# Patient Record
Sex: MALE | Race: WHITE | NOT HISPANIC OR LATINO | Employment: OTHER | URBAN - METROPOLITAN AREA
[De-identification: names, ages, dates, MRNs, and addresses within clinical notes are randomized per-mention and may not be internally consistent; named-entity substitution may affect disease eponyms.]

---

## 2020-01-14 ENCOUNTER — TRANSCRIBE ORDERS (OUTPATIENT)
Dept: ADMINISTRATIVE | Facility: HOSPITAL | Age: 65
End: 2020-01-14

## 2020-01-14 DIAGNOSIS — R07.9 CHEST PAIN, UNSPECIFIED TYPE: Primary | ICD-10-CM

## 2020-01-27 ENCOUNTER — HOSPITAL ENCOUNTER (OUTPATIENT)
Dept: NON INVASIVE DIAGNOSTICS | Facility: CLINIC | Age: 65
Discharge: HOME | End: 2020-01-27
Payer: COMMERCIAL

## 2020-01-27 DIAGNOSIS — R07.9 CHEST PAIN, UNSPECIFIED TYPE: ICD-10-CM

## 2020-01-27 PROCEDURE — 78452 HT MUSCLE IMAGE SPECT MULT: CPT

## 2020-01-27 PROCEDURE — 93016 CV STRESS TEST SUPVJ ONLY: CPT | Performed by: INTERNAL MEDICINE

## 2020-01-27 PROCEDURE — 78452 HT MUSCLE IMAGE SPECT MULT: CPT | Performed by: INTERNAL MEDICINE

## 2020-01-27 PROCEDURE — 93017 CV STRESS TEST TRACING ONLY: CPT

## 2020-01-27 PROCEDURE — A9502 TC99M TETROFOSMIN: HCPCS

## 2020-01-27 PROCEDURE — 93018 CV STRESS TEST I&R ONLY: CPT | Performed by: INTERNAL MEDICINE

## 2020-01-27 RX ADMIN — REGADENOSON 0.4 MG: 0.08 INJECTION, SOLUTION INTRAVENOUS at 11:15

## 2020-01-28 LAB
CHEST PAIN STATEMENT: NORMAL
MAX DIASTOLIC BP: 80 MMHG
MAX HEART RATE: 110 BPM
MAX PREDICTED HEART RATE: 157 BPM
MAX. SYSTOLIC BP: 154 MMHG
PROTOCOL NAME: NORMAL
REASON FOR TERMINATION: NORMAL
TARGET HR FORMULA: NORMAL
TIME IN EXERCISE PHASE: NORMAL

## 2021-03-29 ENCOUNTER — APPOINTMENT (EMERGENCY)
Dept: CT IMAGING | Facility: HOSPITAL | Age: 66
End: 2021-03-29
Payer: COMMERCIAL

## 2021-03-29 ENCOUNTER — APPOINTMENT (OUTPATIENT)
Dept: NON INVASIVE DIAGNOSTICS | Facility: HOSPITAL | Age: 66
End: 2021-03-29
Payer: COMMERCIAL

## 2021-03-29 ENCOUNTER — APPOINTMENT (EMERGENCY)
Dept: RADIOLOGY | Facility: HOSPITAL | Age: 66
End: 2021-03-29
Payer: COMMERCIAL

## 2021-03-29 ENCOUNTER — APPOINTMENT (OUTPATIENT)
Dept: MRI IMAGING | Facility: HOSPITAL | Age: 66
End: 2021-03-29
Payer: COMMERCIAL

## 2021-03-29 ENCOUNTER — APPOINTMENT (OUTPATIENT)
Dept: VASCULAR ULTRASOUND | Facility: HOSPITAL | Age: 66
End: 2021-03-29
Payer: COMMERCIAL

## 2021-03-29 ENCOUNTER — HOSPITAL ENCOUNTER (OUTPATIENT)
Facility: HOSPITAL | Age: 66
Setting detail: OBSERVATION
Discharge: HOME | End: 2021-03-30
Attending: EMERGENCY MEDICINE | Admitting: HOSPITALIST
Payer: COMMERCIAL

## 2021-03-29 DIAGNOSIS — N17.9 AKI (ACUTE KIDNEY INJURY) (HCC): ICD-10-CM

## 2021-03-29 DIAGNOSIS — I10 HTN (HYPERTENSION): ICD-10-CM

## 2021-03-29 DIAGNOSIS — F10.10 ALCOHOL ABUSE: ICD-10-CM

## 2021-03-29 DIAGNOSIS — E78.5 HYPERLIPIDEMIA, UNSPECIFIED HYPERLIPIDEMIA TYPE: ICD-10-CM

## 2021-03-29 DIAGNOSIS — E87.1 HYPONATREMIA: ICD-10-CM

## 2021-03-29 DIAGNOSIS — G45.9 TIA (TRANSIENT ISCHEMIC ATTACK): Primary | ICD-10-CM

## 2021-03-29 DIAGNOSIS — I63.411 CEREBROVASCULAR ACCIDENT (CVA) DUE TO EMBOLISM OF RIGHT MIDDLE CEREBRAL ARTERY (HCC): ICD-10-CM

## 2021-03-29 DIAGNOSIS — Z72.0 TOBACCO USE: ICD-10-CM

## 2021-03-29 DIAGNOSIS — I65.21 SYMPTOMATIC CAROTID ARTERY STENOSIS, RIGHT: ICD-10-CM

## 2021-03-29 PROBLEM — R79.89 ELEVATED SERUM CREATININE: Status: ACTIVE | Noted: 2021-03-29

## 2021-03-29 PROBLEM — R29.898 LEFT ARM WEAKNESS: Status: ACTIVE | Noted: 2021-03-29

## 2021-03-29 PROBLEM — J44.9 COPD (CHRONIC OBSTRUCTIVE PULMONARY DISEASE) (HCC): Status: ACTIVE | Noted: 2021-03-29

## 2021-03-29 PROBLEM — I63.9 STROKE (CEREBRUM) (HCC): Status: ACTIVE | Noted: 2021-03-29

## 2021-03-29 PROBLEM — D64.9 ANEMIA: Status: ACTIVE | Noted: 2021-03-29

## 2021-03-29 LAB
ALBUMIN SERPL BCP-MCNC: 3.6 G/DL (ref 3.5–5)
ALP SERPL-CCNC: 41 U/L (ref 46–116)
ALT SERPL W P-5'-P-CCNC: 18 U/L (ref 12–78)
ANION GAP SERPL CALCULATED.3IONS-SCNC: 11 MMOL/L (ref 4–13)
ANION GAP SERPL CALCULATED.3IONS-SCNC: 11 MMOL/L (ref 4–13)
ANION GAP SERPL CALCULATED.3IONS-SCNC: 9 MMOL/L (ref 4–13)
APTT PPP: 37 SECONDS (ref 23–37)
AST SERPL W P-5'-P-CCNC: 24 U/L (ref 5–45)
ATRIAL RATE: 71 BPM
BASOPHILS # BLD AUTO: 0.04 THOUSANDS/ΜL (ref 0–0.1)
BASOPHILS NFR BLD AUTO: 1 % (ref 0–1)
BILIRUB SERPL-MCNC: 0.32 MG/DL (ref 0.2–1)
BUN SERPL-MCNC: 14 MG/DL (ref 5–25)
BUN SERPL-MCNC: 16 MG/DL (ref 5–25)
BUN SERPL-MCNC: 16 MG/DL (ref 5–25)
CALCIUM SERPL-MCNC: 8.6 MG/DL (ref 8.3–10.1)
CALCIUM SERPL-MCNC: 8.7 MG/DL (ref 8.3–10.1)
CALCIUM SERPL-MCNC: 8.8 MG/DL (ref 8.3–10.1)
CHLORIDE SERPL-SCNC: 102 MMOL/L (ref 100–108)
CHLORIDE SERPL-SCNC: 107 MMOL/L (ref 100–108)
CHLORIDE SERPL-SCNC: 97 MMOL/L (ref 100–108)
CHOLEST SERPL-MCNC: 190 MG/DL (ref 50–200)
CO2 SERPL-SCNC: 24 MMOL/L (ref 21–32)
CO2 SERPL-SCNC: 25 MMOL/L (ref 21–32)
CO2 SERPL-SCNC: 25 MMOL/L (ref 21–32)
CREAT SERPL-MCNC: 1.23 MG/DL (ref 0.6–1.3)
CREAT SERPL-MCNC: 1.3 MG/DL (ref 0.6–1.3)
CREAT SERPL-MCNC: 1.35 MG/DL (ref 0.6–1.3)
EOSINOPHIL # BLD AUTO: 0.21 THOUSAND/ΜL (ref 0–0.61)
EOSINOPHIL NFR BLD AUTO: 3 % (ref 0–6)
ERYTHROCYTE [DISTWIDTH] IN BLOOD BY AUTOMATED COUNT: 14.4 % (ref 11.6–15.1)
ERYTHROCYTE [DISTWIDTH] IN BLOOD BY AUTOMATED COUNT: 14.5 % (ref 11.6–15.1)
EST. AVERAGE GLUCOSE BLD GHB EST-MCNC: 120 MG/DL
ETHANOL SERPL-MCNC: 31 MG/DL (ref 0–3)
FERRITIN SERPL-MCNC: 12 NG/ML (ref 8–388)
FOLATE SERPL-MCNC: >20 NG/ML (ref 3.1–17.5)
GFR SERPL CREATININE-BSD FRML MDRD: 55 ML/MIN/1.73SQ M
GFR SERPL CREATININE-BSD FRML MDRD: 58 ML/MIN/1.73SQ M
GFR SERPL CREATININE-BSD FRML MDRD: 62 ML/MIN/1.73SQ M
GLUCOSE P FAST SERPL-MCNC: 111 MG/DL (ref 65–99)
GLUCOSE SERPL-MCNC: 111 MG/DL (ref 65–140)
GLUCOSE SERPL-MCNC: 115 MG/DL (ref 65–140)
GLUCOSE SERPL-MCNC: 145 MG/DL (ref 65–140)
GLUCOSE SERPL-MCNC: 99 MG/DL (ref 65–140)
HBA1C MFR BLD: 5.8 %
HCT VFR BLD AUTO: 32.3 % (ref 36.5–49.3)
HCT VFR BLD AUTO: 36.8 % (ref 36.5–49.3)
HDLC SERPL-MCNC: 59 MG/DL
HEMOCCULT STL QL: NEGATIVE
HGB BLD-MCNC: 11.2 G/DL (ref 12–17)
HGB BLD-MCNC: 9.8 G/DL (ref 12–17)
IMM GRANULOCYTES # BLD AUTO: 0.03 THOUSAND/UL (ref 0–0.2)
IMM GRANULOCYTES NFR BLD AUTO: 0 % (ref 0–2)
INR PPP: 1.01 (ref 0.84–1.19)
IRON SATN MFR SERPL: 6 %
IRON SERPL-MCNC: 26 UG/DL (ref 65–175)
LDLC SERPL CALC-MCNC: 91 MG/DL (ref 0–100)
LYMPHOCYTES # BLD AUTO: 1.98 THOUSANDS/ΜL (ref 0.6–4.47)
LYMPHOCYTES NFR BLD AUTO: 23 % (ref 14–44)
MAGNESIUM SERPL-MCNC: 1.2 MG/DL (ref 1.6–2.6)
MCH RBC QN AUTO: 23.4 PG (ref 26.8–34.3)
MCH RBC QN AUTO: 23.7 PG (ref 26.8–34.3)
MCHC RBC AUTO-ENTMCNC: 30.3 G/DL (ref 31.4–37.4)
MCHC RBC AUTO-ENTMCNC: 30.4 G/DL (ref 31.4–37.4)
MCV RBC AUTO: 77 FL (ref 82–98)
MCV RBC AUTO: 78 FL (ref 82–98)
MONOCYTES # BLD AUTO: 0.72 THOUSAND/ΜL (ref 0.17–1.22)
MONOCYTES NFR BLD AUTO: 9 % (ref 4–12)
NEUTROPHILS # BLD AUTO: 5.5 THOUSANDS/ΜL (ref 1.85–7.62)
NEUTS SEG NFR BLD AUTO: 64 % (ref 43–75)
NRBC BLD AUTO-RTO: 0 /100 WBCS
P AXIS: -13 DEGREES
PLATELET # BLD AUTO: 184 THOUSANDS/UL (ref 149–390)
PLATELET # BLD AUTO: 276 THOUSANDS/UL (ref 149–390)
PMV BLD AUTO: 10.4 FL (ref 8.9–12.7)
PMV BLD AUTO: 10.5 FL (ref 8.9–12.7)
POTASSIUM SERPL-SCNC: 3.2 MMOL/L (ref 3.5–5.3)
POTASSIUM SERPL-SCNC: 4 MMOL/L (ref 3.5–5.3)
POTASSIUM SERPL-SCNC: 4.1 MMOL/L (ref 3.5–5.3)
PR INTERVAL: 136 MS
PROT SERPL-MCNC: 7.7 G/DL (ref 6.4–8.2)
PROTHROMBIN TIME: 13.4 SECONDS (ref 11.6–14.5)
QRS AXIS: 63 DEGREES
QRSD INTERVAL: 74 MS
QT INTERVAL: 376 MS
QTC INTERVAL: 414 MS
RBC # BLD AUTO: 4.18 MILLION/UL (ref 3.88–5.62)
RBC # BLD AUTO: 4.72 MILLION/UL (ref 3.88–5.62)
SODIUM SERPL-SCNC: 132 MMOL/L (ref 136–145)
SODIUM SERPL-SCNC: 138 MMOL/L (ref 136–145)
SODIUM SERPL-SCNC: 141 MMOL/L (ref 136–145)
T WAVE AXIS: 72 DEGREES
TIBC SERPL-MCNC: 467 UG/DL (ref 250–450)
TRIGL SERPL-MCNC: 199 MG/DL
TROPONIN I SERPL-MCNC: <0.02 NG/ML
TSH SERPL DL<=0.05 MIU/L-ACNC: 1.69 UIU/ML (ref 0.36–3.74)
VENTRICULAR RATE: 73 BPM
VIT B12 SERPL-MCNC: 409 PG/ML (ref 100–900)
WBC # BLD AUTO: 5.26 THOUSAND/UL (ref 4.31–10.16)
WBC # BLD AUTO: 8.48 THOUSAND/UL (ref 4.31–10.16)

## 2021-03-29 PROCEDURE — 97162 PT EVAL MOD COMPLEX 30 MIN: CPT

## 2021-03-29 PROCEDURE — 82948 REAGENT STRIP/BLOOD GLUCOSE: CPT

## 2021-03-29 PROCEDURE — 70450 CT HEAD/BRAIN W/O DYE: CPT

## 2021-03-29 PROCEDURE — 97166 OT EVAL MOD COMPLEX 45 MIN: CPT

## 2021-03-29 PROCEDURE — 83036 HEMOGLOBIN GLYCOSYLATED A1C: CPT | Performed by: INTERNAL MEDICINE

## 2021-03-29 PROCEDURE — 85027 COMPLETE CBC AUTOMATED: CPT | Performed by: INTERNAL MEDICINE

## 2021-03-29 PROCEDURE — 93306 TTE W/DOPPLER COMPLETE: CPT

## 2021-03-29 PROCEDURE — 82272 OCCULT BLD FECES 1-3 TESTS: CPT | Performed by: INTERNAL MEDICINE

## 2021-03-29 PROCEDURE — 93010 ELECTROCARDIOGRAM REPORT: CPT | Performed by: INTERNAL MEDICINE

## 2021-03-29 PROCEDURE — 99285 EMERGENCY DEPT VISIT HI MDM: CPT

## 2021-03-29 PROCEDURE — 80048 BASIC METABOLIC PNL TOTAL CA: CPT | Performed by: INTERNAL MEDICINE

## 2021-03-29 PROCEDURE — 93880 EXTRACRANIAL BILAT STUDY: CPT | Performed by: SURGERY

## 2021-03-29 PROCEDURE — 80053 COMPREHEN METABOLIC PANEL: CPT | Performed by: EMERGENCY MEDICINE

## 2021-03-29 PROCEDURE — 84443 ASSAY THYROID STIM HORMONE: CPT | Performed by: EMERGENCY MEDICINE

## 2021-03-29 PROCEDURE — 99205 OFFICE O/P NEW HI 60 MIN: CPT | Performed by: PSYCHIATRY & NEUROLOGY

## 2021-03-29 PROCEDURE — 71045 X-RAY EXAM CHEST 1 VIEW: CPT

## 2021-03-29 PROCEDURE — 82728 ASSAY OF FERRITIN: CPT | Performed by: INTERNAL MEDICINE

## 2021-03-29 PROCEDURE — 82607 VITAMIN B-12: CPT | Performed by: INTERNAL MEDICINE

## 2021-03-29 PROCEDURE — 85730 THROMBOPLASTIN TIME PARTIAL: CPT | Performed by: EMERGENCY MEDICINE

## 2021-03-29 PROCEDURE — 82746 ASSAY OF FOLIC ACID SERUM: CPT | Performed by: INTERNAL MEDICINE

## 2021-03-29 PROCEDURE — 84484 ASSAY OF TROPONIN QUANT: CPT | Performed by: EMERGENCY MEDICINE

## 2021-03-29 PROCEDURE — 85610 PROTHROMBIN TIME: CPT | Performed by: EMERGENCY MEDICINE

## 2021-03-29 PROCEDURE — 93005 ELECTROCARDIOGRAM TRACING: CPT

## 2021-03-29 PROCEDURE — 99285 EMERGENCY DEPT VISIT HI MDM: CPT | Performed by: EMERGENCY MEDICINE

## 2021-03-29 PROCEDURE — 82077 ASSAY SPEC XCP UR&BREATH IA: CPT | Performed by: INTERNAL MEDICINE

## 2021-03-29 PROCEDURE — 36415 COLL VENOUS BLD VENIPUNCTURE: CPT | Performed by: EMERGENCY MEDICINE

## 2021-03-29 PROCEDURE — 83735 ASSAY OF MAGNESIUM: CPT | Performed by: INTERNAL MEDICINE

## 2021-03-29 PROCEDURE — 80061 LIPID PANEL: CPT | Performed by: INTERNAL MEDICINE

## 2021-03-29 PROCEDURE — 93880 EXTRACRANIAL BILAT STUDY: CPT

## 2021-03-29 PROCEDURE — 83550 IRON BINDING TEST: CPT | Performed by: INTERNAL MEDICINE

## 2021-03-29 PROCEDURE — 99220 PR INITIAL OBSERVATION CARE/DAY 70 MINUTES: CPT | Performed by: INTERNAL MEDICINE

## 2021-03-29 PROCEDURE — 93306 TTE W/DOPPLER COMPLETE: CPT | Performed by: INTERNAL MEDICINE

## 2021-03-29 PROCEDURE — G1004 CDSM NDSC: HCPCS

## 2021-03-29 PROCEDURE — 70551 MRI BRAIN STEM W/O DYE: CPT

## 2021-03-29 PROCEDURE — 83540 ASSAY OF IRON: CPT | Performed by: INTERNAL MEDICINE

## 2021-03-29 RX ORDER — ACETAMINOPHEN 325 MG/1
650 TABLET ORAL EVERY 4 HOURS PRN
Status: DISCONTINUED | OUTPATIENT
Start: 2021-03-29 | End: 2021-03-30 | Stop reason: HOSPADM

## 2021-03-29 RX ORDER — LANOLIN ALCOHOL/MO/W.PET/CERES
100 CREAM (GRAM) TOPICAL DAILY
Status: DISCONTINUED | OUTPATIENT
Start: 2021-03-29 | End: 2021-03-30 | Stop reason: HOSPADM

## 2021-03-29 RX ORDER — SERTRALINE HYDROCHLORIDE 100 MG/1
100 TABLET, FILM COATED ORAL DAILY
COMMUNITY
Start: 2011-01-19

## 2021-03-29 RX ORDER — PANTOPRAZOLE SODIUM 20 MG/1
20 TABLET, DELAYED RELEASE ORAL
Status: DISCONTINUED | OUTPATIENT
Start: 2021-03-29 | End: 2021-03-30 | Stop reason: HOSPADM

## 2021-03-29 RX ORDER — ASPIRIN 81 MG/1
81 TABLET ORAL DAILY
Status: DISCONTINUED | OUTPATIENT
Start: 2021-03-30 | End: 2021-03-30 | Stop reason: HOSPADM

## 2021-03-29 RX ORDER — HEPARIN SODIUM 5000 [USP'U]/ML
5000 INJECTION, SOLUTION INTRAVENOUS; SUBCUTANEOUS EVERY 8 HOURS SCHEDULED
Status: DISCONTINUED | OUTPATIENT
Start: 2021-03-29 | End: 2021-03-30 | Stop reason: HOSPADM

## 2021-03-29 RX ORDER — AMLODIPINE BESYLATE 10 MG/1
10 TABLET ORAL DAILY
Status: DISCONTINUED | OUTPATIENT
Start: 2021-03-29 | End: 2021-03-30

## 2021-03-29 RX ORDER — OMEPRAZOLE 20 MG/1
20 TABLET, DELAYED RELEASE ORAL DAILY
COMMUNITY
End: 2021-03-30 | Stop reason: HOSPADM

## 2021-03-29 RX ORDER — LANSOPRAZOLE 30 MG/1
CAPSULE, DELAYED RELEASE ORAL
COMMUNITY
Start: 2010-11-08 | End: 2021-04-02

## 2021-03-29 RX ORDER — SERTRALINE HYDROCHLORIDE 100 MG/1
100 TABLET, FILM COATED ORAL DAILY
Status: DISCONTINUED | OUTPATIENT
Start: 2021-03-29 | End: 2021-03-30 | Stop reason: HOSPADM

## 2021-03-29 RX ORDER — LABETALOL 20 MG/4 ML (5 MG/ML) INTRAVENOUS SYRINGE
10 EVERY 6 HOURS PRN
Status: DISCONTINUED | OUTPATIENT
Start: 2021-03-29 | End: 2021-03-30 | Stop reason: HOSPADM

## 2021-03-29 RX ORDER — CLOPIDOGREL BISULFATE 75 MG/1
75 TABLET ORAL DAILY
Status: DISCONTINUED | OUTPATIENT
Start: 2021-03-29 | End: 2021-03-30 | Stop reason: HOSPADM

## 2021-03-29 RX ORDER — ACLIDINIUM BROMIDE 400 UG/1
1 POWDER, METERED RESPIRATORY (INHALATION) 2 TIMES DAILY
COMMUNITY
End: 2022-06-23 | Stop reason: ALTCHOICE

## 2021-03-29 RX ORDER — SIMVASTATIN 40 MG
TABLET ORAL
COMMUNITY
Start: 2011-01-19 | End: 2021-03-30 | Stop reason: HOSPADM

## 2021-03-29 RX ORDER — SODIUM CHLORIDE 9 MG/ML
125 INJECTION, SOLUTION INTRAVENOUS CONTINUOUS
Status: DISCONTINUED | OUTPATIENT
Start: 2021-03-29 | End: 2021-03-30 | Stop reason: HOSPADM

## 2021-03-29 RX ORDER — ATORVASTATIN CALCIUM 40 MG/1
40 TABLET, FILM COATED ORAL EVERY EVENING
Status: DISCONTINUED | OUTPATIENT
Start: 2021-03-29 | End: 2021-03-30 | Stop reason: HOSPADM

## 2021-03-29 RX ORDER — ALBUTEROL SULFATE 90 UG/1
2 AEROSOL, METERED RESPIRATORY (INHALATION) EVERY 4 HOURS PRN
Status: DISCONTINUED | OUTPATIENT
Start: 2021-03-29 | End: 2021-03-30 | Stop reason: HOSPADM

## 2021-03-29 RX ORDER — ASPIRIN 81 MG/1
243 TABLET, CHEWABLE ORAL ONCE
Status: COMPLETED | OUTPATIENT
Start: 2021-03-29 | End: 2021-03-29

## 2021-03-29 RX ORDER — FOLIC ACID 1 MG/1
1 TABLET ORAL DAILY
Status: DISCONTINUED | OUTPATIENT
Start: 2021-03-29 | End: 2021-03-30 | Stop reason: HOSPADM

## 2021-03-29 RX ORDER — AMLODIPINE BESYLATE AND BENAZEPRIL HYDROCHLORIDE 5; 10 MG/1; MG/1
CAPSULE ORAL
Status: ON HOLD | COMMUNITY
Start: 2011-01-19 | End: 2021-04-14 | Stop reason: SDUPTHER

## 2021-03-29 RX ADMIN — AMLODIPINE BESYLATE 10 MG: 10 TABLET ORAL at 09:29

## 2021-03-29 RX ADMIN — ATORVASTATIN CALCIUM 40 MG: 40 TABLET, FILM COATED ORAL at 18:46

## 2021-03-29 RX ADMIN — SERTRALINE HYDROCHLORIDE 100 MG: 100 TABLET ORAL at 09:37

## 2021-03-29 RX ADMIN — FOLIC ACID 1 MG: 1 TABLET ORAL at 09:29

## 2021-03-29 RX ADMIN — HEPARIN SODIUM 5000 UNITS: 5000 INJECTION INTRAVENOUS; SUBCUTANEOUS at 14:26

## 2021-03-29 RX ADMIN — HEPARIN SODIUM 5000 UNITS: 5000 INJECTION INTRAVENOUS; SUBCUTANEOUS at 22:45

## 2021-03-29 RX ADMIN — CLOPIDOGREL BISULFATE 75 MG: 75 TABLET ORAL at 09:29

## 2021-03-29 RX ADMIN — ASPIRIN 243 MG: 81 TABLET, CHEWABLE ORAL at 02:39

## 2021-03-29 RX ADMIN — SODIUM CHLORIDE 125 ML/HR: 0.9 INJECTION, SOLUTION INTRAVENOUS at 02:34

## 2021-03-29 RX ADMIN — SODIUM CHLORIDE 500 ML: 0.9 INJECTION, SOLUTION INTRAVENOUS at 01:31

## 2021-03-29 RX ADMIN — PANTOPRAZOLE SODIUM 20 MG: 20 TABLET, DELAYED RELEASE ORAL at 06:04

## 2021-03-29 RX ADMIN — SODIUM CHLORIDE 125 ML/HR: 0.9 INJECTION, SOLUTION INTRAVENOUS at 19:33

## 2021-03-29 RX ADMIN — THIAMINE HCL TAB 100 MG 100 MG: 100 TAB at 09:29

## 2021-03-29 RX ADMIN — Medication 1 TABLET: at 10:31

## 2021-03-29 RX ADMIN — IOHEXOL 85 ML: 350 INJECTION, SOLUTION INTRAVENOUS at 20:11

## 2021-03-29 RX ADMIN — HEPARIN SODIUM 5000 UNITS: 5000 INJECTION INTRAVENOUS; SUBCUTANEOUS at 06:04

## 2021-03-29 NOTE — OCCUPATIONAL THERAPY NOTE
Occupational Therapy Evaluation     Patient Name: Lewis SHAH Date: 3/29/2021  Problem List  Principal Problem:    Stroke (cerebrum) [facility])  Active Problems:    Left arm weakness    Tobacco use    Alcohol abuse    HTN (hypertension)    HLD (hyperlipidemia)    Hyponatremia    Elevated serum creatinine    COPD (chronic obstructive pulmonary disease) ([facility] [address] )    Anemia    Past Medical History  Past Medical History:   Diagnosis Date    Anxiety     [facility]) [facility]     Lumbar back pain      Past Surgical History  History reviewed  No pertinent surgical history  03/29/21 1519   OT Last Visit   OT Visit Date 03/29/21   Note Type   Note type Evaluation   Restrictions/Precautions   Weight Bearing Precautions Per Order No   Other Precautions Multiple lines   Pain Assessment   Pain Assessment Tool 0-10   Pain Score No Pain   Home Living   Type of Home Mobile home   Home Layout One level; Able to live on main level with bedroom/bathroom;Stairs to enter with rails  ([address] [facility] [facility])   Bathroom Shower/Tub Tub/shower unit   Bathroom Toilet Standard   Bathroom Equipment Grab bars in shower   [address]   (none)   Additional Comments reports sleeping on couch mostly   Prior Function   Lives With [facility], [address] in the last 6 months 0   Vocational On disability   Comments (+)drives   Lifestyle   Autonomy PTA pt living alone in mobile home, pt (I) with ADLs and IADLs, no use of AD at baseline, (-)falls, (+)drives   Reciprocal Relationships supportive sister and neice   Service to Others on disability   Intrinsic Gratification likes being with cat "Demetrius", and cleaning   ADL   Eating Assistance 7  Independent   Grooming Assistance 7  Independent   UB Bathing Assistance 7  Independent   LB Bathing Assistance [address] [facility] [id number] [facility] Toileting Assistance  7  Independent   Bed Mobility   Supine to Sit 6  Modified independent   Additional items Increased time required   Additional Comments OOB and in chair at end of session   Transfers   Sit to Stand 7  Independent   Stand to Sit 7  Independent   Stand pivot 7  Independent   Toilet transfer 7  Independent   Functional Mobility   Functional Mobility 7  Independent   Balance   Static Sitting Normal   Dynamic Sitting Normal   Static Standing Normal   Dynamic Standing Normal   Ambulatory Normal   Activity Tolerance   Activity Tolerance Patient tolerated treatment well   Medical Staff Made Aware PT ÁNGEL Harrison Assessment   RUE Assessment [facility]   RUE Strength   RUE Overall Strength Within Functional Limits - able to perform ADL tasks with strength   LUE Assessment   LUE Assessment WFL   LUE Strength   LUE Overall Strength Within Functional Limits - able to perform ADL tasks with strength   Hand Function   Gross Motor Coordination Functional   Fine Motor Coordination Functional  (reports numbness on back of hand below wrist)   Sensation   Light Touch   (light deficits on back of hand)   Sharp/Dull No apparent deficits   Proprioception   Proprioception No apparent deficits   Vision-Basic Assessment   Current Vision Wears glasses only for reading   Vision - Complex Assessment   Ocular Range of Motion [facility]   Tracking Able to track stimulus in all quads without difficulty   Acuity Able to read clock/calendar on wall without difficulty   Cognition   Overall Cognitive Status [facility]   Arousal/Participation Alert; Cooperative   Attention Within functional limits   Orientation Level Oriented X4   Memory Within functional limits   Following Commands Follows all commands and directions without difficulty   Comments Pleasant and cooperative   Assessment   Prognosis Good   Assessment Patient is a 60 y o  male admitted to [address] [place] [address] on 3/29/2021 due to Left arm weakness, pt with stroke (cerebrum)   Pt admitted under stroke pathway  Comorbidities affecting pt's physical performance at time of assessment include L arm weakness, tobacco use, alcohol abuse, HTN, HLD, COPD  Patient has active OT orders and activity orders for Up and OOB as tolerated   PTA pt living alone in mobile home, pt (I) with ADLs and IADLs, no use of AD at baseline, (-)falls, (+)drives  Personal factors affecting pt at time of IE include:steps to enter environment  At the time of evaluation patient currently requires (I) for UB ADLs, is (I) for LB ADLs, (I) for functional transfers, and (I) for functional mobility  No further acute OT needs identified at this time  Recommend continued mobilization with hospital staff and restorative services while in the hospital to increase pts endurance and strength upon D/C  From OT standpoint, recommend D/C to home independently when medically cleared  D/C pt from OT caseload at this time  The patient's raw score on the AM-PAC Daily Activity inpatient short form is 24, standardized score is 57 54, greater than 39 4  Patients at this level are likely to benefit from discharge to home     Goals   Patient Goals to go home   Plan   OT Frequency Eval only   Recommendation   OT Discharge Recommendation Return to previous environment with no needs   AM-PAC Daily Activity Inpatient   Lower Body Dressing 4   Bathing 4   Toileting 4   Upper Body Dressing 4   Grooming 4   Eating 4   Daily Activity Raw Score 24   Daily Activity Standardized Score (Calc for Raw Score >=11) 57 54   AM-PAC Applied Cognition Inpatient   Following a Speech/Presentation 4   Understanding Ordinary Conversation 4   Taking Medications 4   Remembering Where Things Are Placed or Put Away 4   Remembering List of 4-5 Errands 4   Taking Care of Complicated Tasks 4   Applied Cognition Raw Score 24   Applied Cognition Standardized Score 62 21   Barthel Index   Feeding 10   Bathing 5   Grooming Score 5   Dressing Score 10   Bladder Score 10   Bowels Score 10   Toilet Use Score 10   Transfers (Bed/Chair) Score 15   Mobility (Level Surface) Score 15   Stairs Score 10   Barthel Index Score 100     Pt with no identified skilled OT needs at this time, will d/c OT orders        At the end of the session, all needs met and pt seated in bedside chair    Zoila Mcneil OTR/L

## 2021-03-29 NOTE — ED PROVIDER NOTES
History  Chief Complaint   Patient presents with    Extremity Weakness     PT stated that an hour ago he experienced left arm numbness/tingling/inablility to move arm, pt took an aspirin  This episode subsided after half an hour  Pt is back to baseline now, no noted deficits     Patient is a 60year old male with onset of left arm and hand numbness and weakness while watching TV tonight about 1 hour ago which lasted for about 45 minutes and has since resolved  No prior episodes  No headache  No chest pain  No fever  Has COPD and has chronic sob  Took low dose ASA prior to arrival  (+) smokes 5 cigarettes a week and is on Chantix  No recent old records from this ED seen on computer system  [facility] website checked on this patient and last Rx filled was on 3/10/21 for xanax for 30 day supply  History provided by:  Patient (neighbor)   used: No    Extremity Weakness  Associated symptoms: shortness of breath (chronic)    Associated symptoms: no chest pain, no fever, no headaches, no nausea and no vomiting        Prior to Admission Medications   Prescriptions Last Dose Informant Patient Reported? Taking? ALBUTEROL IN   Yes Yes   Sig: Inhale every 4 (four) hours as needed Unknown dose   amLODIPine-benazepril (LOTREL 5-10) 5-10 MG per capsule   Yes Yes   Sig: Take by mouth   lansoprazole (PREVACID) 30 mg capsule   Yes Yes   Sig: Take by mouth   omeprazole (PriLOSEC OTC) 20 MG tablet   Yes Yes   Sig: Take 20 mg by mouth daily   sertraline (ZOLOFT) 100 mg tablet   Yes Yes   Sig: Take by mouth   simvastatin (ZOCOR) 40 mg tablet   Yes Yes   Sig: Take by mouth      Facility-Administered Medications: None       Past Medical History:   Diagnosis Date    Anxiety     COPD (chronic obstructive pulmonary disease) (HCC)     Lumbar back pain        History reviewed  No pertinent surgical history  History reviewed  No pertinent family history    I have reviewed and agree with the history as documented  E-Cigarette/Vaping     E-Cigarette/Vaping Substances     Social History     Tobacco Use    Smoking status: Light Tobacco Smoker    Smokeless tobacco: Never Used   Substance Use Topics    Alcohol use: Yes     Frequency: 4 or more times a week     Drinks per session: 7 to 9     Binge frequency: Weekly    Drug use: Not on file       Review of Systems   Constitutional: Negative for fever  Respiratory: Positive for shortness of breath (chronic)  Cardiovascular: Negative for chest pain  Gastrointestinal: Negative for nausea and vomiting  Musculoskeletal: Positive for extremity weakness  Neurological: Positive for weakness and numbness  Negative for headaches  All other systems reviewed and are negative  Physical Exam  Physical Exam  Vitals signs and nursing note reviewed  Constitutional:       General: He is in acute distress (mild)  HENT:      Head: Normocephalic and atraumatic  Mouth/Throat:      Mouth: Mucous membranes are moist       Pharynx: No posterior oropharyngeal erythema  Eyes:      General: No scleral icterus  Extraocular Movements: Extraocular movements intact  Pupils: Pupils are equal, round, and reactive to light  Neck:      Musculoskeletal: Normal range of motion and neck supple  Cardiovascular:      Rate and Rhythm: Normal rate and regular rhythm  Heart sounds: Normal heart sounds  No murmur  Pulmonary:      Effort: Pulmonary effort is normal  No respiratory distress  Breath sounds: Normal breath sounds  No stridor  No wheezing or rhonchi  Abdominal:      General: Bowel sounds are normal       Palpations: Abdomen is soft  Tenderness: There is no abdominal tenderness  Musculoskeletal:         General: No deformity  Right lower leg: No edema  Left lower leg: No edema  Skin:     General: Skin is warm and dry  Findings: No rash  Neurological:      General: No focal deficit present        Mental Status: He is alert and oriented to person, place, and time  Sensory: No sensory deficit  Motor: No weakness  Psychiatric:         Mood and Affect: Mood normal          Vital Signs  ED Triage Vitals   Temperature Pulse Respirations Blood Pressure SpO2   03/29/21 0019 03/29/21 0019 03/29/21 0036 03/29/21 0019 03/29/21 0019   98 °F (36 7 °C) 69 20 139/67 98 %      Temp Source Heart Rate Source Patient Position - Orthostatic VS BP Location FiO2 (%)   03/29/21 0019 03/29/21 0019 03/29/21 0019 03/29/21 0019 --   Oral Monitor Lying Right arm       Pain Score       --                  Vitals:    03/29/21 0019 03/29/21 0239   BP: 139/67 152/72   Pulse: 69 64   Patient Position - Orthostatic VS: Lying Lying         Visual Acuity      ED Medications  Medications   sodium chloride 0 9 % infusion (125 mL/hr Intravenous New Bag 3/29/21 0234)   sodium chloride 0 9 % bolus 500 mL (0 mL Intravenous Stopped 3/29/21 0234)   aspirin chewable tablet 243 mg (243 mg Oral Given 3/29/21 0239)       Diagnostic Studies  Results Reviewed     Procedure Component Value Units Date/Time    TSH, 3rd generation with Free T4 reflex [[id number]]  (Normal) Collected: 03/29/21 0035    Lab Status: Final result Specimen: Blood from Arm, Left Updated: 03/29/21 0127     TSH 3RD GENERATON 1 686 uIU/mL     Narrative:      Patients undergoing fluorescein dye angiography may retain small amounts of fluorescein in the body for 48-72 hours post procedure  Samples containing fluorescein can produce falsely depressed TSH values  If the patient had this procedure,a specimen should be resubmitted post fluorescein clearance        Troponin I [[id number]]  (Normal) Collected: 03/29/21 0035    Lab Status: Final result Specimen: Blood from Arm, Left Updated: 03/29/21 0118     Troponin I <0 02 ng/mL     Comprehensive metabolic panel [[id number]]  (Abnormal) Collected: 03/29/21 0035    Lab Status: Final result Specimen: Blood from Arm, Left Updated: 03/29/21 0117     Sodium 132 mmol/L      Potassium 4 1 mmol/L      Chloride 97 mmol/L      CO2 24 mmol/L      ANION GAP 11 mmol/L      BUN 16 mg/dL      Creatinine 1 35 mg/dL      Glucose 99 mg/dL      Calcium 8 7 mg/dL      AST 24 U/L      ALT 18 U/L      Alkaline Phosphatase 41 U/L      Total Protein 7 7 g/dL      Albumin 3 6 g/dL      Total Bilirubin 0 32 mg/dL      eGFR 55 ml/min/1 73sq m     Narrative:      National Kidney Disease Foundation guidelines for Chronic Kidney Disease (CKD):     Stage 1 with normal or high GFR (GFR > 90 mL/min/1 73 square meters)    Stage 2 Mild CKD (GFR = 60-89 mL/min/1 73 square meters)    Stage 3A Moderate CKD (GFR = 45-59 mL/min/1 73 square meters)    Stage 3B Moderate CKD (GFR = 30-44 mL/min/1 73 square meters)    Stage 4 Severe CKD (GFR = 15-29 mL/min/1 73 square meters)    Stage 5 End Stage CKD (GFR <15 mL/min/1 73 square meters)  Note: GFR calculation is accurate only with a steady state creatinine    Protime-INR [[id number]]  (Normal) Collected: 03/29/21 0035    Lab Status: Final result Specimen: Blood from Arm, Left Updated: 03/29/21 0109     Protime 13 4 seconds      INR 1 01    APTT [[id number]]  (Normal) Collected: 03/29/21 0035    Lab Status: Final result Specimen: Blood from Arm, Left Updated: 03/29/21 0109     PTT 37 seconds     Fingerstick Glucose (POCT) [[id number]]  (Normal) Collected: 03/29/21 0057    Lab Status: Final result Updated: 03/29/21 0059     POC Glucose 115 mg/dl     CBC and differential [[id number]]  (Abnormal) Collected: 03/29/21 0035    Lab Status: Final result Specimen: Blood from Arm, Left Updated: 03/29/21 0052     WBC 8 48 Thousand/uL      RBC 4 72 Million/uL      Hemoglobin 11 2 g/dL      Hematocrit 36 8 %      MCV 78 fL      MCH 23 7 pg      MCHC 30 4 g/dL      RDW 14 5 %      MPV 10 5 fL      Platelets 716 Thousands/uL      nRBC 0 /100 WBCs      Neutrophils Relative 64 %      Immat GRANS % 0 %      Lymphocytes Relative 23 %      Monocytes Relative 9 %      Eosinophils Relative 3 %      Basophils Relative 1 %      Neutrophils Absolute 5 50 Thousands/µL      Immature Grans Absolute 0 03 Thousand/uL      Lymphocytes Absolute 1 98 Thousands/µL      Monocytes Absolute 0 72 Thousand/µL      Eosinophils Absolute 0 21 Thousand/µL      Basophils Absolute 0 04 Thousands/µL                  XR chest 1 view portable   ED Interpretation by Gregg Weaver MD (03/29 0100)   No acute disease read by me  CT head without contrast   ED Interpretation by Gregg Weaver MD (03/29 0220)   FINDINGS:     PARENCHYMA:  No intracranial mass, mass effect or midline shift  No CT signs of acute infarction  No acute parenchymal hemorrhage  Moderate microangiopathic changes are present  There is a small old lacune in the left thalamus        VENTRICLES AND EXTRA-AXIAL SPACES:  Normal for the patient's age      VISUALIZED ORBITS AND PARANASAL SINUSES:  There is mild to moderate mucosal thickening in the left sphenoid sinus  There is mucosal thickening and partial opacification of the ethmoids  No evidence of acute abnormality involving the orbits      CALVARIUM AND EXTRACRANIAL SOFT TISSUES:  Normal      IMPRESSION:     No acute intracranial abnormality  Moderate microangiopathic changes are present  There is a small old lacune in the left thalamus      Paranasal sinus disease, as described above                  Workstation performed: [id number]      Final Result by Bob Mora MD (03/29 1853)      No acute intracranial abnormality  Moderate microangiopathic changes are present  There is a small old lacune in the left thalamus  Paranasal sinus disease, as described above                    Workstation performed: [id number]                    Procedures  ECG 12 Lead Documentation Only    Date/Time: 3/29/2021 12:38 AM  Performed by: Gregg Weaver MD  Authorized by: Gregg Weaver MD     Indications / Diagnosis:  TIA  ECG reviewed by me, the ED Provider: yes    Patient location:  ED  Previous ECG:     Previous ECG:  Compared to current    Comparison ECG info:  7/22/12    Similarity:  Changes noted (not s  ashleigh now)  Quality:     Tracing quality:  Limited by artifact  Rate:     ECG rate:  73    ECG rate assessment: normal    Rhythm:     Rhythm: sinus rhythm    Ectopy:     Ectopy: none    QRS:     QRS axis:  Normal    QRS intervals:  Normal  Conduction:     Conduction: normal    ST segments:     ST segments:  Normal  T waves:     T waves: normal    Comments:      I do not agree with computer reading of nonspecific T wave abnormality             ED Course  ED Course as of Mar 29 0251   Mon Mar 29, 2021   0041 EKG d/w patient  0123 IVFs ordered  Creatinine(!): 1 35   0141 Labs and CXR d/w patient  SBIRT 20yo+      Most Recent Value   SBIRT (22 yo +)   In order to provide better care to our patients, we are screening all of our patients for alcohol and drug use  Would it be okay to ask you these screening questions? Yes Filed at: 03/29/2021 0110   Initial Alcohol Screen: US AUDIT-C    1  How often do you have a drink containing alcohol? 5 Filed at: 03/29/2021 0114   2  How many drinks containing alcohol do you have on a typical day you are drinking? 5 Filed at: 03/29/2021 0110   3a  Male UNDER 65: How often do you have five or more drinks on one occasion? 5 Filed at: 03/29/2021 0114   3b  FEMALE Any Age, or MALE 65+: How often do you have 4 or more drinks on one occassion? 5 Filed at: 03/29/2021 0114   Audit-C Score  (!) 15 Filed at: 03/29/2021 0114   Full Alcohol Screen: US AUDIT   4  How often during the last year have you found that you were not able to stop drinking once you had started? 0 Filed at: 03/29/2021 0114   5  How often during past year have you failed to do what was normally expected of you because of drinking? 0 Filed at: 03/29/2021 0114   6   How often in past year have you needed a first drink in the morning to get yourself going after a heavy drinking session? 0 Filed at: 03/29/2021 0114   7  How often in past year have you had feeling of guilt or remorse after drinking? 0 Filed at: 03/29/2021 0114   8  How often in past year have you been unable to remember what happened night before because you had been drinking? 0 Filed at: 03/29/2021 0114   9  Have you or someone else been injured as a result of your drinking? 0 Filed at: 03/29/2021 0114   10  Has a relative, friend, doctor or other health worker been concerned about your drinking and suggested you cut down?   0 Filed at: 03/29/2021 0114   AUDIT Total Score  15 Filed at: 03/29/2021 0114   JAQUELIN: How many times in the past year have you    Used an illegal drug or used a prescription medication for non-medical reasons? Never Filed at: 03/29/2021 0113                    MDM  Number of Diagnoses or Management Options  Diagnosis management comments: DDX including but not limited to: TIA, CVA, metabolic abnormality, cardiac etiology, atypical migraine, seizure, tumor, thyroid disease          Amount and/or Complexity of Data Reviewed  Clinical lab tests: ordered and reviewed  Tests in the radiology section of CPT®: reviewed and ordered  Decide to obtain previous medical records or to obtain history from someone other than the patient: yes  Discuss the patient with other providers: yes  Independent visualization of images, tracings, or specimens: yes        Disposition  Final diagnoses:   TIA (transient ischemic attack)   Hyponatremia   PRITI (acute kidney injury) ([facility] [address] )     Time reflects when diagnosis was documented in both MDM as applicable and the Disposition within this note     Time User Action Codes Description Comment    3/29/2021  1:24 AM Ansley Santiago Add [G45 9] TIA (transient ischemic attack)     3/29/2021  1:24 AM Ansley Santiago Add [E87 1] Hyponatremia     3/29/2021  1:24 AM Ansley Santiago Add [N17 9] PRITI (acute kidney injury) [facility]       ED Disposition     ED Disposition Condition Date/Time Comment    Admit Stable Mon Mar 29, 2021  2:50 AM Case was discussed with [facility] resident and the patient's admission status was agreed to be Admission Status: observation status to the service of Dr Claritza Fairbanks   Follow-up Information    None         Patient's Medications   Discharge Prescriptions    No medications on file     No discharge procedures on file      PDMP Review       Value Time User    PDMP Reviewed  Yes 3/29/2021 12:11 AM Erlinda Morin MD          ED Provider  Electronically Signed by           Erlinda Morin MD  03/29/21 7991

## 2021-03-29 NOTE — ASSESSMENT & PLAN NOTE
- As per Internal Medicine this patient appears to be a heavy alcohol drinker     - Agree with instituting the CIWA protocol

## 2021-03-29 NOTE — CONSULTS
Consultation - Vascular Surgery  Chu Marin 60 y o  male MRN: [id number]  Unit/Bed#: S -01 Encounter: [id number]        Assessment/Plan     Assessment:  65M w/ R MCA CVA, suspicious for embolic in setting of R ICA stenosis of 50-69% seen on Carotid Duplex    Took ASA at home and came to ED, all symptoms resolved prior to presentation    Plan:  Care per primary  Agree with Plavix and statin, can add ASA 81  Follow-up CTA head neck  Recommend cardiology consult for surgical clearance    Patient can follow-up with vascular surgery in the outpatient setting for carotid endarterectomy if discharged  Plan to get patient scheduled for early next week surgery  History of Present Illness     HPI:  Chu Marin is a 60 y o  male who presents with left arm weakness, numbness and tingling  Patient states that this happened around 11:30 p m  On 03/28 where he fell decreased sensation in his left arm with pins and needles  The patient did note weakness in his left hand at the time  He thought he was having a stroke in took low-dose aspirin and had his neighbor drive him to the emergency department  By the time he arrived all of his symptoms had resolved stating that the episode lasted approximately 1 hour  States that he has never had this happen before however he did have an episode of momentary memory loss and confusion I year prior which did resolve  He denies any slurred speech, lightheadedness, falls, palpitations or chest pain  Patient does currently smoke 5 cigarettes a week  Reports he drinks about 8 cans of beer every other day  Review of Systems   Constitutional: Negative  HENT: Negative  Eyes: Negative  Respiratory: Negative  Cardiovascular: Negative  Gastrointestinal: Negative  Endocrine: Negative  Genitourinary: Negative  Musculoskeletal: Negative  Skin: Negative  Allergic/Immunologic: Negative  Neurological: Positive for weakness and numbness  Hematological: Negative  Psychiatric/Behavioral: Negative  Historical Information   Past Medical History:   Diagnosis Date    Anxiety     COPD (chronic obstructive pulmonary disease) ([facility] [address] )     Lumbar back pain      History reviewed  No pertinent surgical history  Social History   Social History     Substance and Sexual Activity   Alcohol Use Yes    Frequency: 4 or more times a week    Drinks per session: 7 to 9    Binge frequency: Weekly     Social History     Substance and Sexual Activity   Drug Use Not on file     Social History     Tobacco Use   Smoking Status Light Tobacco Smoker   Smokeless Tobacco Never Used     Family History: History reviewed  No pertinent family history  Meds/Allergies   PTA meds:   Prior to Admission Medications   Prescriptions Last Dose Informant Patient Reported? Taking?    ALBUTEROL IN   Yes Yes   Sig: Inhale every 4 (four) hours as needed Unknown dose   aclidinium (Tudorza Pressair) 400 MCG/ACT inhaler 3/28/2021 at Unknown time  Yes Yes   Sig: Inhale 1 puff 2 (two) times a day   amLODIPine-benazepril (LOTREL 5-10) 5-10 MG per capsule   Yes Yes   Sig: Take by mouth   lansoprazole (PREVACID) 30 mg capsule   Yes Yes   Sig: Take by mouth   omeprazole (PriLOSEC OTC) 20 MG tablet   Yes Yes   Sig: Take 20 mg by mouth daily   sertraline (ZOLOFT) 100 mg tablet   Yes Yes   Sig: Take by mouth   simvastatin (ZOCOR) 40 mg tablet   Yes Yes   Sig: Take by mouth      Facility-Administered Medications: None     Allergies   Allergen Reactions    Penicillins Anaphylaxis       Objective   First Vitals:   Blood Pressure: 139/67 (03/29/21 0019)  Pulse: 69 (03/29/21 0019)  Temperature: 98 °F (36 7 °C) (03/29/21 0019)  Temp Source: Oral (03/29/21 0019)  Respirations: 20 (03/29/21 0036)  Height: 5' 9" (175 3 cm) (03/29/21 0019)  Weight - Scale: 76 4 kg (168 lb 6 9 oz) (03/29/21 0019)  SpO2: 98 % (03/29/21 0019)    Current Vitals:   Blood Pressure: 119/93 (03/29/21 1510)  Pulse: 74 (03/29/21 1510)  Temperature: 98 5 °F (36 9 °C) (03/29/21 1510)  Temp Source: Oral (03/29/21 1510)  Respirations: 16 (03/29/21 1510)  Height: 5' 9" (175 3 cm) (03/29/21 0019)  Weight - Scale: 76 4 kg (168 lb 6 9 oz) (03/29/21 0019)  SpO2: 93 % (03/29/21 1510)      Intake/Output Summary (Last 24 hours) at 3/29/2021 1843  Last data filed at 3/29/2021 1300  Gross per 24 hour   Intake 350 ml   Output 2200 ml   Net -1850 ml       Invasive Devices     Peripheral Intravenous Line            Peripheral IV 03/29/21 Left Antecubital less than 1 day                Physical Exam  Constitutional:       General: He is not in acute distress  Appearance: He is not ill-appearing or toxic-appearing  HENT:      Head: Normocephalic and atraumatic  Nose: Nose normal       Mouth/Throat:      Mouth: Mucous membranes are moist    Eyes:      General: No scleral icterus  Neck:      Musculoskeletal: Neck supple  Cardiovascular:      Rate and Rhythm: Normal rate and regular rhythm  Pulmonary:      Effort: Pulmonary effort is normal    Abdominal:      General: Abdomen is flat  There is no distension  Palpations: Abdomen is soft  Genitourinary:     Comments: Deferred  Musculoskeletal:         General: No swelling or tenderness  Skin:     General: Skin is warm  Neurological:      General: No focal deficit present  Mental Status: He is alert and oriented to person, place, and time  Cranial Nerves: No cranial nerve deficit  Sensory: Sensory deficit (Subjective numbness to dorsal surface of hand) present  Motor: No weakness  Comments: Equal strength bilateral upper and lower extremities  Cranial nerves 2-12 intact  Patient is left hand subjectively per him still feels numb on the dorsal surface   Psychiatric:         Mood and Affect: Mood normal          Behavior: Behavior normal          Lab Results:   I have personally reviewed pertinent lab results    , CBC:   Lab Results   Component Value Date WBC 5 26 03/29/2021    HGB 9 8 (L) 03/29/2021    HCT 32 3 (L) 03/29/2021    MCV 77 (L) 03/29/2021     03/29/2021    MCH 23 4 (L) 03/29/2021    MCHC 30 3 (L) 03/29/2021    RDW 14 4 03/29/2021    MPV 10 4 03/29/2021    NRBC 0 03/29/2021   , CMP:   Lab Results   Component Value Date    SODIUM 141 03/29/2021    K 4 0 03/29/2021     03/29/2021    CO2 25 03/29/2021    BUN 16 03/29/2021    CREATININE 1 23 03/29/2021    CALCIUM 8 8 03/29/2021    AST 24 03/29/2021    ALT 18 03/29/2021    ALKPHOS 41 (L) 03/29/2021    EGFR 62 03/29/2021     Imaging: I have personally reviewed pertinent reports  and I have personally reviewed pertinent films in PACS  EKG, Pathology, and Other Studies: I have personally reviewed pertinent reports     and I have personally reviewed pertinent films in PACS    Code Status: Level 1 - Full Code  Advance Directive and Living Will:      Power of :    POLST:

## 2021-03-29 NOTE — SPEECH THERAPY NOTE
Speech Language/Pathology  Speech/Language Pathology  Assessment    Patient Name: Dre Steiner  WUEEI'X Date: 3/29/2021     Problem List  Principal Problem:    Left arm weakness  Active Problems:    Tobacco use    Alcohol abuse    HTN (hypertension)    HLD (hyperlipidemia)    Hyponatremia    Elevated serum creatinine    COPD (chronic obstructive pulmonary disease) (HCC)    Anemia    Past Medical History  Past Medical History:   Diagnosis Date    Anxiety     COPD (chronic obstructive pulmonary disease) ([address] )     Lumbar back pain      Past Surgical History  History reviewed  No pertinent surgical history  Dre Steiner is a 60 y o  male with PMH of HTN, HLD, GERD, COPD, and current smoker who presents with reports of left arm numbness and tingling and left hand weakness  He reports that at around 11:30 pm/12 am on 3/28 as he was watching TV and drinking beer, he felt decreased sensation in his left arm with pins and needles  In addition, he felt weakness in his left hand  He took a low dose aspirin and had his neighbor bring him to the ED  By the time he arrived, symptoms had resolved, with episode lasting about 1 hour  He reports that this has never happened before but last year, he had an episode of momentary memory loss and confusion which resolved  He denies any visual disturbance, slurred speech, lightheadedness, falls, nausea, vomiting, tremors, palpitations or chest pain  He has chronic cough and SOB secondary to COPD  He also reports that he drinks about 8 cans of beer every other day and last drink was at 11:30 pm, prior to coming into ED  Consult received for speech/swallow eval on stroke pathway  Pt passed nsg swallow screen; tolerating regular diet w/o s/s dysphagia or aspiration  No speech/language deficits reported  NIH score is 0  MRI: pending    No need for formal speech/swallow eval at this time  Reconsult if needed      April Rose, [address] [place] CCC-SLP  Speech Pathologist  Available via  tiger text

## 2021-03-29 NOTE — PLAN OF CARE
Problem: Potential for Falls  Goal: Patient will remain free of falls  Description: INTERVENTIONS:  - Assess patient frequently for physical needs  -  Identify cognitive and physical deficits and behaviors that affect risk of falls  -  [place] fall precautions as indicated by assessment   - Educate patient/family on patient safety including physical limitations  - Instruct patient to call for assistance with activity based on assessment  - Modify environment to reduce risk of injury  - Consider OT/PT consult to assist with strengthening/mobility  Outcome: Progressing     Problem: Neurological Deficit  Goal: Neurological status is stable or improving  Description: Interventions:  - Monitor and assess patient's level of consciousness, motor function, sensory function, and level of assistance needed for ADLs  - Monitor and report changes from baseline  Collaborate with interdisciplinary team to initiate plan and implement interventions as ordered  - Provide and maintain a safe environment  - Consider seizure precautions  - Consider fall precautions  - Consider aspiration precautions  - Consider bleeding precautions  Outcome: Progressing     Problem: Activity Intolerance/Impaired Mobility  Goal: Mobility/activity is maintained at optimum level for patient  Description: Interventions:  - Assess and monitor patient  barriers to mobility and need for assistive/adaptive devices  - Assess patient's emotional response to limitations  - Collaborate with interdisciplinary team and initiate plans and interventions as ordered  - Encourage independent activity per ability   - Maintain proper body alignment  - Perform active/passive rom as tolerated/ordered    - Plan activities to conserve energy   - Turn patient as appropriate  Outcome: Progressing     Problem: Communication Impairment  Goal: Ability to express needs and understand communication  Description: Assess patient's communication skills and ability to understand information  Patient will demonstrate use of effective communication techniques, alternative methods of communication and understanding even if not able to speak  - Encourage communication and provide alternate methods of communication as needed  - Collaborate with case management/ for discharge needs  - Include patient/family/caregiver in decisions related to communication  Outcome: Progressing     Problem: Potential for Aspiration  Goal: Non-ventilated patient's risk of aspiration is minimized  Description: Assess and monitor vital signs, respiratory status, and labs (WBC)  Monitor for signs of aspiration (tachypnea, cough, rales, wheezing, cyanosis, fever)  - Assess and monitor patient's ability to swallow  - Place patient up in chair to eat if possible  - HOB up at 90 degrees to eat if unable to get patient up into chair   - Supervise patient during oral intake  - Instruct patient/ family to take small bites  - Instruct patient/ family to take small single sips when taking liquids  - Follow patient-specific strategies generated by speech pathologist   Outcome: Progressing  Goal: Ventilated patient's risk of aspiration is minimized  Description: Assess and monitor vital signs, respiratory status, airway cuff pressure, and labs (WBC)  Monitor for signs of aspiration (tachypnea, cough, rales, wheezing, cyanosis, fever)  - Elevate head of bed 30 degrees if patient has tube feeding   - Monitor tube feeding  Outcome: Progressing     Problem: Nutrition  Goal: Nutrition/Hydration status is improving  Description: Monitor and assess patient's nutrition/hydration status for malnutrition (ex- brittle hair, bruises, dry skin, pale skin and conjunctiva, muscle wasting, smooth red tongue, and disorientation)  Collaborate with interdisciplinary team and initiate plan and interventions as ordered  Monitor patient's weight and dietary intake as ordered or per policy   Utilize nutrition screening tool and intervene per policy  Determine patient's food preferences and provide high-protein, high-caloric foods as appropriate  - Assist patient with eating   - Allow adequate time for meals   - Encourage patient to take dietary supplement as ordered  - Collaborate with clinical nutritionist   - Include patient/family/caregiver in decisions related to nutrition  Outcome: Progressing     Problem: SAFETY ADULT  Goal: Patient will remain free of falls  Description: INTERVENTIONS:  - Assess patient frequently for physical needs  -  Identify cognitive and physical deficits and behaviors that affect risk of falls    -  [place] fall precautions as indicated by assessment   - Educate patient/family on patient safety including physical limitations  - Instruct patient to call for assistance with activity based on assessment  - Modify environment to reduce risk of injury  - Consider OT/PT consult to assist with strengthening/mobility  Outcome: Progressing  Goal: Maintain or return to baseline ADL function  Description: INTERVENTIONS:  -  Assess patient's ability to carry out ADLs; assess patient's baseline for ADL function and identify physical deficits which impact ability to perform ADLs (bathing, care of mouth/teeth, toileting, grooming, dressing, etc )  - Assess/evaluate cause of self-care deficits   - Assess range of motion  - Assess patient's mobility; develop plan if impaired  - Assess patient's need for assistive devices and provide as appropriate  - Encourage maximum independence but intervene and supervise when necessary  - Involve family in performance of ADLs  - Assess for home care needs following discharge   - Consider OT consult to assist with ADL evaluation and planning for discharge  - Provide patient education as appropriate  Outcome: Progressing  Goal: Maintain or return mobility status to optimal level  Description: INTERVENTIONS:  - Assess patient's baseline mobility status (ambulation, transfers, stairs, etc )    - Identify cognitive and physical deficits and behaviors that affect mobility  - Identify mobility aids required to assist with transfers and/or ambulation (gait belt, sit-to-stand, lift, walker, cane, etc )  - [place] fall precautions as indicated by assessment  - Record patient progress and toleration of activity level on Mobility SBAR; progress patient to next Phase/Stage  - Instruct patient to call for assistance with activity based on assessment  - Consider rehabilitation consult to assist with strengthening/weightbearing, etc   Outcome: Progressing     Problem: DISCHARGE PLANNING  Goal: Discharge to home or other facility with appropriate resources  Description: INTERVENTIONS:  - Identify barriers to discharge w/patient and caregiver  - Arrange for needed discharge resources and transportation as appropriate  - Identify discharge learning needs (meds, wound care, etc )  - Arrange for interpretive services to assist at discharge as needed  - Refer to Case Management Department for coordinating discharge planning if the patient needs post-hospital services based on physician/advanced practitioner order or complex needs related to functional status, cognitive ability, or social support system  Outcome: Progressing     Problem: Knowledge Deficit  Goal: Patient/family/caregiver demonstrates understanding of disease process, treatment plan, medications, and discharge instructions  Description: Complete learning assessment and assess knowledge base    Interventions:  - Provide teaching at level of understanding  - Provide teaching via preferred learning methods  Outcome: Progressing

## 2021-03-29 NOTE — ED NOTES
Called and spoke with Willow Stack in lab, per Willow Stack, the ferritin and iron sat labs are already there and packed up to be transferred to [facility] lab       Sepideh Cronin RN  03/29/21 [id number]

## 2021-03-29 NOTE — ED NOTES
Patient reports using an inhaler called Tudorza  Called pharmacy to confirm they do NOT carry this one  PT would like to have sister bring it in so that he can continue to use it as directed  Tiger Text was sent to ELVIN MCQUEEN asking if his was ok with her       April Mary Sanches RN  03/29/21 7812

## 2021-03-29 NOTE — PROGRESS NOTES
Senior Resident Supervision Note - Admission  [facility] Internal Medicine Residency    Patient Information:     Name: Robert Strange    MRN: [id number]  Age / Sex: 60 y o  male  Unit/Bed #: @DBLINK ([facility],10977)  @ Encounter: [id number]    Senior Resident Statement:    Patient seen and examined personally  History, assessment, and plan detailed below as well as all orders were reviewed with PGY1 resident, Dr Mik Cramer  I agree with the assessment and plan with the following additions / corrections     56-year-old male with a history of hypertension, hyperlipidemia, chronic tobacco abuse and alcohol abuse presents with left distal arm numbness with associated left hand numbness and weakness of 1 hour duration  Currently with no symptoms or complaints  Denies any cardiac history  Drinks about 8 cans of beers every other day  Is down to smoking 6 cigarettes a day  Follows up with a primary care provider  Last visit was 2 days ago  Reports history of polyps on colonoscopy  Reports history of fatty liver on liver ultrasound  Sinus rhythm with slightly elevated blood pressure  Hyponatremia with possible PRITI on BMP  No prior records available for review  CT head with no acute intracranial abnormality, small old lacunar in the left thalamus  Neuro exam unremarkable  NIH SS 0, ABCD2 score 5      Assessment/Plan: Principal Problem:    Left arm weakness  Active Problems:    Tobacco use    Alcohol abuse    HTN (hypertension)    HLD (hyperlipidemia)    Hyponatremia    Elevated serum creatinine    COPD (chronic obstructive pulmonary disease) (HCC)    Anemia     Plan  · Admit stroke pathway, MRI brain, Echo,   · Consult neurology  · TSH, Vitamin B12, Hemoglobin A1c, Fasting lipid panel  · Telemetry  · Aspirin and Plavix, Statin  · Frequent neuro checks  · CIWA protocol  · Iron panel, FOBT    See Resident H&P for details      Disposition:  OBSERVATION    Expected LOS: <2 Evangelina Herrera MD

## 2021-03-29 NOTE — ASSESSMENT & PLAN NOTE
- This patient reports that he previously had been a heavy smoker   - he reports that he has been able to slow his tobacco use and he currently reports that he only smokes, to this examiner anyway, 5 cigarettes a week

## 2021-03-29 NOTE — ASSESSMENT & PLAN NOTE
The transient nature of this patient's arm weakness in light of his other medical problems could certainly be assumed to a TIA  - However MRI scanning reveals a clear but small right embolic appearing infarct     - Because all of the ischemic regions are in the MCA territory on the right it is suspicious that this may be related to his carotids  - His stenosis is noted to be a 50-69%  He needs a CTA and a vascular consult  Plan:  -  Stroke protocol as he is currently on  -  CTA of head and neck - pending  -  Echocardiogram -a 60% EF  -  Maintain pressure   -  Telemetry, continue no AFib seen so far  -  Lipid panel modestly abnormal   Statin started, to be maintained post discharge  -  Secondary stroke / TIA  risk factor modification  -  Continue      Antipltl/AC  -  Keep euvolemic, as dehydration can worsen exam and function  -  Treat high blood sugars if arises  -  Therapies to include PT/OT/ST  -  DVT prevention   -  Frequent neurological check and notify neurology with any change in neuro status  -  Continue to monitor for infectious and metabolic derangements and treat as arises

## 2021-03-29 NOTE — ASSESSMENT & PLAN NOTE
· Hemoglobin of 11 2 with no prior baseline  · MCV: 78, likely iron deficiency anemia  · No signs of acute bleeding  · Reports polyps on previous colonoscopy  · Check iron panel and FOBT  · Monitor with labs

## 2021-03-29 NOTE — UTILIZATION REVIEW
Initial Clinical Review    Admission: Date/Time/Statement:   Admission Orders (From admission, onward)     Ordered        03/29/21 0251  Place in Observation  Once                   Orders Placed This Encounter   Procedures    Place in Observation     Telemetry     Standing Status:   Standing     Number of Occurrences:   1     Order Specific Question:   Level of Care     Answer:   Med Surg [16]     Order Specific Question:   Bed request comments     Answer:   telemetry     ED Arrival Information     Expected Arrival 70 [address] Olea of Arrival Escorted By Service Admission Type    - 3/29/2021 00:09 Urgent Walk-In Self Hospitalist Urgent    Arrival Complaint    L arm weakness        Chief Complaint   Patient presents with    Extremity Weakness     PT stated that an hour ago he experienced left arm numbness/tingling/inablility to move arm, pt took an aspirin  This episode subsided after half an hour  Pt is back to baseline now, no noted deficits     Assessment/Plan: 59 yo male with hx of HTN, HLD, GERD, COPD, and current smoker who presents to ED from home with reports of left arm numbness and tingling and left hand weakness  At 11:30 3/28 pt felt decreased sensation in his left arm with pins and needles with L arm weakness as he was watching TV and drinking beer  Pt took ASA 81mg at home with by ED arrival,symptoms had resolved, with episode lasting about 1 hour  Pt had last year episode of momentary memory loss and confusion which resolved  Pt drinks about 8 cans of beer every other day and last drink was at 11:30 pm, prior to coming into ED NIH score =0  On exam, no focal neuro defecit, pt has slight shakiness with finger-to-noses   CT head shows no acute changes  Pt given  mg in ED    Labs show low sodium-132, elevated creat of 1 35 with no baseline available  Pt admitted as OBS to telemetry with  l arm weakness with DDX of-TIA, , CVA, cardiac etiology, metabolic abnormality, alcohol abuse, atypical migraine, seizure and with hyponatremia and elevated creat  , alcohol abuse  PLan includes telemetry, stroke/TIA workup, neuro consult, neuro checks,lipid panel, A1c, MRI brain, ECHO, DAPT , IVF, labs in am, CIWA monitoring,start on thiamine, folic acid and MVI, hold Amlodipine-Benazepril , and start amlodipine  3/29-  MRI does show acute versus subacute right MCA territory infarct, correlating with his symptoms  Carotid Dopplers show 50-69% stenosis in the right internal carotid artery  Patient's symptoms have been improving, his only complaint this morning is decreased sensation on the dorsal aspect of his left hand    ED Triage Vitals   Temperature Pulse Respirations Blood Pressure SpO2   03/29/21 0019 03/29/21 0019 03/29/21 0036 03/29/21 0019 03/29/21 0019   98 °F (36 7 °C) 69 20 139/67 98 %      Temp Source Heart Rate Source Patient Position - Orthostatic VS BP Location FiO2 (%)   03/29/21 0019 03/29/21 0019 03/29/21 0019 03/29/21 0019 --   Oral Monitor Lying Right arm       Pain Score       03/29/21 0345       No Pain          Wt Readings from Last 1 Encounters:   03/29/21 76 4 kg (168 lb 6 9 oz)     Additional Vital Signs:   Date/Time  Temp  Pulse  Resp  BP  MAP (mmHg)  SpO2    03/29/21 0929  --  --  --  172/80Abnormal   --  --    03/29/21 0900  --  62  --  172/80Abnormal   115  --    03/29/21 0800  --  58  16  161/70  101  97 %    03/29/21 0700  --  62  16  159/75  108  95 %    03/29/21 0600  --  64  16  159/77  111  95 %    03/29/21 0501  --  64  --  153/72  --  --    03/29/21 0500  --  64  18  153/73  105  95 %    03/29/21 0400  --  66  18  146/66  95  95 %    03/29/21 0345  --  64  18  156/74  106  97 %    03/29/21 0239  --  64  18  152/72  --  95 %    03/29/21 0059  --  --  --  --  --  --      Date and Time Eye Opening Best Verbal Response Best Motor Response [place] Coma Scale Score User   03/29/21 0900 4 5 6 15 AG   03/29/21 0700 4 5 6 15 AG   03/29/21 0600 4 5 6 15 AG   03/29/21 0500 4 5 6 15 AG   03/29/21 0342 4 5 6 15 AG   03/29/21 0000 4 5 6 15 RC     CIWA=0 3/29/21    Pertinent Labs/Diagnostic Test Results:   3/29  ECG--Normal sinus rhythm  Normal ECG  ECHO-LEFT VENTRICLE: Size was normal  Systolic function was normal  Ejection fraction was estimated to be 60 %  There were no regional wall motion abnormalities  Wall thickness was normal  DOPPLER: There was an increased relative contribution  of atrial contraction to ventricular filling  Left ventricular diastolic function parameters were normal for the patient's age   RIGHT VENTRICLE: The size was normal  Systolic function was normal  Wall thickness was normal    LEFT ATRIUM: Size was normal    RIGHT ATRIUM: Size was normal    CT head-No acute intracranial abnormality  Moderate microangiopathic changes are present  There is a small old lacune in the left thalamus    Paranasal sinus disease,  CXR-No acute cardiopulmonary disease  MRI brain-3/29-1   2 foci of mild diffusion restriction in the right frontal lobe, indicative of recent/acute to subacute infarction  Findings may be related to right MCA territory or perhaps the watershed territory  Recommend screening assessment of the carotid   arteries with carotid ultrasound to assess for underlying carotid stenosis  Correlation for other cerebrovascular risk factors advised as well  2   Mild, chronic microangiopathy elsewhere  3   No acute intracranial hemorrhage            Results from last 7 days   Lab Units 03/29/21  0448 03/29/21  0035   WBC Thousand/uL 5 26 8 48   HEMOGLOBIN g/dL 9 8* 11 2*   HEMATOCRIT % 32 3* 36 8   PLATELETS Thousands/uL 184 276   NEUTROS ABS Thousands/µL  --  5 50         Results from last 7 days   Lab Units 03/29/21  0448 03/29/21  0035   SODIUM mmol/L 138 132*   POTASSIUM mmol/L 3 2* 4 1   CHLORIDE mmol/L 102 97*   CO2 mmol/L 25 24   ANION GAP mmol/L 11 11   BUN mg/dL 14 16   CREATININE mg/dL 1 30 1 35*   EGFR ml/min/1 73sq m 58 55   CALCIUM mg/dL 8 6 8 7   MAGNESIUM mg/dL  -- 1 2*     Results from last 7 days   Lab Units 03/29/21  0035   AST U/L 24   ALT U/L 18   ALK PHOS U/L 41*   TOTAL PROTEIN g/dL 7 7   ALBUMIN g/dL 3 6   TOTAL BILIRUBIN mg/dL 0 32     Results from last 7 days   Lab Units 03/29/21  0057   POC GLUCOSE mg/dl 115     Results from last 7 days   Lab Units 03/29/21  0448 03/29/21  0035   GLUCOSE RANDOM mg/dL 111 99           Results from last 7 days   Lab Units 03/29/21  0035   TROPONIN I ng/mL <0 02         Results from last 7 days   Lab Units 03/29/21  0035   PROTIME seconds 13 4   INR  1 01   PTT seconds 37     Results from last 7 days   Lab Units 03/29/21  0035   TSH 3RD GENERATON uIU/mL 1 686           Results from last 7 days   Lab Units 03/29/21  0448   FERRITIN ng/mL 12           Results from last 7 days   Lab Units 03/29/21  0448   ETHANOL LVL mg/dL 31*       ED Treatment:   Medication Administration from 03/29/2021 0009 to 03/29/2021 1016       Date/Time Order Dose Route Action     03/29/2021 0131 sodium chloride 0 9 % bolus 500 mL 500 mL Intravenous New Bag     03/29/2021 0234 sodium chloride 0 9 % infusion 125 mL/hr Intravenous New Bag     03/29/2021 0239 aspirin chewable tablet 243 mg 243 mg Oral Given     03/29/2021 0604 pantoprazole (PROTONIX) EC tablet 20 mg 20 mg Oral Given     03/29/2021 0937 sertraline (ZOLOFT) tablet 100 mg 100 mg Oral Given     03/29/2021 0929 clopidogrel (PLAVIX) tablet 75 mg 75 mg Oral Given     03/29/2021 0604 heparin (porcine) subcutaneous injection 5,000 Units 5,000 Units Subcutaneous Given     03/29/2021 0929 thiamine tablet 100 mg 100 mg Oral Given     93/97/4458 0860 folic acid (FOLVITE) tablet 1 mg 1 mg Oral Given     03/29/2021 0929 amLODIPine (NORVASC) tablet 10 mg 10 mg Oral Given        Past Medical History:   Diagnosis Date    Anxiety     COPD (chronic obstructive pulmonary disease) ([facility] [address] )     Lumbar back pain      Present on Admission:  **None**      Admitting Diagnosis: Arm pain [M79 603]  Age/Sex: 60 y o  male  Admission Orders:  Scheduled Medications:  amLODIPine, 10 mg, Oral, Daily  atorvastatin, 40 mg, Oral, QPM  clopidogrel, 75 mg, Oral, Daily  folic acid, 1 mg, Oral, Daily  heparin (porcine), 5,000 Units, Subcutaneous, Q8H [address]  multivitamin-minerals, 1 tablet, Oral, Daily  nicotine, 1 patch, Transdermal, Daily  pantoprazole, 20 mg, Oral, Early Morning  sertraline, 100 mg, Oral, Daily  thiamine, 100 mg, Oral, Daily      Continuous IV Infusions:  sodium chloride, 125 mL/hr, Intravenous, Continuous      PRN Meds:  acetaminophen, 650 mg, Oral, Q4H PRN  albuterol, 2 puff, Inhalation, Q4H PRN  Labetalol HCl, 10 mg, Intravenous, Q6H PRN    neuro checks-Every 1 hour x 4 hours, then every 2 hours x 8 hours, then every 4 hours x 72 hours  CIWA monitoring  Telemetry  Occult blood  4 gm na diet    IP CONSULT TO NEUROLOGY  IP CONSULT TO CASE MANAGEMENT  IP CONSULT TO NUTRITION SERVICES    Network Utilization Review Department  ATTENTION: Please call with any questions or concerns to [phone] and carefully listen to the prompts so that you are directed to the right person  All voicemails are confidential   [facility] all requests for admission clinical reviews, approved or denied determinations and any other requests to dedicated fax number below belonging to the campus where the patient is receiving treatment   List of dedicated fax numbers for the Facilities:  [address] DENIALS (Administrative/Medical Necessity) [phone]   [address] (Maternity/NICU/Pediatrics) [phone]   [address] [address] [facility] Fabiana Ferrer [address] (  Yarely Duarte "Dorinda" 103) [address] [facility] [phone] 244 [facility] 301 [facility] [phone]   Cheryl Menendez 37 [address] [place] [address] [phone]

## 2021-03-29 NOTE — ASSESSMENT & PLAN NOTE
· Takes Amlodipine-Benazepril at home  · Will hold due to elevated creatinine and just start on Amlodipine 10 mg  · Labetalol prn  · Monitor BP

## 2021-03-29 NOTE — QUICK NOTE
Patient's brain MRI does show acute versus subacute right MCA territory infarct, correlating with his symptoms  Carotid Dopplers show 50-69% stenosis in the right internal carotid artery  Patient's symptoms have been improving, his only complaint this morning is decreased sensation on the dorsal aspect of his left hand  Cranial nerves 2-12 intact, motor function grossly intact  Patient does note decreased sensation on the dorsal aspect of his left hand when compared to the right hand  I attempted to update the patient's sister Jn Saleem by phone, and left a message for her as she did not

## 2021-03-29 NOTE — ASSESSMENT & PLAN NOTE
- Carotid ultrasound 50-69% stenosis appreciated on the right  - CTA head and neck pending  - vascular consult pending  - vascular risk factors include dyslipidemia hypertension and heavy tobacco use premorbidly

## 2021-03-29 NOTE — ASSESSMENT & PLAN NOTE
· Reports efforts to quit smoking, currently on Chantix but still smokes 5 cigarettes a week  · NRT  · Tobacco cessation education materials provided

## 2021-03-29 NOTE — ASSESSMENT & PLAN NOTE
· Reports history of drinking one case of wine every day  Says he has cut down and now drinks 8 cans of beer every other day  Last drink was at 12 pm prior to coming into ED    · Check ethanol level, folate and Vit B12  · Will start on thiamine, folic acid and MVI  · CIWA protocol

## 2021-03-29 NOTE — CONSULTS
[facility]  Neurology Consult- Leslie Jarquin 1955, 60 y o  male   MRN: [id number]  Unit/Bed#: S -01 Encounter: [id number]    Inpatient consult to Neurology  Consult performed by: SVEN Nettles  Consult ordered by: Donna Wynne MD      Reason for Consult / Principal Problem:  Left arm weakness  Hx and PE limited by:  None  Review of previous medical records was completed  Family, was not present at the bedside for history and examination  The patient was deemed to be reliable historian  * 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  Assessment & Plan  The transient nature of this patient's arm weakness in light of his other medical problems could certainly be assumed to a TIA  - However MRI scanning reveals a clear but small right embolic appearing infarct     - Because all of the ischemic regions are in the MCA territory on the right it is suspicious that this may be related to his carotids  - His stenosis is noted to be a 50-69%  He needs a CTA and a vascular consult  Plan:  -  Stroke protocol as he is currently on  -  CTA of head and neck - pending  -  Echocardiogram -a 60% EF  -  Maintain pressure   -  Telemetry, continue no AFib seen so far  -  Lipid panel modestly abnormal   Statin started, to be maintained post discharge  -  Secondary stroke / TIA  risk factor modification  -  Continue      Antipltl/AC  -  Keep euvolemic, as dehydration can worsen exam and function  -  Treat high blood sugars if arises  -  Therapies to include PT/OT/ST  -  DVT prevention   -  Frequent neurological check and notify neurology with any change in neuro status  -  Continue to monitor for infectious and metabolic derangements and treat as arises    Symptomatic carotid artery stenosis, right  Assessment & Plan  - Carotid ultrasound 50-69% stenosis appreciated on the right  - CTA head and neck pending  - vascular consult pending  - vascular risk factors include dyslipidemia hypertension and heavy tobacco use premorbidly  Alcohol abuse  Assessment & Plan  - As per Internal Medicine this patient appears to be a heavy alcohol drinker     - Agree with instituting the CIWA protocol  Tobacco use  Assessment & Plan  - This patient reports that he previously had been a heavy smoker   - he reports that he has been able to slow his tobacco use and he currently reports that he only smokes, to this examiner anyway, 5 cigarettes a week  This patient will need follow-up post hospital discharge for an acute right stroke with our stroke team in the office in approximately 1 month  He can be seen by a of our stroke team members  He should stay on his aspirin and statin as well as Plavix for 21 days  HPI: Perla Swenson is a right handed  60 y o  male who  reports that he largely has diminished and cut down his smoking to only 5 cigarettes a week  He reports that he continues to enjoy alcohol particularly beer and or wine  It is in the background of this that the patient reports that he keeps up with his blood pressure and other simple medications  He reports that last night he began to have an episode where his arm felt weak     In addition to it feeling weak he felt that he had some numbness or tingling in it  He reports he did not think it last long he took an aspirin at that time, he ordinarily does not use baby aspirin  He presented to the emergency room he thought he was back to baseline  Overnight he was able to have his MRI and MRI notes a clear what appears to be a somewhat cortical few infarcts noted in the right hemisphere in the region of the MCA territory  Neurology is asked to comment  ROS: 12 system cued query:  The patient reports that he feels like he is back to normal when I interviewed him early on Monday morning approximately 9:00 a m  Jigar Hanna Feels as if he has had an uneventful night    He denies that he feels as if he has had a stroke or feels any Um weakness at this time  He denies any headache no visual disturbances no diplopia  No chest pain no shortness of breath no palpitations  He reports that he feels as if he could all walk well at this point  He denies any dizziness or vertigo  The remainder of his query he denied all he appears to be reliable historian  Historical Information     Past Medical History:   Diagnosis Date    Anxiety     COPD (chronic obstructive pulmonary disease) ([facility] [address] )     Lumbar back pain      History reviewed  No pertinent surgical history  Social History  he is a smoker and alcoholic user of both beer and wine rather heavy amounts by his description  No reports of recreational drug use  Family History: History reviewed  No pertinent family history  Allergies   Allergen Reactions    Penicillins Anaphylaxis     Meds:all current active meds have been reviewed    Scheduled Meds:  Medication Dose Route Frequency    acetaminophen  650 mg Oral Q4H PRN    albuterol  2 puff Inhalation Q4H PRN    amLODIPine  10 mg Oral Daily    atorvastatin  40 mg Oral QPM    clopidogrel  75 mg Oral Daily    folic acid  1 mg Oral Daily    heparin (porcine)  5,000 Units Subcutaneous Q8H [address]    Labetalol HCl  10 mg Intravenous Q6H PRN    multivitamin-minerals  1 tablet Oral Daily    nicotine  1 patch Transdermal Daily    pantoprazole  20 mg Oral Early Morning    sertraline  100 mg Oral Daily    sodium chloride  125 mL/hr Intravenous Continuous    thiamine  100 mg Oral Daily     PRN Meds:   acetaminophen    albuterol    Labetalol HCl      Physical Exam:   Objective   Vitals:Blood pressure 119/93, pulse 74, temperature 98 5 °F (36 9 °C), temperature source Oral, resp  rate 16, height 5' 9" (1 753 m), weight 76 4 kg (168 lb 6 9 oz), SpO2 93 %  ,Body mass index is 24 87 kg/m²  Patient was examined in bed in the emergency department as a hold over    General: alert, appears stated age and cooperative, he is thin  Head: Normocephalic, without obvious abnormality, atraumatic  Oral exam: lips, mucosa, and tongue moist;   Neck: no carotid bruit,   Lungs: clear to auscultation ant  bilaterally  Heart: regular rate and rhythm, S1, S2 normal, no murmur appreciated,   Abdomen: soft, +BS    Extremities: atraumatic, no cyanosis or edema    Neurologic:   Mental status: Alert, oriented, thought content appropriate  CN Exam: ROSA, EOM's I, VF full, Gaze conjugate No sensory or motor lateralizations (No PP on face), Hearing I B, CNIX-XII I B  Motor: full power, nearly symmetrical age appropriate x 4 limbs, I appreciated a slight distinction in his left hand digits particularly compared to the right, he is right-hand dominant  Large muscles, proximal arm and leg, appeared to be symmetrical   Sensory:  Grossly intact  X 4 limbs, 4 mod inc lt, temp, with PP testing  Cerebellar:  No gross cerebellar dysfunction  DTR's: Age appropriate, WNL; Plantars: downgoing on the right mute on the left  Gait:  He reports he was able to walk out to the bathroom while he was in the emergency room without any loss of balance for a complaint  The staff nurse reports that his gait was symmetrical unsteady  Lab Results:   I have personally reviewed pertinent reports    , CBC:   Results from last 7 days   Lab Units 03/29/21  0448 03/29/21  0035   WBC Thousand/uL 5 26 8 48   RBC Million/uL 4 18 4 72   HEMOGLOBIN g/dL 9 8* 11 2*   HEMATOCRIT % 32 3* 36 8   MCV fL 77* 78*   PLATELETS Thousands/uL 184 276   , BMP/CMP:   Results from last 7 days   Lab Units 03/29/21  1331 03/29/21  0448 03/29/21  0035   SODIUM mmol/L 141 138 132*   POTASSIUM mmol/L 4 0 3 2* 4 1   CHLORIDE mmol/L 107 102 97*   CO2 mmol/L 25 25 24   BUN mg/dL 16 14 16   CREATININE mg/dL 1 23 1 30 1 35*   CALCIUM mg/dL 8 8 8 6 8 7   AST U/L  --   --  24   ALT U/L  --   --  18   ALK PHOS U/L  --   --  41*   EGFR ml/min/1 73sq m 62 58 55   , Vitamin B12:   Results from last 7 days Lab Units 03/29/21 0035   VITAMIN B 12 pg/mL 409   , HgBA1C:   Results from last 7 days   Lab Units 03/29/21  0448   HEMOGLOBIN A1C % 5 8*   , TSH:   Results from last 7 days   Lab Units 03/29/21 0035   TSH 3RD GENERATON uIU/mL 1 686   , Coagulation:   Results from last 7 days   Lab Units 03/29/21  0035   INR  1 01   , Lipid Profile:   Results from last 7 days   Lab Units 03/29/21 0035   HDL mg/dL 59   LDL CALC mg/dL 91   TRIGLYCERIDES mg/dL 199*        Imaging Studies: I have personally reviewed pertinent films in PACS and And her reviewed them with the patient the bedside  He has a small embolic appearing infarct  He has fairly significant small vessel disease noted on his flares  EEG, Echo, Pathology, and Other Studies: He has an ejection fraction is 60%  Atria were within normal limits bilaterally  There was no hypertrophy appreciated  Counseling / Coordination of Care  Total time spent today Greater than 30 minutes  Greater than 50% of total time was spent with the patient and / or family counseling and / or coordination of care  A description of the counseling / coordination of care: All of the above was discussed with the patient at the bedside today in the emergency department  Films were reviewed his labs were reviewed I discussed with the patient his tobacco end blood pressure and lipids particularly  he had no questions at this time  Dictation voice to text software has been used in the creation of this document  Please consider this in light of any contextual or grammatical errors

## 2021-03-29 NOTE — PHYSICAL THERAPY NOTE
PHYSICAL THERAPY NOTE  Patient Name: Anna Merchant  [id number] Date: 3/29/2021     03/29/21 0820   PT Last Visit   PT Visit Date 03/29/21   Note Type   Note type Evaluation   Cancel Reasons Patient off floor/test  (off floor (possibly MRI), will follow)   Shahzad Figueroa, PT

## 2021-03-29 NOTE — H&P
Kaykay Palafox [address] 1955, 60 y o  male MRN: [id number]  Unit/Bed#: ED 13 Encounter: [id number]  Primary Care Provider: Sepideh Garcia DO   Date and time admitted to hospital: 3/29/2021 12:17 AM    * Left arm weakness  Assessment & Plan  Presents with left arm numbness and tingling and left hand weakness that occurred around 11:30 pm/12 am on 3/28/21, lasted about an hour  Took aspirin 81 mg at home  Episode resolved on its own by the time patient came to ED  No neurologic deficits currently      ED:  · Head CT with no acute abnormality, shows moderate microangiopathic changes, small old lacune infarct in Lt thalamus  · EKG was NSR  · Received Aspirin 243 mg    Assessment: TIA, other differentials include CVA, cardiac etiology, metabolic abnormality, alcohol abuse, atypical migraine, seizure  Plan:  · NIH score of 0  · Monitor on telemetry  · Stroke/TIA pathway:  · Neuro consult  · Neuro checks  · Lipid panel, HbA1c  · Brain MRI  · Echo  · Lipitor 40 mg daily  · ABCD2 score of 6- high 2 day stroke risk (8%), will start on dual antiplatelet therapy with Aspirin and Plavix  · PT/OT/speech consults  · Neuro checks    Hyponatremia  Assessment & Plan  · Sodium of 132 on admission  · Likely secondary to history of alcohol abuse  · IVF  · Monitor with labs in AM    Elevated serum creatinine  Assessment & Plan  · Creatinine of 1 35 on admission  · No prior labs for baseline  · IVF  · Monitor with labs      Anemia  Assessment & Plan  · Hemoglobin of 11 2 with no prior baseline  · MCV: 78, likely iron deficiency anemia  · No signs of acute bleeding  · Reports polyps on previous colonoscopy  · Check iron panel and FOBT  · Monitor with labs    COPD (chronic obstructive pulmonary disease) (HCC)  Assessment & Plan  · Current smoker  · Reports mild wheezing and cough at baseline  · O2 sat 98% on room air  · Respiratory protocol  · Albuterol inhaler prn    Tobacco use  Assessment & Plan  · Reports efforts to quit smoking, currently on Chantix but still smokes 5 cigarettes a week  · NRT  · Tobacco cessation education materials provided    Alcohol abuse  Assessment & Plan  · Reports history of drinking one case of wine every day  Says he has cut down and now drinks 8 cans of beer every other day  Last drink was at 12 pm prior to coming into ED  · Check ethanol level, folate and Vit B12  · Will start on thiamine, folic acid and MVI  · CIWA protocol    HTN (hypertension)  Assessment & Plan  · Takes Amlodipine-Benazepril at home  · Will hold due to elevated creatinine and just start on Amlodipine 10 mg  · Labetalol prn  · Monitor BP    HLD (hyperlipidemia)  Assessment & Plan  · Takes Zocor 40 mg at home  · Switched to high intensity statin, Lipitor 40 mg daily    VTE Prophylaxis: Heparin  / sequential compression device   Code Status: Level 1  POLST: POLST form is not discussed and not completed at this time  Anticipated Length of Stay:  Patient will be admitted on an Observation basis with an anticipated length of stay of  < 2 midnights  Justification for Hospital Stay: TIA    Chief Complaint:  Left arm numbness and tingling and hand weakness    History of Present Illness:    Vangie Bustamante is a 60 y o  male with PMH of HTN, HLD, GERD, COPD, and current smoker who presents with reports of left arm numbness and tingling and left hand weakness  He reports that at around 11:30 pm/12 am on 3/28 as he was watching TV and drinking beer, he felt decreased sensation in his left arm with pins and needles  In addition, he felt weakness in his left hand  He took a low dose aspirin and had his neighbor bring him to the ED  By the time he arrived, symptoms had resolved, with episode lasting about 1 hour  He reports that this has never happened before but last year, he had an episode of momentary memory loss and confusion which resolved    He denies any visual disturbance, slurred speech, lightheadedness, falls, nausea, vomiting, tremors, palpitations or chest pain  He has chronic cough and SOB secondary to COPD  He also reports that he drinks about 8 cans of beer every other day and last drink was at 11:30 pm, prior to coming into ED  In the ED, vitals were stable  Labs were significant for sodium of 132, creatinine of 1 35, and hemoglobin of 11 2 with no prior baselines  Head CT revealed no acute changes  When seen, patient had NIH score of 0 and no focal neurologic deficits  Review of Systems:    Review of Systems   Constitutional: Negative for chills, diaphoresis, fatigue and fever  Eyes: Negative for visual disturbance  Respiratory: Positive for cough and shortness of breath  Chronic 2/2 COPD   Cardiovascular: Negative for chest pain, palpitations and leg swelling  Gastrointestinal: Negative for abdominal pain, diarrhea, nausea and vomiting  Neurological: Positive for weakness and numbness  Negative for dizziness, tremors, seizures, syncope, speech difficulty, light-headedness and headaches  Past Medical and Surgical History:     Past Medical History:   Diagnosis Date    Anxiety     COPD (chronic obstructive pulmonary disease) ([facility] )     Lumbar back pain        History reviewed  No pertinent surgical history  Meds/Allergies:    Prior to Admission medications    Medication Sig Start Date End Date Taking?  Authorizing Provider   ALBUTEROL IN Inhale every 4 (four) hours as needed Unknown dose   Yes Historical Provider, MD   amLODIPine-benazepril (LOTREL 5-10) 5-10 MG per capsule Take by mouth 1/19/11  Yes Historical Provider, MD   lansoprazole (PREVACID) 30 mg capsule Take by mouth 11/8/10  Yes Historical Provider, MD   omeprazole (PriLOSEC OTC) 20 MG tablet Take 20 mg by mouth daily   Yes Historical Provider, MD   sertraline (ZOLOFT) 100 mg tablet Take by mouth 1/19/11  Yes Historical Provider, MD   simvastatin (ZOCOR) 40 mg tablet Take by mouth 1/19/11  Yes Historical Provider, MD I have reviewed home medications with patient personally  Allergies: Allergies   Allergen Reactions    Penicillins Anaphylaxis       Social History:     Marital Status: /Civil Union   Occupation:   Patient Pre-hospital Living Situation: lives at home on his own  Patient Pre-hospital Level of Mobility: ambulatory  Patient Pre-hospital Diet Restrictions: none  Substance Use History:   Social History     Substance and Sexual Activity   Alcohol Use Yes    Frequency: 4 or more times a week    Drinks per session: 7 to 9    Binge frequency: Weekly     Social History     Tobacco Use   Smoking Status Light Tobacco Smoker   Smokeless Tobacco Never Used     Social History     Substance and Sexual Activity   Drug Use Not on file       Family History:    non-contributory    Physical Exam:     Vitals:   Blood Pressure: 153/72 (03/29/21 0501)  Pulse: 64 (03/29/21 0501)  Temperature: 98 °F (36 7 °C) (03/29/21 0019)  Temp Source: Oral (03/29/21 0019)  Respirations: 18 (03/29/21 0500)  Height: 5' 9" (175 3 cm) (03/29/21 0019)  Weight - Scale: 76 4 kg (168 lb 6 9 oz) (03/29/21 0019)  SpO2: 95 % (03/29/21 0500)    Physical Exam  Vitals signs reviewed  Constitutional:       General: He is not in acute distress  HENT:      Head: Normocephalic and atraumatic  Mouth/Throat:      Mouth: Mucous membranes are moist       Pharynx: Oropharynx is clear  Comments: Slight facial asymmetry noted but patient reports no change, at his baseline  Eyes:      Extraocular Movements: Extraocular movements intact  Pupils: Pupils are equal, round, and reactive to light  Neck:      Musculoskeletal: Normal range of motion and neck supple  Cardiovascular:      Rate and Rhythm: Normal rate and regular rhythm  Pulses: Normal pulses  Heart sounds: Normal heart sounds     Pulmonary:      Effort: Pulmonary effort is normal       Comments: Mild wheezing, chronic  Abdominal:      General: Bowel sounds are normal  There is no distension  Palpations: Abdomen is soft  Tenderness: There is no abdominal tenderness  Musculoskeletal: Normal range of motion  General: No swelling or tenderness  Skin:     General: Skin is warm and dry  Neurological:      General: No focal deficit present  Mental Status: He is alert and oriented to person, place, and time  Motor: No weakness  Comments: Slight shakiness with finger-to-nose, no tremors, no dysdiadochokinesia         Additional Data:     Lab Results: I have personally reviewed pertinent reports  Results from last 7 days   Lab Units 03/29/21  0035   WBC Thousand/uL 8 48   HEMOGLOBIN g/dL 11 2*   HEMATOCRIT % 36 8   PLATELETS Thousands/uL 276   NEUTROS PCT % 64   LYMPHS PCT % 23   MONOS PCT % 9   EOS PCT % 3     Results from last 7 days   Lab Units 03/29/21  0035   POTASSIUM mmol/L 4 1   CHLORIDE mmol/L 97*   CO2 mmol/L 24   BUN mg/dL 16   CREATININE mg/dL 1 35*   CALCIUM mg/dL 8 7   ALK PHOS U/L 41*   ALT U/L 18   AST U/L 24     Results from last 7 days   Lab Units 03/29/21  0035   INR  1 01       Imaging: I have personally reviewed pertinent reports  Ct Head Without Contrast    Result Date: 3/29/2021  Narrative: CT BRAIN - WITHOUT CONTRAST INDICATION:   TIA, with left arm and hand weakness and numbness  Patient experienced an episode of left arm and left hand numbness and weakness which lasted approximately 45 minutes  Symptoms have subsequently completely resolved  Patient is back to baseline  No focal neurological deficits  COMPARISON:  CT dated June 19, 2012 and report of MRI dated July 23, 2012  TECHNIQUE:  CT examination of the brain was performed  In addition to axial images, sagittal and coronal 2D reformatted images were created and submitted for interpretation  Radiation dose length product (DLP) for this visit:  926 mGy-cm     This examination, like all CT scans performed in the [facility], was performed utilizing techniques to minimize radiation dose exposure, including the use of iterative reconstruction and automated exposure control  IMAGE QUALITY:  Diagnostic  FINDINGS: PARENCHYMA:  No intracranial mass, mass effect or midline shift  No CT signs of acute infarction  No acute parenchymal hemorrhage  Moderate microangiopathic changes are present  There is a small old lacune in the left thalamus  VENTRICLES AND EXTRA-AXIAL SPACES:  Normal for the patient's age  VISUALIZED ORBITS AND PARANASAL SINUSES:  There is mild to moderate mucosal thickening in the left sphenoid sinus  There is mucosal thickening and partial opacification of the ethmoids  No evidence of acute abnormality involving the orbits  CALVARIUM AND EXTRACRANIAL SOFT TISSUES:  Normal      Impression: No acute intracranial abnormality  Moderate microangiopathic changes are present  There is a small old lacune in the left thalamus  Paranasal sinus disease, as described above  Workstation performed: [id number]       EKG, Pathology, and Other Studies Reviewed on Admission:   · EKG: NSR at 73 BPM    Epic / Care Everywhere Records Reviewed: Yes     ** Please Note: This note has been constructed using a voice recognition system   **

## 2021-03-29 NOTE — PHYSICAL THERAPY NOTE
PHYSICAL THERAPY EVALUATION  NAME:  Lewis Salinas  DATE: 03/29/21    AGE:   60 y o  Mrn:   [id number]  ADMIT DX:  TIA (transient ischemic attack) [G45 9]  Arm pain [M79 603]  Hyponatremia [E87 1]  PRITI [facility]acute [facility]) ([facility] ) [N17 9]  Problem List:   Patient Active Problem List   Diagnosis    Left arm weakness    Tobacco use    Alcohol abuse    HTN (hypertension)    HLD (hyperlipidemia)    Hyponatremia    Elevated serum creatinine    COPD [facility]chronic obstructive [facility] disease) ([facility] )    Anemia    Stroke (cerebrum) ([facility] )         Length Of Stay: 0  Performed at least 2 patient identifiers during session: Name and Birthday  PHYSICAL THERAPY EVALUATION :    03/29/21 1516   PT Last Visit   PT Visit Date 03/29/21   Note Type   Note type Evaluation   Pain Assessment   Pain Assessment Tool Pain Assessment not indicated - pt denies pain   Home Living   Type of Home Mobile home   Home Layout Stairs to enter with rails; One level  (w/railing)   Home Equipment   (none used prior to admit)   Additional Comments sleeps on sofa   Prior Function   Level of [place] Independent with ADLs and functional mobility   Lives With Alone   ADL Assistance Independent   Falls in the last 6 months 0   Vocational On disability   Restrictions/Precautions   Weight Bearing Precautions Per Order No   Other Precautions Fall Risk;Multiple lines   General   Family/Caregiver Present No   Cognition   Overall Cognitive Status WFL   Arousal/Participation Alert   Memory Within functional limits   Following Commands Follows one step commands with increased time or repetition   RUE Strength   RUE Overall Strength Within Functional Limits - able to perform ADL tasks with strength   LUE Strength   LUE Overall Strength Within Functional Limits - able to perform ADL tasks with strength   Strength RLE   R Hip Flexion 4+/5   R Knee Extension 4+/5   R Ankle Dorsiflexion 4+/5   Strength LLE   L Hip Flexion 4+/5   L Knee Extension 4+/5   L Ankle Dorsiflexion 4+/5   Coordination   Movements are Fluid and Coordinated 0   Coordination and Movement Description + tremors noted throughout   Sensation   (vision and hearing WFL)   Light Touch   RLE Light Touch Grossly intact   LLE Light Touch Grossly intact   Bed Mobility   Supine to Sit 6  Modified independent   Additional items Assist x 1; Increased time required   Transfers   Sit to Stand 7  Independent   Stand to Sit 7  Independent   Ambulation/Elevation   Gait pattern WNL   Gait Assistance 7  Independent  (w/o pole management)   Assistive Device None   Distance tested to 100'x2   Stair Management Assistance 6  Modified independent   Additional items Assist x 1   Stair Management Technique Alternating pattern; Foreward  (ascending forward and descending backward)   Number of Stairs 4   Balance   Static Sitting Normal   Static Standing Normal   Ambulatory Normal   Higher level balance   (TUG 7 99 second, 5xSTS 8 64; 4point DGI=)   Endurance Deficit   Endurance Deficit No   Activity Tolerance   Activity Tolerance Other (Comment)  (+tremors)   Medical Staff Made Aware care coordination w/Zoila from [address] spoke to [facility] RN   Assessment   Prognosis Good   Problem List   (tremors)   Barriers to Discharge Inaccessible home environment;Decreased caregiver support   Goals   Patient Goals to go home tomorrow   Plan   PT Frequency   (DC from IPPT)   Recommendation   PT Discharge Recommendation Return to previous environment with no needs   Equipment Recommended   (none)   AM-PAC Basic Mobility Inpatient   Turning in Bed Without Bedrails 4   Lying on Back to Sitting on Edge of Flat Bed 4   Moving Bed to Chair 4   Standing Up From Chair 4   Walk in Room 4   Climb 3-5 Stairs 4   Basic Mobility Inpatient Raw Score 24   Basic Mobility Standardized Score 57 68   (Please find full objective findings from PT assessment regarding body systems outlined above)       Assessment: Pt is a 60 y o  male seen for PT evaluation s/p admit to [facility] on 3/29/2021 w/    Order placed for PT  Upon evaluation: Pt requires no physical assistance for bed mobility, transfers, and ambulation with out device and modified I on stairs  Pt's clinical presentation is currently evolving given the functional mobility deficits above, especially (but not limited to) +tremors, coupled with fall risks including +ETOH  Pt scored better than cut offs for TUG, 4 point DGI and 5xSTS indicating less risk for falls  No further IPPT indicated at this time  Recommend restorative amb while here, but monitor peripherally if pt has increased tremors due to hx of Etoh     The patient's AM-PAC Basic Mobility Inpatient Short Form Raw Score is 24, Standardized Score is 57 68  A standardized score greater than 42 9 suggests the patient may benefit from discharge to home, however please refer to therapist recommendation for discharge planning given other factors that may influence destination  Comorbidities affecting pt's physical performance at time of assessment include: Anxiety, COPD (chronic obstructive pulmonary disease), and Lumbar back pain, ETOH  Personal factors affecting pt at time of IE include: steps to enter environment       Murphy Girard, PT, DPT

## 2021-03-29 NOTE — ED NOTES
Patient has returned from MRI and was set up for breakfast since tray arrived       April Claudia Keller RN  03/29/21 6960

## 2021-03-29 NOTE — ASSESSMENT & PLAN NOTE
· Sodium of 132 on admission  · Likely secondary to history of alcohol abuse  · IVF  · Monitor with labs in AM

## 2021-03-29 NOTE — ASSESSMENT & PLAN NOTE
Presents with left arm numbness and tingling and left hand weakness that occurred around 11:30 pm/12 am on 3/28/21, lasted about an hour  Took aspirin 81 mg at home  Episode resolved on its own by the time patient came to ED  No neurologic deficits currently      ED:  · Head CT with no acute abnormality, shows moderate microangiopathic changes, small old lacune infarct in Lt thalamus  · EKG was NSR  · Received Aspirin 243 mg    Assessment: TIA, other differentials include CVA, cardiac etiology, metabolic abnormality, alcohol abuse, atypical migraine, seizure  Plan:  · NIH score of 0  · Monitor on telemetry  · Stroke/TIA pathway:  · Neuro consult  · Neuro checks  · Lipid panel, HbA1c  · Brain MRI  · Echo  · Lipitor 40 mg daily  · ABCD2 score of 6- high 2 day stroke risk (8%), will start on dual antiplatelet therapy with Aspirin and Plavix  · PT/OT/speech consults  · Neuro checks

## 2021-03-30 ENCOUNTER — TELEPHONE (OUTPATIENT)
Dept: VASCULAR SURGERY | Facility: CLINIC | Age: 66
End: 2021-03-30

## 2021-03-30 VITALS
RESPIRATION RATE: 18 BRPM | OXYGEN SATURATION: 92 % | HEART RATE: 71 BPM | SYSTOLIC BLOOD PRESSURE: 168 MMHG | DIASTOLIC BLOOD PRESSURE: 74 MMHG | TEMPERATURE: 98.5 F | BODY MASS INDEX: 24.95 KG/M2 | HEIGHT: 69 IN | WEIGHT: 168.43 LBS

## 2021-03-30 LAB
ANION GAP SERPL CALCULATED.3IONS-SCNC: 9 MMOL/L (ref 4–13)
BUN SERPL-MCNC: 17 MG/DL (ref 5–25)
CALCIUM SERPL-MCNC: 9.2 MG/DL (ref 8.3–10.1)
CHLORIDE SERPL-SCNC: 106 MMOL/L (ref 100–108)
CO2 SERPL-SCNC: 27 MMOL/L (ref 21–32)
CREAT SERPL-MCNC: 1.26 MG/DL (ref 0.6–1.3)
ERYTHROCYTE [DISTWIDTH] IN BLOOD BY AUTOMATED COUNT: 14.6 % (ref 11.6–15.1)
GFR SERPL CREATININE-BSD FRML MDRD: 60 ML/MIN/1.73SQ M
GLUCOSE P FAST SERPL-MCNC: 98 MG/DL (ref 65–99)
GLUCOSE SERPL-MCNC: 98 MG/DL (ref 65–140)
HCT VFR BLD AUTO: 37.2 % (ref 36.5–49.3)
HGB BLD-MCNC: 11.1 G/DL (ref 12–17)
MCH RBC QN AUTO: 23.2 PG (ref 26.8–34.3)
MCHC RBC AUTO-ENTMCNC: 29.8 G/DL (ref 31.4–37.4)
MCV RBC AUTO: 78 FL (ref 82–98)
PLATELET # BLD AUTO: 204 THOUSANDS/UL (ref 149–390)
PMV BLD AUTO: 10.6 FL (ref 8.9–12.7)
POTASSIUM SERPL-SCNC: 4.2 MMOL/L (ref 3.5–5.3)
RBC # BLD AUTO: 4.79 MILLION/UL (ref 3.88–5.62)
SODIUM SERPL-SCNC: 142 MMOL/L (ref 136–145)
WBC # BLD AUTO: 5.68 THOUSAND/UL (ref 4.31–10.16)

## 2021-03-30 PROCEDURE — 99214 OFFICE O/P EST MOD 30 MIN: CPT | Performed by: PSYCHIATRY & NEUROLOGY

## 2021-03-30 PROCEDURE — 99205 OFFICE O/P NEW HI 60 MIN: CPT | Performed by: SURGERY

## 2021-03-30 PROCEDURE — NC001 PR NO CHARGE: Performed by: SURGERY

## 2021-03-30 PROCEDURE — 99205 OFFICE O/P NEW HI 60 MIN: CPT | Performed by: INTERNAL MEDICINE

## 2021-03-30 PROCEDURE — 80048 BASIC METABOLIC PNL TOTAL CA: CPT | Performed by: INTERNAL MEDICINE

## 2021-03-30 PROCEDURE — 85027 COMPLETE CBC AUTOMATED: CPT | Performed by: INTERNAL MEDICINE

## 2021-03-30 PROCEDURE — 99217 PR OBSERVATION CARE DISCHARGE MANAGEMENT: CPT | Performed by: INTERNAL MEDICINE

## 2021-03-30 RX ORDER — LANOLIN ALCOHOL/MO/W.PET/CERES
100 CREAM (GRAM) TOPICAL DAILY
Qty: 30 TABLET | Refills: 0 | Status: SHIPPED | OUTPATIENT
Start: 2021-03-31 | End: 2022-03-01 | Stop reason: HOSPADM

## 2021-03-30 RX ORDER — CLOPIDOGREL BISULFATE 75 MG/1
75 TABLET ORAL DAILY
Qty: 19 TABLET | Refills: 0 | Status: SHIPPED | OUTPATIENT
Start: 2021-03-31 | End: 2021-05-05 | Stop reason: SDUPTHER

## 2021-03-30 RX ORDER — AMLODIPINE BESYLATE 10 MG/1
10 TABLET ORAL DAILY
Status: DISCONTINUED | OUTPATIENT
Start: 2021-03-31 | End: 2021-03-30 | Stop reason: HOSPADM

## 2021-03-30 RX ORDER — ATORVASTATIN CALCIUM 40 MG/1
40 TABLET, FILM COATED ORAL EVERY EVENING
Qty: 30 TABLET | Refills: 0 | Status: SHIPPED | OUTPATIENT
Start: 2021-03-30 | End: 2021-09-06 | Stop reason: HOSPADM

## 2021-03-30 RX ORDER — ASPIRIN 81 MG/1
81 TABLET ORAL DAILY
Qty: 30 TABLET | Refills: 0 | Status: SHIPPED | OUTPATIENT
Start: 2021-03-31 | End: 2021-09-18 | Stop reason: HOSPADM

## 2021-03-30 RX ADMIN — SERTRALINE HYDROCHLORIDE 100 MG: 100 TABLET ORAL at 08:45

## 2021-03-30 RX ADMIN — NICOTINE 1 PATCH: 7 PATCH, EXTENDED RELEASE TRANSDERMAL at 08:45

## 2021-03-30 RX ADMIN — THIAMINE HCL TAB 100 MG 100 MG: 100 TAB at 08:45

## 2021-03-30 RX ADMIN — ASPIRIN 81 MG: 81 TABLET, COATED ORAL at 08:45

## 2021-03-30 RX ADMIN — PANTOPRAZOLE SODIUM 20 MG: 20 TABLET, DELAYED RELEASE ORAL at 06:00

## 2021-03-30 RX ADMIN — HEPARIN SODIUM 5000 UNITS: 5000 INJECTION INTRAVENOUS; SUBCUTANEOUS at 13:39

## 2021-03-30 RX ADMIN — Medication 1 TABLET: at 08:44

## 2021-03-30 RX ADMIN — METOPROLOL TARTRATE 25 MG: 25 TABLET, FILM COATED ORAL at 10:45

## 2021-03-30 RX ADMIN — AMLODIPINE BESYLATE 10 MG: 10 TABLET ORAL at 08:45

## 2021-03-30 RX ADMIN — FOLIC ACID 1 MG: 1 TABLET ORAL at 08:45

## 2021-03-30 RX ADMIN — HEPARIN SODIUM 5000 UNITS: 5000 INJECTION INTRAVENOUS; SUBCUTANEOUS at 06:00

## 2021-03-30 RX ADMIN — CLOPIDOGREL BISULFATE 75 MG: 75 TABLET ORAL at 08:45

## 2021-03-30 NOTE — ASSESSMENT & PLAN NOTE
· Hemoglobin of 11 2 with no prior baseline  · MCV: 78, with decreased ferritin, iron, and increased TIBC  · No signs of acute bleeding  · Reports polyps on previous colonoscopy  · B12 and folate within normal limits    Plan:  · Will discharge with iron supplementation  · Outpatient follow-up

## 2021-03-30 NOTE — TELEPHONE ENCOUNTER
From: Deandra Fulton   Sent: Tuesday, March 30, 2021 5:57 PM  To: Jessie Session <[email or web address]; Vascular Surgery Schedulers [email or web address]  Subject: RE: Stormy Kang [birth year]    Dr Alesha Combs,     This is the availability for next week for this carotid angio:      1) Monday possible at [facility]/IR either before or after bypass  I have sent an email to [facility] for time  2) Tuesday in [facility]  The whole day is open in our block  You would have to confirm giving up a part of your admin time  We are unable to schedule in [facility] next week due to the IR Symposium  I will let you know as soon as [facility] gets back to me if there is IR time at [place]  Thanks  Chepe Bejarano  Senior Surgery Coordinator  [address] [place] [address]  Phone: [phone]  Fax: [phone]        From: Jessie Etienne   Sent: Tuesday, March 30, 2021 4:59 PM  To: Vascular Surgery Schedulers [email or web address]  Subject: Stormy Kang [birth year]    Needs to be scheduled for carotid angio with me next week  Level 1  Being discharged from [facility]  Will also need cea either later next week after angio or the following week    ThanksBERNARDO

## 2021-03-30 NOTE — CASE MANAGEMENT
LOS: 1 DAYS  PATIENT IS NOT A BUNDLE  PATIENT IS NOT A READMISSION  UNPLANNED RISK OF READMISSION: N/A as patient is OBS    Consult: TIA     CM met with patient at bedside to introduce self, explain role, complete CM assessment, and discuss DC planning  Patient alert and oriented and sitting up in bed eating his lunch  [facility]  Lives with: alone  Supports: neighbor/friend support, his sister and daughter don't live close but are also supports  Home Type & Entry: mobile home  A few EVELYNE with railing, no issues with navigating his home  DME: denies  ADLs: independent  VNA history:  none  STR history: none  Pharmacy Preference & Coverage[facility] [address] [facility], has Rx plan and no issues with affording or obtaining medications  Mental Health/Drug/ETOH history: patient denies MH history  Reports he "used to be an alcoholic" and would drink 6 bottles of wine per night but this all stopped when he got  5 years ago  Patient now drinks 8 to 10 cans of beer every other night and reports he has no concerns with his consumption  No treatment history  POA/AD/LW: patient denies but requested and received information  He identified his daughter, Kristian Lyles, who lives in [place], as his HCR  Income/Employment: SSD  Transportation: drives in the community  PCP: Dr Jeromy Clayton  Patient reports he just saw him last week and normally sees annually but can follow up more often if needed  Transport Home: friend to transport home     DCP: Patient has been seen by and dismissed from PT/OT caseload as he is back to his baseline and no skilled therapy needs have been identified  No CM needs identified at this time  CM Dept will continue to follow      CM reviewed discharge planning process including the following: identifying caregivers at home, preference for d/c planning needs, availability of treatment team to discuss questions or concerns patient and/or family may have regarding diagnosis, plan of care, old or new medications and discharge planning   CM will continue to follow for care coordination and update assessment as appropriate

## 2021-03-30 NOTE — ASSESSMENT & PLAN NOTE
· Creatinine of 1 35 on admission  · No prior labs for baseline  · Mildly improved with IV fluids, 1 23    Plan:  · Outpatient follow-up

## 2021-03-30 NOTE — NURSING NOTE
Follow up call placed to patient's RN after extravasation of contrast into the left arm yesterday March 29th in CT scan  Per patient's RN Deejay Duncan the site is pink and swelling is present, but patient denies pain  Continued management per unit RN

## 2021-03-30 NOTE — DISCHARGE INSTR - AVS FIRST PAGE
Dear Yair Grant,     It was our pleasure to care for you here at [facility]  It is our hope that we were always able to exceed the expected standards for your care during your stay  You were hospitalized due to stroke  You were cared for on the 3rd floor by Lennice Favre, MD under the service of Nika Connolly MD with the [facility] who covers for your primary care physician (PCP), Erik Merino DO, while you were hospitalized  If you have any questions or concerns related to this hospitalization, you may contact us at [phone]  For follow up as well as any medication refills, we recommend that you follow up with your primary care physician  A registered nurse will reach out to you by phone within a few days after your discharge to answer any additional questions that you may have after going home  However, at this time we provide for you here, the most important instructions / recommendations at discharge:     · Notable Medication Adjustments -   · Stop taking simvastatin  · Start taking atorvastatin 40 mg daily  · Start taking aspirin 81 mg daily  · Take Plavix 75 mg daily for a total of 3 weeks  · Metoprolol 25 mg b i d  · Testing Required after Discharge -   · None  · Important follow up information -   · PCP  · Neurology  · Vascular surgery  · Other Instructions -   · Please monitor for signs and symptoms of stroke at home, return to the emergency department if you worsening symptoms  · Please review this entire after visit summary as additional general instructions including medication list, appointments, activity, diet, any pertinent wound care, and other additional recommendations from your care team that may be provided for you        Sincerely,     Lennice Favre, MD

## 2021-03-30 NOTE — QUICK NOTE
Patient's IV infiltrated during CT scan and test was not completed  Patient refused to let them try again  Upon returning to the unit this nurse explained to patient that it was necessary to place a new IV  I educated him on why  Making him aware he had orders for fluids to be run through his IV and that he is a level 1 Full code and that requires an IV access in case of emergency  Patient refused and said he was "traumatized by what went on downstairs and he will be leaving first thing in the morning" He said "I am telling the doctor I am leaving as soon as he comes to see me"  I again reinforced the need for IV access over night in case of an emergency  He said "I don't care  I can't do it " I made the resident on call aware of all events and conversation  Resident will come to floor and attempt to convince patient to have IV inserted  Awaiting for him to speak to patient  No other orders given at this time  Will continue to monitor patient

## 2021-03-30 NOTE — PROGRESS NOTES
Progress Note - Vascular Surgery   Djeah Perez 60 y o  male MRN: [id number]  Unit/Bed#: S -01 Encounter: [id number]    Assessment:  65M w/ R MCA CVA and R Carotid stenosis, symptoms resolved    US - 50-69% R ICA stenosis    Plan:  Care per primary  Aspirin/Plavix/statin  Follow-up official CT results  Plan for vascular intervention early next week  The vascular attending will see the patient today and discuss intervention options    Subjective/Objective   Chief Complaint:     Subjective:  Patient's IV infiltrated during the CT a last night, however the CT a did appear to be adequate to illustrate anatomy  Patient had no further events and still has no symptoms of the right CVA  Objective:     Blood pressure 143/80, pulse 69, temperature 98 2 °F (36 8 °C), temperature source Oral, resp  rate 19, height 5' 9" (1 753 m), weight 76 4 kg (168 lb 6 9 oz), SpO2 92 %  ,Body mass index is 24 87 kg/m²  Intake/Output Summary (Last 24 hours) at 3/30/2021 0720  Last data filed at 3/30/2021 0500  Gross per 24 hour   Intake 1330 ml   Output 900 ml   Net 430 ml       Invasive Devices     Peripheral Intravenous Line            Peripheral IV 03/30/21 Right;Ventral (anterior) Forearm less than 1 day                Physical Exam: General: AAOx3  Head: normocephalic, atraumatic  Neck: supple, trachea midline  Neuro: No CN deficits  Equal strength b/l UE and LE  Respiratory: BS b/l  Abdomen: Soft, NT, no rebound/guarding  Heart: RRR, S1s2  Ext: Warm no cyanosis         Lab, Imaging and other studies:  I have personally reviewed pertinent lab results    , CBC:   Lab Results   Component Value Date    WBC 5 68 03/30/2021    HGB 11 1 (L) 03/30/2021    HCT 37 2 03/30/2021    MCV 78 (L) 03/30/2021     03/30/2021    MCH 23 2 (L) 03/30/2021    MCHC 29 8 (L) 03/30/2021    RDW 14 6 03/30/2021    MPV 10 6 03/30/2021   , CMP:   Lab Results   Component Value Date    SODIUM 142 03/30/2021    K 4 2 03/30/2021     03/30/2021 CO2 27 03/30/2021    BUN 17 03/30/2021    CREATININE 1 26 03/30/2021    CALCIUM 9 2 03/30/2021    EGFR 60 03/30/2021     VTE Pharmacologic Prophylaxis: Heparin  VTE Mechanical Prophylaxis: sequential compression device

## 2021-03-30 NOTE — ASSESSMENT & PLAN NOTE
· Reports efforts to quit smoking, currently on Chantix but still smokes 5 cigarettes a week    Plan:  · Tobacco cessation education materials provided  · Outpatient follow-up

## 2021-03-30 NOTE — ASSESSMENT & PLAN NOTE
· Reports history of drinking one case of wine every day  Says he has cut down and now drinks 8 cans of beer every other day  Last drink was at 12 pm prior to coming into ED    · Folate and vitamin 12 within normal limits  · Patient was on CIWA protocol, did not require any Ativan    Plan:  · Outpatient follow-up

## 2021-03-30 NOTE — ASSESSMENT & PLAN NOTE
· Patient presented with left arm numbness, tingling, and weakness, which resolved upon arrival to the emergency department  · CT head showed prior small lacunar infarct in left thalamus  · MRI brain showed right MCA territory subacute/acute infarct  · Carotid Doppler showed 50-69% internal carotid artery stenosis  · Patient's only symptom is decreased sensation at the dorsal aspect of his left hand    Plan:  · Aspirin, Plavix, and atorvastatin  · Outpatient vascular surgery follow-up

## 2021-03-30 NOTE — CONSULTS
Consultation - Cardiology   Parish García 60 y o  male MRN: [id number]  Unit/Bed#: S -01 Encounter: [id number]    Inpatient consult to Cardiology  Consult performed by: Amy Gutierrez MD  Consult ordered by: Jess Gu MD          History of Present Illness   Physician Requesting Consult: Jessica Valencia MD  Reason for Consult / Principal Problem: Pre-op risk stratification    HPI: Parish García is a 60y o  year old male with left arm numbness/tingling lasting an hour on 3/28/21  Took ASA at home, episode resolved on own  Has prior history of HTN, COPD, dyslipidemia, CKD, tobacco usage  MRI brain demonstrated small R MCA embolic infarct  No afib on telemetry  Carotid duplex demonstrated 50-69% R ICA stenosis  CTA head/neck pending  Echo demonstrated structurally normal heart, stress test 1/20 demonstrated no ischemia  Asymptomatic from cardiac standpoint  Able to climb stairs without difficulty  Does get dyspneic from COPD  Review of Systems   Constitution: Negative for chills, diaphoresis, fever and malaise/fatigue  HENT: Negative  Eyes: Negative  Cardiovascular: Negative for chest pain, dyspnea on exertion, leg swelling and palpitations  Respiratory: Positive for shortness of breath  Negative for cough, snoring and wheezing  Endocrine: Negative  Hematologic/Lymphatic: Negative  Skin: Negative for rash  Musculoskeletal: Negative  Gastrointestinal: Negative for abdominal pain, constipation and diarrhea  Genitourinary: Negative for bladder incontinence, dysuria and frequency  Neurological: Negative for dizziness and light-headedness  Psychiatric/Behavioral: Negative  All other systems reviewed and are negative  Historical Information   Past Medical History:   Diagnosis Date    Anxiety     COPD (chronic obstructive pulmonary disease) ([facility] [address] )     Lumbar back pain      History reviewed  No pertinent surgical history    Social History     Substance and Sexual Activity   Alcohol Use Yes    Frequency: 4 or more times a week    Drinks per session: 7 to 9    Binge frequency: Weekly     Social History     Substance and Sexual Activity   Drug Use Not on file     Social History     Tobacco Use   Smoking Status Light Tobacco Smoker   Smokeless Tobacco Never Used     Family History: non-contributory    Meds/Allergies   Current Facility-Administered Medications   Medication Dose Route Frequency Provider Last Rate    acetaminophen  650 mg Oral Q4H PRN Donna Wynne MD      albuterol  2 puff Inhalation Q4H PRN Donna Wynne MD      amLODIPine  10 mg Oral Daily Donna Wynne MD      aspirin  81 mg Oral Daily Melchor Dozier MD      atorvastatin  40 mg Oral QPM Donna Wynne MD      clopidogrel  75 mg Oral Daily SVEN Baires      folic acid  1 mg Oral Daily Donna Wynne MD      heparin (porcine)  5,000 Units Subcutaneous [facility] Mariana Araujo MD      Labetalol HCl  10 mg Intravenous Q6H PRN Donna Wynne MD      multivitamin-minerals  1 tablet Oral Daily Donna Wynne MD      nicotine  1 patch Transdermal Daily Donna Wynne MD      pantoprazole  20 mg Oral Early Morning Donna Wynne MD      sertraline  100 mg Oral Daily Donna Wynne MD      sodium chloride  125 mL/hr Intravenous Continuous Donna Wynne  mL/hr (03/29/21 1933)    thiamine  100 mg Oral Daily Donna Wynne MD       sodium chloride, 125 mL/hr, Last Rate: 125 mL/hr (03/29/21 1933)      Medications Prior to Admission   Medication    aclidinium (Tudorza Pressair) 400 MCG/ACT inhaler    ALBUTEROL IN    amLODIPine-benazepril (LOTREL 5-10) 5-10 MG per capsule    lansoprazole (PREVACID) 30 mg capsule    omeprazole (PriLOSEC OTC) 20 MG tablet    sertraline (ZOLOFT) 100 mg tablet    simvastatin (ZOCOR) 40 mg tablet       Allergies   Allergen Reactions    Penicillins Anaphylaxis       Objective   Vitals: Blood pressure 143/80, pulse 69, temperature 98 2 °F (36 8 °C), temperature source Oral, resp  rate 19, height 5' 9" (1 753 m), weight 76 4 kg (168 lb 6 9 oz), SpO2 92 %  , Body mass index is 24 87 kg/m² ,   Orthostatic Blood Pressures      Most Recent Value   Blood Pressure  143/80 filed at 2021 9684   Patient Position - Orthostatic VS  Lying filed at 2021 7726        Systolic (60FNT), ES , Min:119 , OOI:780     Diastolic (48FQY), CEI:65, Min:72, Max:93      Intake/Output Summary (Last 24 hours) at 3/30/2021 0916  Last data filed at 3/30/2021 0900  Gross per 24 hour   Intake 1430 ml   Output 1200 ml   Net 230 ml       Weight (last 2 days)     Date/Time   Weight    21 0019   76 4 (168 43)              Invasive Devices     Peripheral Intravenous Line            Peripheral IV 21 Right;Ventral (anterior) Forearm less than 1 day                  Physical Exam  Constitutional:       Appearance: He is well-developed  HENT:      Head: Normocephalic and atraumatic  Neck:      Vascular: No JVD  Cardiovascular:      Rate and Rhythm: Normal rate and regular rhythm  Heart sounds: Normal heart sounds  No murmur  No friction rub  No gallop  Pulmonary:      Effort: Pulmonary effort is normal       Breath sounds: Normal breath sounds  No wheezing or rales  Abdominal:      General: Bowel sounds are normal  There is no distension  Palpations: Abdomen is soft  Tenderness: There is no abdominal tenderness  Skin:     General: Skin is warm and dry  Findings: No erythema or rash  Neurological:      Mental Status: He is alert and oriented to person, place, and time  Deep Tendon Reflexes: Reflexes are normal and symmetric               Laboratory Results:  Results from last 7 days   Lab Units 21  0035   TROPONIN I ng/mL <0 02       CBC with diff:   Results from last 7 days   Lab Units 21  0604 21  7718 21  0035   WBC Thousand/uL 5 68 5 26 8 48   HEMOGLOBIN g/dL 11 1* 9 8* 11 2*   HEMATOCRIT % 37 2 32 3* 36 8   MCV fL 78* 77* 78*   PLATELETS Thousands/uL 204 184 276   MCH pg 23 2* 23 4* 23 7*   MCHC g/dL 29 8* 30 3* 30 4*   RDW % 14 6 14 4 14 5   MPV fL 10 6 10 4 10 5   NRBC AUTO /100 WBCs  --   --  0         CMP:  Results from last 7 days   Lab Units 21  0604 21  1331 21  0448 21  0035   POTASSIUM mmol/L 4 2 4 0 3 2* 4 1   CHLORIDE mmol/L 106 107 102 97*   CO2 mmol/L 27 25 25 24   BUN mg/dL 17 16 14 16   CREATININE mg/dL 1 26 1 23 1 30 1 35*   CALCIUM mg/dL 9 2 8 8 8 6 8 7   AST U/L  --   --   --  24   ALT U/L  --   --   --  18   ALK PHOS U/L  --   --   --  41*   EGFR ml/min/1 73sq m 60 62 58 55         BMP:  Results from last 7 days   Lab Units 21  0604 21  1331 218 21  0035   POTASSIUM mmol/L 4 2 4 0 3 2* 4 1   CHLORIDE mmol/L 106 107 102 97*   CO2 mmol/L 27 25 25 24   BUN mg/dL 17 16 14 16   CREATININE mg/dL 1 26 1 23 1 30 1 35*   CALCIUM mg/dL 9 2 8 8 8 6 8 7       BNP:     Magnesium:   Results from last 7 days   Lab Units 21  0035   MAGNESIUM mg/dL 1 2*       Coags:   Results from last 7 days   Lab Units 21  0035   PTT seconds 37   INR  1 01       TSH:       Lipid Profile:   Results from last 7 days   Lab Units 21  0035   TRIGLYCERIDES mg/dL 199*   HDL mg/dL 59       Cardiac testing:   Results for orders placed during the hospital encounter of 21   Echo complete with contrast if indicated    Narrative [facility] [address] [facility]  [phone]    Transthoracic Echocardiogram  2D, M-mode, Doppler, and Color Doppler    Study date:  29-Mar-2021    Patient: Leyda Lowry  MR number: [id number]  Account number: [de-identified]  : 1955  Age: 60 years  Gender: Male  Status: Outpatient  Location: ED  Height: 69 in  Weight: 168 lb  BP: 159/ 77 mmHg    Indications: TIA    Diagnoses: G45 9 - Transient cerebral ischemic attack, unspecified    Sonographer:  TONY Trevino  Primary Physician:  Kadie Alvarado, DO  Referring Physician:  Yuli Ledesma MD  Group:  [facility]  Interpreting Physician:  Mikala Lisa MD    SUMMARY    LEFT VENTRICLE:  Systolic function was normal  Ejection fraction was estimated to be 60 %  There were no regional wall motion abnormalities  RIGHT VENTRICLE:  The size was normal   Systolic function was normal     HISTORY: PRIOR HISTORY: Hyperlipidemia, Hypertension, Alcohol and Tobacco use, COPD  PROCEDURE: The study was performed in the ED  This was a routine study  The transthoracic approach was used  The study included complete 2D imaging, M-mode, complete spectral Doppler, and color Doppler  The heart rate was 61 bpm, at the  start of the study  Images were obtained from the parasternal, apical, subcostal, and suprasternal notch acoustic windows  Image quality was adequate  LEFT VENTRICLE: Size was normal  Systolic function was normal  Ejection fraction was estimated to be 60 %  There were no regional wall motion abnormalities  Wall thickness was normal  DOPPLER: There was an increased relative contribution  of atrial contraction to ventricular filling  Left ventricular diastolic function parameters were normal for the patient's age  RIGHT VENTRICLE: The size was normal  Systolic function was normal  Wall thickness was normal     LEFT ATRIUM: Size was normal     RIGHT ATRIUM: Size was normal     MITRAL VALVE: Valve structure was normal  There was normal leaflet separation  DOPPLER: The transmitral velocity was within the normal range  There was no evidence for stenosis  There was trace regurgitation  AORTIC VALVE: The valve was trileaflet  Leaflets exhibited normal thickness, mild calcification, normal cuspal separation, and sclerosis  DOPPLER: Transaortic velocity was within the normal range  There was no evidence for stenosis  There  was no significant regurgitation  TRICUSPID VALVE: The valve structure was normal  There was normal leaflet separation  DOPPLER: The transtricuspid velocity was within the normal range  There was no evidence for stenosis  There was trace regurgitation  Pulmonary artery  systolic pressure was within the normal range  Estimated peak PA pressure was 32 mmHg  PULMONIC VALVE: Leaflets exhibited normal thickness, no calcification, and normal cuspal separation  DOPPLER: The transpulmonic velocity was within the normal range  There was trace regurgitation  PERICARDIUM: There was no pericardial effusion  The pericardium was normal in appearance  AORTA: The root exhibited normal size  SYSTEMIC VEINS: IVC: The inferior vena cava was normal in size  Respirophasic changes were normal     SYSTEM MEASUREMENT TABLES    2D  %FS: 36 31 %  Ao Diam: 3 62 cm  EDV(Teich): 116 63 ml  EF(Teich): 65 78 %  ESV(Teich): 39 91 ml  IVSd: 0 93 cm  LA Area: 16 21 cm2  LA Diam: 3 6 cm  LVEDV MOD A4C: 73 25 ml  LVEF MOD A4C: 56 6 %  LVESV MOD A4C: 31 8 ml  LVIDd: 4 97 cm  LVIDs: 3 17 cm  LVLd A4C: 7 28 cm  LVLs A4C: 6 1 cm  LVPWd: 0 9 cm  RA Area: 12 77 cm2  RVIDd: 3 42 cm  SV MOD A4C: 41 46 ml  SV(Teich): 76 72 ml    CW  AV MaxPG: 3 62 mmHg  AV Vmax: 0 95 m/s  TR MaxP 84 mmHg  TR Vmax: 2 69 m/s    MM  TAPSE: 2 05 cm    PW  E' Sept: 0 08 m/s  E/E' Sept: 7 68  LVOT Vmax: 0 83 m/s  LVOT maxP 79 mmHg  MV A Cj: 0 75 m/s  MV Dec [place]: 4 68 m/s2  MV DecT: 130 82 ms  MV E Cj: 0 61 m/s  MV E/A Ratio: 0 81  MV PHT: 37 94 ms  MVA By PHT: 5 8 cm2    [employer] Accredited Echocardiography Laboratory    Prepared and electronically signed by    Jannet Chapa MD  Signed 29-Mar-2021 08:40:55       No results found for this or any previous visit  No results found for this or any previous visit    Results for orders placed during the hospital encounter of 20   NM myocardial perfusion spect (stress and/or rest) Narrative [address]  [address] [place] [address]  [phone]    Rest/Stress Gated SPECT Myocardial Perfusion Imaging After Regadenoson    Patient: Leyda Lowry  MR number: [id number]  Account number: [de-identified]  : 1955  Age: 62 years  Gender: Male  Status: Outpatient  Location: [address] [place] [address] and Vascular Center  Height: 69 in  Weight: 155 lb  BP: 142/ 60 mmHg    Allergies: ALLERGIES NOT ON FILE    Diagnosis: R07 9 - Chest pain, unspecified    Referring Physician:  Bhavya Espinosa DO  Primary Physician:  Bhavya Espinosa DO  Technician:  KANE Castillo  RN:  Mitch Voss RN  Group:  [facility]  Cardiology Fellow:  Dolph Eisenmenger, MD  Report Prepared by[de-identified]  Mitch Voss RN  Interpreting Physician:  Doc Hill MD    INDICATIONS: Detection of Coronary artery disease  HISTORY: Childhood rheumatic fever  The patient is a 62year old  male  Chest pain status: chest pain  Other symptoms: dyspnea and decreased effort tolerance  Coronary artery disease risk factors: smoking  Cardiovascular history:  none significant  Medications: a beta blocker and a lipid lowering agent  PHYSICAL EXAM: Baseline physical exam screening: no wheezes audible  REST ECG: Normal sinus rhythm  PROCEDURE: The study was performed in the the [address] [address] and Vascular Center  A regadenoson infusion pharmacologic stress test was performed  Gated SPECT myocardial perfusion imaging was performed after stress  Systolic blood pressure  was 142 mmHg, at the start of the study  Diastolic blood pressure was 60 mmHg, at the start of the study  The heart rate was 64 bpm, at the start of the study  IV double checked  Regadenoson protocol:  HR bpm SBP mmHg DBP mmHg Symptoms  Baseline 64 142 60 none  Immediate 109 122 64 mild dyspnea  1 min 97 140 76 none  No medications or fluids given      STRESS SUMMARY: Duration of pharmacologic stress was 3 min and 0 sec  Functional capacity could not be adequately assessed  Maximal heart rate during stress was 109 bpm  The heart rate response to stress was blunted  There was resting  hypertension with an appropriate blood pressure response to stress  There was no chest pain during stress  The stress test was terminated due to protocol completion  Pre oxygen saturation: 98 %  Peak oxygen saturation: 98 %  The stress ECG  was negative for ischemia and normal  There were no stress arrhythmias or conduction abnormalities  ISOTOPE ADMINISTRATION:  Resting isotope administration Stress isotope administration  Agent Tetrofosmin Tetrofosmin  Dose 10 66 mCi 31 6 mCi  Date 01/27/2020 01/27/2020  Injection time 07:55 09:55  Imaging time 08:43 10:39  Injection-image interval 48 min 44 min    The radiopharmaceutical was injected one minute before the end of exercise  MYOCARDIAL PERFUSION IMAGING:  The image quality was fair  Rotating projection images reveal mild diaphragmatic attenuation  Left ventricular size was normal  The TID ratio was 0 74  PERFUSION DEFECTS:  -  There were no significnat perfusion defects  PERFUSION QUANTITATION:  The summed perfusion score measured 0 during stress  GATED SPECT:  The calculated left ventricular ejection fraction was 71 %  Left ventricular ejection fraction was within normal limits by visual estimate  There was no left ventricular regional abnormality  SUMMARY:  -  Stress results: There was resting hypertension with an appropriate blood pressure response to stress  There was no chest pain during stress  -  ECG conclusions: The stress ECG was negative for ischemia and normal   -  Perfusion imaging: There were no significnat perfusion defects   -  Gated SPECT: The calculated left ventricular ejection fraction was 71 %  Left ventricular ejection fraction was within normal limits by visual estimate  There was no left ventricular regional abnormality      IMPRESSIONS: Normal study after vasodilation and low level exercise  Myocardial perfusion imaging was normal at rest and with stress  Left ventricular systolic function was normal     Prepared and signed by    Stephen Lino MD  Signed 01/27/2020 14:00:03           EKG reviewed personally: NSR 73 Nml  Telemetry reviewed personally: NSR    Assessment:  Principal Problem:    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  Active Problems:    Left arm weakness    Tobacco use    Alcohol abuse    HTN (hypertension)    HLD (hyperlipidemia)    Hyponatremia    Elevated serum creatinine    COPD (chronic obstructive pulmonary disease) (HCC)    Anemia    Symptomatic carotid artery stenosis, right      Impression:  1  R carotid stenosis - patient at acceptable cardiovascular risk to proceed with surgery  No further preoperative testing necessary at this time  Would start metoprolol tartrate 25mg BID and continue indefinitely  2  HTN - controlled  3  Dyslipidemia - on statin  Plan:  1  Proceed with surgery  2  Start metoprolol tartrate 25mg BID  3  No cardiology follow-up necessary at this tiime  4  Stable for discharge from cardiac standpoint

## 2021-03-30 NOTE — ASSESSMENT & PLAN NOTE
· 50-69% carotid artery stenosis on the right internal carotid artery  · Vascular surgery evaluated the patient, planning for procedure after discharge    Plan:  · Outpatient follow-up with vascular surgery

## 2021-03-30 NOTE — ASSESSMENT & PLAN NOTE
· Presents with left arm numbness and tingling and left hand weakness that occurred around 11:30 pm/12 am on 3/28/21, lasted about an hour  Took aspirin 81 mg at home   Episode resolved on its own by the time patient came to ED  · Secondary to TIA  · Now resolved    Plan:  · Aspirin, Plavix, and atorvastatin  · Outpatient vascular surgery follow-up

## 2021-03-30 NOTE — ASSESSMENT & PLAN NOTE
· Takes Amlodipine-Benazepril at home  · Blood pressure is controlled this admission    Plan:  · Continue home medications

## 2021-03-30 NOTE — ASSESSMENT & PLAN NOTE
Dejah Perez is a 30-year-old gentleman presented 3/29 with stroke-like symptoms of left upper extremity numbness and weakness that started the night prior  His symptoms had resolved upon arrival to the emergency room, initial CT was unremarkable, however MRI of the brain showed evidence of cortical micro infarcts in the right hemisphere with evidence of right ICA stenosis  Patient started on dual anti-platelet therapy  MRI scanning reveals a clear but small right embolic appearing infarct  Due to the facts that the ischemic regions are in the MCA territory on the right it is suspicious that this may be related to his carotids  Carotid doppler demonstrated 50-69% of right carotid  CTA demonstrated fairly extensive calcification of the left and right vessel  Moderate to severe stenosis on right, and mild stenosis of the left ICA origin  Vascular consulted, appreciate recommendations  They plan for vascular surgical intervention early next week  Plan:  -  Continue stroke pathway  -  Echocardiogram -a 60% EF  -  Normotension; avoid hypotension  -  Telemetry, continue no AFib seen so far  -  Lipid panel modestly abnormal   Statin started, to be maintained post discharge  -  Continue aspirin and Plavix   -  Keep euvolemic, as dehydration can worsen exam and function  -  Goal euglycemia  -  Therapies to include PT/OT/ST  -  DVT prevention   -  Frequent neurological check and notify neurology with any change in neuro status  -  Continue to monitor for infectious and metabolic derangements and treat as arises  - Patient will require follow up with outpatient neurology    - No further neurology recommendations at this time; Okay for discharge from neuro standpoint

## 2021-03-30 NOTE — PLAN OF CARE
Problem: Potential for Falls  Goal: Patient will remain free of falls  Description: INTERVENTIONS:  - Assess patient frequently for physical needs  -  Identify cognitive and physical deficits and behaviors that affect risk of falls  -  [place] fall precautions as indicated by assessment   - Educate patient/family on patient safety including physical limitations  - Instruct patient to call for assistance with activity based on assessment  - Modify environment to reduce risk of injury  - Consider OT/PT consult to assist with strengthening/mobility  Outcome: Progressing     Problem: Neurological Deficit  Goal: Neurological status is stable or improving  Description: Interventions:  - Monitor and assess patient's level of consciousness, motor function, sensory function, and level of assistance needed for ADLs  - Monitor and report changes from baseline  Collaborate with interdisciplinary team to initiate plan and implement interventions as ordered  - Provide and maintain a safe environment  - Consider seizure precautions  - Consider fall precautions  - Consider aspiration precautions  - Consider bleeding precautions  Outcome: Progressing     Problem: Activity Intolerance/Impaired Mobility  Goal: Mobility/activity is maintained at optimum level for patient  Description: Interventions:  - Assess and monitor patient  barriers to mobility and need for assistive/adaptive devices  - Assess patient's emotional response to limitations  - Collaborate with interdisciplinary team and initiate plans and interventions as ordered  - Encourage independent activity per ability   - Maintain proper body alignment  - Perform active/passive rom as tolerated/ordered    - Plan activities to conserve energy   - Turn patient as appropriate  Outcome: Progressing     Problem: Communication Impairment  Goal: Ability to express needs and understand communication  Description: Assess patient's communication skills and ability to understand information  Patient will demonstrate use of effective communication techniques, alternative methods of communication and understanding even if not able to speak  - Encourage communication and provide alternate methods of communication as needed  - Collaborate with case management/ for discharge needs  - Include patient/family/caregiver in decisions related to communication  Outcome: Progressing     Problem: Potential for Aspiration  Goal: Non-ventilated patient's risk of aspiration is minimized  Description: Assess and monitor vital signs, respiratory status, and labs (WBC)  Monitor for signs of aspiration (tachypnea, cough, rales, wheezing, cyanosis, fever)  - Assess and monitor patient's ability to swallow  - Place patient up in chair to eat if possible  - HOB up at 90 degrees to eat if unable to get patient up into chair   - Supervise patient during oral intake  - Instruct patient/ family to take small bites  - Instruct patient/ family to take small single sips when taking liquids  - Follow patient-specific strategies generated by speech pathologist   Outcome: Progressing  Goal: Ventilated patient's risk of aspiration is minimized  Description: Assess and monitor vital signs, respiratory status, airway cuff pressure, and labs (WBC)  Monitor for signs of aspiration (tachypnea, cough, rales, wheezing, cyanosis, fever)  - Elevate head of bed 30 degrees if patient has tube feeding   - Monitor tube feeding  Outcome: Progressing     Problem: Nutrition  Goal: Nutrition/Hydration status is improving  Description: Monitor and assess patient's nutrition/hydration status for malnutrition (ex- brittle hair, bruises, dry skin, pale skin and conjunctiva, muscle wasting, smooth red tongue, and disorientation)  Collaborate with interdisciplinary team and initiate plan and interventions as ordered  Monitor patient's weight and dietary intake as ordered or per policy   Utilize nutrition screening tool and intervene per policy  Determine patient's food preferences and provide high-protein, high-caloric foods as appropriate  - Assist patient with eating   - Allow adequate time for meals   - Encourage patient to take dietary supplement as ordered  - Collaborate with clinical nutritionist   - Include patient/family/caregiver in decisions related to nutrition  Outcome: Progressing     Problem: SAFETY ADULT  Goal: Patient will remain free of falls  Description: INTERVENTIONS:  - Assess patient frequently for physical needs  -  Identify cognitive and physical deficits and behaviors that affect risk of falls    -  [place] fall precautions as indicated by assessment   - Educate patient/family on patient safety including physical limitations  - Instruct patient to call for assistance with activity based on assessment  - Modify environment to reduce risk of injury  - Consider OT/PT consult to assist with strengthening/mobility  Outcome: Progressing  Goal: Maintain or return to baseline ADL function  Description: INTERVENTIONS:  -  Assess patient's ability to carry out ADLs; assess patient's baseline for ADL function and identify physical deficits which impact ability to perform ADLs (bathing, care of mouth/teeth, toileting, grooming, dressing, etc )  - Assess/evaluate cause of self-care deficits   - Assess range of motion  - Assess patient's mobility; develop plan if impaired  - Assess patient's need for assistive devices and provide as appropriate  - Encourage maximum independence but intervene and supervise when necessary  - Involve family in performance of ADLs  - Assess for home care needs following discharge   - Consider OT consult to assist with ADL evaluation and planning for discharge  - Provide patient education as appropriate  Outcome: Progressing  Goal: Maintain or return mobility status to optimal level  Description: INTERVENTIONS:  - Assess patient's baseline mobility status (ambulation, transfers, stairs, etc )    - Identify cognitive and physical deficits and behaviors that affect mobility  - Identify mobility aids required to assist with transfers and/or ambulation (gait belt, sit-to-stand, lift, walker, cane, etc )  - [place] fall precautions as indicated by assessment  - Record patient progress and toleration of activity level on Mobility SBAR; progress patient to next Phase/Stage  - Instruct patient to call for assistance with activity based on assessment  - Consider rehabilitation consult to assist with strengthening/weightbearing, etc   Outcome: Progressing     Problem: DISCHARGE PLANNING  Goal: Discharge to home or other facility with appropriate resources  Description: INTERVENTIONS:  - Identify barriers to discharge w/patient and caregiver  - Arrange for needed discharge resources and transportation as appropriate  - Identify discharge learning needs (meds, wound care, etc )  - Arrange for interpretive services to assist at discharge as needed  - Refer to Case Management Department for coordinating discharge planning if the patient needs post-hospital services based on physician/advanced practitioner order or complex needs related to functional status, cognitive ability, or social support system  Outcome: Progressing     Problem: Knowledge Deficit  Goal: Patient/family/caregiver demonstrates understanding of disease process, treatment plan, medications, and discharge instructions  Description: Complete learning assessment and assess knowledge base    Interventions:  - Provide teaching at level of understanding  - Provide teaching via preferred learning methods  Outcome: Progressing

## 2021-03-30 NOTE — ASSESSMENT & PLAN NOTE
· Current smoker  · Reports mild wheezing and cough at baseline  · O2 sat 97% on room air    Plan:  · Outpatient follow-up

## 2021-03-30 NOTE — UTILIZATION REVIEW
Continued Stay Review  3/29/2021 0251 observation    Date: 3/30/2021                        Current Patient Class: observation  Current Level of Care: med surg     HPI:65 y o  male initially admitted on 3/29/2021 from home thru ED to observation due to left arm weakness with DDX of TIA vs CVA, cardiac etiology, metabolic abnormality , alcohol abuse, atypical migraine, seizure/hypnatrema  Patient drinks case wine daily or 8 cans beer every other day  PMH of HTN, HLD, GERD, COPD  Presented with left arm numbness and tingling, left hand numbness lasting about 1 hour, patient took asa at home  Labs were significant for sodium of 132, creatinine of 1 35, and hemoglobin of 11 2 with no prior baselines  Head CT revealed no acute changes  Plan includes telemetry, stroke/TIA workup, neuro consult, neuro checks,lipid panel, A1c, MRI brain, ECHO, DAPT , IVF, labs in am, CIWA monitoring,start on thiamine, folic acid and MVI, hold Amlodipine-Benazepril , and start amlodipine    3/29/2021 per neurology - Patient with MRI showing cortical micro infarcts in the right hemisphere with evidence of right ICA stenosis  Presenting symptoms  Resolved upon presentation to ED and recommend dual anti platelet therapy  3/29/2021 per vascular - Patient has R ICA stenosis of 50-69% on Carotid Dulpex with suspicion for embolic R MCA CVA  Recommend Plavix and statin, add asa  Will need surgery and recommend follow up  For carotid endartectomy  Assessment/Plan:  3/30/2021 numbness and tingling in hand improving  To continue asa and Plavix  Plan on vascular surgical intervention early next week  Per cardiology 3/20/2021:  Patient with right carotid stenosis and is acceptable cardiovascular risk for surgery  Patient dyspneic with climbing of stairs  No afib no telemety  Recommend to start metoprolol          Pertinent Labs/Diagnostic Results:   3/29/2021 Carotids RIGHT: There is 50-69% stenosis noted in the internal carotid artery  Plaque is   heterogenous and irregular  There is retrograde flow noted in the ECA signifying possible high grade   stenosis at the bifurcation  Vertebral artery flow is antegrade  There is no significant subclavian artery   disease  LEFT: There is <50% stenosis noted in the internal carotid artery  Plaque is   heterogenous and irregular  Vertebral artery flow is antegrade  There is no significant subclavian artery   Disease  3/29/2021 MRI brain  2 foci of mild diffusion restriction in the right frontal lobe, indicative of recent/acute to subacute infarction   Findings may be related to right MCA territory or perhaps the watershed territory   Recommend screening assessment of the carotid   arteries with carotid ultrasound to assess for underlying carotid stenosis   Correlation for other cerebrovascular risk factors advised as well  2   Mild, chronic microangiopathy elsewhere  3   No acute intracranial hemorrhage    3/29/201 CxR- No acute cardiopulmonary disease  3/29/2021 cta head neck - Unfortunately this examination is degraded as the IV significantly infiltrated and there is a poor contrast bolus within the vasculature  Noncontrast imaging of the brain demonstrates mild chronic microangiopathic change  The limited postcontrast portion of the examination demonstrates extensive calcification of the cervical vasculature including right brachiocephalic origin, left common carotid artery origin, both common carotid arteries and bifurcations  There is suggestion of high-grade stenosis of the distal right common carotid artery and proximal cervical internal carotid artery as well as the distal left vertebral artery  Recommend repeat CT angiogram     3/29/2021 Ct head No acute intracranial abnormality   Moderate microangiopathic changes are present  Jessi Andres is a small old lacune in the left thalamus     Paranasal sinus disease    3/29/2021 ECG Normal sinus rhythm   Normal ECG   When compared with ECG of 22-JUL-2012 20:43,   ST elevation now present in Inferior leads   Nonspecific T wave abnormality now evident in Lateral leads    3/29/2021 Echo - LEFT VENTRICLE:Systolic function was normal  Ejection fraction was estimated to be 60 %    There were no regional wall motion abnormalities    RIGHT VENTRICLE:The size was normal   Systolic function was normal      Results from last 7 days   Lab Units 03/30/21  0604 03/29/21  0448 03/29/21  0035   WBC Thousand/uL 5 68 5 26 8 48   HEMOGLOBIN g/dL 11 1* 9 8* 11 2*   HEMATOCRIT % 37 2 32 3* 36 8   PLATELETS Thousands/uL 204 184 276   NEUTROS ABS Thousands/µL  --   --  5 50         Results from last 7 days   Lab Units 03/30/21  0604 03/29/21  1331 03/29/21 0448 03/29/21  0035   SODIUM mmol/L 142 141 138 132*   POTASSIUM mmol/L 4 2 4 0 3 2* 4 1   CHLORIDE mmol/L 106 107 102 97*   CO2 mmol/L 27 25 25 24   ANION GAP mmol/L 9 9 11 11   BUN mg/dL 17 16 14 16   CREATININE mg/dL 1 26 1 23 1 30 1 35*   EGFR ml/min/1 73sq m 60 62 58 55   CALCIUM mg/dL 9 2 8 8 8 6 8 7   MAGNESIUM mg/dL  --   --   --  1 2*     Results from last 7 days   Lab Units 03/29/21  0035   AST U/L 24   ALT U/L 18   ALK PHOS U/L 41*   TOTAL PROTEIN g/dL 7 7   ALBUMIN g/dL 3 6   TOTAL BILIRUBIN mg/dL 0 32     Results from last 7 days   Lab Units 03/29/21  0057   POC GLUCOSE mg/dl 115     Results from last 7 days   Lab Units 03/30/21  0604 03/29/21  1331 03/29/21  0448 03/29/21  0035   GLUCOSE RANDOM mg/dL 98 145* 111 99     Results from last 7 days   Lab Units 03/29/21  0448   HEMOGLOBIN A1C % 5 8*   EAG mg/dl 120     Results from last 7 days   Lab Units 03/29/21  0035   TROPONIN I ng/mL <0 02     Results from last 7 days   Lab Units 03/29/21  0035   PROTIME seconds 13 4   INR  1 01   PTT seconds 37     Results from last 7 days   Lab Units 03/29/21  0035   TSH 3RD GENERATON uIU/mL 1 686     Results from last 7 days   Lab Units 03/29/21  0448   FERRITIN ng/mL 12       Results from last 7 days   Lab Units 03/29/21  0448   ETHANOL LVL mg/dL 31*       Vital Signs:   03/30/21 0944  --  76  18  198/95Abnormal   136  97 %  None (Room air)  Standing for 3 minutes - Orthostatic VS   03/30/21 0942  --  84  18  184/88Abnormal   124  97 %  None (Room air)  Standing - Orthostatic VS   03/30/21 0939  --  77  18  188/81Abnormal   117  95 %  None (Room air)  Sitting - Orthostatic VS   03/30/21 0935  --  66  18  159/74  106  94 %  None (Room air)  Lying - Orthostatic VS     03/29/21 1510  98 5 °F (36 9 °C)  74  16  119/93  101  93 %  None (Room air)  Lying   03/29/21 1300  97 8 °F (36 6 °C)  72  19  162/72  --  98 %  --  Lying   03/29/21 1100  --  87  20  160/73  --  98 %  None (Room air)  --   03/29/21 0929  --  --  --  172/80Abnormal   --  --  --  --   03/29/21 0900  --  62  --  172/80Abnormal   115  --  --  Lying   03/29/21 0800  --  58  16  161/70  101  97 %  None (Room air)       CIWA  3/30/2021:  0 at 0400                  3/29/2021:   0 at 2200    0 at 1800     0 at 0900     Medications:   Scheduled Medications:  [START ON 3/31/2021] amLODIPine, 10 mg, Oral, Daily  aspirin, 81 mg, Oral, Daily  atorvastatin, 40 mg, Oral, QPM  clopidogrel, 75 mg, Oral, Daily  folic acid, 1 mg, Oral, Daily  heparin (porcine), 5,000 Units, Subcutaneous, Q8H YADY  metoprolol tartrate, 25 mg, Oral, Q12H [address]  multivitamin-minerals, 1 tablet, Oral, Daily  nicotine, 1 patch, Transdermal, Daily  pantoprazole, 20 mg, Oral, Early Morning  sertraline, 100 mg, Oral, Daily  thiamine, 100 mg, Oral, Daily      Continuous IV Infusions:  sodium chloride, 125 mL/hr, Intravenous, Continuous      PRN Meds:  acetaminophen, 650 mg, Oral, Q4H PRN  albuterol, 2 puff, Inhalation, Q4H PRN  Labetalol HCl, 10 mg, Intravenous, Q6H PRN        Discharge Plan: to be determined    Network Utilization Review Department  ATTENTION: Please call with any questions or concerns to [phone] and carefully listen to the prompts so that you are directed to the right person   All voicemails are confidential   Ty Limb all requests for admission clinical reviews, approved or denied determinations and any other requests to dedicated fax number below belonging to the campus where the patient is receiving treatment   List of dedicated fax numbers for the Facilities:  [address] DENIALS (Administrative/Medical Necessity) [phone]   [address] (Maternity/NICU/Pediatrics) [phone]   [address] [facility] [address] Priti Ferrer 1180 (  Yarely Duarte "Dorinda" 103) [address] [place] [address] Kaykay Maylin [address] [place] [address] [phone]

## 2021-03-30 NOTE — DISCHARGE SUMMARY
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  Assessment & Plan  · Patient presented with left arm numbness, tingling, and weakness, which resolved upon arrival to the emergency department  · CT head showed prior small lacunar infarct in left thalamus  · MRI brain showed right MCA territory subacute/acute infarct  · Carotid Doppler showed 50-69% internal carotid artery stenosis  · Patient's only symptom is decreased sensation at the dorsal aspect of his left hand    Plan:  · Aspirin, Plavix, and atorvastatin  · Outpatient vascular surgery follow-up    Left arm weakness  Assessment & Plan  · Presents with left arm numbness and tingling and left hand weakness that occurred around 11:30 pm/12 am on 3/28/21, lasted about an hour  Took aspirin 81 mg at home  Episode resolved on its own by the time patient came to ED  · Secondary to TIA  · Now resolved    Plan:  · Aspirin, Plavix, and atorvastatin  · Outpatient vascular surgery follow-up    Alcohol abuse  Assessment & Plan  · Reports history of drinking one case of wine every day  Says he has cut down and now drinks 8 cans of beer every other day  Last drink was at 12 pm prior to coming into ED    · Folate and vitamin 12 within normal limits  · Patient was on CIWA protocol, did not require any Ativan    Plan:  · Outpatient follow-up    Symptomatic carotid artery stenosis, right  Assessment & Plan  · 50-69% carotid artery stenosis on the right internal carotid artery  · Vascular surgery evaluated the patient, planning for procedure after discharge    Plan:  · Outpatient follow-up with vascular surgery    Anemia  Assessment & Plan  · Hemoglobin of 11 2 with no prior baseline  · MCV: 78, with decreased ferritin, iron, and increased TIBC  · No signs of acute bleeding  · Reports polyps on previous colonoscopy  · B12 and folate within normal limits    Plan:  · Will discharge with iron supplementation  · Outpatient follow-up    COPD (chronic obstructive pulmonary disease) (HCC)  Assessment & Plan  · Current smoker  · Reports mild wheezing and cough at baseline  · O2 sat 97% on room air    Plan:  · Outpatient follow-up    Elevated serum creatinine  Assessment & Plan  · Creatinine of 1 35 on admission  · No prior labs for baseline  · Mildly improved with IV fluids, 1 23    Plan:  · Outpatient follow-up      Hyponatremia  Assessment & Plan  · Sodium of 132 on admission  · Likely secondary to history of alcohol abuse  · Resolved    Plan:  · Outpatient follow-up    HLD (hyperlipidemia)  Assessment & Plan  · Takes Zocor 40 mg at home    Plan:  · Switched to high intensity statin, Lipitor 40 mg daily    HTN (hypertension)  Assessment & Plan  · Takes Amlodipine-Benazepril at home  · Blood pressure is controlled this admission    Plan:  · Continue home medications    Tobacco use  Assessment & Plan  · Reports efforts to quit smoking, currently on Chantix but still smokes 5 cigarettes a week    Plan:  · Tobacco cessation education materials provided  · Outpatient follow-up        Discharging Resident Physician: Lennice Favre, MD  Attending: Nika Connolly MD  PCP: Erik Merino DO  Admission Date: 3/29/2021  Discharge Date: 03/30/21    Disposition:     Home    Reason for Admission: TIA    Consultations During Hospital Stay:  · Neuro  · Vascular Surgery  · Cardiology    Procedures Performed:     · CT head  · Chest x-ray  · Brain MRI  · Carotid Doppler  · CTA head and neck    Significant Findings / Test Results:     · CT head:  Small old lacunar infarct in the left thalamus  · Brain MRI:  Right MCA territory acute/subacute infarcts  · Carotid Dopplers:  Right 50-69% stenosis internal carotid artery    Incidental Findings:   · None    Test Results Pending at Discharge (will require follow up): · None     Outpatient Tests Requested:  · Vascular surgery:  Likely CEA    Complications:  None    Hospital Course:     Paulo Clement is a 60 y o  male patient who originally presented to the hospital on 3/29/2021 due to tingling, numbness, and weakness in his left hand for approximately 1 hour prior to arrival to the ED  The symptoms subsided upon arrival   He was admitted via the stroke pathway, CT head showed prior infarct, MRI brain showed right MCA territory subacute/acute infarct  He underwent carotid Dopplers, which showed 50-69% right internal carotid artery stenosis  He was initiated on aspirin, Plavix, and atorvastatin  He was evaluated by Neurology, who cleared the patient for discharge  He was also evaluated by vascular surgery who recommended an outpatient intervention  The patient was scheduled for CTA on 03/29, unfortunately his IV infiltrated, and the study was completed, but unable to be properly read as the contrast did not probably travel into the appropriate vessels  The patient became very frustrated, and refused 2nd IV at that time  He was okay when I evaluated him this morning, but he is ready for discharge today  Condition at Discharge: good     Discharge Day Visit / Exam:     Subjective:  No acute overnight events  Please see above for CTA from last night  The patient was pleasant this morning, continues to have no complaints, denies any weakness, the only symptom that he notices is decreased sensation on the dorsal aspect of his left hand  He denies any chest pain shortness of breath  He is asking for discharge      Vitals: Blood Pressure: 140/80 (03/30/21 1009)  Pulse: 76 (03/30/21 0944)  Temperature: 98 2 °F (36 8 °C) (03/30/21 0647)  Temp Source: Oral (03/30/21 0647)  Respirations: 18 (03/30/21 0944)  Height: 5' 9" (175 3 cm) (03/29/21 0019)  Weight - Scale: 76 4 kg (168 lb 6 9 oz) (03/29/21 0019)  SpO2: 97 % (03/30/21 6622)  Exam:   Physical Exam  Vitals signs reviewed  Constitutional:       General: He is not in acute distress  Appearance: Normal appearance  He is not ill-appearing, toxic-appearing or diaphoretic  HENT:      Head: Normocephalic and atraumatic  Eyes:      General: No scleral icterus  Conjunctiva/sclera: Conjunctivae normal    Cardiovascular:      Rate and Rhythm: Normal rate and regular rhythm  Heart sounds: Normal heart sounds  No murmur  No gallop  Pulmonary:      Effort: Pulmonary effort is normal  No respiratory distress  Breath sounds: Normal breath sounds  No wheezing, rhonchi or rales  Abdominal:      General: Bowel sounds are normal  There is no distension  Palpations: Abdomen is soft  Tenderness: There is no abdominal tenderness  Musculoskeletal:      Right lower leg: No edema  Left lower leg: No edema  Neurological:      Mental Status: He is alert and oriented to person, place, and time  Cranial Nerves: No cranial nerve deficit  Sensory: Sensory deficit present  Motor: No weakness  Comments: Decreased sensation dorsal aspect left hand   Psychiatric:         Mood and Affect: Mood normal          Behavior: Behavior normal          Discussion with Family:  Discussed case with his sister Jn Saleem over the phone    Discharge instructions/Information to patient and family:   See after visit summary for information provided to patient and family  Provisions for Follow-Up Care:  See after visit summary for information related to follow-up care and any pertinent home health orders  Planned Readmission: No     Discharge Medications:  See after visit summary for reconciled discharge medications provided to patient and family        ** Please Note: This note has been constructed using a voice recognition system **

## 2021-03-30 NOTE — PROGRESS NOTES
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  Assessment & Plan  Renae Bettencourt is a 52-year-old gentleman presented 3/29 with stroke-like symptoms of left upper extremity numbness and weakness that started the night prior  His symptoms had resolved upon arrival to the emergency room, initial CT was unremarkable, however MRI of the brain showed evidence of cortical micro infarcts in the right hemisphere with evidence of right ICA stenosis  Patient started on dual anti-platelet therapy  MRI scanning reveals a clear but small right embolic appearing infarct  Due to the facts that the ischemic regions are in the MCA territory on the right it is suspicious that this may be related to his carotids  Carotid doppler demonstrated 50-69% of right carotid  CTA demonstrated fairly extensive calcification of the left and right vessel  Moderate to severe stenosis on right, and mild stenosis of the left ICA origin  Vascular consulted, appreciate recommendations  They plan for vascular surgical intervention early next week  Plan:  -  Continue stroke pathway  -  Echocardiogram -a 60% EF  -  Normotension; avoid hypotension  -  Telemetry, continue no AFib seen so far  -  Lipid panel modestly abnormal   Statin started, to be maintained post discharge  -  Continue aspirin and Plavix   -  Keep euvolemic, as dehydration can worsen exam and function  -  Goal euglycemia  -  Therapies to include PT/OT/ST  -  DVT prevention   -  Frequent neurological check and notify neurology with any change in neuro status  -  Continue to monitor for infectious and metabolic derangements and treat as arises  - Patient will require follow up with outpatient neurology    - No further neurology recommendations at this time; Okay for discharge from neuro standpoint      Symptomatic carotid artery stenosis, right  Assessment & Plan  - Carotid ultrasound 50-69% stenosis appreciated on the right  - CTA head and neck was limited due to extravasation of contrast, however, demonstrated fairly extensive calcification of the left and right vessel  Moderate to severe stenosis on right, and mild stenosis of the left ICA origin  - Vascular evaluation appreciated; planning for vascular surgical intervention early next week  Susan Welch will need follow up in in 4 weeks with neurovascular attending or advance practitioner  He will not require outpatient neurological testing  Subjective:   Gloria Gray was evaluated today in his room  Patient offers no complaints  Denies pain, weakness, dizziness, and headache  States that his numbness and tingling in his left hand is much improving  Patient anxious to be discharged home  Past Medical History:   Diagnosis Date    Anxiety     COPD [facility]chronic obstructive pulmonary disease) ([facility])     Lumbar back pain      History reviewed  No pertinent surgical history  History reviewed  No pertinent family history    Social History     Socioeconomic History    Marital status: /Civil Union     Spouse name: None    Number of children: None    Years of education: None    Highest education level: None   Occupational History    None   Social Needs    Financial resource strain: None    Food insecurity     Worry: None     Inability: None    Transportation needs     Medical: None     Non-medical: None   Tobacco Use    Smoking status: Light Tobacco Smoker    Smokeless tobacco: Never Used   Substance and Sexual Activity    Alcohol use: Yes     Frequency: 4 or more times a week     Drinks per session: 7 to 9     Binge frequency: Weekly    Drug use: None    Sexual activity: None   Lifestyle    Physical activity     Days per week: None     Minutes per session: None    Stress: None   Relationships    Social connections     Talks on phone: None     Gets together: None     Attends Jewish service: None     Active member of club or organization: None     Attends meetings of clubs or organizations: None     Relationship status: None    Intimate partner violence     Fear of current or ex partner: None     Emotionally abused: None     Physically abused: None     Forced sexual activity: None   Other Topics Concern    None   Social History Narrative    None         Medications: All current active meds have been reviewed and current meds:  Scheduled Meds:  Current Facility-Administered Medications   Medication Dose Route Frequency Provider Last Rate    acetaminophen  650 mg Oral Q4H PRN Candi Dugan MD      albuterol  2 puff Inhalation Q4H PRN MD Russell Taylor ON 3/31/2021] amLODIPine  10 mg Oral Daily Ann Lowry MD      aspirin  81 mg Oral Daily Gabrielle Colmenares MD      atorvastatin  40 mg Oral QPM Candi Dugan MD      clopidogrel  75 mg Oral Daily SVEN Baires      folic acid  1 mg Oral Daily Candi Dugan MD      heparin (porcine)  5,000 Units Subcutaneous [facility] Candi Dugan MD      Labetalol HCl  10 mg Intravenous Q6H PRN Candi Dugan MD      metoprolol tartrate  25 mg Oral Q12H [address] Ann Lowry MD      multivitamin-minerals  1 tablet Oral Daily Candi Dugan MD      nicotine  1 patch Transdermal Daily Candi Dugan MD      pantoprazole  20 mg Oral Early Morning Candi Dugan MD      sertraline  100 mg Oral Daily Candi Dugan MD      sodium chloride  125 mL/hr Intravenous Continuous Candi Dugan  mL/hr (03/29/21 1933)    thiamine  100 mg Oral Daily Candi Dugan MD       Continuous Infusions:sodium chloride, 125 mL/hr, Last Rate: 125 mL/hr (03/29/21 1933)      PRN Meds:   acetaminophen    albuterol    Labetalol HCl       ROS:   Review of Systems   Neurological: Positive for numbness          Dorsal aspect of his left hand patient reports numbness (improving)   All other systems reviewed and are negative  Vitals:   /80   Pulse 76   Temp 98 2 °F (36 8 °C) (Oral)   Resp 18   Ht 5' 9" (1 753 m)   Wt 76 4 kg (168 lb 6 9 oz)   SpO2 97%   BMI 24 87 kg/m²     Physical Exam:   Physical Exam  Vitals signs and nursing note reviewed  Constitutional:       General: He is not in acute distress  Appearance: Normal appearance  He is not ill-appearing  HENT:      Head: Normocephalic and atraumatic  Right Ear: External ear normal       Left Ear: External ear normal       Nose: Nose normal       Mouth/Throat:      Mouth: Mucous membranes are moist    Eyes:      General: No scleral icterus  Extraocular Movements: Extraocular movements intact and EOM normal       Conjunctiva/sclera: Conjunctivae normal       Pupils: Pupils are equal, round, and reactive to light  Cardiovascular:      Rate and Rhythm: Normal rate  Pulses: Normal pulses  Pulmonary:      Effort: Pulmonary effort is normal  No respiratory distress  Musculoskeletal: Normal range of motion  General: Swelling present  No tenderness or signs of injury  Right lower leg: No edema  Left lower leg: No edema  Comments: PATRICIAE 2/2 contrast extravasation  Skin:     General: Skin is warm and dry  Capillary Refill: Capillary refill takes less than 2 seconds  Findings: No erythema or rash  Neurological:      General: No focal deficit present  Mental Status: He is alert and oriented to person, place, and time  Coordination: Finger-Nose-Finger Test normal       Deep Tendon Reflexes: Strength normal       Reflex Scores:       Bicep reflexes are 2+ on the right side and 2+ on the left side  Brachioradialis reflexes are 2+ on the right side and 2+ on the left side  Patellar reflexes are 2+ on the right side and 2+ on the left side  Achilles reflexes are 2+ on the right side and 2+ on the left side       Comments: Detailed below   Psychiatric:         Mood and Affect: Mood normal          Speech: Speech normal        Neurologic Exam     Mental Status   Oriented to person, place, and time  Oriented to city  Oriented to year, month and date  Attention: normal  Concentration: normal    Speech: speech is normal   Level of consciousness: alert  Knowledge: good  Able to name object  Able to repeat  Patient is awake and alert for today's examination  Able to state the year, date, his birthday, location, and situation  Able to follow 2 step commands  Attention normal, speech clear  Able to identify objects, and colors (although he notes that he is colorblind, and does struggle more with this task)  Cranial Nerves   Cranial nerves II through XII intact  CN II   Visual acuity: normal  Right visual field deficit: none  Left visual field deficit: none     CN III, IV, VI   Pupils are equal, round, and reactive to light  Extraocular motions are normal    Right pupil: Size: 3 mm  Shape: regular  Left pupil: Size: 3 mm  Shape: regular  Nystagmus: none   Diplopia: none  Upgaze: normal  Downgaze: normal  Conjugate gaze: present    CN V   Right facial sensation deficit: none  Left facial sensation deficit: none    CN VII   Right facial weakness: none  Left facial weakness: none    CN VIII   Hearing: intact    CN IX, X   CN IX normal      CN XI   CN XI normal      CN XII   CN XII normal    Facial symmetry noted  Able to protrude tongue midline  EOM intact, visual fields intact  Motor Exam   Muscle bulk: normal  Overall muscle tone: normal    Strength   Strength 5/5 throughout       Sensory Exam   Light touch normal      Gait, Coordination, and Reflexes     Coordination   Finger to nose coordination: normal    Tremor   Resting tremor: absent  Intention tremor: absent    Reflexes   Right brachioradialis: 2+  Left brachioradialis: 2+  Right biceps: 2+  Left biceps: 2+  Right patellar: 2+  Left patellar: 2+  Right achilles: 2+  Left achilles: 2+  Gait exam deferred at this time  Labs: I have personally reviewed pertinent reports  Recent Results (from the past 24 hour(s))   Occult blood 1-3, stool    Collection Time: 03/29/21  2:14 PM   Result Value Ref Range    Fecal Occult Blood Diagnostic Negative Negative    Fecal Occult Blood Diagnostic 2      Fecal Occult Blood Diagnostic 3     CBC    Collection Time: 03/30/21  6:04 AM   Result Value Ref Range    WBC 5 68 4 31 - 10 16 Thousand/uL    RBC 4 79 3 88 - 5 62 Million/uL    Hemoglobin 11 1 (L) 12 0 - 17 0 g/dL    Hematocrit 37 2 36 5 - 49 3 %    MCV 78 (L) 82 - 98 fL    MCH 23 2 (L) 26 8 - 34 3 pg    MCHC 29 8 (L) 31 4 - 37 4 g/dL    RDW 14 6 11 6 - 15 1 %    Platelets 672 585 - 658 Thousands/uL    MPV 10 6 8 9 - 12 7 fL   Basic metabolic panel    Collection Time: 03/30/21  6:04 AM   Result Value Ref Range    Sodium 142 136 - 145 mmol/L    Potassium 4 2 3 5 - 5 3 mmol/L    Chloride 106 100 - 108 mmol/L    CO2 27 21 - 32 mmol/L    ANION GAP 9 4 - 13 mmol/L    BUN 17 5 - 25 mg/dL    Creatinine 1 26 0 60 - 1 30 mg/dL    Glucose 98 65 - 140 mg/dL    Glucose, Fasting 98 65 - 99 mg/dL    Calcium 9 2 8 3 - 10 1 mg/dL    eGFR 60 ml/min/1 73sq m       Imaging: I have personally reviewed pertinent imaging in PACS, including CTA,  and I have personally reviewed PACS reports  EKG, Pathology, and Other Studies: I have personally reviewed pertinent reports  VTE Prophylaxis: Sequential compression device (Venodyne)  and Heparin    Total time spent today 25 minutes  Greater than 50% of total time was spent with the patient and / or family counseling and / or coordination of care  A description of the counseling / coordination of care: Chart review, review of imaging, patient education on stroke risk factors provided, coordination of care

## 2021-03-30 NOTE — ASSESSMENT & PLAN NOTE
· Sodium of 132 on admission  · Likely secondary to history of alcohol abuse  · Resolved    Plan:  · Outpatient follow-up

## 2021-03-30 NOTE — ASSESSMENT & PLAN NOTE
- Carotid ultrasound 50-69% stenosis appreciated on the right  - CTA head and neck was limited due to extravasation of contrast, however, demonstrated fairly extensive calcification of the left and right vessel  Moderate to severe stenosis on right, and mild stenosis of the left ICA origin  - Vascular evaluation appreciated; planning for vascular surgical intervention early next week

## 2021-03-31 ENCOUNTER — TELEPHONE (OUTPATIENT)
Dept: VASCULAR SURGERY | Facility: CLINIC | Age: 66
End: 2021-03-31

## 2021-03-31 NOTE — UTILIZATION REVIEW
Notification of Discharge  This is a Notification of Discharge from our facility [address]  Please be advised that this patient has been discharge from our facility  Below you will find the admission and discharge date and time including the patients disposition  PRESENTATION DATE: 3/29/2021 12:17 AM  OBS ADMISSION DATE:   IP ADMISSION DATE: N/A N/A   DISCHARGE DATE: 3/30/2021  5:56 PM  DISPOSITION: Home/Self Care Home/Self Care   Admission Orders listed below:  Admission Orders (From admission, onward)     Ordered        03/29/21 0251  Place in Observation  Once                   Please contact the UR Department if additional information is required to close this patient's authorization/case  [address] Utilization Review Department  Main: [phone] x carefully listen to the prompts  All voicemails are confidential   [email or web address]  Send all requests for admission clinical reviews, approved or denied determinations and any other requests to dedicated fax number below belonging to the campus where the patient is receiving treatment   List of dedicated fax numbers:  [address] DENIALS (Administrative/Medical Necessity) [phone]   [address] (Maternity/NICU/Pediatrics) [phone]   [place] [phone]   Ladean Ros [phone]   [place] [phone]   [facility] [address] [place] [address] [phone]   [facility]  [phone]   [address] [facility] [address]  [address] [address] [address] [facility] [phone]

## 2021-03-31 NOTE — TELEPHONE ENCOUNTER
Is patient requesting a call when authorization has been obtained? Patient did not request a call  Surgery Date: 4-6-21  Primary Surgeon: Jo-Ann Maradiaga (NPI: [id number])  Assisting Surgeon: Not Applicable (N/A)  Facility: [facility] (Tax: [id number] / NPI: [id number])  Inpatient / Outpatient: Outpatient  Level: 1    Clearance Received: No clearance ordered  Consent Received: Consent will be signed day of procedure  Medication Hold / Last Dose: No  VQI Spreadsheet: Not Applicable (N/A)  IR Notified: Not Applicable (N/A)  Rep  Notified: Not Applicable (N/A)  Equipment Needs: Not Applicable (N/A)  Vas Lab Requested: Not Applicable (N/A)  Patient Contacted: 3-31-21    Diagnosis: I65 21  Procedure/ CPT Code(s): Angiogram // CPT: 71816 R Carotid Angio    For varicose vein related procedures, last LEVDR? Not Applicable (N/A)    Post Operative Date/ Time: To Be Determined (TBD)     *Please review with patient med hold, PATs, and check H&P *  PATIENT WAS MAILED SURGERY/SHOWERING/DISCHARGE/COVID INSTRUCTIONS AFTER REVIEWING WITH HER VIA PHONE CALL     Spoke to patient to schedule procedure LLF

## 2021-04-01 ENCOUNTER — PREP FOR PROCEDURE (OUTPATIENT)
Dept: VASCULAR SURGERY | Facility: CLINIC | Age: 66
End: 2021-04-01

## 2021-04-02 ENCOUNTER — TELEPHONE (OUTPATIENT)
Dept: NEUROLOGY | Facility: CLINIC | Age: 66
End: 2021-04-02

## 2021-04-02 RX ORDER — CARVEDILOL 12.5 MG/1
12.5 TABLET ORAL 2 TIMES DAILY WITH MEALS
COMMUNITY
End: 2022-03-01 | Stop reason: HOSPADM

## 2021-04-02 RX ORDER — OMEPRAZOLE 20 MG/1
20 CAPSULE, DELAYED RELEASE ORAL DAILY
COMMUNITY
End: 2021-04-14 | Stop reason: HOSPADM

## 2021-04-02 NOTE — TELEPHONE ENCOUNTER
Post CVA Discharge Follow Up    Hospitalization: 3/29-3/30  The purpose of this phone call is to assess patient's general wellbeing or for any assistance needed with follow-up care  Per SVEN Harper "   Pita Garnica will need follow up in in 4 weeks with neurovascular attending or advance practitioner  He will not require outpatient neurological testing  "  Called, reached patient, I introduced myself and explained neurovascular nurse navigator role  Since discharge, he denies experiencing any new or worsening stroke-like symptoms  Patient denies the presence of any residual stroke symptoms following hospitalization and claims he has returned to baseline functioning  Ambulation / ADLs:  he is ambulating independently as well as preforming his own ADLs  Patient manages his own medications, appointments, and affairs  Appointments / Medication Review:  Reviewed appointments - patient successfully followed up with PCP  Patient scheduled for the following: CEA scheduled for the 4/6  Offered to schedule stroke hospital follow up appointment, patient politely declined  I gave him our number if he chooses to follow up in the future  I reviewed medications with him  There have not been any medication changes since discharge from the hospital  Reports having no difficulties obtaining medications  Reports he is taking all as prescribed with no missed doses, medication side effects, or signs of bleeding  Risk Factors / Education:  During this call, we reviewed stroke type, symptoms, personal risk factors and management, medications, and resources  Patient verbalizes understanding  Offered to send stroke education binder and my card in the mail, they are agreeable to this  I mailed the information as requested       As for risk factors, patient reports he knows what to do for risk factor modification and is agreeable to changing his habits, but he is struggling  Continues to be an everyday smoker, smokes about 5 cigarettes a day  Smoking cessation reviewed, patient determined to quit, on Chantix  he reports following he is struggling with his diet  Reviewed low salt low cholesterol diet  I addressed all his questions  At the conclusion of the conversation, patient denies having any further questions or concerns  ,m

## 2021-04-02 NOTE — PRE-PROCEDURE INSTRUCTIONS
Pre-Surgery Instructions:   Medication Instructions    aclidinium [address]) 400 MCG/ACT inhaler Instructed patient per Anesthesia Guidelines   ALBUTEROL IN Instructed patient per Anesthesia Guidelines   amLODIPine-benazepril (LOTREL 5-10) 5-10 MG per capsule Instructed patient per Anesthesia Guidelines   aspirin (ECOTRIN LOW STRENGTH) 81 mg EC tablet Instructed patient per Anesthesia Guidelines   atorvastatin (LIPITOR) 40 mg tablet Instructed patient per Anesthesia Guidelines   carvedilol (COREG) 12 5 mg tablet Instructed patient per Anesthesia Guidelines   clopidogrel (PLAVIX) 75 mg tablet nurse will check with office    metoprolol tartrate (LOPRESSOR) 25 mg tablet Instructed patient per Anesthesia Guidelines   omeprazole (PriLOSEC) 20 mg delayed release capsule Instructed patient per Anesthesia Guidelines   sertraline (ZOLOFT) 100 mg tablet Instructed patient per Anesthesia Guidelines   thiamine 100 MG tablet Instructed patient per Anesthesia Guidelines  Pt instructed to take sertraline, prilosec, metoprolol, and coreg the morning of surgery with a small sip of water  [facility] preop instructions reviewed with pt  Pt will get surgical soap

## 2021-04-04 ENCOUNTER — ANESTHESIA EVENT (OUTPATIENT)
Dept: PERIOP | Facility: HOSPITAL | Age: 66
End: 2021-04-04
Payer: COMMERCIAL

## 2021-04-06 ENCOUNTER — HOSPITAL ENCOUNTER (OUTPATIENT)
Facility: HOSPITAL | Age: 66
Setting detail: OUTPATIENT SURGERY
Discharge: HOME | End: 2021-04-06
Attending: SURGERY | Admitting: SURGERY
Payer: COMMERCIAL

## 2021-04-06 ENCOUNTER — APPOINTMENT (OUTPATIENT)
Dept: RADIOLOGY | Facility: HOSPITAL | Age: 66
End: 2021-04-06
Payer: COMMERCIAL

## 2021-04-06 ENCOUNTER — PREP FOR PROCEDURE (OUTPATIENT)
Dept: VASCULAR SURGERY | Facility: CLINIC | Age: 66
End: 2021-04-06

## 2021-04-06 ENCOUNTER — HOSPITAL ENCOUNTER (OUTPATIENT)
Facility: HOSPITAL | Age: 66
Setting detail: SURGERY ADMIT
End: 2021-04-06
Attending: SURGERY | Admitting: SURGERY
Payer: COMMERCIAL

## 2021-04-06 ENCOUNTER — ANESTHESIA (OUTPATIENT)
Dept: PERIOP | Facility: HOSPITAL | Age: 66
End: 2021-04-06
Payer: COMMERCIAL

## 2021-04-06 VITALS
HEART RATE: 70 BPM | OXYGEN SATURATION: 96 % | TEMPERATURE: 98 F | SYSTOLIC BLOOD PRESSURE: 147 MMHG | WEIGHT: 160 LBS | DIASTOLIC BLOOD PRESSURE: 71 MMHG | HEIGHT: 69 IN | RESPIRATION RATE: 20 BRPM | BODY MASS INDEX: 23.7 KG/M2

## 2021-04-06 DIAGNOSIS — Z91.89 RISK FACTORS FOR OBSTRUCTIVE SLEEP APNEA: Primary | ICD-10-CM

## 2021-04-06 DIAGNOSIS — I65.29 STENOSIS OF CAROTID ARTERY, UNSPECIFIED LATERALITY: ICD-10-CM

## 2021-04-06 DIAGNOSIS — N28.9 RENAL INSUFFICIENCY: Primary | ICD-10-CM

## 2021-04-06 PROCEDURE — C1769 GUIDE WIRE: HCPCS | Performed by: SURGERY

## 2021-04-06 PROCEDURE — C1887 CATHETER, GUIDING: HCPCS | Performed by: SURGERY

## 2021-04-06 PROCEDURE — 76000 FLUOROSCOPY <1 HR PHYS/QHP: CPT

## 2021-04-06 PROCEDURE — NC001 PR NO CHARGE: Performed by: SURGERY

## 2021-04-06 PROCEDURE — 36223 PLACE CATH CAROTID/INOM ART: CPT | Performed by: SURGERY

## 2021-04-06 PROCEDURE — C1894 INTRO/SHEATH, NON-LASER: HCPCS | Performed by: SURGERY

## 2021-04-06 PROCEDURE — C1887 CATHETER, GUIDING: HCPCS

## 2021-04-06 RX ORDER — FENTANYL CITRATE 50 UG/ML
INJECTION, SOLUTION INTRAMUSCULAR; INTRAVENOUS AS NEEDED
Status: DISCONTINUED | OUTPATIENT
Start: 2021-04-06 | End: 2021-04-06

## 2021-04-06 RX ORDER — LIDOCAINE HYDROCHLORIDE 10 MG/ML
INJECTION, SOLUTION EPIDURAL; INFILTRATION; INTRACAUDAL; PERINEURAL AS NEEDED
Status: DISCONTINUED | OUTPATIENT
Start: 2021-04-06 | End: 2021-04-06 | Stop reason: HOSPADM

## 2021-04-06 RX ORDER — ONDANSETRON 2 MG/ML
4 INJECTION INTRAMUSCULAR; INTRAVENOUS ONCE AS NEEDED
Status: DISCONTINUED | OUTPATIENT
Start: 2021-04-06 | End: 2021-04-06 | Stop reason: HOSPADM

## 2021-04-06 RX ORDER — SODIUM CHLORIDE 9 MG/ML
125 INJECTION, SOLUTION INTRAVENOUS CONTINUOUS
Status: DISCONTINUED | OUTPATIENT
Start: 2021-04-06 | End: 2021-04-06 | Stop reason: HOSPADM

## 2021-04-06 RX ORDER — SODIUM CHLORIDE, SODIUM LACTATE, POTASSIUM CHLORIDE, CALCIUM CHLORIDE 600; 310; 30; 20 MG/100ML; MG/100ML; MG/100ML; MG/100ML
50 INJECTION, SOLUTION INTRAVENOUS CONTINUOUS
Status: CANCELLED | OUTPATIENT
Start: 2021-04-06

## 2021-04-06 RX ORDER — ACETAMINOPHEN 325 MG/1
650 TABLET ORAL ONCE
Status: COMPLETED | OUTPATIENT
Start: 2021-04-06 | End: 2021-04-06

## 2021-04-06 RX ORDER — HEPARIN SODIUM 1000 [USP'U]/ML
INJECTION, SOLUTION INTRAVENOUS; SUBCUTANEOUS AS NEEDED
Status: DISCONTINUED | OUTPATIENT
Start: 2021-04-06 | End: 2021-04-06

## 2021-04-06 RX ORDER — HEPARIN SODIUM 200 [USP'U]/100ML
INJECTION, SOLUTION INTRAVENOUS
Status: COMPLETED | OUTPATIENT
Start: 2021-04-06 | End: 2021-04-06

## 2021-04-06 RX ORDER — LIDOCAINE HYDROCHLORIDE 10 MG/ML
0.5 INJECTION, SOLUTION EPIDURAL; INFILTRATION; INTRACAUDAL; PERINEURAL ONCE AS NEEDED
Status: CANCELLED | OUTPATIENT
Start: 2021-04-06

## 2021-04-06 RX ORDER — MIDAZOLAM HYDROCHLORIDE 2 MG/2ML
INJECTION, SOLUTION INTRAMUSCULAR; INTRAVENOUS AS NEEDED
Status: DISCONTINUED | OUTPATIENT
Start: 2021-04-06 | End: 2021-04-06

## 2021-04-06 RX ORDER — FENTANYL CITRATE/PF 50 MCG/ML
25 SYRINGE (ML) INJECTION
Status: DISCONTINUED | OUTPATIENT
Start: 2021-04-06 | End: 2021-04-06 | Stop reason: HOSPADM

## 2021-04-06 RX ADMIN — ACETAMINOPHEN 650 MG: 325 TABLET, FILM COATED ORAL at 12:28

## 2021-04-06 RX ADMIN — SODIUM CHLORIDE: 0.9 INJECTION, SOLUTION INTRAVENOUS at 09:18

## 2021-04-06 RX ADMIN — FENTANYL CITRATE 25 MCG: 50 INJECTION INTRAMUSCULAR; INTRAVENOUS at 08:18

## 2021-04-06 RX ADMIN — HEPARIN SODIUM 2000 UNITS: 1000 INJECTION INTRAVENOUS; SUBCUTANEOUS at 08:29

## 2021-04-06 RX ADMIN — FENTANYL CITRATE 25 MCG: 50 INJECTION INTRAMUSCULAR; INTRAVENOUS at 08:42

## 2021-04-06 RX ADMIN — MIDAZOLAM 1 MG: 1 INJECTION INTRAMUSCULAR; INTRAVENOUS at 08:24

## 2021-04-06 RX ADMIN — MIDAZOLAM 2 MG: 1 INJECTION INTRAMUSCULAR; INTRAVENOUS at 08:04

## 2021-04-06 RX ADMIN — SODIUM CHLORIDE 125 ML/HR: 0.9 INJECTION, SOLUTION INTRAVENOUS at 06:42

## 2021-04-06 RX ADMIN — MIDAZOLAM 1 MG: 1 INJECTION INTRAMUSCULAR; INTRAVENOUS at 08:41

## 2021-04-06 NOTE — ANESTHESIA PREPROCEDURE EVALUATION
Procedure:  ARTERIOGRAM, carotid Angio (Right Neck)    Relevant Problems   ANESTHESIA (within normal limits)      CARDIO   (+) HLD (hyperlipidemia)   (+) HTN (hypertension)   (+) 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      GI/HEPATIC   (+) GERD (gastroesophageal reflux disease)      HEMATOLOGY   (+) Anemia      MUSCULOSKELETAL   (+) Lumbar back pain      NEURO/PSYCH   (+) 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      PULMONARY   (+) COPD (chronic obstructive pulmonary disease) (HCC)      Other   (+) Alcohol abuse   (+) Left arm weakness   (+) Tobacco use     [place] [address], [address] [facility]  [phone]     Transthoracic Echocardiogram  2D, M-mode, Doppler, and Color Doppler     Study date:  29-Mar-2021     Patient: Neris Murillo  MR number: [id number]  Account number: [de-identified]  : 1955  Age: 60 years  Gender: Male  Status: Outpatient  Location: ED  Height: 69 in  Weight: 168 lb  BP: 159/ 77 mmHg     Indications: TIA     Diagnoses: G45 9 - Transient cerebral ischemic attack, unspecified     Sonographer:  TONY Barroso  Primary Physician:  Ghazala Diaz DO  Referring Physician:  Willy Hooks MD  Group:  [facility]  Interpreting Physician:  Kelly Boyd MD     SUMMARY     LEFT VENTRICLE:  Systolic function was normal  Ejection fraction was estimated to be 60 %  There were no regional wall motion abnormalities      RIGHT VENTRICLE:  The size was normal   Systolic function was normal      HISTORY: PRIOR HISTORY: Hyperlipidemia, Hypertension, Alcohol and Tobacco use, COPD      PROCEDURE: The study was performed in the ED  This was a routine study  The transthoracic approach was used  The study included complete 2D imaging, M-mode, complete spectral Doppler, and color Doppler  The heart rate was 61 bpm, at the  start of the study   Images were obtained from the parasternal, apical, subcostal, and suprasternal notch acoustic windows  Image quality was adequate      LEFT VENTRICLE: Size was normal  Systolic function was normal  Ejection fraction was estimated to be 60 %  There were no regional wall motion abnormalities  Wall thickness was normal  DOPPLER: There was an increased relative contribution  of atrial contraction to ventricular filling  Left ventricular diastolic function parameters were normal for the patient's age      RIGHT VENTRICLE: The size was normal  Systolic function was normal  Wall thickness was normal      LEFT ATRIUM: Size was normal      RIGHT ATRIUM: Size was normal      MITRAL VALVE: Valve structure was normal  There was normal leaflet separation  DOPPLER: The transmitral velocity was within the normal range  There was no evidence for stenosis  There was trace regurgitation      AORTIC VALVE: The valve was trileaflet  Leaflets exhibited normal thickness, mild calcification, normal cuspal separation, and sclerosis  DOPPLER: Transaortic velocity was within the normal range  There was no evidence for stenosis  There  was no significant regurgitation      TRICUSPID VALVE: The valve structure was normal  There was normal leaflet separation  DOPPLER: The transtricuspid velocity was within the normal range  There was no evidence for stenosis  There was trace regurgitation  Pulmonary artery  systolic pressure was within the normal range  Estimated peak PA pressure was 32 mmHg      PULMONIC VALVE: Leaflets exhibited normal thickness, no calcification, and normal cuspal separation  DOPPLER: The transpulmonic velocity was within the normal range  There was trace regurgitation      PERICARDIUM: There was no pericardial effusion  The pericardium was normal in appearance      AORTA: The root exhibited normal size      SYSTEMIC VEINS: IVC: The inferior vena cava was normal in size   Respirophasic changes were normal      SYSTEM MEASUREMENT TABLES     2D  %FS: 36 31 %  Ao Diam: 3 62 cm  EDV(Teich): 116 63 ml  EF(Teich): 65 78 %  ESV(Teich): 39 91 ml  IVSd: 0 93 cm  LA Area: 16 21 cm2  LA Diam: 3 6 cm  LVEDV MOD A4C: 73 25 ml  LVEF MOD A4C: 56 6 %  LVESV MOD A4C: 31 8 ml  LVIDd: 4 97 cm  LVIDs: 3 17 cm  LVLd A4C: 7 28 cm  LVLs A4C: 6 1 cm  LVPWd: 0 9 cm  RA Area: 12 77 cm2  RVIDd: 3 42 cm  SV MOD A4C: 41 46 ml  SV(Teich): 76 72 ml     CW  AV MaxPG: 3 62 mmHg  AV Vmax: 0 95 m/s  TR MaxP 84 mmHg  TR Vmax: 2 69 m/s     MM  TAPSE: 2 05 cm     PW  E' Sept: 0 08 m/s  E/E' Sept: 7 68  LVOT Vmax: 0 83 m/s  LVOT maxP 79 mmHg  MV A Cj: 0 75 m/s  MV Dec [place]: 4 68 m/s2  MV DecT: 130 82 ms  MV E Cj: 0 61 m/s  MV E/A Ratio: 0 81  MV PHT: 37 94 ms  MVA By PHT: 5 8 cm2     [employer] Accredited Echocardiography Laboratory     Prepared and electronically signed by  Errol Echevarria MD  Signed 29-Mar-2021 08:40:55     Physical Exam    Airway    Mallampati score: III  TM Distance: >3 FB  Neck ROM: full     Dental       Cardiovascular  Rhythm: regular, Rate: normal,     Pulmonary  Breath sounds clear to auscultation,     Other Findings        Anesthesia Plan  ASA Score- 3     Anesthesia Type- IV sedation with anesthesia with ASA Monitors  Additional Monitors:   Airway Plan:           Plan Factors-Exercise tolerance (METS): <4 METS  Chart reviewed  Patient summary reviewed  Patient is a current smoker  Induction- intravenous  Postoperative Plan-     Informed Consent- Anesthetic plan and risks discussed with patient

## 2021-04-06 NOTE — OP NOTE
OPERATIVE REPORT  PATIENT NAME: Perla Swenson    :  1955  MRN: [id number]  Pt Location: [place] [address]    SURGERY DATE: 2021    Surgeon(s) and Role:     * Tito Saldivar MD - Primary     * Lora Osullivan DO - Assisting    Preop Diagnosis:  Carotid artery stenosis, symptomatic, right [I65 21]    Post-Op Diagnosis Codes:     * Carotid artery stenosis, symptomatic, right [I65 21]    Procedure(s) (LRB):  ARTERIOGRAM, carotid Angio (Right)    Specimen(s):  * No specimens in log *    Estimated Blood Loss:   Minimal    Drains:  * No LDAs found *    Anesthesia Type:   Conscious Sedation     Operative Indications:  Carotid artery stenosis, symptomatic, right [I01 15]      Complications:   None    Procedures:  1  US-Guided Right and Left Common Femoral Artery Access  2  Introduction of Catheter into the Ascending Aorta  3  Diagnostic Ascending and Aortic Arch Angiogram  4  Selective Catheterization of the Innominate Artery  5  Selective Catheterization of Right Common Carotid Artery   6  Diagnostic Right Carotid Angiogram   7  Diagnostic Right Cerebral Angiogram    Findings:  1  Aortic Arch Angiogram  a  Innominate Artery: heavily calcified however no flow limiting stenosis  b  Left Common Carotid Artery: heavily calcified with approximately 70% stenosis of the origin  c  Left Subclavian Artery: calcified, no significant stenosis  2  Selective Right Carotid Angiogram  a  Right Common Carotid Artery: heavily calcified with approximately 30% stenosis at the origin  b  Right Internal Carotid Artery: heavy calcification at the carotid bifurcation with approximately 60% stenosis  c  Right External Carotid Artery: occluded  d  Distal Right Internal Carotid Artery: patent  e  Right Middle Cerebral Artery: patent, of note, no cerebral crossover (suggestive of absence of [place])  3  Additional Findings   a  Right Vertebral Artery: patent, tortuous, no stenosis  b   Right Subclavian Artery: patent, no stenosis  c  Suggestion of absence of [facility] [employer] [place] as no demonstration of crossover on cerebral angiogram       Description of Procedure: On the date of surgery, the patient was taken to the operating room and transferred to the OR table, placed in the supine position, and prepped and draped in the normal sterile fashion  A time-out procedure was performed identifying and confirming the correct patient and procedure  Using a combination of palpation, flouroscopic guidance and US guidance, the right CFA was punctured utilizing the standard micropuncture technique with introduction of a 5Fr microcatheter and bentson wire into the right common iliac artery, however due to the heavily calcified nature of the aortic bifurcation, passage of wire/catheter into the abdominal aorta was prohibitive, as such a decision was made to access the left common femoral artery via fluoroscopic and ultrasound guidance with a micropuncture  After the microcatheter was in place a bentson wire was easily passed into the abdominal aorta after which the microcatheter was removed and a 5Fr sheath was advanced over the wire  The patient was systemically heparinized upon placement of 5Fr Sheath  The bentson wire was advanced under fluoroscopic guidance into the ascending aorta after which a pigtail catheter was advanced over the wire and diagnostic aortogram was performed  Next a glidewire was used to select the innominate artery and advance an angled glide catheter into the innominate artery  At this point diagnostic angiogram was performed and next the glidewire was utilized to engage the right common carotid artery allowing the catheter to be advanced into the right common carotid artery with performance of right carotid angiogram and right cerebral angiogram   After all diagnostic imaging was completed the catheter and wire were removed under fluoroscopic guidance   The 5Fr femoral sheaths were removed and direct manual pressure was held for 20 minutes to obtain hemostasis  Sterile dressings were applied and the patient was transferred to PACU in stable condition       Radiation: 564mGy  Fluoro Time: 20minutes      Dr Tyson Cameron was present for the entire procedure    Patient Disposition:  PACU     SIGNATURE: Durward Brittle, DO  DATE: April 6, 2021  TIME: 10:13 AM

## 2021-04-06 NOTE — DISCHARGE INSTRUCTIONS
Carotid Artery Disease   AMBULATORY CARE:   Carotid artery disease  is a condition that causes narrow or blocked carotid arteries  Your carotid arteries are the blood vessels that supply your brain with most of the blood it needs to work  You have 2 carotid arteries, one on each side of your neck  Call 911 for any of the following:   · You have any of the following signs of a stroke:      ? Numbness or drooping on one side of your face     ? Weakness in an arm or leg    ? Confusion or difficulty speaking    ? Dizziness, a severe headache, or vision loss    · You have any of the following signs of a heart attack:      ? Squeezing, pressure, or pain in your chest    ? You may  also have any of the following:     § Discomfort or pain in your back, neck, jaw, stomach, or arm    § Shortness of breath    § Nausea or vomiting    § Lightheadedness or a sudden cold sweat    Contact your healthcare provider if:   · You have questions or concerns about your condition or care  Signs and symptoms of carotid artery disease: You may have no signs or symptoms  Most commonly, carotid artery disease causes transient ischemic attacks (TIAs), or mini-strokes  You may have numbness, weakness, lack of movement, or vision or speech problems  A TIA goes away quickly and does not cause permanent damage  A TIA may be a warning sign that you are about to have a stroke  If you have any symptoms of a TIA or stroke, seek care immediately  Warning signs of a stroke: The word F A S T  can help you remember and recognize warning signs of a stroke  · F = Face:  One side of the face droops  · A = Arms:  One arm starts to drop when both arms are raised  · S = Speech:  Speech is slurred or sounds different than usual     · T = Time:  A person who is having a stroke needs to be seen immediately  A stroke is a medical emergency that needs immediate treatment   Some medicines and treatments work best if given within a few hours of a stroke  Treatment  for carotid artery disease depends on how narrow your arteries have become, your symptoms, and your general health  The goal of treatment is to lower your risk for a stroke  You may need any of the following:  · Take aspirin if directed  Your healthcare provider may suggest that you take an aspirin a day to prevent blood clots from forming in the carotid arteries  If your healthcare provider wants you to take aspirin daily, do not take acetaminophen or ibuprofen instead  · Control risk factors  High blood pressure, high cholesterol, heart disease, diabetes, and being overweight increase your risk for atherosclerosis  · Procedures can help open blocked arteries  A carotid endarterectomy is used to cut plaque out of the artery  An angioplasty is used to push the plaque against the artery wall with a balloon device  Sometimes a stent is placed during an angioplasty  A stent is a metal mesh tube that is placed in the artery to keep it open  Manage carotid artery disease:   · Eat a variety of healthy foods  Healthy foods include fruit, vegetables, whole-grain breads, low-fat dairy products, lean meat, and fish  Choose fish that are high in omega-3 fatty acids, such as salmon and fresh tuna  Ask your healthcare provider for more information on a heart healthy diet and the DASH eating plan  · Limit sodium (salt)  Sodium may increase your blood pressure  Add less table salt to your foods  Read food labels and choose foods that are low in sodium  Your healthcare provider may suggest you follow a low sodium diet  · Reach or maintain a healthy weight  Extra weight makes your heart work harder  Ask your healthcare provider how much you should weight  He can help you create a safe weight loss plan  Even a weight loss of 10% of your body weight can help your heart function better  · Exercise as directed    Exercise helps improve heart function and can help you manage your weight  Exercise can also help lower your cholesterol and blood sugar levels  Try to get at least 30 minutes of exercise at least 5 times each week  Try to be active every day  Your healthcare provider can help you create an exercise plan that works best for you  · Limit alcohol  Alcohol can increase your blood pressure and triglyceride levels  Men should limit alcohol to 2 drinks per day  Women should limit alcohol to 1 drink per day  A drink of alcohol is 12 ounces of beer, 5 ounces of wine, or 1½ ounces of liquor  · Do not smoke  Nicotine and other chemicals in cigarettes and cigars can cause heart and lung damage  Ask your healthcare provider for information if you currently smoke and need help to quit  E-cigarettes or smokeless tobacco still contain nicotine  Talk to your healthcare provider before you use these products  Follow up with your healthcare provider as directed:  Write down your questions so you remember to ask them during your visits  © Copyright [address] Information is for End User's use only and may not be sold, redistributed or otherwise used for commercial purposes  All illustrations and images included in CareNotes® are the copyrighted property of A D A M , Inc  or [address] [place] [address]  The above information is an  only  It is not intended as medical advice for individual conditions or treatments  Talk to your doctor, nurse or pharmacist before following any medical regimen to see if it is safe and effective for you  Angiogram   WHAT YOU SHOULD KNOW:   An angiogram is a procedure to look at arteries in your body  Arteries are the blood vessels that carry blood from your heart to your body  AFTER YOU LEAVE:   Follow up with your primary healthcare provider or cardiologist as directed:  Write down your questions so you remember to ask them during your visits  Self-care:   · Limit activity:  Rest for the remainder of the day of your procedure   Keep your arm or leg straight as much as possible  If the angiogram catheter was put in your leg, do not use stairs for a few days after your angiogram  When you must use stairs, step up with the leg that was not used for the angiogram  Straighten this leg to move the other leg up to the next step without putting stress on it  You may be told to avoid lifting more than 15 pounds for 5 days after your procedure  · Keep your wound clean and dry:  Ask your cardiologist when you can bathe  When you are allowed to bathe, carefully wash the incisions with soap and water  Dry the area and put on new, clean bandages as directed  Change your bandages if they get wet or dirty  · Watch for bleeding and bruising: It is normal to have a bruise and soreness where the angiogram catheter went in  Contact your cardiologist if your bruise gets larger  If your wound bleeds, use your hand to put pressure on the bandage  If you do not have a bandage, use a clean cloth to put pressure over and just above the puncture site  Seek care immediately  · Drink liquids as directed:  Ask your primary healthcare provider how much liquid to drink after your angiogram  This will help your body get rid of the dye used during the procedure  Limit caffeine  Do no drink alcohol for 24 hours after your angiogram   Contact your primary healthcare provider or cardiologist if:   · You have a fever  · Your catheter site is red, leaks pus, or smells bad  · You have increasing pain at your catheter site  · You have questions or concerns about your condition or care  Seek care immediately or call 911 if:   · The leg or arm used for your angiogram is numb, painful, or changes color  · The bruise at your catheter site gets bigger or becomes swollen  · Your catheter site bleeds  · You have weakness in an arm or leg  · You become confused or have difficulty speaking  · You have dizziness, a severe headache, or vision loss    © 0084 4782 [address] is for End User's use only and may not be sold, redistributed or otherwise used for commercial purposes  All illustrations and images included in CareNotes® are the copyrighted property of A D A M , Inc  or Juan Mujica  The above information is an  only  It is not intended as medical advice for individual conditions or treatments  Talk to your doctor, nurse or pharmacist before following any medical regimen to see if it is safe and effective for you

## 2021-04-07 DIAGNOSIS — Z11.59 SCREENING FOR VIRAL DISEASE: Primary | ICD-10-CM

## 2021-04-07 DIAGNOSIS — Z11.59 SCREENING FOR VIRAL DISEASE: ICD-10-CM

## 2021-04-07 PROCEDURE — U0003 INFECTIOUS AGENT DETECTION BY NUCLEIC ACID (DNA OR RNA); SEVERE ACUTE RESPIRATORY SYNDROME CORONAVIRUS 2 (SARS-COV-2) (CORONAVIRUS DISEASE [COVID-19]), AMPLIFIED PROBE TECHNIQUE, MAKING USE OF HIGH THROUGHPUT TECHNOLOGIES AS DESCRIBED BY CMS-2020-01-R: HCPCS | Performed by: SURGERY

## 2021-04-07 PROCEDURE — U0005 INFEC AGEN DETEC AMPLI PROBE: HCPCS | Performed by: SURGERY

## 2021-04-07 NOTE — TELEPHONE ENCOUNTER
Left message for patient to call us back so that we can schedule his surgery with Dr Caterina Emerson 4-12-21 at [facility]/hybrid   LLF

## 2021-04-07 NOTE — UTILIZATION REVIEW
Notification of Discharge  This is a Notification of Discharge from our facility [address]  Please be advised that this patient has been discharge from our facility  Below you will find the admission and discharge date and time including the patients disposition  PRESENTATION DATE: 3/29/2021 12:17 AM  OBS ADMISSION DATE:   IP ADMISSION DATE: N/A N/A   DISCHARGE DATE: 3/30/2021  5:56 PM  DISPOSITION: Home/Self Care Home/Self Care   Admission Orders listed below:  Admission Orders (From admission, onward)     Ordered        03/29/21 0251  Place in Observation  Once                   Please contact the UR Department if additional information is required to close this patient's authorization/case  [address] [employer] Utilization Review Department  Main: [phone] x carefully listen to the prompts  All voicemails are confidential   [email or web address]  Send all requests for admission clinical reviews, approved or denied determinations and any other requests to dedicated fax number below belonging to the campus where the patient is receiving treatment   List of dedicated fax numbers:  [address] DENIALS (Administrative/Medical Necessity) [phone]   [address] (Maternity/NICU/Pediatrics) [phone]   Dereck Min [phone]   Jessi Sheets [phone]   Chelle Juniper [phone]   [facility] [address] [place] [address] [phone]   [facility]  [phone]   [address] [facility]  2401 [facility] 1000 [address] [facility] [phone]

## 2021-04-07 NOTE — TELEPHONE ENCOUNTER
Is patient requesting a call when authorization has been obtained? Patient did not request a call  Surgery Date: 4-12-21  Primary Surgeon: Che Bentley (NPI: [id number])  Assisting Surgeon: Not Applicable (N/A)  Facility: [facility] (Tax: [id number] / NPI: [id number])  Inpatient / Outpatient: Inpatient  Level: 1    Clearance Received: No clearance ordered  Consent Received: Consent will be signed day of procedure  Medication Hold / Last Dose: No  VQI Spreadsheet: yes  IR Notified: Not Applicable (N/A)  Rep  Notified: [id number]  Equipment Needs: Not Applicable (N/A)  [facility] Lab Requested: Yes  Patient Contacted: 4-7-21    Diagnosis: I65 21  Procedure/ CPT Code(s): (CEA) Carotid Endarterectomy / Patch Angioplasty // CPT: 28037 R CEA    For varicose vein related procedures, last LEVDR? Not Applicable (N/A)    Post Operative Date/ Time: [facility] [address] with Che Starch (NPI: [id number]) 4-21-12     *Please review with patient med hold, PATs, and check H&P *  PATIENT WAS MAILED SURGERY/SHOWERING/DISCHARGE/COVID INSTRUCTIONS AFTER REVIEWING WITH HER VIA PHONE CALL  Spoke to patient to schedule surgery patient is going for his Covid testing today  Allyssa TOBAR

## 2021-04-07 NOTE — TELEPHONE ENCOUNTER
Authorization requirements reviewed  Please refer to Mohan Ambrose / Mateo Meron number [id number] for case updates

## 2021-04-08 LAB — SARS-COV-2 RNA RESP QL NAA+PROBE: NEGATIVE

## 2021-04-12 ENCOUNTER — ANESTHESIA EVENT (INPATIENT)
Dept: PERIOP | Facility: HOSPITAL | Age: 66
DRG: 039 | End: 2021-04-12
Payer: COMMERCIAL

## 2021-04-12 DIAGNOSIS — Z91.89 AT HIGH RISK FOR KIDNEY INJURY: Primary | ICD-10-CM

## 2021-04-12 NOTE — TELEPHONE ENCOUNTER
Authorization requirements reviewed  Please refer to Carroll Santos / George Warren number [id number] for case updates

## 2021-04-12 NOTE — TELEPHONE ENCOUNTER
Spoke to patient to let him know that he has been rescheduled for tomorrow 4-13-21 [facility] Patient confirmed and understood the instructions   F

## 2021-04-12 NOTE — TELEPHONE ENCOUNTER
Left patient a message stating that there was no Hold on ASA or Plavix, but do not take them tomorrow morning if patient had any questions please call us back someone will be here till 6:00pm LLF

## 2021-04-12 NOTE — TELEPHONE ENCOUNTER
Spoke to patient to let him know that his surgery has been cancelled for today and that we are looking into rescheduling it [place] 4-13-21, but will call him back to confirm LLF

## 2021-04-13 ENCOUNTER — HOSPITAL ENCOUNTER (INPATIENT)
Facility: HOSPITAL | Age: 66
LOS: 1 days | Discharge: HOME | DRG: 039 | End: 2021-04-14
Attending: SURGERY | Admitting: SURGERY
Payer: COMMERCIAL

## 2021-04-13 ENCOUNTER — ANESTHESIA (INPATIENT)
Dept: PERIOP | Facility: HOSPITAL | Age: 66
DRG: 039 | End: 2021-04-13
Payer: COMMERCIAL

## 2021-04-13 ENCOUNTER — APPOINTMENT (OUTPATIENT)
Dept: NON INVASIVE DIAGNOSTICS | Facility: HOSPITAL | Age: 66
DRG: 039 | End: 2021-04-13
Payer: COMMERCIAL

## 2021-04-13 DIAGNOSIS — I65.21 SYMPTOMATIC CAROTID ARTERY STENOSIS, RIGHT: Primary | ICD-10-CM

## 2021-04-13 DIAGNOSIS — Z98.890 S/P CAROTID ENDARTERECTOMY: ICD-10-CM

## 2021-04-13 DIAGNOSIS — I65.21 STENOSIS OF RIGHT INTERNAL CAROTID ARTERY: ICD-10-CM

## 2021-04-13 LAB
ABO GROUP BLD: NORMAL
ABO GROUP BLD: NORMAL
BLD GP AB SCN SERPL QL: NEGATIVE
KCT BLD-ACNC: 219 SEC (ref 89–137)
KCT BLD-ACNC: 274 SEC (ref 89–137)
RH BLD: POSITIVE
RH BLD: POSITIVE
SPECIMEN EXPIRATION DATE: NORMAL
SPECIMEN SOURCE: ABNORMAL
SPECIMEN SOURCE: ABNORMAL

## 2021-04-13 PROCEDURE — 03UK0KZ SUPPLEMENT RIGHT INTERNAL CAROTID ARTERY WITH NONAUTOLOGOUS TISSUE SUBSTITUTE, OPEN APPROACH: ICD-10-PCS | Performed by: SURGERY

## 2021-04-13 PROCEDURE — C1781 MESH (IMPLANTABLE): HCPCS | Performed by: SURGERY

## 2021-04-13 PROCEDURE — 99024 POSTOP FOLLOW-UP VISIT: CPT | Performed by: SURGERY

## 2021-04-13 PROCEDURE — C1765 ADHESION BARRIER: HCPCS | Performed by: SURGERY

## 2021-04-13 PROCEDURE — 35301 RECHANNELING OF ARTERY: CPT | Performed by: SURGERY

## 2021-04-13 PROCEDURE — 86901 BLOOD TYPING SEROLOGIC RH(D): CPT | Performed by: SURGERY

## 2021-04-13 PROCEDURE — 86900 BLOOD TYPING SEROLOGIC ABO: CPT | Performed by: SURGERY

## 2021-04-13 PROCEDURE — 03CK0ZZ EXTIRPATION OF MATTER FROM RIGHT INTERNAL CAROTID ARTERY, OPEN APPROACH: ICD-10-PCS | Performed by: SURGERY

## 2021-04-13 PROCEDURE — 86850 RBC ANTIBODY SCREEN: CPT | Performed by: SURGERY

## 2021-04-13 PROCEDURE — 35301 RECHANNELING OF ARTERY: CPT | Performed by: PHYSICIAN ASSISTANT

## 2021-04-13 PROCEDURE — 85347 COAGULATION TIME ACTIVATED: CPT

## 2021-04-13 PROCEDURE — 93882 EXTRACRANIAL UNI/LTD STUDY: CPT

## 2021-04-13 DEVICE — XENOSURE BIOLOGIC PATCH, 1CM X 14CM, EIFU
Type: IMPLANTABLE DEVICE | Site: CAROTID | Status: FUNCTIONAL
Brand: XENOSURE BIOLOGIC PATCH

## 2021-04-13 RX ORDER — METOCLOPRAMIDE HYDROCHLORIDE 5 MG/ML
10 INJECTION INTRAMUSCULAR; INTRAVENOUS ONCE AS NEEDED
Status: DISCONTINUED | OUTPATIENT
Start: 2021-04-13 | End: 2021-04-13 | Stop reason: HOSPADM

## 2021-04-13 RX ORDER — CHLORHEXIDINE GLUCONATE 0.12 MG/ML
15 RINSE ORAL EVERY 12 HOURS SCHEDULED
Status: DISCONTINUED | OUTPATIENT
Start: 2021-04-13 | End: 2021-04-13

## 2021-04-13 RX ORDER — LIDOCAINE HYDROCHLORIDE 10 MG/ML
0.5 INJECTION, SOLUTION EPIDURAL; INFILTRATION; INTRACAUDAL; PERINEURAL ONCE AS NEEDED
Status: DISCONTINUED | OUTPATIENT
Start: 2021-04-13 | End: 2021-04-13 | Stop reason: HOSPADM

## 2021-04-13 RX ORDER — ATORVASTATIN CALCIUM 40 MG/1
40 TABLET, FILM COATED ORAL EVERY EVENING
Status: DISCONTINUED | OUTPATIENT
Start: 2021-04-13 | End: 2021-04-14 | Stop reason: HOSPADM

## 2021-04-13 RX ORDER — OXYCODONE HYDROCHLORIDE 5 MG/1
5 TABLET ORAL EVERY 4 HOURS PRN
Status: DISCONTINUED | OUTPATIENT
Start: 2021-04-13 | End: 2021-04-14 | Stop reason: HOSPADM

## 2021-04-13 RX ORDER — LABETALOL 20 MG/4 ML (5 MG/ML) INTRAVENOUS SYRINGE
5
Status: DISCONTINUED | OUTPATIENT
Start: 2021-04-13 | End: 2021-04-14 | Stop reason: HOSPADM

## 2021-04-13 RX ORDER — LIDOCAINE HYDROCHLORIDE 10 MG/ML
INJECTION, SOLUTION EPIDURAL; INFILTRATION; INTRACAUDAL; PERINEURAL AS NEEDED
Status: DISCONTINUED | OUTPATIENT
Start: 2021-04-13 | End: 2021-04-13

## 2021-04-13 RX ORDER — SUCCINYLCHOLINE/SOD CL,ISO/PF 100 MG/5ML
SYRINGE (ML) INTRAVENOUS AS NEEDED
Status: DISCONTINUED | OUTPATIENT
Start: 2021-04-13 | End: 2021-04-13

## 2021-04-13 RX ORDER — HEPARIN SODIUM 1000 [USP'U]/ML
INJECTION, SOLUTION INTRAVENOUS; SUBCUTANEOUS AS NEEDED
Status: DISCONTINUED | OUTPATIENT
Start: 2021-04-13 | End: 2021-04-13

## 2021-04-13 RX ORDER — ROCURONIUM BROMIDE 10 MG/ML
INJECTION, SOLUTION INTRAVENOUS AS NEEDED
Status: DISCONTINUED | OUTPATIENT
Start: 2021-04-13 | End: 2021-04-13

## 2021-04-13 RX ORDER — HYDRALAZINE HYDROCHLORIDE 20 MG/ML
15 INJECTION INTRAMUSCULAR; INTRAVENOUS
Status: DISCONTINUED | OUTPATIENT
Start: 2021-04-13 | End: 2021-04-14 | Stop reason: HOSPADM

## 2021-04-13 RX ORDER — OXYCODONE HYDROCHLORIDE 5 MG/1
2.5 TABLET ORAL EVERY 4 HOURS PRN
Status: DISCONTINUED | OUTPATIENT
Start: 2021-04-13 | End: 2021-04-14 | Stop reason: HOSPADM

## 2021-04-13 RX ORDER — FENTANYL CITRATE/PF 50 MCG/ML
50 SYRINGE (ML) INJECTION
Status: DISCONTINUED | OUTPATIENT
Start: 2021-04-13 | End: 2021-04-13 | Stop reason: HOSPADM

## 2021-04-13 RX ORDER — PANTOPRAZOLE SODIUM 40 MG/1
40 TABLET, DELAYED RELEASE ORAL
Status: DISCONTINUED | OUTPATIENT
Start: 2021-04-14 | End: 2021-04-14 | Stop reason: HOSPADM

## 2021-04-13 RX ORDER — PROPOFOL 10 MG/ML
INJECTION, EMULSION INTRAVENOUS AS NEEDED
Status: DISCONTINUED | OUTPATIENT
Start: 2021-04-13 | End: 2021-04-13

## 2021-04-13 RX ORDER — PROPOFOL 10 MG/ML
INJECTION, EMULSION INTRAVENOUS CONTINUOUS PRN
Status: DISCONTINUED | OUTPATIENT
Start: 2021-04-13 | End: 2021-04-13

## 2021-04-13 RX ORDER — FOLIC ACID 1 MG/1
1 TABLET ORAL DAILY
Status: DISCONTINUED | OUTPATIENT
Start: 2021-04-14 | End: 2021-04-14 | Stop reason: HOSPADM

## 2021-04-13 RX ORDER — CHLORHEXIDINE GLUCONATE 0.12 MG/ML
15 RINSE ORAL ONCE
Status: COMPLETED | OUTPATIENT
Start: 2021-04-13 | End: 2021-04-13

## 2021-04-13 RX ORDER — SODIUM CHLORIDE 9 MG/ML
INJECTION, SOLUTION INTRAVENOUS CONTINUOUS PRN
Status: DISCONTINUED | OUTPATIENT
Start: 2021-04-13 | End: 2021-04-13

## 2021-04-13 RX ORDER — AMLODIPINE BESYLATE 5 MG/1
5 TABLET ORAL DAILY
Status: DISCONTINUED | OUTPATIENT
Start: 2021-04-14 | End: 2021-04-14 | Stop reason: HOSPADM

## 2021-04-13 RX ORDER — SODIUM CHLORIDE, SODIUM GLUCONATE, SODIUM ACETATE, POTASSIUM CHLORIDE, MAGNESIUM CHLORIDE, SODIUM PHOSPHATE, DIBASIC, AND POTASSIUM PHOSPHATE .53; .5; .37; .037; .03; .012; .00082 G/100ML; G/100ML; G/100ML; G/100ML; G/100ML; G/100ML; G/100ML
125 INJECTION, SOLUTION INTRAVENOUS CONTINUOUS
Status: DISCONTINUED | OUTPATIENT
Start: 2021-04-13 | End: 2021-04-14

## 2021-04-13 RX ORDER — MIDAZOLAM HYDROCHLORIDE 2 MG/2ML
INJECTION, SOLUTION INTRAMUSCULAR; INTRAVENOUS AS NEEDED
Status: DISCONTINUED | OUTPATIENT
Start: 2021-04-13 | End: 2021-04-13

## 2021-04-13 RX ORDER — SERTRALINE HYDROCHLORIDE 100 MG/1
100 TABLET, FILM COATED ORAL
Status: DISCONTINUED | OUTPATIENT
Start: 2021-04-13 | End: 2021-04-14 | Stop reason: HOSPADM

## 2021-04-13 RX ORDER — HYDROMORPHONE HCL IN WATER/PF 6 MG/30 ML
0.2 PATIENT CONTROLLED ANALGESIA SYRINGE INTRAVENOUS
Status: DISCONTINUED | OUTPATIENT
Start: 2021-04-13 | End: 2021-04-13 | Stop reason: HOSPADM

## 2021-04-13 RX ORDER — SODIUM CHLORIDE, SODIUM LACTATE, POTASSIUM CHLORIDE, CALCIUM CHLORIDE 600; 310; 30; 20 MG/100ML; MG/100ML; MG/100ML; MG/100ML
50 INJECTION, SOLUTION INTRAVENOUS CONTINUOUS
Status: DISCONTINUED | OUTPATIENT
Start: 2021-04-13 | End: 2021-04-13

## 2021-04-13 RX ORDER — PROTAMINE SULFATE 10 MG/ML
INJECTION, SOLUTION INTRAVENOUS AS NEEDED
Status: DISCONTINUED | OUTPATIENT
Start: 2021-04-13 | End: 2021-04-13

## 2021-04-13 RX ORDER — ONDANSETRON 2 MG/ML
INJECTION INTRAMUSCULAR; INTRAVENOUS AS NEEDED
Status: DISCONTINUED | OUTPATIENT
Start: 2021-04-13 | End: 2021-04-13

## 2021-04-13 RX ORDER — ASPIRIN 81 MG/1
81 TABLET ORAL DAILY
Status: DISCONTINUED | OUTPATIENT
Start: 2021-04-14 | End: 2021-04-14 | Stop reason: HOSPADM

## 2021-04-13 RX ORDER — DEXAMETHASONE SODIUM PHOSPHATE 10 MG/ML
INJECTION, SOLUTION INTRAMUSCULAR; INTRAVENOUS AS NEEDED
Status: DISCONTINUED | OUTPATIENT
Start: 2021-04-13 | End: 2021-04-13

## 2021-04-13 RX ORDER — FENTANYL CITRATE 50 UG/ML
INJECTION, SOLUTION INTRAMUSCULAR; INTRAVENOUS AS NEEDED
Status: DISCONTINUED | OUTPATIENT
Start: 2021-04-13 | End: 2021-04-13

## 2021-04-13 RX ORDER — CARVEDILOL 12.5 MG/1
12.5 TABLET ORAL 2 TIMES DAILY WITH MEALS
Status: DISCONTINUED | OUTPATIENT
Start: 2021-04-13 | End: 2021-04-14 | Stop reason: HOSPADM

## 2021-04-13 RX ORDER — ACETAMINOPHEN 325 MG/1
650 TABLET ORAL EVERY 6 HOURS PRN
Status: DISCONTINUED | OUTPATIENT
Start: 2021-04-13 | End: 2021-04-14 | Stop reason: HOSPADM

## 2021-04-13 RX ORDER — METOPROLOL TARTRATE 5 MG/5ML
INJECTION INTRAVENOUS AS NEEDED
Status: DISCONTINUED | OUTPATIENT
Start: 2021-04-13 | End: 2021-04-13

## 2021-04-13 RX ORDER — ONDANSETRON 2 MG/ML
4 INJECTION INTRAMUSCULAR; INTRAVENOUS ONCE AS NEEDED
Status: DISCONTINUED | OUTPATIENT
Start: 2021-04-13 | End: 2021-04-13 | Stop reason: HOSPADM

## 2021-04-13 RX ORDER — CLINDAMYCIN PHOSPHATE 600 MG/50ML
600 INJECTION INTRAVENOUS ONCE
Status: COMPLETED | OUTPATIENT
Start: 2021-04-13 | End: 2021-04-13

## 2021-04-13 RX ORDER — LANOLIN ALCOHOL/MO/W.PET/CERES
100 CREAM (GRAM) TOPICAL DAILY
Status: DISCONTINUED | OUTPATIENT
Start: 2021-04-14 | End: 2021-04-14 | Stop reason: HOSPADM

## 2021-04-13 RX ORDER — CLOPIDOGREL BISULFATE 75 MG/1
75 TABLET ORAL DAILY
Status: DISCONTINUED | OUTPATIENT
Start: 2021-04-14 | End: 2021-04-14 | Stop reason: HOSPADM

## 2021-04-13 RX ORDER — HYDROMORPHONE HCL/PF 1 MG/ML
0.2 SYRINGE (ML) INJECTION EVERY 4 HOURS PRN
Status: DISCONTINUED | OUTPATIENT
Start: 2021-04-13 | End: 2021-04-14

## 2021-04-13 RX ORDER — DIPHENHYDRAMINE HYDROCHLORIDE 50 MG/ML
12.5 INJECTION INTRAMUSCULAR; INTRAVENOUS ONCE AS NEEDED
Status: DISCONTINUED | OUTPATIENT
Start: 2021-04-13 | End: 2021-04-13 | Stop reason: HOSPADM

## 2021-04-13 RX ORDER — ALBUTEROL SULFATE 90 UG/1
1 AEROSOL, METERED RESPIRATORY (INHALATION) EVERY 4 HOURS PRN
Status: DISCONTINUED | OUTPATIENT
Start: 2021-04-13 | End: 2021-04-14 | Stop reason: HOSPADM

## 2021-04-13 RX ORDER — LIDOCAINE HYDROCHLORIDE 10 MG/ML
0.5 INJECTION, SOLUTION EPIDURAL; INFILTRATION; INTRACAUDAL; PERINEURAL ONCE AS NEEDED
Status: COMPLETED | OUTPATIENT
Start: 2021-04-13 | End: 2021-04-13

## 2021-04-13 RX ORDER — HYDROMORPHONE HCL/PF 1 MG/ML
0.5 SYRINGE (ML) INJECTION
Status: DISCONTINUED | OUTPATIENT
Start: 2021-04-13 | End: 2021-04-13 | Stop reason: HOSPADM

## 2021-04-13 RX ADMIN — FENTANYL CITRATE 50 MCG: 50 INJECTION INTRAMUSCULAR; INTRAVENOUS at 15:25

## 2021-04-13 RX ADMIN — PHENYLEPHRINE HYDROCHLORIDE 20 MCG/MIN: 10 INJECTION INTRAVENOUS at 15:59

## 2021-04-13 RX ADMIN — PROPOFOL 50 MCG/KG/MIN: 10 INJECTION, EMULSION INTRAVENOUS at 14:50

## 2021-04-13 RX ADMIN — LIDOCAINE HYDROCHLORIDE 50 MG: 10 INJECTION, SOLUTION EPIDURAL; INFILTRATION; INTRACAUDAL; PERINEURAL at 14:44

## 2021-04-13 RX ADMIN — FENTANYL CITRATE 100 MCG: 50 INJECTION INTRAMUSCULAR; INTRAVENOUS at 14:44

## 2021-04-13 RX ADMIN — Medication 25 MG: at 20:15

## 2021-04-13 RX ADMIN — HYDROMORPHONE HYDROCHLORIDE 0.2 MG: 1 INJECTION, SOLUTION INTRAMUSCULAR; INTRAVENOUS; SUBCUTANEOUS at 23:46

## 2021-04-13 RX ADMIN — NICARDIPINE HYDROCHLORIDE 5 MG/HR: 2.5 INJECTION, SOLUTION INTRAVENOUS at 15:32

## 2021-04-13 RX ADMIN — OXYCODONE HYDROCHLORIDE 5 MG: 5 TABLET ORAL at 20:14

## 2021-04-13 RX ADMIN — METOROPROLOL TARTRATE 2.5 MG: 5 INJECTION, SOLUTION INTRAVENOUS at 15:52

## 2021-04-13 RX ADMIN — DEXAMETHASONE SODIUM PHOSPHATE 4 MG: 10 INJECTION, SOLUTION INTRAMUSCULAR; INTRAVENOUS at 14:47

## 2021-04-13 RX ADMIN — PHENYLEPHRINE HYDROCHLORIDE 200 MCG: 10 INJECTION INTRAVENOUS at 16:02

## 2021-04-13 RX ADMIN — ATORVASTATIN CALCIUM 40 MG: 40 TABLET, FILM COATED ORAL at 20:14

## 2021-04-13 RX ADMIN — LIDOCAINE HYDROCHLORIDE 0.5 ML: 10 INJECTION, SOLUTION EPIDURAL; INFILTRATION; INTRACAUDAL; PERINEURAL at 13:28

## 2021-04-13 RX ADMIN — SODIUM CHLORIDE, SODIUM LACTATE, POTASSIUM CHLORIDE, AND CALCIUM CHLORIDE 50 ML/HR: .6; .31; .03; .02 INJECTION, SOLUTION INTRAVENOUS at 13:28

## 2021-04-13 RX ADMIN — CARVEDILOL 12.5 MG: 12.5 TABLET, FILM COATED ORAL at 20:13

## 2021-04-13 RX ADMIN — METOROPROLOL TARTRATE 2.5 MG: 5 INJECTION, SOLUTION INTRAVENOUS at 15:51

## 2021-04-13 RX ADMIN — ROCURONIUM BROMIDE 30 MG: 50 INJECTION, SOLUTION INTRAVENOUS at 15:35

## 2021-04-13 RX ADMIN — Medication 80 MG: at 14:45

## 2021-04-13 RX ADMIN — ROCURONIUM BROMIDE 50 MG: 50 INJECTION, SOLUTION INTRAVENOUS at 14:48

## 2021-04-13 RX ADMIN — PROPOFOL 50 MG: 10 INJECTION, EMULSION INTRAVENOUS at 15:20

## 2021-04-13 RX ADMIN — SODIUM CHLORIDE, SODIUM GLUCONATE, SODIUM ACETATE, POTASSIUM CHLORIDE, MAGNESIUM CHLORIDE, SODIUM PHOSPHATE, DIBASIC, AND POTASSIUM PHOSPHATE 125 ML/HR: .53; .5; .37; .037; .03; .012; .00082 INJECTION, SOLUTION INTRAVENOUS at 18:43

## 2021-04-13 RX ADMIN — PHENYLEPHRINE HYDROCHLORIDE 50 MCG: 10 INJECTION INTRAVENOUS at 17:18

## 2021-04-13 RX ADMIN — CLINDAMYCIN PHOSPHATE 600 MG: 600 INJECTION, SOLUTION INTRAVENOUS at 15:08

## 2021-04-13 RX ADMIN — CHLORHEXIDINE GLUCONATE 0.12% ORAL RINSE 15 ML: 1.2 LIQUID ORAL at 12:51

## 2021-04-13 RX ADMIN — MIDAZOLAM 1 MG: 1 INJECTION INTRAMUSCULAR; INTRAVENOUS at 14:38

## 2021-04-13 RX ADMIN — SERTRALINE HYDROCHLORIDE 100 MG: 100 TABLET ORAL at 22:08

## 2021-04-13 RX ADMIN — PROPOFOL 150 MG: 10 INJECTION, EMULSION INTRAVENOUS at 14:45

## 2021-04-13 RX ADMIN — PHENYLEPHRINE HYDROCHLORIDE 50 MCG: 10 INJECTION INTRAVENOUS at 17:16

## 2021-04-13 RX ADMIN — PROTAMINE SULFATE 20 MG: 10 INJECTION, SOLUTION INTRAVENOUS at 17:10

## 2021-04-13 RX ADMIN — PROTAMINE SULFATE 20 MG: 10 INJECTION, SOLUTION INTRAVENOUS at 17:04

## 2021-04-13 RX ADMIN — SUGAMMADEX 200 MG: 100 INJECTION, SOLUTION INTRAVENOUS at 17:33

## 2021-04-13 RX ADMIN — ONDANSETRON 4 MG: 2 INJECTION INTRAMUSCULAR; INTRAVENOUS at 14:47

## 2021-04-13 RX ADMIN — PHENYLEPHRINE HYDROCHLORIDE 100 MCG: 10 INJECTION INTRAVENOUS at 15:59

## 2021-04-13 RX ADMIN — ROCURONIUM BROMIDE 20 MG: 50 INJECTION, SOLUTION INTRAVENOUS at 16:35

## 2021-04-13 RX ADMIN — MIDAZOLAM 1 MG: 1 INJECTION INTRAMUSCULAR; INTRAVENOUS at 14:43

## 2021-04-13 RX ADMIN — HEPARIN SODIUM 7000 UNITS: 1000 INJECTION INTRAVENOUS; SUBCUTANEOUS at 15:52

## 2021-04-13 RX ADMIN — SODIUM CHLORIDE: 0.9 INJECTION, SOLUTION INTRAVENOUS at 14:50

## 2021-04-13 NOTE — ANESTHESIA POSTPROCEDURE EVALUATION
Post-Op Assessment Note    CV Status:  Stable    Pain management: adequate     Mental Status:  Awake and sleepy (responds to name and questioning  moving all extremities )   Hydration Status:  Euvolemic   PONV Controlled:  Controlled   Airway Patency:  Patent      Post Op Vitals Reviewed: Yes      Staff: Anesthesiologist, CRNA         No complications documented      /52 (04/13/21 1802)    Temp (!) 97 3 °F (36 3 °C) (04/13/21 1802)    Pulse 71 (04/13/21 1802)   Resp 16 (04/13/21 1802)    SpO2 99 % (04/13/21 1802)

## 2021-04-13 NOTE — ANESTHESIA PROCEDURE NOTES
Arterial Line Insertion  Performed by: Daniela Whaley CRNA  Authorized by: Conrad Chaudhary MD   Consent: Written consent obtained  Risks and benefits: risks, benefits and alternatives were discussed  Consent given by: patient  Patient understanding: patient states understanding of the procedure being performed  Patient consent: the patient's understanding of the procedure matches consent given  Procedure consent: procedure consent matches procedure scheduled  Time out: Immediately prior to procedure a "time out" was called to verify the correct patient, procedure, equipment, support staff and site/side marked as required  Preparation: Patient was prepped and draped in the usual sterile fashion    Indications: hemodynamic monitoring  Orientation:  Left  Location: radial artery  Procedure Details:  Eric's test normal: yes  Needle gauge: 20  Seldinger technique: Seldinger technique used  Number of attempts: 1 (4 attempts on opposite arm by RANDALL Ford, prior to VK inserting on left )    Post-procedure:  Post-procedure: dressing applied  Waveform: good waveform  Patient tolerance: Patient tolerated the procedure well with no immediate complications

## 2021-04-13 NOTE — ANESTHESIA PREPROCEDURE EVALUATION
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      PULMONARY   (+) COPD (chronic obstructive pulmonary disease) (HCC)      Other   (+) Alcohol abuse   (+) Left arm weakness   (+) Tobacco use        Physical Exam    Airway    Mallampati score: II  TM Distance: >3 FB  Neck ROM: full     Dental   No notable dental hx     Cardiovascular  Rhythm: regular, Rate: normal, Cardiovascular exam normal    Pulmonary  Pulmonary exam normal Breath sounds clear to auscultation,     Other Findings        Anesthesia Plan  ASA Score- 3     Anesthesia Type- general with ASA Monitors  Additional Monitors: arterial line  Airway Plan: ETT  Plan Factors-    Chart reviewed  EKG reviewed  Existing labs reviewed  Patient summary reviewed  Induction- intravenous  Postoperative Plan- Plan for postoperative opioid use  Informed Consent- Anesthetic plan and risks discussed with patient  I personally reviewed this patient with the CRNA  Discussed and agreed on the Anesthesia Plan with the CRNA  Shaan Gutierrez           Recent labs personally reviewed:  Lab Results   Component Value Date    WBC 5 68 03/30/2021    HGB 11 1 (L) 03/30/2021     03/30/2021     Lab Results   Component Value Date    K 4 2 03/30/2021    BUN 17 03/30/2021    CREATININE 1 26 03/30/2021     Lab Results   Component Value Date    PTT 37 03/29/2021      Lab Results   Component Value Date    INR 1 01 03/29/2021       Blood type A    Lab Results   Component Value Date    HGBA1C 5 8 (H) 03/29/2021       I, Ida Hoyt MD, have personally seen and evaluated the patient prior to anesthetic care  I have reviewed the pre-anesthetic record, and other medical records if appropriate to the anesthetic care  If a CRNA is involved in the case, I have reviewed the CRNA assessment, if present, and agree  Risks/benefits and alternatives discussed with patient including possible PONV, sore throat, and possibility of rare anesthetic and surgical emergencies

## 2021-04-14 ENCOUNTER — DOCUMENTATION (OUTPATIENT)
Dept: VASCULAR SURGERY | Facility: CLINIC | Age: 66
End: 2021-04-14

## 2021-04-14 VITALS
RESPIRATION RATE: 18 BRPM | SYSTOLIC BLOOD PRESSURE: 164 MMHG | HEART RATE: 71 BPM | DIASTOLIC BLOOD PRESSURE: 78 MMHG | TEMPERATURE: 97.6 F | HEIGHT: 69 IN | WEIGHT: 164.68 LBS | BODY MASS INDEX: 24.39 KG/M2 | OXYGEN SATURATION: 96 %

## 2021-04-14 LAB
ANION GAP SERPL CALCULATED.3IONS-SCNC: 5 MMOL/L (ref 4–13)
APTT PPP: 34 SECONDS (ref 23–37)
BUN SERPL-MCNC: 18 MG/DL (ref 5–25)
CALCIUM SERPL-MCNC: 8 MG/DL (ref 8.3–10.1)
CHLORIDE SERPL-SCNC: 108 MMOL/L (ref 100–108)
CO2 SERPL-SCNC: 26 MMOL/L (ref 21–32)
CREAT SERPL-MCNC: 1.09 MG/DL (ref 0.6–1.3)
ERYTHROCYTE [DISTWIDTH] IN BLOOD BY AUTOMATED COUNT: 14.7 % (ref 11.6–15.1)
GFR SERPL CREATININE-BSD FRML MDRD: 71 ML/MIN/1.73SQ M
GLUCOSE SERPL-MCNC: 108 MG/DL (ref 65–140)
HCT VFR BLD AUTO: 28.1 % (ref 36.5–49.3)
HGB BLD-MCNC: 8.6 G/DL (ref 12–17)
INR PPP: 1.12 (ref 0.84–1.19)
MCH RBC QN AUTO: 24.2 PG (ref 26.8–34.3)
MCHC RBC AUTO-ENTMCNC: 30.6 G/DL (ref 31.4–37.4)
MCV RBC AUTO: 79 FL (ref 82–98)
PLATELET # BLD AUTO: 220 THOUSANDS/UL (ref 149–390)
PMV BLD AUTO: 10.8 FL (ref 8.9–12.7)
POTASSIUM SERPL-SCNC: 4.2 MMOL/L (ref 3.5–5.3)
PROTHROMBIN TIME: 14.4 SECONDS (ref 11.6–14.5)
RBC # BLD AUTO: 3.56 MILLION/UL (ref 3.88–5.62)
SODIUM SERPL-SCNC: 139 MMOL/L (ref 136–145)
WBC # BLD AUTO: 11.6 THOUSAND/UL (ref 4.31–10.16)

## 2021-04-14 PROCEDURE — 85027 COMPLETE CBC AUTOMATED: CPT | Performed by: PHYSICIAN ASSISTANT

## 2021-04-14 PROCEDURE — 85730 THROMBOPLASTIN TIME PARTIAL: CPT | Performed by: PHYSICIAN ASSISTANT

## 2021-04-14 PROCEDURE — 99024 POSTOP FOLLOW-UP VISIT: CPT | Performed by: PHYSICIAN ASSISTANT

## 2021-04-14 PROCEDURE — NC001 PR NO CHARGE: Performed by: PHYSICIAN ASSISTANT

## 2021-04-14 PROCEDURE — 80048 BASIC METABOLIC PNL TOTAL CA: CPT | Performed by: PHYSICIAN ASSISTANT

## 2021-04-14 PROCEDURE — 85610 PROTHROMBIN TIME: CPT | Performed by: PHYSICIAN ASSISTANT

## 2021-04-14 RX ORDER — OXYCODONE HYDROCHLORIDE 5 MG/1
5 TABLET ORAL EVERY 4 HOURS PRN
Qty: 5 TABLET | Refills: 0 | Status: SHIPPED | OUTPATIENT
Start: 2021-04-14 | End: 2021-04-24

## 2021-04-14 RX ORDER — ONDANSETRON 2 MG/ML
4 INJECTION INTRAMUSCULAR; INTRAVENOUS EVERY 6 HOURS PRN
Status: DISCONTINUED | OUTPATIENT
Start: 2021-04-14 | End: 2021-04-14 | Stop reason: HOSPADM

## 2021-04-14 RX ORDER — AMLODIPINE BESYLATE AND BENAZEPRIL HYDROCHLORIDE 5; 10 MG/1; MG/1
1 CAPSULE ORAL DAILY
COMMUNITY
Start: 2021-04-15 | End: 2021-09-18 | Stop reason: HOSPADM

## 2021-04-14 RX ORDER — ACETAMINOPHEN 325 MG/1
650 TABLET ORAL EVERY 6 HOURS PRN
COMMUNITY
Start: 2021-04-14 | End: 2022-03-01 | Stop reason: HOSPADM

## 2021-04-14 RX ORDER — DOCUSATE SODIUM 100 MG/1
100 CAPSULE, LIQUID FILLED ORAL 2 TIMES DAILY
Status: DISCONTINUED | OUTPATIENT
Start: 2021-04-14 | End: 2021-04-14 | Stop reason: HOSPADM

## 2021-04-14 RX ORDER — HEPARIN SODIUM 5000 [USP'U]/ML
5000 INJECTION, SOLUTION INTRAVENOUS; SUBCUTANEOUS EVERY 8 HOURS SCHEDULED
Status: DISCONTINUED | OUTPATIENT
Start: 2021-04-14 | End: 2021-04-14 | Stop reason: HOSPADM

## 2021-04-14 RX ADMIN — SODIUM CHLORIDE, SODIUM GLUCONATE, SODIUM ACETATE, POTASSIUM CHLORIDE, MAGNESIUM CHLORIDE, SODIUM PHOSPHATE, DIBASIC, AND POTASSIUM PHOSPHATE 125 ML/HR: .53; .5; .37; .037; .03; .012; .00082 INJECTION, SOLUTION INTRAVENOUS at 02:33

## 2021-04-14 RX ADMIN — HEPARIN SODIUM 5000 UNITS: 5000 INJECTION INTRAVENOUS; SUBCUTANEOUS at 05:57

## 2021-04-14 RX ADMIN — THIAMINE HCL TAB 100 MG 100 MG: 100 TAB at 09:55

## 2021-04-14 RX ADMIN — CARVEDILOL 12.5 MG: 12.5 TABLET, FILM COATED ORAL at 09:55

## 2021-04-14 RX ADMIN — OXYCODONE HYDROCHLORIDE 5 MG: 5 TABLET ORAL at 02:33

## 2021-04-14 RX ADMIN — TIOTROPIUM BROMIDE 18 MCG: 18 CAPSULE ORAL; RESPIRATORY (INHALATION) at 09:55

## 2021-04-14 RX ADMIN — Medication 1 TABLET: at 09:55

## 2021-04-14 RX ADMIN — DOCUSATE SODIUM 100 MG: 100 CAPSULE, LIQUID FILLED ORAL at 09:55

## 2021-04-14 RX ADMIN — Medication 25 MG: at 09:55

## 2021-04-14 RX ADMIN — AMLODIPINE BESYLATE 5 MG: 5 TABLET ORAL at 09:55

## 2021-04-14 RX ADMIN — ASPIRIN 81 MG: 81 TABLET, COATED ORAL at 09:55

## 2021-04-14 RX ADMIN — FOLIC ACID 1 MG: 1 TABLET ORAL at 09:55

## 2021-04-14 RX ADMIN — PANTOPRAZOLE SODIUM 40 MG: 40 TABLET, DELAYED RELEASE ORAL at 05:04

## 2021-04-14 RX ADMIN — CLOPIDOGREL BISULFATE 75 MG: 75 TABLET ORAL at 09:55

## 2021-04-14 NOTE — PROGRESS NOTES
Vascular Nurse Navigator Post Op Education    Met with patient and daughter Maria Del Rosario Welch to introduce myself as Vascular Nurse Navigator and explained my role  Patient is appropriate and accepting to education  Patient was educated with Review of written materials provided, Teachback, Explanation, Demonstration and Question & Answer on expectations of post op care and recovery on Right CEA  Patient is a smoker  (5 ppw as he is currently on Chantix to assist him in quitting), as such Smoking effects on the lungs, tobacco triggers and Smoking cessation was reviewed  Education provided to patient and his daughter Maria Del Rosario Welch on infection prevention, activity limitations, when to call the office, importance of follow up, and incisional care  Discharge instruction handout provided to patient to review

## 2021-04-16 ENCOUNTER — TELEPHONE (OUTPATIENT)
Dept: VASCULAR SURGERY | Facility: CLINIC | Age: 66
End: 2021-04-16

## 2021-04-16 NOTE — TELEPHONE ENCOUNTER
Vascular Nurse Navigator Post Op Phone Call    Post-Discharge phone call attempted to assess patient recovery after vascular surgery I left a message on answering machine  Will attempt to contact at later date  Pt's chart was reviewed prior to call and leaving message  Procedure: RIGHT ENDARTERECTOMY ARTERY CAROTID WITH BOVINE PATCH (Right)    Date of Procedure: 4/13/21    Surgeon:    Bayron Jensen MD - Primary     * Mckenna Garcia PA-C - Assisting    Discharge Date: 4/14/21    Discharge Disposition: Home    Anticoagulation pt was discharge on post op?: Aspirin and Clopidogrel (Plavix)    Statin?: Lipitor (atorvastatin)    Reminded pt of the following in message:    NEXT OFFICE VISIT SCHEDULED: 4/21/21 at 3:30 pm with Dr Bill Castle at [facility] [address] [place] [address]    To contact [facility] with any concerns

## 2021-04-21 ENCOUNTER — OFFICE VISIT (OUTPATIENT)
Dept: VASCULAR SURGERY | Facility: CLINIC | Age: 66
End: 2021-04-21

## 2021-04-21 VITALS
DIASTOLIC BLOOD PRESSURE: 74 MMHG | HEIGHT: 69 IN | BODY MASS INDEX: 23.85 KG/M2 | HEART RATE: 74 BPM | SYSTOLIC BLOOD PRESSURE: 122 MMHG | WEIGHT: 161 LBS

## 2021-04-21 DIAGNOSIS — I65.23 CAROTID STENOSIS, ASYMPTOMATIC, BILATERAL: Primary | ICD-10-CM

## 2021-04-21 DIAGNOSIS — I65.21 SYMPTOMATIC CAROTID ARTERY STENOSIS, RIGHT: ICD-10-CM

## 2021-04-21 PROCEDURE — 99024 POSTOP FOLLOW-UP VISIT: CPT | Performed by: SURGERY

## 2021-04-21 NOTE — PATIENT INSTRUCTIONS
Complete postop 3 month course of Plavix, then discontinue  Continue lifelong aspirin and statin  Follow-up with carotid ultrasound in 3 months

## 2021-04-21 NOTE — PROGRESS NOTES
Assessment/Plan:    Symptomatic carotid artery stenosis, right  Extensive right common carotid/internal carotid artery endarterectomy  Patient did well postprocedure  Patient to complete a 3 month postprocedure course of clopidogrel  Patient instructed to discontinue after 3 months  Patient to continue lifelong aspirin and statin therapy  Patient will undergo postprocedure 3 month carotid duplex  Diagnoses and all orders for this visit:    Carotid stenosis, asymptomatic, bilateral  -     VAS carotid complete study; Future    Symptomatic carotid artery stenosis, right          Subjective:      Patient ID: Irina Moore is a 60 y o  male  Patient presents today s/p right endarterectomy done 4/13/21, performed for symptomatic extensive right common and internal carotid artery stenosis  Patient required extensive endarterectomy of the proximal common carotid artery from the clavicle to approximately 2-3 cm distal to the bifurcation  Postprocedure the patient did well  Patient denies TIA or stroke symptoms or dysphagia  Patient is taking ASA 81mg, Atorvastatin, and will complete a 3 month postprocedure course of Plavix  The following portions of the patient's history were reviewed and updated as appropriate: allergies, current medications, past family history, past medical history, past social history, past surgical history and problem list     Review of Systems   Constitutional: Negative  HENT: Negative  Difficulty swallowing    Eyes: Negative  Respiratory: Negative  Cardiovascular: Negative  Gastrointestinal: Negative  Endocrine: Negative  Genitourinary: Negative  Musculoskeletal: Negative  Skin: Negative  Allergic/Immunologic: Negative  Neurological: Negative  Hematological: Negative  Psychiatric/Behavioral: Negative            Objective:      /74 (BP Location: Right arm, Patient Position: Sitting)   Pulse 74   Ht 5' 9" (1 753 m)   Wt 73 kg (161 lb)   BMI 23 78 kg/m²          Physical Exam  Vitals signs and nursing note reviewed  Neck:      Comments: Right cervical incision healing well  No significant swelling or ecchymosis present  Neurological:      Cranial Nerves: No cranial nerve deficit  Sensory: No sensory deficit  Motor: No weakness  Gait: Gait normal       Comments: Tongue is midline with normal movement

## 2021-04-21 NOTE — ASSESSMENT & PLAN NOTE
Extensive right common carotid/internal carotid artery endarterectomy  Patient did well postprocedure  Patient to complete a 3 month postprocedure course of clopidogrel  Patient instructed to discontinue after 3 months  Patient to continue lifelong aspirin and statin therapy  Patient will undergo postprocedure 3 month carotid duplex

## 2021-04-21 NOTE — LETTER
April 21, 2021     Referral [address] [place] [id number]    Patient: Dre Steiner   YOB: 1955   Date of Visit: 4/21/2021       Dear Dr Nida Ibarra:    Thank you for referring Lelia Cardona to me for evaluation  Below are the relevant portions of my assessment and plan of care  Patient presents today s/p right endarterectomy done 4/13/21, performed for symptomatic extensive right common and internal carotid artery stenosis  Patient required extensive endarterectomy of the proximal common carotid artery from the clavicle to approximately 2-3 cm distal to the bifurcation  Postprocedure the patient did well  Patient denies TIA or stroke symptoms or dysphagia  Patient is taking ASA 81mg, Atorvastatin, and will complete a 3 month postprocedure course of Plavix  If you have questions, please do not hesitate to call me  I look forward to following Guerrero Lopez along with you           Sincerely,        Luiz Alexandre MD        CC: No Recipients

## 2021-04-27 NOTE — ANESTHESIA POSTPROCEDURE EVALUATION
Post-Op Assessment Note             Reason for prolonged intubation > 24 hours:  Unknown please contact surgical teamReason for prolonged intubation > 48 hours:  Unknown please contact surgical team      No complications documented      BP      Temp      Pulse     Resp      SpO2

## 2021-05-05 DIAGNOSIS — G45.9 TIA (TRANSIENT ISCHEMIC ATTACK): ICD-10-CM

## 2021-05-05 RX ORDER — CLOPIDOGREL BISULFATE 75 MG/1
75 TABLET ORAL DAILY
Qty: 30 TABLET | Refills: 2 | Status: SHIPPED | OUTPATIENT
Start: 2021-05-05 | End: 2021-07-28

## 2021-05-05 NOTE — TELEPHONE ENCOUNTER
Patient of Dr Kristina Tijerina who will need to be on Plavix 75 mg for 3 months post-op R CEA done 4/12/21

## 2021-07-12 ENCOUNTER — TELEPHONE (OUTPATIENT)
Dept: VASCULAR SURGERY | Facility: CLINIC | Age: 66
End: 2021-07-12

## 2021-07-12 NOTE — TELEPHONE ENCOUNTER
Attempted to contact patient to schedule 3 month office visit to review results of their carotid doppler  Requested patient call [phone] option 3 to schedule appointment(s)  Surgeon - Paralee Ahumada (NPI: [id number])  Date of Surgery - 04/13/21  All appointments must be scheduled by, 06/29/22  !!!! Patient must be scheduled for their 9-month office visit before the follow-up window close date !!!!    ALPHONSO/ CEA VQI Protocol  patients must be scheduled for the following    [x] 3-month Doppler - scheduled for 07/14/21 OV DUE AFTER PER NOTE    [x] 9-month Doppler Expected - due on or after 01/15/22    [x] 9-month OV after Doppler - due on or after 01/15/22          Scheduling Reminders  1  Insurance requires, 9-month doppler to be scheduled 6-months and 2-days after the 3-month doppler  2  Appointment notes MUST be entered in the following format:  a  VQI FORMS NEEDED VQI LTFU (ALPHONSO or CEA) (Date of Surgery) CV Duplex (Date of Doppler) (Location of Doppler)  3  If unable to schedule, a recall MUST be entered  >>Please route this encounter to Sammi Grant, Emiliano Gardner, and Bria Adams  <<    Please schedule from recall, apt note is in proper format for a VQI visit

## 2021-07-20 NOTE — TELEPHONE ENCOUNTER
Pt rescheduled CV for 07/28/21 and OV for 08/19/21 at [facility]     Pt is requesting [facility] only due to transportation

## 2021-07-28 ENCOUNTER — HOSPITAL ENCOUNTER (OUTPATIENT)
Dept: NON INVASIVE DIAGNOSTICS | Facility: CLINIC | Age: 66
Discharge: HOME | End: 2021-07-28
Payer: COMMERCIAL

## 2021-07-28 ENCOUNTER — APPOINTMENT (OUTPATIENT)
Dept: LAB | Facility: CLINIC | Age: 66
End: 2021-07-28
Payer: COMMERCIAL

## 2021-07-28 DIAGNOSIS — G45.9 TIA (TRANSIENT ISCHEMIC ATTACK): ICD-10-CM

## 2021-07-28 DIAGNOSIS — I65.23 CAROTID STENOSIS, ASYMPTOMATIC, BILATERAL: Primary | ICD-10-CM

## 2021-07-28 DIAGNOSIS — I65.23 CAROTID STENOSIS, ASYMPTOMATIC, BILATERAL: ICD-10-CM

## 2021-07-28 DIAGNOSIS — I65.21 SYMPTOMATIC CAROTID ARTERY STENOSIS, RIGHT: ICD-10-CM

## 2021-07-28 LAB
ANION GAP SERPL CALCULATED.3IONS-SCNC: 10 MMOL/L (ref 4–13)
BUN SERPL-MCNC: 18 MG/DL (ref 5–25)
CALCIUM SERPL-MCNC: 8.9 MG/DL (ref 8.3–10.1)
CHLORIDE SERPL-SCNC: 98 MMOL/L (ref 100–108)
CO2 SERPL-SCNC: 28 MMOL/L (ref 21–32)
CREAT SERPL-MCNC: 1.54 MG/DL (ref 0.6–1.3)
GFR SERPL CREATININE-BSD FRML MDRD: 47 ML/MIN/1.73SQ M
GLUCOSE SERPL-MCNC: 97 MG/DL (ref 65–140)
POTASSIUM SERPL-SCNC: 4.3 MMOL/L (ref 3.5–5.3)
SODIUM SERPL-SCNC: 136 MMOL/L (ref 136–145)

## 2021-07-28 PROCEDURE — 93880 EXTRACRANIAL BILAT STUDY: CPT | Performed by: SURGERY

## 2021-07-28 PROCEDURE — 80048 BASIC METABOLIC PNL TOTAL CA: CPT

## 2021-07-28 PROCEDURE — 93880 EXTRACRANIAL BILAT STUDY: CPT

## 2021-07-28 PROCEDURE — 36415 COLL VENOUS BLD VENIPUNCTURE: CPT

## 2021-07-28 RX ORDER — CLOPIDOGREL BISULFATE 75 MG/1
TABLET ORAL
Qty: 90 TABLET | Refills: 0 | Status: SHIPPED | OUTPATIENT
Start: 2021-07-28 | End: 2022-04-24 | Stop reason: HOSPADM

## 2021-07-28 NOTE — PROGRESS NOTES
From: [email or web address]   Sent: Wednesday, July 28, 2021 2:40 PM  To: Vascular Surgery [email or web address]  Subject: Ronald Mary Ann [birth year]    Patient is s/p RCEA 4/13/21  Severe proximal CCA stenosis noted on duplex today  Please schedule for stat CTA carotids and RTO this Friday  Also make sure is taking his Plavix and asa    Thanks,  GD

## 2021-07-29 ENCOUNTER — TELEPHONE (OUTPATIENT)
Dept: VASCULAR SURGERY | Facility: CLINIC | Age: 66
End: 2021-07-29

## 2021-07-29 NOTE — TELEPHONE ENCOUNTER
Pt's daughter called back and was notified  She asked to call her directly for nephrology appointment information since she will be the one taking him to the appointment  Advised that we are waiting to hear back from nephrology and then we will call her

## 2021-07-29 NOTE — TELEPHONE ENCOUNTER
Pt went for BMP yesterday for STAT CTA head/neck ordered by Dr Daron Barron yesterday  eGFR 47, creatinine 1 54- Dr Daron Barron notified  He advised that pt needs to see nephrology  CTA and OV were cancelled  I called pt's cell phone number listed and it went right to vm  Left a message to call the office  Called pt's daughter- no answer, left a message explaining that we are cancelling the CTA/OV and pt needs to see nephrology ASAP  Requested she call the office  Called nephrology [phone] and spoke w/ Mack  Explained that pt needs nephrology clearance for CTA ASAP  She said they are booking into October at all locations  She is going to check w/ her supervisor and call us back

## 2021-07-30 ENCOUNTER — TELEPHONE (OUTPATIENT)
Dept: NEPHROLOGY | Facility: CLINIC | Age: 66
End: 2021-07-30

## 2021-07-30 NOTE — TELEPHONE ENCOUNTER
Received call from [facility] at [facility], patient is schedule 8/9 8:30am w/ Dr Harper Ortega in [place] at [address]

## 2021-08-09 ENCOUNTER — CONSULT (OUTPATIENT)
Dept: NEPHROLOGY | Facility: CLINIC | Age: 66
End: 2021-08-09
Payer: COMMERCIAL

## 2021-08-09 VITALS
HEIGHT: 69 IN | SYSTOLIC BLOOD PRESSURE: 148 MMHG | BODY MASS INDEX: 24.34 KG/M2 | DIASTOLIC BLOOD PRESSURE: 80 MMHG | WEIGHT: 164.3 LBS | HEART RATE: 80 BPM

## 2021-08-09 DIAGNOSIS — I10 HYPERTENSION, UNSPECIFIED TYPE: Primary | ICD-10-CM

## 2021-08-09 DIAGNOSIS — N28.9 RENAL INSUFFICIENCY: ICD-10-CM

## 2021-08-09 PROBLEM — E87.1 HYPONATREMIA: Status: RESOLVED | Noted: 2021-03-29 | Resolved: 2021-08-09

## 2021-08-09 LAB
SL AMB  POCT GLUCOSE, UA: ABNORMAL
SL AMB LEUKOCYTE ESTERASE,UA: ABNORMAL
SL AMB POCT BILIRUBIN,UA: ABNORMAL
SL AMB POCT BLOOD,UA: ABNORMAL
SL AMB POCT KETONES,UA: ABNORMAL
SL AMB POCT NITRITE,UA: ABNORMAL
SL AMB POCT PH,UA: 6
SL AMB POCT SPECIFIC GRAVITY,UA: 1.01
SL AMB POCT URINE PROTEIN: 100
SL AMB POCT UROBILINOGEN: ABNORMAL

## 2021-08-09 PROCEDURE — 81002 URINALYSIS NONAUTO W/O SCOPE: CPT | Performed by: INTERNAL MEDICINE

## 2021-08-09 PROCEDURE — 99204 OFFICE O/P NEW MOD 45 MIN: CPT | Performed by: INTERNAL MEDICINE

## 2021-08-09 NOTE — PATIENT INSTRUCTIONS
You are here for your 1st visit  You are scheduled to get CT scan with contrast of your carotid artery and they notice that the creatinine which is the blood test for the kidney function was a little above your baseline at 1 5  It appears your normal level may be around 1 2  They were concerned because the contrast might stone the kidneys so they just wanted clearance from me  I am just going to repeat the lab work and I want you to be hydrated nonfasting to make sure that that does not affect the kidney function  I will also let the surgeon know to go ahead and schedule the test after we get the results  After you have had the CT scan a week later obtain the other blood test to make sure there was no complication to the kidneys and I will call you with the results

## 2021-08-09 NOTE — PROGRESS NOTES
Consultation - Nephrology   Clarence Goyal 73 y o  male MRN: [id number]  Unit/Bed#:  Encounter: [id number]      Assessment/Plan     Assessment / Plan:  1  Renal    Patient appears a baseline creatinine of 1 2 and then he had labs done with a creatinine of 1 5 prior to CTA which was scheduled  The scan was postponed at the time and he is referred for evaluation  I suspect the patient may have had subtle pre renal azotemia as he says he thinks he fasted and was done later in the afternoon  Also it has been very hot so he would be at risk for dehydration  At this point that is my working diagnosis so I do not see any problems with proceeding with the CT scan with contrast provided things are stabilized and improved  He is no longer on his ACE-inhibitor  I am going to check a BMP and I told the patient to hydrate himself and can be done nonfasting  He says he will do it this week  If things remain stable or creatinine slightly lower he has clearance to undergo CT scan with contrast     If creatinine remains slightly elevated would recommend placing intravenous and giving a bolus of 500 cc normal saline then running saline at 70 cc/hours through procedure  I have given a slip to repeat a BMP the week after he has the procedure  The patient has clearance to undergo CT scan after repeat BMP  2  Carotid disease    The patient had right carotid endarterectomy and apparently surveillance Doppler revealed a proximal carotid stenosis so he is scheduled to get a CTA to evaluate this per vascular surgery  History of Present Illness   Physician Requesting Consult: No att  providers found  Reason for Consult / Principal Problem:  Renal insufficiency  Hx and PE limited by:   HPI: Clarence Goyal is a 73y o  year old male who had a stroke that was manifested with left hand weakness that has resolved  He underwent carotid endarterectomy on the right about 3 months ago    Surveillance Doppler revealed suggestion of a proximal stenosis in the carotid artery and he was scheduled get a CTA when it was noted his creatinine was above his baseline at 1 5  The procedure was postponed and he was referred for clearance and recommendations  History obtained from chart review and the patient  Consults    Review of Systems   Constitutional: Negative for appetite change, chills, diaphoresis, fatigue and fever  HENT: Negative  Eyes: Negative  Respiratory: Negative  Negative for cough, chest tightness, shortness of breath and wheezing  Cardiovascular: Negative  Negative for chest pain, palpitations and leg swelling  Gastrointestinal: Negative  Negative for abdominal distention, abdominal pain, diarrhea, nausea and vomiting  Genitourinary: Negative  Negative for difficulty urinating, dysuria, flank pain and hematuria  Neurological: Negative for dizziness, syncope, light-headedness and headaches  Psychiatric/Behavioral: Negative for agitation, behavioral problems, confusion and decreased concentration         Historical Information   Patient Active Problem List   Diagnosis    Left arm weakness    Tobacco use    Alcohol abuse    HLD (hyperlipidemia)    Elevated serum creatinine    [facility]    Anemia    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    Symptomatic carotid artery stenosis, right    Lumbar back pain    GERD (gastroesophageal reflux disease)    Stenosis of right internal carotid artery    Renal insufficiency     Past Medical History:   Diagnosis Date    Anxiety     Back pain     Claustrophobia     COPD (chronic obstructive pulmonary disease) ([facility])     GERD (gastroesophageal reflux disease)     Hypertension     Lumbar back pain     Panic attacks     Stenosis of right carotid artery     Stroke ([address] )     Wears glasses     Wears partial dentures     upper denture     Past Surgical History:   Procedure Laterality Date    COLONOSCOPY  IR CEREBRAL ANGIOGRAPHY  4/6/2021    OTHER SURGICAL HISTORY      fertility    MA SLCTV CATHJ 3RD+ ORD SLCTV ABDL PEL/LXTR [facility] Right 4/6/2021    Procedure: ARTERIOGRAM, carotid Angio;  Surgeon: Frannie Davis MD;  [facility];  Service: Vascular    MA THROMBOENDARTECTMY [facility] Right 4/13/2021    Procedure: RIGHT ENDARTERECTOMY ARTERY CAROTID WITH BOVINE PATCH;  Surgeon: Frannie Davis MD;  [facility];  Service: Vascular     Social History   Social History     Substance and Sexual Activity   Alcohol Use Yes     Social History     Substance and Sexual Activity   Drug Use Never     Social History     Tobacco Use   Smoking Status Light Tobacco Smoker    Packs/day: 0 25   Smokeless Tobacco Never Used   Tobacco Comment    5 cigs a week     History reviewed  No pertinent family history  Meds/Allergies   current meds:   No current facility-administered medications for this visit  Allergies   Allergen Reactions    Penicillins Anaphylaxis       Objective   [unfilled]  Body mass index is 24 26 kg/m²  Invasive Devices:        PHYSICAL EXAM:  /80 (BP Location: Left arm, Patient Position: Sitting, Cuff Size: Standard)   Pulse 80   Ht 5' 9" (1 753 m)   Wt 74 5 kg (164 lb 4 8 oz)   BMI 24 26 kg/m²     Physical Exam  Constitutional:       General: He is not in acute distress  Appearance: He is not toxic-appearing or diaphoretic  HENT:      Head: Normocephalic  Ears:      Comments: Mild left ptosis     Nose:      Comments: Wearing mask     Mouth/Throat:      Comments: Wearing mask  Eyes:      General: No scleral icterus  Extraocular Movements: Extraocular movements intact  Cardiovascular:      Rate and Rhythm: Normal rate and regular rhythm  Heart sounds: No friction rub  No gallop  Comments: No edema  Pulmonary:      Effort: Pulmonary effort is normal  No respiratory distress  Breath sounds: Normal breath sounds   No wheezing, rhonchi or rales    Abdominal:      General: Bowel sounds are normal  There is no distension  Palpations: Abdomen is soft  Tenderness: There is no abdominal tenderness  There is no rebound  Musculoskeletal:      Cervical back: Normal range of motion and neck supple  Neurological:      Mental Status: He is alert and oriented to person, place, and time  Mental status is at baseline  Comments: Mild left ptosis   Psychiatric:         Mood and Affect: Mood normal          Behavior: Behavior normal          Thought Content:  Thought content normal          Judgment: Judgment normal            Current Weight: Weight - Scale: 74 5 kg (164 lb 4 8 oz)  First Weight: Weight - Scale: 74 5 kg (164 lb 4 8 oz)    Lab Results:              Invalid input(s): LABGLOM        Invalid input(s): LABALBU

## 2021-08-09 NOTE — LETTER
August 9, 2021     [place] [address]  [address] [place] [address]    Patient: Nick Garibay   YOB: 1955   Date of Visit: 8/9/2021       Dear Dr Jigar Angel: Thank you for referring Aura Hubbard to me for evaluation  Below are my notes for this consultation  If you have questions, please do not hesitate to call me  I look forward to following your patient along with you  Sincerely,        Marcy Quiroga MD        CC: ÁNGEL Motta MD  8/9/2021  9:05 AM  Sign when Signing Visit  Consultation - Nephrology   Nick Garibay 73 y o  male MRN: [id number]  Unit/Bed#:  Encounter: [id number]      Assessment/Plan     Assessment / Plan:  1  Renal    Patient appears a baseline creatinine of 1 2 and then he had labs done with a creatinine of 1 5 prior to CTA which was scheduled  The scan was postponed at the time and he is referred for evaluation  I suspect the patient may have had subtle pre renal azotemia as he says he thinks he fasted and was done later in the afternoon  Also it has been very hot so he would be at risk for dehydration  At this point that is my working diagnosis so I do not see any problems with proceeding with the CT scan with contrast provided things are stabilized and improved  He is no longer on his ACE-inhibitor  I am going to check a BMP and I told the patient to hydrate himself and can be done nonfasting  He says he will do it this week  If things remain stable or creatinine slightly lower he has clearance to undergo CT scan with contrast     If creatinine remains slightly elevated would recommend placing intravenous and giving a bolus of 500 cc normal saline then running saline at 70 cc/hours through procedure  I have given a slip to repeat a BMP the week after he has the procedure  The patient has clearance to undergo CT scan after repeat BMP      2  Carotid disease    The patient had right carotid endarterectomy and apparently surveillance Doppler revealed a proximal carotid stenosis so he is scheduled to get a CTA to evaluate this per vascular surgery  History of Present Illness   Physician Requesting Consult: No att  providers found  Reason for Consult / Principal Problem:  Renal insufficiency  Hx and PE limited by:   HPI: Abiel Hoffmann is a 73y o  year old male who had a stroke that was manifested with left hand weakness that has resolved  He underwent carotid endarterectomy on the right about 3 months ago  Surveillance Doppler revealed suggestion of a proximal stenosis in the carotid artery and he was scheduled get a CTA when it was noted his creatinine was above his baseline at 1 5  The procedure was postponed and he was referred for clearance and recommendations  History obtained from chart review and the patient  Consults    Review of Systems   Constitutional: Negative for appetite change, chills, diaphoresis, fatigue and fever  HENT: Negative  Eyes: Negative  Respiratory: Negative  Negative for cough, chest tightness, shortness of breath and wheezing  Cardiovascular: Negative  Negative for chest pain, palpitations and leg swelling  Gastrointestinal: Negative  Negative for abdominal distention, abdominal pain, diarrhea, nausea and vomiting  Genitourinary: Negative  Negative for difficulty urinating, dysuria, flank pain and hematuria  Neurological: Negative for dizziness, syncope, light-headedness and headaches  Psychiatric/Behavioral: Negative for agitation, behavioral problems, confusion and decreased concentration         Historical Information   Patient Active Problem List   Diagnosis    Left arm weakness    Tobacco use    Alcohol abuse    HLD (hyperlipidemia)    Elevated serum creatinine    COPD (chronic obstructive pulmonary disease) (H[facility])    Anemia    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    Symptomatic carotid artery stenosis, right    Lumbar back pain    GERD (gastroesophageal reflux disease)    Stenosis of right internal carotid artery    Renal insufficiency     Past Medical History:   Diagnosis Date    Anxiety     Back pain     Claustrophobia     COPD (chronic obstructive pulmonary disease) (HCC)     GERD (gastroesophageal reflux disease)     Hypertension     Lumbar back pain     Panic attacks     Stenosis of right carotid artery     Stroke ([address] )     Wears glasses     Wears partial dentures     upper denture     Past Surgical History:   Procedure Laterality Date    COLONOSCOPY      IR CEREBRAL ANGIOGRAPHY  4/6/2021    OTHER SURGICAL HISTORY      fertility    MD SLCTV CATHJ 3RD+ ORD SLCTV ABDL PEL/LXTR [facility] Right 4/6/2021    Procedure: ARTERIOGRAM, carotid Angio;  Surgeon: Moisés Medel MD;  [facility];  Service: Vascular    MD THROMBOENDARTECTMY [facility] S[facility] Right 4/13/2021    Procedure: RIGHT ENDARTERECTOMY ARTERY CAROTID WITH BOVINE PATCH;  Surgeon: Moisés Medel MD;  [facility];  Service: Vascular     Social History   Social History     Substance and Sexual Activity   Alcohol Use Yes     Social History     Substance and Sexual Activity   Drug Use Never     Social History     Tobacco Use   Smoking Status Light Tobacco Smoker    Packs/day: 0 25   Smokeless Tobacco Never Used   Tobacco Comment    5 cigs a week     History reviewed  No pertinent family history  Meds/Allergies   current meds:   No current facility-administered medications for this visit  Allergies   Allergen Reactions    Penicillins Anaphylaxis       Objective   [unfilled]  Body mass index is 24 26 kg/m²  Invasive Devices:        PHYSICAL EXAM:  /80 (BP Location: Left arm, Patient Position: Sitting, Cuff Size: Standard)   Pulse 80   Ht 5' 9" (1 753 m)   Wt 74 5 kg (164 lb 4 8 oz)   BMI 24 26 kg/m²     Physical Exam  Constitutional:       General: He is not in acute distress       Appearance: He is not toxic-appearing or diaphoretic  HENT:      Head: Normocephalic  Ears:      Comments: Mild left ptosis     Nose:      Comments: Wearing mask     Mouth/Throat:      Comments: Wearing mask  Eyes:      General: No scleral icterus  Extraocular Movements: Extraocular movements intact  Cardiovascular:      Rate and Rhythm: Normal rate and regular rhythm  Heart sounds: No friction rub  No gallop  Comments: No edema  Pulmonary:      Effort: Pulmonary effort is normal  No respiratory distress  Breath sounds: Normal breath sounds  No wheezing, rhonchi or rales  Abdominal:      General: Bowel sounds are normal  There is no distension  Palpations: Abdomen is soft  Tenderness: There is no abdominal tenderness  There is no rebound  Musculoskeletal:      Cervical back: Normal range of motion and neck supple  Neurological:      Mental Status: He is alert and oriented to person, place, and time  Mental status is at baseline  Comments: Mild left ptosis   Psychiatric:         Mood and Affect: Mood normal          Behavior: Behavior normal          Thought Content:  Thought content normal          Judgment: Judgment normal            Current Weight: Weight - Scale: 74 5 kg (164 lb 4 8 oz)  First Weight: Weight - Scale: 74 5 kg (164 lb 4 8 oz)    Lab Results:              Invalid input(s): LABGLOM        Invalid input(s): LABALBU

## 2021-08-09 NOTE — TELEPHONE ENCOUNTER
Pt saw Dr Thony Houser today and per note, pt is going to repeat BMP and "If things remain stable or creatinine slightly lower he has clearance to undergo CT scan with contrast  If creatinine remains slightly elevated would recommend placing intravenous and giving a bolus of 500 cc normal saline then running saline at 70 cc/hours through procedure "

## 2021-08-26 NOTE — TELEPHONE ENCOUNTER
Called pt and left vm req he return our call, pt has not had labs drawn  Also called emergency contact sister Michelle Corral and left  req she have pt return our call

## 2021-08-30 NOTE — TELEPHONE ENCOUNTER
Nursing has tried to contact pt several times in regards to clearance  Please send a certified letter

## 2021-09-02 ENCOUNTER — APPOINTMENT (EMERGENCY)
Dept: RADIOLOGY | Facility: HOSPITAL | Age: 66
DRG: 062 | End: 2021-09-02
Payer: COMMERCIAL

## 2021-09-02 ENCOUNTER — HOSPITAL ENCOUNTER (INPATIENT)
Facility: HOSPITAL | Age: 66
LOS: 4 days | Discharge: HOME | DRG: 062 | End: 2021-09-06
Attending: EMERGENCY MEDICINE | Admitting: INTERNAL MEDICINE
Payer: COMMERCIAL

## 2021-09-02 ENCOUNTER — APPOINTMENT (EMERGENCY)
Dept: CT IMAGING | Facility: HOSPITAL | Age: 66
DRG: 062 | End: 2021-09-02
Payer: COMMERCIAL

## 2021-09-02 DIAGNOSIS — L29.9 ITCHING: ICD-10-CM

## 2021-09-02 DIAGNOSIS — E78.5 HYPERLIPIDEMIA, UNSPECIFIED HYPERLIPIDEMIA TYPE: ICD-10-CM

## 2021-09-02 DIAGNOSIS — D50.8 IRON DEFICIENCY ANEMIA SECONDARY TO INADEQUATE DIETARY IRON INTAKE: ICD-10-CM

## 2021-09-02 DIAGNOSIS — R53.1 WEAKNESS: ICD-10-CM

## 2021-09-02 DIAGNOSIS — Z72.0 TOBACCO USE: ICD-10-CM

## 2021-09-02 DIAGNOSIS — I63.9 ACUTE CVA (CEREBROVASCULAR ACCIDENT) (HCC): Primary | ICD-10-CM

## 2021-09-02 DIAGNOSIS — K21.9 GASTROESOPHAGEAL REFLUX DISEASE WITHOUT ESOPHAGITIS: ICD-10-CM

## 2021-09-02 DIAGNOSIS — K59.00 CONSTIPATION: ICD-10-CM

## 2021-09-02 LAB
ANION GAP SERPL CALCULATED.3IONS-SCNC: 9 MMOL/L (ref 4–13)
APTT PPP: 34 SECONDS (ref 23–37)
BUN SERPL-MCNC: 19 MG/DL (ref 5–25)
CALCIUM SERPL-MCNC: 9.7 MG/DL (ref 8.3–10.1)
CHLORIDE SERPL-SCNC: 103 MMOL/L (ref 100–108)
CO2 SERPL-SCNC: 29 MMOL/L (ref 21–32)
CREAT SERPL-MCNC: 1.45 MG/DL (ref 0.6–1.3)
ERYTHROCYTE [DISTWIDTH] IN BLOOD BY AUTOMATED COUNT: 17.7 % (ref 11.6–15.1)
GFR SERPL CREATININE-BSD FRML MDRD: 50 ML/MIN/1.73SQ M
GLUCOSE SERPL-MCNC: 110 MG/DL (ref 65–140)
GLUCOSE SERPL-MCNC: 116 MG/DL (ref 65–140)
HCT VFR BLD AUTO: 34.9 % (ref 36.5–49.3)
HGB BLD-MCNC: 9.9 G/DL (ref 12–17)
INR PPP: 1.03 (ref 0.84–1.19)
MCH RBC QN AUTO: 19.7 PG (ref 26.8–34.3)
MCHC RBC AUTO-ENTMCNC: 28.4 G/DL (ref 31.4–37.4)
MCV RBC AUTO: 69 FL (ref 82–98)
PLATELET # BLD AUTO: 332 THOUSANDS/UL (ref 149–390)
PMV BLD AUTO: 10.5 FL (ref 8.9–12.7)
POTASSIUM SERPL-SCNC: 4 MMOL/L (ref 3.5–5.3)
PROTHROMBIN TIME: 13.6 SECONDS (ref 11.6–14.5)
RBC # BLD AUTO: 5.03 MILLION/UL (ref 3.88–5.62)
SODIUM SERPL-SCNC: 141 MMOL/L (ref 136–145)
TROPONIN I SERPL-MCNC: <0.02 NG/ML
WBC # BLD AUTO: 5.62 THOUSAND/UL (ref 4.31–10.16)

## 2021-09-02 PROCEDURE — 3E03317 INTRODUCTION OF OTHER THROMBOLYTIC INTO PERIPHERAL VEIN, PERCUTANEOUS APPROACH: ICD-10-PCS | Performed by: EMERGENCY MEDICINE

## 2021-09-02 PROCEDURE — 70498 CT ANGIOGRAPHY NECK: CPT

## 2021-09-02 PROCEDURE — 83036 HEMOGLOBIN GLYCOSYLATED A1C: CPT | Performed by: NURSE PRACTITIONER

## 2021-09-02 PROCEDURE — 94664 DEMO&/EVAL PT USE INHALER: CPT

## 2021-09-02 PROCEDURE — 93005 ELECTROCARDIOGRAM TRACING: CPT

## 2021-09-02 PROCEDURE — 99292 CRITICAL CARE ADDL 30 MIN: CPT | Performed by: EMERGENCY MEDICINE

## 2021-09-02 PROCEDURE — NC001 PR NO CHARGE: Performed by: INTERNAL MEDICINE

## 2021-09-02 PROCEDURE — 96374 THER/PROPH/DIAG INJ IV PUSH: CPT

## 2021-09-02 PROCEDURE — 85730 THROMBOPLASTIN TIME PARTIAL: CPT | Performed by: EMERGENCY MEDICINE

## 2021-09-02 PROCEDURE — 71045 X-RAY EXAM CHEST 1 VIEW: CPT

## 2021-09-02 PROCEDURE — 99291 CRITICAL CARE FIRST HOUR: CPT

## 2021-09-02 PROCEDURE — 36415 COLL VENOUS BLD VENIPUNCTURE: CPT | Performed by: EMERGENCY MEDICINE

## 2021-09-02 PROCEDURE — 85610 PROTHROMBIN TIME: CPT | Performed by: EMERGENCY MEDICINE

## 2021-09-02 PROCEDURE — 85027 COMPLETE CBC AUTOMATED: CPT | Performed by: EMERGENCY MEDICINE

## 2021-09-02 PROCEDURE — 80061 LIPID PANEL: CPT | Performed by: NURSE PRACTITIONER

## 2021-09-02 PROCEDURE — 80048 BASIC METABOLIC PNL TOTAL CA: CPT | Performed by: EMERGENCY MEDICINE

## 2021-09-02 PROCEDURE — 84484 ASSAY OF TROPONIN QUANT: CPT | Performed by: EMERGENCY MEDICINE

## 2021-09-02 PROCEDURE — 70496 CT ANGIOGRAPHY HEAD: CPT

## 2021-09-02 PROCEDURE — 82948 REAGENT STRIP/BLOOD GLUCOSE: CPT

## 2021-09-02 PROCEDURE — 99291 CRITICAL CARE FIRST HOUR: CPT | Performed by: EMERGENCY MEDICINE

## 2021-09-02 RX ORDER — LABETALOL 20 MG/4 ML (5 MG/ML) INTRAVENOUS SYRINGE
10 ONCE
Status: COMPLETED | OUTPATIENT
Start: 2021-09-02 | End: 2021-09-02

## 2021-09-02 RX ORDER — LABETALOL 20 MG/4 ML (5 MG/ML) INTRAVENOUS SYRINGE
10 EVERY 6 HOURS PRN
Status: DISCONTINUED | OUTPATIENT
Start: 2021-09-02 | End: 2021-09-03

## 2021-09-02 RX ADMIN — IOHEXOL 100 ML: 350 INJECTION, SOLUTION INTRAVENOUS at 20:12

## 2021-09-02 RX ADMIN — LABETALOL 20 MG/4 ML (5 MG/ML) INTRAVENOUS SYRINGE 10 MG: at 20:53

## 2021-09-02 RX ADMIN — LABETALOL 20 MG/4 ML (5 MG/ML) INTRAVENOUS SYRINGE 10 MG: at 23:02

## 2021-09-02 RX ADMIN — LABETALOL 20 MG/4 ML (5 MG/ML) INTRAVENOUS SYRINGE 10 MG: at 20:39

## 2021-09-02 RX ADMIN — SODIUM CHLORIDE 50 ML: 9 INJECTION, SOLUTION INTRAVENOUS at 21:59

## 2021-09-02 RX ADMIN — ALTEPLASE 55.1 MG: KIT at 21:01

## 2021-09-02 NOTE — Clinical Note
Case was discussed with __ and the patient's admission status was agreed to be Admission Status: inpatient status to the service of Dr Cindy Johnson

## 2021-09-03 ENCOUNTER — APPOINTMENT (INPATIENT)
Dept: MRI IMAGING | Facility: HOSPITAL | Age: 66
DRG: 062 | End: 2021-09-03
Payer: COMMERCIAL

## 2021-09-03 ENCOUNTER — APPOINTMENT (INPATIENT)
Dept: NON INVASIVE DIAGNOSTICS | Facility: HOSPITAL | Age: 66
DRG: 062 | End: 2021-09-03
Payer: COMMERCIAL

## 2021-09-03 ENCOUNTER — APPOINTMENT (INPATIENT)
Dept: CT IMAGING | Facility: HOSPITAL | Age: 66
DRG: 062 | End: 2021-09-03
Payer: COMMERCIAL

## 2021-09-03 LAB
ALBUMIN SERPL BCP-MCNC: 3.8 G/DL (ref 3.5–5)
ALP SERPL-CCNC: 54 U/L (ref 46–116)
ALT SERPL W P-5'-P-CCNC: 15 U/L (ref 12–78)
ANION GAP SERPL CALCULATED.3IONS-SCNC: 11 MMOL/L (ref 4–13)
AST SERPL W P-5'-P-CCNC: 16 U/L (ref 5–45)
ATRIAL RATE: 70 BPM
BASOPHILS # BLD AUTO: 0.02 THOUSANDS/ΜL (ref 0–0.1)
BASOPHILS NFR BLD AUTO: 0 % (ref 0–1)
BILIRUB SERPL-MCNC: 0.38 MG/DL (ref 0.2–1)
BUN SERPL-MCNC: 18 MG/DL (ref 5–25)
CA-I BLD-SCNC: 1.11 MMOL/L (ref 1.12–1.32)
CALCIUM SERPL-MCNC: 9.2 MG/DL (ref 8.3–10.1)
CHLORIDE SERPL-SCNC: 99 MMOL/L (ref 100–108)
CHOLEST SERPL-MCNC: 175 MG/DL (ref 50–200)
CO2 SERPL-SCNC: 26 MMOL/L (ref 21–32)
CREAT SERPL-MCNC: 1.34 MG/DL (ref 0.6–1.3)
EOSINOPHIL # BLD AUTO: 0.07 THOUSAND/ΜL (ref 0–0.61)
EOSINOPHIL NFR BLD AUTO: 1 % (ref 0–6)
ERYTHROCYTE [DISTWIDTH] IN BLOOD BY AUTOMATED COUNT: 17.8 % (ref 11.6–15.1)
EST. AVERAGE GLUCOSE BLD GHB EST-MCNC: 126 MG/DL
GFR SERPL CREATININE-BSD FRML MDRD: 55 ML/MIN/1.73SQ M
GLUCOSE SERPL-MCNC: 117 MG/DL (ref 65–140)
HBA1C MFR BLD: 6 %
HCT VFR BLD AUTO: 32.9 % (ref 36.5–49.3)
HDLC SERPL-MCNC: 58 MG/DL
HGB BLD-MCNC: 9.4 G/DL (ref 12–17)
IMM GRANULOCYTES # BLD AUTO: 0.04 THOUSAND/UL (ref 0–0.2)
IMM GRANULOCYTES NFR BLD AUTO: 1 % (ref 0–2)
LDLC SERPL CALC-MCNC: 83 MG/DL (ref 0–100)
LYMPHOCYTES # BLD AUTO: 0.98 THOUSANDS/ΜL (ref 0.6–4.47)
LYMPHOCYTES NFR BLD AUTO: 13 % (ref 14–44)
MAGNESIUM SERPL-MCNC: 1.3 MG/DL (ref 1.6–2.6)
MCH RBC QN AUTO: 19.4 PG (ref 26.8–34.3)
MCHC RBC AUTO-ENTMCNC: 28.6 G/DL (ref 31.4–37.4)
MCV RBC AUTO: 68 FL (ref 82–98)
MONOCYTES # BLD AUTO: 0.55 THOUSAND/ΜL (ref 0.17–1.22)
MONOCYTES NFR BLD AUTO: 7 % (ref 4–12)
NEUTROPHILS # BLD AUTO: 5.88 THOUSANDS/ΜL (ref 1.85–7.62)
NEUTS SEG NFR BLD AUTO: 78 % (ref 43–75)
NONHDLC SERPL-MCNC: 117 MG/DL
NRBC BLD AUTO-RTO: 0 /100 WBCS
P AXIS: 64 DEGREES
PHOSPHATE SERPL-MCNC: 2.8 MG/DL (ref 2.3–4.1)
PLATELET # BLD AUTO: 257 THOUSANDS/UL (ref 149–390)
PMV BLD AUTO: 10.3 FL (ref 8.9–12.7)
POTASSIUM SERPL-SCNC: 3.1 MMOL/L (ref 3.5–5.3)
PR INTERVAL: 172 MS
PROT SERPL-MCNC: 7.8 G/DL (ref 6.4–8.2)
QRS AXIS: 68 DEGREES
QRSD INTERVAL: 74 MS
QT INTERVAL: 374 MS
QTC INTERVAL: 403 MS
RBC # BLD AUTO: 4.85 MILLION/UL (ref 3.88–5.62)
SODIUM SERPL-SCNC: 136 MMOL/L (ref 136–145)
T WAVE AXIS: 69 DEGREES
TRIGL SERPL-MCNC: 168 MG/DL
VENTRICULAR RATE: 70 BPM
WBC # BLD AUTO: 7.54 THOUSAND/UL (ref 4.31–10.16)

## 2021-09-03 PROCEDURE — 94760 N-INVAS EAR/PLS OXIMETRY 1: CPT

## 2021-09-03 PROCEDURE — G1004 CDSM NDSC: HCPCS

## 2021-09-03 PROCEDURE — 93306 TTE W/DOPPLER COMPLETE: CPT

## 2021-09-03 PROCEDURE — 92610 EVALUATE SWALLOWING FUNCTION: CPT

## 2021-09-03 PROCEDURE — 82330 ASSAY OF CALCIUM: CPT

## 2021-09-03 PROCEDURE — 97116 GAIT TRAINING THERAPY: CPT

## 2021-09-03 PROCEDURE — 83540 ASSAY OF IRON: CPT

## 2021-09-03 PROCEDURE — 70551 MRI BRAIN STEM W/O DYE: CPT

## 2021-09-03 PROCEDURE — 97163 PT EVAL HIGH COMPLEX 45 MIN: CPT

## 2021-09-03 PROCEDURE — 85025 COMPLETE CBC W/AUTO DIFF WBC: CPT

## 2021-09-03 PROCEDURE — 99232 SBSQ HOSP IP/OBS MODERATE 35: CPT | Performed by: INTERNAL MEDICINE

## 2021-09-03 PROCEDURE — 82728 ASSAY OF FERRITIN: CPT

## 2021-09-03 PROCEDURE — 80053 COMPREHEN METABOLIC PANEL: CPT

## 2021-09-03 PROCEDURE — 83550 IRON BINDING TEST: CPT

## 2021-09-03 PROCEDURE — 99223 1ST HOSP IP/OBS HIGH 75: CPT | Performed by: PSYCHIATRY & NEUROLOGY

## 2021-09-03 PROCEDURE — 84100 ASSAY OF PHOSPHORUS: CPT

## 2021-09-03 PROCEDURE — 83735 ASSAY OF MAGNESIUM: CPT

## 2021-09-03 PROCEDURE — 93306 TTE W/DOPPLER COMPLETE: CPT | Performed by: INTERNAL MEDICINE

## 2021-09-03 PROCEDURE — 94640 AIRWAY INHALATION TREATMENT: CPT

## 2021-09-03 PROCEDURE — 93010 ELECTROCARDIOGRAM REPORT: CPT | Performed by: INTERNAL MEDICINE

## 2021-09-03 RX ORDER — HYDRALAZINE HYDROCHLORIDE 20 MG/ML
10 INJECTION INTRAMUSCULAR; INTRAVENOUS ONCE
Status: COMPLETED | OUTPATIENT
Start: 2021-09-03 | End: 2021-09-03

## 2021-09-03 RX ORDER — MAGNESIUM SULFATE HEPTAHYDRATE 40 MG/ML
2 INJECTION, SOLUTION INTRAVENOUS ONCE
Status: COMPLETED | OUTPATIENT
Start: 2021-09-04 | End: 2021-09-04

## 2021-09-03 RX ORDER — BACLOFEN 10 MG/1
TABLET ORAL
COMMUNITY
Start: 2021-08-04 | End: 2021-09-18 | Stop reason: HOSPADM

## 2021-09-03 RX ORDER — HYDRALAZINE HYDROCHLORIDE 20 MG/ML
5 INJECTION INTRAMUSCULAR; INTRAVENOUS EVERY 4 HOURS PRN
Status: DISCONTINUED | OUTPATIENT
Start: 2021-09-03 | End: 2021-09-06 | Stop reason: HOSPADM

## 2021-09-03 RX ORDER — ALBUTEROL SULFATE 90 UG/1
2 AEROSOL, METERED RESPIRATORY (INHALATION) EVERY 4 HOURS PRN
Status: DISCONTINUED | OUTPATIENT
Start: 2021-09-03 | End: 2021-09-06 | Stop reason: HOSPADM

## 2021-09-03 RX ORDER — POTASSIUM CHLORIDE 20 MEQ/1
40 TABLET, EXTENDED RELEASE ORAL ONCE
Status: COMPLETED | OUTPATIENT
Start: 2021-09-04 | End: 2021-09-04

## 2021-09-03 RX ORDER — PRAVASTATIN SODIUM 40 MG
40 TABLET ORAL DAILY
COMMUNITY
Start: 2021-07-26 | End: 2021-09-06 | Stop reason: HOSPADM

## 2021-09-03 RX ORDER — ALPRAZOLAM 0.25 MG/1
TABLET ORAL
COMMUNITY
Start: 2021-06-15 | End: 2022-04-24 | Stop reason: HOSPADM

## 2021-09-03 RX ORDER — OMEPRAZOLE 20 MG/1
20 CAPSULE, DELAYED RELEASE ORAL DAILY
COMMUNITY
Start: 2021-07-02 | End: 2021-09-06 | Stop reason: HOSPADM

## 2021-09-03 RX ORDER — CALCIUM CARBONATE 200(500)MG
1000 TABLET,CHEWABLE ORAL 2 TIMES DAILY PRN
Status: DISCONTINUED | OUTPATIENT
Start: 2021-09-03 | End: 2021-09-06 | Stop reason: HOSPADM

## 2021-09-03 RX ORDER — ATORVASTATIN CALCIUM 40 MG/1
80 TABLET, FILM COATED ORAL
Status: DISCONTINUED | OUTPATIENT
Start: 2021-09-03 | End: 2021-09-06 | Stop reason: HOSPADM

## 2021-09-03 RX ORDER — LEVALBUTEROL 1.25 MG/.5ML
1.25 SOLUTION, CONCENTRATE RESPIRATORY (INHALATION) EVERY 8 HOURS PRN
Status: DISCONTINUED | OUTPATIENT
Start: 2021-09-03 | End: 2021-09-06 | Stop reason: HOSPADM

## 2021-09-03 RX ORDER — LABETALOL 20 MG/4 ML (5 MG/ML) INTRAVENOUS SYRINGE
20 ONCE
Status: COMPLETED | OUTPATIENT
Start: 2021-09-03 | End: 2021-09-03

## 2021-09-03 RX ORDER — FENOFIBRATE 160 MG/1
160 TABLET ORAL DAILY
COMMUNITY
Start: 2021-06-25 | End: 2022-06-23 | Stop reason: ALTCHOICE

## 2021-09-03 RX ADMIN — CALCIUM CARBONATE (ANTACID) CHEW TAB 500 MG 1000 MG: 500 CHEW TAB at 17:06

## 2021-09-03 RX ADMIN — HYDRALAZINE HYDROCHLORIDE 10 MG: 20 INJECTION, SOLUTION INTRAMUSCULAR; INTRAVENOUS at 19:44

## 2021-09-03 RX ADMIN — IPRATROPIUM BROMIDE 0.5 MG: 0.5 SOLUTION RESPIRATORY (INHALATION) at 08:25

## 2021-09-03 RX ADMIN — LABETALOL 20 MG/4 ML (5 MG/ML) INTRAVENOUS SYRINGE 20 MG: at 02:56

## 2021-09-03 RX ADMIN — ACLIDINIUM BROMIDE 400 MCG: 400 POWDER, METERED RESPIRATORY (INHALATION) at 11:21

## 2021-09-03 RX ADMIN — ATORVASTATIN CALCIUM 80 MG: 40 TABLET, FILM COATED ORAL at 17:06

## 2021-09-03 RX ADMIN — ACLIDINIUM BROMIDE 400 MCG: 400 POWDER, METERED RESPIRATORY (INHALATION) at 20:59

## 2021-09-03 RX ADMIN — LABETALOL 20 MG/4 ML (5 MG/ML) INTRAVENOUS SYRINGE 10 MG: at 18:38

## 2021-09-03 NOTE — ASSESSMENT & PLAN NOTE
· Carotid study 7/21 with high grade stenosis of R carotid and 50-69% stenosis noted in the internal carotid artery  · Follows with outpatient vascular surgery   · Continue ASA/Statin

## 2021-09-03 NOTE — RESPIRATORY THERAPY NOTE
RT Protocol Note  Taylor Miranda 73 y o  male MRN: [id number]  Unit/Bed#: ICU 07 Encounter: [id number]    Assessment    Principal Problem:    CVA (cerebral vascular accident) [facility])  Active Problems:    Tobacco use    COPD (chronic obstructive pulmonary disease) ([facility] )    Anemia    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    Stenosis of right internal carotid artery    Renal insufficiency      Home Pulmonary Medications:  Tudorza inhaler,     09/02/21 2300   Respiratory Assessment   Assessment Type Assess only   General Appearance Awake; Alert   Respiratory Pattern Normal   Chest Assessment Chest expansion symmetrical   Bilateral Breath Sounds Diminished   Cough Strong;Non-productive   Resp Comments pt has COPD, pt states he uses Tudorza inhaler twice daily and albuterol MDI as needed at home, he stated he has asked his daughter to bring in his tudorza as we do not carry it, he took one dose today of the tudorza, will order prn albuterol MDI   Oxygen Therapy/Pulse Ox   SpO2 97 %    albuterol MDI       Past Medical History:   Diagnosis Date    Anxiety     Back pain     Claustrophobia     COPD (chronic obstructive pulmonary disease) ([facility] )     GERD (gastroesophageal reflux disease)     Hypertension     Lumbar back pain     Panic attacks     Stenosis of right carotid artery     Stroke ([place] [id number] )     Wears glasses     Wears partial dentures     upper denture     Social History     Socioeconomic History    Marital status: /Civil Union     Spouse name: None    Number of children: None    Years of education: None    Highest education level: None   Occupational History    None   Tobacco Use    Smoking status: Light Tobacco Smoker     Packs/day: 0 25    Smokeless tobacco: Never Used    Tobacco comment: 5 cigs a week   Vaping Use    Vaping Use: Never used   Substance and Sexual Activity    Alcohol use:  Yes    Drug use: Never    Sexual activity: None   Other Topics Concern    None   Social History Narrative    None     Social Determinants of Health     Financial Resource Strain:     Difficulty of Paying Living Expenses:    Food Insecurity:     Worried About Running Out of Food in the Last Year:     [address] in the Last Year:    Transportation Needs:     Lack of Transportation (Medical):  Lack of Transportation (Non-Medical):    Physical Activity:     Days of Exercise per Week:     Minutes of Exercise per Session:    Stress:     Feeling of Stress :    Social Connections:     Frequency of Communication with Friends and Family:     Frequency of Social Gatherings with Friends and Family:     Attends [employer] Services:     Active Member of Clubs or Organizations:     Attends Club or Organization Meetings:     Marital Status:    Intimate Partner Violence:     Fear of Current or Ex-Partner:     Emotionally Abused:     Physically Abused:     Sexually Abused:        Subjective         Objective    Physical Exam:   Assessment Type: Assess only  General Appearance: Awake, Alert  Respiratory Pattern: Normal  Chest Assessment: Chest expansion symmetrical  Bilateral Breath Sounds: Diminished  Cough: Strong, Non-productive    Vitals:  Blood pressure 158/72, pulse 59, temperature 98 3 °F (36 8 °C), temperature source Oral, resp  rate 21, height 5' 9" (1 753 m), weight 68 6 kg (151 lb 3 8 oz), SpO2 96 %  Imaging and other studies: I have personally reviewed pertinent reports              Plan    Respiratory Plan: Home Bronchodilator Patient pathway        Resp Comments: pt has COPD, pt states he uses Tudorza inhaler twice daily and albuterol MDI as needed at home, he stated he has asked his daughter to bring in his tudorza as we do not carry it, he took one dose today of the tudorza, will order prn albuterol MDI

## 2021-09-03 NOTE — SPEECH THERAPY NOTE
Speech Language/Pathology  Speech/Language Pathology  Assessment    Patient Name: Louisa Colmenares  PVJPA'U Date: 9/3/2021     Problem List  Principal Problem:    CVA (cerebral vascular accident) [facility])  Active Problems:    Tobacco use    HTN (hypertension)    COPD (chronic obstructive pulmonary disease) ([facility] )    Anemia    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    Stenosis of right internal carotid artery    Renal insufficiency    Past Medical History  Past Medical History:   Diagnosis Date    Anxiety     Back pain     Claustrophobia     [facility]     GERD (gastroesophageal reflux disease)     Hypertension     Lumbar back pain     Panic attacks     Stenosis of right carotid artery     Stroke ([facility] )     Wears glasses     Wears partial dentures     upper denture     Past Surgical History  Past Surgical History:   Procedure Laterality Date    COLONOSCOPY      IR CEREBRAL ANGIOGRAPHY  4/6/2021    OTHER SURGICAL HISTORY      fertility    [place] Rosangela Courts 3RD+ ORD SLCTV ABDL PEL/LXTR [facility] Right 4/6/2021    Procedure: ARTERIOGRAM, carotid Angio;  Surgeon: Janine Weber MD;  Location: [facility];  Service: Vascular    [place] Hayley DURAN Right 4/13/2021    Procedure: RIGHT ENDARTERECTOMY ARTERY CAROTID WITH BOVINE PATCH;  Surgeon: Janine Weber MD;  Location: [facility];  Service: Vascular     History of Present Illness per chart:   Louisa Colmenares is a 73 y o  male with a past medical history of carotid artery stenosis status post right carotid endarterectomy, hypertension, hyperlipidemia, COPD and active smoking who presents with left facial droop  Patient stated that he was watching TV and had a glass of wine this evening, and noted to have left facial droop, left upper visual deficit, difficulty in speech and disoriented, associated with headache at around 1840   Patient denies fever, chills, nausea vomiting, dysuria or bowel movement changes  Patient had 2 strokes in the past   Patient's daughter accompanied patient to sent [facility]  CT was negative for intracranial hemorrhage  CTA head revealed right PCA distal focal occlusion  Decision was made to initiate tPA  Patient's blood pressure was 190, labetalol was given before tPA  Special Studies:   XR chest- 09/02/21  IMPRESSION:  No acute cardiopulmonary disease  Findings are stable    CTA stroke alert- 09/02/21  IMPRESSION:  RIGHT EXTRACRANIAL CAROTID SEGMENT: Severe plaque stenosis and significant hemodynamic stenosis of the right common carotid artery origin/proximally near its origin as well  Moderate to marked plaque stenosis right common carotid artery  Status post   right endarterectomy which appears patent  LEFT EXTRACRANIAL CAROTID SEGMENT: Severe plaque stenosis left common carotid artery origin  Moderate to severe left common carotid artery and left carotid bulb plaque stenosis  Right PCA distal focal occlusion  Mild plaque stenosis right V4 segment  Severe hemodynamic significant plaque stenosis left V4 segment  CT stroke- 09/02/21  IMPRESSION:  No evidence of acute intracranial hemorrhage  Age-indeterminate ischemia in the right frontal corona radiata and right centrum semiovale               Bedside Swallow Evaluation:    Summary:  Pt presents w/ functional oral and pharyngeal swallowing skills  No overt s/s of aspiration  Pt reports that his speech is at baseline  Recommendations of regular and thin liquid diet  No further ST is warranted  Please re-consult if further concerns arise  Recommendations:  Diet: Regular   Liquid: Thin   Meds: Whole as tolerated   Supervision: Intermittent   Positioning:Upright  Strategies: Pt to take PO/Meds only when fully alert and upright  Slow rate, small bites/sips, alternate between liquids and solids   Oral care:  To be completed daily   Aspiration precautions  Reflux precautions    Therapy Prognosis: good Prognosis considerations: cognitive status, medical status   Frequency:  Eval only, No f/u tx indicated  Goal(s):  Pt will tolerate least restrictive diet w/out s/s aspiration or oral/pharyngeal difficulties  Patient's goal:  " I haven't eaten since yesterday morning"       Reason for consult:  R/o aspiration  Determine safest and least restrictive diet  New neuro event  Stroke protocol    Precautions:  none    Current diet:  NPO     Premorbid diet[de-identified]    Previous VBS:  None   Speech therapy screen on 3/29/21- no ST was warranted at that time  O2 requirement:  RA    Voice/Speech:  Clear and intelligible   Nursing and pt report that his speech is at baseline     Social:  Lives alone, has support from his family     Follows commands:  WFL                          Cognitive Status:  A&O x4     Oral mech exam:  Dentition: has dentures, but are not present at hospital with him   Labial strength and ROM: WFL  Lingual strength and ROM: WFL  Mandibular strength and ROM: adequate   Velum: unable to visualize   Secretion management: WFL  Volitional cough: WFL  Volitional swallow: WFL     Items administered:  Puree, soft solid, hard solid,  thin liquids, Liquids were taken by straw       Oral stage:  WFL  Lip closure: WFL  Mastication: Mildly prolonged, but functional for pt   Bolus formation: WFL  Bolus control: good   Transfer: Timely   Oral residue: none   Pocketing: none     Pharyngeal stage:  WFL  Swallow promptness: Prompt   Laryngeal rise: Palpated and judged to be [facility]   Wet voice: none   Throat clear: none   Cough: dry cough noted at end of evaluation, but not suspected to be related to aspiration   Secondary swallows: no   Audible swallows: no   No overt s/s aspiration    Esophageal stage:  H/o GERD        Results d/w:  Pt, nursing

## 2021-09-03 NOTE — ASSESSMENT & PLAN NOTE
· Not currently in acute exacerbation   · Continue xoponex/atrovent   · Currently on room air   · No documented PFTs

## 2021-09-03 NOTE — ASSESSMENT & PLAN NOTE
· Patient presented to ED 9/2 with left faial droop and L upper visual field deficit, and difficulty in speech  · Stroke alert activated in ED   · CT Head with no acute intracranial hemorrhage  · CTA Head with severe plaque stenosis of bilateral common carotid arteries  Right PCA distal focal occlusion     · S/p Tpa, follow tpa protocol   · MRI 3/21: 2 foci of mild diffusion restriction in the right frontal lobe, indicative of recent/acute to subacute infarction  · S/p R carotid endarterectomy 4/13/21   · Stroke protocol   · Frequent neuro exam   · Permissive HTN, PRN labetolol   · Repeat CT Head in 24 hours   · MRI pending   · STAT CT Head with any neuro changes   · Hold ASA/Plavix  · Echo 3/21: EF 60%  · Repeat echo with bubble study   · Neurology on board, appreciate recommendations

## 2021-09-03 NOTE — QUICK NOTE
Received text from ED for Mr  Alray Rinne, 73 y o with prior strokes on ASA/Plavix, continued tobacco use, CKD recent R carotid endarterectomy with progressive stenosis for which he was supposed to get a CTA soon, however, because of his worsening renal function, he was recommended to get labs which he hadn't yet  This evening, he presented as a stroke alert with L sided facial droop and visual deficit found to have a R PCA occlusion that was not amenable for retrieval and thus received tpa  Prior to tpa, his BP was  that required Labetolol and thus improved to  and tpa was given       A/P   Recurrent CVA s/p tpa   - monitor BP, maintain <180/105mmHg at least for 24 hours   - IV Labetolol prn   - CTH negative for hemorrhage     Recent R endarterectomy   - follows with vascular outpatient   - patent as per CTA results, however, with new significant plaque stenosis in Left carotid artery     Microcytic Anemia likely secondary to ACD vs iron deficiency   - check iron panel     CKD   - monitor closely given s/p CTA     Tobacco Abuse   - will need aggressive counseling     D/w Zenaida Jiang PA-C and Dr Yris Gaitan

## 2021-09-03 NOTE — ASSESSMENT & PLAN NOTE
· S/p endarterectomy 4/13/21 with Dr Jennifer Gayle due to symptomatic extensive R common and internal carotid artery stenosis  · Progressive disease and patient continues to smoke   · Currently on ASA/Atrovastatin/Plavix as outpatient   · Supposed to have outpatient CTA but has new renal insufficiency

## 2021-09-03 NOTE — ASSESSMENT & PLAN NOTE
· Currently every day smoker  · Smokes 1/2ppd   · Refused nicotine patch   · Smoking cessation education

## 2021-09-03 NOTE — CONSULTS
NEUROLOGY RESIDENCY CONSULT NOTE     Name: Jesus Seth   Age & Sex: 73 y o  male   MRN: [id number]  Unit/Bed#: ICU 07   Encounter: [id number]  Length of Stay: 1    ASSESSMENT & PLAN     * CVA (cerebral vascular accident) [address]  Presented as stroke alert on 9/2/2021  7:52 PM with initial NIHSS of 3 and LKW 1830, initial Blood Pressure: (!) 183/77  Initial presenting deficits were slurred speech left sided weakness left VF cut  Pt was found to be a tPA candidate within the appropriate time window and without obvious contraindication and tPA was given      Post tPA exam: L superior quadrantanopsia  Current Blood Pressure: (!) 176/75   Vascular risk factors: previous strokes, active smoker, elevated cholesterol and PAD    Workup:  Lab Results   Component Value Date    HGBA1C 5 8 (H) 03/29/2021    CHOLESTEROL 190 03/29/2021    LDLCALC 91 03/29/2021    TRIG 199 (H) 03/29/2021    INR 1 03 09/02/2021       CTH: no acute findings, chronic infarct in right frontal corona radiata and right centrum semiovale   CTA: hx of RCEA, L ICA with severe stenosis at origin; chronic RPCA occlusion, severe atherosclerotic disease throughout   MRI: pending   Repeat CTH at 24 hours: pending for 8PM today   Echocardiogram: pending   Telemetry: monitored    Pertinent scores:  - NIHSS: 3  - Modified [place]: 1    Impression: recurrent ischemic CVA with left sided deficits in the setting of current tobacco use and b/l ICA stenosis with numerous heterogeneous plaques and significant intracranial atherosclerosis    Plan:  - Stroke pathway  - Discussed plan with neurology attending, Dr Mehran Baumann  - BP: BP as close to but <180 SBP due to recent tPA administration  - With tPA admin monitor BP and neuro exam q 15 mins x 2 hours then q 30 mins x 6 hours then q1 hour x 24 hours  - If SBP is >180, increase frequency of BP measurements and administer antihypertensive agents as needed to maintain at or below 180 SBP  - Obtain lipids and A1c  - atorvastatin 80mg qhs  - Maintain glucose <180, SSI for coverage if indicated  - Repeat CTH if GCS drops 2pts in 1hr or if post tPA if pt is reporting severe headache, acute increase in BP, N/V  - Medical management as per primary team appreciated  - Antiplatelet agents: held for tPA  - TTE  - MRI brain pending  - DVT ppx held for 24 hours due to tPA administration  - Monitor on telemetry  - PT/OT/Speech/PM&R input appreciated  - Repeat CTH at 24 hours after tPA administration prior to starting AP/AC   - As pt was compliant with ASA and Plavix at home will obtain MRI however will likely switch to ASA and anticoagulation, but management will be determined following MRI  - Discussed with ICU attending      Robert Ruth will need follow up in in 4 weeks with neurovascular attending or AP  He will not require outpatient neurological testing  SUBJECTIVE     Reason for Consult / Principal Problem: pre hospital stroke alert  Hx and PE limited by: Nothing    HPI    Robert Ruth is a 73 y o  male with past medical history of prior CVA, right CEA on aspirin and ? Plavix who presented on 9/2/2021 1952 as a pre-hospital stroke alert  Symptoms reportedly started around 630-7 p m  and consisted of left-sided facial droop, difficulty speaking and left-sided visual field cut  Initial NIH was 3  Patient does have past medical history of 2 prior strokes however symptoms did seem to be new as he reports no persistent deficits from prior  CT head was negative for any acute findings, CTA demonstrated chronic right PCA occlusion  Initial blood pressure was greater than 263 systolic and was brought down 165 with labetalol  Patient had otherwise no other contraindications and subsequently received IV tPA and admitted to ICU under stroke pathway   Symptoms mostly resolved with tPA however there is persistent left superior quadrantanopsia which the pt was not aware of until the exam  Otherwise his exam is non focal and he has no complaints  He reports he does not know what medications he is taking at home although he is sure he takes ASA 81 he is not sure about plavix  He does state that he is compliant with all medications that he has  He is planning on having his daughter bring in his medications today  Consult to Neurology  Consult performed by: Luis Felipe Ferrell MD  Consult ordered by: Joan Barrett DO          Historical Information   Past Medical History:   Diagnosis Date    Anxiety     Back pain     Claustrophobia     COPD (chronic obstructive pulmonary disease) ([address] )     GERD (gastroesophageal reflux disease)     Hypertension     Lumbar back pain     Panic attacks     Stenosis of right carotid artery     Stroke [facility])     Wears glasses     Wears partial dentures     upper denture     Past Surgical History:   Procedure Laterality Date    COLONOSCOPY      IR CEREBRAL ANGIOGRAPHY  4/6/2021    OTHER SURGICAL HISTORY      fertility    AR SLCTV CATHJ 3RD+ ORD SLCTV ABDL PEL/LXTR [facility] Right 4/6/2021    Procedure: ARTERIOGRAM, carotid Angio;  Surgeon: Jay Quiles MD;  Location: [facility];  Service: Vascular    AR THROMBOENDARTECTMY [facility] Right 4/13/2021    Procedure: RIGHT ENDARTERECTOMY ARTERY CAROTID WITH BOVINE PATCH;  Surgeon: Jay Quiles MD;  Location: [facility];  Service: Vascular     Social History   Social History     Substance and Sexual Activity   Alcohol Use Yes     Social History     Substance and Sexual Activity   Drug Use Never     E-Cigarette/Vaping    E-Cigarette Use Never User      E-Cigarette/Vaping Substances    Nicotine No     THC No     CBD No     Flavoring No     Other No     Unknown No      Social History     Tobacco Use   Smoking Status Light Tobacco Smoker    Packs/day: 0 25   Smokeless Tobacco Never Used   Tobacco Comment    5 cigs a week     Family History: History reviewed  No pertinent family history    Meds/Allergies   all current active meds have been reviewed, current meds:   Current Facility-Administered Medications   Medication Dose Route Frequency    aclidinium (TUDORZA PRESSAIR) 400 MCG/ACT inhaler 400 mcg  1 puff Inhalation BID    albuterol (PROVENTIL HFA,VENTOLIN HFA) inhaler 2 puff  2 puff Inhalation Q4H PRN    atorvastatin (LIPITOR) tablet 80 mg  80 mg Oral Daily With Dinner    Labetalol HCl (NORMODYNE) injection 10 mg  10 mg Intravenous Q6H PRN    levalbuterol (XOPENEX) inhalation solution 1 25 mg  1 25 mg Nebulization Q8H PRN    and PTA meds:   Prior to Admission Medications   Prescriptions Last Dose Informant Patient Reported? Taking?    ALBUTEROL IN 9/2/2021 at Unknown time Self Yes Yes   Sig: Inhale every 4 (four) hours as needed Unknown dose   Varenicline Tartrate (CHANTIX PO) Not Taking at Unknown time Self Yes No   Sig: Take 1 tablet by mouth daily   Patient not taking: Reported on 9/2/2021   acetaminophen (TYLENOL) 325 mg tablet 9/1/2021 at Unknown time Self No Yes   Sig: Take 2 tablets (650 mg total) by mouth every 6 (six) hours as needed for mild pain   aclidinium (Tudorza Pressair) 400 MCG/ACT inhaler 9/2/2021 at Unknown time Self Yes Yes   Sig: Inhale 1 puff 2 (two) times a day   amLODIPine-benazepril (LOTREL 5-10) 5-10 MG per capsule 9/1/2021 at Unknown time Self Yes Yes   Sig: Take 1 capsule by mouth daily    aspirin (ECOTRIN LOW STRENGTH) 81 mg EC tablet  Self No No   Sig: Take 1 tablet (81 mg total) by mouth daily   atorvastatin (LIPITOR) 40 mg tablet  Self No No   Sig: Take 1 tablet (40 mg total) by mouth every evening   carvedilol (COREG) 12 5 mg tablet 9/1/2021 at Unknown time Self Yes Yes   Sig: Take 12 5 mg by mouth 2 (two) times a day with meals   clopidogrel (PLAVIX) 75 mg tablet 9/1/2021 at Unknown time  No Yes   Sig: TAKE 1 TABLET BY MOUTH EVERY DAY FOR 19 DOSES   Patient taking differently: daily    metoprolol tartrate (LOPRESSOR) 25 mg tablet  Self No No   Sig: Take 1 tablet (25 mg total) by mouth every 12 (twelve) hours   sertraline (ZOLOFT) 100 mg tablet 9/1/2021 at Unknown time Self Yes Yes   Sig: Take by mouth   thiamine 100 MG tablet  Self No No   Sig: Take 1 tablet (100 mg total) by mouth daily      Facility-Administered Medications: None     Allergies   Allergen Reactions    Penicillins Anaphylaxis       Review of previous medical records was completed  Review of Systems   Constitutional: Negative for fever  HENT: Positive for dental problem  Eyes: Positive for visual disturbance  Respiratory: Negative for shortness of breath  Cardiovascular: Negative for chest pain, palpitations and leg swelling  Gastrointestinal: Negative for abdominal pain  Musculoskeletal: Negative for back pain  Neurological: Positive for weakness and numbness  Negative for dizziness and headaches  Psychiatric/Behavioral: Negative for agitation  The patient is not nervous/anxious  OBJECTIVE     Patient ID: Magdalena Richardson is a 73 y o  male  Vitals:   Vitals:    09/03/21 0950 09/03/21 1056 09/03/21 1100 09/03/21 1130   BP:  141/61 150/67 (!) 154/104   BP Location:  Left arm     Pulse:  68 67 74   Resp:  22 (!) 24 (!) 30   Temp:  98 3 °F (36 8 °C)     TempSrc:  Oral     SpO2:  98% 95% 96%   Weight:       Height: 5' 9" (1 753 m)         Body mass index is 22 33 kg/m²  Intake/Output Summary (Last 24 hours) at 9/3/2021 1324  Last data filed at 9/3/2021 1200  Gross per 24 hour   Intake 570 ml   Output 1125 ml   Net -555 ml       Physical Exam  Vitals and nursing note reviewed  Constitutional:       Appearance: He is well-developed  HENT:      Head: Normocephalic and atraumatic  Eyes:      Extraocular Movements: EOM normal       Conjunctiva/sclera: Conjunctivae normal       Pupils: Pupils are equal, round, and reactive to light  Cardiovascular:      Rate and Rhythm: Normal rate and regular rhythm  Heart sounds: Normal heart sounds  No murmur heard       Pulmonary:      Effort: Pulmonary effort is normal       Breath sounds: Normal breath sounds  Musculoskeletal:      Cervical back: Normal range of motion and neck supple  Skin:     General: Skin is warm and dry  Capillary Refill: Capillary refill takes less than 2 seconds  Neurological:      Mental Status: He is oriented to person, place, and time  Coordination: Finger-Nose-Finger Test normal       Deep Tendon Reflexes: Strength normal       Reflex Scores:       Tricep reflexes are 3+ on the right side and 3+ on the left side  Bicep reflexes are 3+ on the right side and 3+ on the left side  Brachioradialis reflexes are 3+ on the right side and 3+ on the left side  Patellar reflexes are 3+ on the right side and 3+ on the left side  Achilles reflexes are 2+ on the right side and 2+ on the left side  Psychiatric:         Speech: Speech normal           Neurologic Exam     Mental Status   Oriented to person, place, and time  Attention: normal  Concentration: normal    Speech: speech is normal   Level of consciousness: alert  Knowledge: good  Normal comprehension  Cranial Nerves     CN II   Left visual field deficit: upper temporal quadrant(s)    CN III, IV, VI   Pupils are equal, round, and reactive to light  Extraocular motions are normal      CN V   Facial sensation intact  CN VII   Facial expression full, symmetric  CN VIII   CN VIII normal      CN IX, X   CN IX normal    CN X normal      CN XI   CN XI normal      CN XII   CN XII normal      Motor Exam   Muscle bulk: normal  Overall muscle tone: normal  Right arm pronator drift: absent  Left arm pronator drift: absent    Strength   Strength 5/5 throughout       Sensory Exam   Right arm light touch: normal  Left arm light touch: decreased from elbow  Right leg light touch: normal  Left leg light touch: normal  Vibration normal    Pinprick normal      Gait, Coordination, and Reflexes     Coordination   Finger to nose coordination: normal    Tremor   Resting tremor: absent  Intention tremor: absent  Action tremor: absent    Reflexes   Right brachioradialis: 3+  Left brachioradialis: 3+  Right biceps: 3+  Left biceps: 3+  Right triceps: 3+  Left triceps: 3+  Right patellar: 3+  Left patellar: 3+  Right achilles: 2+  Left achilles: 2+  Right plantar: normal  Left plantar: normal  Right Cortes: absent  Left Cortes: absent  Right ankle clonus: absent  Left ankle clonus: present (NS)  Cross adductors b/l        LABORATORY DATA     Labs: I have personally reviewed pertinent films in PACS  Results from last 7 days   Lab Units 09/02/21 2007   WBC Thousand/uL 5 62   HEMOGLOBIN g/dL 9 9*   HEMATOCRIT % 34 9*   PLATELETS Thousands/uL 332      Results from last 7 days   Lab Units 09/02/21 2007   POTASSIUM mmol/L 4 0   CHLORIDE mmol/L 103   CO2 mmol/L 29   BUN mg/dL 19   CREATININE mg/dL 1 45*   CALCIUM mg/dL 9 7              Results from last 7 days   Lab Units 09/02/21 2007   INR  1 03   PTT seconds 34         Results from last 7 days   Lab Units 09/02/21 2007   TROPONIN I ng/mL <0 02       IMAGING & DIAGNOSTIC TESTING     Radiology Results: I have personally reviewed pertinent films in PACS  X-ray chest 1 view portable   Final Result by Sarabjit Anderson MD (09/03 0372)      No acute cardiopulmonary disease  Findings are stable            Workstation performed: [id number]         CTA stroke alert (head/neck)   Final Result by James Millard MD (09/02 2112)      RIGHT EXTRACRANIAL CAROTID SEGMENT: Severe plaque stenosis and significant hemodynamic stenosis of the right common carotid artery origin/proximally near its origin as well  Moderate to marked plaque stenosis right common carotid artery  Status post    right endarterectomy which appears patent  LEFT EXTRACRANIAL CAROTID SEGMENT: Severe plaque stenosis left common carotid artery origin  Moderate to severe left common carotid artery and left carotid bulb plaque stenosis  Right PCA distal focal occlusion  Mild plaque stenosis right V4 segment  Severe hemodynamic significant plaque stenosis left V4 segment  I personally discussed this study with neurologist on 9/2/2021 at 8:29 PM                         Workstation performed: [id number]         CT stroke alert brain   Final Result by Maxine Warner MD (09/02 2029)      No evidence of acute intracranial hemorrhage  Age-indeterminate ischemia in the right frontal corona radiata and right centrum semiovale  I personally discussed this study with MERY MATHUR on 9/2/2021 at 8:29 PM                Workstation performed: [id number]         CT head wo contrast    (Results Pending)   MRI inpatient order    (Results Pending)       Other Diagnostic Testing: I have personally reviewed pertinent films in PACS    ACTIVE MEDICATIONS     Current Facility-Administered Medications   Medication Dose Route Frequency    aclidinium (TUDORZA PRESSAIR) 400 MCG/ACT inhaler 400 mcg  1 puff Inhalation BID    albuterol (PROVENTIL HFA,VENTOLIN HFA) inhaler 2 puff  2 puff Inhalation Q4H PRN    atorvastatin (LIPITOR) tablet 80 mg  80 mg Oral Daily With Dinner    Labetalol HCl (NORMODYNE) injection 10 mg  10 mg Intravenous Q6H PRN    levalbuterol (XOPENEX) inhalation solution 1 25 mg  1 25 mg Nebulization Q8H PRN       Prior to Admission medications    Medication Sig Start Date End Date Taking?  Authorizing Provider   acetaminophen (TYLENOL) 325 mg tablet Take 2 tablets (650 mg total) by mouth every 6 (six) hours as needed for mild pain 4/14/21  Yes Abdirizak Duque PA-C   aclidinium Charlcie Razor Pressair) 400 MCG/ACT inhaler Inhale 1 puff 2 (two) times a day   Yes Historical Provider, MD   ALBUTEROL IN Inhale every 4 (four) hours as needed Unknown dose   Yes Historical Provider, MD   amLODIPine-benazepril (LOTREL 5-10) 5-10 MG per capsule Take 1 capsule by mouth daily  4/15/21  Yes Abdirizak Duque PA-C carvedilol (COREG) 12 5 mg tablet Take 12 5 mg by mouth 2 (two) times a day with meals   Yes Historical Provider, MD   clopidogrel (PLAVIX) 75 mg tablet TAKE 1 TABLET BY MOUTH EVERY DAY FOR 19 DOSES  Patient taking differently: daily  7/28/21  Yes Maryanne Mathis PA-C   sertraline (ZOLOFT) 100 mg tablet Take by mouth 1/19/11  Yes Historical Provider, MD   aspirin (ECOTRIN LOW STRENGTH) 81 mg EC tablet Take 1 tablet (81 mg total) by mouth daily 3/31/21 8/9/21  Lillie López MD   atorvastatin (LIPITOR) 40 mg tablet Take 1 tablet (40 mg total) by mouth every evening 3/30/21 8/9/21  Lillie López MD   metoprolol tartrate (LOPRESSOR) 25 mg tablet Take 1 tablet (25 mg total) by mouth every 12 (twelve) hours 3/30/21 4/29/21  Lillie López MD   thiamine 100 MG tablet Take 1 tablet (100 mg total) by mouth daily 3/31/21 8/9/21  Lillie López MD   Varenicline Tartrate (CHANTIX PO) Take 1 tablet by mouth daily  Patient not taking: Reported on 9/2/2021    Historical Provider, MD       CODE STATUS & ADVANCED DIRECTIVES     Code Status: Level 1 - Full Code  Advance Directive and Living Will:      Power of :    POLST:        VTE Pharmacologic Prophylaxis: Pharmacologic VTE Prophylaxis contraindicated due to tpa  VTE Mechanical Prophylaxis: sequential compression device    ==  Orlin Felty, MD [address] Neurology Residency, PGY-3

## 2021-09-03 NOTE — PHYSICAL THERAPY NOTE
PHYSICAL THERAPY TREATMENT NOTE    Patient Name: Taylor LOCKE'S Date: 9/3/2021     09/03/21 1202   PT Last Visit   PT Visit Date 09/03/21   Pain Assessment   Pain Assessment Tool Pain Assessment not indicated - pt denies pain   Restrictions/Precautions   Other Precautions Chair Alarm; Bed Alarm;Telemetry; Fall Risk   General   Chart Reviewed Yes   Family/Caregiver Present No   Cognition   Arousal/Participation Alert; Cooperative   Attention Within functional limits   Orientation Level Oriented to person; Other (Comment)  (pt was identified w/ full name, birth date)   Following Commands Follows one step commands with increased time or repetition   Subjective   Subjective pt agreed to participate in PT intervention  Transfers   Sit to Stand 6  Modified independent   Additional items Increased time required   Stand to Sit 6  Modified independent   Additional items Increased time required   Ambulation/Elevation   Gait pattern Short stride;Decreased R stance   Gait Assistance 6  Modified independent   Assistive Device [facility]   Distance 35 feet  (additional not possible due to fatigue)   Stair Management Assistance Not tested   Balance   Static Sitting Good   Dynamic Sitting Fair   Static Standing Fair   Ambulatory Fair  (w/ cane)   Activity Tolerance   Activity Tolerance Patient limited by fatigue   Nurse Made Aware spoke to [facility] CARE & [facility] NSG, Etelvina Newton CM   Assessment   Prognosis Good   Problem List Decreased strength;Decreased endurance; Impaired balance;Decreased mobility; Decreased coordination;Decreased safety awareness   Assessment Therapist introduced single point cane use w/ ambulation to improve gait stability  Pt was noted to have improvement w/ use of cane w/ increased ambulation distance and decreased level of assist to maintain safety  continued inpatient PT tx is indicated to reduce fall risk     Goals   Patient Goals go home STG Expiration Date 09/13/21   Short Term Goal #1 pt will: Increase bilateral LE strength 1/2 grade to facilitate independent mobility, Perform all bed mobility tasks independently to decrease fall risk factors, Perform all transfers independently to improve independence, Ambulate 300 ft  with least restrictive assistive device independently w/o LOB to expedite return to independent level of function, Navigate 3 stairs independently with unilateral handrail to facilitate return to previous living environment, Increase ambulatory balance 1 grade to decrease risk for falls, Complete exercise program independently to improve strength and endurance, Tolerate 3 hr OOB to faciliate upright tolerance, Improve Barthel Index score to 90 or greater to facilitate independence, and Complete Timed Up and Go or Comfortable Gait Speed to further assess mobility and monitor progress   PT Treatment Day 1   Plan   Treatment/Interventions Functional transfer training;LE strengthening/ROM; Elevations; Therapeutic exercise; Endurance training;Patient/family training;Equipment eval/education; Bed mobility;Gait training   Progress Progressing toward goals   PT Frequency 5x/wk   Recommendation   PT Discharge Recommendation Home with outpatient rehabilitation   Additional Comments recommend cane use w/ mobility   AM-PAC Basic Mobility Inpatient   Turning in Bed Without Bedrails 4   Lying on Back to Sitting on Edge of Flat Bed 3   Moving Bed to Chair 3   Standing Up From Chair 3   Walk in Room 3   Climb 3-5 Stairs 2   Basic Mobility Inpatient Raw Score 18   Basic Mobility Standardized Score 41 05     Skilled inpatient PT recommended while in hospital to progress pt toward treatment goals      Amanda Nur, PT

## 2021-09-03 NOTE — ASSESSMENT & PLAN NOTE
· Baseline creatinine 1 2  · Avoid nephrotoxic agents and hypotension   · Continue to trend BUN/Cr  · Will need outpatient nephrology follow up

## 2021-09-03 NOTE — PLAN OF CARE
Problem: PHYSICAL THERAPY ADULT  Goal: Performs mobility at highest level of function for planned discharge setting  See evaluation for individualized goals  Description: Treatment/Interventions: Functional transfer training, LE strengthening/ROM, Elevations, Therapeutic exercise, Endurance training, Patient/family training, Equipment eval/education, Bed mobility, Gait training          See flowsheet documentation for full assessment, interventions and recommendations  9/3/2021 1408 by Gilberto Roldan, PT  Outcome: Progressing  Note: Prognosis: Good  Problem List: Decreased strength, Decreased endurance, Impaired balance, Decreased mobility, Decreased coordination, Decreased safety awareness  Assessment: Therapist introduced single point cane use w/ ambulation to improve gait stability  Pt was noted to have improvement w/ use of cane w/ increased ambulation distance and decreased level of assist to maintain safety  continued inpatient PT tx is indicated to reduce fall risk  PT Discharge Recommendation: Home with outpatient rehabilitation          See flowsheet documentation for full assessment  9/3/2021 1407 by Gilberto Roldan, PT  Outcome: Progressing  Note: Prognosis: Good  Problem List: Decreased strength, Decreased endurance, Impaired balance, Decreased mobility, Decreased coordination, Decreased safety awareness  Assessment: Therapist introduced single point cane use w/ ambulation to improve gait stability  Pt was noted to have improvement w/ use of cane w/ increased ambulation distance and decreased level of assist to maintain safety  continued inpatient PT tx is indicated to reduce fall risk  PT Discharge Recommendation: Home with outpatient rehabilitation          See flowsheet documentation for full assessment

## 2021-09-03 NOTE — PHYSICAL THERAPY NOTE
PHYSICAL THERAPY EVALUATION NOTE    Patient Name: Abiel Hoffmann  [id number] Date: 9/3/2021  AGE:   73 y o  Mrn:   [id number]  ADMIT DX:  Weakness [R53 1]  Facial droop [R29 810]  Visual problems [H54 7]  Acute CVA (cerebrovascular accident) ([facility] [address] ) [I63 9]    Past Medical History:   Diagnosis Date    Anxiety     Back pain     Claustrophobia     COPD (chronic obstructive pulmonary disease) (HCC)     GERD (gastroesophageal reflux disease)     Hypertension     Lumbar back pain     Panic attacks     Stenosis of right carotid artery     Stroke [facility])     Wears glasses     Wears partial dentures     upper denture     Length Of Stay: 1  PHYSICAL THERAPY EVALUATION :    09/03/21 1152   PT Last Visit   PT Visit Date 09/03/21   Pain Assessment   Pain Assessment Tool Pain Assessment not indicated - pt denies pain   Home Living   Type of [address] [place] [address] One level; Other (Comment)  (3 EVELYNE)   Additional Comments lives w/ daughter  home alone during the day  independent w/ ADLs and driving  no falls in last 6 months but had severe near falls  Prior Function   Comments pt seen supine in bed  agreed to participate in PT eval  denied pain  pt has lightheadedness w/ positional changes  Restrictions/Precautions   Other Precautions Chair Alarm; Bed Alarm;Telemetry; Fall Risk   General   Additional Pertinent History 9/3/21 at 9:00, blood pressure was 170/109  Family/Caregiver Present No   Cognition   Arousal/Participation Alert   Orientation Level Oriented to person; Other (Comment)  (pt was identified w/ full name, birth date)   Following Commands Follows one step commands with increased time or repetition   Comments room air resting pulse ox 96% and 71 BPM, active 95% and 83 BPM  blood pressure supine 165/74, seated 141/76, standing 127/56     RUE Assessment   RUE Assessment WFL  (4-/5)   LUE Assessment   LUE Assessment WFL  (3+/5)   RLE Assessment   RLE Assessment WFL  (4-/5)   LLE Assessment   LLE Assessment WFL  (4-/5)   Coordination   Movements are Fluid and Coordinated 0   Coordination and Movement Description impaired coordination UEs   Light Touch   RLE Light Touch Grossly intact   LLE Light Touch Grossly intact   Bed Mobility   Supine to Sit 5  Supervision  (+ lightheaded)   Additional items HOB elevated; Bedrails; Increased time required;Verbal cues  (for bedrail use)   Transfers   Sit to Stand 5  Supervision   Additional items Increased time required;Verbal cues  (for LE positioning)   Stand to Sit 5  Supervision   Additional items Increased time required   Ambulation/Elevation   Gait pattern Decreased foot clearance; Foward flexed; Short stride;Decreased R stance   Gait Assistance 5  Supervision   Additional items Verbal cues  (for full step length, breathing technique)   Assistive Device None   Distance 20 feet  (additional not possible due to fatigue)   Stair Management Assistance Not tested   Balance   Static Sitting Good   Static Standing Fair -   Ambulatory Fair -   Activity Tolerance   Activity Tolerance Patient limited by fatigue   Nurse Made Aware spoke to [facility] Julia VALENZUELA CM   Assessment   Prognosis Good   Problem List Decreased strength;Decreased endurance; Impaired balance;Decreased mobility; Decreased coordination;Decreased safety awareness   Assessment Pt was noted to have left facial droop, left upper visual deficit, difficulty in speech and disoriented, associated with headache  Dx: CVA, severe plaque stenosis bilateral common carotid arteries, right PCA distal focal occlusion, renal insufficiency, HTN, and s/p tPA  order placed for PT eval and tx, w/ activity order of up w/ A and fall precautions  Lannie Kocher AP stated pt is appropriate for mobility assessment  pt presents w/ comorbidities of ALPHONSO s/p CEA, HTN, hyperlipidemia, back pain, CVA, and COPD and personal factors of advanced age and stair(s) to enter home   pt presents w/ weakness, decreased endurance, impaired balance, gait deviations, impaired coordination, decreased safety awareness and fall risk  these impairments are evident in findings from physical examination (weakness and impaired coordination), mobility assessment (need for standby assist w/ all phases of mobility when usually mobilizing independently, tolerance to only 20 feet of ambulation and need for cueing for mobility technique), and Barthel Index: 65/100  pt needed input for mobility technique  pt is at risk for falls due to physical and safety awareness deficits  pt's clinical presentation is unstable/unpredictable (evident in poor blood pressure control, need for standby assist w/ all phases of mobility when usually mobilizing independently, tolerance to only 20 feet of ambulation and need for input for mobility technique/safety)  pt needs inpatient PT tx to improve mobility deficits and progress mobility training as appropriate  discharge recommendation is for outpatient PT to reduce fall risk and maximize level of functional independence  Goals   Patient Goals go home   STG Expiration Date 09/13/21   Short Term Goal #1 pt will:  Increase bilateral LE strength 1/2 grade to facilitate independent mobility, Perform all bed mobility tasks independently to decrease fall risk factors, Perform all transfers independently to improve independence, Ambulate 300 ft  with least restrictive assistive device independently w/o LOB to expedite return to independent level of function, Navigate 3 stairs independently with unilateral handrail to facilitate return to previous living environment, Increase ambulatory balance 1 grade to decrease risk for falls, Complete exercise program independently to improve strength and endurance, Tolerate 3 hr OOB to faciliate upright tolerance, Improve Barthel Index score to 90 or greater to facilitate independence, and Complete Timed Up and Go or Comfortable Gait Speed to further assess mobility and monitor progress   Plan   Treatment/Interventions Functional transfer training;LE strengthening/ROM; Elevations; Therapeutic exercise; Endurance training;Patient/family training;Equipment eval/education; Bed mobility;Gait training   PT Frequency 5x/wk   Recommendation   PT Discharge Recommendation Home with outpatient rehabilitation   Additional Comments spoke to Bryon WINSTON prior to session - stated pt is appropriate to mobilize and complet mobility assessment   AM-PAC Basic Mobility Inpatient   Turning in Bed Without Bedrails 4   Lying on Back to Sitting on Edge of Flat Bed 3   Moving Bed to Chair 3   Standing Up From Chair 3   Walk in Room 3   Climb 3-5 Stairs 2   Basic Mobility Inpatient Raw Score 18   Basic Mobility Standardized Score 41 05   Barthel Index   Feeding 10   Bathing 0   Grooming Score 5   Dressing Score 10   Bladder Score 10   Bowels Score 10   Toilet Use Score 10   Transfers (Bed/Chair) Score 10   Mobility (Level Surface) Score 0   Stairs Score 0   Barthel Index Score 65     The patient's AM-[facility] Basic Mobility Inpatient Short Form Raw Score is 18, Standardized Score is 41 05  A standardized score less than 42 9 suggests the patient may benefit from discharge to post-acute rehabilitation services  Please also refer to the recommendation of the Physical Therapist for safe discharge planning  As pt has 1st floor setup and family support available, he is recommended for return home w/ outpatient PT  Skilled PT recommended while in hospital and upon DC to progress pt toward treatment goals       Fatmata Mcogwan, PT

## 2021-09-03 NOTE — UTILIZATION REVIEW
Initial Clinical Review    Admission: Date/Time/Statement:   Admission Orders (From admission, onward)     Ordered        09/02/21 2135  Inpatient Admission  Once                   Orders Placed This Encounter   Procedures    Inpatient Admission     Standing Status:   Standing     Number of Occurrences:   1     Order Specific Question:   Level of Care     Answer:   Critical Care [15]     Order Specific Question:   Estimated length of stay     Answer:   More than 2 Midnights     Order Specific Question:   Certification     Answer:   I certify that inpatient services are medically necessary for this patient for a duration of greater than two midnights  See H&P and MD Progress Notes for additional information about the patient's course of treatment  ED Arrival Information     Expected Arrival Acuity    - 9/2/2021 19:44 Emergent         Means of arrival Escorted by Service Admission type    Walk-In Family Member Critical Care/ICU Emergency         Arrival complaint    VISUAL DISTURBANCE/FACIAL DROOP         Chief Complaint   Patient presents with    Facial Droop     Pts daughter reports patient having left sided facial droop, started 1 hour ago  Pt reports "temple pain" and "being back in the 60s"        Initial Presentation: 73 y o  male with a past medical history of carotid artery stenosis status post right carotid endarterectomy, hypertension, hyperlipidemia, COPD, stroke x2,chronic anemia and active smoking who presents with left facial droop,  left upper visual deficit, difficulty in speech and disoriented, associated with headache at around 1840  Stroke alert called 2011  NIHSS 3  CT neg for hemorrhage  On exam, pt has L upper visual file deficit, wheezing  L facial and eyelid slightly lower L than R    CTA head revealed right PCA distal focal occlusion-not amenable for retrieval   Decision was made to initiate tPA  Patient's blood pressure was 190, labetalol was given before tPA  Pt admitted as Inpatient with CVA to ICU  Plan - frequent neuro checks, permissive HTN- goal of SBP less than 180, DBP less than 105  Labetalol p r n  For blood pressure above goal  Repeat CT in 24 hrs after tPA-8 PM 9/3, MRI, echo, hold ASA and Plavix  x24 hrs, neurology  Consult, continue statin, continue albuterol p r n  And ipratropium        Date: 9/3   Day 2:   Baseline creat 1 2, creat 1 45 on admission-will trend cr/BUN   No facial droop noted, equal strength bilateral upper and lower extremities   Neurology-Left quadrantanopsia on exam  MRI pending  Continue DAPT for now and will adjust anticoagulants based on MRI  Impression: recurrent ischemic CVA with left sided deficits in the setting of current tobacco use and b/l ICA stenosis with numerous heterogeneous plaques and significant intracranial atherosclerosis   Plan-  BP as close to but <180 SBP due to recent tPA administration, neuro checks, obtain lipids and A1c, Atorvastatin 80 mg daily, Repeat CTH if GCS drops 2pts in 1hr or if post tPA if pt is reporting severe headache, acute increase in BP, N/V  , TTE, MRI brain, telemetry, PT/OT/ST, Repeat CTH at 24 hours after tPA administration prior to starting AP/AC     ED Triage Vitals   Temperature Pulse Respirations Blood Pressure SpO2   09/02/21 1954 09/02/21 1952 09/02/21 1952 09/02/21 1952 09/02/21 1952   98 1 °F (36 7 °C) 71 18 (!) 183/77 98 %      Temp Source Heart Rate Source Patient Position - Orthostatic VS BP Location FiO2 (%)   09/02/21 1954 09/02/21 1952 09/02/21 1952 09/02/21 1952 --   Oral Monitor Sitting Right arm       Pain Score       09/02/21 2005       4          Wt Readings from Last 1 Encounters:   09/03/21 68 6 kg (151 lb 3 8 oz)     Additional Vital Signs:   Date/Time  Temp  Pulse  Resp  BP  MAP (mmHg)  SpO2    09/03/21 0800  --  64  24Abnormal   173/76Abnormal   109  98 %    09/03/21 0730  --  75  22  165/99  125  96 %    09/03/21 0700  98 5 °F (36 9 °C)  75  19  176/75Abnormal   108  95 %    09/03/21 0600  --  74  18  148/66  95  97 %    09/03/21 0530  --  72  19  157/68  98  93 %    09/03/21 0500  --  71  27Abnormal   151/91  115  97 %    09/03/21 0430  --  60  26Abnormal   159/71  102  93 %    09/03/21 0400  98 3 °F (36 8 °C)  61  20  178/80Abnormal   115  96 %    09/03/21 0330  --  58  20  169/74  107  92 %    09/03/21 0302  97 8 °F (36 6 °C)  --  --  --  --  --    09/03/21 0300  --  57  20  182/81Abnormal   116  95 %    09/03/21 0230  --  68  30Abnormal   175/76Abnormal   109  97 %    09/03/21 0200  --  57  23Abnormal   191/86Abnormal   124  97 %    09/03/21 0130  --  63  26Abnormal   202/109Abnormal   --  96 %    09/03/21 0100  --  60  23Abnormal   180/77Abnormal   111  97 %    09/03/21 0030  --  57  25Abnormal   160/101Abnormal   124  97 %    09/03/21 0000  98 °F (36 7 °C)  62  33Abnormal   171/74Abnormal   106  96 %    09/02/21 2330  --  59  21  158/72  103  96 %    09/02/21 2300  --  59  25Abnormal   191/81Abnormal   116  97 %    09/02/21 2245  --  --  --  191/81Abnormal   --  --    09/02/21 2230  --  60  23Abnormal   177/72Abnormal   104  99 %    09/02/21 2221  98 3 °F (36 8 °C)  68  22  177/72Abnormal   104  98 %    09/02/21 2200  --  60  --  177/77Abnormal   --  99 %    09/02/21 21:55:26  --  --  --  176/78Abnormal   --  --    09/02/21 2155  --  60  --  --  --  97 %    09/02/21 2150  --  60  --  --  --  98 %    09/02/21 21:49:26  --  --  --  178/93Abnormal   --  --    09/02/21 21:46:26  --  --  --  178/75Abnormal   --  --    09/02/21 2145  --  58  --  --  --  99 %    09/02/21 21:40:26  --  --  --  169/74  --  --    09/02/21 2140  --  58  --  --  --  97 %    09/02/21 21:34:26  --  --  --  182/78Abnormal   --  --    09/02/21 21:31:26  --  --  --  189/84Abnormal   --  --    09/02/21 2130  --  57  18  183/81Abnormal   --  97 %    09/02/21 21:25:26  --  --  --  159/74  --  --    09/02/21 2125  --  58  --  --  --  97 %    09/02/21 2120  --  56  --  --  --  98 %    09/02/21 21:19:26  --  --  --  167/75  -- --    09/02/21 2115  --  58  16  160/73  --  97 %    09/02/21 21:10:26  --  --  --  172/76Abnormal   --  --    09/02/21 21:04:26  --  --  --  176/77Abnormal   --  --    09/02/21 21:01:26  --  --  --  167/72  --  --    09/02/21 2100  --  56  16  168/75  104  95 %    09/02/21 20:55:26  --  --  --  171/73Abnormal   --  --    09/02/21 20:49:18  --  --  --  207/80Abnormal   --  --    09/02/21 2046  --  60  --  --  --  97 %    09/02/21 2045  --  62  --  194/74Abnormal   107  --    09/02/21 2040  --  62  --  --  --  97 %    09/02/21 2035  --  62  --  --  --  97 %    09/02/21 20:34:19  --  --  --  191/83Abnormal   --  --    09/02/21 2030  --  62  14  198/82Abnormal   118  97 %      Date and Time Eye Opening Best Verbal Response Best Motor Response Ge Coma Scale Score   09/03/21 0800 4 5 6 15   09/03/21 0700 4 5 6 15   09/03/21 0400 4 5 6 15   09/03/21 0000 4 5 6 15   09/02/21 2221 4 5 6 15   09/02/21 2145 4 5 6 15   09/02/21 2130 4 5 6 15   09/02/21 2115 4 5 6 15   09/02/21 2100 4 5 6 15   09/02/21 2045 4 5 6 15   09/02/21 2030 4 5 6 15   09/02/21 2015 4 5 6 15   09/02/21 2005 4 5 6 15       Pertinent Labs/Diagnostic Test Results:   9/2   ECG-Normal sinus rhythm  CT brain stroke alert-No evidence of acute intracranial hemorrhage  Age-indeterminate ischemia in the right frontal corona radiata and right centrum semiovale   CTA head /neck-  RIGHT EXTRACRANIAL CAROTID SEGMENT: Severe plaque stenosis and significant hemodynamic stenosis of the right common carotid artery origin/proximally near its origin as well  Moderate to marked plaque stenosis right common carotid artery  Status post right endarterectomy which appears patent    LEFT EXTRACRANIAL CAROTID SEGMENT: Severe plaque stenosis left common carotid artery origin   Moderate to severe left common carotid artery and left carotid bulb plaque stenosis    Right PCA distal focal occlusion       Mild plaque stenosis right V4 segment    Severe hemodynamic significant plaque stenosis left V4 segment  CXR-No acute cardiopulmonary disease  9/3- ECHO  LEFT VENTRICLE: Size was normal  Systolic function was normal by visual assessment  Ejection fraction was estimated to be 55 %  There were no regional wall motion abnormalities  Wall thickness was normal  DOPPLER: There was an increased  relative contribution of atrial contraction to ventricular filling  Doppler parameters were consistent with abnormal left ventricular relaxation (grade 1 diastolic dysfunction)    RIGHT VENTRICLE: The ventricle was mildly dilated  Systolic function was normal  DOPPLER: Systolic pressure was not estimated   LEFT ATRIUM: Size was normal    RIGHT ATRIUM: The atrium was mildly dilated        Results from last 7 days   Lab Units 09/02/21 2007   WBC Thousand/uL 5 62   HEMOGLOBIN g/dL 9 9*   HEMATOCRIT % 34 9*   PLATELETS Thousands/uL 332         Results from last 7 days   Lab Units 09/02/21 2007   SODIUM mmol/L 141   POTASSIUM mmol/L 4 0   CHLORIDE mmol/L 103   CO2 mmol/L 29   ANION GAP mmol/L 9   BUN mg/dL 19   CREATININE mg/dL 1 45*   EGFR ml/min/1 73sq m 50   CALCIUM mg/dL 9 7         Results from last 7 days   Lab Units 09/02/21 2005   POC GLUCOSE mg/dl 116     Results from last 7 days   Lab Units 09/02/21 2007   GLUCOSE RANDOM mg/dL 110       Results from last 7 days   Lab Units 09/02/21 2007   TROPONIN I ng/mL <0 02         Results from last 7 days   Lab Units 09/02/21 2007   PROTIME seconds 13 6   INR  1 03   PTT seconds 34           ED Treatment:   Medication Administration from 09/02/2021 1941 to 09/02/2021 2217       Date/Time Order Dose Route Action     09/02/2021 2012 iohexol (OMNIPAQUE) 350 MG/ML injection (SINGLE-DOSE) 100 mL 100 mL Intravenous Given     09/02/2021 2100 alteplase (ACTIVASE) bolus 6 1 mg 6 1 mg Intravenous Given     09/02/2021 2101 alteplase (ACTIVASE) infusion 55 1 mg 55 1 mg Intravenous New Bag     09/02/2021 2159 sodium chloride 0 9 % bolus 50 mL 50 mL Intravenous Given     09/02/2021 2039 Labetalol HCl (NORMODYNE) injection 10 mg 10 mg Intravenous Given     09/02/2021 2053 Labetalol HCl (NORMODYNE) injection 10 mg 10 mg Intravenous Given        Past Medical History:   Diagnosis Date    Anxiety     Back pain     Claustrophobia     [facility]     GERD (gastroesophageal reflux disease)     Hypertension     Lumbar back pain     Panic attacks     Stenosis of right carotid artery     [facility] ([facility] )     Wears glasses     Wears partial dentures     upper denture     Present on Admission:   Renal insufficiency   COPD (chronic obstructive pulmonary disease) ([facility])   Stenosis of right internal carotid artery   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   CVA (cerebral vascular accident) ([facility] )   Anemia   Tobacco use      Admitting Diagnosis: Weakness [R53 1]  Facial droop [R29 810]  Visual problems [H54 7]  Acute CVA (cerebrovascular accident) ([facility] ) [I63 9]  Age/Sex: 73 y o  male  Admission Orders:  Scheduled Medications:  ipratropium, 0 5 mg, Nebulization, TID  Labetalol HCl (NORMODYNE) injection 20 mg   Dose: 20 mg  Freq: Once Route: IV  Start: 09/03/21 0215 End: 09/03/21 0256    Continuous IV Infusions:     PRN Meds:  albuterol, 2 puff, Inhalation, Q4H PRN  Labetalol HCl, 10 mg, Intravenous, Q6H PRN, SBP>180 x1 9/2  levalbuterol, 1 25 mg, Nebulization, Q8H PRN    Cardiopulmonary monitoring  Neuro checks  SCD's  Nursing dysphagia assess prior to diet  Monitor for bleeding  FOBT  NPO      IP CONSULT TO NEUROLOGY  IP CONSULT TO CASE MANAGEMENT    Network Utilization Review Department  ATTENTION: Please call with any questions or concerns to [phone] and carefully listen to the prompts so that you are directed to the right person   All voicemails are confidential   Deidre Mejia all requests for admission clinical reviews, approved or denied determinations and any other requests to dedicated fax number below belonging to the [place] where the patient is receiving treatment   List of dedicated fax numbers for the Facilities:  [address] [place] [address] DENIALS (Administrative/Medical Necessity) [phone]   [address] (Maternity/NICU/Pediatrics) [phone] [address] Priti Ferrer [id number] [place] [address] [address] Maylin [address] [address] [place], [place] [address] [phone]

## 2021-09-03 NOTE — ASSESSMENT & PLAN NOTE
Presented as stroke alert on 9/2/2021  7:52 PM with initial NIHSS of 3 and LKW 1830, initial Blood Pressure: (!) 183/77  Initial presenting deficits were slurred speech left sided weakness left VF cut  Pt was found to be a tPA candidate within the appropriate time window and without obvious contraindication and tPA was given      Post tPA exam: L superior quadrantanopsia  Current Blood Pressure: (!) 176/75   Vascular risk factors: previous strokes, active smoker, elevated cholesterol and PAD    Workup:  Lab Results   Component Value Date    HGBA1C 5 8 (H) 03/29/2021    CHOLESTEROL 190 03/29/2021    LDLCALC 91 03/29/2021    TRIG 199 (H) 03/29/2021    INR 1 03 09/02/2021       CTH: no acute findings, chronic infarct in right frontal corona radiata and right centrum semiovale   CTA: hx of RCEA, L ICA with severe stenosis at origin; chronic RPCA occlusion, severe atherosclerotic disease throughout   MRI: pending   Repeat CTH at 24 hours: pending for 8PM today   Echocardiogram: pending   Telemetry: monitored    Pertinent scores:  - NIHSS: 3  - Modified Waka: 1    Impression: recurrent ischemic CVA with left sided deficits in the setting of current tobacco use and b/l ICA stenosis with numerous heterogeneous plaques and significant intracranial atherosclerosis    Plan:  - Stroke pathway  - Discussed plan with neurology attending, Dr Effie Mcpherson  - BP: BP as close to but <180 SBP due to recent tPA administration  - With tPA admin monitor BP and neuro exam q 15 mins x 2 hours then q 30 mins x 6 hours then q1 hour x 24 hours  - If SBP is >180, increase frequency of BP measurements and administer antihypertensive agents as needed to maintain at or below 180 SBP  - Obtain lipids and A1c  - atorvastatin 80mg qhs  - Maintain glucose <180, SSI for coverage if indicated  - Repeat CTH if GCS drops 2pts in 1hr or if post tPA if pt is reporting severe headache, acute increase in BP, N/V  - Medical management as per primary team appreciated  - Antiplatelet agents: held for tPA  - TTE  - MRI brain pending  - DVT ppx held for 24 hours due to tPA administration  - Monitor on telemetry  - PT/OT/Speech/PM&R input appreciated  - Repeat CTH at 24 hours after tPA administration prior to starting AP/AC   - As pt was compliant with ASA and Plavix at home will obtain MRI however will likely switch to ASA and anticoagulation, but management will be determined following MRI  - Discussed with ICU attending

## 2021-09-03 NOTE — ED PROVIDER NOTES
History  Chief Complaint   Patient presents with    Facial Droop     Pts daughter reports patient having left sided facial droop, started 1 hour ago  Pt reports "temple pain" and "being back in the 61"      67-year-old male presenting from with for evaluation of stroke-like symptoms that started around 630 to 7:00 p m  tonight  He presents with his daughter who she states "dragged him" to the hospital   He had a left-sided facial droop, difficulty speaking, was disoriented, with left-sided visual field deficit  Much of this has improved this point but he states he still has the vision problem  This is all new although he has a history of 2 strokes in the past but states he does not have any residual deficits  History provided by:  Patient   used: No        Prior to Admission Medications   Prescriptions Last Dose Informant Patient Reported? Taking?    ALBUTEROL IN 9/2/2021 at Unknown time Self Yes Yes   Sig: Inhale every 4 (four) hours as needed Unknown dose   Varenicline Tartrate (CHANTIX PO) Not Taking at Unknown time Self Yes No   Sig: Take 1 tablet by mouth daily   Patient not taking: Reported on 9/2/2021   acetaminophen (TYLENOL) 325 mg tablet 9/1/2021 at Unknown time Self No Yes   Sig: Take 2 tablets (650 mg total) by mouth every 6 (six) hours as needed for mild pain   aclidinium (Tudorza Pressair) 400 MCG/ACT inhaler 9/2/2021 at Unknown time Self Yes Yes   Sig: Inhale 1 puff 2 (two) times a day   amLODIPine-benazepril (LOTREL 5-10) 5-10 MG per capsule 9/1/2021 at Unknown time Self Yes Yes   Sig: Take 1 capsule by mouth daily    aspirin (ECOTRIN LOW STRENGTH) 81 mg EC tablet  Self No No   Sig: Take 1 tablet (81 mg total) by mouth daily   atorvastatin (LIPITOR) 40 mg tablet  Self No No   Sig: Take 1 tablet (40 mg total) by mouth every evening   carvedilol (COREG) 12 5 mg tablet 9/1/2021 at Unknown time Self Yes Yes   Sig: Take 12 5 mg by mouth 2 (two) times a day with meals clopidogrel (PLAVIX) 75 mg tablet 9/1/2021 at Unknown time  No Yes   Sig: TAKE 1 TABLET BY MOUTH EVERY DAY FOR 19 DOSES   Patient taking differently: daily    metoprolol tartrate (LOPRESSOR) 25 mg tablet  Self No No   Sig: Take 1 tablet (25 mg total) by mouth every 12 (twelve) hours   sertraline (ZOLOFT) 100 mg tablet 9/1/2021 at Unknown time Self Yes Yes   Sig: Take by mouth   thiamine 100 MG tablet  Self No No   Sig: Take 1 tablet (100 mg total) by mouth daily      Facility-Administered Medications: None       Past Medical History:   Diagnosis Date    Anxiety     Back pain     Claustrophobia     [facility]     GERD (gastroesophageal reflux disease)     Hypertension     Lumbar back pain     Panic attacks     Stenosis of right carotid artery     Stroke ([address] )     Wears glasses     Wears partial dentures     upper denture       Past Surgical History:   Procedure Laterality Date    COLONOSCOPY      IR CEREBRAL ANGIOGRAPHY  4/6/2021    OTHER SURGICAL HISTORY      fertility    [facility] 3RD+ ORD SLCTV ABDL PEL/LXTR [facility] Right 4/6/2021    Procedure: ARTERIOGRAM, carotid Angio;  Surgeon: Chanelle Yun MD;  Location: [facility];  Service: Vascular    [place] THROMBOENDARTECTMY NECK,NECK INCIS Right 4/13/2021    Procedure: RIGHT ENDARTERECTOMY ARTERY CAROTID WITH BOVINE PATCH;  Surgeon: Chanelle Yun MD;  Location: [facility];  Service: Vascular       History reviewed  No pertinent family history  I have reviewed and agree with the history as documented      E-Cigarette/Vaping    E-Cigarette Use Never User      E-Cigarette/Vaping Substances    Nicotine No     THC No     CBD No     Flavoring No     Other No     Unknown No      Social History     Tobacco Use    Smoking status: Light Tobacco Smoker     Packs/day: 0 25    Smokeless tobacco: Never Used    Tobacco comment: 5 cigs a week   Vaping Use    Vaping Use: Never used   Substance Use Topics    Alcohol use: Yes    Drug use: Never       Review of Systems   Neurological: Positive for facial asymmetry and headaches  Psychiatric/Behavioral: Positive for confusion  All other systems reviewed and are negative  Physical Exam  Physical Exam  Vitals and nursing note reviewed  Constitutional:       General: He is not in acute distress  Appearance: Normal appearance  He is normal weight  HENT:      Head: Normocephalic and atraumatic  Right Ear: External ear normal       Left Ear: External ear normal       Nose: Nose normal  No congestion or rhinorrhea  Mouth/Throat:      Mouth: Mucous membranes are moist       Pharynx: Oropharynx is clear  Eyes:      General: No scleral icterus  Right eye: No discharge  Left eye: No discharge  Conjunctiva/sclera: Conjunctivae normal    Cardiovascular:      Rate and Rhythm: Normal rate and regular rhythm  Pulses: Normal pulses  Heart sounds: Normal heart sounds  Pulmonary:      Effort: Pulmonary effort is normal  No respiratory distress  Breath sounds: Normal breath sounds  Abdominal:      General: Abdomen is flat  Palpations: Abdomen is soft  Tenderness: There is no abdominal tenderness  Musculoskeletal:         General: No swelling  Normal range of motion  Cervical back: Normal range of motion and neck supple  Skin:     General: Skin is warm and dry  Capillary Refill: Capillary refill takes less than 2 seconds  Neurological:      General: No focal deficit present  Mental Status: He is alert and oriented to person, place, and time  Mental status is at baseline  Cranial Nerves: No cranial nerve deficit  Sensory: No sensory deficit  Motor: No weakness  Coordination: Coordination normal       Gait: Gait normal       Comments: L sided visual field deficit  Slight L facial droop to L eyelid     Psychiatric:         Mood and Affect: Mood normal          Behavior: Behavior normal          Vital Signs  ED Triage Vitals   Temperature Pulse Respirations Blood Pressure SpO2   09/02/21 1954 09/02/21 1952 09/02/21 1952 09/02/21 1952 09/02/21 1952   98 1 °F (36 7 °C) 71 18 (!) 183/77 98 %      Temp Source Heart Rate Source Patient Position - Orthostatic VS BP Location FiO2 (%)   09/02/21 1954 09/02/21 1952 09/02/21 1952 09/02/21 1952 --   Oral Monitor Sitting Right arm       Pain Score       09/02/21 2005       4           Vitals:    09/02/21 2155 09/02/21 2155 09/02/21 2200 09/02/21 2221   BP:  (!) 176/78 (!) 177/77 (!) 177/72   Pulse: 60  60 68   Patient Position - Orthostatic VS:    Lying         Visual Acuity  Visual Acuity      Most Recent Value   L Pupil Size (mm)  3   R Pupil Size (mm)  3   L Pupil Shape  Round   R Pupil Shape  Round          ED Medications  Medications   Labetalol HCl (NORMODYNE) injection 10 mg (10 mg Intravenous Given 9/2/21 2302)   iohexol (OMNIPAQUE) 350 MG/ML injection (SINGLE-DOSE) 100 mL (100 mL Intravenous Given 9/2/21 2012)   alteplase (ACTIVASE) bolus 6 1 mg (6 1 mg Intravenous Given 9/2/21 2100)     Followed by   alteplase (ACTIVASE) infusion 55 1 mg (55 1 mg Intravenous New Bag 9/2/21 2101)     Followed by   sodium chloride 0 9 % bolus 50 mL (50 mL Intravenous Given 9/2/21 2159)   Labetalol HCl (NORMODYNE) injection 10 mg (10 mg Intravenous Given 9/2/21 2039)   Labetalol HCl (NORMODYNE) injection 10 mg (10 mg Intravenous Given 9/2/21 2053)       Diagnostic Studies  Results Reviewed     Procedure Component Value Units Date/Time    Troponin I [[id number]]  (Normal) Collected: 09/02/21 2007    Lab Status: Final result Specimen: Blood from Arm, Right Updated: 09/02/21 2038     Troponin I <0 02 ng/mL     Basic metabolic panel [[id number]]  (Abnormal) Collected: 09/02/21 2007    Lab Status: Final result Specimen: Blood from Arm, Right Updated: 09/02/21 2030     Sodium 141 mmol/L      Potassium 4 0 mmol/L      Chloride 103 mmol/L      CO2 29 mmol/L      ANION GAP 9 mmol/L      BUN 19 mg/dL      Creatinine 1 45 mg/dL      Glucose 110 mg/dL      Calcium 9 7 mg/dL      eGFR 50 ml/min/1 73sq m     Narrative:      [place] guidelines for Chronic Kidney Disease (CKD):     Stage 1 with normal or high GFR (GFR > 90 mL/min/1 73 square meters)    Stage 2 Mild CKD (GFR = 60-89 mL/min/1 73 square meters)    Stage 3A Moderate CKD (GFR = 45-59 mL/min/1 73 square meters)    Stage 3B Moderate CKD (GFR = 30-44 mL/min/1 73 square meters)    Stage 4 Severe CKD (GFR = 15-29 mL/min/1 73 square meters)    Stage 5 End Stage CKD (GFR <15 mL/min/1 73 square meters)  Note: GFR calculation is accurate only with a steady state creatinine    Protime-INR [[id number]]  (Normal) Collected: 09/02/21 2007    Lab Status: Final result Specimen: Blood from Arm, Right Updated: 09/02/21 2030     Protime 13 6 seconds      INR 1 03    APTT [[id number]]  (Normal) Collected: 09/02/21 2007    Lab Status: Final result Specimen: Blood from Arm, Right Updated: 09/02/21 2030     PTT 34 seconds     CBC and Platelet [[id number]]  (Abnormal) Collected: 09/02/21 2007    Lab Status: Final result Specimen: Blood from Arm, Right Updated: 09/02/21 2018     WBC 5 62 Thousand/uL      RBC 5 03 Million/uL      Hemoglobin 9 9 g/dL      Hematocrit 34 9 %      MCV 69 fL      MCH 19 7 pg      MCHC 28 4 g/dL      RDW 17 7 %      Platelets 394 Thousands/uL      MPV 10 5 fL     Fingerstick Glucose (POCT) [[id number]]  (Normal) Collected: 09/02/21 2005    Lab Status: Final result Updated: 09/02/21 2006     POC Glucose 116 mg/dl                  CTA stroke alert (head/neck)   Final Result by Halley Saha MD (09/02 2112)      RIGHT EXTRACRANIAL CAROTID SEGMENT: Severe plaque stenosis and significant hemodynamic stenosis of the right common carotid artery origin/proximally near its origin as well  Moderate to marked plaque stenosis right common carotid artery  Status post    right endarterectomy which appears patent  LEFT EXTRACRANIAL CAROTID SEGMENT: Severe plaque stenosis left common carotid artery origin  Moderate to severe left common carotid artery and left carotid bulb plaque stenosis  Right PCA distal focal occlusion  Mild plaque stenosis right V4 segment  Severe hemodynamic significant plaque stenosis left V4 segment  I personally discussed this study with neurologist on 9/2/2021 at 8:29 PM                         Workstation performed: [id number]         CT stroke alert brain   Final Result by Be Medina MD (09/02 2029)      No evidence of acute intracranial hemorrhage  Age-indeterminate ischemia in the right frontal corona radiata and right centrum semiovale               I personally discussed this study with MERY MATHUR on 9/2/2021 at 8:29 PM                Workstation performed: [id number]         X-ray chest 1 view portable    (Results Pending)              Procedures  ECG 12 Lead Documentation Only    Date/Time: 9/2/2021 9:03 PM  Performed by: Kristin Flores DO  Authorized by: Kristin Flores DO     Indications / Diagnosis:  Stroke  ECG reviewed by me, the ED Provider: yes    Patient location:  ED  Previous ECG:     Previous ECG:  Compared to current    Similarity:  No change    Comparison to cardiac monitor: Yes    Interpretation:     Interpretation: normal    Rate:     ECG rate:  70    ECG rate assessment: normal    Rhythm:     Rhythm: sinus rhythm    Ectopy:     Ectopy: none    QRS:     QRS axis:  Normal    QRS intervals:  Normal  Conduction:     Conduction: normal    ST segments:     ST segments:  Normal  T waves:     T waves: normal      CriticalCare Time  Performed by: Kristin Flores DO  Authorized by: Kristin Flores DO     Critical care provider statement:     Critical care time (minutes):  75    Critical care time was exclusive of:  Separately billable procedures and treating other patients and teaching time    Critical care was necessary to treat or prevent imminent or life-threatening deterioration of the following conditions:  CNS failure or compromise    Critical care was time spent personally by me on the following activities:  Blood draw for specimens, obtaining history from patient or surrogate, development of treatment plan with patient or surrogate, discussions with consultants, discussions with primary provider, evaluation of patient's response to treatment, examination of patient, review of old charts, re-evaluation of patient's condition, ordering and review of radiographic studies, ordering and review of laboratory studies and ordering and performing treatments and interventions    I assumed direction of critical care for this patient from another provider in my specialty: no    Comments:      Acute cva, given tpa, admit to ICU             ED Course  ED Course as of Sep 02 2328   Thu Sep 02, 2021   2011 Discussed case w/ stroke neurologist after stroke alert called immediately upon pt evaluation  Pt had sudden onset facial droop left sided w/ left visual field deficit, slow speech, disorientation at 630-7pm  Everything has improved except visual field cut  Ct/cta ordered  2038 D/w radiology and neurology (Dr Bekah Cobos) recommend giving TPA  D/w pt and daughter, agreeable to receive TPA, no contraindications  TPA ordered  Bp however now over 642 systolic, labetalol ordered, goal bp under 180/105       2100 Slight delay in giving TPA due to accelerated hypertension, out of limits for giving TPA over 180/105  Given labetalol, now bp 168/75, TPA infusing now  2127 D/w critical care, Dr Lan Arnold, and Mei guerrero, accept to service                      Stroke Assessment     Row Name 09/02/21 2022             NIH Stroke Scale    Interval  Baseline      Level of Consciousness (1a )  0      LOC Questions (1b )  0      LOC Commands (1c )  0      Best Gaze (2 )  0      Visual (3 )  1      Facial Palsy (4 )  1      Motor Arm, Left (5a )  0      Motor Arm, Right (5b )  0      Motor Leg, Left (6a )  0      Motor Leg, Right (6b )  0      Limb Ataxia (7 )  0      Sensory (8 )  0      Best Language (9 )  0      Dysarthria (10 )  0      Extinction and Inattention (11 ) (Formerly Neglect)  0      Total  2          First Filed Value   TPA Decision  TPA given this admission  After a discussion of risks and benefits reviewing inclusion and exclusion criteria the decision was made to proceed with thrombolytic therapy  Verbal consent was obtained from   TPA consent options  the patient  SBIRT 20yo+      Most Recent Value   SBIRT (24 yo +)   In order to provide better care to our patients, we are screening all of our patients for alcohol and drug use  Would it be okay to ask you these screening questions? Yes Filed at: 09/02/2021 2029   Initial Alcohol Screen: US AUDIT-C    1  How often do you have a drink containing alcohol? 4 Filed at: 09/02/2021 2029   2  How many drinks containing alcohol do you have on a typical day you are drinking? 5 Filed at: 09/02/2021 2029   3a  Male UNDER 65: How often do you have five or more drinks on one occasion? 2 Filed at: 09/02/2021 2029   3b  FEMALE Any Age, or MALE 65+: How often do you have 4 or more drinks on one occassion? 0 Filed at: 09/02/2021 2029   Audit-C Score  (!) 11 Filed at: 09/02/2021 2029   Full Alcohol Screen: US AUDIT   4  How often during the last year have you found that you were not able to stop drinking once you had started? 0 Filed at: 09/02/2021 2029   5  How often during past year have you failed to do what was normally expected of you because of drinking? 0 Filed at: 09/02/2021 2029   6  How often in past year have you needed a first drink in the morning to get yourself going after a heavy drinking session? 0 Filed at: 09/02/2021 2029   7  How often in past year have you had feeling of guilt or remorse after drinking? 0 Filed at: 09/02/2021 2029   8   How often in past year have you been unable to remember what happened night before because you had been drinking? 0 Filed at: 09/02/2021 2029   9  Have you or someone else been injured as a result of your drinking? 0 Filed at: 09/02/2021 2029   10  Has a relative, friend, doctor or other health worker been concerned about your drinking and suggested you cut down?   0 Filed at: 09/02/2021 2029   AUDIT Total Score  11 Filed at: 09/02/2021 2029   JAQUELIN: How many times in the past year have you    Used an illegal drug or used a prescription medication for non-medical reasons? Never Filed at: 09/02/2021 2029                    MDM  Number of Diagnoses or Management Options  Acute CVA (cerebrovascular accident) [facility]): new and requires workup  Weakness: new and requires workup  Diagnosis management comments: 71-year-old male with acute CVA with left-sided facial droop and left-sided visual field deficit  He is given IV tPA, and IV labetalol due to blood pressures over 567 systolic prior to tPA administration  Admit to ICU, discussed with critical care physician         Amount and/or Complexity of Data Reviewed  Clinical lab tests: ordered and reviewed  Tests in the radiology section of CPT®: ordered and reviewed  Decide to obtain previous medical records or to obtain history from someone other than the patient: yes  Obtain history from someone other than the patient: yes  Discuss the patient with other providers: yes    Risk of Complications, Morbidity, and/or Mortality  Presenting problems: high  Diagnostic procedures: high  Management options: high    Patient Progress  Patient progress: improved      Disposition  Final diagnoses:   Weakness   Acute CVA (cerebrovascular accident) [facility])     Time reflects when diagnosis was documented in both MDM as applicable and the Disposition within this note     Time User Action Codes Description Comment    9/2/2021  8:04 PM Rock Linwood Add [R53 1] Weakness     9/2/2021  9:03 PM Rock Linwood Add [I63 9] Acute CVA (cerebrovascular accident) ([facility] [address] )     9/2/2021  9:03 PM Manuel Sal Modify [R53 1] Weakness     9/2/2021  9:03 PM Teodora Dhaliwal Modify [I63 9] Acute CVA (cerebrovascular accident) [facility])       ED Disposition     ED Disposition Condition Date/Time Comment    Admit Stable Thu Sep 2, 2021  9:29 PM Case was discussed with Dr Jenn Montero and the patient's admission status was agreed to be Admission Status: inpatient status to the service of Dr Bert Cortes  Follow-up Information    None         Current Discharge Medication List      CONTINUE these medications which have NOT CHANGED    Details   acetaminophen (TYLENOL) 325 mg tablet Take 2 tablets (650 mg total) by mouth every 6 (six) hours as needed for mild pain  Qty:      Associated Diagnoses: S/P carotid endarterectomy;  Symptomatic carotid artery stenosis, right      aclidinium (Tudorza Pressair) 400 MCG/ACT inhaler Inhale 1 puff 2 (two) times a day      ALBUTEROL IN Inhale every 4 (four) hours as needed Unknown dose      amLODIPine-benazepril (LOTREL 5-10) 5-10 MG per capsule Take 1 capsule by mouth daily       carvedilol (COREG) 12 5 mg tablet Take 12 5 mg by mouth 2 (two) times a day with meals      clopidogrel (PLAVIX) 75 mg tablet TAKE 1 TABLET BY MOUTH EVERY DAY FOR 19 DOSES  Qty: 90 tablet, Refills: 0    Associated Diagnoses: TIA (transient ischemic attack)      sertraline (ZOLOFT) 100 mg tablet Take by mouth      aspirin (ECOTRIN LOW STRENGTH) 81 mg EC tablet Take 1 tablet (81 mg total) by mouth daily  Qty: 30 tablet, Refills: 0    Associated Diagnoses: TIA (transient ischemic attack)      atorvastatin (LIPITOR) 40 mg tablet Take 1 tablet (40 mg total) by mouth every evening  Qty: 30 tablet, Refills: 0    Associated Diagnoses: TIA (transient ischemic attack)      metoprolol tartrate (LOPRESSOR) 25 mg tablet Take 1 tablet (25 mg total) by mouth every 12 (twelve) hours  Qty: 60 tablet, Refills: 0    Associated Diagnoses: HTN (hypertension)      thiamine 100 MG tablet Take 1 tablet (100 mg total) by mouth daily  Qty: 30 tablet, Refills: 0    Associated Diagnoses: Alcohol abuse      Varenicline Tartrate (CHANTIX PO) Take 1 tablet by mouth daily           No discharge procedures on file      PDMP Review       Value Time User    PDMP Reviewed  Yes 4/14/2021 10:10 AM Nancy Zhao PA-C          ED Provider  Electronically Signed by           Swetha Haddad DO  09/02/21 2376

## 2021-09-03 NOTE — QUICK NOTE
Stroke alert called:8 11 AM  Neurology response immediate  LKW: 630 PM  Pt with slurred speech left sided weakness left VF cut with significantly improved symptoms however left VF cut residual  NIHSS 3 for slurred speech and left VF cut    CTH not acute  CTA H/N with distal focal R PCA occlusion- spoke with radiologist- too distal for IR  Severe stenosis left common carotid noted, noted in prior CTA also      IVtPA yes   Pt was hypertensive 198 SBP - labetalol given and tpa administered once BP </= 185/105    Admit under post tPA stroke protocol  No AP/AC  d/w ED physician Dr Ovalles at time of alert

## 2021-09-03 NOTE — H&P
H&P Exam - [facility] 73 y o  male MRN: [id number]  Unit/Bed#: ED 18 Encounter: [id number]      -------------------------------------------------------------------------------------------------------------  Chief Complaint:  Facial droop    History of Present Illness     Clarence Goyal is a 73 y o  male with a past medical history of carotid artery stenosis status post right carotid endarterectomy, hypertension, hyperlipidemia, COPD and active smoking who presents with left facial droop  Patient stated that he was watching TV and had a glass of wine this evening, and noted to have left facial droop, left upper visual deficit, difficulty in speech and disoriented, associated with headache at around 1840  Patient denies fever, chills, nausea vomiting, dysuria or bowel movement changes  Patient had 2 strokes in the past   Patient's daughter accompanied patient to [facility]  CT was negative for intracranial hemorrhage  CTA head revealed right PCA distal focal occlusion  Decision was made to initiate tPA  Patient's blood pressure was 190, labetalol was given before tPA  History obtained from patient and his daughter  -------------------------------------------------------------------------------------------------------------  Assessment and Plan:    Neuro:    Diagnosis:  Stroke  o Patient presents with left facial droop, left upper visual deficit, difficulty in speech, started around [address]  o Patient presents ED with residual left upper visual deficit, but improved and slightly left facial and eyelid droop  o Stroke alert was activated  CT head reviewed no evidence of acute intracranial hemorrhage  CTA head revealed severe plaque stenosis of bilateral common carotid arteries  Right PCA distal focal occlusion    o Decision was made to give tPA after risk and benefit discussed with patient and his daughter  o Chart review MRI (03/29/2021): 2 foci of mild diffusion restriction in the right frontal lobe, indicative of recent/acute to subacute infarction  Status post right carotid endarterectomy on 04/14/2021    o Plan:   o Frequent neuro checks  o Permissive hypertensive, with a goal of SBP less than 180, DBP less than 105  Labetalol p r n  For blood pressure above goal  o Repeat CT in 24 hours after tPA  o Repeat MRI and echo  o Hold aspirin and Plavix and anticoagulation for 24 hours  o Inpatient consult to Neurology  o Impair consult to vascular surgery      CV:    Diagnosis:  Hypertension  o Plan:   o Patient was taking amlodipine and metoprolol at home  Need a med reconciliation  o In the setting of stroke, permissive hypertension   Diagnosis:  Hyperlipidemia  o Plan:  Continue atorvastatin      Pulm:   Diagnosis:  COPD  o Not in acute exacerbation  Slight wheezing on exam  o Continue albuterol p r n  And ipratropium  o    Diagnosis:  Tobacco use  o Patient currently smokes half a pack per day  o Plan:  counselingSmoking sensation      GI:    No active issues      :    Diagnosis:  Elevated creatinine  o Baseline creatinine 1 1-1 3  o Current creatinine on presentation 1 45  o Outpatient follow-up with Nephrology      F/E/N:    Plan:  NPO, waiting speech and swallow evaluation      Heme/Onc:    Diagnosis:  Anemia  o CBC on presentation revealed hemoglobin 9 9, MCV 69  o Iron panel (03/29/2021):   Iron 26, ferritin 12, TIBC 467  o Likely due to iron-deficiency anemia  o Repeat iron panel  o Plan:  Iron supplementation      Endo:    No active issues      ID:    No active issues      MSK/Skin:    No active issues      Alcohol abuse  -patient usually drinks 8 beers every other day  -in the past 2 days he has been drinking wine  -counseling alcohol sensation    Disposition: Admit to Critical Care   Code Status: Prior  --------------------------------------------------------------------------------------------------------------  Review of Systems   Constitutional: Negative for chills and fever  HENT: Negative for congestion, rhinorrhea, sneezing and sore throat  Eyes: Positive for visual disturbance  Negative for pain and discharge  Respiratory: Positive for wheezing  Negative for cough, chest tightness and shortness of breath  Cardiovascular: Negative for chest pain and leg swelling  Gastrointestinal: Negative for abdominal pain, nausea and vomiting  Endocrine: Negative for polydipsia, polyphagia and polyuria  Genitourinary: Negative for flank pain, frequency and urgency  Musculoskeletal: Negative for arthralgias, back pain and joint swelling  Skin: Negative for color change and pallor  Neurological: Positive for facial asymmetry and speech difficulty  Negative for dizziness, weakness, light-headedness and headaches  Psychiatric/Behavioral: Negative for agitation and confusion  Physical Exam  HENT:      Head: Normocephalic  Eyes:      General: Visual field deficit (Left upper visual field deficit) present  Pupils: Pupils are equal, round, and reactive to light  Cardiovascular:      Rate and Rhythm: Normal rate and regular rhythm  Heart sounds: No murmur heard  Pulmonary:      Effort: Pulmonary effort is normal  No respiratory distress  Breath sounds: Wheezing present  No rales  Abdominal:      Palpations: Abdomen is soft  Tenderness: There is no abdominal tenderness  Musculoskeletal:         General: No swelling  Normal range of motion  Cervical back: Normal range of motion  Skin:     General: Skin is warm  Neurological:      Mental Status: He is alert and oriented to person, place, and time  Cranial Nerves: Facial asymmetry (Left facial and eyelid slightly lower than right) present  Sensory: No sensory deficit     Psychiatric:         Mood and Affect: Mood normal        --------------------------------------------------------------------------------------------------------------  Vitals: Vitals:    09/02/21 2045 09/02/21 2050 09/02/21 2100 09/02/21 2115   BP: (!) 194/74  168/75 160/73   BP Location:   Right arm    Pulse: 62  56 58   Resp:   16 16   Temp:       TempSrc:       SpO2:    97%   Weight:  68 7 kg (151 lb 8 oz)       Temp  Min: 98 1 °F (36 7 °C)  Max: 98 1 °F (36 7 °C)        Body mass index is 22 37 kg/m²  N/A    Laboratory and Diagnostics:  Results from last 7 days   Lab Units 09/02/21 2007   WBC Thousand/uL 5 62   HEMOGLOBIN g/dL 9 9*   HEMATOCRIT % 34 9*   PLATELETS Thousands/uL 332     Results from last 7 days   Lab Units 09/02/21 2007   SODIUM mmol/L 141   POTASSIUM mmol/L 4 0   CHLORIDE mmol/L 103   CO2 mmol/L 29   ANION GAP mmol/L 9   BUN mg/dL 19   CREATININE mg/dL 1 45*   CALCIUM mg/dL 9 7   GLUCOSE RANDOM mg/dL 110          Results from last 7 days   Lab Units 09/02/21 2007   INR  1 03   PTT seconds 34      Results from last 7 days   Lab Units 09/02/21 2007   TROPONIN I ng/mL <0 02             EKG:  Normal sinus rhythm  Imaging: I have personally reviewed pertinent reports        Historical Information   Past Medical History:   Diagnosis Date    Anxiety     Back pain     Claustrophobia     COPD (chronic obstructive pulmonary disease) (HCC)     GERD (gastroesophageal reflux disease)     Hypertension     Lumbar back pain     Panic attacks     Stenosis of right carotid artery     Stroke ([address] )     Wears glasses     Wears partial dentures     upper denture     Past Surgical History:   Procedure Laterality Date    COLONOSCOPY      IR CEREBRAL ANGIOGRAPHY  4/6/2021    OTHER SURGICAL HISTORY      fertility    GA Johnny Brock 3RD+ ORD SLCTV ABDL PEL/LXTR [facility] Right 4/6/2021    Procedure: ARTERIOGRAM, carotid Angio;  Surgeon: Johnny Maddox MD;  Location: [facility];  Service: Vascular    GA THROMBOENDARTECTMY Jeni Nieves Right 4/13/2021    Procedure: RIGHT ENDARTERECTOMY ARTERY CAROTID WITH BOVINE PATCH;  Surgeon: Johnny Maddox MD;  Location: [facility]; Service: Vascular     Social History   Social History     Substance and Sexual Activity   Alcohol Use Yes     Social History     Substance and Sexual Activity   Drug Use Never     Social History     Tobacco Use   Smoking Status Light Tobacco Smoker    Packs/day: 0 25   Smokeless Tobacco Never Used   Tobacco Comment    5 cigs a week       Family History:   History reviewed  No pertinent family history  I have reviewed this patient's family history and commented on sigificant items within the HPI      Medications:  Current Facility-Administered Medications   Medication Dose Route Frequency    alteplase (ACTIVASE) infusion 55 1 mg  0 81 mg/kg Intravenous Once    Followed by   Jessy Vinton sodium chloride 0 9 % bolus 50 mL  50 mL Intravenous Once     Home medications:  Prior to Admission Medications   Prescriptions Last Dose Informant Patient Reported? Taking?    ALBUTEROL IN  Self Yes No   Sig: Inhale every 4 (four) hours as needed Unknown dose   Varenicline Tartrate (CHANTIX PO) Not Taking at Unknown time Self Yes No   Sig: Take 1 tablet by mouth daily   Patient not taking: Reported on 9/2/2021   acetaminophen (TYLENOL) 325 mg tablet  Self No No   Sig: Take 2 tablets (650 mg total) by mouth every 6 (six) hours as needed for mild pain   Patient not taking: Reported on 8/9/2021   aclidinium (Tudorza Pressair) 400 MCG/ACT inhaler  Self Yes No   Sig: Inhale 1 puff 2 (two) times a day   amLODIPine-benazepril (LOTREL 5-10) 5-10 MG per capsule  Self Yes No   Sig: Take 1 capsule by mouth daily   Patient not taking: Reported on 8/9/2021   aspirin (ECOTRIN LOW STRENGTH) 81 mg EC tablet  Self No No   Sig: Take 1 tablet (81 mg total) by mouth daily   atorvastatin (LIPITOR) 40 mg tablet  Self No No   Sig: Take 1 tablet (40 mg total) by mouth every evening   carvedilol (COREG) 12 5 mg tablet  Self Yes No   Sig: Take 12 5 mg by mouth 2 (two) times a day with meals   clopidogrel (PLAVIX) 75 mg tablet   No No   Sig: TAKE 1 TABLET BY MOUTH EVERY DAY FOR 19 DOSES   Patient taking differently: daily    metoprolol tartrate (LOPRESSOR) 25 mg tablet  Self No No   Sig: Take 1 tablet (25 mg total) by mouth every 12 (twelve) hours   sertraline (ZOLOFT) 100 mg tablet  Self Yes No   Sig: Take by mouth   thiamine 100 MG tablet  Self No No   Sig: Take 1 tablet (100 mg total) by mouth daily      Facility-Administered Medications: None     Allergies: Allergies   Allergen Reactions    Penicillins Anaphylaxis     ------------------------------------------------------------------------------------------------------------  Advance Directive and Living Will:      Power of :    POLST:    ------------------------------------------------------------------------------------------------------------  Anticipated Length of Stay is > 2 midnights    Care Time Delivered:   No Critical Care time spent       Evert Buerger, MD        Portions of the record may have been created with voice recognition software  Occasional wrong word or "sound a like" substitutions may have occurred due to the inherent limitations of voice recognition software    Read the chart carefully and recognize, using context, where substitutions have occurred

## 2021-09-03 NOTE — PROGRESS NOTES
[facility]  Progress Note Sheldon Thurman 1955, 73 y o  male MRN: [id number]  Unit/Bed#: ICU 07 Encounter: [id number]  Primary Care Provider: Loan Hager DO   Date and time admitted to hospital: 9/2/2021  7:52 PM    * CVA (cerebral vascular accident) [facility])  Assessment & Plan  · Patient presented to ED 9/2 with left faial droop and L upper visual field deficit, and difficulty in speech  · Stroke alert activated in ED   · CT Head with no acute intracranial hemorrhage  · CTA Head with severe plaque stenosis of bilateral common carotid arteries  Right PCA distal focal occlusion     · S/p Tpa, follow tpa protocol   · MRI 3/21: 2 foci of mild diffusion restriction in the right frontal lobe, indicative of recent/acute to subacute infarction  · S/p R carotid endarterectomy 4/13/21   · Stroke protocol   · Frequent neuro exam   · Permissive HTN, PRN labetolol   · Repeat CT Head in 24 hours   · MRI pending   · STAT CT Head with any neuro changes   · Hold ASA/Plavix  · Echo 3/21: EF 60%  · Repeat echo with bubble study   · Neurology on board, appreciate recommendations      Renal insufficiency  Assessment & Plan  · Baseline creatinine 1 2  · Avoid nephrotoxic agents and hypotension   · Continue to trend BUN/Cr  · Will need outpatient nephrology follow up     Stenosis of right internal carotid artery  Assessment & Plan  · Carotid study 7/21 with high grade stenosis of R carotid and 50-69% stenosis noted in the internal carotid artery  · Follows with outpatient vascular surgery   · Continue ASA/Statin    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  Assessment & Plan  · S/p endarterectomy 4/13/21 with Dr Catie Munoz due to symptomatic extensive R common and internal carotid artery stenosis  · Progressive disease and patient continues to smoke   · Currently on ASA/Atrovastatin/Plavix as outpatient   · Supposed to have outpatient CTA but has new renal insufficiency       Anemia  Assessment & Plan  · Chronic   · Iron panel pending   · Continue to trend blood counts  · Transfuse for Hgb<7     COPD (chronic obstructive pulmonary disease) (HCC)  Assessment & Plan  · Not currently in acute exacerbation   · Continue xoponex/atrovent   · Currently on room air   · No documented PFTs    HTN (hypertension)  Assessment & Plan  · Blood pressure goal <180/105   · PRN labetolol   · Home regimen: amlodipine/metoprolol     Tobacco use  Assessment & Plan  · Currently every day smoker  · Smokes 1/2ppd   · Refused nicotine patch   · Smoking cessation education        ----------------------------------------------------------------------------------------  HPI/24hr events: 73year old admitted due to CVA s/p tpa  Overnight, no changes in neurologic exam      Patient appropriate for transfer out of the ICU today?: No  Disposition: Continue Critical Care   Code Status: Level 1 - Full Code  ---------------------------------------------------------------------------------------  SUBJECTIVE  Patient states that he is hungry  Denies need for nicotine patch  Review of Systems   Eyes: Negative for photophobia and visual disturbance  Respiratory: Positive for cough  Negative for shortness of breath  Cardiovascular: Negative for chest pain  Gastrointestinal: Negative for abdominal pain and nausea  Neurological: Negative for dizziness, facial asymmetry, speech difficulty and headaches  Psychiatric/Behavioral: Negative for confusion       Review of systems was reviewed and negative unless stated above in HPI/24-hour events   ---------------------------------------------------------------------------------------  OBJECTIVE    Vitals   Vitals:    21 2300 21 2330 21 0000 21 0030   BP: (!) 191/81 158/72 (!) 171/74 (!) 160/101   BP Location:       Pulse: 59 59 62 57   Resp: (!) 25 21 (!) 33 (!) 25   Temp:       TempSrc:       SpO2: 97% 96% 96% 97%   Weight:       Height:         Temp (24hrs), Av 2 °F (36 8 °C), Min:98 1 °F (36 7 °C), Max:98 3 °F (36 8 °C)  Current: Temperature: 98 3 °F (36 8 °C)          Respiratory:  SpO2: SpO2: 97 %, SpO2 Activity: SpO2 Activity: At Rest, SpO2 Device: O2 Device: None (Room air)       Invasive/non-invasive ventilation settings   Respiratory    Lab Data (Last 4 hours)    None         O2/Vent Data (Last 4 hours)    None                Physical Exam  Constitutional:       General: He is not in acute distress  Appearance: He is normal weight  He is not ill-appearing, toxic-appearing or diaphoretic  HENT:      Head: Normocephalic and atraumatic  Comments: No facial droop noted     Nose: Nose normal       Mouth/Throat:      Mouth: Mucous membranes are moist       Pharynx: Oropharynx is clear  Eyes:      General: No scleral icterus  Conjunctiva/sclera: Conjunctivae normal    Cardiovascular:      Rate and Rhythm: Normal rate and regular rhythm  Heart sounds: Normal heart sounds  Pulmonary:      Effort: Pulmonary effort is normal  No respiratory distress  Breath sounds: Wheezing present  Abdominal:      General: Bowel sounds are normal  There is no distension  Palpations: Abdomen is soft  Tenderness: There is no abdominal tenderness  Musculoskeletal:      Cervical back: Normal range of motion and neck supple  Right lower leg: No edema  Left lower leg: No edema  Comments: Equal strength bilateral upper and lower extremities   Skin:     General: Skin is warm and dry  Coloration: Skin is not jaundiced  Neurological:      General: No focal deficit present  Mental Status: He is alert and oriented to person, place, and time     Psychiatric:         Mood and Affect: Mood normal          Laboratory and Diagnostics:  Results from last 7 days   Lab Units 09/02/21 2007   WBC Thousand/uL 5 62   HEMOGLOBIN g/dL 9 9*   HEMATOCRIT % 34 9*   PLATELETS Thousands/uL 332     Results from last 7 days   Lab Units 09/02/21 2007 SODIUM mmol/L 141   POTASSIUM mmol/L 4 0   CHLORIDE mmol/L 103   CO2 mmol/L 29   ANION GAP mmol/L 9   BUN mg/dL 19   CREATININE mg/dL 1 45*   CALCIUM mg/dL 9 7   GLUCOSE RANDOM mg/dL 110          Results from last 7 days   Lab Units 09/02/21 2007   INR  1 03   PTT seconds 34      Results from last 7 days   Lab Units 09/02/21 2007   TROPONIN I ng/mL <0 02         ABG:    VBG:          Micro        EKG: Reviewed  Normal sinus  Imaging: I have personally reviewed pertinent films in PACS    Intake and Output  I/O       09/01 0701 - 09/02 0700 09/02 0701 - 09/03 0700    P  O   120    Total Intake(mL/kg)  120 (1 7)    Urine (mL/kg/hr)  200    Total Output  200    Net  -80                Height and Weights   Height: 5' 9" (175 3 cm)  IBW (Ideal Body Weight): 70 7 kg  Body mass index is 22 33 kg/m²  Weight (last 2 days)     Date/Time   Weight    09/02/21 2200   68 6 (151 24)    09/02/21 2136   68 6 (151 24)    09/02/21 2050   68 7 (151 5)    09/02/21 1952   68 (150)                Nutrition       Diet Orders   (From admission, onward)             Start     Ordered    09/02/21 2224  Room Service  Once     Question:  Type of Service  Answer:  Room Service - Appropriate with Assistance    09/02/21 2223 09/02/21 2132  Diet NPO  Diet effective now     Question Answer Comment   Diet Type NPO    RD to adjust diet per protocol?  No        09/02/21 2135                  Active Medications  Scheduled Meds:  Current Facility-Administered Medications   Medication Dose Route Frequency Provider Last Rate    albuterol  2 puff Inhalation Q4H PRN Lou Luna,       Labetalol HCl  10 mg Intravenous Q6H PRN Debby Herrera PA-C       Continuous Infusions:     PRN Meds:   albuterol, 2 puff, Q4H PRN  Labetalol HCl, 10 mg, Q6H PRN        Invasive Devices Review  Invasive Devices     Peripheral Intravenous Line            Peripheral IV 09/02/21 Left Forearm <1 day    Peripheral IV 09/02/21 Right Antecubital <1 day Rationale for remaining devices: Critically ill   ---------------------------------------------------------------------------------------  Advance Directive and Living Will:      Power of :    POLST:    ---------------------------------------------------------------------------------------  Care Time Delivered:   No Critical Care time spent       Cameron Davis PA-C

## 2021-09-03 NOTE — PLAN OF CARE
Problem: Potential for Falls  Goal: Patient will remain free of falls  Description: INTERVENTIONS:  - Educate patient/family on patient safety including physical limitations  - Instruct patient to call for assistance with activity   - Consult OT/PT to assist with strengthening/mobility   - Keep Call bell within reach  - Keep bed low and locked with side rails adjusted as appropriate  - Keep care items and personal belongings within reach  - Initiate and maintain comfort rounds  - Make Fall Risk Sign visible to staff  - Apply yellow socks and bracelet for high fall risk patients  - Consider moving patient to room near nurses station  Outcome: Progressing     Problem: Neurological Deficit  Goal: Neurological status is stable or improving  Description: Interventions:  - Monitor and assess patient's level of consciousness, motor function, sensory function, and level of assistance needed for ADLs  - Monitor and report changes from baseline  Collaborate with interdisciplinary team to initiate plan and implement interventions as ordered  - Provide and maintain a safe environment  - Consider seizure precautions  - Consider fall precautions  - Consider aspiration precautions  - Consider bleeding precautions  Outcome: Progressing     Problem: Activity Intolerance/Impaired Mobility  Goal: Mobility/activity is maintained at optimum level for patient  Description: Interventions:  - Assess and monitor patient  barriers to mobility and need for assistive/adaptive devices  - Assess patient's emotional response to limitations  - Collaborate with interdisciplinary team and initiate plans and interventions as ordered  - Encourage independent activity per ability   - Maintain proper body alignment  - Perform active/passive rom as tolerated/ordered    - Plan activities to conserve energy   - Turn patient as appropriate  Outcome: Progressing     Problem: Communication Impairment  Goal: Ability to express needs and understand communication  Description: Assess patient's communication skills and ability to understand information  Patient will demonstrate use of effective communication techniques, alternative methods of communication and understanding even if not able to speak  - Encourage communication and provide alternate methods of communication as needed  - Collaborate with case management/ for discharge needs  - Include patient/family/caregiver in decisions related to communication  Outcome: Progressing     Problem: Potential for Aspiration  Goal: Non-ventilated patient's risk of aspiration is minimized  Description: Assess and monitor vital signs, respiratory status, and labs (WBC)  Monitor for signs of aspiration (tachypnea, cough, rales, wheezing, cyanosis, fever)  - Assess and monitor patient's ability to swallow  - Place patient up in chair to eat if possible  - HOB up at 90 degrees to eat if unable to get patient up into chair   - Supervise patient during oral intake  - Instruct patient/ family to take small bites  - Instruct patient/ family to take small single sips when taking liquids  - Follow patient-specific strategies generated by speech pathologist   Outcome: Progressing  Goal: Ventilated patient's risk of aspiration is minimized  Description: Assess and monitor vital signs, respiratory status, airway cuff pressure, and labs (WBC)  Monitor for signs of aspiration (tachypnea, cough, rales, wheezing, cyanosis, fever)  - Elevate head of bed 30 degrees if patient has tube feeding   - Monitor tube feeding  Outcome: Progressing     Problem: Nutrition  Goal: Nutrition/Hydration status is improving  Description: Monitor and assess patient's nutrition/hydration status for malnutrition (ex- brittle hair, bruises, dry skin, pale skin and conjunctiva, muscle wasting, smooth red tongue, and disorientation)   Collaborate with interdisciplinary team and initiate plan and interventions as ordered  Monitor patient's weight and dietary intake as ordered or per policy  Utilize nutrition screening tool and intervene per policy  Determine patient's food preferences and provide high-protein, high-caloric foods as appropriate  - Assist patient with eating   - Allow adequate time for meals   - Encourage patient to take dietary supplement as ordered  - Collaborate with clinical nutritionist   - Include patient/family/caregiver in decisions related to nutrition    Outcome: Progressing     Problem: Prexisting or High Potential for Compromised Skin Integrity  Goal: Skin integrity is maintained or improved  Description: INTERVENTIONS:  - Identify patients at risk for skin breakdown  - Assess and monitor skin integrity  - Assess and monitor nutrition and hydration status  - Monitor labs   - Assess for incontinence   - Turn and reposition patient  - Assist with mobility/ambulation  - Relieve pressure over bony prominences  - Avoid friction and shearing  - Provide appropriate hygiene as needed including keeping skin clean and dry  - Evaluate need for skin moisturizer/barrier cream  - Collaborate with interdisciplinary team   - Patient/family teaching  - Consider wound care consult   Outcome: Progressing     Problem: MOBILITY - ADULT  Goal: Maintain or return to baseline ADL function  Description: INTERVENTIONS:  -  Assess patient's ability to carry out ADLs; assess patient's baseline for ADL function and identify physical deficits which impact ability to perform ADLs (bathing, care of mouth/teeth, toileting, grooming, dressing, etc )  - Assess/evaluate cause of self-care deficits   - Assess range of motion  - Assess patient's mobility; develop plan if impaired  - Assess patient's need for assistive devices and provide as appropriate  - Encourage maximum independence but intervene and supervise when necessary  - Involve family in performance of ADLs  - Assess for home care needs following discharge   - Consider OT consult to assist with ADL evaluation and planning for discharge  - Provide patient education as appropriate  Outcome: Progressing  Goal: Maintains/Returns to pre admission functional level  Description: INTERVENTIONS:  - Perform BMAT or MOVE assessment daily    - Set and communicate daily mobility goal to care team and patient/family/caregiver     - Collaborate with rehabilitation services on mobility goals if consulted    - Out of bed for toileting  - Record patient progress and toleration of activity level   Outcome: Progressing

## 2021-09-04 PROBLEM — Z72.0 TOBACCO USE: Chronic | Status: ACTIVE | Noted: 2021-03-29

## 2021-09-04 PROBLEM — D50.9 IRON DEFICIENCY ANEMIA: Chronic | Status: ACTIVE | Noted: 2021-03-29

## 2021-09-04 PROBLEM — F10.10 ALCOHOL ABUSE: Chronic | Status: ACTIVE | Noted: 2021-03-29

## 2021-09-04 PROBLEM — D50.9 IRON DEFICIENCY ANEMIA: Status: ACTIVE | Noted: 2021-03-29

## 2021-09-04 PROBLEM — J44.9 COPD (CHRONIC OBSTRUCTIVE PULMONARY DISEASE) (HCC): Chronic | Status: ACTIVE | Noted: 2021-03-29

## 2021-09-04 PROBLEM — I65.21 SYMPTOMATIC CAROTID ARTERY STENOSIS, RIGHT: Chronic | Status: ACTIVE | Noted: 2021-03-29

## 2021-09-04 PROBLEM — R79.89 ELEVATED SERUM CREATININE: Status: RESOLVED | Noted: 2021-03-29 | Resolved: 2021-09-04

## 2021-09-04 PROBLEM — E78.5 HLD (HYPERLIPIDEMIA): Chronic | Status: ACTIVE | Noted: 2021-03-29

## 2021-09-04 LAB
ANION GAP SERPL CALCULATED.3IONS-SCNC: 11 MMOL/L (ref 4–13)
BUN SERPL-MCNC: 13 MG/DL (ref 5–25)
CALCIUM SERPL-MCNC: 9.4 MG/DL (ref 8.3–10.1)
CHLORIDE SERPL-SCNC: 103 MMOL/L (ref 100–108)
CO2 SERPL-SCNC: 24 MMOL/L (ref 21–32)
CREAT SERPL-MCNC: 1.18 MG/DL (ref 0.6–1.3)
FERRITIN SERPL-MCNC: 10 NG/ML (ref 8–388)
GFR SERPL CREATININE-BSD FRML MDRD: 64 ML/MIN/1.73SQ M
GLUCOSE SERPL-MCNC: 109 MG/DL (ref 65–140)
IRON SATN MFR SERPL: 4 %
IRON SERPL-MCNC: 23 UG/DL (ref 65–175)
MAGNESIUM SERPL-MCNC: 3.6 MG/DL (ref 1.6–2.6)
POTASSIUM SERPL-SCNC: 3.9 MMOL/L (ref 3.5–5.3)
SODIUM SERPL-SCNC: 138 MMOL/L (ref 136–145)
TIBC SERPL-MCNC: 600 UG/DL (ref 250–450)

## 2021-09-04 PROCEDURE — 83735 ASSAY OF MAGNESIUM: CPT | Performed by: FAMILY MEDICINE

## 2021-09-04 PROCEDURE — 97110 THERAPEUTIC EXERCISES: CPT

## 2021-09-04 PROCEDURE — 80048 BASIC METABOLIC PNL TOTAL CA: CPT | Performed by: FAMILY MEDICINE

## 2021-09-04 PROCEDURE — 99232 SBSQ HOSP IP/OBS MODERATE 35: CPT | Performed by: INTERNAL MEDICINE

## 2021-09-04 PROCEDURE — 97166 OT EVAL MOD COMPLEX 45 MIN: CPT

## 2021-09-04 PROCEDURE — 97116 GAIT TRAINING THERAPY: CPT

## 2021-09-04 PROCEDURE — 99232 SBSQ HOSP IP/OBS MODERATE 35: CPT | Performed by: PSYCHIATRY & NEUROLOGY

## 2021-09-04 RX ORDER — CARVEDILOL 12.5 MG/1
12.5 TABLET ORAL 2 TIMES DAILY WITH MEALS
Status: DISCONTINUED | OUTPATIENT
Start: 2021-09-04 | End: 2021-09-06 | Stop reason: HOSPADM

## 2021-09-04 RX ORDER — AMLODIPINE BESYLATE 5 MG/1
5 TABLET ORAL DAILY
Status: DISCONTINUED | OUTPATIENT
Start: 2021-09-04 | End: 2021-09-06 | Stop reason: HOSPADM

## 2021-09-04 RX ORDER — SERTRALINE HYDROCHLORIDE 100 MG/1
100 TABLET, FILM COATED ORAL
Status: DISCONTINUED | OUTPATIENT
Start: 2021-09-04 | End: 2021-09-06 | Stop reason: HOSPADM

## 2021-09-04 RX ORDER — FERROUS SULFATE 325(65) MG
325 TABLET ORAL
Status: DISCONTINUED | OUTPATIENT
Start: 2021-09-04 | End: 2021-09-06 | Stop reason: HOSPADM

## 2021-09-04 RX ORDER — FENOFIBRATE 48 MG/1
160 TABLET, COATED ORAL DAILY
Status: DISCONTINUED | OUTPATIENT
Start: 2021-09-04 | End: 2021-09-06 | Stop reason: HOSPADM

## 2021-09-04 RX ORDER — PANTOPRAZOLE SODIUM 40 MG/1
40 TABLET, DELAYED RELEASE ORAL
Status: DISCONTINUED | OUTPATIENT
Start: 2021-09-04 | End: 2021-09-06 | Stop reason: HOSPADM

## 2021-09-04 RX ORDER — MAGNESIUM SULFATE HEPTAHYDRATE 40 MG/ML
2 INJECTION, SOLUTION INTRAVENOUS ONCE
Status: COMPLETED | OUTPATIENT
Start: 2021-09-04 | End: 2021-09-04

## 2021-09-04 RX ORDER — LISINOPRIL 10 MG/1
10 TABLET ORAL DAILY
Status: DISCONTINUED | OUTPATIENT
Start: 2021-09-04 | End: 2021-09-05

## 2021-09-04 RX ORDER — POTASSIUM CHLORIDE 20 MEQ/1
40 TABLET, EXTENDED RELEASE ORAL ONCE
Status: DISCONTINUED | OUTPATIENT
Start: 2021-09-04 | End: 2021-09-04

## 2021-09-04 RX ADMIN — PANTOPRAZOLE SODIUM 40 MG: 40 TABLET, DELAYED RELEASE ORAL at 08:01

## 2021-09-04 RX ADMIN — SERTRALINE 100 MG: 100 TABLET, FILM COATED ORAL at 20:52

## 2021-09-04 RX ADMIN — MAGNESIUM SULFATE HEPTAHYDRATE 2 G: 40 INJECTION, SOLUTION INTRAVENOUS at 00:43

## 2021-09-04 RX ADMIN — FERROUS SULFATE TAB 325 MG (65 MG ELEMENTAL FE) 325 MG: 325 (65 FE) TAB at 08:01

## 2021-09-04 RX ADMIN — MAGNESIUM SULFATE HEPTAHYDRATE 2 G: 40 INJECTION, SOLUTION INTRAVENOUS at 06:29

## 2021-09-04 RX ADMIN — ACLIDINIUM BROMIDE 400 MCG: 400 POWDER, METERED RESPIRATORY (INHALATION) at 20:51

## 2021-09-04 RX ADMIN — LISINOPRIL 10 MG: 10 TABLET ORAL at 11:38

## 2021-09-04 RX ADMIN — FENOFIBRATE 168 MG: 48 TABLET, FILM COATED ORAL at 09:38

## 2021-09-04 RX ADMIN — CARVEDILOL 12.5 MG: 12.5 TABLET, FILM COATED ORAL at 08:01

## 2021-09-04 RX ADMIN — POTASSIUM CHLORIDE 40 MEQ: 1500 TABLET, EXTENDED RELEASE ORAL at 00:43

## 2021-09-04 RX ADMIN — ACLIDINIUM BROMIDE 400 MCG: 400 POWDER, METERED RESPIRATORY (INHALATION) at 09:39

## 2021-09-04 RX ADMIN — AMLODIPINE BESYLATE 5 MG: 5 TABLET ORAL at 11:38

## 2021-09-04 RX ADMIN — ATORVASTATIN CALCIUM 80 MG: 40 TABLET, FILM COATED ORAL at 15:39

## 2021-09-04 NOTE — PLAN OF CARE
Problem: PHYSICAL THERAPY ADULT  Goal: Performs mobility at highest level of function for planned discharge setting  See evaluation for individualized goals  Description: Treatment/Interventions: Functional transfer training, LE strengthening/ROM, Elevations, Therapeutic exercise, Endurance training, Patient/family training, Equipment eval/education, Bed mobility, Gait training          See flowsheet documentation for full assessment, interventions and recommendations  Outcome: Progressing  Note: Prognosis: Good  Problem List: Decreased strength, Decreased endurance, Impaired balance, Decreased mobility, Decreased coordination, Decreased safety awareness  Assessment: pt began tx session seated  OOb in the recliner and was agreeable to participate in PT intervention  pt is now progressing with skilled PT and is now performing all functional transfers w/o an AD and ambulation w/o and AD with mod I with VC's for safety and foot placement as pt demonstrated a NBOS  pt was able to complete multiple STS in order to increase LE strength and increase endurance and safety w/o using an AD  pt was able to increase ambulation distance in todays tx session to 265'x1 w/o an AD  pt was able to increase dynamic standing balance by performing standing marches w/o UE support x20 w/o LOB  pt also was able to complete 13 steps with L sided hand rail with /s but required VC's to slow down as pt was performing steps to fast and proper form was not completed  pt would benefit from continued focus on OoB mobility with progression of ambulation and continuation of stair trials as appropriate  post tx session pt seated OoB in the recliner with call bell in arms reach            PT Discharge Recommendation: Home with outpatient rehabilitation          See flowsheet documentation for full assessment

## 2021-09-04 NOTE — PHYSICAL THERAPY NOTE
PHYSICAL THERAPY NOTE          Patient Name: Emmy Barton  RACXF'R Date: 9/4/2021 09/04/21 1430   PT Last Visit   PT Visit Date 09/04/21   Note Type   Note Type Treatment   Pain Assessment   Pain Assessment Tool 0-10   Pain Score No Pain   Patient's Stated Pain Goal No pain   Hospital Pain Intervention(s) Repositioned; Ambulation/increased activity; Emotional support; Rest   Multiple Pain Sites No   Restrictions/Precautions   Weight Bearing Precautions Per Order No   Other Precautions Chair Alarm; Bed Alarm; Fall Risk   General   Chart Reviewed Yes   Response to Previous Treatment Patient with no complaints from previous session  Family/Caregiver Present No   Cognition   Overall Cognitive Status Unable to assess   Arousal/Participation Alert; Responsive; Cooperative   Attention Within functional limits   Orientation Level Oriented X4   Memory Unable to assess   Following Commands Follows one step commands with increased time or repetition   Comments pt was pleasant and cooperative throughout tx session    Subjective   Subjective pt was agreeable to participate in PT intervention    Bed Mobility   Rolling R Unable to assess   Rolling L Unable to assess   Supine to Sit Unable to assess   Sit to Supine Unable to assess   Additional Comments pt seated OOB in the recliner pre/post tx session    Transfers   Sit to Stand 6  Modified independent   Additional items Increased time required;Verbal cues   Stand to Sit 6  Modified independent   Additional items Increased time required;Verbal cues   Additional Comments pt does not require any AD to complete functional transfers in todays tx session    Ambulation/Elevation   Gait pattern Short stride;Decreased R stance   Gait Assistance 6  Modified independent   Additional items Verbal cues   Assistive Device None   Distance 265'x1   (no AD )   Stair Management Assistance 5  Supervision Additional items Verbal cues; Tactile cues; Increased time required  (VC's to slow down for safety )   Stair Management Technique One rail L;Step to pattern   Number of Stairs 13   Balance   Static Sitting Good   Dynamic Sitting Fair   Static Standing Fair   Dynamic Standing Fair   Ambulatory Fair   Activity Tolerance   Activity Tolerance Patient limited by fatigue   Exercises   Hip Adduction Sitting;20 reps;AROM; Bilateral   Knee AROM Long Arc Quad Sitting;20 reps;AROM; Bilateral   Balance Training Standing;20 reps;AROM; Bilateral  (standing (B) marches x20 w/o UE support )   Marching Sitting;20 reps;AROM; Bilateral   Assessment   Prognosis Good   Problem List Decreased strength;Decreased endurance; Impaired balance;Decreased mobility; Decreased coordination;Decreased safety awareness   Assessment pt began tx session seated  OOb in the recliner and was agreeable to participate in PT intervention  pt is now progressing with skilled PT and is now performing all functional transfers w/o an AD and ambulation w/o and AD with mod I with VC's for safety and foot placement as pt demonstrated a NBOS  pt was able to complete multiple STS in order to increase LE strength and increase endurance and safety w/o using an AD  pt was able to increase ambulation distance in todays tx session to 265'x1 w/o an AD  pt was able to increase dynamic standing balance by performing standing marches w/o UE support x20 w/o LOB  pt also was able to complete 13 steps with L sided hand rail with /s but required VC's to slow down as pt was performing steps to fast and proper form was not completed  pt would benefit from continued focus on OoB mobility with progression of ambulation and continuation of stair trials as appropriate   post tx session pt seated OoB in the recliner with call bell in arms reach    Goals   Patient Goals to go home    STG Expiration Date 09/13/21   PT Treatment Day 2   Plan   Treatment/Interventions Functional transfer training;LE strengthening/ROM; Elevations; Therapeutic exercise; Endurance training;Patient/family training;Equipment eval/education; Bed mobility;Gait training;Spoke to nursing   Progress Progressing toward goals   PT Frequency 5x/wk   Recommendation   PT Discharge Recommendation Home with outpatient rehabilitation   AM-PAC Basic Mobility Inpatient   Turning in Bed Without Bedrails 4   Lying on Back to Sitting on Edge of Flat Bed 4   Moving Bed to Chair 4   Standing Up From Chair 4   Walk in Room 4   Climb 3-5 Stairs 4   Basic Mobility Inpatient Raw Score 24   Basic Mobility Standardized Score 57 68     Pattie Varma, PTA

## 2021-09-04 NOTE — ASSESSMENT & PLAN NOTE
· Blood pressure goal <180/105   · PRN hydralazine  · Home regimen: amlodipine/metoprolol/benazapril/coreg  · Will start to reintroduce home meds today, starting with coreg 12 5mg BID

## 2021-09-04 NOTE — ASSESSMENT & PLAN NOTE
Recent Labs     09/02/21 2007 09/03/21  2320   HGB 9 9* 9 4*     · Chronic   · Iron panel: low iron, high TIBC  · Continue to trend blood counts  · Transfuse for Hgb<7   · Iron tabs 325 daily

## 2021-09-04 NOTE — PLAN OF CARE
Problem: OCCUPATIONAL THERAPY ADULT  Goal: Performs self-care activities at highest level of function for planned discharge setting  See evaluation for individualized goals  Description: Treatment Interventions: ADL retraining, Functional transfer training, UE strengthening/ROM, Cognitive reorientation, Endurance training, Neuromuscular reeducation, Continued evaluation, Activityengagement, Energy conservation          See flowsheet documentation for full assessment, interventions and recommendations  Note: Limitation: Decreased ADL status, Decreased UE strength, Decreased endurance, Decreased fine motor control, Decreased self-care trans, Decreased Safe judgement during ADL  Prognosis: Fair  Assessment: Patient evaluated by Occupational Therapy  Patient admitted with CVA (cerebral vascular accident) ([facility] [address] )  Patient is S/p TPA  CTA: hx of RCEA, L ICA with severe stenosis at origin; chronic RPCA occlusion, severe atherosclerotic disease throughout MRI: large acute R PCA territory stroke  The patients occupational profile, medical and therapy history includes a expanded review of medical and/or therapy records and additional review of physical, cognitive, or psychosocial history related to current functional performance  Comorbidities affecting functional mobility and ADLS include: anxiety, COPD and CVA  Prior to admission, patient was independent with functional mobility without assistive device, independent with ADLS, independent with IADLS and living with daughter in a 1 level home with 3 steps to enter  Patient performed Grooming and UB bathing at sup, LB dressing at Sup, UB dressing Sup , transfers at Sup, bed mobility sup, and functional mobility w/o AD sup   The evaluation identifies the following performance deficits: weakness, impaired balance, decreased endurance, decreased coordination, increased fall risk, new onset of impairment of functional mobility, decreased ADLS, decreased IADLS, decreased activity tolerance and decreased strength, that result in activity limitations and/or participation restrictions  This evaluation requires clinical decision making of high complexity, because the patient presents with comorbidites that affect occupational performance and required significant modification of tasks or assistance with consideration of multiple treatment options  The Barthel Index was used as a functional outcome tool presenting with a score of 85  The patient's raw score on the -PAC Daily Activity inpatient short form is 19, standardized score is 40 22, greater than 39 4  Patients at this level are likely to benefit from DC to home  Please refer to the recommendation of the Occupational Therapist for safe DC planning  Patient will benefit from skilled Occupational Therapy services to address above deficits and facilitate a safe return to prior level of function       OT Discharge Recommendation: Home with outpatient rehabilitation

## 2021-09-04 NOTE — OCCUPATIONAL THERAPY NOTE
Occupational Therapy Evaluation     Patient Name: Christiana Hernandez  OTRHF'I Date: 9/4/2021  Problem List  Principal Problem:    CVA (cerebral vascular accident) [facility])  Active Problems:    Tobacco use    HLD (hyperlipidemia)    COPD (chronic obstructive pulmonary disease) ([facility])    Iron deficiency anemia    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    GERD (gastroesophageal reflux disease)    Stenosis of right internal carotid artery    Renal insufficiency    Benign essential hypertension    Past Medical History  Past Medical History:   Diagnosis Date    Anxiety     Back pain     Claustrophobia     COPD (chronic obstructive pulmonary disease) (HCC)     GERD (gastroesophageal reflux disease)     Hypertension     Lumbar back pain     Panic attacks     Stenosis of right carotid artery     Stroke ([address] )     Wears glasses     Wears partial dentures     upper denture     Past Surgical History  Past Surgical History:   Procedure Laterality Date    COLONOSCOPY      IR CEREBRAL ANGIOGRAPHY  4/6/2021    OTHER SURGICAL HISTORY      fertility    LA Vannie  3RD+ ORD SLCTV ABDL PEL/LXTR [facility] Right 4/6/2021    Procedure: ARTERIOGRAM, carotid Angio;  Surgeon: Terrance Klinefelter, MD;  Location: [facility];  Service: Vascular    LA THROMBOENDARTECTMY Maya Colón Right 4/13/2021    Procedure: RIGHT ENDARTERECTOMY ARTERY CAROTID WITH BOVINE PATCH;  Surgeon: Terrance Klinefelter, MD;  Location: [facility];  Service: Vascular             09/04/21 1305   Note Type   Note type Evaluation   Restrictions/Precautions   Other Precautions Chair Alarm; Bed Alarm; Fall Risk   Pain Assessment   Pain Assessment Tool 0-10   Pain Score No Pain   Home Living   Type of Home Mobile home  (3 [place]ELY[place])   Home Layout One level   Bathroom Shower/Tub Tub/shower unit   Bathroom Toilet Standard   Bathroom Equipment Grab bars in shower   Additional Comments Pt stated uses no AD to ambulate   Prior Function   Lives With Daughter   Andre Help From Family   ADL Assistance Independent   IADLs Independent   Falls in the last 6 months 0   Comments Patient states that he is home alone some parts of the day daughter is not home  Patient stated able to perform [facility] cook and some cleaning and med managment    ADL   Eating Assistance 7  Independent   Grooming Assistance 5  Supervision/Setup   Grooming Deficit Wash/dry hands; Wash/dry face  (standing)   UB Bathing Assistance 5  Supervision/Setup   UB Bathing Deficit Right arm;Left arm  (standing)   LB Bathing Assistance Unable to assess   UB Dressing Assistance 5  Supervision/Setup   UB Dressing Deficit Pull around back   LB Dressing Assistance 5  Supervision/Setup   LB Dressing Deficit Don/doff R sock; Don/doff L sock   Toileting Assistance  5  Supervision/Setup   Bed Mobility   Supine to Sit 5  Supervision   Sit to Supine 5  Supervision   Transfers   Sit to Stand 5  Supervision   Stand to Sit 5  Supervision   Toilet transfer 5  Supervision   Functional Mobility   Additional Comments Patient ambulated w/o AD 15 feet at Sup   Balance   Static Sitting Fair +   Dynamic Sitting Fair +   Static Standing Fair -   Dynamic Standing Fair -   Activity Tolerance   Activity Tolerance Patient tolerated treatment well   Nurse Made Aware ÁNGEL Gomez   RUE Assessment   RUE Assessment WFL   RUE Strength   RUE Overall Strength   (3+/5)   LUE Assessment   LUE Assessment WFL   LUE Strength   LUE Overall Strength   (3+/5)   Hand Function   Gross Motor Coordination Functional   Fine Motor Coordination Functional   Vision - Complex Assessment   Tracking Able to track stimulus in all quads without difficulty   Acuity Able to read employee name badge without difficulty   Cognition   Arousal/Participation Alert; Cooperative   Orientation Level Oriented X4   Following Commands Follows one step commands with increased time or repetition   Comments Pt ID by wristband name and    Assessment   Limitation Decreased ADL status; Decreased UE strength;Decreased endurance;Decreased fine motor control;Decreased self-care trans;Decreased Safe judgement during ADL   Prognosis Fair   Assessment Patient evaluated by Occupational Therapy  Patient admitted with CVA (cerebral vascular accident) ([facility] [address] )  Patient is S/p TPA  CTA: hx of RCEA, L ICA with severe stenosis at origin; chronic RPCA occlusion, severe atherosclerotic disease throughout MRI: large acute R PCA territory stroke  The patients occupational profile, medical and therapy history includes a expanded review of medical and/or therapy records and additional review of physical, cognitive, or psychosocial history related to current functional performance  Comorbidities affecting functional mobility and ADLS include: anxiety, COPD and CVA  Prior to admission, patient was independent with functional mobility without assistive device, independent with ADLS, independent with IADLS and living with daughter in a 1 level home with 3 steps to enter  Patient performed Grooming and UB bathing at sup, LB dressing at Sup, UB dressing Sup , transfers at Sup, bed mobility sup, and functional mobility w/o AD sup  The evaluation identifies the following performance deficits: weakness, impaired balance, decreased endurance, decreased coordination, increased fall risk, new onset of impairment of functional mobility, decreased ADLS, decreased IADLS, decreased activity tolerance and decreased strength, that result in activity limitations and/or participation restrictions  This evaluation requires clinical decision making of high complexity, because the patient presents with comorbidites that affect occupational performance and required significant modification of tasks or assistance with consideration of multiple treatment options  The Barthel Index was used as a functional outcome tool presenting with a score of 85    The patient's raw score on the AM-PAC Daily Activity inpatient short form is 19, standardized score is 40 22, greater than 39 4  Patients at this level are likely to benefit from DC to home  Please refer to the recommendation of the Occupational Therapist for safe DC planning  Patient will benefit from skilled Occupational Therapy services to address above deficits and facilitate a safe return to prior level of function  Goals   Patient Goals "go home"   Long Term Goal #1   (8-14)   Long Term Goal #2  Patient will increase standing tolerance to 5 minutes during ADL task to decrease assistance level and decrease fall risk; Patient will increase bed mobility to independent in preparation for ADLS and transfers; Patient will increase functional mobility to and from bathroom with no AD independently to increase performance with ADLS and to use a toilet; Patient will tolerate 10 minutes of UE ROM/strengthening to increase general activity tolerance and performance in ADLS/IADLS; Patient will improve functional activity tolerance to 10 minutes of sustained functional tasks to increase participation in basic self-care and decrease assistance level;  Patient will increase dynamic standing balance to fair+ to improve postural stability and decrease fall risk during standing ADLS and transfers  Functional Transfer Goals   Pt Will Transfer To Bedside Commode With mod indep   Pt Will Transfer To Toilet With mod indep   Pt Will Transfer To Shower With mod indep   ADL Goals   Pt Will Perform Eating Independently   Pt Will Perform Grooming With mod indep   Pt Will Perform Bathing With mod indep   Pt Will Perform UE Dressing With mod indep   Pt Will Perform LE Dressing With mod indep   Pt Will Perform Toileting With mod indep   Plan   Treatment Interventions ADL retraining;Functional transfer training;UE strengthening/ROM; Cognitive reorientation; Endurance training;Neuromuscular reeducation;Continued evaluation; Activityengagement; Energy conservation   Goal Expiration Date 09/18/21   OT Frequency 2-3x/wk   Recommendation   OT Discharge Recommendation Home with outpatient rehabilitation   AM-PAC Daily Activity Inpatient   Lower Body Dressing 3   Bathing 3   Toileting 3   Upper Body Dressing 3   Grooming 3   Eating 4   Daily Activity Raw Score 19   Daily Activity Standardized Score (Calc for Raw Score >=11) 40 22   AM-PAC Applied Cognition Inpatient   Following a Speech/Presentation 4   Understanding Ordinary Conversation 4   Taking Medications 3   Remembering Where Things Are Placed or Put Away 4   Remembering List of 4-5 Errands 3   Taking Care of Complicated Tasks 3   Applied Cognition Raw Score 21   Applied Cognition Standardized Score 44 3   Barthel Index   Feeding 10   Bathing 5   Grooming Score 0   Dressing Score 5   Bladder Score 10   Bowels Score 10   Toilet Use Score 10   Transfers (Bed/Chair) Score 10   Mobility (Level Surface) Score 15   Stairs Score 10   Barthel Index Score 85   Ambrocio Hutchins, OT

## 2021-09-04 NOTE — PLAN OF CARE
Problem: Potential for Falls  Goal: Patient will remain free of falls  Description: INTERVENTIONS:  - Educate patient/family on patient safety including physical limitations  - Instruct patient to call for assistance with activity   - Consult OT/PT to assist with strengthening/mobility   - Keep Call bell within reach  - Keep bed low and locked with side rails adjusted as appropriate  - Keep care items and personal belongings within reach  - Initiate and maintain comfort rounds  - Make Fall Risk Sign visible to staff  - Apply yellow socks and bracelet for high fall risk patients  - Consider moving patient to room near nurses station  Outcome: Progressing     Problem: Neurological Deficit  Goal: Neurological status is stable or improving  Description: Interventions:  - Monitor and assess patient's level of consciousness, motor function, sensory function, and level of assistance needed for ADLs  - Monitor and report changes from baseline  Collaborate with interdisciplinary team to initiate plan and implement interventions as ordered  - Provide and maintain a safe environment  - Consider seizure precautions  - Consider fall precautions  - Consider aspiration precautions  - Consider bleeding precautions  Outcome: Progressing     Problem: Activity Intolerance/Impaired Mobility  Goal: Mobility/activity is maintained at optimum level for patient  Description: Interventions:  - Assess and monitor patient  barriers to mobility and need for assistive/adaptive devices  - Assess patient's emotional response to limitations  - Collaborate with interdisciplinary team and initiate plans and interventions as ordered  - Encourage independent activity per ability   - Maintain proper body alignment  - Perform active/passive rom as tolerated/ordered    - Plan activities to conserve energy   - Turn patient as appropriate  Outcome: Progressing     Problem: Communication Impairment  Goal: Ability to express needs and understand communication  Description: Assess patient's communication skills and ability to understand information  Patient will demonstrate use of effective communication techniques, alternative methods of communication and understanding even if not able to speak  - Encourage communication and provide alternate methods of communication as needed  - Collaborate with case management/ for discharge needs  - Include patient/family/caregiver in decisions related to communication  Outcome: Progressing     Problem: Potential for Aspiration  Goal: Non-ventilated patient's risk of aspiration is minimized  Description: Assess and monitor vital signs, respiratory status, and labs (WBC)  Monitor for signs of aspiration (tachypnea, cough, rales, wheezing, cyanosis, fever)  - Assess and monitor patient's ability to swallow  - Place patient up in chair to eat if possible  - HOB up at 90 degrees to eat if unable to get patient up into chair   - Supervise patient during oral intake  - Instruct patient/ family to take small bites  - Instruct patient/ family to take small single sips when taking liquids  - Follow patient-specific strategies generated by speech pathologist   Outcome: Progressing  Goal: Ventilated patient's risk of aspiration is minimized  Description: Assess and monitor vital signs, respiratory status, airway cuff pressure, and labs (WBC)  Monitor for signs of aspiration (tachypnea, cough, rales, wheezing, cyanosis, fever)  - Elevate head of bed 30 degrees if patient has tube feeding   - Monitor tube feeding  Outcome: Progressing     Problem: Nutrition  Goal: Nutrition/Hydration status is improving  Description: Monitor and assess patient's nutrition/hydration status for malnutrition (ex- brittle hair, bruises, dry skin, pale skin and conjunctiva, muscle wasting, smooth red tongue, and disorientation)   Collaborate with interdisciplinary team and initiate plan and interventions as ordered  Monitor patient's weight and dietary intake as ordered or per policy  Utilize nutrition screening tool and intervene per policy  Determine patient's food preferences and provide high-protein, high-caloric foods as appropriate  - Assist patient with eating   - Allow adequate time for meals   - Encourage patient to take dietary supplement as ordered  - Collaborate with clinical nutritionist   - Include patient/family/caregiver in decisions related to nutrition    Outcome: Progressing     Problem: Prexisting or High Potential for Compromised Skin Integrity  Goal: Skin integrity is maintained or improved  Description: INTERVENTIONS:  - Identify patients at risk for skin breakdown  - Assess and monitor skin integrity  - Assess and monitor nutrition and hydration status  - Monitor labs   - Assess for incontinence   - Turn and reposition patient  - Assist with mobility/ambulation  - Relieve pressure over bony prominences  - Avoid friction and shearing  - Provide appropriate hygiene as needed including keeping skin clean and dry  - Evaluate need for skin moisturizer/barrier cream  - Collaborate with interdisciplinary team   - Patient/family teaching  - Consider wound care consult   Outcome: Progressing     Problem: MOBILITY - ADULT  Goal: Maintain or return to baseline ADL function  Description: INTERVENTIONS:  -  Assess patient's ability to carry out ADLs; assess patient's baseline for ADL function and identify physical deficits which impact ability to perform ADLs (bathing, care of mouth/teeth, toileting, grooming, dressing, etc )  - Assess/evaluate cause of self-care deficits   - Assess range of motion  - Assess patient's mobility; develop plan if impaired  - Assess patient's need for assistive devices and provide as appropriate  - Encourage maximum independence but intervene and supervise when necessary  - Involve family in performance of ADLs  - Assess for home care needs following discharge   - Consider OT consult to assist with ADL evaluation and planning for discharge  - Provide patient education as appropriate  Outcome: Progressing  Goal: Maintains/Returns to pre admission functional level  Description: INTERVENTIONS:  - Perform BMAT or MOVE assessment daily    - Set and communicate daily mobility goal to care team and patient/family/caregiver  - Collaborate with rehabilitation services on mobility goals if consulted  - Out of bed for toileting  - Record patient progress and toleration of activity level   Outcome: Progressing     Problem: Nutrition/Hydration-ADULT  Goal: Nutrient/Hydration intake appropriate for improving, restoring or maintaining nutritional needs  Description: Monitor and assess patient's nutrition/hydration status for malnutrition  Collaborate with interdisciplinary team and initiate plan and interventions as ordered  Monitor patient's weight and dietary intake as ordered or per policy  Utilize nutrition screening tool and intervene as necessary  Determine patient's food preferences and provide high-protein, high-caloric foods as appropriate       INTERVENTIONS:  - Monitor oral intake, urinary output, labs, and treatment plans  - Assess nutrition and hydration status and recommend course of action  - Evaluate amount of meals eaten  - Assist patient with eating if necessary   - Allow adequate time for meals  - Recommend/ encourage appropriate diets, oral nutritional supplements, and vitamin/mineral supplements  - Order, calculate, and assess calorie counts as needed  - Recommend, monitor, and adjust tube feedings and TPN/PPN based on assessed needs  - Assess need for intravenous fluids  - Provide specific nutrition/hydration education as appropriate  - Include patient/family/caregiver in decisions related to nutrition  Outcome: Progressing

## 2021-09-04 NOTE — ASSESSMENT & PLAN NOTE
· Patient presented to ED 9/2 with left faial droop and L upper visual field deficit, and difficulty in speech  · Stroke alert activated in ED   · CT Head with no acute intracranial hemorrhage  · CTA Head with severe plaque stenosis of bilateral common carotid arteries  Right PCA distal focal occlusion     · S/p Tpa, follow tpa protocol   · MRI 3/21: 2 foci of mild diffusion restriction in the right frontal lobe, indicative of recent/acute to subacute infarction  · S/p R carotid endarterectomy 4/13/21   · Echo 3/21: EF 60%  · Echo 9/3: EF 67%, grade 1 diastolic dysfunction  · Plan:  · Stroke protocol   · neuro exam q4h  · Restart home BP meds one at a time, will start coreg 12 5 BID today  · MRI pending   · STAT CT Head with any neuro changes   · Hold ASA/Plavix  · Neurology on board, appreciate recommendations    · Pending read of MRI, can transfer to [facility]

## 2021-09-04 NOTE — ASSESSMENT & PLAN NOTE
· Carotid study 7/21 with high grade stenosis of R carotid and 50-69% stenosis noted in the internal carotid artery  · Follows with outpatient vascular surgery   · Continue Statin, hold asa/plavix  · F/u outpatient with vascular

## 2021-09-04 NOTE — PROGRESS NOTES
NEUROLOGY RESIDENCY PROGRESS NOTE     Name: Kelsey Dee   Age & Sex: 73 y o  male   MRN: [id number]  Unit/Bed#: ICU 07   Encounter: [id number]    ASSESSMENT & PLAN     * CVA (cerebral vascular accident) [facility])  Assessment & Plan  Presented as stroke alert on 9/2/2021  7:52 PM with initial NIHSS of 3 and LKW 1830, initial Blood Pressure: (!) 183/77  Initial presenting deficits were slurred speech left sided weakness left VF cut  Pt was found to be a tPA candidate within the appropriate time window and without obvious contraindication and tPA was given      Post tPA exam: L superior quadrantanopsia  Current Blood Pressure: (!) 176/78   Vascular risk factors: previous strokes, active smoker, elevated cholesterol and PAD    Workup:  Lab Results   Component Value Date    HGBA1C 6 0 (H) 09/02/2021    CHOLESTEROL 175 09/02/2021    LDLCALC 83 09/02/2021    TRIG 168 (H) 09/02/2021    INR 1 03 09/02/2021       CTH: no acute findings, chronic infarct in right frontal corona radiata and right centrum semiovale   CTA: hx of RCEA, L ICA with severe stenosis at origin; chronic RPCA occlusion, severe atherosclerotic disease throughout   MRI: large acute R PCA territory stroke   Echocardiogram: LVEF 55% RA dilation   Telemetry: monitored    Pertinent scores:  - NIHSS: 3  - Modified Emmett: 1    Impression: large R PCA territory CVA with left sided deficits in the setting of current tobacco use and b/l ICA stenosis with numerous heterogeneous plaques and significant intracranial atherosclerosis    Plan:  - Stroke pathway  - Discussed plan with neurology attending, Dr Bernardo Gan  - BP: BP as close to but <180 SBP due to recent tPA administration, normotension after 24 hours  - If SBP is >180, increase frequency of BP measurements and administer antihypertensive agents as needed to maintain at or below 180 SBP  - atorvastatin 80mg qhs  - Maintain glucose <180, SSI for coverage if indicated  - Repeat CTH if GCS drops 2pts in 1hr or if post tPA if pt is reporting severe headache, acute increase in BP, N/V  - Medical management as per primary team appreciated  - Antiplatelet agents: restart ASA  - Monitor on telemetry  - PT/OT/Speech/PM&R input appreciated  - As pt was compliant with ASA and Plavix at home would recommend switching from ASA and Plavix to ASA and Eliquis after 7 days, continue on ASA for now  - No further inpatient neurology recommendations, will sign off, please call with questions or concerns  - Outpatient neurology follow up requested and follow up information is in discharge providers          Rosanne Lomeli will need follow up in in 4 weeks with neurovascular attending or AP  He will not require outpatient neurological testing  SUBJECTIVE     Patient was seen and examined  No acute events overnight  He feels pretty much back to normal at this point  Discussed his MRI with him that he had another stroke and that he needs to stop smoking  Review of Systems   Constitutional: Negative for fever  HENT: Positive for dental problem  Eyes: Positive for visual disturbance  Respiratory: Negative for shortness of breath  Cardiovascular: Negative for chest pain, palpitations and leg swelling  Gastrointestinal: Negative for abdominal pain  Musculoskeletal: Negative for back pain  Neurological: Positive for weakness and numbness  Negative for dizziness and headaches  Psychiatric/Behavioral: Negative for agitation  The patient is not nervous/anxious  OBJECTIVE     Patient ID: Rosanne Lomeli is a 73 y o  male      Vitals:    21 0600 21 0700 21 0801 21 1138   BP:  (!) 183/82 (!) 176/78 135/72   BP Location:  Left arm     Pulse: 72 65 73    Resp: (!) 29 20     Temp:  98 6 °F (37 °C)     TempSrc:  Oral     SpO2: 95% 93%     Weight:       Height:          Temperature:   Temp (24hrs), Av 7 °F (37 1 °C), Min:98 3 °F (36 8 °C), Max:99 2 °F (37 3 °C)    Temperature: 98 6 °F (37 °C)    Neurologic Exam     Mental Status   Oriented to person, place, and time  Attention: normal  Concentration: normal    Speech: speech is normal   Level of consciousness: alert  Knowledge: good  Normal comprehension  Cranial Nerves     CN II   Left visual field deficit: upper temporal quadrant(s)    CN III, IV, VI   Pupils are equal, round, and reactive to light  Extraocular motions are normal      CN V   Facial sensation intact  CN VII   Facial expression full, symmetric  CN VIII   CN VIII normal      CN IX, X   CN IX normal    CN X normal      CN XI   CN XI normal      CN XII   CN XII normal      Motor Exam   Muscle bulk: normal  Overall muscle tone: normal  Right arm pronator drift: absent  Left arm pronator drift: absent    Strength   Strength 5/5 throughout  Sensory Exam   Right arm light touch: normal  Left arm light touch: decreased from elbow  Right leg light touch: normal  Left leg light touch: normal  Vibration normal    Pinprick normal      Gait, Coordination, and Reflexes     Coordination   Finger to nose coordination: normal    Tremor   Resting tremor: absent  Intention tremor: absent  Action tremor: absent    Reflexes   Right brachioradialis: 3+  Left brachioradialis: 3+  Right biceps: 3+  Left biceps: 3+  Right triceps: 3+  Left triceps: 3+  Right patellar: 3+  Left patellar: 3+  Right achilles: 2+  Left achilles: 2+  Right plantar: normal  Left plantar: normal  Right Cortes: absent  Left Cortes: absent  Right ankle clonus: absent  Left ankle clonus: present (NS)  Cross adductors b/l        LABORATORY DATA     Labs:  I have personally reviewed pertinent films in PACS  Results from last 7 days   Lab Units 09/03/21 2320 09/02/21 2007   WBC Thousand/uL 7 54 5 62   HEMOGLOBIN g/dL 9 4* 9 9*   HEMATOCRIT % 32 9* 34 9*   PLATELETS Thousands/uL 257 332   NEUTROS PCT % 78*  --    MONOS PCT % 7  --       Results from last 7 days   Lab Units 09/04/21 0903 09/03/21 2320 09/02/21 2007   POTASSIUM mmol/L 3 9 3 1* 4 0   CHLORIDE mmol/L 103 99* 103   CO2 mmol/L 24 26 29   BUN mg/dL 13 18 19   CREATININE mg/dL 1 18 1 34* 1 45*   CALCIUM mg/dL 9 4 9 2 9 7   ALK PHOS U/L  --  54  --    ALT U/L  --  15  --    AST U/L  --  16  --      Results from last 7 days   Lab Units 09/04/21  0903 09/03/21  2320   MAGNESIUM mg/dL 3 6* 1 3*     Results from last 7 days   Lab Units 09/03/21  2320   PHOSPHORUS mg/dL 2 8      Results from last 7 days   Lab Units 09/02/21 2007   INR  1 03   PTT seconds 34         Results from last 7 days   Lab Units 09/02/21 2007   TROPONIN I ng/mL <0 02       IMAGING & DIAGNOSTIC TESTING     Radiology Results: I have personally reviewed pertinent films in PACS      Other Diagnostic Testing: I have personally reviewed pertinent films in PACS    ACTIVE MEDICATIONS     Current Facility-Administered Medications   Medication Dose Route Frequency    aclidinium (TUDORZA PRESSAIR) 400 MCG/ACT inhaler 400 mcg  1 puff Inhalation BID    albuterol (PROVENTIL HFA,VENTOLIN HFA) inhaler 2 puff  2 puff Inhalation Q4H PRN    amLODIPine (NORVASC) tablet 5 mg  5 mg Oral Daily    And    lisinopril (ZESTRIL) tablet 10 mg  10 mg Oral Daily    atorvastatin (LIPITOR) tablet 80 mg  80 mg Oral Daily With Dinner    calcium carbonate (TUMS) chewable tablet 1,000 mg  1,000 mg Oral BID PRN    carvedilol (COREG) tablet 12 5 mg  12 5 mg Oral BID With Meals    fenofibrate (TRICOR) tablet 168 mg  168 mg Oral Daily    ferrous sulfate tablet 325 mg  325 mg Oral Daily With Breakfast    hydrALAZINE (APRESOLINE) injection 5 mg  5 mg Intravenous Q4H PRN    levalbuterol (XOPENEX) inhalation solution 1 25 mg  1 25 mg Nebulization Q8H PRN    pantoprazole (PROTONIX) EC tablet 40 mg  40 mg Oral Early Morning    sertraline (ZOLOFT) tablet 100 mg  100 mg Oral HS       Prior to Admission medications    Medication Sig Start Date End Date Taking?  Authorizing Provider   aclidinium (Beverly Omer) 400 MCG/ACT inhaler Inhale 1 puff 2 (two) times a day   Yes Historical Provider, MD   ALBUTEROL IN Inhale every 4 (four) hours as needed Unknown dose   Yes Historical Provider, MD   carvedilol (COREG) 12 5 mg tablet Take 12 5 mg by mouth 2 (two) times a day with meals   Yes Historical Provider, MD   clopidogrel (PLAVIX) 75 mg tablet TAKE 1 TABLET BY MOUTH EVERY DAY FOR 19 DOSES  Patient taking differently: daily  7/28/21  Yes Hannah Adamson PA-C   sertraline (ZOLOFT) 100 mg tablet Take by mouth 1/19/11  Yes Historical Provider, MD   acetaminophen (TYLENOL) 325 mg tablet Take 2 tablets (650 mg total) by mouth every 6 (six) hours as needed for mild pain 4/14/21   Newton Patton PA-C   ALPRAZolam Mel Rios) 0 25 mg tablet TAKE 1 TABLET BY ORAL ROUTE 4 TIMES EVERY DAY AS NEEDED 6/15/21   Historical Provider, MD   amLODIPine-benazepril (LOTREL 5-10) 5-10 MG per capsule Take 1 capsule by mouth daily  4/15/21   Newton Patton PA-C   aspirin (ECOTRIN LOW STRENGTH) 81 mg EC tablet Take 1 tablet (81 mg total) by mouth daily 3/31/21 8/9/21  Delores Humphrey MD   atorvastatin (LIPITOR) 40 mg tablet Take 1 tablet (40 mg total) by mouth every evening 3/30/21 8/9/21  Delores Humphrey MD   baclofen 10 mg tablet  8/4/21   Historical Provider, MD   fenofibrate 160 MG tablet Take 160 mg by mouth daily 6/25/21   Historical Provider, MD   metoprolol tartrate (LOPRESSOR) 25 mg tablet Take 1 tablet (25 mg total) by mouth every 12 (twelve) hours 3/30/21 4/29/21  Delores Humphrey MD   omeprazole (PriLOSEC) 20 mg delayed release capsule Take 20 mg by mouth daily Take before a meal 7/2/21   Historical Provider, MD   pravastatin (PRAVACHOL) 40 mg tablet Take 40 mg by mouth daily 7/26/21   Historical Provider, MD   thiamine 100 MG tablet Take 1 tablet (100 mg total) by mouth daily 3/31/21 8/9/21  Delores Humphrey MD   Varenicline Tartrate (CHANTIX PO) Take 1 tablet by mouth daily  Patient not taking: Reported on 9/2/2021    Historical Provider, MD ==  MD Lory Mccarty 74 Neurology Residency, PGY-3

## 2021-09-04 NOTE — ASSESSMENT & PLAN NOTE
· Not currently in acute exacerbation   · Continue xoponex/atrovent   · Continue home aclidinium  · Currently on room air   · No documented PFTs

## 2021-09-04 NOTE — ASSESSMENT & PLAN NOTE
Recent Labs     09/02/21 2007 09/03/21  2320   CREATININE 1 45* 1 34*   EGFR 50 55     Estimated Creatinine Clearance: 53 3 mL/min (A) (by C-G formula based on SCr of 1 34 mg/dL (H))    · Baseline creatinine 1 2  · Avoid nephrotoxic agents and hypotension   · Continue to trend BUN/Cr  · Will need outpatient nephrology follow up

## 2021-09-04 NOTE — ASSESSMENT & PLAN NOTE
· Home regimen: statin + fenofibrate  · Continue with increased statin dose and restart home fenofibrate

## 2021-09-04 NOTE — CASE MANAGEMENT
LOS: 2 days  Patient is not a readmission  Patient is not identified as a bundle  Readmission Risk Score: 14     CM spoke with patient to provide introduction to CM and to discuss discharge planning  Pt reports that he lives alone in a mobile home with three steps to enter the home  Patient reports that he drives, and is independent in ADLs/IADLs PTA  Patient denies use of DME for ambulation, but has a cane  Grab bars in shower  Denies hx HHC and SNF; Denies inpatient mental health or drug/alcohol treatment  Pt does not have a living will  Reported that [facility] pharmacy on [address] is preferred pharmacy  PCP is Wells Sacks  Pt reports that his daughter will transport upon d/c  Discussed PT/OT recs for OP rehab @ discharge  CM following for any additional d/c needs  CM reviewed d/c planning process including the following: identifying help at home, patient preference for d/c planning needs, Discharge Lounge, [facility] Meds to Bed program, availability of treatment team to discuss questions or concerns patient and/or family may have regarding understanding medications and recognizing signs and symptoms once discharged  CM also encouraged patient to follow up with all recommended appointments after discharge  Patient advised of importance for patient and family to participate in managing patients medical well being

## 2021-09-04 NOTE — ASSESSMENT & PLAN NOTE
Presented as stroke alert on 9/2/2021  7:52 PM with initial NIHSS of 3 and LKW 1830, initial Blood Pressure: (!) 183/77  Initial presenting deficits were slurred speech left sided weakness left VF cut  Pt was found to be a tPA candidate within the appropriate time window and without obvious contraindication and tPA was given      Post tPA exam: L superior quadrantanopsia  Current Blood Pressure: (!) 176/78   Vascular risk factors: previous strokes, active smoker, elevated cholesterol and PAD    Workup:  Lab Results   Component Value Date    HGBA1C 6 0 (H) 09/02/2021    CHOLESTEROL 175 09/02/2021    LDLCALC 83 09/02/2021    TRIG 168 (H) 09/02/2021    INR 1 03 09/02/2021       CTH: no acute findings, chronic infarct in right frontal corona radiata and right centrum semiovale   CTA: hx of RCEA, L ICA with severe stenosis at origin; chronic RPCA occlusion, severe atherosclerotic disease throughout   MRI: large acute R PCA territory stroke   Echocardiogram: LVEF 55% RA dilation   Telemetry: monitored    Pertinent scores:  - NIHSS: 3  - Modified Moore: 1    Impression: large R PCA territory CVA with left sided deficits in the setting of current tobacco use and b/l ICA stenosis with numerous heterogeneous plaques and significant intracranial atherosclerosis    Plan:  - Stroke pathway  - Discussed plan with neurology attending, Dr Gopi Ayoub  - BP: BP as close to but <180 SBP due to recent tPA administration, normotension after 24 hours  - If SBP is >180, increase frequency of BP measurements and administer antihypertensive agents as needed to maintain at or below 180 SBP  - atorvastatin 80mg qhs  - Maintain glucose <180, SSI for coverage if indicated  - Repeat CTH if GCS drops 2pts in 1hr or if post tPA if pt is reporting severe headache, acute increase in BP, N/V  - Medical management as per primary team appreciated  - Antiplatelet agents: restart ASA  - Monitor on telemetry  - PT/OT/Speech/PM&R input appreciated  - As pt was compliant with ASA and Plavix at home would recommend switching from ASA and Plavix to ASA and Eliquis after 7 days, continue on ASA for now  - No further inpatient neurology recommendations, will sign off, please call with questions or concerns    - Outpatient neurology follow up requested and follow up information is in discharge providers

## 2021-09-04 NOTE — ASSESSMENT & PLAN NOTE
· S/p endarterectomy 4/13/21 with Dr Andie Fine due to symptomatic extensive R common and internal carotid artery stenosis  · Progressive disease and patient continues to smoke   · Currently on ASA/Atrovastatin/Plavix as outpatient   · Supposed to have outpatient CTA but has new renal insufficiency

## 2021-09-04 NOTE — UTILIZATION REVIEW
Continued Stay Review    Date: 9/4/2021                         Current Patient Class: inpatient  Current Level of Care: med surg     HPI:66 y o  male initially admitted on 9/2/2021 inpatient due to CVA  Presented due ot left facial droop, left upper visual field deficit and dysarthria  Given tpa  Assessment/Plan:  9/4/2021:   On exam lungs wheezing   LUE weakness  To continue stroke protocol:  Neuro checks every 4 hours, restart home BP medications one at a time, today start Coreg,  Hold asa and plavix  MRI is pending  Vital Signs:   09/04/21 0801  --  73  --  176/78Abnormal   --  --  --  --   09/04/21 0700  98 6 °F (37 °C)  65  20  183/82Abnormal   118  93 %  None (Room air)  Lying   09/04/21 0600  --  72  29Abnormal   --  --  95 %  --  --   09/04/21 0500  --  70  35Abnormal   --  --  94 %  --  --   09/04/21 0400  --  68  22  --  --  95 %  --  --   09/04/21 0300  --  67  24Abnormal   --  --  92 %  --  --   09/04/21 0200  --  72  24Abnormal   175/74Abnormal   107  93 %  --  --   09/04/21 0100  --  72  21  170/72  105  95 %  --  --   09/04/21 0000  98 6 °F (37 °C)  68  24Abnormal   149/84  107  95 %  None (Room air)         Pertinent Labs/Diagnostic Results:   9/2   ECG-Normal sinus rhythm  CT brain stroke alert-No evidence of acute intracranial hemorrhage  Age-indeterminate ischemia in the right frontal corona radiata and right centrum semiovale   CTA head /neck-  RIGHT EXTRACRANIAL CAROTID SEGMENT: Severe plaque stenosis and significant hemodynamic stenosis of the right common carotid artery origin/proximally near its origin as well  Moderate to marked plaque stenosis right common carotid artery  Status post right endarterectomy which appears patent    LEFT EXTRACRANIAL CAROTID SEGMENT: Severe plaque stenosis left common carotid artery origin   Moderate to severe left common carotid artery and left carotid bulb plaque stenosis    Right PCA distal focal occlusion       Mild plaque stenosis right V4 segment    Severe hemodynamic significant plaque stenosis left V4 segment  CXR-No acute cardiopulmonary disease      9/3- ECHO  LEFT VENTRICLE: Size was normal  Systolic function was normal by visual assessment  Ejection fraction was estimated to be 55 %  There were no regional wall motion abnormalities  Wall thickness was normal  DOPPLER: There was an increased  relative contribution of atrial contraction to ventricular filling  Doppler parameters were consistent with abnormal left ventricular relaxation (grade 1 diastolic dysfunction)    RIGHT VENTRICLE: The ventricle was mildly dilated  Systolic function was normal  DOPPLER: Systolic pressure was not estimated   LEFT ATRIUM: Size was normal    RIGHT ATRIUM: The atrium was mildly dilated  9/3/2021 MRI - Acute infarct in the right occipital lobe correlating with focal right PCA occlusion on yesterday's CTA  Lacunar infarct in the right thalamus  favored to be subacute     No acute hemorrhage or mass effect  Additional chronic ischemic changes as described        Results from last 7 days   Lab Units 09/03/21 2320 09/02/21 2007   WBC Thousand/uL 7 54 5 62   HEMOGLOBIN g/dL 9 4* 9 9*   HEMATOCRIT % 32 9* 34 9*   PLATELETS Thousands/uL 257 332   NEUTROS ABS Thousands/µL 5 88  --      Results from last 7 days   Lab Units 09/04/21 0903 09/03/21 2320 09/02/21 2007   SODIUM mmol/L 138 136 141   POTASSIUM mmol/L 3 9 3 1* 4 0   CHLORIDE mmol/L 103 99* 103   CO2 mmol/L 24 26 29   ANION GAP mmol/L 11 11 9   BUN mg/dL 13 18 19   CREATININE mg/dL 1 18 1 34* 1 45*   EGFR ml/min/1 73sq m 64 55 50   CALCIUM mg/dL 9 4 9 2 9 7   CALCIUM, IONIZED mmol/L  --  1 11*  --    MAGNESIUM mg/dL  --  1 3*  --    PHOSPHORUS mg/dL  --  2 8  --      Results from last 7 days   Lab Units 09/03/21 2320   AST U/L 16   ALT U/L 15   ALK PHOS U/L 54   TOTAL PROTEIN g/dL 7 8   ALBUMIN g/dL 3 8   TOTAL BILIRUBIN mg/dL 0 38     Results from last 7 days   Lab Units 09/02/21 2005   POC GLUCOSE mg/dl 116     Results from last 7 days   Lab Units 09/04/21  0903 09/03/21  2320 09/02/21 2007   GLUCOSE RANDOM mg/dL 109 117 110     Results from last 7 days   Lab Units 09/02/21 2007   HEMOGLOBIN A1C % 6 0*   EAG mg/dl 126     Results from last 7 days   Lab Units 09/02/21 2007   TROPONIN I ng/mL <0 02         Results from last 7 days   Lab Units 09/02/21 2007   PROTIME seconds 13 6   INR  1 03   PTT seconds 34     Results from last 7 days   Lab Units 09/03/21  2320   FERRITIN ng/mL 10       Medications:   Scheduled Medications:  aclidinium, 1 puff, Inhalation, BID  amLODIPine, 5 mg, Oral, Daily   And  lisinopril, 10 mg, Oral, Daily  atorvastatin, 80 mg, Oral, Daily With Dinner  carvedilol, 12 5 mg, Oral, BID With Meals  fenofibrate, 168 mg, Oral, Daily  ferrous sulfate, 325 mg, Oral, Daily With Breakfast  pantoprazole, 40 mg, Oral, Early Morning  sertraline, 100 mg, Oral, HS    magnesium sulfate 2 g/50 mL IVPB (premix) 2 g   Dose: 2 g  Freq: Once Route: IV  Last Dose: Stopped (09/04/21 0829)  Start: 09/04/21 0630 End: 09/04/21 0829    magnesium sulfate 2 g/50 mL IVPB (premix) 2 g   Dose: 2 g  Freq: Once Route: IV  Last Dose: Stopped (09/04/21 0243)  Start: 09/04/21 0000 End: 09/04/21 0243    potassium chloride (K-DUR,KLOR-CON) CR tablet 40 mEq   Dose: 40 mEq  Freq: Once Route: PO  Start: 09/04/21 0000 End: 09/04/21 0043    Continuous IV Infusions: none      PRN Meds: not used   albuterol, 2 puff, Inhalation, Q4H PRN  calcium carbonate, 1,000 mg, Oral, BID PRN  hydrALAZINE, 5 mg, Intravenous, Q4H PRN  levalbuterol, 1 25 mg, Nebulization, Q8H PRN      Discharge Plan: To be  Determined    Network Utilization Review Department  ATTENTION: Please call with any questions or concerns to [phone] and carefully listen to the prompts so that you are directed to the right person   All voicemails are confidential   [employer] all requests for admission clinical reviews, approved or denied determinations and any other requests to dedicated fax number below belonging to the campus where the patient is receiving treatment   List of dedicated fax numbers for the Facilities:  [address] [place] [address] DENIALS (Administrative/Medical Necessity) [phone]   [address] (Maternity/NICU/Pediatrics) [phone]   [address] [place] [address] [facility] [address] [address] [place] [address] Kaykay Gómez 14[address] [place] [address] [phone]

## 2021-09-04 NOTE — PROGRESS NOTES
[facility]  Progress Note Charli Fus 1955, 73 y o  male MRN: [id number]  Unit/Bed#: ICU 07 Encounter: [id number]  Primary Care Provider: Jose Brooks DO   Date and time admitted to hospital: 9/2/2021  7:52 PM    * CVA (cerebral vascular accident) [facility])  Assessment & Plan  · Patient presented to ED 9/2 with left faial droop and L upper visual field deficit, and difficulty in speech  · Stroke alert activated in ED   · CT Head with no acute intracranial hemorrhage  · CTA Head with severe plaque stenosis of bilateral common carotid arteries  Right PCA distal focal occlusion  · S/p Tpa, follow tpa protocol   · MRI 3/21: 2 foci of mild diffusion restriction in the right frontal lobe, indicative of recent/acute to subacute infarction  · S/p R carotid endarterectomy 4/13/21   · Echo 3/21: EF 60%  · Echo 9/3: EF 74%, grade 1 diastolic dysfunction  · Plan:  · Stroke protocol   · neuro exam q4h  · Restart home BP meds one at a time, will start coreg 12 5 BID today  · MRI pending   · STAT CT Head with any neuro changes   · Hold ASA/Plavix  · Neurology on board, appreciate recommendations    · Pending read of MRI, can transfer to med-surg    Renal insufficiency  Assessment & Plan  Recent Labs     09/02/21 2007 09/03/21  2320   CREATININE 1 45* 1 34*   EGFR 50 55     Estimated Creatinine Clearance: 53 3 mL/min (A) (by C-G formula based on SCr of 1 34 mg/dL (H))    · Baseline creatinine 1 2  · Avoid nephrotoxic agents and hypotension   · Continue to trend BUN/Cr  · Will need outpatient nephrology follow up     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  Assessment & Plan  · S/p endarterectomy 4/13/21 with Dr Burke Arora due to symptomatic extensive R common and internal carotid artery stenosis  · Progressive disease and patient continues to smoke   · Currently on ASA/Atrovastatin/Plavix as outpatient   · Supposed to have outpatient CTA but has new renal insufficiency       Iron deficiency anemia  Assessment & Plan  Recent Labs     09/02/21 2007 09/03/21  2320   HGB 9 9* 9 4*     · Chronic   · Iron panel: low iron, high TIBC  · Continue to trend blood counts  · Transfuse for Hgb<7   · Iron tabs 325 daily    Benign essential hypertension  Assessment & Plan  · Blood pressure goal <180/105   · PRN hydralazine  · Home regimen: amlodipine/metoprolol/benazapril/coreg  · Will start to reintroduce home meds today, starting with coreg 12 5mg BID    Stenosis of right internal carotid artery  Assessment & Plan  · Carotid study 7/21 with high grade stenosis of R carotid and 50-69% stenosis noted in the internal carotid artery  · Follows with outpatient vascular surgery   · Continue Statin, hold asa/plavix  · F/u outpatient with vascular    GERD (gastroesophageal reflux disease)  Assessment & Plan  - Protonix 40mg daily in place of his home Prilosec 20mg daily    COPD (chronic obstructive pulmonary disease) ([facility] [address] )  Assessment & Plan  · Not currently in acute exacerbation   · Continue xoponex/atrovent   · Continue home aclidinium  · Currently on room air   · No documented PFTs    HLD (hyperlipidemia)  Assessment & Plan  · Home regimen: statin + fenofibrate  · Continue with increased statin dose and restart home fenofibrate    Tobacco use  Assessment & Plan  · Currently every day smoker  · Smokes 1/2ppd   · Refused nicotine patch   · Smoking cessation education      ----------------------------------------------------------------------------------------  HPI/24hr events: no reported events    ---------------------------------------------------------------------------------------  SUBJECTIVE  Patient reports no acute complaints  He states he has been up and out of bed  Normal urine and stool output  Good appetite  No vision/hearing/speech deficits  Review of Systems   Constitutional: Negative for activity change, appetite change, chills, fatigue and fever  Eyes: Negative for visual disturbance  Respiratory: Negative for cough and shortness of breath  Cardiovascular: Negative for chest pain and leg swelling  Gastrointestinal: Negative for abdominal pain, constipation, diarrhea and nausea  Genitourinary: Negative for difficulty urinating and frequency  Musculoskeletal: Negative for gait problem  Neurological: Negative for dizziness, speech difficulty, light-headedness and headaches  Psychiatric/Behavioral: Negative for dysphoric mood  The patient is not nervous/anxious  Review of systems was reviewed and negative unless stated above in HPI/24-hour events   ---------------------------------------------------------------------------------------  Patient appropriate for transfer out of the ICU today?: Patient does not meet criteria for ICU Follow-up Clinic; referral has not been made  Disposition: Transfer to [facility] with Telemetry   Code Status: Level 1 - Full Code  ---------------------------------------------------------------------------------------  ICU CORE MEASURES    Prophylaxis   VTE Pharmacologic Prophylaxis: Pharmacologic VTE Prophylaxis contraindicated due to cva  VTE Mechanical Prophylaxis: sequential compression device  Stress Ulcer Prophylaxis: Pantoprazole PO    ABCDE Protocol (if indicated)  Plan to perform spontaneous awakening trial today? Not applicable  Plan to perform spontaneous breathing trial today? Not applicable  Obvious barriers to extubation? Not applicable  CAM-ICU: n/a    Invasive Devices Review  Invasive Devices     Peripheral Intravenous Line            Peripheral IV 09/02/21 Left Forearm 1 day    Peripheral IV 09/03/21 Left Arm <1 day              Can any invasive devices be discontinued today?  No  ---------------------------------------------------------------------------------------  OBJECTIVE    Vitals   Vitals:    09/04/21 0400 09/04/21 0500 09/04/21 0600 09/04/21 0700   BP:    (!) 183/82   BP Location:    Left arm   Pulse: 68 70 72 65   Resp: 22 (!) 35 (!) 29 20   Temp:    98 6 °F (37 °C)   TempSrc:    Oral   SpO2: 95% 94% 95% 93%   Weight:       Height:         Temp (24hrs), Av 6 °F (37 °C), Min:98 3 °F (36 8 °C), Max:99 2 °F (37 3 °C)  Current: Temperature: 98 6 °F (37 °C)  HR: 71  BP: 183/82  RR: 19  SpO2: 94    Respiratory:  SpO2: SpO2: 93 %, SpO2 Activity: SpO2 Activity: At Rest, SpO2 Device: O2 Device: None (Room air)       Invasive/non-invasive ventilation settings   Respiratory    Lab Data (Last 4 hours)    None         O2/Vent Data (Last 4 hours)    None                Physical Exam  Vitals reviewed  Constitutional:       General: He is not in acute distress  Appearance: He is normal weight  He is not ill-appearing  HENT:      Head: Normocephalic and atraumatic  Nose: Nose normal  No congestion  Mouth/Throat:      Mouth: Mucous membranes are moist       Pharynx: Oropharynx is clear  Eyes:      Extraocular Movements: Extraocular movements intact  Conjunctiva/sclera: Conjunctivae normal       Pupils: Pupils are equal, round, and reactive to light  Comments: Left lid droop   Cardiovascular:      Rate and Rhythm: Normal rate and regular rhythm  Pulses: Normal pulses  Heart sounds: Normal heart sounds  Pulmonary:      Effort: Pulmonary effort is normal       Breath sounds: Wheezing present  Abdominal:      General: Abdomen is flat  Bowel sounds are normal       Palpations: Abdomen is soft  Musculoskeletal:      Cervical back: Normal range of motion and neck supple  Right lower leg: No edema  Left lower leg: No edema  Skin:     General: Skin is warm and dry  Capillary Refill: Capillary refill takes less than 2 seconds  Neurological:      Mental Status: He is alert and oriented to person, place, and time  Motor: Weakness (LUE) present     Psychiatric:         Mood and Affect: Mood normal          Behavior: Behavior normal          Laboratory and Diagnostics:  Results from last 7 days Lab Units 09/03/21 2320 09/02/21 2007   WBC Thousand/uL 7 54 5 62   HEMOGLOBIN g/dL 9 4* 9 9*   HEMATOCRIT % 32 9* 34 9*   PLATELETS Thousands/uL 257 332   NEUTROS PCT % 78*  --    MONOS PCT % 7  --      Results from last 7 days   Lab Units 09/03/21  2320 09/02/21 2007   SODIUM mmol/L 136 141   POTASSIUM mmol/L 3 1* 4 0   CHLORIDE mmol/L 99* 103   CO2 mmol/L 26 29   ANION GAP mmol/L 11 9   BUN mg/dL 18 19   CREATININE mg/dL 1 34* 1 45*   CALCIUM mg/dL 9 2 9 7   GLUCOSE RANDOM mg/dL 117 110   ALT U/L 15  --    AST U/L 16  --    ALK PHOS U/L 54  --    ALBUMIN g/dL 3 8  --    TOTAL BILIRUBIN mg/dL 0 38  --      Results from last 7 days   Lab Units 09/03/21 2320   MAGNESIUM mg/dL 1 3*   PHOSPHORUS mg/dL 2 8      Results from last 7 days   Lab Units 09/02/21 2007   INR  1 03   PTT seconds 34      Results from last 7 days   Lab Units 09/02/21 2007   TROPONIN I ng/mL <0 02         ABG:    VBG:          Micro        EKG: no new EKG to report  Imaging: X-ray chest 1 view portable    Result Date: 9/3/2021  Impression: No acute cardiopulmonary disease  Findings are stable Workstation performed: [id number]     CT stroke alert brain    Result Date: 9/2/2021  Impression: No evidence of acute intracranial hemorrhage  Age-indeterminate ischemia in the right frontal corona radiata and right centrum semiovale  I personally discussed this study with MERY MATHUR on 9/2/2021 at 8:29 PM  Workstation performed: [id number]     CTA stroke alert (head/neck)    Result Date: 9/2/2021  Impression: RIGHT EXTRACRANIAL CAROTID SEGMENT: Severe plaque stenosis and significant hemodynamic stenosis of the right common carotid artery origin/proximally near its origin as well  Moderate to marked plaque stenosis right common carotid artery  Status post right endarterectomy which appears patent  LEFT EXTRACRANIAL CAROTID SEGMENT: Severe plaque stenosis left common carotid artery origin   Moderate to severe left common carotid artery and left carotid bulb plaque stenosis  Right PCA distal focal occlusion  Mild plaque stenosis right V4 segment  Severe hemodynamic significant plaque stenosis left V4 segment  I personally discussed this study with neurologist on 9/2/2021 at 8:29 PM  Workstation performed: [id number]    I have personally reviewed pertinent reports  Intake and Output  I/O       09/02 0701 - 09/03 0700 09/03 0701 - 09/04 0700    P  O  240 600    I V  (mL/kg)  30 (0 4)    Total Intake(mL/kg) 240 (3 5) 630 (9 2)    Urine (mL/kg/hr) 500 1575 (1)    Stool 0     Total Output 500 1575    Net -260 -945          Unmeasured Urine Occurrence 1 x     Unmeasured Stool Occurrence 1 x         Height and Weights   Height: 5' 9" (175 3 cm)  IBW (Ideal Body Weight): 70 7 kg  Body mass index is 22 33 kg/m²  Weight (last 2 days)     Date/Time   Weight    09/03/21 1500   68 6 (151 24)    09/03/21 0948   68 6 (151 24)    09/03/21 0600   68 6 (151 24)    09/03/21 0302   68 6 (151 24)    09/02/21 2200   68 6 (151 24)    09/02/21 2136   68 6 (151 24)    09/02/21 2050   68 7 (151 5)    09/02/21 1952   68 (150)                Nutrition       Diet Orders   (From admission, onward)             Start     Ordered    09/03/21 1150  Diet Cardiovascular; Sodium 2 GM  Diet effective now     Question Answer Comment   Diet Type Cardiovascular    Cardiac Sodium 2 GM    RD to adjust diet per protocol?  No        09/03/21 1149    09/02/21 2224  Room Service  Once     Question:  Type of Service  Answer:  Room Service - Appropriate with Assistance    09/02/21 2223                Active Medications  Scheduled Meds:  Current Facility-Administered Medications   Medication Dose Route Frequency Provider Last Rate    aclidinium  1 puff Inhalation BID Alex Souleymane, DO      albuterol  2 puff Inhalation Q4H PRN Lou Luna, DO      atorvastatin  80 mg Oral Daily With [employer], DO      calcium carbonate  1,000 mg Oral BID PRN Romoland Souleymane, DO  carvedilol  12 5 mg Oral BID With Meals Rubi Florian, DO      fenofibrate  168 mg Oral Daily Rubi Florian, DO      ferrous sulfate  325 mg Oral Daily With Breakfast Rubi Florian, DO      hydrALAZINE  5 mg Intravenous Q4H PRN Jc Zhu PA-C      levalbuterol  1 25 mg Nebulization Q8H PRN Deborah Ayoub PA-C      magnesium sulfate  2 g Intravenous Once Rubi Florian, DO      pantoprazole  40 mg Oral Early Morning Rubi Florian, DO       Continuous Infusions:     PRN Meds:   albuterol, 2 puff, Q4H PRN  calcium carbonate, 1,000 mg, BID PRN  hydrALAZINE, 5 mg, Q4H PRN  levalbuterol, 1 25 mg, Q8H PRN        Allergies   Allergies   Allergen Reactions    Penicillins Anaphylaxis     ---------------------------------------------------------------------------------------  Advance Directive and Living Will:      Power of :    POLST:    ---------------------------------------------------------------------------------------  Care Time Delivered:   Upon my evaluation, this patient had a high probability of imminent or life-threatening deterioration due to CVA, which required my direct attention, intervention, and personal management  I have personally provided 30 minutes (730 to 800) of critical care time, exclusive of procedures, teaching, family meetings, and any prior time recorded by providers other than myself  Rubi Florian DO      Portions of the record may have been created with voice recognition software  Occasional wrong word or "sound a like" substitutions may have occurred due to the inherent limitations of voice recognition software    Read the chart carefully and recognize, using context, where substitutions have occurred

## 2021-09-05 ENCOUNTER — APPOINTMENT (INPATIENT)
Dept: CT IMAGING | Facility: HOSPITAL | Age: 66
DRG: 062 | End: 2021-09-05
Payer: COMMERCIAL

## 2021-09-05 PROBLEM — R13.10 DYSPHAGIA: Status: ACTIVE | Noted: 2021-09-05

## 2021-09-05 PROBLEM — N18.31 STAGE 3A CHRONIC KIDNEY DISEASE (HCC): Chronic | Status: ACTIVE | Noted: 2021-09-05

## 2021-09-05 PROBLEM — N17.9 AKI (ACUTE KIDNEY INJURY) (HCC): Status: ACTIVE | Noted: 2021-09-05

## 2021-09-05 PROBLEM — R73.03 PRE-DIABETES: Status: ACTIVE | Noted: 2021-09-05

## 2021-09-05 LAB
ANION GAP SERPL CALCULATED.3IONS-SCNC: 7 MMOL/L (ref 4–13)
BUN SERPL-MCNC: 25 MG/DL (ref 5–25)
CALCIUM SERPL-MCNC: 8.6 MG/DL (ref 8.3–10.1)
CHLORIDE SERPL-SCNC: 101 MMOL/L (ref 100–108)
CO2 SERPL-SCNC: 27 MMOL/L (ref 21–32)
CREAT SERPL-MCNC: 1.77 MG/DL (ref 0.6–1.3)
GFR SERPL CREATININE-BSD FRML MDRD: 39 ML/MIN/1.73SQ M
GLUCOSE SERPL-MCNC: 115 MG/DL (ref 65–140)
GLUCOSE SERPL-MCNC: 97 MG/DL (ref 65–140)
MAGNESIUM SERPL-MCNC: 2.5 MG/DL (ref 1.6–2.6)
PHOSPHATE SERPL-MCNC: 2.4 MG/DL (ref 2.3–4.1)
POTASSIUM SERPL-SCNC: 3.8 MMOL/L (ref 3.5–5.3)
SODIUM SERPL-SCNC: 135 MMOL/L (ref 136–145)

## 2021-09-05 PROCEDURE — 84100 ASSAY OF PHOSPHORUS: CPT | Performed by: FAMILY MEDICINE

## 2021-09-05 PROCEDURE — 80048 BASIC METABOLIC PNL TOTAL CA: CPT | Performed by: FAMILY MEDICINE

## 2021-09-05 PROCEDURE — 82948 REAGENT STRIP/BLOOD GLUCOSE: CPT

## 2021-09-05 PROCEDURE — 93005 ELECTROCARDIOGRAM TRACING: CPT

## 2021-09-05 PROCEDURE — 99233 SBSQ HOSP IP/OBS HIGH 50: CPT | Performed by: INTERNAL MEDICINE

## 2021-09-05 PROCEDURE — 70450 CT HEAD/BRAIN W/O DYE: CPT

## 2021-09-05 PROCEDURE — G1004 CDSM NDSC: HCPCS

## 2021-09-05 PROCEDURE — 99232 SBSQ HOSP IP/OBS MODERATE 35: CPT | Performed by: PSYCHIATRY & NEUROLOGY

## 2021-09-05 PROCEDURE — 83735 ASSAY OF MAGNESIUM: CPT | Performed by: FAMILY MEDICINE

## 2021-09-05 RX ORDER — ASPIRIN 81 MG/1
81 TABLET, CHEWABLE ORAL DAILY
Status: DISCONTINUED | OUTPATIENT
Start: 2021-09-05 | End: 2021-09-06 | Stop reason: HOSPADM

## 2021-09-05 RX ORDER — PREDNISONE 20 MG/1
20 TABLET ORAL DAILY
Status: DISCONTINUED | OUTPATIENT
Start: 2021-09-05 | End: 2021-09-06 | Stop reason: HOSPADM

## 2021-09-05 RX ORDER — SODIUM CHLORIDE 9 MG/ML
125 INJECTION, SOLUTION INTRAVENOUS CONTINUOUS
Status: DISCONTINUED | OUTPATIENT
Start: 2021-09-05 | End: 2021-09-06

## 2021-09-05 RX ORDER — DIPHENHYDRAMINE HYDROCHLORIDE 50 MG/ML
50 INJECTION INTRAMUSCULAR; INTRAVENOUS EVERY 6 HOURS PRN
Status: DISCONTINUED | OUTPATIENT
Start: 2021-09-05 | End: 2021-09-06 | Stop reason: HOSPADM

## 2021-09-05 RX ADMIN — ASPIRIN 81 MG: 81 TABLET, CHEWABLE ORAL at 08:07

## 2021-09-05 RX ADMIN — CARVEDILOL 12.5 MG: 12.5 TABLET, FILM COATED ORAL at 08:07

## 2021-09-05 RX ADMIN — FENOFIBRATE 168 MG: 48 TABLET, FILM COATED ORAL at 08:07

## 2021-09-05 RX ADMIN — PANTOPRAZOLE SODIUM 40 MG: 40 TABLET, DELAYED RELEASE ORAL at 05:06

## 2021-09-05 RX ADMIN — PREDNISONE 20 MG: 20 TABLET ORAL at 11:10

## 2021-09-05 RX ADMIN — AMLODIPINE BESYLATE 5 MG: 5 TABLET ORAL at 08:08

## 2021-09-05 RX ADMIN — SODIUM CHLORIDE 125 ML/HR: 0.9 INJECTION, SOLUTION INTRAVENOUS at 17:48

## 2021-09-05 RX ADMIN — SODIUM CHLORIDE 125 ML/HR: 0.9 INJECTION, SOLUTION INTRAVENOUS at 11:11

## 2021-09-05 RX ADMIN — ALBUTEROL SULFATE 2 PUFF: 90 AEROSOL, METERED RESPIRATORY (INHALATION) at 17:46

## 2021-09-05 RX ADMIN — ACLIDINIUM BROMIDE 400 MCG: 400 POWDER, METERED RESPIRATORY (INHALATION) at 21:27

## 2021-09-05 RX ADMIN — ACLIDINIUM BROMIDE 400 MCG: 400 POWDER, METERED RESPIRATORY (INHALATION) at 08:08

## 2021-09-05 RX ADMIN — FERROUS SULFATE TAB 325 MG (65 MG ELEMENTAL FE) 325 MG: 325 (65 FE) TAB at 08:07

## 2021-09-05 RX ADMIN — CARVEDILOL 12.5 MG: 12.5 TABLET, FILM COATED ORAL at 17:08

## 2021-09-05 RX ADMIN — SERTRALINE 100 MG: 100 TABLET, FILM COATED ORAL at 21:26

## 2021-09-05 RX ADMIN — DIPHENHYDRAMINE HYDROCHLORIDE 50 MG: 50 INJECTION, SOLUTION INTRAMUSCULAR; INTRAVENOUS at 07:41

## 2021-09-05 RX ADMIN — ATORVASTATIN CALCIUM 80 MG: 40 TABLET, FILM COATED ORAL at 17:08

## 2021-09-05 NOTE — QUICK NOTE
Uvula edema has difficultly improved with Benadryl x1 and prednisone 20 x 1  He was going to be continued on prednisone 20 for now     Unable to identify any inciting medications/events  Daughter stated at bedside regarding plan

## 2021-09-05 NOTE — ASSESSMENT & PLAN NOTE
· Blood pressure goal <180/105   · PRN hydralazine  · Home regimen: amlodipine/metoprolol/benazapril/coreg  · Currently on coreg 12 5mg BID, Amlodipine 5mg daily  · Benazepril on hold due to worsening renal function

## 2021-09-05 NOTE — RAPID RESPONSE
Rapid Response Note  Jennifer Monk 73 y o  male MRN: [id number]  Unit/Bed#: S -01 Encounter: [id number]    Rapid Response Notification(s):   Primary reason for rapid response call:  Acute change in neuro status    Rapid Response Intervention(s):   Circulation:  None  Fluids administered:  None  Medications administered: Other (comment)  Comments:  Given Benadryl 50 x 1  Background/Situation:   Jennifer Monk is a 73 y o  male who is a 71-year-old male comes to the ED on 09/02/2021 for left facial droop  He was put on stroke pathway and TPA  and noted to have right PCA stroke  Moved from the ICU to [facility] on 09/3 or 9/4/2021 concurrently on SLIM service, [facility]  He has a history of to PCA strokes who failed DAPT therapy (ASA/Plavix)  Had new complaint of left-sided facial droop which he noted on looking at the mirror in the bathroom, new numbness and tingling in his bilateral legs, "Felt his left brow",   No asymmetric sensation noted  Also felt a feeling of unrealism as if the the current events are not real      He also states that he has difficulty in breathing  He was given Chloraseptic for sore throat prior  Was never intubated in the ICU  He states it feels like when he used to do drugs like cocaine  Review of Systems:  See above    Objective:   Vitals:    09/05/21 0637 09/05/21 0728 09/05/21 0729 09/05/21 0734   BP:  (!) 235/97 160/70 158/78   BP Location:       Pulse:  82  77   Resp:       Temp:       TempSrc:       SpO2:  95%  97%   Weight: 66 7 kg (147 lb)      Height:         Physical Exam  Vitals and nursing note reviewed  Constitutional:       Appearance: He is well-developed  He is not toxic-appearing or diaphoretic  Comments: Significant Uvular edema noted   HENT:      Head: Normocephalic and atraumatic  Eyes:      Conjunctiva/sclera: Conjunctivae normal    Cardiovascular:      Rate and Rhythm: Normal rate and regular rhythm  Heart sounds: No murmur heard  Pulmonary:      Effort: Pulmonary effort is normal  No respiratory distress  Breath sounds: Normal breath sounds  Abdominal:      Palpations: Abdomen is soft  Tenderness: There is no abdominal tenderness  Musculoskeletal:      Cervical back: Neck supple  Right lower leg: No edema  Left lower leg: No edema  Skin:     General: Skin is warm and dry  Neurological:      Mental Status: He is alert  Comments: Slight slight facial droop? Some dysarthria  Bilateral sensation to gross touch and pinprick symmetric sensation in face, arms, legs  Twitching noted in legs  No pronator drift  No asterixis  Finger-to-nose intact  EOM intact  Unable to assess at this is different than yesterday as he had a left sided deficits such as left quadrantropsia as, neurology resident was saw the patient yesterday states that his neurological exam is similar to yesterday           Assessment:   · Right PCA stroke this admission with history of 2 prior strokes with new complaint of pins and needles in legs, left facial droop, could feel his eyebrow, feeling of unrealism/increased lethargy  · Uvular edema on exam- new complaint of difficulty in breathing    Plan:   · Stat CT head  · Have informed neurology resident who evaluated patient at bedside, agreed with CT head  · Neurology had recommended aspirin yesterday, have started aspirin 81 today  · Benadryl 50 x 1 for uvular edema, serial throat exams, nursing informed     Rapid Response Outcome:   Condition:  In stable condition  Transfer:  Remain on floor    Family notified of transfer: NA  Family member contacted:  Patient has updated daughter  Portions of the record may have been created with voice recognition software  Occasional wrong word or "sound a like" substitutions may have occurred due to the inherent limitations of voice recognition software    Read the chart carefully and recognize, using context, where substitutions have occurred      Kayden Hernandes MD

## 2021-09-05 NOTE — ASSESSMENT & PLAN NOTE
Presented as stroke alert on 9/2/2021  7:52 PM with initial NIHSS of 3 and LKW 1830, initial Blood Pressure: (!) 183/77  Initial presenting deficits were slurred speech left sided weakness left VF cut  Pt was found to be a tPA candidate within the appropriate time window and without obvious contraindication and tPA was given   Post tPA exam: L superior quadrantanopsia  Current Blood Pressure: 158/78   Vascular risk factors: previous strokes, active smoker, elevated cholesterol and PAD    Workup:  Lab Results   Component Value Date    HGBA1C 6 0 (H) 09/02/2021    CHOLESTEROL 175 09/02/2021    LDLCALC 83 09/02/2021    TRIG 168 (H) 09/02/2021    INR 1 03 09/02/2021       CTH: no acute findings, chronic infarct in right frontal corona radiata and right centrum semiovale   CTA: hx of RCEA, L ICA with severe stenosis at origin; chronic RPCA occlusion, severe atherosclerotic disease throughout   MRI: large acute R PCA territory stroke   Echocardiogram: LVEF 55% RA dilation   Telemetry: monitored    Pertinent scores:  - NIHSS: 3  - Modified Stanton: 1    Impression: large R PCA territory CVA with left sided deficits in the setting of current tobacco use and b/l ICA stenosis with numerous heterogeneous plaques and significant intracranial atherosclerosis    Plan:  - BP: normotension  - atorvastatin 80mg qhs  - Maintain glucose <180, SSI for coverage if indicated  - Repeat CTH if GCS drops 2pts in 1hr  - Medical management as per primary team appreciated  - Antiplatelet agents: restart ASA  - Monitor on telemetry  - PT/OT/Speech/PM&R input appreciated  - As pt was compliant with ASA and Plavix at home would recommend switching to ASA AND ELIQUIS on 9/10 if repeat CTH on 9/10 is stable; continue on ASA monotherapy for now  - No further inpatient neurology recommendations, will sign off, please call with questions or concerns    - Outpatient neurology follow up requested and follow up information is in discharge providers

## 2021-09-05 NOTE — ASSESSMENT & PLAN NOTE
Recent Labs     09/03/21 2320 09/04/21  0903 09/05/21  0438   CREATININE 1 34* 1 18 1 77*   EGFR 55 64 39     Estimated Creatinine Clearance: 38 8 mL/min (A) (by C-G formula based on SCr of 1 77 mg/dL (H))  Baseline seems to be from 1 1-1 4  Cr   Today 1 77  Possibly secondary to relative hypoperfusion, as patient's baseline SBP seems to be in the 170-190s    Plan:  Pt on Benazepril at home - currently on hold  Will monitor renal function  Avoid hypotension  Avoid nephrotoxins  Daily I/O

## 2021-09-05 NOTE — PROGRESS NOTES
At approximately 0725, RN was informed by PCA that "pt felt like he was having a stroke " Rapid response called  Pt reported enhanced L side facial droop that he could "feel in his eyebrow" and "made it more difficult to speak  Slight slurring noted  Pt also reported new onset of pins and needles in B/L LE, as well as "throat swelling " Edema noted on exam, benadryl ordered and administered  No other notable neurological deficits  BP elevated with systolic in the 932'I but vitals otherwise stable  EKG obtained  Awaiting further orders

## 2021-09-05 NOTE — PROGRESS NOTES
NEUROLOGY RESIDENCY PROGRESS NOTE     Name: Yobany Marie   Age & Sex: 73 y o  male   MRN: [id number]  Unit/Bed#: S -01   Encounter: [id number]    ASSESSMENT & PLAN     * CVA (cerebral vascular accident) [facility])  Assessment & Plan  Presented as stroke alert on 9/2/2021  7:52 PM with initial NIHSS of 3 and LKW 1830, initial Blood Pressure: (!) 183/77  Initial presenting deficits were slurred speech left sided weakness left VF cut  Pt was found to be a tPA candidate within the appropriate time window and without obvious contraindication and tPA was given      Post tPA exam: L superior quadrantanopsia  Current Blood Pressure: 158/78   Vascular risk factors: previous strokes, active smoker, elevated cholesterol and PAD    Workup:  Lab Results   Component Value Date    HGBA1C 6 0 (H) 09/02/2021    CHOLESTEROL 175 09/02/2021    LDLCALC 83 09/02/2021    TRIG 168 (H) 09/02/2021    INR 1 03 09/02/2021       CTH: no acute findings, chronic infarct in right frontal corona radiata and right centrum semiovale   CTA: hx of RCEA, L ICA with severe stenosis at origin; chronic RPCA occlusion, severe atherosclerotic disease throughout   MRI: large acute R PCA territory stroke   Echocardiogram: LVEF 55% RA dilation   Telemetry: monitored    Pertinent scores:  - NIHSS: 3  - Modified Rosalino: 1    Impression: large R PCA territory CVA with left sided deficits in the setting of current tobacco use and b/l ICA stenosis with numerous heterogeneous plaques and significant intracranial atherosclerosis    Plan:  - BP: normotension  - atorvastatin 80mg qhs  - Maintain glucose <180, SSI for coverage if indicated  - Repeat CTH if GCS drops 2pts in 1hr  - Medical management as per primary team appreciated  - Antiplatelet agents: restart ASA  - Monitor on telemetry  - PT/OT/Speech/PM&R input appreciated  - As pt was compliant with ASA and Plavix at home would recommend switching to ASA AND ELIQUIS on 9/10 if repeat CTH on 9/10 is stable; continue on ASA monotherapy for now  - No further inpatient neurology recommendations, will sign off, please call with questions or concerns  - Outpatient neurology follow up requested and follow up information is in discharge providers        Deandre Galarza will need follow up in in 4 weeks with neurovascular attending or AP  He will require a CT head without contrast within 1 week (9/10/21)    SUBJECTIVE     Patient was seen and examined  No acute events overnight  I was alerted by TT by medicine team that this patient had RRT called this morning because he was stating he felt like he was having a stroke with some worsening facial droop and eye droop  He was assessed by medicine who additionally found him to have swelling in the throat and difficulty swallowing  He was given benadryl and I examined him finding no acute changes to his neuro exam  He did undergo repeat CTH which was unchanged from previous and on re-examination after his [facility] he was back to his baseline  Symptoms appeared to be 2/2 throat swelling that is under investigation by medicine  Notably, at the time of the RR his BP was >230 SBP however was more within range on my exam sometime later (160 SBP), symptoms of confusion could have been aggravated by HTN urgency  He also noted symptoms started after he had ambulated to the bathroom, would recommend orthostatic vitals as well  Review of Systems   Constitutional: Negative for fever  HENT: Positive for sore throat and trouble swallowing  Eyes: Positive for visual disturbance  Respiratory: Negative for shortness of breath  Cardiovascular: Negative for chest pain, palpitations and leg swelling  Gastrointestinal: Negative for abdominal pain  Musculoskeletal: Negative for back pain  Neurological: Negative for dizziness, weakness, numbness and headaches  Psychiatric/Behavioral: Negative for agitation  The patient is not nervous/anxious          OBJECTIVE     Patient ID: Mary Alice Mcdonnell Beck Neal is a 73 y o  male  Vitals:    21 0637 21 0728 21 0729 21 0734   BP:  (!) 235/97 160/70 158/78   BP Location:       Pulse:  82  77   Resp:       Temp:       TempSrc:       SpO2:  95%  97%   Weight: 66 7 kg (147 lb)      Height:          Temperature:   Temp (24hrs), Av 5 °F (36 9 °C), Min:97 8 °F (36 6 °C), Max:99 1 °F (37 3 °C)    Temperature: 98 2 °F (36 8 °C)    Neurologic Exam     Mental Status   Oriented to person, place, and time  Attention: normal  Concentration: normal    Speech: speech is normal   Level of consciousness: alert  Knowledge: good  Normal comprehension  Cranial Nerves     CN II   Left visual field deficit: upper temporal quadrant(s)    CN III, IV, VI   Pupils are equal, round, and reactive to light  Extraocular motions are normal      CN V   Facial sensation intact  CN VII   Facial expression full, symmetric  CN VIII   CN VIII normal      CN IX, X   CN IX normal    CN X normal      CN XI   CN XI normal      CN XII   CN XII normal      Motor Exam   Muscle bulk: normal  Overall muscle tone: normal  Right arm pronator drift: absent  Left arm pronator drift: absent    Strength   Strength 5/5 throughout       Sensory Exam   Right arm light touch: normal  Left arm light touch: decreased from elbow  Right leg light touch: normal  Left leg light touch: normal  Vibration normal    Pinprick normal      Gait, Coordination, and Reflexes     Coordination   Finger to nose coordination: normal    Tremor   Resting tremor: absent  Intention tremor: absent  Action tremor: absent    Reflexes   Right brachioradialis: 3+  Left brachioradialis: 3+  Right biceps: 3+  Left biceps: 3+  Right triceps: 3+  Left triceps: 3+  Right patellar: 3+  Left patellar: 3+  Right achilles: 2+  Left achilles: 2+  Right plantar: normal  Left plantar: normal  Right Cortes: absent  Left Cortes: absent  Right ankle clonus: absent  Left ankle clonus: present (NS)  Cross adductors b/l LABORATORY DATA     Labs:  I have personally reviewed pertinent films in PACS  Results from last 7 days   Lab Units 09/03/21 2320 09/02/21 2007   WBC Thousand/uL 7 54 5 62   HEMOGLOBIN g/dL 9 4* 9 9*   HEMATOCRIT % 32 9* 34 9*   PLATELETS Thousands/uL 257 332   NEUTROS PCT % 78*  --    MONOS PCT % 7  --       Results from last 7 days   Lab Units 09/05/21 0438 09/04/21 0903 09/03/21  2320   POTASSIUM mmol/L 3 8 3 9 3 1*   CHLORIDE mmol/L 101 103 99*   CO2 mmol/L 27 24 26   BUN mg/dL 25 13 18   CREATININE mg/dL 1 77* 1 18 1 34*   CALCIUM mg/dL 8 6 9 4 9 2   ALK PHOS U/L  --   --  54   ALT U/L  --   --  15   AST U/L  --   --  16     Results from last 7 days   Lab Units 09/05/21 0438 09/04/21  0903 09/03/21  2320   MAGNESIUM mg/dL 2 5 3 6* 1 3*     Results from last 7 days   Lab Units 09/05/21 0438 09/03/21  2320   PHOSPHORUS mg/dL 2 4 2 8      Results from last 7 days   Lab Units 09/02/21 2007   INR  1 03   PTT seconds 34         Results from last 7 days   Lab Units 09/02/21 2007   TROPONIN I ng/mL <0 02       IMAGING & DIAGNOSTIC TESTING     Radiology Results: I have personally reviewed pertinent films in PACS      Other Diagnostic Testing: I have personally reviewed pertinent films in PACS    ACTIVE MEDICATIONS     Current Facility-Administered Medications   Medication Dose Route Frequency    aclidinium (TUDORZA PRESSAIR) 400 MCG/ACT inhaler 400 mcg  1 puff Inhalation BID    albuterol (PROVENTIL HFA,VENTOLIN HFA) inhaler 2 puff  2 puff Inhalation Q4H PRN    amLODIPine (NORVASC) tablet 5 mg  5 mg Oral Daily    aspirin chewable tablet 81 mg  81 mg Oral Daily    atorvastatin (LIPITOR) tablet 80 mg  80 mg Oral Daily With Dinner    calcium carbonate (TUMS) chewable tablet 1,000 mg  1,000 mg Oral BID PRN    carvedilol (COREG) tablet 12 5 mg  12 5 mg Oral BID With Meals    diphenhydrAMINE (BENADRYL) injection 50 mg  50 mg Intravenous Q6H PRN    fenofibrate (TRICOR) tablet 168 mg  168 mg Oral Daily    ferrous sulfate tablet 325 mg  325 mg Oral Daily With Breakfast    hydrALAZINE (APRESOLINE) injection 5 mg  5 mg Intravenous Q4H PRN    levalbuterol (XOPENEX) inhalation solution 1 25 mg  1 25 mg Nebulization Q8H PRN    pantoprazole (PROTONIX) EC tablet 40 mg  40 mg Oral Early Morning    phenol (CHLORASEPTIC) 1 4 % mucosal liquid 1 spray  1 spray Mouth/Throat Q2H PRN    predniSONE tablet 20 mg  20 mg Oral Daily    sertraline (ZOLOFT) tablet 100 mg  100 mg Oral HS    sodium chloride 0 9 % infusion  125 mL/hr Intravenous Continuous       Prior to Admission medications    Medication Sig Start Date End Date Taking?  Authorizing Provider   aclidinium Adra Reveal Pressair) 400 MCG/ACT inhaler Inhale 1 puff 2 (two) times a day   Yes Historical Provider, MD   ALBUTEROL IN Inhale every 4 (four) hours as needed Unknown dose   Yes Historical Provider, MD   carvedilol (COREG) 12 5 mg tablet Take 12 5 mg by mouth 2 (two) times a day with meals   Yes Historical Provider, MD   clopidogrel (PLAVIX) 75 mg tablet TAKE 1 TABLET BY MOUTH EVERY DAY FOR 19 DOSES  Patient taking differently: daily  7/28/21  Yes Clara Nelson PA-C   sertraline (ZOLOFT) 100 mg tablet Take by mouth 1/19/11  Yes Historical Provider, MD   acetaminophen (TYLENOL) 325 mg tablet Take 2 tablets (650 mg total) by mouth every 6 (six) hours as needed for mild pain 4/14/21   Demian Malone PA-C   ALPRAZolam Beula Ej) 0 25 mg tablet TAKE 1 TABLET BY ORAL ROUTE 4 TIMES EVERY DAY AS NEEDED 6/15/21   Historical Provider, MD   amLODIPine-benazepril (LOTREL 5-10) 5-10 MG per capsule Take 1 capsule by mouth daily  4/15/21   Demian Malone PA-C   aspirin (ECOTRIN LOW STRENGTH) 81 mg EC tablet Take 1 tablet (81 mg total) by mouth daily 3/31/21 8/9/21  Lakeisha Mayfield MD   atorvastatin (LIPITOR) 40 mg tablet Take 1 tablet (40 mg total) by mouth every evening 3/30/21 8/9/21  Lakeisha Mayfield MD   baclofen 10 mg tablet  8/4/21   Historical Provider, MD   fenofibrate 160 MG tablet Take 160 mg by mouth daily 6/25/21   Historical Provider, MD   metoprolol tartrate (LOPRESSOR) 25 mg tablet Take 1 tablet (25 mg total) by mouth every 12 (twelve) hours 3/30/21 4/29/21  Phuc Medina MD   omeprazole (PriLOSEC) 20 mg delayed release capsule Take 20 mg by mouth daily Take before a meal 7/2/21   Historical Provider, MD   pravastatin (PRAVACHOL) 40 mg tablet Take 40 mg by mouth daily 7/26/21   Historical Provider, MD   thiamine 100 MG tablet Take 1 tablet (100 mg total) by mouth daily 3/31/21 8/9/21  Phuc Medina MD   Varenicline Tartrate (CHANTIX PO) Take 1 tablet by mouth daily  Patient not taking: Reported on 9/2/2021    Historical Provider, MD       ==  MD Lory Blackmon 74 Neurology Residency, PGY-3

## 2021-09-05 NOTE — ASSESSMENT & PLAN NOTE
Recent Labs     09/02/21 2007 09/03/21  2320   HGB 9 9* 9 4*     · Chronic   · Iron panel: low iron, high TIBC  · Continue to trend blood counts  · Transfuse for Hgb<7   · Iron tabs 325 daily with breakfast

## 2021-09-05 NOTE — ASSESSMENT & PLAN NOTE
Complained of dysphagia and a sensation of choking this morning 09/05/21  Denies any difficulty breathing  Currently using Chloraseptic spray q2H p r n   For sore throat  On examination, Lynn uvular edema noted    Benadryl 50mg IV ordered with improvement in symptoms  Continue monitoring for oropharyngeal pain

## 2021-09-05 NOTE — QUICK NOTE
Received a call from nursing for positive orthostatic vitals, BP supine 118/55, sitting 87/52, standing after 3 min - 81/53  Patient reported lightheadedness    Patient was started on IV Saline 0 9% 125mL/h  Will continue to monitor VS

## 2021-09-05 NOTE — ASSESSMENT & PLAN NOTE
· Not currently in acute exacerbation   · Continue xoponex   · Continue home aclidinium  · Currently on room air   · No documented PFTs

## 2021-09-05 NOTE — ASSESSMENT & PLAN NOTE
Lab Results   Component Value Date    HGBA1C 6 0 (H) 09/02/2021     Significant for pre-diabetes  Outpatient follow up with PCP

## 2021-09-05 NOTE — ASSESSMENT & PLAN NOTE
· Patient presented to ED 9/2 with left faial droop and L upper visual field deficit, and difficulty in speech  · Stroke alert activated in ED   · CT Head with no acute intracranial hemorrhage  · CTA Head with severe plaque stenosis of bilateral common carotid arteries  Right PCA distal focal occlusion  · S/p Tpa, follow tpa protocol   · MRI 3/21: 2 foci of mild diffusion restriction in the right frontal lobe, indicative of recent/acute to subacute infarction  · S/p R carotid endarterectomy 4/13/21   · Echo 3/21: EF 60%  · Echo 9/3: EF 28%, grade 1 diastolic dysfunction  · MRI 9/3 Acute infarct in the right occipital lobe correlating with focal right PCA occlusion on yesterday's CTA  Lacunar infarct in the right thalamus  favored to be subacute     · 09/05 patient was a rapid response on the floor, complained of bilateral paresthesias, sensation of facial droop and stated that he feels like he is "high"  On physical examination - symmetric bilateral paresthesias in the lower extremities, no other gross neurologic deficits noted  Per neurology no significant change since prior day  · Stat CT ordered - significant for evolving R PCA infarct  · Neurology at the bedside - appreciate input    · Plan:  · Continue mintoring patient for neurologic changes  · Restart home BP meds - currently taking coreg 12 5 BID and Amlodipine 5mg daily  · ASA 81mg restarted today  · Patient to go on Eliquis in 1 week due to tPA therapy  · STAT CT Head with any neuro changes   · Hold Plavix  · Neurology on board, appreciate recommendations

## 2021-09-05 NOTE — PROGRESS NOTES
[facility]  Progress Note - Hernando Florence 1955, 73 y o  male MRN: [id number]  Unit/Bed#: S -01 Encounter: [id number]  Primary Care Provider: Dayle Meigs, DO   Date and time admitted to hospital: 9/2/2021  7:52 PM    * CVA (cerebral vascular accident) [facility])  Assessment & Plan  · Patient presented to ED 9/2 with left faial droop and L upper visual field deficit, and difficulty in speech  · Stroke alert activated in ED   · CT Head with no acute intracranial hemorrhage  · CTA Head with severe plaque stenosis of bilateral common carotid arteries  Right PCA distal focal occlusion  · S/p Tpa, follow tpa protocol   · MRI 3/21: 2 foci of mild diffusion restriction in the right frontal lobe, indicative of recent/acute to subacute infarction  · S/p R carotid endarterectomy 4/13/21   · Echo 3/21: EF 60%  · Echo 9/3: EF 46%, grade 1 diastolic dysfunction  · MRI 9/3 Acute infarct in the right occipital lobe correlating with focal right PCA occlusion on yesterday's CTA  Lacunar infarct in the right thalamus  favored to be subacute     · 09/05 patient was a rapid response on the floor, complained of bilateral paresthesias, sensation of facial droop and stated that he feels like he is "high"  On physical examination - symmetric bilateral paresthesias in the lower extremities, no other gross neurologic deficits noted  Per neurology no significant change since prior day  · Stat CT ordered - significant for evolving R PCA infarct  · Neurology at the bedside - appreciate input    · Plan:  · Continue mintoring patient for neurologic changes  · Restart home BP meds - currently taking coreg 12 5 BID and Amlodipine 5mg daily  · ASA 81mg restarted today  · Patient to go on Eliquis in 1 week due to tPA therapy  · STAT CT Head with any neuro changes   · Hold Plavix  · Neurology on board, appreciate recommendations        Dysphagia  Assessment & Plan  Complained of dysphagia and a sensation of choking this morning 09/05/21  Denies any difficulty breathing  Currently using Chloraseptic spray q2H p r n  For sore throat  On examination, Lynn uvular edema noted    Benadryl 50mg IV ordered with improvement in symptoms  Continue monitoring for oropharyngeal pain    Stage 3a chronic kidney disease [facility])  Assessment & Plan  Lab Results   Component Value Date    EGFR 39 09/05/2021    EGFR 64 09/04/2021    EGFR 55 09/03/2021    CREATININE 1 77 (H) 09/05/2021    CREATININE 1 18 09/04/2021    CREATININE 1 34 (H) 09/03/2021   · Baseline creatinine 1 2  · Avoid nephrotoxic agents and hypotension   · Continue to trend BUN/Cr  · Will need outpatient nephrology follow up       Pre-diabetes  Assessment & Plan  Lab Results   Component Value Date    HGBA1C 6 0 (H) 09/02/2021     Significant for pre-diabetes  Outpatient follow up with PCP    PRITI (acute kidney injury) [facility])  Assessment & Plan  Recent Labs     09/03/21  2320 09/04/21  0903 09/05/21  0438   CREATININE 1 34* 1 18 1 77*   EGFR 55 64 39     Estimated Creatinine Clearance: 38 8 mL/min (A) (by C-G formula based on SCr of 1 77 mg/dL (H))  Baseline seems to be from 1 1-1 4  Cr   Today 1 77  Possibly secondary to relative hypoperfusion, as patient's baseline SBP seems to be in the 170-190s    Plan:  Pt on Benazepril at home - currently on hold  Will monitor renal function  Avoid hypotension  Avoid nephrotoxins  Daily I/O      Benign essential hypertension  Assessment & Plan  · Blood pressure goal <180/105   · PRN hydralazine  · Home regimen: amlodipine/metoprolol/benazapril/coreg  · Currently on coreg 12 5mg BID, Amlodipine 5mg daily  · Benazepril on hold due to worsening renal function    Stenosis of right internal carotid artery  Assessment & Plan  · Carotid study 7/21 with high grade stenosis of R carotid and 50-69% stenosis noted in the internal carotid artery  · Follows with outpatient vascular surgery   · Continue Statin, hold asa/plavix  · F/u outpatient with vascular    GERD (gastroesophageal reflux disease)  Assessment & Plan  - Protonix 40mg daily in place of his home Prilosec 20mg daily    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  Assessment & Plan  · S/p endarterectomy 4/13/21 with Dr Anahy Crane due to symptomatic extensive R common and internal carotid artery stenosis  · Progressive disease and patient continues to smoke   · Currently on ASA/Atrovastatin/Plavix as outpatient   · Supposed to have outpatient CTA but has new renal insufficiency       Iron deficiency anemia  Assessment & Plan  Recent Labs     09/02/21 2007 09/03/21  2320   HGB 9 9* 9 4*     · Chronic   · Iron panel: low iron, high TIBC  · Continue to trend blood counts  · Transfuse for Hgb<7   · Iron tabs 325 daily with breakfast    COPD (chronic obstructive pulmonary disease) ([facility] [address] )  Assessment & Plan  · Not currently in acute exacerbation   · Continue xoponex   · Continue home aclidinium  · Currently on room air   · No documented PFTs    HLD (hyperlipidemia)  Assessment & Plan  · Home regimen: statin + fenofibrate  · Continue with increased statin dose and restart home fenofibrate    Tobacco use  Assessment & Plan  · Currently every day smoker  · Smokes 1/2ppd   · Refused nicotine patch   · Smoking cessation education            VTE Pharmacologic Prophylaxis: VTE Score: 7 High Risk (Score >/= 5) - Pharmacological DVT Prophylaxis Contraindicated  Sequential Compression Devices Ordered  Patient Centered Rounds: I performed bedside rounds with nursing staff today  Discussions with Specialists or Other Care Team Provider: Neurology    Education and Discussions with Family / Patient: Patient declined call to   Current Length of Stay: 3 day(s)  Current Patient Status: Inpatient   Discharge Plan: Anticipate discharge in 48-72 hrs to home with home services  Code Status: Level 1 - Full Code    Subjective:   Patient was a rapid response on the floor today   On my arrival, was sitting in bed  Complaining of new-onset bilateral lower extremity numbness and paresthesias, sensation of facial drooping, and feeling like he is "high"  He also reports a sore throat, sensation of choking and difficulty swallowing  Objective:     Vitals:   Temp (24hrs), Av 6 °F (37 °C), Min:98 2 °F (36 8 °C), Max:99 1 °F (37 3 °C)    Temp:  [98 2 °F (36 8 °C)-99 1 °F (37 3 °C)] 98 5 °F (36 9 °C)  HR:  [71-88] 83  Resp:  [16-19] 16  BP: ()/(50-97) 131/70  SpO2:  [92 %-97 %] 96 %  Body mass index is 21 71 kg/m²  Input and Output Summary (last 24 hours): Intake/Output Summary (Last 24 hours) at 2021 1556  Last data filed at 2021 1300  Gross per 24 hour   Intake 940 ml   Output 600 ml   Net 340 ml       Physical Exam:   Physical Exam  Vitals and nursing note reviewed  Constitutional:       Appearance: He is well-developed  HENT:      Head: Normocephalic and atraumatic  Mouth/Throat:      Mouth: Mucous membranes are moist       Pharynx: Posterior oropharyngeal erythema and uvula swelling present  Tonsils: No tonsillar exudate  Eyes:      Conjunctiva/sclera: Conjunctivae normal    Cardiovascular:      Rate and Rhythm: Normal rate and regular rhythm  Heart sounds: No murmur heard  Pulmonary:      Effort: Pulmonary effort is normal  No respiratory distress  Breath sounds: Normal breath sounds  Abdominal:      Palpations: Abdomen is soft  Tenderness: There is no abdominal tenderness  Musculoskeletal:      Cervical back: Neck supple  Right lower leg: No edema  Left lower leg: No edema  Skin:     General: Skin is warm and dry  Neurological:      Mental Status: He is alert and oriented to person, place, and time  Cranial Nerves: Facial asymmetry present  No cranial nerve deficit  Sensory: Sensation is intact  Motor: No weakness  Comments: Slight left-sided eye droop noted on examination    Cranial Nerves II - XII grossly intact  Symmetric Paresthesias on lower extremities bilaterally, Sensation in lower extremities preserved and symmetric bilaterally  Proprioception intact              Additional Data:     Labs:  Results from last 7 days   Lab Units 09/03/21  2320   WBC Thousand/uL 7 54   HEMOGLOBIN g/dL 9 4*   HEMATOCRIT % 32 9*   PLATELETS Thousands/uL 257   NEUTROS PCT % 78*   LYMPHS PCT % 13*   MONOS PCT % 7   EOS PCT % 1     Results from last 7 days   Lab Units 09/05/21  0438 09/03/21  2320   SODIUM mmol/L 135* 136   POTASSIUM mmol/L 3 8 3 1*   CHLORIDE mmol/L 101 99*   CO2 mmol/L 27 26   BUN mg/dL 25 18   CREATININE mg/dL 1 77* 1 34*   ANION GAP mmol/L 7 11   CALCIUM mg/dL 8 6 9 2   ALBUMIN g/dL  --  3 8   TOTAL BILIRUBIN mg/dL  --  0 38   ALK PHOS U/L  --  54   ALT U/L  --  15   AST U/L  --  16   GLUCOSE RANDOM mg/dL 97 117     Results from last 7 days   Lab Units 09/02/21 2007   INR  1 03     Results from last 7 days   Lab Units 09/05/21  0731 09/02/21 2005   POC GLUCOSE mg/dl 115 116     Results from last 7 days   Lab Units 09/02/21 2007   HEMOGLOBIN A1C % 6 0*           Lines/Drains:  Invasive Devices     Peripheral Intravenous Line            Peripheral IV 09/03/21 Left Arm 1 day                      Imaging: Reviewed radiology reports from this admission including: chest xray, CT head, MRI brain and CTA stroke alert (head/neck) and CT stroke alert brain    Recent Cultures (last 7 days):         Last 24 Hours Medication List:   Current Facility-Administered Medications   Medication Dose Route Frequency Provider Last Rate    aclidinium  1 puff Inhalation BID Davi Oswald, DO      albuterol  2 puff Inhalation Q4H PRN Davi Oswald, DO      amLODIPine  5 mg Oral Daily Davi Oswald, DO      aspirin  81 mg Oral Daily Kanu Schmid MD      atorvastatin  80 mg Oral Daily With [employer], DO      calcium carbonate  1,000 mg Oral BID PRN Davi Oswald, DO      carvedilol  12 5 mg Oral BID With Meals Maria Esther Girt, DO      diphenhydrAMINE  50 mg Intravenous Q6H PRN Reva Chun MD      fenofibrate  168 mg Oral Daily Maria Esther Girt, DO      ferrous sulfate  325 mg Oral Daily With Breakfast Maria Esther Girt, DO      hydrALAZINE  5 mg Intravenous Q4H PRN Maria Esther Girt, DO      levalbuterol  1 25 mg Nebulization Q8H PRN [place] Girt, DO      pantoprazole  40 mg Oral Early Morning [place] Girt, DO      phenol  1 spray Mouth/Throat Q2H PRN Abel Mai PA-C      predniSONE  20 mg Oral Daily Reva Chun MD      sertraline  100 mg Oral HS Maria Esther Girt, DO      sodium chloride  125 mL/hr Intravenous Continuous Reva Chun  mL/hr (09/05/21 1111)        Today, Patient Was Seen By: Lara Osorio MD    **Please Note: This note may have been constructed using a voice recognition system  **

## 2021-09-05 NOTE — ASSESSMENT & PLAN NOTE
Lab Results   Component Value Date    EGFR 39 09/05/2021    EGFR 64 09/04/2021    EGFR 55 09/03/2021    CREATININE 1 77 (H) 09/05/2021    CREATININE 1 18 09/04/2021    CREATININE 1 34 (H) 09/03/2021   · Baseline creatinine 1 2  · Avoid nephrotoxic agents and hypotension   · Continue to trend BUN/Cr  · Will need outpatient nephrology follow up

## 2021-09-05 NOTE — ASSESSMENT & PLAN NOTE
· S/p endarterectomy 4/13/21 with Dr Finnegan Agent due to symptomatic extensive R common and internal carotid artery stenosis  · Progressive disease and patient continues to smoke   · Currently on ASA/Atrovastatin/Plavix as outpatient   · Supposed to have outpatient CTA but has new renal insufficiency

## 2021-09-05 NOTE — RAPID RESPONSE
Rapid Response Note  Nick Garibay 73 y o  male MRN: [id number]  Unit/Bed#: S -01 Encounter: [id number]    Rapid Response Notification(s):   Response called date/time:  9/5/2021 7:25 AM  Response team arrival date/time:  9/5/2021 7:28 AM  Response end date/time:  9/5/2021 7:48 AM  Rapid response location:  [facility]  Primary reason for rapid response call:  Acute change in neuro status    Rapid Response Intervention(s):   Airway:  None  Breathing:  None  Circulation:  None  Fluids administered:  None  Medications administered: benadryl  Background/Situation:   Nick Garibay is a 73 y o  male who was admitted on 9/2/2021 for CVA given TPA on 9/2/2021 at 2045, residual left unilateral superior temporal quadrantanopia  Downgraded to general medicine 9/4/2021  Rapid response called for concern for acute neurological symptoms in setting of recent CVA  Pt states that he feels that his thoughts are "cloudy" is having some difficulty speaking, feels like his left eye is drooping, has bilateral lower extremity tingling and notes that his throat feels like it is closing  Denies headache, acute vision changes, weakness,lightheadedness, weakness, nausea, chest pain, palpitations, shortness of breath, or bilateral leg pain or swelling  Review of Systems   Constitutional: Negative for chills, diaphoresis, fatigue and fever  HENT: Positive for trouble swallowing  Tight feeling in throat  Eyes: Positive for visual disturbance  Respiratory: Positive for choking  Negative for cough and shortness of breath  Choking sensation 2/2 tight feeling in throat  Cardiovascular: Negative  Negative for chest pain, palpitations and leg swelling  Gastrointestinal: Negative  Negative for abdominal pain and nausea  Genitourinary: Negative  Musculoskeletal: Negative  Skin: Negative  Neurological: Positive for facial asymmetry, speech difficulty and numbness   Negative for dizziness, syncope, weakness, light-headedness and headaches  Hematological: Negative  Psychiatric/Behavioral: Negative  All other systems reviewed and are negative  Objective:   Vitals:    09/05/21 0637 09/05/21 0728 09/05/21 0729 09/05/21 0734   BP:  (!) 235/97 160/70 158/78   BP Location:       Pulse:  82  77   Resp:       Temp:       TempSrc:       SpO2:  95%  97%   Weight: 66 7 kg (147 lb)      Height:         Physical Exam  Vitals and nursing note reviewed  Constitutional:       General: He is not in acute distress  Appearance: He is normal weight  He is not diaphoretic  HENT:      Head: Normocephalic and atraumatic  Right Ear: External ear normal       Left Ear: External ear normal       Nose: Nose normal       Mouth/Throat:      Mouth: Mucous membranes are moist       Comments: Mild oropharyngeal erythema  Eyes:      General: No scleral icterus  Right eye: No discharge  Left eye: No discharge  Extraocular Movements: Extraocular movements intact  Conjunctiva/sclera: Conjunctivae normal       Pupils: Pupils are equal, round, and reactive to light  Cardiovascular:      Rate and Rhythm: Normal rate and regular rhythm  Pulses: Normal pulses  Heart sounds: Normal heart sounds  No murmur heard  No friction rub  No gallop  Pulmonary:      Effort: Pulmonary effort is normal  No respiratory distress  Breath sounds: Normal breath sounds  Abdominal:      General: Abdomen is flat  Bowel sounds are normal       Palpations: Abdomen is soft  Musculoskeletal:         General: Normal range of motion  Cervical back: Normal range of motion and neck supple  Right lower leg: No edema  Left lower leg: No edema  Lymphadenopathy:      Cervical: No cervical adenopathy  Skin:     General: Skin is warm and dry  Capillary Refill: Capillary refill takes less than 2 seconds  Coloration: Skin is pale  Findings: No erythema or rash  Neurological:      Mental Status: He is alert and oriented to person, place, and time  GCS: GCS eye subscore is 4  GCS verbal subscore is 5  GCS motor subscore is 6  Cranial Nerves: Dysarthria and facial asymmetry present  Sensory: Sensation is intact  Motor: Motor function is intact  No weakness or pronator drift  Coordination: Coordination is intact  Finger-Nose-Finger Test normal       Comments: Slight left sided eyelid droop as compared to right  Speech minimally slurred but not at baseline  Bilateral upper and lower extremity sensation intact  Assessment/Plan  · Acute neurological deficits        -new left eyelid droop, slurred speech, b/l lower extremity numbness and tingling        -given TPA 9/2/2021 for acute CVA        -SLIM to consult neurology, consider repeat imaging    · Sensation of throat swelling         -oropharyngeal erythema noted on exam         -no respiratory distress noted, VSS         -benadryl given         -continue to monitor       Rapid Response Outcome  Family notified of transfer: no  Family member contacted: patient not transferred, did not notify     Portions of the record may have been created with voice recognition software  Occasional wrong word or "sound a like" substitutions may have occurred due to the inherent limitations of voice recognition software  Read the chart carefully and recognize, using context, where substitutions have occurred      Malena Cade,

## 2021-09-06 VITALS
OXYGEN SATURATION: 97 % | BODY MASS INDEX: 21.86 KG/M2 | DIASTOLIC BLOOD PRESSURE: 75 MMHG | HEART RATE: 65 BPM | SYSTOLIC BLOOD PRESSURE: 156 MMHG | HEIGHT: 69 IN | WEIGHT: 147.6 LBS | RESPIRATION RATE: 19 BRPM | TEMPERATURE: 98.7 F

## 2021-09-06 PROBLEM — N17.9 AKI (ACUTE KIDNEY INJURY) (HCC): Status: RESOLVED | Noted: 2021-09-05 | Resolved: 2021-09-06

## 2021-09-06 PROBLEM — R13.10 DYSPHAGIA: Status: RESOLVED | Noted: 2021-09-05 | Resolved: 2021-09-06

## 2021-09-06 PROBLEM — N28.9 RENAL INSUFFICIENCY: Status: RESOLVED | Noted: 2021-08-09 | Resolved: 2021-09-06

## 2021-09-06 LAB
ANION GAP SERPL CALCULATED.3IONS-SCNC: 7 MMOL/L (ref 4–13)
ATRIAL RATE: 82 BPM
BUN SERPL-MCNC: 25 MG/DL (ref 5–25)
CALCIUM SERPL-MCNC: 8.4 MG/DL (ref 8.3–10.1)
CHLORIDE SERPL-SCNC: 104 MMOL/L (ref 100–108)
CO2 SERPL-SCNC: 26 MMOL/L (ref 21–32)
CREAT SERPL-MCNC: 1.4 MG/DL (ref 0.6–1.3)
ERYTHROCYTE [DISTWIDTH] IN BLOOD BY AUTOMATED COUNT: 17.8 % (ref 11.6–15.1)
GFR SERPL CREATININE-BSD FRML MDRD: 52 ML/MIN/1.73SQ M
GLUCOSE SERPL-MCNC: 89 MG/DL (ref 65–140)
HCT VFR BLD AUTO: 31.8 % (ref 36.5–49.3)
HGB BLD-MCNC: 8.8 G/DL (ref 12–17)
MCH RBC QN AUTO: 19.1 PG (ref 26.8–34.3)
MCHC RBC AUTO-ENTMCNC: 27.7 G/DL (ref 31.4–37.4)
MCV RBC AUTO: 69 FL (ref 82–98)
P AXIS: 64 DEGREES
PLATELET # BLD AUTO: 268 THOUSANDS/UL (ref 149–390)
PMV BLD AUTO: 10.1 FL (ref 8.9–12.7)
POTASSIUM SERPL-SCNC: 3.9 MMOL/L (ref 3.5–5.3)
PR INTERVAL: 152 MS
QRS AXIS: 67 DEGREES
QRSD INTERVAL: 76 MS
QT INTERVAL: 386 MS
QTC INTERVAL: 450 MS
RBC # BLD AUTO: 4.6 MILLION/UL (ref 3.88–5.62)
SODIUM SERPL-SCNC: 137 MMOL/L (ref 136–145)
T WAVE AXIS: 67 DEGREES
VENTRICULAR RATE: 82 BPM
WBC # BLD AUTO: 6.33 THOUSAND/UL (ref 4.31–10.16)

## 2021-09-06 PROCEDURE — 93010 ELECTROCARDIOGRAM REPORT: CPT | Performed by: INTERNAL MEDICINE

## 2021-09-06 PROCEDURE — 97530 THERAPEUTIC ACTIVITIES: CPT

## 2021-09-06 PROCEDURE — 99239 HOSP IP/OBS DSCHRG MGMT >30: CPT | Performed by: INTERNAL MEDICINE

## 2021-09-06 PROCEDURE — 80048 BASIC METABOLIC PNL TOTAL CA: CPT | Performed by: INTERNAL MEDICINE

## 2021-09-06 PROCEDURE — 85027 COMPLETE CBC AUTOMATED: CPT | Performed by: INTERNAL MEDICINE

## 2021-09-06 PROCEDURE — 99232 SBSQ HOSP IP/OBS MODERATE 35: CPT | Performed by: PSYCHIATRY & NEUROLOGY

## 2021-09-06 RX ORDER — ATORVASTATIN CALCIUM 80 MG/1
80 TABLET, FILM COATED ORAL
Qty: 30 TABLET | Refills: 0 | Status: SHIPPED | OUTPATIENT
Start: 2021-09-06 | End: 2021-09-06 | Stop reason: HOSPADM

## 2021-09-06 RX ORDER — ATORVASTATIN CALCIUM 80 MG/1
80 TABLET, FILM COATED ORAL DAILY
Qty: 30 TABLET | Refills: 0 | Status: SHIPPED | OUTPATIENT
Start: 2021-09-06 | End: 2022-06-23 | Stop reason: ALTCHOICE

## 2021-09-06 RX ORDER — CLOPIDOGREL BISULFATE 75 MG/1
75 TABLET ORAL DAILY
Status: DISCONTINUED | OUTPATIENT
Start: 2021-09-06 | End: 2021-09-06 | Stop reason: HOSPADM

## 2021-09-06 RX ORDER — LORATADINE 10 MG/1
10 TABLET ORAL DAILY
Qty: 30 TABLET | Refills: 0 | Status: SHIPPED | OUTPATIENT
Start: 2021-09-06 | End: 2022-06-23 | Stop reason: ALTCHOICE

## 2021-09-06 RX ORDER — SENNOSIDES 8.6 MG
8.6 TABLET ORAL
Qty: 30 TABLET | Refills: 0 | Status: SHIPPED | OUTPATIENT
Start: 2021-09-06 | End: 2022-03-01 | Stop reason: HOSPADM

## 2021-09-06 RX ORDER — NICOTINE 21 MG/24HR
14 PATCH, TRANSDERMAL 24 HOURS TRANSDERMAL DAILY
Status: DISCONTINUED | OUTPATIENT
Start: 2021-09-06 | End: 2021-09-06 | Stop reason: HOSPADM

## 2021-09-06 RX ORDER — FERROUS SULFATE TAB EC 324 MG (65 MG FE EQUIVALENT) 324 (65 FE) MG
324 TABLET DELAYED RESPONSE ORAL
Qty: 30 TABLET | Refills: 0 | Status: SHIPPED | OUTPATIENT
Start: 2021-09-06 | End: 2022-06-23 | Stop reason: ALTCHOICE

## 2021-09-06 RX ORDER — PANTOPRAZOLE SODIUM 40 MG/1
40 TABLET, DELAYED RELEASE ORAL
Qty: 30 TABLET | Refills: 0 | Status: SHIPPED | OUTPATIENT
Start: 2021-09-06 | End: 2022-03-01 | Stop reason: HOSPADM

## 2021-09-06 RX ORDER — NICOTINE 21 MG/24HR
1 PATCH, TRANSDERMAL 24 HOURS TRANSDERMAL DAILY
Qty: 28 PATCH | Refills: 0 | Status: SHIPPED | OUTPATIENT
Start: 2021-09-07 | End: 2022-06-23 | Stop reason: ALTCHOICE

## 2021-09-06 RX ADMIN — CLOPIDOGREL BISULFATE 75 MG: 75 TABLET ORAL at 09:58

## 2021-09-06 RX ADMIN — ACLIDINIUM BROMIDE 400 MCG: 400 POWDER, METERED RESPIRATORY (INHALATION) at 08:30

## 2021-09-06 RX ADMIN — PANTOPRAZOLE SODIUM 40 MG: 40 TABLET, DELAYED RELEASE ORAL at 04:16

## 2021-09-06 RX ADMIN — FERROUS SULFATE TAB 325 MG (65 MG ELEMENTAL FE) 325 MG: 325 (65 FE) TAB at 08:28

## 2021-09-06 RX ADMIN — AMLODIPINE BESYLATE 5 MG: 5 TABLET ORAL at 08:28

## 2021-09-06 RX ADMIN — CARVEDILOL 12.5 MG: 12.5 TABLET, FILM COATED ORAL at 08:28

## 2021-09-06 RX ADMIN — NICOTINE 14 MG: 14 PATCH, EXTENDED RELEASE TRANSDERMAL at 04:16

## 2021-09-06 RX ADMIN — PREDNISONE 20 MG: 20 TABLET ORAL at 08:28

## 2021-09-06 RX ADMIN — DIPHENHYDRAMINE HYDROCHLORIDE 50 MG: 50 INJECTION, SOLUTION INTRAMUSCULAR; INTRAVENOUS at 04:03

## 2021-09-06 RX ADMIN — ASPIRIN 81 MG: 81 TABLET, CHEWABLE ORAL at 08:28

## 2021-09-06 RX ADMIN — FENOFIBRATE 168 MG: 48 TABLET, FILM COATED ORAL at 08:28

## 2021-09-06 NOTE — PHYSICAL THERAPY NOTE
PHYSICAL THERAPY NOTE       09/06/21 0856   PT Last Visit   PT Visit Date 09/06/21   Note Type   Note Type Treatment   Pain Assessment   Pain Assessment Tool 0-10   Pain Score No Pain   General   Chart Reviewed Yes   Additional Pertinent History 9/5/21:  RR for stroke like sx, increased L facial droop, slurred speech, throat swelling;  CT shows evolving nonhemorrhagic infarction of R occipital lobe  Pt with allergic reaction and orthostatic BP   Cognition   Overall Cognitive Status WFL   Arousal/Participation Alert   Attention Within functional limits   Orientation Level Oriented X4   Memory Within functional limits   Following Commands Follows all commands and directions without difficulty   Bed Mobility   Supine to Sit 7  Independent   Sit to Supine 7  Independent   Transfers   Sit to Stand 6  Modified independent   Stand to Sit 6  Modified independent   Ambulation/Elevation   Gait pattern Decreased foot clearance  (slight path deviation, no LOB, slow josé miguel)   Gait Assistance 5  Supervision   Additional items Assist x 1   Assistive Device None   Distance 150ft without AD, no LOB however slow josé miguel and path deviation;  TUG performed, 14 5 seconds  Balance   Static Sitting Good   Dynamic Sitting Good   Static Standing Fair +   Dynamic Standing Fair +   Ambulatory Fair +   Activity Tolerance   Activity Tolerance   (no adverse effects to PT noted)   Nurse Made Aware Leopoldo Izaguirre   Assessment   Prognosis Fair   Problem List Impaired balance;Decreased mobility; Decreased coordination   Assessment Pt with improved mobility this session however would continue to benefit from PT for increased endurance, higher level balance training, decreased gait speed/TUG score to improve community mobility safety; Pt appears to be back to baseline after RR on 9/5/21;   Pt with BLE strength 4+/5, coordination for toe taps and heel to shin WFL and equal bilaterally;  BUE strength WFL;  Pt remains seated EOB, needs in reach, nurse aware  The patient's AM-PAC Basic Mobility Inpatient Short Form Raw Score is 24, Standardized Score is 57 68  A standardized score greater than 42 9 suggests the patient may benefit from discharge to home with OPPT  Please also refer to the recommendation of the Physical Therapist for safe discharge planning  Goals   Patient Goals to get this all figured out   STG Expiration Date 09/13/21   Short Term Goal #1 pt will: Increase bilateral LE strength 1/2 grade to facilitate independent mobility (MET), Perform all bed mobility tasks independently to decrease fall risk factors (MET); Perform all transfers independently to improve independence, Ambulate 300 ft  With no AD independently w/o LOB/path deviation to expedite return to independent level of function, Navigate 3 stairs independently with unilateral handrail to facilitate return to previous living environment, Increase ambulatory balance 1 grade to decrease risk for falls, Complete exercise program independently to improve strength and endurance, Tolerate 3 hr OOB to faciliate upright tolerance (MET), Improve Barthel Index score to 90 or greater to facilitate independence, Timed Up and Go < 13 5 seconds to decrease risk of falls and increase community dwelling mobility  Plan   Treatment/Interventions ADL retraining;LE strengthening/ROM; Therapeutic exercise; Functional transfer training;Elevations; Endurance training;Bed mobility; Equipment eval/education;Gait training; Compensatory technique education;Continued evaluation;Spoke to nursing   Progress Progressing toward goals   PT Frequency Other (Comment)  (6x/wk)   Recommendation   PT Discharge Recommendation Home with outpatient rehabilitation   PT - OK to Discharge Yes  (When medically ready)   AM-PAC Basic Mobility Inpatient   Turning in Bed Without Bedrails 4   Lying on Back to Sitting on Edge of Flat Bed 4   Moving Bed to Chair 4 Standing Up From Chair 4   Walk in Room 4   Climb 3-5 Stairs 4   Basic Mobility Inpatient Raw Score 24   Basic Mobility Standardized Score 57 68     Lem Soulier, PT      Patient Name: Clarence Goyal  PDGIW'W Date: 9/6/2021

## 2021-09-06 NOTE — ASSESSMENT & PLAN NOTE
Recent Labs     09/03/21  2320 09/06/21  0508   HGB 9 4* 8 8*     · Chronic   · Iron panel: low iron, high TIBC  · Continue to trend blood counts  · Transfuse for Hgb<7   · Iron tabs 325 daily with breakfast

## 2021-09-06 NOTE — DISCHARGE INSTR - AVS FIRST PAGE
Dear Taylor Miranda,     It was our pleasure to care for you here at [facility]  It is our hope that we were always able to exceed the expected standards for your care during your stay  You were hospitalized due to acute stroke  You were cared for on the [facility] 3rd floor by Shalini Lomeli MD under the service of [address] [place] [address] with the [employer] [facility] who covers for your primary care physician (PCP), Cee Bains DO, while you were hospitalized  If you have any questions or concerns related to this hospitalization, you may contact us at [phone]  For follow up as well as any medication refills, we recommend that you follow up with your primary care physician  A registered nurse will reach out to you by phone within a few days after your discharge to answer any additional questions that you may have after going home  However, at this time we provide for you here, the most important instructions / recommendations at discharge:     · Notable Medication Adjustments -   · Atorvastatin increased to 80 mg daily  · Pantoprazole increased to 40 mg daily  · New prescription added for Claritin 10 mg daily generalized itching  · Testing Required after Discharge -   · CT of head scheduled on 9/10/21  · Important follow up information -   · Follow-up with your primary care physician within 1 week of discharge   · Follow-up with outpatient Neurology for post stroke management  · Follow-up with outpatient Nephrology for management of chronic kidney disease  · Follow-up with outpatient vascular surgery for management carotid artery stenosis  · Other Instructions -   · None  · Please review this entire after visit summary as additional general instructions including medication list, appointments, activity, diet, any pertinent wound care, and other additional recommendations from your care team that may be provided for you        Sincerely,     Shalini Lomeli, MD

## 2021-09-06 NOTE — ASSESSMENT & PLAN NOTE
Presented as stroke alert on 9/2/2021  7:52 PM with initial NIHSS of 3 and LKW 1830, initial Blood Pressure: (!) 183/77  Initial presenting deficits were slurred speech left sided weakness left VF cut  Pt was found to be a tPA candidate within the appropriate time window and without obvious contraindication and tPA was given   Post tPA exam: L superior quadrantanopsia  Current Blood Pressure: 156/75   Vascular risk factors: previous strokes, active smoker, elevated cholesterol and PAD    Workup:  Lab Results   Component Value Date    HGBA1C 6 0 (H) 09/02/2021    CHOLESTEROL 175 09/02/2021    LDLCALC 83 09/02/2021    TRIG 168 (H) 09/02/2021    INR 1 03 09/02/2021       CTH: no acute findings, chronic infarct in right frontal corona radiata and right centrum semiovale   CTA: hx of RCEA, L ICA with severe stenosis at origin; chronic RPCA occlusion, severe atherosclerotic disease throughout   MRI: large acute R PCA territory stroke   Echocardiogram: LVEF 55% RA dilation   Telemetry: monitored    Pertinent scores:  - NIHSS: 3  - Modified [place]: 1    Impression: large R PCA territory CVA with left sided deficits in the setting of current tobacco use and b/l ICA stenosis with numerous heterogeneous plaques and significant intracranial atherosclerosis    Plan:  - BP: normotension  - atorvastatin 80mg qhs  - Maintain glucose <180, SSI for coverage if indicated  - Repeat CTH if GCS drops 2pts in 1hr  - Medical management as per primary team appreciated  - Antiplatelet agents: restarted ASA and plavix  - Monitor on telemetry  - PT/OT/Speech/PM&R input appreciated  - Pt was compliant with ASA and Plavix at home and will continue for now - will switch to ASA and Eliquis on 9/10 if repeat CTH on 9/10 is stable  - D/w medicine team  - No further inpatient neurology recommendations, will sign off, please call with questions or concerns    - Outpatient neurology follow up requested and follow up information is in discharge providers

## 2021-09-06 NOTE — ASSESSMENT & PLAN NOTE
Recent Labs     09/04/21  0903 09/05/21  0438 09/06/21  0508   CREATININE 1 18 1 77* 1 40*   EGFR 64 39 52     Estimated Creatinine Clearance: 49 9 mL/min (A) (by C-G formula based on SCr of 1 4 mg/dL (H))  Cr was elevated on admission possibly secondary to relative hypoperfusion, as patient's baseline SBP seems to be in the 170-190s  Baseline seems to be from 1 1-1 4  Cr   Today 1 40 back to baseline    Plan:  · Can resume home regimen of Benazepril    · Will monitor renal function  · Avoid hypotension  · Avoid nephrotoxins  · Daily I/O

## 2021-09-06 NOTE — INCIDENTAL FINDINGS
The following findings require follow up:  Radiographic finding     MRI of Brain Findings:   · Stable subacute right thalamic infarction  · Cerebral atrophy with chronic small vessel ischemic white matter disease and chronic lacunar infarctions  Follow up required: Pt already scheduled to follow up with outpatient Neurology for post-stroke management and repeat CT Head on 9/10/2021

## 2021-09-06 NOTE — ASSESSMENT & PLAN NOTE
Currently PRITI resolved with creatinine back to baseline this morning  No further inpatient interventions indicated      Plan:  · Follow-up with outpatient Neurology once discharged

## 2021-09-06 NOTE — ASSESSMENT & PLAN NOTE
· S/p endarterectomy 4/13/21 with Dr Joni Salazar due to symptomatic extensive R common and internal carotid artery stenosis  · Progressive disease and patient continues to smoke   · Currently on ASA/Atrovastatin/Plavix as outpatient   · Supposed to have outpatient CTA but has new renal insufficiency     Plan:  · Follow-up with neurology as outpatient  · Outpatient CT of head scheduled

## 2021-09-06 NOTE — ASSESSMENT & PLAN NOTE
Complained of dysphagia and a sensation of choking this morning 09/05/21  Denies any difficulty breathing  On examination, Lynn uvular edema noted and received prednisone 20 mg x 2 doses and Benadryl 50mg IV ordered with improvement in symptoms, currently resolved  Pt was evaluated by speech/swallowing and passed  Tolerating po diet without difficulty  Plan:   · Continue monitoring for oropharyngeal pain while inpatient   · Continue with Chloraseptic spray q2H p r n   For sore throat

## 2021-09-06 NOTE — DISCHARGE INSTRUCTIONS
Stroke   WHAT YOU NEED TO KNOW:   A stroke happens when blood flow to part of the brain is interrupted  This can cause serious brain damage from a lack of oxygen  Your healthcare providers will help you create goals for your recovery  They will help you and your family or care providers make a plan for care at home to help you reach your goals  The plan will include lifestyle changes you can make to help you manage your health and prevent another stroke  Your discharge instructions will include information on services and equipment you or your family may need  DISCHARGE INSTRUCTIONS:   Call your local emergency number (911 in the [address] [facility]) for any of the following:   · You have any of the following signs of a stroke:      ? Numbness or drooping on one side of your face     ? Weakness in an arm or leg    ? Confusion or difficulty speaking    ? Dizziness, a severe headache, or vision loss       · You have a seizure  · You have chest pain or shortness of breath  Seek care immediately if:   · Your arm or leg feels warm, tender, and painful  It may look swollen and red  · You have loss of balance or coordination  · You have double vision or vision loss  · You have unusual or heavy bleeding  Call your doctor or neurologist if:   · Your blood sugar level or blood pressure is higher or lower than usual     · You have trouble swallowing  · You have trouble having a bowel movement or urinating  · You have questions or concerns about your condition or care  Medicines:  Medicines may be needed to treat high cholesterol, high blood pressure, or diabetes  You may also need any of the following, depending on the kind of stroke you had:  · Antiplatelets , such as aspirin, help prevent blood clots  Take your antiplatelet medicine exactly as directed  These medicines make it more likely for you to bleed or bruise  If you are told to take aspirin, do not take acetaminophen or ibuprofen instead       · Blood thinners  help prevent blood clots  Clots can cause strokes, heart attacks, and death  The following are general safety guidelines to follow while you are taking a blood thinner:    ? Watch for bleeding and bruising while you take blood thinners  Watch for bleeding from your gums or nose  Watch for blood in your urine and bowel movements  Use a soft washcloth on your skin, and a soft toothbrush to brush your teeth  This can keep your skin and gums from bleeding  If you shave, use an electric shaver  Do not play contact sports  ? Tell your dentist and other healthcare providers that you take a blood thinner  Wear a bracelet or necklace that says you take this medicine  ? Do not start or stop any other medicines unless your healthcare provider tells you to  Many medicines cannot be used with blood thinners  ? Take your blood thinner exactly as prescribed by your healthcare provider  Do not skip does or take less than prescribed  Tell your provider right away if you forget to take your blood thinner, or if you take too much  ? Warfarin  is a blood thinner that you may need to take  The following are things you should be aware of if you take warfarin:     § Foods and medicines can affect the amount of warfarin in your blood  Do not make major changes to your diet while you take warfarin  Warfarin works best when you eat about the same amount of vitamin K every day  Vitamin K is found in green leafy vegetables and certain other foods  Ask for more information about what to eat when you are taking warfarin  § You will need to see your healthcare provider for follow-up visits when you are on warfarin  You will need regular blood tests  These tests are used to decide how much medicine you need  · Take your medicine as directed  Contact your healthcare provider if you think your medicine is not helping or if you have side effects  Tell him or her if you are allergic to any medicine   Keep a list of the medicines, vitamins, and herbs you take  Include the amounts, and when and why you take them  Bring the list or the pill bottles to follow-up visits  Carry your medicine list with you in case of an emergency  Know the warning signs of a stroke: The words BE FAST can help you remember and recognize warning signs of a stroke:  · B = Balance:  Sudden loss of balance    · E = Eyes:  Loss of vision in one or both eyes    · F = Face:  Face droops on one side    · A = Arms:  Arm drops when both arms are raised    · S = Speech:  Speech is slurred or sounds different    · T = Time:  Time to get help immediately     Recovery testing: Your healthcare provider will test your recovery 90 days (3 months) after your stroke  This may be done over the phone or in person  Your provider will ask how well you can do the activities you did before the stroke  He or she will also ask how well you can do your daily activities without help  Your provider may make recommendations for you based on your test  For example, you may need someone to help you walk safely  You may also need help with daily activities, such as getting dressed  Based on your answers, your provider may do this test again over time  Manage the effects of a stroke:   · Go to stroke rehabilitation (rehab) if directed  Rehab is a program run by specialists who will help you recover abilities you may have lost  Specialists include physical, occupational, and speech therapists  Physical therapists help you gain strength or keep your balance  Occupational therapists teach you new ways to do daily activities  Your therapy may include movements for everyday activities  An example is being able to raise yourself from a chair  A speech therapist helps you improve your ability to talk and swallow  · Make your home safe  Remove anything you might trip over  Tape electrical cords down  Keep paths clear throughout your home  Make sure your home is well lit   Put nonslip materials on surfaces that might be slippery  An example is your bathtub or shower floor  A cane or walker may help you keep your balance as you walk  Prevent another stroke:   · Manage health conditions  A condition such as diabetes can increase your risk for a stroke  Control your blood sugar level if you have hyperglycemia or diabetes  Take your prescribed medicines and check your blood sugar level as directed  · Check your blood pressure as directed  High blood pressure can increase your risk for a stroke  Follow your healthcare provider's directions for controlling your blood pressure  · Do not use nicotine products or illegal drugs  Nicotine and other chemicals in cigarettes and cigars can cause blood vessel damage  Nicotine and illegal drugs both increase your risk for a stroke  Ask your healthcare provider for information if you currently smoke or use drugs and need help to quit  E- cigarettes or smokeless tobacco still contain nicotine  Talk to your healthcare provider before you use these products  · Talk to your healthcare provider about alcohol  Alcohol can raise your blood pressure  The recommended limit is 2 drinks in a day for men and 1 drink in a day for women  Do not binge drink or save a week's worth of alcohol to drink in 1 or 2 days  Limit weekly amounts as directed by your provider  · Eat a variety of healthy foods  Healthy foods include whole-grain breads, low-fat dairy products, beans, lean meats, and fish  Eat at least 5 servings of fruits and vegetables each day  Choose foods that are low in fat, cholesterol, salt, and sugar  Eat foods that are high in potassium, such as potatoes and bananas  A dietitian can help you create healthy meal plans  · Maintain a healthy weight  Ask your healthcare provider how much you should weigh  Ask him or her to help you create a weight loss plan if you are overweight   He or she can help you create small goals if you have a lot of weight to lose  · Exercise as directed  Exercise can lower your blood pressure, cholesterol, weight, and blood sugar levels  Healthcare providers will help you create exercise goals  They can also help you make a plan to reach your goals  For example, you can break exercise into 10 minute periods, 3 times in the day  Find an exercise that you enjoy  This will make it easier for you to reach your exercise goals  · Manage stress  Stress can raise your blood pressure  Find new ways to relax, such as deep breathing or listening to music  What you need to know about depression after a stroke:  Talk to your healthcare provider if you have depression that continues or is getting worse  Your provider may be able to help treat your depression  Your provider can also recommend support groups for you to join  A support group is a place to talk with others who have had a stroke  It may also help to talk to friends and family members about how you are feeling  Tell your family and friends to let your healthcare provider know if they see any signs of depression:  · Extreme sadness    · Avoiding social interaction with family or friends    · A lack of interest in things you once enjoyed    · Irritability    · Trouble sleeping    · Low energy levels    · A change in eating habits or sudden weight gain or loss    Follow up with your doctor or neurologist as directed: You may need to come in for regular tests of your brain function  Your provider may refer you to a specialist, or to other kinds of care  An example is palliative (comfort) care  Your provider can also give information about respite care to anyone who helps care for you  Respite care is a service to help caregivers take a break or get more rest  Write down your questions so you remember to ask them during your visits  For support and more information:   · American Heart Association and American Stroke Association  [address]   Phone: [phone] - [phone]  Web Address: https://www strong com/  [facility] [address] 2021 Information is for End User's use only and may not be sold, redistributed or otherwise used for commercial purposes  All illustrations and images included in CareNotes® are the copyrighted property of [employer]  or [address] [place] [address]  The above information is an  only  It is not intended as medical advice for individual conditions or treatments  Talk to your doctor, nurse or pharmacist before following any medical regimen to see if it is safe and effective for you

## 2021-09-06 NOTE — DISCHARGE SUMMARY
Luisa MATTHEW  77 , [birth year], 73 y o  male MRN: [id number]   Unit/Bed#: S /S -01 Encounter: [id number]   Primary Care Physician: Brenden Cole DO   Date and Time Admitted to Hospital: 9/2/2021  7:52 PM       * CVA (cerebral vascular accident) [facility])  Assessment & Plan  · Patient presented to ED 9/2 with left faial droop and L upper visual field deficit, and difficulty in speech  · Stroke alert activated in ED   · CT Head with no acute intracranial hemorrhage  · CTA Head with severe plaque stenosis of bilateral common carotid arteries  Right PCA distal focal occlusion  · S/p Tpa, follow tpa protocol   · MRI 3/21: 2 foci of mild diffusion restriction in the right frontal lobe, indicative of recent/acute to subacute infarction  · S/p R carotid endarterectomy 4/13/21   · Echo 3/21: EF 60%  · Echo 9/3: EF 65%, grade 1 diastolic dysfunction  · MRI 9/3 Acute infarct in the right occipital lobe correlating with focal right PCA occlusion on yesterday's CTA  Lacunar infarct in the right thalamus  favored to be subacute     · 09/05 patient was a rapid response on the floor, complained of bilateral paresthesias, sensation of facial droop and stated that he feels like he is "high"  On physical examination - symmetric bilateral paresthesias in the lower extremities, no other gross neurologic deficits noted  Per neurology no significant change since prior day    · Repeat Stat CT of head showed evolving stroke with no acute hemorrhage    Plan:  · Continue mintoring patient for neurologic changes  · Restart home BP meds   · Continue with ASA 81 mg and plavix restarted today  · Patient to go on Eliquis in 1 week if repeat CT of head is stable  · Neurology on board, appreciate recommendations    · F/u with Neurology and CT of head scheduled 9/10 as outpatient      Stage 3a chronic kidney disease [facility])  Assessment & Plan  Lab Results   Component Value Date    EGFR 52 09/06/2021 EGFR 39 09/05/2021    EGFR 64 09/04/2021    CREATININE 1 40 (H) 09/06/2021    CREATININE 1 77 (H) 09/05/2021    CREATININE 1 18 09/04/2021   · Baseline creatinine 1 1 - 1 4  · Creatinine was 1 4 today and back to baseline  · Will need outpatient nephrology follow up       Pre-diabetes  Assessment & Plan  Lab Results   Component Value Date    HGBA1C 6 0 (H) 09/02/2021     Significant for pre-diabetes  Outpatient follow up with PCP    Benign essential hypertension  Assessment & Plan  · BP has been stable with last measurement 156/75  Plan:  · Blood pressure goal <180/105  Avoid hypotension     · PRN hydralazine  · Home regimen: amlodipine/metoprolol/benazapril/coreg  · Currently on coreg 12 5mg BID, Amlodipine 5mg daily  · Benazepril can be resumed given renal function returned to baseline    Stenosis of right internal carotid artery  Assessment & Plan  · Carotid study 7/21 with high grade stenosis of R carotid and 50-69% stenosis noted in the internal carotid artery  · Follows with outpatient vascular surgery   · Continue Statin, hold asa/plavix  · F/u outpatient with vascular    GERD (gastroesophageal reflux disease)  Assessment & Plan  - Protonix 40mg daily in place of his home Prilosec 20mg daily    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  Assessment & Plan  · S/p endarterectomy 4/13/21 with Dr Wilder Fonseca due to symptomatic extensive R common and internal carotid artery stenosis  · Progressive disease and patient continues to smoke   · Currently on ASA/Atrovastatin/Plavix as outpatient   · Supposed to have outpatient CTA but has new renal insufficiency     Plan:  · Follow-up with neurology as outpatient  · Outpatient CT of head scheduled       Iron deficiency anemia  Assessment & Plan  Recent Labs     09/03/21  2320 09/06/21  0508   HGB 9 4* 8 8*     · Chronic   · Iron panel: low iron, high TIBC  · Continue to trend blood counts  · Transfuse for Hgb<7   · Iron tabs 325 daily with breakfast    COPD (chronic obstructive pulmonary disease) ([facility] [address] )  Assessment & Plan  · Not currently in acute exacerbation   · Continue xoponex   · Continue home aclidinium  · Currently on room air   · No documented PFTs    HLD (hyperlipidemia)  Assessment & Plan  · Home regimen: statin + fenofibrate  · Continue with increased statin dose and restart home fenofibrate    Tobacco use  Assessment & Plan  · Currently every day smoker  · Smokes 1/2ppd   · Started on nicotine patch and will continue as outpatient   · Smoking cessation education      Dysphagia-resolved as of 9/6/2021  Assessment & Plan  Complained of dysphagia and a sensation of choking this morning 09/05/21  Denies any difficulty breathing  On examination, Lynn uvular edema noted and received prednisone 20 mg x 2 doses and Benadryl 50mg IV ordered with improvement in symptoms, currently resolved  Pt was evaluated by speech/swallowing and passed  Tolerating po diet without difficulty  Plan:   · Continue monitoring for oropharyngeal pain while inpatient   · Continue with Chloraseptic spray q2H p r n  For sore throat      PRITI (acute kidney injury) ([facility])-resolved as of 9/6/2021  Assessment & Plan  Recent Labs     09/04/21  0903 09/05/21  0438 09/06/21  0508   CREATININE 1 18 1 77* 1 40*   EGFR 64 39 52     Estimated Creatinine Clearance: 49 9 mL/min (A) (by C-G formula based on SCr of 1 4 mg/dL (H))  Cr was elevated on admission possibly secondary to relative hypoperfusion, as patient's baseline SBP seems to be in the 170-190s  Baseline seems to be from 1 1-1 4  Cr   Today 1 40 back to baseline    Plan:  · Can resume home regimen of Benazepril    · Will monitor renal function  · Avoid hypotension  · Avoid nephrotoxins  · Daily I/O           Medical Problems     Resolved Problems  Date Reviewed: 9/6/2021        Resolved    PRITI (acute kidney injury) ([facility] ) 9/6/2021     Resolved by  Kevin Stringer MD    Dysphagia 9/6/2021     Resolved by  Kevin Stringer MD    Overview Deleted 9/5/2021  3:55 PM by Sherman Georges MD                      Discharging Resident: Marian Renteria MD  Discharging Attending: Floyd Rodriguez*  PCP: Delmy Chan DO  Admission Date:   Admission Orders (From admission, onward)     Ordered        09/02/21 2135  Inpatient Admission  Once                   Discharge Date: 09/06/21    Consultations During Hospital Stay:  · Neurology  · PT/OT  · Speech/Swallowing  · Case management    Procedures Performed:   · TPA administration    Significant Findings / Test Results:   CTA:   1  Right PCA distal focal occlusion  2  Mild plaque stenosis right V4 segment  3  Severe hemodynamic significant plaque stenosis left V4 segment  MRI of Brain:  1  Acute infarct in the right occipital lobe correlating with focal right PCA occlusion on yesterday's CTA  2  Lacunar infarct in the right thalamus  favored to be subacute  CT of head:  1  Evolving, acute nonhemorrhagic right PCA territory infarction  2   Stable subacute right thalamic infarction  3   Cerebral atrophy with chronic small vessel ischemic white matter disease and chronic lacunar infarctions as described above  Incidental Findings:   MRI of Brain:   · Stable subacute right thalamic infarction  · Cerebral atrophy with chronic small vessel ischemic white matter disease and chronic lacunar infarctions as described above  Test Results Pending at Discharge (will require follow up): · None     Outpatient Tests Requested:  · Repeat CT of head scheduled on 8/76/83    Complications:  Rapid response called for acute neurologic changes on 09/05  Reason for Admission: Acute Stroke    Hospital Course:   Blanca Lainez is a 73 y o  male patient who originally presented to the hospital on 9/2/2021 due to left facial droop and found to have right PCA stroke despite on ASA/Plavix   Initial CT showed no evidence of acute intracranial hemorrhage and CTA showed right PCA distal focal occlusion and mild plaque stenosis right V4 segment with severe hemodynamic significant plaque stenosis left V4 segment  Subsequent MRI of Brain showed acute infarct in the right occipital lobe correlating with focal right PCA occlusion  Neurology was consulted and patient was admitted on stroke pathway and tPA was administered  Acute kidney injury was found on admission with elevated Creatinine levels which has returned to his baseline with IV fluid hydration  Over the course of his hospital stay, rapid response was called for worsened left-sided facial droop associated with new onset b/l lower extremity numbness and tingling and dysphasia with choking sensation  On examination, uvula edema was found and subsequently resolved with benadryl x 1 dose and prednisone x 2 doses  Repeat Stat CT of head showed evolving stroke but no hemorrhage  Speech/Swallowing was consulted and patient passed evaluation  Given that the patient is medically stable, he will be discharged home today with outpatient OT required and no driving until patient is evaluated  Patient has repeat CT of head scheduled on 09/10, restarted on dual-antiplatelets and will follow up with outpatient Neurology for ongoing management  The patient was made aware of the discharge and agrees with the plan  Please see above list of diagnoses and related plan for additional information  Condition at Discharge: stable    Discharge Day Visit / Exam:   Subjective:  patient was seen at bedside this am in no acute distress  He reports left-sided facial droop mostly resolved and denies dysphagia currently  Overnight, he did complain of generalized itching and was given benadryl by the night team  He denies dizziness, headache, weakness, chest pain, or SOB  He is feeling much better and have no new complaints today       Vitals: Blood Pressure: 156/75 (09/06/21 0627)  Pulse: 65 (09/06/21 0627)  Temperature: 98 7 °F (37 1 °C) (09/06/21 0627)  Temp Source: Oral (09/06/21 6036)  Respirations: 19 (09/06/21 1466)  Height: 5' 9" (175 3 cm) (09/04/21 1343)  Weight - Scale: 67 kg (147 lb 9 6 oz) (09/06/21 0600)  SpO2: 97 % (09/06/21 0627)    Physical Exam:   General: Yobany Marie appears comfortably sitting up in bed eating breakfast, not ill-appearing and in no acute distress   HEENT: atraumatic and normocephalic; PERRL   Neck: supple, non-tender; no JVD  Lungs: clear to auscultation bilaterally; no wheezes, crackles, or rales; no respiratory distress  Heart: regular rate and rhythm; normal S1 and S2; no murmur or gallop  Abdomen: soft, non-tender; bowel sounds normal  Skin: normal without suspicious lesions or rash on exposed skin  Neuro: alert and oriented x 3; mild residual left-sided facial droop (most notable left upper eyelid); no AMS or confusion  Extremities: warm and well-perfused; no edema, cyanosis, or clubbing    Discussion with Family: Patient declined call to   Discharge instructions/Information to patient and family:   See after visit summary for information provided to patient and family  Provisions for Follow-Up Care:  See after visit summary for information related to follow-up care and any pertinent home health orders  Disposition:   Other: Home with outpatient rehab    Planned Readmission: No     Discharge Medications:  See after visit summary for reconciled discharge medications provided to patient and/or family        **Please Note: This note may have been constructed using a voice recognition system**

## 2021-09-06 NOTE — PLAN OF CARE
Problem: PHYSICAL THERAPY ADULT  Goal: Performs mobility at highest level of function for planned discharge setting  See evaluation for individualized goals  Description: Treatment/Interventions: Functional transfer training, LE strengthening/ROM, Elevations, Therapeutic exercise, Endurance training, Patient/family training, Equipment eval/education, Bed mobility, Gait training          See flowsheet documentation for full assessment, interventions and recommendations  Note: Prognosis: Fair  Problem List: Impaired balance, Decreased mobility, Decreased coordination  Assessment: Pt with improved mobility this session however would continue to benefit from PT for increased endurance, higher level balance training, decreased gait speed/TUG score to improve community mobility safety; Pt appears to be back to baseline after RR on 9/5/21; Pt with BLE strength 4+/5, coordination for toe taps and heel to shin WFL and equal bilaterally;  BUE strength WFL;  Pt remains seated EOB, needs in reach, nurse aware  The patient's AM-PAC Basic Mobility Inpatient Short Form Raw Score is 24, Standardized Score is 57 68  A standardized score greater than 42 9 suggests the patient may benefit from discharge to home with OPPT  Please also refer to the recommendation of the Physical Therapist for safe discharge planning  PT Discharge Recommendation: Home with outpatient rehabilitation     PT - OK to Discharge: Yes (When medically ready)    See flowsheet documentation for full assessment

## 2021-09-06 NOTE — ASSESSMENT & PLAN NOTE
· BP has been stable with last measurement 156/75  Plan:  · Blood pressure goal <180/105  Avoid hypotension     · PRN hydralazine  · Home regimen: amlodipine/metoprolol/benazapril/coreg  · Currently on coreg 12 5mg BID, Amlodipine 5mg daily  · Benazepril can be resumed given renal function returned to baseline

## 2021-09-06 NOTE — UTILIZATION REVIEW
Initial Clinical Review     Admission: Date/Time/Statement:       Admission Orders (From admission, onward)              Ordered          09/02/21 2135   Inpatient Admission  Once                             Orders Placed This Encounter   Procedures    Inpatient Admission       Standing Status:   Standing       Number of Occurrences:   1       Order Specific Question:   Level of Care       Answer:   Critical Care [15]       Order Specific Question:   Estimated length of stay       Answer:   More than 2 Midnights       Order Specific Question:   Certification       Answer:   I certify that inpatient services are medically necessary for this patient for a duration of greater than two midnights  See H&P and MD Progress Notes for additional information about the patient's course of treatment             ED Arrival Information      Expected Arrival Acuity     - 9/2/2021 19:44 Emergent           Means of arrival Escorted by Service Admission type     Walk-In Family Member Critical Care/ICU Emergency           Arrival complaint     VISUAL DISTURBANCE/FACIAL DROOP                Chief Complaint   Patient presents with    Facial Droop       Pts daughter reports patient having left sided facial droop, started 1 hour ago  Pt reports "temple pain" and "being back in the 60s"          Initial Presentation: 73 y  o  male with a past medical history of carotid artery stenosis status post right carotid endarterectomy, hypertension, hyperlipidemia, COPD, stroke x2,chronic anemia and active smoking who presents with left facial droop,  left upper visual deficit, difficulty in speech and disoriented, associated with headache at around 1840  Stroke alert called 2011  NIHSS 3  CT neg for hemorrhage  On exam, pt has L upper visual file deficit, wheezing   L facial and eyelid slightly lower L than R    CTA head revealed right PCA distal focal occlusion-not amenable for retrieval   Decision was made to initiate tPA   Patient's blood pressure was 190, labetalol was given before tPA  Pt admitted as Inpatient with CVA to ICU  Plan - frequent neuro checks, permissive HTN- goal of SBP less than 180, DBP less than 105  Labetalol p r n  For blood pressure above goal  Repeat CT in 24 hrs after tPA-8 PM 9/3, MRI, echo, hold ASA and Plavix  x24 hrs, neurology  Consult, continue statin, continue albuterol p r n  And ipratropium           Date: 9/3   Day 2:   Baseline creat 1 2, creat 1 45 on admission-will trend cr/BUN   No facial droop noted, equal strength bilateral upper and lower extremities        Neurology-Left quadrantanopsia on exam  MRI pending  Continue DAPT for now and will adjust anticoagulants based on MRI  Impression: recurrent ischemic CVA with left sided deficits in the setting of current tobacco use and b/l ICA stenosis with numerous heterogeneous plaques and significant intracranial atherosclerosis   Plan-  BP as close to but <180 SBP due to recent tPA administration, neuro checks, obtain lipids and A1c, Atorvastatin 80 mg daily, Repeat CTH if GCS drops 2pts in 1hr or if post tPA if pt is reporting severe headache, acute increase in BP, N/V  , TTE, MRI brain, telemetry, PT/OT/ST, Repeat CTH at 24 hours after tPA administration prior to starting AP/AC             ED Triage Vitals   Temperature Pulse Respirations Blood Pressure SpO2   09/02/21 1954 09/02/21 1952 09/02/21 1952 09/02/21 1952 09/02/21 1952   98 1 °F (36 7 °C) 71 18 (!) 183/77 98 %       Temp Source Heart Rate Source Patient Position - Orthostatic VS BP Location FiO2 (%)   09/02/21 1954 09/02/21 1952 09/02/21 1952 09/02/21 1952 --   Oral Monitor Sitting Right arm         Pain Score           09/02/21 2005           4                  Wt Readings from Last 1 Encounters:   09/03/21 68 6 kg (151 lb 3 8 oz)      Additional Vital Signs:   Date/Time   Temp   Pulse   Resp   BP   MAP (mmHg)   SpO2     09/03/21 0800   --   64   24Abnormal    173/76Abnormal    109   98 %     09/03/21 0730   --   75   22   165/99   125   96 %     09/03/21 0700   98 5 °F (36 9 °C)   75   19   176/75Abnormal    108   95 %     09/03/21 0600   --   74   18   148/66   95   97 %     09/03/21 0530   --   72   19   157/68   98   93 %     09/03/21 0500   --   71   27Abnormal    151/91   115   97 %     09/03/21 0430   --   60   26Abnormal    159/71   102   93 %     09/03/21 0400   98 3 °F (36 8 °C)   61   20   178/80Abnormal    115   96 %     09/03/21 0330   --   58   20   169/74   107   92 %     09/03/21 0302   97 8 °F (36 6 °C)   --   --   --   --   --     09/03/21 0300   --   57   20   182/81Abnormal    116   95 %     09/03/21 0230   --   68   30Abnormal    175/76Abnormal    109   97 %     09/03/21 0200   --   57   23Abnormal    191/86Abnormal    124   97 %     09/03/21 0130   --   63   26Abnormal    202/109Abnormal    --   96 %     09/03/21 0100   --   60   23Abnormal    180/77Abnormal    111   97 %     09/03/21 0030   --   57   25Abnormal    160/101Abnormal    124   97 %     09/03/21 0000   98 °F (36 7 °C)   62   33Abnormal    171/74Abnormal    106   96 %     09/02/21 2330   --   59   21   158/72   103   96 %     09/02/21 2300   --   59   25Abnormal    191/81Abnormal    116   97 %     09/02/21 2245   --   --   --   191/81Abnormal    --   --     09/02/21 2230   --   60   23Abnormal    177/72Abnormal    104   99 %     09/02/21 2221   98 3 °F (36 8 °C)   68   22   177/72Abnormal    104   98 %     09/02/21 2200   --   60   --   177/77Abnormal    --   99 %     09/02/21 21:55:26   --   --   --   176/78Abnormal    --   --     09/02/21 2155   --   60   --   --   --   97 %     09/02/21 2150 -- 60   --   --   --   98 %     09/02/21 21:49:26   --   --   --   178/93Abnormal    --   --     09/02/21 21:46:26   --   --   --   178/75Abnormal    --   --     09/02/21 2145   -- 58   --   --   --   99 %     09/02/21 21:40:26   --   --   --   169/74   --   --     09/02/21 2140 -- 58   --   --   --   97 %     09/02/21 21:34:26   --   --   --   182/78Abnormal    --   --     09/02/21 21:31:26   --   --   --   189/84Abnormal    --   --     09/02/21 2130   --   57   18   183/81Abnormal    --   97 %     09/02/21 21:25:26   --   --   --   159/74   --   --     09/02/21 2125   --   58   --   --   --   97 %     09/02/21 2120   --   56   --   --   --   98 %     09/02/21 21:19:26   --   --   --   167/75   --   --     09/02/21 2115   --   58   16   160/73   --   97 %     09/02/21 21:10:26   --   --   --   172/76Abnormal    --   --     09/02/21 21:04:26   --   --   --   176/77Abnormal    --   --     09/02/21 21:01:26   --   --   --   167/72   --   --     09/02/21 2100   --   56   16   168/75   104   95 %     09/02/21 20:55:26   --   --   --   171/73Abnormal    --   --     09/02/21 20:49:18   --   --   --   207/80Abnormal    --   --     09/02/21 2046   --   60   --   --   --   97 %     09/02/21 2045   --   62   --   194/74Abnormal    107   --     09/02/21 2040   --   62   --   --   --   97 %     09/02/21 2035   --   62   --   --   --   97 %     09/02/21 20:34:19   --   --   --   191/83Abnormal    --   --     09/02/21 2030   --   62   14   198/82Abnormal    118   97 %        Date and Time Eye Opening Best Verbal Response Best Motor Response Ge Coma Scale Score   09/03/21 0800 4 5 6 15   09/03/21 0700 4 5 6 15   09/03/21 0400 4 5 6 15   09/03/21 0000 4 5 6 15   09/02/21 2221 4 5 6 15   09/02/21 2145 4 5 6 15   09/02/21 2130 4 5 6 15   09/02/21 2115 4 5 6 15   09/02/21 2100 4 5 6 15   09/02/21 2045 4 5 6 15   09/02/21 2030 4 5 6 15   09/02/21 2015 4 5 6 15   09/02/21 2005 4 5 6 15         Pertinent Labs/Diagnostic Test Results:   9/2   ECG-Normal sinus rhythm  CT brain stroke alert-No evidence of acute intracranial hemorrhage  Age-indeterminate ischemia in the right frontal corona radiata and right centrum semiovale     CTA head /neck-  RIGHT EXTRACRANIAL CAROTID SEGMENT: Severe plaque stenosis and significant hemodynamic stenosis of the right common carotid artery origin/proximally near its origin as well  Moderate to marked plaque stenosis right common carotid artery  Status post right endarterectomy which appears patent    LEFT EXTRACRANIAL CAROTID SEGMENT: Severe plaque stenosis left common carotid artery origin  Moderate to severe left common carotid artery and left carotid bulb plaque stenosis    Right PCA distal focal occlusion       Mild plaque stenosis right V4 segment    Severe hemodynamic significant plaque stenosis left V4 segment  CXR-No acute cardiopulmonary disease      9/3- ECHO  LEFT VENTRICLE: Size was normal  Systolic function was normal by visual assessment  Ejection fraction was estimated to be 55 %  There were no regional wall motion abnormalities  Wall thickness was normal  DOPPLER: There was an increased  relative contribution of atrial contraction to ventricular filling  Doppler parameters were consistent with abnormal left ventricular relaxation (grade 1 diastolic dysfunction)    RIGHT VENTRICLE: The ventricle was mildly dilated  Systolic function was normal  DOPPLER: Systolic pressure was not estimated   LEFT ATRIUM: Size was normal    RIGHT ATRIUM: The atrium was mildly dilated             Results from last 7 days   Lab Units 09/02/21 2007   WBC Thousand/uL 5 62   HEMOGLOBIN g/dL 9 9*   HEMATOCRIT % 34 9*   PLATELETS Thousands/uL 332               Results from last 7 days   Lab Units 09/02/21 2007   SODIUM mmol/L 141   POTASSIUM mmol/L 4 0   CHLORIDE mmol/L 103   CO2 mmol/L 29   ANION GAP mmol/L 9   BUN mg/dL 19   CREATININE mg/dL 1 45*   EGFR ml/min/1 73sq m 50   CALCIUM mg/dL 9 7               Results from last 7 days   Lab Units 09/02/21 2005   POC GLUCOSE mg/dl 116           Results from last 7 days   Lab Units 09/02/21 2007   GLUCOSE RANDOM mg/dL 110            Results from last 7 days   Lab Units 09/02/21 2007   TROPONIN I ng/mL <0 02               Results from last 7 days   Lab Units 09/02/21 2007   PROTIME seconds 13 6   INR   1 03   PTT seconds 34            ED Treatment:             Medication Administration from 09/02/2021 1941 to 09/02/2021 2217        Date/Time Order Dose Route Action       09/02/2021 2012 iohexol (OMNIPAQUE) 350 MG/ML injection (SINGLE-DOSE) 100 mL 100 mL Intravenous Given       09/02/2021 2100 alteplase (ACTIVASE) bolus 6 1 mg 6 1 mg Intravenous Given       09/02/2021 2101 alteplase (ACTIVASE) infusion 55 1 mg 55 1 mg Intravenous New Bag       09/02/2021 2159 sodium chloride 0 9 % bolus 50 mL 50 mL Intravenous Given       09/02/2021 2039 Labetalol HCl (NORMODYNE) injection 10 mg 10 mg Intravenous Given       09/02/2021 2053 Labetalol HCl (NORMODYNE) injection 10 mg 10 mg Intravenous Given          Medical History        Past Medical History:   Diagnosis Date    Anxiety      Back pain      Claustrophobia      [facility]      GERD (gastroesophageal reflux disease)      Hypertension      Lumbar back pain      Panic attacks      Stenosis of right carotid artery      [facility]roke [facility] [address] )      Wears glasses      Wears partial dentures       upper denture         Present on Admission:   Renal insufficiency   COPD [facility]chronic obstructive pulmonary disease) ([facility])   Stenosis of right internal carotid artery   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   CVA (cerebral vascular accident) ([address] )   Anemia   Tobacco use        Admitting Diagnosis: Weakness [R53 1]  Facial droop [R29 810]  Visual problems [H54 7]  Acute CVA (cerebrovascular accident) ([facility] [address] ) [I63 9]  Age/Sex: 73 y o  male  Admission Orders:  Scheduled Medications:  ipratropium, 0 5 mg, Nebulization, TID  Labetalol HCl (NORMODYNE) injection 20 mg   Dose: 20 mg  Freq:  Once Route: IV  Start: 09/03/21 0215 End: 09/03/21 0256     Continuous IV Infusions:  PRN Meds:  albuterol, 2 puff, Inhalation, Q4H PRN  Labetalol HCl, 10 mg, Intravenous, Q6H PRN, SBP>180 x1 9/2  levalbuterol, 1 25 mg, Nebulization, Q8H PRN     Cardiopulmonary monitoring  Neuro checks  SCD's  Nursing dysphagia assess prior to diet  Monitor for bleeding  FOBT  NPO        IP CONSULT TO NEUROLOGY  IP CONSULT TO CASE MANAGEMENT     Network Utilization Review Department  ATTENTION: Please call with any questions or concerns to [phone] and carefully listen to the prompts so that you are directed to the right person  All voicemails are confidential   Al Wong all requests for admission clinical reviews, approved or denied determinations and any other requests to dedicated fax number below belonging to the campus where the patient is receiving treatment   List of dedicated fax numbers for the Facilities:  [address] DENIALS (Administrative/Medical Necessity) [phone]   [address] (Maternity/NICU/Pediatrics) [phone] [address] Priti Ferrer 6[address] [id number] [place] [address] Kaykay Maylin G[address]mez [address] [facility] [place] [address] [phone]

## 2021-09-06 NOTE — QUICK NOTE
Called the patient to reiterate important discharge instructions and spoke to patient's daughter (patient was sleeping) explaining important discharge follow-up instructions as follows:  1  New prescription for Claritin ordered at discharge given patient was still c/o itchiness in the morning but should only be taken if patient continues to have itching, otherwise not needed  2  He is scheduled to have [facility] repeat as outpatient on 9/10 and then f/u with outpatient Neurology   3  He will continue with ASA and Plavix for now until after [facility] repeat and f/u with neurology as outpatient, at which point Neurology will likely stop his Plavix and he will continue with ASA and start on Eliquis given repeat CTH on 9/10 is stable (evolving stroke but no hemorrhage)  However, this will be further instructed/adjusted by Neurology upon follow up depending on the repeat [facility] result  Patient's daughter fully understands and will relay the instruction to her father

## 2021-09-06 NOTE — PROGRESS NOTES
NEUROLOGY RESIDENCY PROGRESS NOTE     Name: Stacey Irvin   Age & Sex: 73 y o  male   MRN: [id number]  Unit/Bed#: S -01   Encounter: [id number]    ASSESSMENT & PLAN     * CVA (cerebral vascular accident) [facility])  Assessment & Plan  Presented as stroke alert on 9/2/2021  7:52 PM with initial NIHSS of 3 and LKW 1830, initial Blood Pressure: (!) 183/77  Initial presenting deficits were slurred speech left sided weakness left VF cut  Pt was found to be a tPA candidate within the appropriate time window and without obvious contraindication and tPA was given      Post tPA exam: L superior quadrantanopsia  Current Blood Pressure: 156/75   Vascular risk factors: previous strokes, active smoker, elevated cholesterol and PAD    Workup:  Lab Results   Component Value Date    HGBA1C 6 0 (H) 09/02/2021    CHOLESTEROL 175 09/02/2021    LDLCALC 83 09/02/2021    TRIG 168 (H) 09/02/2021    INR 1 03 09/02/2021       CTH: no acute findings, chronic infarct in right frontal corona radiata and right centrum semiovale   CTA: hx of RCEA, L ICA with severe stenosis at origin; chronic RPCA occlusion, severe atherosclerotic disease throughout   MRI: large acute R PCA territory stroke   Echocardiogram: LVEF 55% RA dilation   Telemetry: monitored    Pertinent scores:  - NIHSS: 3  - Modified [place]: 1    Impression: large R PCA territory CVA with left sided deficits in the setting of current tobacco use and b/l ICA stenosis with numerous heterogeneous plaques and significant intracranial atherosclerosis    Plan:  - BP: normotension  - atorvastatin 80mg qhs  - Maintain glucose <180, SSI for coverage if indicated  - Repeat CTH if GCS drops 2pts in 1hr  - Medical management as per primary team appreciated  - Antiplatelet agents: restarted ASA and plavix  - Monitor on telemetry  - PT/OT/Speech/PM&R input appreciated  - Pt was compliant with ASA and Plavix at home and will continue for now - will switch to ASA and Eliquis on 9/10 if repeat [facility] on 9/10 is stable  - D/w medicine team  - No further inpatient neurology recommendations, will sign off, please call with questions or concerns  - Outpatient neurology follow up requested and follow up information is in discharge providers        Emmy Barton will need follow up in in 4 weeks with neurovascular attending or AP  He will require a CT head without contrast within 1 week on 9/10  SUBJECTIVE     Patient was seen and examined  No acute events overnight  He continues to do well, he was found to have orthostatic positive vitals yesterday which likely contributed to his episode of feeling unwell and weak  His vision is mostly improved with some residual blurriness in the L superior quadrants  Review of Systems   Constitutional: Negative for fever  Eyes: Positive for visual disturbance  Respiratory: Negative for shortness of breath  Cardiovascular: Negative for chest pain, palpitations and leg swelling  Gastrointestinal: Negative for abdominal pain  Musculoskeletal: Negative for back pain  Neurological: Negative for dizziness, weakness, numbness and headaches  Psychiatric/Behavioral: Negative for agitation  The patient is not nervous/anxious  OBJECTIVE     Patient ID: Emmy Barton is a 73 y o  male  Vitals:    21 1454 21 2214 21 0600 21 0627   BP: 131/70 137/64  156/75   BP Location: Right arm Right arm  Left arm   Pulse: 83 76  65   Resp: 16 16  19   Temp: 98 5 °F (36 9 °C)   98 7 °F (37 1 °C)   TempSrc: Oral Oral  Oral   SpO2: 96% 96%  97%   Weight:   67 kg (147 lb 9 6 oz)    Height:          Temperature:   Temp (24hrs), Av 6 °F (37 °C), Min:98 5 °F (36 9 °C), Max:98 7 °F (37 1 °C)    Temperature: 98 7 °F (37 1 °C)    Neurologic Exam     Mental Status   Oriented to person, place, and time  Attention: normal  Concentration: normal    Speech: speech is normal   Level of consciousness: alert  Knowledge: good     Normal comprehension  Cranial Nerves     CN II   Left visual field deficit: upper temporal quadrant(s)    CN III, IV, VI   Pupils are equal, round, and reactive to light  Extraocular motions are normal      CN V   Facial sensation intact  CN VII   Facial expression full, symmetric  CN VIII   CN VIII normal      CN IX, X   CN IX normal    CN X normal      CN XI   CN XI normal      CN XII   CN XII normal      Motor Exam   Muscle bulk: normal  Overall muscle tone: normal  Right arm pronator drift: absent  Left arm pronator drift: absent    Strength   Strength 5/5 throughout  Sensory Exam   Right arm light touch: normal  Left arm light touch: decreased from elbow  Right leg light touch: normal  Left leg light touch: normal  Vibration normal    Pinprick normal      Gait, Coordination, and Reflexes     Coordination   Finger to nose coordination: normal    Reflexes   Right brachioradialis: 3+  Left brachioradialis: 3+  Right biceps: 3+  Left biceps: 3+  Right triceps: 3+  Left triceps: 3+  Right patellar: 3+  Left patellar: 3+  Right achilles: 2+  Left achilles: 2+  Left ankle clonus present: NS   Cross adductors b/l        LABORATORY DATA     Labs:  I have personally reviewed pertinent films in PACS  Results from last 7 days   Lab Units 09/06/21 0508 09/03/21  2320 09/02/21 2007   WBC Thousand/uL 6 33 7 54 5 62   HEMOGLOBIN g/dL 8 8* 9 4* 9 9*   HEMATOCRIT % 31 8* 32 9* 34 9*   PLATELETS Thousands/uL 268 257 332   NEUTROS PCT %  --  78*  --    MONOS PCT %  --  7  --       Results from last 7 days   Lab Units 09/06/21  0508 09/05/21 0438 09/04/21  0903 09/03/21  2320   POTASSIUM mmol/L 3 9 3 8 3 9 3 1*   CHLORIDE mmol/L 104 101 103 99*   CO2 mmol/L 26 27 24 26   BUN mg/dL 25 25 13 18   CREATININE mg/dL 1 40* 1 77* 1 18 1 34*   CALCIUM mg/dL 8 4 8 6 9 4 9 2   ALK PHOS U/L  --   --   --  54   ALT U/L  --   --   --  15   AST U/L  --   --   --  16     Results from last 7 days   Lab Units 09/05/21  0438 09/04/21  0903 09/03/21  2320   MAGNESIUM mg/dL 2 5 3 6* 1 3*     Results from last 7 days   Lab Units 09/05/21  0438 09/03/21  2320   PHOSPHORUS mg/dL 2 4 2 8      Results from last 7 days   Lab Units 09/02/21 2007   INR  1 03   PTT seconds 34         Results from last 7 days   Lab Units 09/02/21 2007   TROPONIN I ng/mL <0 02       IMAGING & DIAGNOSTIC TESTING     Radiology Results: I have personally reviewed pertinent films in PACS      Other Diagnostic Testing: I have personally reviewed pertinent films in PACS    ACTIVE MEDICATIONS     Current Facility-Administered Medications   Medication Dose Route Frequency    aclidinium (TUDORZA PRESSAIR) 400 MCG/ACT inhaler 400 mcg  1 puff Inhalation BID    albuterol (PROVENTIL HFA,VENTOLIN HFA) inhaler 2 puff  2 puff Inhalation Q4H PRN    amLODIPine (NORVASC) tablet 5 mg  5 mg Oral Daily    aspirin chewable tablet 81 mg  81 mg Oral Daily    atorvastatin (LIPITOR) tablet 80 mg  80 mg Oral Daily With Dinner    calcium carbonate (TUMS) chewable tablet 1,000 mg  1,000 mg Oral BID PRN    carvedilol (COREG) tablet 12 5 mg  12 5 mg Oral BID With Meals    clopidogrel (PLAVIX) tablet 75 mg  75 mg Oral Daily    diphenhydrAMINE (BENADRYL) injection 50 mg  50 mg Intravenous Q6H PRN    fenofibrate (TRICOR) tablet 168 mg  168 mg Oral Daily    ferrous sulfate tablet 325 mg  325 mg Oral Daily With Breakfast    hydrALAZINE (APRESOLINE) injection 5 mg  5 mg Intravenous Q4H PRN    levalbuterol (XOPENEX) inhalation solution 1 25 mg  1 25 mg Nebulization Q8H PRN    nicotine (NICODERM CQ) 14 mg/24hr TD 24 hr patch 14 mg  14 mg Transdermal Daily    pantoprazole (PROTONIX) EC tablet 40 mg  40 mg Oral Early Morning    phenol (CHLORASEPTIC) 1 4 % mucosal liquid 1 spray  1 spray Mouth/Throat Q2H PRN    predniSONE tablet 20 mg  20 mg Oral Daily    sertraline (ZOLOFT) tablet 100 mg  100 mg Oral HS       Prior to Admission medications    Medication Sig Start Date End Date Taking? Authorizing Provider   aclidinium Dinah Justice Pressair) 400 MCG/ACT inhaler Inhale 1 puff 2 (two) times a day   Yes Historical Provider, MD   ALBUTEROL IN Inhale every 4 (four) hours as needed Unknown dose   Yes Historical Provider, MD   carvedilol (COREG) 12 5 mg tablet Take 12 5 mg by mouth 2 (two) times a day with meals   Yes Historical Provider, MD   clopidogrel (PLAVIX) 75 mg tablet TAKE 1 TABLET BY MOUTH EVERY DAY FOR 19 DOSES  Patient taking differently: daily  7/28/21  Yes Michael Cerda PA-C   sertraline (ZOLOFT) 100 mg tablet Take by mouth 1/19/11  Yes Historical Provider, MD   acetaminophen (TYLENOL) 325 mg tablet Take 2 tablets (650 mg total) by mouth every 6 (six) hours as needed for mild pain 4/14/21   Nemoorn ShellOBI dillon   ALPRAZolam Buddie Fay) 0 25 mg tablet TAKE 1 TABLET BY ORAL ROUTE 4 TIMES EVERY DAY AS NEEDED 6/15/21   Historical Provider, MD   amLODIPine-benazepril (LOTREL 5-10) 5-10 MG per capsule Take 1 capsule by mouth daily  4/15/21   Evorn ShellOBI dillon   aspirin (ECOTRIN LOW STRENGTH) 81 mg EC tablet Take 1 tablet (81 mg total) by mouth daily 3/31/21 8/9/21  Joana Santos MD   atorvastatin (LIPITOR) 40 mg tablet Take 1 tablet (40 mg total) by mouth every evening 3/30/21 8/9/21  Joana Santos MD   baclofen 10 mg tablet  8/4/21   Historical Provider, MD   fenofibrate 160 MG tablet Take 160 mg by mouth daily 6/25/21   Historical Provider, MD   metoprolol tartrate (LOPRESSOR) 25 mg tablet Take 1 tablet (25 mg total) by mouth every 12 (twelve) hours 3/30/21 4/29/21  Joana Santos MD   omeprazole (PriLOSEC) 20 mg delayed release capsule Take 20 mg by mouth daily Take before a meal 7/2/21   Historical Provider, MD   pravastatin (PRAVACHOL) 40 mg tablet Take 40 mg by mouth daily 7/26/21   Historical Provider, MD   thiamine 100 MG tablet Take 1 tablet (100 mg total) by mouth daily 3/31/21 8/9/21  Joana Santos MD   Varenicline Tartrate (CHANTIX PO) Take 1 tablet by mouth daily  Patient not taking: Reported on 9/2/2021    Historical Provider, MD       ==  MD Lory Gonzalez 74 Neurology Residency, PGY-3

## 2021-09-06 NOTE — ASSESSMENT & PLAN NOTE
Lab Results   Component Value Date    EGFR 52 09/06/2021    EGFR 39 09/05/2021    EGFR 64 09/04/2021    CREATININE 1 40 (H) 09/06/2021    CREATININE 1 77 (H) 09/05/2021    CREATININE 1 18 09/04/2021   · Baseline creatinine 1 1 - 1 4  · Creatinine was 1 4 today and back to baseline  · Will need outpatient nephrology follow up

## 2021-09-06 NOTE — ASSESSMENT & PLAN NOTE
· Currently every day smoker  · Smokes 1/2ppd   · Started on nicotine patch and will continue as outpatient   · Smoking cessation education

## 2021-09-06 NOTE — ASSESSMENT & PLAN NOTE
· Patient presented to ED 9/2 with left faial droop and L upper visual field deficit, and difficulty in speech  · Stroke alert activated in ED   · CT Head with no acute intracranial hemorrhage  · CTA Head with severe plaque stenosis of bilateral common carotid arteries  Right PCA distal focal occlusion  · S/p Tpa, follow tpa protocol   · MRI 3/21: 2 foci of mild diffusion restriction in the right frontal lobe, indicative of recent/acute to subacute infarction  · S/p R carotid endarterectomy 4/13/21   · Echo 3/21: EF 60%  · Echo 9/3: EF 05%, grade 1 diastolic dysfunction  · MRI 9/3 Acute infarct in the right occipital lobe correlating with focal right PCA occlusion on yesterday's CTA  Lacunar infarct in the right thalamus  favored to be subacute     · 09/05 patient was a rapid response on the floor, complained of bilateral paresthesias, sensation of facial droop and stated that he feels like he is "high"  On physical examination - symmetric bilateral paresthesias in the lower extremities, no other gross neurologic deficits noted  Per neurology no significant change since prior day    · Repeat Stat CT of head showed evolving stroke with no acute hemorrhage    Plan:  · Continue mintoring patient for neurologic changes  · Restart home BP meds   · Continue with ASA 81 mg and plavix restarted today  · Patient to go on Eliquis in 1 week if repeat CT of head is stable  · Neurology on board, appreciate recommendations    · F/u with Neurology and CT of head scheduled 9/10 as outpatient

## 2021-09-07 ENCOUNTER — TELEPHONE (OUTPATIENT)
Dept: NEUROLOGY | Facility: CLINIC | Age: 66
End: 2021-09-07

## 2021-09-07 NOTE — TELEPHONE ENCOUNTER
1ST ATTEMPT LMOM for pt to call in to sched HFU appt  [facility] Stroke Alert/[employer]    NOTE FROM CHART:  Reason for Consult / Principal Problem: pre hospital stroke alert  Genais Herbie will need follow up in in 4 weeks with neurovascular attending or AP  He will require a CT head without contrast within 1 week on 9/10

## 2021-09-13 ENCOUNTER — TELEPHONE (OUTPATIENT)
Dept: NEUROLOGY | Facility: CLINIC | Age: 66
End: 2021-09-13

## 2021-09-13 NOTE — TELEPHONE ENCOUNTER
Post CVA Discharge Follow Up    Hospitalization: 9/2/2021 - 9/6/2021  The purpose of this phone call is to assess patient's general wellbeing or for any assistance needed with follow-up care  Called patient with no answer  Left message on voicemail box for call back

## 2021-09-15 ENCOUNTER — HOSPITAL ENCOUNTER (EMERGENCY)
Facility: HOSPITAL | Age: 66
End: 2021-09-15
Attending: EMERGENCY MEDICINE | Admitting: INTERNAL MEDICINE
Payer: COMMERCIAL

## 2021-09-15 ENCOUNTER — APPOINTMENT (EMERGENCY)
Dept: RADIOLOGY | Facility: HOSPITAL | Age: 66
End: 2021-09-15
Payer: COMMERCIAL

## 2021-09-15 ENCOUNTER — APPOINTMENT (EMERGENCY)
Dept: CT IMAGING | Facility: HOSPITAL | Age: 66
End: 2021-09-15
Payer: COMMERCIAL

## 2021-09-15 ENCOUNTER — HOSPITAL ENCOUNTER (INPATIENT)
Facility: HOSPITAL | Age: 66
LOS: 3 days | Discharge: HOME | DRG: 378 | End: 2021-09-18
Attending: INTERNAL MEDICINE | Admitting: INTERNAL MEDICINE
Payer: COMMERCIAL

## 2021-09-15 VITALS
OXYGEN SATURATION: 99 % | HEART RATE: 72 BPM | SYSTOLIC BLOOD PRESSURE: 177 MMHG | TEMPERATURE: 98.4 F | DIASTOLIC BLOOD PRESSURE: 79 MMHG | RESPIRATION RATE: 15 BRPM

## 2021-09-15 DIAGNOSIS — Z78.9 IMPAIRED DECISION MAKING: ICD-10-CM

## 2021-09-15 DIAGNOSIS — I48.91 ATRIAL FIBRILLATION (HCC): ICD-10-CM

## 2021-09-15 DIAGNOSIS — I63.531 CEREBROVASCULAR ACCIDENT (CVA) DUE TO OCCLUSION OF RIGHT POSTERIOR CEREBRAL ARTERY (HCC): ICD-10-CM

## 2021-09-15 DIAGNOSIS — E83.42 HYPOMAGNESEMIA: ICD-10-CM

## 2021-09-15 DIAGNOSIS — N17.9 AKI (ACUTE KIDNEY INJURY) (HCC): ICD-10-CM

## 2021-09-15 DIAGNOSIS — K92.2 UPPER GI BLEED: Primary | ICD-10-CM

## 2021-09-15 DIAGNOSIS — K92.2 GI BLEED: Primary | ICD-10-CM

## 2021-09-15 DIAGNOSIS — E87.6 HYPOKALEMIA: ICD-10-CM

## 2021-09-15 DIAGNOSIS — R45.851 SUICIDAL IDEATION: ICD-10-CM

## 2021-09-15 PROBLEM — R26.2 AMBULATORY DYSFUNCTION: Status: ACTIVE | Noted: 2021-09-15

## 2021-09-15 PROBLEM — R55 SYNCOPE: Status: ACTIVE | Noted: 2021-09-15

## 2021-09-15 PROBLEM — D64.9 ANEMIA: Status: ACTIVE | Noted: 2021-09-15

## 2021-09-15 LAB
ABO GROUP BLD: NORMAL
ALBUMIN SERPL BCP-MCNC: 3.5 G/DL (ref 3.4–4.8)
ALP SERPL-CCNC: 24.9 U/L (ref 10–129)
ALT SERPL W P-5'-P-CCNC: 6 U/L (ref 5–63)
ANION GAP SERPL CALCULATED.3IONS-SCNC: 12 MMOL/L (ref 4–13)
APTT PPP: 25 SECONDS (ref 23–31)
AST SERPL W P-5'-P-CCNC: 12 U/L (ref 15–41)
BASOPHILS # BLD AUTO: 0.02 THOUSANDS/ΜL (ref 0–0.1)
BASOPHILS NFR BLD AUTO: 0 % (ref 0–1)
BILIRUB SERPL-MCNC: 0.29 MG/DL (ref 0.3–1.2)
BLD GP AB SCN SERPL QL: NEGATIVE
BNP SERPL-MCNC: 111.7 PG/ML (ref 1–100)
BUN SERPL-MCNC: 21 MG/DL (ref 6–20)
CALCIUM SERPL-MCNC: 8 MG/DL (ref 8.4–10.2)
CHLORIDE SERPL-SCNC: 100 MMOL/L (ref 96–108)
CO2 SERPL-SCNC: 21 MMOL/L (ref 22–33)
CREAT SERPL-MCNC: 1.81 MG/DL (ref 0.5–1.2)
D DIMER PPP FEU-MCNC: 3.48 MG/L FEU (ref 0.19–0.49)
EOSINOPHIL # BLD AUTO: 0.11 THOUSAND/ΜL (ref 0–0.61)
EOSINOPHIL NFR BLD AUTO: 2 % (ref 0–6)
ERYTHROCYTE [DISTWIDTH] IN BLOOD BY AUTOMATED COUNT: 18.4 % (ref 11.6–15.1)
ETHANOL SERPL-MCNC: 101.7 MG/DL
GFR SERPL CREATININE-BSD FRML MDRD: 38 ML/MIN/1.73SQ M
GLUCOSE SERPL-MCNC: 91 MG/DL (ref 65–140)
HCT VFR BLD AUTO: 26.6 % (ref 36.5–49.3)
HEMOCCULT STL QL IA: POSITIVE
HGB BLD-MCNC: 10.4 G/DL (ref 12–17)
HGB BLD-MCNC: 7.7 G/DL (ref 12–17)
IMM GRANULOCYTES # BLD AUTO: 0.02 THOUSAND/UL (ref 0–0.2)
IMM GRANULOCYTES NFR BLD AUTO: 0 % (ref 0–2)
INR PPP: 1.08 (ref 0.9–1.1)
LIPASE SERPL-CCNC: 81 U/L (ref 13–60)
LYMPHOCYTES # BLD AUTO: 1.28 THOUSANDS/ΜL (ref 0.6–4.47)
LYMPHOCYTES NFR BLD AUTO: 18 % (ref 14–44)
MAGNESIUM SERPL-MCNC: 0.9 MG/DL (ref 1.6–2.6)
MAGNESIUM SERPL-MCNC: 1.6 MG/DL (ref 1.6–2.6)
MCH RBC QN AUTO: 19.8 PG (ref 26.8–34.3)
MCHC RBC AUTO-ENTMCNC: 28.9 G/DL (ref 31.4–37.4)
MCV RBC AUTO: 69 FL (ref 82–98)
MONOCYTES # BLD AUTO: 0.6 THOUSAND/ΜL (ref 0.17–1.22)
MONOCYTES NFR BLD AUTO: 8 % (ref 4–12)
NEUTROPHILS # BLD AUTO: 5.15 THOUSANDS/ΜL (ref 1.85–7.62)
NEUTS SEG NFR BLD AUTO: 72 % (ref 43–75)
PHOSPHATE SERPL-MCNC: 3.1 MG/DL (ref 2.5–5)
PLATELET # BLD AUTO: 253 THOUSANDS/UL (ref 149–390)
PMV BLD AUTO: 11.2 FL (ref 8.9–12.7)
POTASSIUM SERPL-SCNC: 3.1 MMOL/L (ref 3.5–5)
POTASSIUM SERPL-SCNC: 4.6 MMOL/L (ref 3.5–5)
PROT SERPL-MCNC: 6 G/DL (ref 6.4–8.3)
PROTHROMBIN TIME: 12.2 SECONDS (ref 9.5–12.1)
RBC # BLD AUTO: 3.88 MILLION/UL (ref 3.88–5.62)
RH BLD: POSITIVE
SARS-COV-2 RNA RESP QL NAA+PROBE: NEGATIVE
SODIUM SERPL-SCNC: 133 MMOL/L (ref 133–145)
SPECIMEN EXPIRATION DATE: NORMAL
TROPONIN I SERPL-MCNC: <0.03 NG/ML (ref 0–0.07)
WBC # BLD AUTO: 7.18 THOUSAND/UL (ref 4.31–10.16)

## 2021-09-15 PROCEDURE — 36430 TRANSFUSION BLD/BLD COMPNT: CPT

## 2021-09-15 PROCEDURE — 82077 ASSAY SPEC XCP UR&BREATH IA: CPT | Performed by: STUDENT IN AN ORGANIZED HEALTH CARE EDUCATION/TRAINING PROGRAM

## 2021-09-15 PROCEDURE — 86901 BLOOD TYPING SEROLOGIC RH(D): CPT | Performed by: STUDENT IN AN ORGANIZED HEALTH CARE EDUCATION/TRAINING PROGRAM

## 2021-09-15 PROCEDURE — C9113 INJ PANTOPRAZOLE SODIUM, VIA: HCPCS | Performed by: EMERGENCY MEDICINE

## 2021-09-15 PROCEDURE — 96361 HYDRATE IV INFUSION ADD-ON: CPT

## 2021-09-15 PROCEDURE — 85018 HEMOGLOBIN: CPT | Performed by: INTERNAL MEDICINE

## 2021-09-15 PROCEDURE — 96365 THER/PROPH/DIAG IV INF INIT: CPT

## 2021-09-15 PROCEDURE — 84100 ASSAY OF PHOSPHORUS: CPT | Performed by: EMERGENCY MEDICINE

## 2021-09-15 PROCEDURE — 80053 COMPREHEN METABOLIC PANEL: CPT | Performed by: STUDENT IN AN ORGANIZED HEALTH CARE EDUCATION/TRAINING PROGRAM

## 2021-09-15 PROCEDURE — 99285 EMERGENCY DEPT VISIT HI MDM: CPT | Performed by: STUDENT IN AN ORGANIZED HEALTH CARE EDUCATION/TRAINING PROGRAM

## 2021-09-15 PROCEDURE — 96366 THER/PROPH/DIAG IV INF ADDON: CPT

## 2021-09-15 PROCEDURE — 84132 ASSAY OF SERUM POTASSIUM: CPT | Performed by: STUDENT IN AN ORGANIZED HEALTH CARE EDUCATION/TRAINING PROGRAM

## 2021-09-15 PROCEDURE — C9113 INJ PANTOPRAZOLE SODIUM, VIA: HCPCS | Performed by: INTERNAL MEDICINE

## 2021-09-15 PROCEDURE — 99223 1ST HOSP IP/OBS HIGH 75: CPT | Performed by: INTERNAL MEDICINE

## 2021-09-15 PROCEDURE — 83540 ASSAY OF IRON: CPT | Performed by: INTERNAL MEDICINE

## 2021-09-15 PROCEDURE — 83880 ASSAY OF NATRIURETIC PEPTIDE: CPT | Performed by: STUDENT IN AN ORGANIZED HEALTH CARE EDUCATION/TRAINING PROGRAM

## 2021-09-15 PROCEDURE — 85730 THROMBOPLASTIN TIME PARTIAL: CPT | Performed by: STUDENT IN AN ORGANIZED HEALTH CARE EDUCATION/TRAINING PROGRAM

## 2021-09-15 PROCEDURE — 87635 SARS-COV-2 COVID-19 AMP PRB: CPT | Performed by: STUDENT IN AN ORGANIZED HEALTH CARE EDUCATION/TRAINING PROGRAM

## 2021-09-15 PROCEDURE — G0328 FECAL BLOOD SCRN IMMUNOASSAY: HCPCS | Performed by: EMERGENCY MEDICINE

## 2021-09-15 PROCEDURE — 86900 BLOOD TYPING SEROLOGIC ABO: CPT | Performed by: STUDENT IN AN ORGANIZED HEALTH CARE EDUCATION/TRAINING PROGRAM

## 2021-09-15 PROCEDURE — 83735 ASSAY OF MAGNESIUM: CPT | Performed by: STUDENT IN AN ORGANIZED HEALTH CARE EDUCATION/TRAINING PROGRAM

## 2021-09-15 PROCEDURE — 86850 RBC ANTIBODY SCREEN: CPT | Performed by: STUDENT IN AN ORGANIZED HEALTH CARE EDUCATION/TRAINING PROGRAM

## 2021-09-15 PROCEDURE — 82728 ASSAY OF FERRITIN: CPT | Performed by: INTERNAL MEDICINE

## 2021-09-15 PROCEDURE — 85025 COMPLETE CBC W/AUTO DIFF WBC: CPT | Performed by: STUDENT IN AN ORGANIZED HEALTH CARE EDUCATION/TRAINING PROGRAM

## 2021-09-15 PROCEDURE — 36415 COLL VENOUS BLD VENIPUNCTURE: CPT | Performed by: STUDENT IN AN ORGANIZED HEALTH CARE EDUCATION/TRAINING PROGRAM

## 2021-09-15 PROCEDURE — 86920 COMPATIBILITY TEST SPIN: CPT

## 2021-09-15 PROCEDURE — 99285 EMERGENCY DEPT VISIT HI MDM: CPT

## 2021-09-15 PROCEDURE — P9016 RBC LEUKOCYTES REDUCED: HCPCS

## 2021-09-15 PROCEDURE — 74176 CT ABD & PELVIS W/O CONTRAST: CPT

## 2021-09-15 PROCEDURE — 83550 IRON BINDING TEST: CPT | Performed by: INTERNAL MEDICINE

## 2021-09-15 PROCEDURE — 85379 FIBRIN DEGRADATION QUANT: CPT | Performed by: STUDENT IN AN ORGANIZED HEALTH CARE EDUCATION/TRAINING PROGRAM

## 2021-09-15 PROCEDURE — 83690 ASSAY OF LIPASE: CPT | Performed by: STUDENT IN AN ORGANIZED HEALTH CARE EDUCATION/TRAINING PROGRAM

## 2021-09-15 PROCEDURE — 84484 ASSAY OF TROPONIN QUANT: CPT | Performed by: STUDENT IN AN ORGANIZED HEALTH CARE EDUCATION/TRAINING PROGRAM

## 2021-09-15 PROCEDURE — 85610 PROTHROMBIN TIME: CPT | Performed by: STUDENT IN AN ORGANIZED HEALTH CARE EDUCATION/TRAINING PROGRAM

## 2021-09-15 PROCEDURE — 96375 TX/PRO/DX INJ NEW DRUG ADDON: CPT

## 2021-09-15 PROCEDURE — 71045 X-RAY EXAM CHEST 1 VIEW: CPT

## 2021-09-15 RX ORDER — LORATADINE 10 MG/1
10 TABLET ORAL DAILY
Status: DISCONTINUED | OUTPATIENT
Start: 2021-09-16 | End: 2021-09-18 | Stop reason: HOSPADM

## 2021-09-15 RX ORDER — SODIUM CHLORIDE 9 MG/ML
125 INJECTION, SOLUTION INTRAVENOUS CONTINUOUS
Status: DISCONTINUED | OUTPATIENT
Start: 2021-09-15 | End: 2021-09-15 | Stop reason: HOSPADM

## 2021-09-15 RX ORDER — FENOFIBRATE 48 MG/1
160 TABLET, COATED ORAL DAILY
Status: DISCONTINUED | OUTPATIENT
Start: 2021-09-16 | End: 2021-09-15

## 2021-09-15 RX ORDER — MAGNESIUM SULFATE HEPTAHYDRATE 40 MG/ML
2 INJECTION, SOLUTION INTRAVENOUS ONCE
Status: COMPLETED | OUTPATIENT
Start: 2021-09-15 | End: 2021-09-15

## 2021-09-15 RX ORDER — PANTOPRAZOLE SODIUM 40 MG/1
40 INJECTION, POWDER, FOR SOLUTION INTRAVENOUS ONCE
Status: COMPLETED | OUTPATIENT
Start: 2021-09-15 | End: 2021-09-15

## 2021-09-15 RX ORDER — POTASSIUM CHLORIDE 29.8 MG/ML
40 INJECTION INTRAVENOUS ONCE
Status: DISCONTINUED | OUTPATIENT
Start: 2021-09-15 | End: 2021-09-15 | Stop reason: RX

## 2021-09-15 RX ORDER — DOCUSATE SODIUM 100 MG/1
100 CAPSULE, LIQUID FILLED ORAL 2 TIMES DAILY
Status: DISCONTINUED | OUTPATIENT
Start: 2021-09-15 | End: 2021-09-18 | Stop reason: HOSPADM

## 2021-09-15 RX ORDER — ALPRAZOLAM 0.25 MG/1
0.25 TABLET ORAL 2 TIMES DAILY PRN
Status: DISCONTINUED | OUTPATIENT
Start: 2021-09-15 | End: 2021-09-18 | Stop reason: HOSPADM

## 2021-09-15 RX ORDER — SENNOSIDES 8.6 MG
8.6 TABLET ORAL
Status: DISCONTINUED | OUTPATIENT
Start: 2021-09-15 | End: 2021-09-18 | Stop reason: HOSPADM

## 2021-09-15 RX ORDER — POLYETHYLENE GLYCOL 3350 17 G/17G
17 POWDER, FOR SOLUTION ORAL DAILY
Status: DISCONTINUED | OUTPATIENT
Start: 2021-09-16 | End: 2021-09-18 | Stop reason: HOSPADM

## 2021-09-15 RX ORDER — MAGNESIUM HYDROXIDE/ALUMINUM HYDROXICE/SIMETHICONE 120; 1200; 1200 MG/30ML; MG/30ML; MG/30ML
30 SUSPENSION ORAL EVERY 6 HOURS PRN
Status: DISCONTINUED | OUTPATIENT
Start: 2021-09-15 | End: 2021-09-18 | Stop reason: HOSPADM

## 2021-09-15 RX ORDER — LANOLIN ALCOHOL/MO/W.PET/CERES
100 CREAM (GRAM) TOPICAL DAILY
Status: DISCONTINUED | OUTPATIENT
Start: 2021-09-16 | End: 2021-09-18 | Stop reason: HOSPADM

## 2021-09-15 RX ORDER — ONDANSETRON 2 MG/ML
4 INJECTION INTRAMUSCULAR; INTRAVENOUS EVERY 6 HOURS PRN
Status: DISCONTINUED | OUTPATIENT
Start: 2021-09-15 | End: 2021-09-18 | Stop reason: HOSPADM

## 2021-09-15 RX ORDER — SODIUM CHLORIDE 9 MG/ML
100 INJECTION, SOLUTION INTRAVENOUS CONTINUOUS
Status: DISPENSED | OUTPATIENT
Start: 2021-09-15 | End: 2021-09-16

## 2021-09-15 RX ORDER — SERTRALINE HYDROCHLORIDE 100 MG/1
100 TABLET, FILM COATED ORAL DAILY
Status: DISCONTINUED | OUTPATIENT
Start: 2021-09-16 | End: 2021-09-18 | Stop reason: HOSPADM

## 2021-09-15 RX ORDER — CARVEDILOL 12.5 MG/1
12.5 TABLET ORAL 2 TIMES DAILY WITH MEALS
Status: DISCONTINUED | OUTPATIENT
Start: 2021-09-15 | End: 2021-09-18 | Stop reason: HOSPADM

## 2021-09-15 RX ORDER — NICOTINE 21 MG/24HR
1 PATCH, TRANSDERMAL 24 HOURS TRANSDERMAL DAILY
Status: DISCONTINUED | OUTPATIENT
Start: 2021-09-15 | End: 2021-09-18 | Stop reason: HOSPADM

## 2021-09-15 RX ORDER — ATORVASTATIN CALCIUM 80 MG/1
80 TABLET, FILM COATED ORAL
Status: DISCONTINUED | OUTPATIENT
Start: 2021-09-15 | End: 2021-09-18 | Stop reason: HOSPADM

## 2021-09-15 RX ORDER — MAGNESIUM SULFATE HEPTAHYDRATE 40 MG/ML
2 INJECTION, SOLUTION INTRAVENOUS ONCE
Status: COMPLETED | OUTPATIENT
Start: 2021-09-15 | End: 2021-09-16

## 2021-09-15 RX ORDER — POTASSIUM CHLORIDE 14.9 MG/ML
20 INJECTION INTRAVENOUS
Status: COMPLETED | OUTPATIENT
Start: 2021-09-15 | End: 2021-09-15

## 2021-09-15 RX ORDER — FERROUS SULFATE 325(65) MG
325 TABLET ORAL
Status: DISCONTINUED | OUTPATIENT
Start: 2021-09-16 | End: 2021-09-18 | Stop reason: HOSPADM

## 2021-09-15 RX ORDER — PANTOPRAZOLE SODIUM 40 MG/1
40 INJECTION, POWDER, FOR SOLUTION INTRAVENOUS EVERY 12 HOURS SCHEDULED
Status: DISCONTINUED | OUTPATIENT
Start: 2021-09-15 | End: 2021-09-18 | Stop reason: HOSPADM

## 2021-09-15 RX ORDER — FENOFIBRATE 48 MG/1
48 TABLET, COATED ORAL DAILY
Status: DISCONTINUED | OUTPATIENT
Start: 2021-09-16 | End: 2021-09-18 | Stop reason: HOSPADM

## 2021-09-15 RX ORDER — ACETAMINOPHEN 325 MG/1
650 TABLET ORAL EVERY 6 HOURS PRN
Status: DISCONTINUED | OUTPATIENT
Start: 2021-09-15 | End: 2021-09-18 | Stop reason: HOSPADM

## 2021-09-15 RX ADMIN — PANTOPRAZOLE SODIUM 40 MG: 40 INJECTION, POWDER, FOR SOLUTION INTRAVENOUS at 18:07

## 2021-09-15 RX ADMIN — ATORVASTATIN CALCIUM 80 MG: 80 TABLET, FILM COATED ORAL at 18:06

## 2021-09-15 RX ADMIN — SODIUM CHLORIDE 125 ML/HR: 0.9 INJECTION, SOLUTION INTRAVENOUS at 04:53

## 2021-09-15 RX ADMIN — NICOTINE 1 PATCH: 14 PATCH, EXTENDED RELEASE TRANSDERMAL at 18:07

## 2021-09-15 RX ADMIN — SODIUM CHLORIDE 1000 ML: 0.9 INJECTION, SOLUTION INTRAVENOUS at 00:57

## 2021-09-15 RX ADMIN — MAGNESIUM SULFATE HEPTAHYDRATE 2 G: 40 INJECTION, SOLUTION INTRAVENOUS at 18:07

## 2021-09-15 RX ADMIN — POTASSIUM CHLORIDE 20 MEQ: 14.9 INJECTION, SOLUTION INTRAVENOUS at 04:47

## 2021-09-15 RX ADMIN — CARVEDILOL 12.5 MG: 12.5 TABLET, FILM COATED ORAL at 18:06

## 2021-09-15 RX ADMIN — DOCUSATE SODIUM 100 MG: 100 CAPSULE, LIQUID FILLED ORAL at 18:06

## 2021-09-15 RX ADMIN — SODIUM CHLORIDE 1100 ML: 0.9 INJECTION, SOLUTION INTRAVENOUS at 01:12

## 2021-09-15 RX ADMIN — MAGNESIUM SULFATE HEPTAHYDRATE 2 G: 40 INJECTION, SOLUTION INTRAVENOUS at 02:36

## 2021-09-15 RX ADMIN — PANTOPRAZOLE SODIUM 40 MG: 40 INJECTION, POWDER, FOR SOLUTION INTRAVENOUS at 02:21

## 2021-09-15 RX ADMIN — POTASSIUM CHLORIDE 20 MEQ: 14.9 INJECTION, SOLUTION INTRAVENOUS at 06:56

## 2021-09-15 NOTE — ASSESSMENT & PLAN NOTE
Lab Results   Component Value Date    EGFR 38 09/15/2021    EGFR 52 09/06/2021    EGFR 39 09/05/2021    CREATININE 1 81 (H) 09/15/2021    CREATININE 1 40 (H) 09/06/2021    CREATININE 1 77 (H) 09/05/2021     Monitor kidney function closely  Avoid nephrotoxins

## 2021-09-15 NOTE — TELEPHONE ENCOUNTER
Received TT from [employer], RN stating the patient presented to [facility] ED today  I plan to follow back up with the patient after discharge

## 2021-09-15 NOTE — ED NOTES
TRANSFER INFORMATION      TIME:   4533       TRANSFER DESTINATION:         TRANSFER CREW:   [facility]       ACCEPTING DOCTOR:   Ryan Ch       PHONE NUMBER TO CALL REPORT:  [phone] Robert Baez RN  09/15/21 5239

## 2021-09-15 NOTE — ASSESSMENT & PLAN NOTE
History of alcohol abuse  Monitor for withdrawals  Thiamine  [facility] protocol

## 2021-09-15 NOTE — ASSESSMENT & PLAN NOTE
Recent stroke  Patient is scheduled for outpatient CT scan head  Recent Neurology input noted, plan is for him to follow-up with Neurology after CT scan for initiation of Eliquis  Continue atorvastatin

## 2021-09-15 NOTE — EMTALA/ACUTE CARE TRANSFER
[facility] EMERGENCY DEPARTMENT  [address] [facility] [facility] [address]  Dept: [phone]      EMTALA TRANSFER CONSENT    NAME Reva KANG 1955                              MRN [id number]    I have been informed of my rights regarding examination, treatment, and transfer   by Dr Trace Lopez MD    Benefits: Specialized equipment and/or services available at the receiving facility (Include comment)________________________ (ICU availability)    Risks: Potential for delay in receiving treatment, Potential deterioration of medical condition, Loss of IV, Increased discomfort during transfer, Possible worsening of condition or death during transfer      Consent for Transfer:  I acknowledge that my medical condition has been evaluated and explained to me by the emergency department physician or other qualified medical person and/or my attending physician, who has recommended that I be transferred to the service of  Accepting Physician: Vita Crowder at [address] Name, [address] : [facility]  The above potential benefits of such transfer, the potential risks associated with such transfer, and the probable risks of not being transferred have been explained to me, and I fully understand them  The doctor has explained that, in my case, the benefits of transfer outweigh the risks  I agree to be transferred  I authorize the performance of emergency medical procedures and treatments upon me in both transit and upon arrival at the receiving facility  Additionally, I authorize the release of any and all medical records to the receiving facility and request they be transported with me, if possible  I understand that the safest mode of transportation during a medical emergency is an ambulance and that the Hospital advocates the use of this mode of transport   Risks of traveling to the receiving facility by car, including absence of medical control, life sustaining equipment, such as oxygen, and medical personnel has been explained to me and I fully understand them  (YVROSE CORRECT BOX BELOW)  [  ]  I consent to the stated transfer and to be transported by ambulance/helicopter  [  ]  I consent to the stated transfer, but refuse transportation by ambulance and accept full responsibility for my transportation by car  I understand the risks of non-ambulance transfers and I exonerate the Hospital and its staff from any deterioration in my condition that results from this refusal     X___________________________________________    DATE  09/15/21  TIME________  Signature of patient or legally responsible individual signing on patient behalf           RELATIONSHIP TO PATIENT_________________________          Provider Certification    NAME Julián Gan                                         1955                              MRN [id number]    A medical screening exam was performed on the above named patient  Based on the examination:    Condition Necessitating Transfer The primary encounter diagnosis was Upper GI bleed  Diagnoses of Hypomagnesemia, Hypokalemia, and PRITI (acute kidney injury) ([address] ) were also pertinent to this visit      Patient Condition: The patient has been stabilized such that within reasonable medical probability, no material deterioration of the patient condition or the condition of the unborn child(silvino) is likely to result from the transfer    Reason for Transfer: Level of Care needed not available at this facility    Transfer Requirements: Facility [facility]   · Space available and qualified personnel available for treatment as acknowledged by    · Agreed to accept transfer and to provide appropriate medical treatment as acknowledged by       [place]  · Appropriate medical records of the examination and treatment of the patient are provided at the time of transfer   [address] _______  · Transfer will be performed by qualified personnel from    and appropriate transfer equipment as required, including the use of necessary and appropriate life support measures  Provider Certification: I have examined the patient and explained the following risks and benefits of being transferred/refusing transfer to the patient/family:  General risk, such as traffic hazards, adverse weather conditions, rough terrain or turbulence, possible failure of equipment (including vehicle or aircraft), or consequences of actions of persons outside the control of the transport personnel, Unanticipated needs of medical equipment and personnel during transport, Risk of worsening condition      Based on these reasonable risks and benefits to the patient and/or the unborn child(silvino), and based upon the information available at the time of the patients examination, I certify that the medical benefits reasonably to be expected from the provision of appropriate medical treatments at another medical facility outweigh the increasing risks, if any, to the individuals medical condition, and in the case of labor to the unborn child, from effecting the transfer      X____________________________________________ DATE 09/15/21        TIME_______      ORIGINAL - SEND TO MEDICAL RECORDS   COPY - SEND WITH PATIENT DURING TRANSFER

## 2021-09-15 NOTE — H&P
[address] [facility]  H&P- Christiana Abler 1955, 73 y o  male MRN: [id number]  Unit/Bed#: [facility] 817-01 Encounter: [id number]  Primary Care Provider: Elizabeth Ulrich DO   Date and time admitted to hospital: 9/15/2021  3:21 PM    * Upper GI bleed  Assessment & Plan  Presyncopal symptoms melena low hemoglobin  Received packed cells at [facility]  Abdominal CT scan noted  IV pantoprazole  Clear liquid diet for now  Discussed with GI  GI following    Syncope  Assessment & Plan  Possibly due to hypovolemia GI bleed versus vasovagal  Monitor orthostatics      Ambulatory dysfunction  Assessment & Plan  Multifactorial  Safe ambulation  Fall precautions  Physical therapy    Anemia  Assessment & Plan  Multifactorial with possible acute GI bleed contributing  Received packed cells at [facility]  Consent for transfusion if clinically indicated obtained from patient  Monitor hemoglobin    Stage 3a chronic kidney disease [facility])  Assessment & Plan  Lab Results   Component Value Date    EGFR 38 09/15/2021    EGFR 52 09/06/2021    EGFR 39 09/05/2021    CREATININE 1 81 (H) 09/15/2021    CREATININE 1 40 (H) 09/06/2021    CREATININE 1 77 (H) 09/05/2021     Monitor kidney function closely  Avoid nephrotoxins    Benign essential hypertension  Assessment & Plan  Monitor blood pressures  Avoid hypotension    CVA (cerebral vascular accident) [facility])  Assessment & Plan  Recent stroke  Patient is scheduled for outpatient CT scan head  Recent Neurology input noted, plan is for him to follow-up with Neurology after CT scan for initiation of Eliquis  Continue atorvastatin    Stenosis of right internal carotid artery  Assessment & Plan  Right internal carotid artery stenosis  Continue atorvastatin  Plavix aspirin on hold presently in view of GI bleed  Outpatient vascular follow-up      Iron deficiency anemia  Assessment & Plan  Check iron panel  Iron supplementation    COPD (chronic obstructive pulmonary disease) ([address] )  Assessment & Plan  Continue respiratory treatment    HLD (hyperlipidemia)  Assessment & Plan  Continue statin    Alcohol abuse  Assessment & Plan  History of alcohol abuse  Monitor for withdrawals  Thiamine  CIWA protocol    Tobacco use  Assessment & Plan  Tobacco cessation discussed  Pre contemplative      VTE Pharmacologic Prophylaxis: VTE Score: 4 GI bleed, presently on vena dynes  Code Status: Level 1 - Full Code   Discussion with family: Discussed with the patient, called and updated daughter in detail questions answered  Anticipated Length of Stay: Patient will be admitted on an inpatient basis with an anticipated length of stay of greater than 2 midnights secondary to Syncope GI bleed for management as outlined  Chief Complaint:     Transfer from [facility]  Syncope    History of Present Illness:  Andre Pappas is a 73 y o  male with a PMH of history of stroke carotid artery disease, hypertension, dyslipidemia, chronic alcohol abuse, tobacco abuse, ambulatory dysfunction who presents with syncope  Patient reports yesterday 11:00 p m  He went to the bathroom, he felt dizzy lightheaded and almost passed out briefly, he came around quickly, he denies focal weakness, no history suggestive seizures  His daughter was with him and lowered him to the floor  She reports he felt weak and she had to place him on the floor  No loss of consciousness or or history suggestive focal weakness  EMS was called, patient was transferred to [facility]   He also reported history of melena  He was noted to be anemic and hypotensive and received packed cells at [facility]   His transferred here for further management  Patient reports feeling tired presently  He reports history of melena noticed couple of times in the last few days  No history suggestive hematemesis hematochezia  No history of abdominal pain nausea vomiting diarrhea  Denies dysphagia odynophagia  No history of fever chills sweats constitutional symptoms  Denies chest pain shortness breath palpitations presyncope syncope  No no history of cough chest tightness wheezing  Denies urinary symptoms  History is also obtained from daughter over phone, she reports patient drinks few beers every alternate day, yesterday was drinking wine however  She also questions his ability to take care of himself and would like a competency evaluation if possible during his hospital stay  Patient himself reports his daughter takes care of his medications  Medication list reviewed with the daughter, he is on carvedilol not metoprolol, she also reports he has not been on Eliquis and is waiting for a follow-up CT scan on 09/22/2021 and for further instructions from Neurology    Review of Systems:  Review of Systems   All other systems reviewed and are negative        Past Medical and Surgical History:   Past Medical History:   Diagnosis Date    PRITI (acute kidney injury) ([facility] [address] ) 9/5/2021    Anxiety     Back pain     Claustrophobia     COPD [facility]     Dysphagia 9/5/2021    GERD (gastroesophageal reflux disease)     Hypertension     Lumbar back pain     Panic attacks     Stenosis of right carotid artery     Stroke [facility])     Wears glasses     Wears partial dentures     upper denture       Past Surgical History:   Procedure Laterality Date    COLONOSCOPY      IR CEREBRAL ANGIOGRAPHY  4/6/2021    OTHER SURGICAL HISTORY      fertility    NV Michael Gene 3RD+ ORD SLCTV ABDL PEL/LXTR [facility] Right 4/6/2021    Procedure: ARTERIOGRAM, carotid Angio;  Surgeon: Yonathan Swartz MD;  Location: [facility];  Service: Vascular    NV THROMBOENDARTECTMY Mitch Barroso Right 4/13/2021    Procedure: RIGHT ENDARTERECTOMY ARTERY CAROTID WITH BOVINE PATCH;  Surgeon: Yonathan Swartz MD;  Location: [facility];  Service: Vascular       Meds/Allergies:  Prior to Admission medications    Medication Sig Start Date End Date Taking?  Authorizing Provider   acetaminophen (TYLENOL) 325 mg tablet Take 2 tablets (650 mg total) by mouth every 6 (six) hours as needed for mild pain 4/14/21   Cheryle Stoner PA-C   aclidinium Isabelle Vargas) 400 MCG/ACT inhaler Inhale 1 puff 2 (two) times a day  Patient not taking: Reported on 9/15/2021    Historical Provider, MD   ALBUTEROL IN Inhale every 4 (four) hours as needed Unknown dose    Historical Provider, MD   ALPRAZolam (XANAX) 0 25 mg tablet TAKE 1 TABLET BY ORAL ROUTE 4 TIMES EVERY DAY AS NEEDED 6/15/21   Historical Provider, MD   amLODIPine-benazepril (LOTREL 5-10) 5-10 MG per capsule Take 1 capsule by mouth daily  4/15/21   Cheryle Stoner PA-C   aspirin (ECOTRIN LOW STRENGTH) 81 mg EC tablet Take 1 tablet (81 mg total) by mouth daily 3/31/21 8/9/21  Batsheva Rivero MD   atorvastatin (LIPITOR) 80 mg tablet Take 1 tablet (80 mg total) by mouth daily 9/6/21 10/6/21  Benny Wiggins MD   baclofen 10 mg tablet  8/4/21   Historical Provider, MD   carvedilol (COREG) 12 5 mg tablet Take 12 5 mg by mouth 2 (two) times a day with meals    Historical Provider, MD   clopidogrel (PLAVIX) 75 mg tablet TAKE 1 TABLET BY MOUTH EVERY DAY FOR 19 DOSES  Patient taking differently: daily  7/28/21   Zee Melton PA-C   fenofibrate 160 MG tablet Take 160 mg by mouth daily 6/25/21   Historical Provider, MD   ferrous sulfate 324 (65 Fe) mg Take 1 tablet (324 mg total) by mouth daily before breakfast 9/6/21   Latha Moser MD   loratadine (CLARITIN) 10 mg tablet Take 1 tablet (10 mg total) by mouth daily 9/6/21 10/6/21  Benny Wiggins MD   metoprolol tartrate (LOPRESSOR) 25 mg tablet Take 1 tablet (25 mg total) by mouth every 12 (twelve) hours 3/30/21 4/29/21  Batsheva Rivero MD   nicotine (NICODERM CQ) 14 mg/24hr TD 24 hr patch Place 1 patch on the skin daily 9/7/21   Benny Wiggins MD   pantoprazole (PROTONIX) 40 mg tablet Take 1 tablet (40 mg total) by mouth daily in the early morning 9/6/21 10/6/21  Jammie Palmer MD   senna (SENOKOT) 8 6 mg Take 1 tablet (8 6 mg total) by mouth daily at bedtime as needed for constipation  Patient not taking: Reported on 9/15/2021 9/6/21   Elder Moser MD   sertraline (ZOLOFT) 100 mg tablet Take by mouth 1/19/11   Historical Provider, MD   thiamine 100 MG tablet Take 1 tablet (100 mg total) by mouth daily 3/31/21 8/9/21  Kelsey Rivas MD   Varenicline Tartrate (CHANTIX PO) Take 1 tablet by mouth daily  Patient not taking: Reported on 9/2/2021    Historical Provider, MD     I have reviewed home medications with patient family member  Allergies: Allergies   Allergen Reactions    Penicillins Anaphylaxis       Social History:  Marital Status: /Civil Union   Occupation:   Patient Pre-hospital Living Situation: Home  Patient Pre-hospital Level of Mobility: walks with cane  Patient Pre-hospital Diet Restrictions: no  Substance Use History:   Social History     Substance and Sexual Activity   Alcohol Use Yes     Social History     Tobacco Use   Smoking Status Light Tobacco Smoker    Packs/day: 0 25   Smokeless Tobacco Never Used   Tobacco Comment    also wears nicotine patches     Social History     Substance and Sexual Activity   Drug Use Never       Family History:  No family history on file      Physical Exam:     Vitals:   Blood Pressure: (!) 180/86 (09/15/21 1622)  Pulse: 65 (09/15/21 1622)  Temperature: 98 1 °F (36 7 °C) (09/15/21 1622)  Respirations: 20 (09/15/21 1622)  Height: 5' 9" (175 3 cm) (09/15/21 1532)  Weight - Scale: 66 7 kg (147 lb) (09/15/21 1532)  SpO2: 96 % (09/15/21 1622)    Physical Exam     Comfortably sitting up in bed  Dental caries noted  Neck supple  Lungs diminished breath sounds bilateral  Heart sounds S1 and S2 noted  Abdomen soft  Awake obeys simple commands  Pulses noted  No rash  No pedal edema    Additional Data:     Lab Results:  Results from last 7 days   Lab Units 09/15/21  0054   WBC Thousand/uL 7 18   HEMOGLOBIN g/dL 7 7*   HEMATOCRIT % 26 6*   PLATELETS Thousands/uL 253   NEUTROS PCT % 72   LYMPHS PCT % 18   MONOS PCT % 8   EOS PCT % 2     Results from last 7 days   Lab Units 09/15/21  0900 09/15/21  0054   SODIUM mmol/L  --  133   POTASSIUM mmol/L 4 6 3 1*   CHLORIDE mmol/L  --  100   CO2 mmol/L  --  21*   BUN mg/dL  --  21*   CREATININE mg/dL  --  1 81*   ANION GAP mmol/L  --  12   CALCIUM mg/dL  --  8 0*   ALBUMIN g/dL  --  3 5   TOTAL BILIRUBIN mg/dL  --  0 29*   ALK PHOS U/L  --  24 9   ALT U/L  --  6   AST U/L  --  12*   GLUCOSE RANDOM mg/dL  --  91     Results from last 7 days   Lab Units 09/15/21  0054   INR  1 08       Imaging: Reviewed radiology reports from this admission including: chest xray and abdominal/pelvic CT      EKG and Other Studies Reviewed on Admission:   · EKG:  Not available for review of, reported as sinus rhythm  · Pending    ** Please Note: This note has been constructed using a voice recognition system   **

## 2021-09-15 NOTE — ED NOTES
Patient transported to CT, Mag Sulfate drip to be started upon return        Panfilo Roberts RN  09/15/21 [id number]

## 2021-09-15 NOTE — ASSESSMENT & PLAN NOTE
Presyncopal symptoms melena low hemoglobin  Received packed cells at [facility]  Abdominal CT scan noted  IV pantoprazole  Clear liquid diet for now  Discussed with GI  GI following

## 2021-09-15 NOTE — PLAN OF CARE
Problem: Potential for Falls  Goal: Patient will remain free of falls  Description: INTERVENTIONS:  - Educate patient/family on patient safety including physical limitations  - Instruct patient to call for assistance with activity   - Consult OT/PT to assist with strengthening/mobility   - Keep Call bell within reach  - Keep bed low and locked with side rails adjusted as appropriate  - Keep care items and personal belongings within reach  - Initiate and maintain comfort rounds  - Make Fall Risk Sign visible to staff  - Offer Toileting every 2  Hours, in advance of need  - Initiate/Maintain alarm  - Obtain necessary fall risk management equipment  - Apply yellow socks and bracelet for high fall risk patients  - Consider moving patient to room near nurses station  Outcome: Progressing

## 2021-09-15 NOTE — ASSESSMENT & PLAN NOTE
Right internal carotid artery stenosis  Continue atorvastatin  Plavix aspirin on hold presently in view of GI bleed  Outpatient vascular follow-up

## 2021-09-15 NOTE — ED NOTES
Per blood bank, additional unit of blood has to be ordered for pt          [address], RN  09/15/21 9886

## 2021-09-15 NOTE — ED PROVIDER NOTES
History  Chief Complaint   Patient presents with    Dizziness     Pt with chronic dizziness, pt started 2 new BP meds today and is more dizzy than usual  Pt hypotensive   Hypotension     Santo Matute is a 73year old male with a PMHx of stage 3 CKD, prediabetes, HTN, GERD, COPD, HLD, tobacco use, stenosis of right internal carotid artery, s/p endarterectomy 4/13/21 with Dr Anahy Crane due to symptomatic extensive R common and internal carotid artery stenosis, CVA 9/2, was discharged 9/6 on 1 week of Eliqu, patient on ASA and plavix, who present to the emergency department for evaluation of lightheadedness that worsened this evening about 1 hour PTA  Patient states was urinating, in a sitting position, when he suddenly felt very lightheaded and was afraid to get up  Patient states he eased himself onto the ground, denies fall, head strike, or any injuries  Patient states he then had difficulty getting up d/t severe lightheadedness so he called his daughter, who then called EMS  Patient states has had chronic lightheadedness for several years, but has never been this severe  Patient reports melena that began today, denies hematochezia or blood in the stool  Patient states he started two new antihypertensive medications today, unsure which ones, per chart review appear to be adding lopressor 25mg BID and benazepril 10mg daily to regimen of amlodipine 5mg daily and coreg 12 5mg BID  Patient hypotensive to 97/43 on arrival  Patient states drank 8 beers this evening, reports daily alcohol use of about 6-8 beers, smoked 5 cigarettes today, arrived wearing a nicotine patch, was removed on arrival, denies drug use  Patient denies any other complaints including syncope, visual disturbance, weakness, HA, fever/chills, change in baseline cough or sputum production, SOB, chest pain, abdominal pain, n/v/d, urinary symptoms, or any other complaints at this time       Admitted 9/2-6/2021 for CVA with "acute infarct in the right occipital lobe correlating with focal right PCA occlusion on yesterday's CTA  Lacunar infarct in the right thalamus favored to be subacute" seen on MRI 9/3, received tpa  discharged on Eliquis x 1 week, also found to have PRITI during this admission that had resolved at discharge      History provided by:  Patient, medical records and EMS personnel   used: No    Dizziness  Quality:  Lightheadedness  Severity:  Severe  Onset quality:  Sudden  Timing:  Constant  Progression:  Worsening  Chronicity:  Chronic  Associated symptoms: no blood in stool, no chest pain, no diarrhea, no headaches, no nausea, no palpitations, no shortness of breath, no vomiting and no weakness        Prior to Admission Medications   Prescriptions Last Dose Informant Patient Reported? Taking?    ALBUTEROL IN  Self Yes No   Sig: Inhale every 4 (four) hours as needed Unknown dose   ALPRAZolam (XANAX) 0 25 mg tablet   Yes No   Sig: TAKE 1 TABLET BY ORAL ROUTE 4 TIMES EVERY DAY AS NEEDED   Varenicline Tartrate (CHANTIX PO)  Self Yes No   Sig: Take 1 tablet by mouth daily   Patient not taking: Reported on 9/2/2021   acetaminophen (TYLENOL) 325 mg tablet  Self No No   Sig: Take 2 tablets (650 mg total) by mouth every 6 (six) hours as needed for mild pain   aclidinium (Tudorza Pressair) 400 MCG/ACT inhaler  Self Yes No   Sig: Inhale 1 puff 2 (two) times a day   amLODIPine-benazepril (LOTREL 5-10) 5-10 MG per capsule  Self Yes No   Sig: Take 1 capsule by mouth daily    aspirin (ECOTRIN LOW STRENGTH) 81 mg EC tablet  Self No No   Sig: Take 1 tablet (81 mg total) by mouth daily   atorvastatin (LIPITOR) 80 mg tablet   No No   Sig: Take 1 tablet (80 mg total) by mouth daily   baclofen 10 mg tablet   Yes No   carvedilol (COREG) 12 5 mg tablet  Self Yes No   Sig: Take 12 5 mg by mouth 2 (two) times a day with meals   clopidogrel (PLAVIX) 75 mg tablet   No No   Sig: TAKE 1 TABLET BY MOUTH EVERY DAY FOR 19 DOSES   Patient taking differently: daily fenofibrate 160 MG tablet   Yes No   Sig: Take 160 mg by mouth daily   ferrous sulfate 324 (65 Fe) mg   No No   Sig: Take 1 tablet (324 mg total) by mouth daily before breakfast   loratadine (CLARITIN) 10 mg tablet   No No   Sig: Take 1 tablet (10 mg total) by mouth daily   metoprolol tartrate (LOPRESSOR) 25 mg tablet  Self No No   Sig: Take 1 tablet (25 mg total) by mouth every 12 (twelve) hours   nicotine (NICODERM CQ) 14 mg/24hr TD 24 hr patch   No No   Sig: Place 1 patch on the skin daily   pantoprazole (PROTONIX) 40 mg tablet   No No   Sig: Take 1 tablet (40 mg total) by mouth daily in the early morning   senna (SENOKOT) 8 6 mg   No No   Sig: Take 1 tablet (8 6 mg total) by mouth daily at bedtime as needed for constipation   sertraline (ZOLOFT) 100 mg tablet  Self Yes No   Sig: Take by mouth   thiamine 100 MG tablet  Self No No   Sig: Take 1 tablet (100 mg total) by mouth daily      Facility-Administered Medications: None       Past Medical History:   Diagnosis Date    [facility]I [facility]) ([facility] [address] ) 9/5/2021    Anxiety     Back pain     Claustrophobia     [facility]     Dysphagia 9/5/2021    GERD (gastroesophageal reflux disease)     Hypertension     Lumbar back pain     Panic attacks     Stenosis of right carotid artery     [facility]     Wears glasses     Wears partial dentures     upper denture       Past Surgical History:   Procedure Laterality Date    COLONOSCOPY      IR CEREBRAL ANGIOGRAPHY  4/6/2021    OTHER SURGICAL HISTORY      fertility    PA SLCTV CATHJ 3RD+ ORD SLCTV ABDL PEL/LXTR [facility] Right 4/6/2021    Procedure: ARTERIOGRAM, carotid Angio;  Surgeon: Frannie Davis MD;  Location: [facility];  Service: Vascular    PA THROMBOENDARTECTMY NECK,NECK INCIS Right 4/13/2021    Procedure: RIGHT ENDARTERECTOMY ARTERY CAROTID WITH BOVINE PATCH;  Surgeon: Frannie Davis MD;  Location: [facility];  Service: Vascular       History reviewed   No pertinent family history  I have reviewed and agree with the history as documented  E-Cigarette/Vaping    E-Cigarette Use Never User      E-Cigarette/Vaping Substances    Nicotine No     THC No     CBD No     Flavoring No     Other No     Unknown No      Social History     Tobacco Use    Smoking status: Light Tobacco Smoker     Packs/day: 0 25    Smokeless tobacco: Never Used    Tobacco comment: also wears nicotine patches   Vaping Use    Vaping Use: Never used   Substance Use Topics    Alcohol use: Yes    Drug use: Never       Review of Systems   Constitutional: Negative for activity change, appetite change, chills, fatigue and fever  HENT: Negative for congestion, drooling, ear pain, rhinorrhea, sinus pain, sore throat, trouble swallowing and voice change  Eyes: Negative for pain, discharge and visual disturbance  Respiratory: Negative for cough, chest tightness, shortness of breath and wheezing  Cardiovascular: Negative for chest pain, palpitations and leg swelling  Gastrointestinal: Negative for abdominal pain, blood in stool, diarrhea, nausea and vomiting  Genitourinary: Negative for decreased urine volume, dysuria, frequency and hematuria  Musculoskeletal: Negative for arthralgias, back pain and neck pain  Skin: Negative for color change and rash  Neurological: Positive for light-headedness  Negative for dizziness, syncope, weakness, numbness and headaches  Psychiatric/Behavioral: Negative for suicidal ideas  All other systems reviewed and are negative  Physical Exam  Physical Exam  Vitals and nursing note reviewed  Constitutional:       General: He is not in acute distress  Appearance: Normal appearance  He is well-developed  He is not ill-appearing  Comments: Patient resting comfortably on stretcher, GCS 15, speaking in full sentences, BP 88/48   HENT:      Head: Normocephalic and atraumatic        Right Ear: External ear normal       Left Ear: External ear normal       Nose: Nose normal  No congestion or rhinorrhea  Mouth/Throat:      Mouth: Mucous membranes are moist    Eyes:      General:         Right eye: No discharge  Left eye: No discharge  Extraocular Movements: Extraocular movements intact  Conjunctiva/sclera: Conjunctivae normal       Pupils: Pupils are equal, round, and reactive to light  Cardiovascular:      Rate and Rhythm: Normal rate and regular rhythm  Heart sounds: No murmur heard  No friction rub  No gallop  Pulmonary:      Effort: Pulmonary effort is normal  No respiratory distress  Breath sounds: Normal breath sounds  No stridor  No wheezing, rhonchi or rales  Abdominal:      General: Bowel sounds are normal  There is no distension  Palpations: Abdomen is soft  Tenderness: There is no abdominal tenderness  There is no right CVA tenderness, left CVA tenderness, guarding or rebound  Genitourinary:     Rectum: Guaiac result positive  Comments: No active bleeding present  Musculoskeletal:         General: No deformity or signs of injury  Cervical back: Normal range of motion and neck supple  No tenderness  Lymphadenopathy:      Cervical: No cervical adenopathy  Skin:     General: Skin is warm and dry  Capillary Refill: Capillary refill takes less than 2 seconds  Findings: No bruising, erythema or rash  Neurological:      General: No focal deficit present  Mental Status: He is alert and oriented to person, place, and time  Cranial Nerves: No cranial nerve deficit  Sensory: No sensory deficit  Motor: No weakness        Coordination: Coordination normal       Comments: B/l UE and LE with 5/5 strength, CN II-XII intact, no pronator drift   Psychiatric:         Mood and Affect: Mood normal          Vital Signs  ED Triage Vitals   Temperature Pulse Respirations Blood Pressure SpO2   09/15/21 0201 09/15/21 0042 09/15/21 0042 09/15/21 0010 09/15/21 0042   97 5 °F (36 4 °C) 80 18 (!) 97/43 92 %      Temp Source Heart Rate Source Patient Position - Orthostatic VS BP Location FiO2 (%)   09/15/21 0201 09/15/21 0212 09/15/21 0212 09/15/21 0212 --   Oral Monitor Lying Left arm       Pain Score       --                  Vitals:    09/15/21 0557 09/15/21 0707 09/15/21 0752 09/15/21 0903   BP: 108/60 150/77 135/68 146/61   Pulse: 72 78 63 64   Patient Position - Orthostatic VS:             Visual Acuity      ED Medications  Medications   sodium chloride 0 9 % infusion (125 mL/hr Intravenous New Bag 9/15/21 0453)   sodium chloride 0 9 % bolus 1,000 mL (0 mL Intravenous Stopped 9/15/21 0112)   sodium chloride 0 9 % bolus 1,100 mL (0 mL Intravenous Stopped 9/15/21 0153)   magnesium sulfate 2 g/50 mL IVPB (premix) 2 g (0 g Intravenous Stopped 9/15/21 0404)   pantoprazole (PROTONIX) injection 40 mg (40 mg Intravenous Given 9/15/21 0221)   potassium chloride 20 mEq IVPB (premix) (0 mEq Intravenous Stopped 9/15/21 0845)       Diagnostic Studies  Results Reviewed     Procedure Component Value Units Date/Time    Potassium [[id number]]  (Normal) Collected: 09/15/21 0900    Lab Status: Final result Specimen: Blood from Arm, Left Updated: 09/15/21 1006     Potassium 4 6 mmol/L     Magnesium [[id number]]  (Normal) Collected: 09/15/21 0900    Lab Status: Final result Specimen: Blood from Arm, Left Updated: 09/15/21 1005     Magnesium 1 6 mg/dL     Novel Coronavirus (Covid-19),PCR [facility] - 2 Hour Stat [[id number]]  (Normal) Collected: 09/15/21 0129    Lab Status: Final result Specimen: Nasopharyngeal Swab Updated: 09/15/21 0315     SARS-CoV-2 Negative    Narrative: The specimen collection materials, transport medium, and/or testing methodology utilized in the production of these test results have been proven to be reliable in a limited validation with an abbreviated program under the Emergency Utilization Authorization provided by the FDA    Testing reported as "Presumptive positive" will be confirmed with secondary testing to ensure result accuracy  Clinical caution and judgement should be used with the interpretation of these results with consideration of the clinical impression and other laboratory testing  Testing reported as "Positive" or "Negative" has been proven to be accurate according to standard laboratory validation requirements  All testing is performed with control materials showing appropriate reactivity at standard intervals        Phosphorus [[id number]]  (Normal) Collected: 09/15/21 0054    Lab Status: Final result Specimen: Blood from Arm, Left Updated: 09/15/21 0222     Phosphorus 3 1 mg/dL     Occult Bloood,Fecal Immunochemical [[id number]]  (Abnormal) Collected: 09/15/21 0216    Lab Status: Final result Specimen: Stool from Per Rectum Updated: 09/15/21 0220     OCCULT BLD, FECAL IMMUNOLOGICAL Positive    Narrative:        Performed by Fecal Immunochemical Test     Magnesium [[id number]]  (Abnormal) Collected: 09/15/21 0054    Lab Status: Final result Specimen: Blood from Arm, Left Updated: 09/15/21 0200     Magnesium 0 9 mg/dL     Protime-INR [[id number]]  (Abnormal) Collected: 09/15/21 0054    Lab Status: Final result Specimen: Blood from Arm, Left Updated: 09/15/21 0147     Protime 12 2 seconds      INR 1 08    Narrative:      INR Reference Ranges:  No Anticoagulant, Normal:           0 9-1 1  Standard Dose, Oral Anticoagulant:  2 0-3 0  High Dose, Oral Anticoagulant:      2 5-3 5    APTT [[id number]]  (Normal) Collected: 09/15/21 0054    Lab Status: Final result Specimen: Blood from Arm, Left Updated: 09/15/21 0147     PTT 25 seconds     D-Dimer [[id number]]  (Abnormal) Collected: 09/15/21 0054    Lab Status: Final result Specimen: Blood from Arm, Left Updated: 09/15/21 0147     D-Dimer, Quant  3 48 mg/L FEU     B-Type Natriuretic Peptide(BNP) CA, GH, EA Campuses Only [[id number]]  (Abnormal) Collected: 09/15/21 0054    Lab Status: Final result Specimen: Blood from Arm, Left Updated: 09/15/21 0142      7 pg/mL     Troponin I repeat in 3hrs [[id number]]  (Normal) Collected: 09/15/21 0054    Lab Status: Final result Specimen: Blood from Arm, Left Updated: 09/15/21 0135     Troponin I <0 03 ng/mL     Ethanol [[id number]]  (Abnormal) Collected: 09/15/21 0054    Lab Status: Final result Specimen: Blood from Arm, Left Updated: 09/15/21 0132     Ethanol Lvl 101 7 mg/dL     Lipase [[id number]]  (Abnormal) Collected: 09/15/21 0054    Lab Status: Final result Specimen: Blood from Arm, Left Updated: 09/15/21 0131     Lipase 81 u/L     Comprehensive metabolic panel [[id number]]  (Abnormal) Collected: 09/15/21 0054    Lab Status: Final result Specimen: Blood from Arm, Left Updated: 09/15/21 0131     Sodium 133 mmol/L      Potassium 3 1 mmol/L      Chloride 100 mmol/L      CO2 21 mmol/L      ANION GAP 12 mmol/L      BUN 21 mg/dL      Creatinine 1 81 mg/dL      Glucose 91 mg/dL      Calcium 8 0 mg/dL      AST 12 U/L      ALT 6 U/L      Alkaline Phosphatase 24 9 U/L      Total Protein 6 0 g/dL      Albumin 3 5 g/dL      Total Bilirubin 0 29 mg/dL      eGFR 38 ml/min/1 73sq m     Narrative:      [place] guidelines for Chronic Kidney Disease (CKD):     Stage 1 with normal or high GFR (GFR > 90 mL/min/1 73 square meters)    Stage 2 Mild CKD (GFR = 60-89 mL/min/1 73 square meters)    Stage 3A Moderate CKD (GFR = 45-59 mL/min/1 73 square meters)    Stage 3B Moderate CKD (GFR = 30-44 mL/min/1 73 square meters)    Stage 4 Severe CKD (GFR = 15-29 mL/min/1 73 square meters)    Stage 5 End Stage CKD (GFR <15 mL/min/1 73 square meters)  Note: GFR calculation is accurate only with a steady state creatinine    CBC and differential [[id number]]  (Abnormal) Collected: 09/15/21 0054    Lab Status: Final result Specimen: Blood from Arm, Left Updated: 09/15/21 0116     WBC 7 18 Thousand/uL      RBC 3 88 Million/uL      Hemoglobin 7 7 g/dL      Hematocrit 26 6 %      MCV 69 fL      MCH 19 8 pg MCHC 28 9 g/dL      RDW 18 4 %      MPV 11 2 fL      Platelets 058 Thousands/uL      Neutrophils Relative 72 %      Immat GRANS % 0 %      Lymphocytes Relative 18 %      Monocytes Relative 8 %      Eosinophils Relative 2 %      Basophils Relative 0 %      Neutrophils Absolute 5 15 Thousands/µL      Immature Grans Absolute 0 02 Thousand/uL      Lymphocytes Absolute 1 28 Thousands/µL      Monocytes Absolute 0 60 Thousand/µL      Eosinophils Absolute 0 11 Thousand/µL      Basophils Absolute 0 02 Thousands/µL                  CT abdomen pelvis wo contrast   Final Result by Radhika Nelson MD (09/15 [id number])      The wall of the stomach appears somewhat thickened  Clinical correlation and follow-up is recommended  Colonic diverticulosis without evidence of acute diverticulitis  No bowel obstruction  There is minimal free fluid in the pelvis  Cholelithiasis  No CT evidence of acute cholecystitis  Borderline hepatosplenomegaly  Atherosclerosis  Other nonemergent findings, as described above  Please see discussion  Workstation performed: [id number]         XR chest 1 view portable   ED Interpretation by Harriet Rizzo PA-C (09/15 [id number])   No infiltrate, pleural effusion, or pneumothorax  Final Result by Viktoria Morgan MD ([id number]/53 [id number])      No acute cardiopulmonary disease                    Workstation performed: [id number]                    Procedures  ECG 12 Lead Documentation Only    Date/Time: 9/15/2021 12:36 AM  Performed by: Harriet Rizzo PA-C  Authorized by: Harriet Rizzo PA-C     Rate:     ECG rate:  68    ECG rate assessment: normal    Rhythm:     Rhythm: sinus rhythm    Ectopy:     Ectopy: none    QRS:     QRS axis:  Normal    QRS intervals:  Normal  Conduction:     Conduction: normal    ST segments:     ST segments:  Normal  T waves:     T waves: normal    Comments:      No acute ischemic changes             ED Course  ED Course as of Sep 15 1026   Wed Sep 15, 2021   0324 K to 3 1, will replete  Mag to 0 9, currently repleting      0325 Patient with CKD stage 3, baseline creatinine 1 1 - 1 4  Cr now to 1 81, meets criteria for PRITI        0327 Patient with PRITI, also with GI bleed requiring PRBC, d-dimer to 3 48        0341 H&H to 7 7 and 26 6 from 8 8 and 31 8 9/6  Fecal occult positive  1034 CT ABDOMEN AND PELVIS WITHOUT IV CONTRAST     FINDINGS:     ABDOMEN     LOWER CHEST:  Minimal bibasilar scarring versus atelectasis  LIVER/BILIARY TREE:  The liver is borderline enlarged, measuring approximately 18 cm craniocaudal dimension  GALLBLADDER:  There are gallstone(s) within the gallbladder, without pericholecystic inflammatory changes  SPLEEN:  The spleen is borderline enlarged, measuring approximately 13 cm AP dimension  PANCREAS:  Unremarkable  ADRENAL GLANDS:  Unremarkable  KIDNEYS/URETERS:  Bilateral renovascular calcifications   No hydronephrosis  STOMACH AND BOWEL:  The wall of the stomach appears somewhat thickened   There is no bowel obstruction  [place]rie [place] is colonic diverticulosis without evidence of acute diverticulitis  APPENDIX:  No findings to suggest appendicitis  ABDOMINOPELVIC CAVITY: [place]rie [place] is minimal free fluid in the pelvis   There is no pneumoperitoneum  VESSELS:  Atherosclerosis   No abdominal aortic aneurysm   No evidence of retroperitoneal hemorrhage  PELVIS     REPRODUCTIVE ORGANS:  Unremarkable for patient's age  URINARY BLADDER:  Unremarkable  ABDOMINAL WALL/INGUINAL REGIONS:  Unremarkable  OSSEOUS STRUCTURES:  No acute fracture or destructive osseous lesion   Old healed fracture deformity left 11th rib, posteriorly   Degenerative changes of the spine with possible disc bulge L5-S1  Impression:      The wall of the stomach appears somewhat thickened   Clinical correlation and follow-up is recommended       Colonic diverticulosis without evidence of acute diverticulitis   No bowel obstruction  Alissa Joshua is minimal free fluid in the pelvis  Cholelithiasis   No CT evidence of acute cholecystitis  Borderline hepatosplenomegaly  Atherosclerosis  Other nonemergent findings, as described above   Please see discussion      80 Dr Henry Diaz accepts patient to B level 2 step down  MDM  Number of Diagnoses or Management Options  PRITI (acute kidney injury) ([facility] [address] )  Hypokalemia  Hypomagnesemia  Upper GI bleed  Diagnosis management comments: Patient is a 73year old male with a pertinent past medical history of CKD, prediabetes, COPD, HTN, CVA 9/2 was discharged on 9/6 with 1 week of eliquis, on ASA and plavix, who presents to the ED with worsening of chronic lightheadedness and melena that began today, denies any associated sxs  Patient states was also started on two new antihypertensives today, drank 8 beers this evening with daily alcohol use  BP 88/48 on arrival, H&H to 7 7/26 6 from 8 8/31 8 on 9/6/21, fecal occult positive, meets criteria for PRITI, with evidence of upper GI bleed, and d-dimer to to 3 48, therefore, unable to obtain CTA and not a candidate for anticoagulation therapy  Patient also with hypokalemia and hypomagnesemia, was repleted now to 4 6 and 1 6 respectively  CXR with NAD, CT with thickening of the wall of the stomach, no other acute findings  Patient given protonix, 2L NS, and PRBC  Case reviewed by Dr Henry Diaz who accepts patient to his inpatient Level 2 stepdown service at [facility]  Results discussed with patient, who verbalized understanding and agreement with the management plan              Amount and/or Complexity of Data Reviewed  Clinical lab tests: ordered and reviewed  Tests in the radiology section of CPT®: ordered and reviewed    Patient Progress  Patient progress: stable      Disposition  Final diagnoses:   Hypomagnesemia   Upper GI bleed   Hypokalemia   PRITI (acute kidney injury) ([facility] [address] )     Time reflects when diagnosis was documented in both MDM as applicable and the Disposition within this note     Time User Action Codes Description Comment    9/15/2021  2:01 AM Nuaudi Boatman Add [E83 42] Hypomagnesemia     9/15/2021  4:39 AM Cali Akhil Add [K92 2] Upper GI bleed     9/15/2021  4:39 AM Cali Akhil Add [E87 6] Hypokalemia     9/15/2021  4:39 AM Cali Akhil Add [N17 9] PRITI (acute kidney injury) ([facility] [address] )     9/15/2021  4:39 AM Cali Akhil Modify [E83 42] Hypomagnesemia     9/15/2021  4:39 AM Cali Akhil Modify [K92 2] Upper GI bleed       ED Disposition     ED Disposition Condition Date/Time Comment    Transfer to Another Facility-In Network  Wed Sep 15, 2021  5:36 AM Julián Gan should be transferred out to B          MD Documentation      Most Recent Value   Patient Condition  The patient has been stabilized such that within reasonable medical probability, no material deterioration of the patient condition or the condition of the unborn child(silvino) is likely to result from the transfer   Reason for Transfer  Level of Care needed not available at this facility   Benefits of Transfer  Specialized equipment and/or services available at the receiving facility (Include comment)________________________ [place] Citrin availability]   Risks of Transfer  Potential for delay in receiving treatment, Potential deterioration of medical condition, Loss of IV, Increased discomfort during transfer, Possible worsening of condition or death during transfer   Accepting Physician  Thiago Harrell Crew   Sending MD Harrell/Torey CROCKER   Provider Certification  General risk, such as traffic hazards, adverse weather conditions, rough terrain or turbulence, possible failure of equipment (including vehicle or aircraft), or consequences of actions of persons outside the control of the transport personnel, Unanticipated needs of medical equipment and personnel during transport, Risk of worsening condition RN Documentation      Most Recent Value   Accepting Facility Name, [address]   [facility]      Follow-up Information    None         Patient's Medications   Discharge Prescriptions    No medications on file     No discharge procedures on file      PDMP Review       Value Time User    PDMP Reviewed  Yes 9/6/2021  2:40 PM Elder Moser MD          ED Provider  Electronically Signed by           Joel Kaminski PA-C  09/15/21 0807

## 2021-09-15 NOTE — ASSESSMENT & PLAN NOTE
Multifactorial with possible acute GI bleed contributing  Received packed cells at [facility]  Consent for transfusion if clinically indicated obtained from patient  Monitor hemoglobin

## 2021-09-15 NOTE — Clinical Note
Case was discussed with Yogi Catalan PA-C and the patient's admission status was agreed to be Admission Status: inpatient status to the service of Dr Patric Riddle

## 2021-09-16 ENCOUNTER — ANESTHESIA (INPATIENT)
Dept: GASTROENTEROLOGY | Facility: HOSPITAL | Age: 66
DRG: 378 | End: 2021-09-16
Payer: COMMERCIAL

## 2021-09-16 ENCOUNTER — ANESTHESIA EVENT (INPATIENT)
Dept: GASTROENTEROLOGY | Facility: HOSPITAL | Age: 66
DRG: 378 | End: 2021-09-16
Payer: COMMERCIAL

## 2021-09-16 ENCOUNTER — APPOINTMENT (INPATIENT)
Dept: GASTROENTEROLOGY | Facility: HOSPITAL | Age: 66
DRG: 378 | End: 2021-09-16
Payer: COMMERCIAL

## 2021-09-16 PROBLEM — D62 ACUTE BLOOD LOSS ANEMIA: Status: ACTIVE | Noted: 2021-09-15

## 2021-09-16 LAB
ABO GROUP BLD BPU: NORMAL
ABO GROUP BLD BPU: NORMAL
ANION GAP SERPL CALCULATED.3IONS-SCNC: 3 MMOL/L (ref 4–13)
BASOPHILS # BLD AUTO: 0.03 THOUSANDS/ΜL (ref 0–0.1)
BASOPHILS NFR BLD AUTO: 1 % (ref 0–1)
BPU ID: NORMAL
BPU ID: NORMAL
BUN SERPL-MCNC: 12 MG/DL (ref 5–25)
CALCIUM SERPL-MCNC: 8 MG/DL (ref 8.3–10.1)
CHLORIDE SERPL-SCNC: 110 MMOL/L (ref 100–108)
CO2 SERPL-SCNC: 23 MMOL/L (ref 21–32)
CREAT SERPL-MCNC: 0.9 MG/DL (ref 0.6–1.3)
CROSSMATCH: NORMAL
CROSSMATCH: NORMAL
EOSINOPHIL # BLD AUTO: 0.1 THOUSAND/ΜL (ref 0–0.61)
EOSINOPHIL NFR BLD AUTO: 2 % (ref 0–6)
ERYTHROCYTE [DISTWIDTH] IN BLOOD BY AUTOMATED COUNT: 21.5 % (ref 11.6–15.1)
FERRITIN SERPL-MCNC: 12 NG/ML (ref 8–388)
GFR SERPL CREATININE-BSD FRML MDRD: 89 ML/MIN/1.73SQ M
GLUCOSE SERPL-MCNC: 86 MG/DL (ref 65–140)
HCT VFR BLD AUTO: 34.8 % (ref 36.5–49.3)
HGB BLD-MCNC: 10.5 G/DL (ref 12–17)
HGB BLD-MCNC: 11.2 G/DL (ref 12–17)
HGB BLD-MCNC: 11.7 G/DL (ref 12–17)
IMM GRANULOCYTES # BLD AUTO: 0.02 THOUSAND/UL (ref 0–0.2)
IMM GRANULOCYTES NFR BLD AUTO: 0 % (ref 0–2)
IRON SATN MFR SERPL: 25 % (ref 20–50)
IRON SERPL-MCNC: 124 UG/DL (ref 65–175)
LYMPHOCYTES # BLD AUTO: 0.98 THOUSANDS/ΜL (ref 0.6–4.47)
LYMPHOCYTES NFR BLD AUTO: 16 % (ref 14–44)
MAGNESIUM SERPL-MCNC: 2.2 MG/DL (ref 1.6–2.6)
MCH RBC QN AUTO: 22.2 PG (ref 26.8–34.3)
MCHC RBC AUTO-ENTMCNC: 30.2 G/DL (ref 31.4–37.4)
MCV RBC AUTO: 74 FL (ref 82–98)
MONOCYTES # BLD AUTO: 0.46 THOUSAND/ΜL (ref 0.17–1.22)
MONOCYTES NFR BLD AUTO: 8 % (ref 4–12)
NEUTROPHILS # BLD AUTO: 4.53 THOUSANDS/ΜL (ref 1.85–7.62)
NEUTS SEG NFR BLD AUTO: 73 % (ref 43–75)
NRBC BLD AUTO-RTO: 0 /100 WBCS
PLATELET # BLD AUTO: 221 THOUSANDS/UL (ref 149–390)
PMV BLD AUTO: 10.8 FL (ref 8.9–12.7)
POTASSIUM SERPL-SCNC: 3.7 MMOL/L (ref 3.5–5.3)
RBC # BLD AUTO: 4.72 MILLION/UL (ref 3.88–5.62)
SODIUM SERPL-SCNC: 136 MMOL/L (ref 136–145)
TIBC SERPL-MCNC: 493 UG/DL (ref 250–450)
UNIT DISPENSE STATUS: NORMAL
UNIT DISPENSE STATUS: NORMAL
UNIT PRODUCT CODE: NORMAL
UNIT PRODUCT CODE: NORMAL
UNIT PRODUCT VOLUME: 350 ML
UNIT PRODUCT VOLUME: 350 ML
UNIT RH: NORMAL
UNIT RH: NORMAL
WBC # BLD AUTO: 6.12 THOUSAND/UL (ref 4.31–10.16)

## 2021-09-16 PROCEDURE — 99223 1ST HOSP IP/OBS HIGH 75: CPT | Performed by: INTERNAL MEDICINE

## 2021-09-16 PROCEDURE — 99233 SBSQ HOSP IP/OBS HIGH 50: CPT | Performed by: FAMILY MEDICINE

## 2021-09-16 PROCEDURE — 43235 EGD DIAGNOSTIC BRUSH WASH: CPT | Performed by: INTERNAL MEDICINE

## 2021-09-16 PROCEDURE — 85018 HEMOGLOBIN: CPT | Performed by: INTERNAL MEDICINE

## 2021-09-16 PROCEDURE — 0DJ08ZZ INSPECTION OF UPPER INTESTINAL TRACT, VIA NATURAL OR ARTIFICIAL OPENING ENDOSCOPIC: ICD-10-PCS | Performed by: INTERNAL MEDICINE

## 2021-09-16 PROCEDURE — 97166 OT EVAL MOD COMPLEX 45 MIN: CPT

## 2021-09-16 PROCEDURE — 80048 BASIC METABOLIC PNL TOTAL CA: CPT | Performed by: INTERNAL MEDICINE

## 2021-09-16 PROCEDURE — 97162 PT EVAL MOD COMPLEX 30 MIN: CPT

## 2021-09-16 PROCEDURE — C9113 INJ PANTOPRAZOLE SODIUM, VIA: HCPCS | Performed by: INTERNAL MEDICINE

## 2021-09-16 PROCEDURE — 83735 ASSAY OF MAGNESIUM: CPT | Performed by: INTERNAL MEDICINE

## 2021-09-16 PROCEDURE — 85025 COMPLETE CBC W/AUTO DIFF WBC: CPT | Performed by: INTERNAL MEDICINE

## 2021-09-16 RX ORDER — HYDRALAZINE HYDROCHLORIDE 20 MG/ML
5 INJECTION INTRAMUSCULAR; INTRAVENOUS EVERY 4 HOURS PRN
Status: DISCONTINUED | OUTPATIENT
Start: 2021-09-16 | End: 2021-09-18 | Stop reason: HOSPADM

## 2021-09-16 RX ORDER — PROPOFOL 10 MG/ML
INJECTION, EMULSION INTRAVENOUS AS NEEDED
Status: DISCONTINUED | OUTPATIENT
Start: 2021-09-16 | End: 2021-09-16

## 2021-09-16 RX ORDER — LIDOCAINE HYDROCHLORIDE 10 MG/ML
INJECTION, SOLUTION EPIDURAL; INFILTRATION; INTRACAUDAL; PERINEURAL AS NEEDED
Status: DISCONTINUED | OUTPATIENT
Start: 2021-09-16 | End: 2021-09-16

## 2021-09-16 RX ORDER — SODIUM CHLORIDE, SODIUM LACTATE, POTASSIUM CHLORIDE, CALCIUM CHLORIDE 600; 310; 30; 20 MG/100ML; MG/100ML; MG/100ML; MG/100ML
INJECTION, SOLUTION INTRAVENOUS CONTINUOUS PRN
Status: DISCONTINUED | OUTPATIENT
Start: 2021-09-16 | End: 2021-09-16

## 2021-09-16 RX ORDER — ALBUTEROL SULFATE 90 UG/1
2 AEROSOL, METERED RESPIRATORY (INHALATION) EVERY 4 HOURS PRN
Status: DISCONTINUED | OUTPATIENT
Start: 2021-09-16 | End: 2021-09-18 | Stop reason: HOSPADM

## 2021-09-16 RX ORDER — LORAZEPAM 2 MG/ML
2 INJECTION INTRAMUSCULAR ONCE
Status: COMPLETED | OUTPATIENT
Start: 2021-09-16 | End: 2021-09-16

## 2021-09-16 RX ADMIN — PANTOPRAZOLE SODIUM 40 MG: 40 INJECTION, POWDER, FOR SOLUTION INTRAVENOUS at 21:18

## 2021-09-16 RX ADMIN — ALBUTEROL SULFATE 2 PUFF: 90 AEROSOL, METERED RESPIRATORY (INHALATION) at 09:30

## 2021-09-16 RX ADMIN — SODIUM CHLORIDE 100 ML/HR: 0.9 INJECTION, SOLUTION INTRAVENOUS at 03:36

## 2021-09-16 RX ADMIN — PROPOFOL 50 MG: 10 INJECTION, EMULSION INTRAVENOUS at 13:20

## 2021-09-16 RX ADMIN — ALBUTEROL SULFATE 2 PUFF: 90 AEROSOL, METERED RESPIRATORY (INHALATION) at 14:08

## 2021-09-16 RX ADMIN — SODIUM CHLORIDE, SODIUM LACTATE, POTASSIUM CHLORIDE, AND CALCIUM CHLORIDE: .6; .31; .03; .02 INJECTION, SOLUTION INTRAVENOUS at 13:05

## 2021-09-16 RX ADMIN — LORAZEPAM 2 MG: 2 INJECTION INTRAMUSCULAR; INTRAVENOUS at 15:58

## 2021-09-16 RX ADMIN — POLYETHYLENE GLYCOL 3350, SODIUM SULFATE ANHYDROUS, SODIUM BICARBONATE, SODIUM CHLORIDE, POTASSIUM CHLORIDE 4000 ML: 236; 22.74; 6.74; 5.86; 2.97 POWDER, FOR SOLUTION ORAL at 18:02

## 2021-09-16 RX ADMIN — ATORVASTATIN CALCIUM 80 MG: 80 TABLET, FILM COATED ORAL at 17:28

## 2021-09-16 RX ADMIN — NICOTINE 1 PATCH: 14 PATCH, EXTENDED RELEASE TRANSDERMAL at 17:28

## 2021-09-16 RX ADMIN — PROPOFOL 50 MG: 10 INJECTION, EMULSION INTRAVENOUS at 13:16

## 2021-09-16 RX ADMIN — PANTOPRAZOLE SODIUM 40 MG: 40 INJECTION, POWDER, FOR SOLUTION INTRAVENOUS at 09:33

## 2021-09-16 RX ADMIN — PROPOFOL 50 MG: 10 INJECTION, EMULSION INTRAVENOUS at 13:22

## 2021-09-16 RX ADMIN — SERTRALINE 100 MG: 100 TABLET, FILM COATED ORAL at 09:32

## 2021-09-16 RX ADMIN — FENOFIBRATE 48 MG: 48 TABLET ORAL at 09:32

## 2021-09-16 RX ADMIN — PROPOFOL 50 MG: 10 INJECTION, EMULSION INTRAVENOUS at 13:21

## 2021-09-16 RX ADMIN — LIDOCAINE HYDROCHLORIDE 50 MG: 10 INJECTION, SOLUTION EPIDURAL; INFILTRATION; INTRACAUDAL; PERINEURAL at 13:15

## 2021-09-16 RX ADMIN — PROPOFOL 100 MG: 10 INJECTION, EMULSION INTRAVENOUS at 13:15

## 2021-09-16 RX ADMIN — CARVEDILOL 12.5 MG: 12.5 TABLET, FILM COATED ORAL at 17:28

## 2021-09-16 RX ADMIN — CARVEDILOL 12.5 MG: 12.5 TABLET, FILM COATED ORAL at 09:32

## 2021-09-16 RX ADMIN — TIOTROPIUM BROMIDE 18 MCG: 18 CAPSULE ORAL; RESPIRATORY (INHALATION) at 09:31

## 2021-09-16 RX ADMIN — PROPOFOL 50 MG: 10 INJECTION, EMULSION INTRAVENOUS at 13:18

## 2021-09-16 RX ADMIN — LORATADINE 10 MG: 10 TABLET ORAL at 09:32

## 2021-09-16 NOTE — ASSESSMENT & PLAN NOTE
Lab Results   Component Value Date    EGFR 89 09/16/2021    EGFR 38 09/15/2021    EGFR 52 09/06/2021    CREATININE 0 90 09/16/2021    CREATININE 1 81 (H) 09/15/2021    CREATININE 1 40 (H) 09/06/2021     Monitor kidney function closely  Avoid nephrotoxins

## 2021-09-16 NOTE — UTILIZATION REVIEW
Initial Clinical Review    Admission: Date/Time/Statement:   Admission Orders (From admission, onward)     Ordered        09/15/21 1529  Inpatient Admission  Once        [facility]  LEVEL OF CARE            Orders Placed This Encounter   Procedures    Inpatient Admission     Standing Status:   Standing     Number of Occurrences:   1     Order Specific Question:   Level of Care     Answer:   Med Surg [16]     Order Specific Question:   Estimated length of stay     Answer:   More than 2 Midnights     Order Specific Question:   Certification     Answer:   I certify that inpatient services are medically necessary for this patient for a duration of greater than two midnights  See H&P and MD Progress Notes for additional information about the patient's course of treatment  Initial Presentation:   Rachel Serrano is a 72 yo male who presents as a transfer from the ED [facility] to [facility] with c/o Syncope, no LOC  In the ED he was noted to be anemic and hypotensive and was transfused with 2 u PRBCs pre-transfer  PMH:  CVA, carotid artery disease, hypertension, dyslipidemia, chronic alcohol abuse, tobacco abuse, ambulatory dysfunction, melena  He is admitted to INPATIENT status with Upper GI Bleed - IV Pantoprazole, clear liquid diet, GI consult  Syncope - d/t hypovolemia, GI Bleed or vasovagal   Anemia - trend H/H and transfuse PRN  Ambulatory dysfunction - PT/OT eval   CKD stage 3a - avoid nephrotoxins  Recent CVA - hold Plavix, continue Atorvastatin  Iron def anemia - iron panel and supplementation  Alcohol abuse - CIWA, thiamine  Date: 9/16   Day 2:   CIWA is 3 today then 0  Repeat Hgb today is 11 2  No further bleeding  Pt is stable this morning  No N/V or bloody BMs  EGD pending  9/16 GI Consult - symptomatic anemia,  Iron deficiency, blood transfusion - no active bleeding, H/H stable post transfusion 2 U PRBCs and PRN, continue IV Protonix BID, EGD today        9/16 EGD - IMPRESSION: 3cms hiatal hernia, Normal esophagus, stomach and duodenum, No source of melena   RECOMMENDATION:  Start diet, Colonoscopy inpatient vs outpatient, Okay for DAPT  Wt Readings from Last 1 Encounters:   09/15/21 66 7 kg (147 lb)     Additional Vital Signs:   09/16/21 10:54:48  98 1 °F (36 7 °C)  63  16  132/57  82  95 %  --  --   09/16/21 07:33:25  97 9 °F (36 6 °C)  71  16  165/78  107  97 %  --  --   09/16/21 06:07:22  --  93  --  170/78  109  96 %  --  --   09/16/21 0300  --  70  --  156/74  --  --  --  --   09/16/21 02:58:19  98 5 °F (36 9 °C)  72  18  156/74  101  94 %  --  --   09/15/21 22:33:44  98 2 °F (36 8 °C)  62  18  136/58  84  98 %  --  --   09/15/21 2100  --  --  --  --  --  97 %  None (Room air)  --   09/15/21 18:18:07  --  75  --  176/85Abnormal   115  95 %  --  Standing - Orthostatic VS   09/15/21 18:16:02  --  68  --  176/86Abnormal   116  97 %  --  Sitting - Orthostatic VS   09/15/21 18:13:37  --  66  --  177/87Abnormal   117  95 %  --  Lying - Orthostatic VS   09/15/21 1709  --  --  --  --  --  --  None (Room air)  --   09/15/21 16:22:03  98 1 °F (36 7 °C)  65  20  180/86Abnormal   117  96 %  --  Lying     Pertinent Labs/Diagnostic Test Results:     9/15 CXR  - no acute disease  9/15 CTAP - The wall of the stomach appears somewhat thickened  Clinical correlation and follow-up is recommended  Colonic diverticulosis without evidence of acute diverticulitis  No bowel obstruction  There is minimal free fluid in the pelvis  Cholelithiasis  No CT evidence of acute cholecystitis  Borderline hepatosplenomegaly  Atherosclerosis      9/15 ECG - NSR    Results from last 7 days   Lab Units 09/15/21  0129   SARS-COV-2  Negative     Results from last 7 days   Lab Units 09/16/21  1021 09/16/21  0555 09/15/21  2142 09/15/21  0054   WBC Thousand/uL  --  6 12  --  7 18   HEMOGLOBIN g/dL 11 2* 10 5* 10 4* 7 7*   HEMATOCRIT %  --  34 8*  --  26 6*   PLATELETS Thousands/uL  --  221 --  253   NEUTROS ABS Thousands/µL  --  4 53  --  5 15         Results from last 7 days   Lab Units 09/16/21  0555 09/15/21  0900 09/15/21  0054   SODIUM mmol/L 136  --  133   POTASSIUM mmol/L 3 7 4 6 3 1*   CHLORIDE mmol/L 110*  --  100   CO2 mmol/L 23  --  21*   ANION GAP mmol/L 3*  --  12   BUN mg/dL 12  --  21*   CREATININE mg/dL 0 90  --  1 81*   EGFR ml/min/1 73sq m 89  --  38   CALCIUM mg/dL 8 0*  --  8 0*   MAGNESIUM mg/dL 2 2 1 6 0 9*   PHOSPHORUS mg/dL  --   --  3 1     Results from last 7 days   Lab Units 09/15/21  0054   AST U/L 12*   ALT U/L 6   ALK PHOS U/L 24 9   TOTAL PROTEIN g/dL 6 0*   ALBUMIN g/dL 3 5   TOTAL BILIRUBIN mg/dL 0 29*         Results from last 7 days   Lab Units 09/16/21  0555 09/15/21  0054   GLUCOSE RANDOM mg/dL 86 91     Results from last 7 days   Lab Units 09/15/21  0054   TROPONIN I ng/mL <0 03     Results from last 7 days   Lab Units 09/15/21  0054   D-DIMER QUANTITATIVE mg/L FEU 3 48*     Results from last 7 days   Lab Units 09/15/21  0054   PROTIME seconds 12 2*   INR  1 08   PTT seconds 25     Results from last 7 days   Lab Units 09/15/21  0054   BNP pg/mL 111 7*     Results from last 7 days   Lab Units 09/15/21  0900   FERRITIN ng/mL 12         Results from last 7 days   Lab Units 09/15/21  0054   LIPASE u/L 81*         Results from last 7 days   Lab Units 09/15/21  0054   ETHANOL LVL mg/dL 101 7*       Past Medical History:   Diagnosis Date    [facility] 9/5/2021    Anxiety     Back pain     Claustrophobia     [facility]     Dysphagia 9/5/2021    GERD (gastroesophageal reflux disease)     Hypertension     Lumbar back pain     Panic attacks     Stenosis of right carotid artery     Stroke [facility]     Wears glasses     Wears partial dentures     upper denture     Present on Admission:   Benign essential hypertension   COPD (chronic obstructive pulmonary disease) ([address] )   CVA [facility]cere[facility] vascular accident) [facility]   HLD (hyperlipidemia)   Iron deficiency anemia   Stage 3a chronic kidney disease (HCC)   Stenosis of right internal carotid artery   Tobacco use   Alcohol abuse    Admitting Diagnosis: Upper GI bleed [K92 2]     Age/Sex: 73 y o  male     Admission Orders:  Scheduled Medications:  atorvastatin, 80 mg, Oral, Daily With Dinner  carvedilol, 12 5 mg, Oral, BID With Meals  docusate sodium, 100 mg, Oral, BID  fenofibrate, 48 mg, Oral, Daily  ferrous sulfate, 325 mg, Oral, Daily With Breakfast  loratadine, 10 mg, Oral, Daily  nicotine, 1 patch, Transdermal, Daily  pantoprazole, 40 mg, Intravenous, Q12H YADY  polyethylene glycol, 17 g, Oral, Daily  sertraline, 100 mg, Oral, Daily  thiamine, 100 mg, Oral, Daily  tiotropium, 18 mcg, Inhalation, Daily      Continuous IV Infusions:  sodium chloride, 100 mL/hr, Intravenous, Continuous      PRN Meds:  acetaminophen, 650 mg, Oral, Q6H PRN  albuterol, 2 puff, Inhalation, Q4H PRN - x 1 9/16  ALPRAZolam, 0 25 mg, Oral, BID PRN  aluminum-magnesium hydroxide-simethicone, 30 mL, Oral, Q6H PRN  ondansetron, 4 mg, Intravenous, Q6H PRN  senna, 8 6 mg, Oral, HS PRN    SCDs  Up w/ assist   CIWA   Continuous pulse ox   H/H q 12 hr   NPO for EGD   IP CONSULT TO GASTROENTEROLOGY    Network Utilization Review Department  ATTENTION: Please call with any questions or concerns to [phone] and carefully listen to the prompts so that you are directed to the right person  All voicemails are confidential   Lucy Kennedy all requests for admission clinical reviews, approved or denied determinations and any other requests to dedicated fax number below belonging to the campus where the patient is receiving treatment   List of dedicated fax numbers for the Facilities:  [address] DENIALS (Administrative/Medical Necessity) [phone]   [address] (Maternity/NICU/Pediatrics) [phone]   5000 [facility] [phone]   8049 [facility] [phone]   [facility] [address] [facility] 8948 [address] [address] [address] [phone] 4601 [facility] [phone]

## 2021-09-16 NOTE — PROGRESS NOTES
[address] [facility]  Progress Note - Linda Marshall 1955, 73 y o  male MRN: [id number]  Unit/Bed#: [facility] 817-01 Encounter: [id number]  Primary Care Provider: Monet Patricio DO   Date and time admitted to hospital: 9/15/2021  3:21 PM    * Upper GI bleed  Assessment & Plan  Presyncopal symptoms melena low hemoglobin  Received packed cells at [facility]  Abdominal CT scan noted  IV pantoprazole  Clear liquid diet for now  EGD today    Syncope  Assessment & Plan  Possibly due to hypovolemia GI bleed versus vasovagal  Monitor orthostatics      Ambulatory dysfunction  Assessment & Plan  Multifactorial  Safe ambulation  Fall precautions  Physical therapy    Acute blood loss anemia  Assessment & Plan  Due to GI bleed    Received 2 units of PRBCs for hemoglobin of 7 7  Today's hemoglobin 10 5    Stage 3a chronic kidney disease [facility])  Assessment & Plan  Lab Results   Component Value Date    EGFR 89 09/16/2021    EGFR 38 09/15/2021    EGFR 52 09/06/2021    CREATININE 0 90 09/16/2021    CREATININE 1 81 (H) 09/15/2021    CREATININE 1 40 (H) 09/06/2021     Monitor kidney function closely  Avoid nephrotoxins    Benign essential hypertension  Assessment & Plan  Monitor blood pressures  Avoid hypotension    CVA (cerebral vascular accident) [facility])  Assessment & Plan  Recent stroke  Patient is scheduled for outpatient CT scan head  Recent Neurology input noted, plan is for him to follow-up with Neurology after CT scan for initiation of Eliquis  Continue atorvastatin    Stenosis of right internal carotid artery  Assessment & Plan  Right internal carotid artery stenosis  Continue atorvastatin  Plavix aspirin on hold presently in view of GI bleed  Outpatient vascular follow-up      COPD (chronic obstructive pulmonary disease) ([facility] )  Assessment & Plan  Continue respiratory treatment    Alcohol abuse  Assessment & Plan  History of alcohol abuse  Monitor for withdrawals  Thiamine  CIWA protocol    Tobacco use  Assessment & Plan  Tobacco cessation discussed  Pre contemplative      VTE Pharmacologic Prophylaxis:   Pharmacologic: Held due to GI bleed  Mechanical VTE Prophylaxis in Place: Yes    Patient Centered Rounds: I have performed bedside rounds with nursing staff today  Discussions with Specialists or Other Care Team Provider:  Cm and nursing    Education and Discussions with Family / Patient:  With patient,     Time Spent for Care: 30 minutes  More than 50% of total time spent on counseling and coordination of care as described above  Current Length of Stay: 1 day(s)    Current Patient Status: Inpatient   Certification Statement: The patient will continue to require additional inpatient hospital stay due to EGD today    Discharge Plan:  Likely tomorrow    Code Status: Level 1 - Full Code      Subjective:   Patient seen examined bedside  No acute events denies any nausea or vomiting no bloody bowel movements    Objective:     Vitals:   Temp (24hrs), Av 2 °F (36 8 °C), Min:97 9 °F (36 6 °C), Max:98 5 °F (36 9 °C)    Temp:  [97 9 °F (36 6 °C)-98 5 °F (36 9 °C)] 98 1 °F (36 7 °C)  HR:  [62-93] 63  Resp:  [14-20] 16  BP: (132-180)/(57-87) 132/57  SpO2:  [94 %-100 %] 95 %  Body mass index is 21 71 kg/m²  Input and Output Summary (last 24 hours): Intake/Output Summary (Last 24 hours) at 2021 1130  Last data filed at 2021 0740  Gross per 24 hour   Intake 0 ml   Output 300 ml   Net -300 ml       Physical Exam:     Physical Exam  Vitals and nursing note reviewed  Constitutional:       General: He is not in acute distress  Appearance: Normal appearance  HENT:      Head: Normocephalic  Nose: Nose normal       Mouth/Throat:      Mouth: Mucous membranes are moist    Eyes:      Conjunctiva/sclera: Conjunctivae normal    Cardiovascular:      Rate and Rhythm: Normal rate  Heart sounds: Normal heart sounds  No murmur heard       Pulmonary:      Effort: Pulmonary effort is normal  No respiratory distress  Breath sounds: Normal breath sounds  No wheezing  Abdominal:      General: There is no distension  Tenderness: There is no abdominal tenderness  There is no guarding  Musculoskeletal:         General: No swelling  Right lower leg: No edema  Skin:     General: Skin is warm  Neurological:      General: No focal deficit present  Mental Status: He is alert and oriented to person, place, and time  Psychiatric:         Mood and Affect: Mood normal            Additional Data:     Labs:    Results from last 7 days   Lab Units 09/16/21  1021 09/16/21  0555   WBC Thousand/uL  --  6 12   HEMOGLOBIN g/dL 11 2* 10 5*   HEMATOCRIT %  --  34 8*   PLATELETS Thousands/uL  --  221   NEUTROS PCT %  --  73   LYMPHS PCT %  --  16   MONOS PCT %  --  8   EOS PCT %  --  2     Results from last 7 days   Lab Units 09/16/21  0555 09/15/21  0054   SODIUM mmol/L 136 133   POTASSIUM mmol/L 3 7 3 1*   CHLORIDE mmol/L 110* 100   CO2 mmol/L 23 21*   BUN mg/dL 12 21*   CREATININE mg/dL 0 90 1 81*   ANION GAP mmol/L 3* 12   CALCIUM mg/dL 8 0* 8 0*   ALBUMIN g/dL  --  3 5   TOTAL BILIRUBIN mg/dL  --  0 29*   ALK PHOS U/L  --  24 9   ALT U/L  --  6   AST U/L  --  12*   GLUCOSE RANDOM mg/dL 86 91     Results from last 7 days   Lab Units 09/15/21  0054   INR  1 08                       * I Have Reviewed All Lab Data Listed Above  * Additional Pertinent Lab Tests Reviewed:  [facility]    Imaging:    Imaging Reports Reviewed Today Include:  CT abdomen and pelvis  Imaging Personally Reviewed by Myself Includes:  None    Recent Cultures (last 7 days):           Last 24 Hours Medication List:   Current Facility-Administered Medications   Medication Dose Route Frequency Provider Last Rate    acetaminophen  650 mg Oral Q6H PRN Ibeth Curtis MD      albuterol  2 puff Inhalation Q4H PRN Loree Krabbe, CRNP      ALPRAZolam  0 25 mg Oral BID PRN Marilin Rogers MD      aluminum-magnesium hydroxide-simethicone  30 mL Oral Q6H PRN Marilin Rogers MD      atorvastatin  80 mg Oral Daily With Meredith Schulz MD      carvedilol  12 5 mg Oral BID With Meals Marilin Rogers MD      docusate sodium  100 mg Oral BID Marilin Rogers MD      fenofibrate  48 mg Oral Daily Marilin Rogers MD      ferrous sulfate  325 mg Oral Daily With Breakfast Marilin Rogers MD      loratadine  10 mg Oral Daily Marilin Rogers MD      nicotine  1 patch Transdermal Daily Marilin Rogers MD      ondansetron  4 mg Intravenous Q6H PRN Marilin Rogers MD      pantoprazole  40 mg Intravenous Q12H Yunior Rajput MD      polyethylene glycol  17 g Oral Daily Marilin Rogers MD      senna  8 6 mg Oral HS PRN Marilin Rogers MD      sertraline  100 mg Oral Daily Marilin Rogers MD      sodium chloride  100 mL/hr Intravenous Continuous Marilin Rogers  mL/hr (09/16/21 0336)    thiamine  100 mg Oral Daily Marilin Rogers MD      tiotropium  18 mcg Inhalation Daily Marilin Rogers MD          Today, Patient Was Seen By: Francia Nevarez MD    ** Please Note: Dictation voice to text software may have been used in the creation of this document   **

## 2021-09-16 NOTE — ANESTHESIA POSTPROCEDURE EVALUATION
Post-Op Assessment Note    CV Status:  Stable  Pain Score: 0    Pain management: adequate     Mental Status:  Alert and awake   Hydration Status:  Euvolemic   PONV Controlled:  Controlled   Airway Patency:  Patent      Post Op Vitals Reviewed: Yes      Staff: CRNA         No complications documented      BP  161/75   Temp      Pulse  75   Resp   14   SpO2   99

## 2021-09-16 NOTE — ANESTHESIA PREPROCEDURE EVALUATION
Procedure:  EGD    Relevant Problems   CARDIO   (+) Benign essential hypertension   (+) HLD (hyperlipidemia)      GI/HEPATIC   (+) GERD (gastroesophageal reflux disease)   (+) Upper GI bleed      /RENAL   (+) Stage 3a chronic kidney disease (HCC)      HEMATOLOGY   (+) Acute blood loss anemia   (+) Iron deficiency anemia      MUSCULOSKELETAL   (+) Lumbar back pain      NEURO/PSYCH   (+) CVA (cerebral vascular accident) ([facility] )   (+) 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      PULMONARY   (+) COPD (chronic obstructive pulmonary disease) (HCC)        Physical Exam    Airway    Mallampati score: II  TM Distance: >3 FB  Neck ROM: full     Dental   upper dentures,     Cardiovascular  Cardiovascular exam normal    Pulmonary  Pulmonary exam normal     Other Findings        Anesthesia Plan  ASA Score- 3 Emergent    Anesthesia Type- IV sedation with anesthesia with ASA Monitors  Additional Monitors:   Airway Plan:           Plan Factors-Exercise tolerance (METS): <4 METS  Chart reviewed  EKG reviewed  Existing labs reviewed  Patient is a current smoker  Patient instructed to abstain from smoking on day of procedure  Patient did not smoke on day of surgery  Induction- intravenous  Postoperative Plan-     Informed Consent- Anesthetic plan and risks discussed with patient  I personally reviewed this patient with the CRNA  Discussed and agreed on the Anesthesia Plan with the CRNA  Analia Hinson

## 2021-09-16 NOTE — TELEPHONE ENCOUNTER
Dr Eben Santos,   Pt has been admitted to hospital several times  He is currently inpt  I noticed on 9/2/21 he hac cta head and neck done for stroke alert  Please review and advise re: plan  Thank you

## 2021-09-16 NOTE — OCCUPATIONAL THERAPY NOTE
Occupational Therapy Evaluation     Patient Name: Bill VAN Date: 9/16/2021  Problem List  Principal Problem:    Upper GI bleed  Active Problems:    Tobacco use    Alcohol abuse    HLD (hyperlipidemia)    COPD (chronic obstructive pulmonary disease) ([facility])    Iron deficiency anemia    Stenosis of right internal carotid artery    CVA (cerebral vascular accident) ([facility] )    Benign essential hypertension    Stage 3a chronic kidney disease ([facility] )    Acute blood loss anemia    Ambulatory dysfunction    Syncope    Past Medical History  Past Medical History:   Diagnosis Date    PRITI (acute kidney injury) ([facility] ) 9/5/2021    Anxiety     Back pain     Claustrophobia     COPD [facility]chronic obstructive pulmonary disease) [facility]     Dysphagia 9/5/2021    GERD (gastroesophageal reflux disease)     Hypertension     Lumbar back pain     Panic attacks     Stenosis of right carotid artery     [facility]roke [facility]     Wears glasses     Wears partial dentures     upper denture     Past Surgical History  Past Surgical History:   Procedure Laterality Date    COLONOSCOPY      IR CEREBRAL ANGIOGRAPHY  4/6/2021    OTHER SURGICAL HISTORY      fertility    NC Martín Coello 3RD+ ORD SLCTV ABDL PEL/LXTR [facility] Right 4/6/2021    Procedure: ARTERIOGRAM, carotid Angio;  Surgeon: Johnna Paulino MD;  [facility]: [facility];  Service: Vascular    NC THROMBOENDARTECTMY Dereck López Right 4/13/2021    Procedure: RIGHT ENDARTERECTOMY ARTERY CAROTID WITH BOVINE PATCH;  Surgeon: Johnna Paulino MD;  [facility];  Service: Vascular           09/16/21 0944   OT Last Visit   OT Visit Date 09/16/21   Note Type   Note type Evaluation   Restrictions/Precautions   Weight Bearing Precautions Per Order No   Other Precautions Fall Risk;Multiple lines   Pain Assessment   Pain Assessment Tool Pain Assessment not indicated - pt denies pain   Pain Score No Pain   Home Living   Type of Home Mobile home   Home Layout One level; Able to live on main level with bedroom/bathroom   Bathroom Shower/Tub Tub/shower unit   [employer] Grab bars in shower   [address] [facility]  (Denies use PTA)   Additional Comments Pt lives with daughter in mobile home with 3 EVELYNE  Prior Function   Level of [place] Independent with ADLs and functional mobility; Needs assistance with IADLs   Lives With Daughter   Receives Help From Family   ADL Assistance Independent   IADLs Needs assistance   Falls in the last 6 months 1 to 4  (x1 leading to admission)   Vocational Retired   Comments PTA, Pt I with ADLs/+/no ad  Lifestyle   Autonomy Pt I with ADLs/+/no ad  Reciprocal Relationships Pt lives with supportive daughter who completes IADLs  Service to Others Retired-   Intrinsic Gratification Enjoys watching TV  Psychosocial   Psychosocial (WDL) WDL   ADL   Where Assessed Edge of bed   Eating Assistance 7  Independent   Grooming Assistance 7  Independent   UB Bathing Assistance 7  Independent   LB Bathing Assistance 5  Supervision/Setup   UB Dressing Assistance 7  Independent   LB Dressing Assistance 5  Supervision/Setup   LB Dressing Deficit Don/doff R sock; Don/doff L sock; Setup   Toileting Assistance  5  Supervision/Setup   Functional Assistance 5  Supervision/Setup   Bed Mobility   Supine to Sit 6  Modified independent   Additional items HOB elevated; Increased time required   Sit to Supine Unable to assess   Additional Comments Pt greeted supine in bed and left OOB in recliner chair at end of session  Call bell nearby and all needs within reach  Transfers   Sit to Stand 7  Independent   Stand to Sit 7  Independent   Functional Mobility   Functional Mobility 5  Supervision   Additional Comments no AD     Balance   Static Sitting Good   Dynamic Sitting Fair +   Static Standing Fair   Dynamic Standing Fair   Ambulatory Fair   Activity Tolerance   Activity Tolerance Patient tolerated treatment well Medical Staff Made Aware Co-evaluation with PT 2* to Pt's medication complexity and multiple co-morbidities  Nurse Made Aware RN cleared and present through portions of OT evaluation  RUE Assessment   RUE Assessment WNL  (4+/5 grossly)   LUE Assessment   LUE Assessment WNL  (4+/5 grossly)   Hand Function   Gross Motor Coordination Functional   Fine Motor Coordination Functional   Sensation   Light Touch No apparent deficits   Vision-Basic Assessment   Current Vision No visual deficits   Vision - Complex Assessment   Ocular Range of Motion WFL   Tracking Able to track stimulus in all quads without difficulty   Acuity Able to read employee name badge without difficulty   Cognition   Overall Cognitive Status [facility]   Arousal/Participation Alert; Cooperative   Attention Within functional limits   Orientation Level Oriented X4   Memory Within functional limits   Following Commands Follows all commands and directions without difficulty   Comments Pt very pleasant and cooperative throughout OT session  Assessment   Limitation Decreased high-level ADLs; Decreased ADL status   Prognosis Fair   Assessment Pt is a 72 yo Male who presented to [facility] on [facility]/15/2021 with lightheadedness and s/p lowered fall to floor  Pt with diagnosis of upper GI bleed  Pt  has a past medical history of PRITI (acute kidney injury) ([facility] ) (9/5/2021), Anxiety, Back pain, Claustrophobia, COPD (chronic obstructive pulmonary disease) ([facility] [address] ), Dysphagia (9/5/2021), GERD (gastroesophageal reflux disease), Hypertension, Lumbar back pain, Panic attacks, Stenosis of right carotid artery, Stroke [facility]), Wears glasses, and Wears partial dentures  Pt greeted bedside for OT evaluation on 9/16/2021  Pt lives with daughter in mobile home with 3 EVELYNE  PTA, Pt I with ADLs/+/no ad  Pt completes UB ADLs with I and LB ADLs with S  Pt completes bed mobility with mod I, functional transfers with I and functional mobility with no AD at S level   Pt with no further acute OT concerns upon DC home  Pt would benefit from returning home with increased assistance upon DC to maximize safety and independence with ADLs and functional tasks of choice  DC skilled OT services  Goals   Patient Goals To go home  Plan   OT Frequency Eval only   Recommendation   OT Discharge Recommendation No rehabilitation needs   OT - OK to Discharge Yes   Additional Comments  The patient's raw score on the AM-PAC Daily Activity inpatient short form is 21, standardized score is 44 27, greater than 39 4  Patients at this level are likely to benefit from discharge to home  Please refer to the recommendation of the Occupational Therapist for safe discharge planning     AM-PAC Daily Activity Inpatient   Lower Body Dressing 3   Bathing 3   Toileting 3   Upper Body Dressing 4   Grooming 4   Eating 4   Daily Activity Raw Score 21   Daily Activity Standardized Score (Calc for Raw Score >=11) 44 27   AM-PAC Applied Cognition Inpatient   Following a Speech/Presentation 4   Understanding Ordinary Conversation 4   Taking Medications 4   Remembering Where Things Are Placed or Put Away 4   Remembering List of 4-5 Errands 4   Taking Care of Complicated Tasks 4   Applied Cognition Raw Score 24   Applied Cognition Standardized Score 62 21   Modified Rosalino Scale   Modified Deuel Scale 3     Jenniffer Kahn MS, OTR/L

## 2021-09-16 NOTE — CONSULTS
Consultation - [facility]  Christiana Cannonr 73 y o  male MRN: [id number]  Unit/Bed#: [facility] 483-27 Encounter: [id number]        Inpatient consult to gastroenterology  Consult performed by: Anton Herrera MD  Consult ordered by: Altagracia Rodriguez MD      Reason for Consult / Principal Problem:   Symptomatic iron deficiency anemia requiring blood transfusions  Melena    ASSESSMENT AND PLAN:    Patient is a 43-year-old male with past medical history of COPD, CKD stage 3, status post right CEA, recent ischemic stroke on aspirin and Plavix who presents with syncopal episode, generalized fatigue, found to be anemic requiring blood transfusions  1  Symptomatic anemia, iron deficiency; requiring blood transfusions  Patient presenting with syncopal episode and ambulatory dysfunction at [facility] stable initially however lab work significant for anemia with hemoglobin of 7 7, wanting 2 PRBC transfusions baseline earlier this year around 6 however lately has been 8-9 for the past couple of months  Does complain of dark stool initially however reports that they have cleared now  Denies any abdominal pain, nausea, vomiting, hematemesis, BRBPR  Admits to drinking 6-8 beers every other day  Active smoker  Uses multiple pain medications however does not recall any names  Given recent history of stroke, started on aspirin Plavix earlier this month      -- no active evidence of overt GI bleeding, LAUREN today showing empty rectal vault with no melena or BRBPR   -- hemoglobin 10 4 from 7 7 after transfusion of 2 PRBC   -- PC normal, BUN/creatinine normal   -- CT abdomen showing thickening of gastric wall, hepatosplenomegaly   -- reports had an EGD and colonoscopy many years ago, does not recall indications   -- reports cologuard test last year which was negative for screening   -- for now will continue Protonix IV b i d    -- EGD today   -- transfuse PRN   -- discussed possibility of colonoscopy however patient does not want undergo colonoscopy as an inpatient   -- recommendations on anti-platelet and AC use pending EGD; patient is at a high risk of holding these medications, currently no overt signs of GI bleeding    2  Recent ischemic stroke  Patient currently on aspirin Plavix, currently held as patient is NPO for EGD  Rx as per primary team     3  Carotid artery stenosis status post S right CEA in April 2021  Was recommended follow-up with Neurology however has not followed up  Treatment as per primary team     4  Dizziness  Patient presenting to [facility] on 09/15 with primary complaints of dizziness concerning for presyncopal episode  At the time patient was also noted to have elevated ethanol level which could be contributing  Also anemic with hemoglobin of 7 7; PRITI with Cr 1 8 which has now improved  Treatment as per primary team     ______________________________________________________________________    HPI:  Patient is a 43-year-old male with past medical history including but not limited to recent ischemic stroke on aspirin Plavix, right carotid artery stenosis status post CEA in April 2021, who presented to [facility] for dizziness and presyncopal event  Was transferred to [facility]  Patient currently offers no complaints, reports significant improvement in overall dizziness  Reports that he had dark bowel movements prior to the admission however since then have cleared up and have become normal   Currently denies any abdominal pain, nausea, vomiting, hematemesis, melena, BRBPR, dysphagia, odynophagia, recent unintentional weight changes  Denies any recent changes in medications  Endorses a history of EGD and colonoscopy in the past however was many years ago, does not remember the indication  For screening he has had a Cologuard last year which was negative  Social HX:  Active smoker, drinks 6-8 beers every other day      REVIEW OF SYSTEMS:    CONSTITUTIONAL: Denies any fever, chills, rigors, and weight loss  HEENT: No earache or tinnitus  Denies hearing loss or visual disturbances  CARDIOVASCULAR: No chest pain or palpitations  RESPIRATORY: Denies any cough, hemoptysis, shortness of breath or dyspnea on exertion  GASTROINTESTINAL: As noted in the History of Present Illness  GENITOURINARY: No problems with urination  Denies any hematuria or dysuria  NEUROLOGIC: No dizziness or vertigo, denies headaches  MUSCULOSKELETAL: Denies any muscle or joint pain  SKIN: Denies skin rashes or itching  ENDOCRINE: Denies excessive thirst  Denies intolerance to heat or cold  PSYCHOSOCIAL: Denies depression or anxiety  Denies any recent memory loss         Historical Information   Past Medical History:   Diagnosis Date    [facility]I [facility]) [facility] [address] ) 9/5/2021    Anxiety     Back pain     Claustrophobia     [facility]     Dysphagia 9/5/2021    GERD (gastroesophageal reflux disease)     Hypertension     Lumbar back pain     Panic attacks     Stenosis of right carotid artery     Stroke [facility])     Wears glasses     Wears partial dentures     upper denture     Past Surgical History:   Procedure Laterality Date    COLONOSCOPY      IR CEREBRAL ANGIOGRAPHY  4/6/2021    OTHER SURGICAL HISTORY      fertility    [place] Mancil Faes 3RD+ ORD SLCTV ABDL PEL/LXTR [facility] Right 4/6/2021    Procedure: ARTERIOGRAM, carotid Angio;  Surgeon: Jay Quiles MD;  Location: [facility];  Service: Vascular    MN THROMBOENDARTECTMY Pino Mccracken Right 4/13/2021    Procedure: RIGHT ENDARTERECTOMY ARTERY CAROTID WITH BOVINE PATCH;  Surgeon: Jay Quiles MD;  Location: [facility];  Service: Vascular     Social History   Social History     Substance and Sexual Activity   Alcohol Use Yes     Social History     Substance and Sexual Activity   Drug Use Never     Social History     Tobacco Use   Smoking Status Light Tobacco Smoker    Packs/day: 0 25 Smokeless Tobacco Never Used   Tobacco Comment    also wears nicotine patches     No family history on file      Meds/Allergies     Medications Prior to Admission   Medication    acetaminophen (TYLENOL) 325 mg tablet    aclidinium (Tudorza Pressair) 400 MCG/ACT inhaler    ALBUTEROL IN    ALPRAZolam (XANAX) 0 25 mg tablet    amLODIPine-benazepril (LOTREL 5-10) 5-10 MG per capsule    aspirin (ECOTRIN LOW STRENGTH) 81 mg EC tablet    atorvastatin (LIPITOR) 80 mg tablet    baclofen 10 mg tablet    carvedilol (COREG) 12 5 mg tablet    clopidogrel (PLAVIX) 75 mg tablet    fenofibrate 160 MG tablet    ferrous sulfate 324 (65 Fe) mg    loratadine (CLARITIN) 10 mg tablet    metoprolol tartrate (LOPRESSOR) 25 mg tablet    nicotine (NICODERM CQ) 14 mg/24hr TD 24 hr patch    pantoprazole (PROTONIX) 40 mg tablet    senna (SENOKOT) 8 6 mg    sertraline (ZOLOFT) 100 mg tablet    thiamine 100 MG tablet    Varenicline Tartrate (CHANTIX PO)     Current Facility-Administered Medications   Medication Dose Route Frequency    acetaminophen (TYLENOL) tablet 650 mg  650 mg Oral Q6H PRN    albuterol (PROVENTIL HFA,VENTOLIN HFA) inhaler 2 puff  2 puff Inhalation Q4H PRN    ALPRAZolam (XANAX) tablet 0 25 mg  0 25 mg Oral BID PRN    aluminum-magnesium hydroxide-simethicone (MYLANTA) oral suspension 30 mL  30 mL Oral Q6H PRN    atorvastatin (LIPITOR) tablet 80 mg  80 mg Oral Daily With Dinner    carvedilol (COREG) tablet 12 5 mg  12 5 mg Oral BID With Meals    docusate sodium (COLACE) capsule 100 mg  100 mg Oral BID    fenofibrate (TRICOR) tablet 48 mg  48 mg Oral Daily    ferrous sulfate tablet 325 mg  325 mg Oral Daily With Breakfast    loratadine (CLARITIN) tablet 10 mg  10 mg Oral Daily    nicotine (NICODERM CQ) 14 mg/24hr TD 24 hr patch 1 patch  1 patch Transdermal Daily    ondansetron (ZOFRAN) injection 4 mg  4 mg Intravenous Q6H PRN    pantoprazole (PROTONIX) injection 40 mg  40 mg Intravenous Q12H [address]  polyethylene glycol (MIRALAX) packet 17 g  17 g Oral Daily    senna (SENOKOT) tablet 8 6 mg  8 6 mg Oral HS PRN    sertraline (ZOLOFT) tablet 100 mg  100 mg Oral Daily    sodium chloride 0 9 % infusion  100 mL/hr Intravenous Continuous    thiamine tablet 100 mg  100 mg Oral Daily    tiotropium (SPIRIVA) capsule for inhaler 18 mcg  18 mcg Inhalation Daily       Allergies   Allergen Reactions    Penicillins Anaphylaxis           Objective     Blood pressure 165/78, pulse 71, temperature 97 9 °F (36 6 °C), resp  rate 16, height 5' 9" (1 753 m), weight 66 7 kg (147 lb), SpO2 97 %  Body mass index is 21 71 kg/m²  Intake/Output Summary (Last 24 hours) at 9/16/2021 0911  Last data filed at 9/15/2021 2100  Gross per 24 hour   Intake --   Output 300 ml   Net -300 ml         PHYSICAL EXAM:      General Appearance:   Alert, cooperative, no distress   HEENT:   Normocephalic, atraumatic, anicteric      Neck:  Supple, symmetrical, trachea midline   Lungs:   Clear to auscultation bilaterally; no rales, rhonchi or wheezing; respirations unlabored    Heart[de-identified]   Regular rate and rhythm; no murmur, rub, or gallop     Abdomen:   Soft, non-tender, non-distended; normal bowel sounds; no masses, no organomegaly    Genitalia:   Deferred    Rectal:   Empty rectal vault, no BRBPR or melena noted   Extremities:  No cyanosis, clubbing or edema    Pulses:  2+ and symmetric all extremities    Skin:  No jaundice, rashes, or lesions    Lymph nodes:  No palpable cervical lymphadenopathy        Lab Results:   Admission on 09/15/2021   Component Date Value    Hemoglobin 09/15/2021 10 4*    Iron Saturation 09/15/2021 25     TIBC 09/15/2021 493*    Iron 09/15/2021 124     Ferritin 09/15/2021 12     Sodium 09/16/2021 136     Potassium 09/16/2021 3 7     Chloride 09/16/2021 110*    CO2 09/16/2021 23     ANION GAP 09/16/2021 3*    BUN 09/16/2021 12     Creatinine 09/16/2021 0 90     Glucose 09/16/2021 86     Calcium 09/16/2021 8 0*    eGFR 09/16/2021 89     WBC 09/16/2021 6 12     RBC 09/16/2021 4 72     Hemoglobin 09/16/2021 10 5*    Hematocrit 09/16/2021 34 8*    MCV 09/16/2021 74*    MCH 09/16/2021 22 2*    MCHC 09/16/2021 30 2*    RDW 09/16/2021 21 5*    MPV 09/16/2021 10 8     Platelets 95/38/5288 221     nRBC 09/16/2021 0     Neutrophils Relative 09/16/2021 73     Immat GRANS % 09/16/2021 0     Lymphocytes Relative 09/16/2021 16     Monocytes Relative 09/16/2021 8     Eosinophils Relative 09/16/2021 2     Basophils Relative 09/16/2021 1     Neutrophils Absolute 09/16/2021 4 53     Immature Grans Absolute 09/16/2021 0 02     Lymphocytes Absolute 09/16/2021 0 98     Monocytes Absolute 09/16/2021 0 46     Eosinophils Absolute 09/16/2021 0 10     Basophils Absolute 09/16/2021 0 03     Magnesium 09/16/2021 2 2        Imaging Studies: I have personally reviewed pertinent imaging studies

## 2021-09-16 NOTE — NURSING NOTE
Patient is having uncontrollable cough  Requesting his inhaler which he uses at home  None ordered in medication list  SLIM paged for Albuterol

## 2021-09-16 NOTE — ASSESSMENT & PLAN NOTE
Presyncopal symptoms melena low hemoglobin  Received packed cells at [facility]  Abdominal CT scan noted  IV pantoprazole  Clear liquid diet for now  EGD today

## 2021-09-16 NOTE — PROGRESS NOTES
Patient's daughter called concerned that her father might not have capacity to take care of himself    She is requesting formal psychiatric capacity  Consult placed

## 2021-09-16 NOTE — PHYSICAL THERAPY NOTE
Physical Therapy Evaluation     Patient's Name: Nick Garibay    Admitting Diagnosis  Upper GI bleed [K92 2]    Problem List  Patient Active Problem List   Diagnosis    Left arm weakness    Tobacco use    Alcohol abuse    HLD (hyperlipidemia)    [facility]    Iron deficiency anemia    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    Symptomatic carotid artery stenosis, right    Lumbar back pain    GERD (gastroesophageal reflux disease)    Stenosis of right internal carotid artery    [facility]ce[facility]) [facility]    Benign essential hypertension    Pre-diabetes    Stage 3a chronic kidney disease ([facility] )    Upper GI bleed    Acute blood loss anemia    Ambulatory dysfunction    Syncope       Past Medical History  Past Medical History:   Diagnosis Date    PRITI (acute kidney injury) ([facility] [facility] ) 9/5/2021    Anxiety     Back pain     Claustrophobia     COPD [facility]ronic obstructive [facility] disease) ([facility]     Dysphagia 9/5/2021    GERD (gastroesophageal reflux disease)     Hypertension     Lumbar back pain     Panic attacks     Stenosis of right carotid artery     Stroke ([facility]     Wears glasses     Wears partial dentures     upper denture       Past Surgical History  Past Surgical History:   Procedure Laterality Date    COLONOSCOPY      IR CEREBRAL ANGIOGRAPHY  4/6/2021    OTHER SURGICAL HISTORY      fertility    TN Chand Rajesh 3RD+ ORD SLCTV ABDL PEL/LXTR [facility] Right 4/6/2021    Procedure: ARTERIOGRAM, carotid Angio;  Surgeon: Devante Lee MD;  Location: [facility];  Service: Vascular    TN Stella DURAN Right 4/13/2021    Procedure: RIGHT ENDARTERECTOMY ARTERY CAROTID WITH BOVINE PATCH;  Surgeon: Devante Lee MD;  [facility]: [facility];  Service: Vascular        09/16/21 0945   PT Last Visit   PT Visit Date 09/16/21   Note Type   Note type Evaluation   Pain Assessment   Pain Assessment Tool Pain Assessment not indicated - pt denies pain   Pain Score No Pain   Home Living   Type of Home Mobile home   Home Layout One level;Performs ADLs on one level; Able to live on main level with bedroom/bathroom  (3 EVELY[address])   Bathroom Shower/Tub Tub/shower unit   Bathroom Toilet Standard   Bathroom Equipment   (denies )   [address]  (did not use PTA )   Prior Function   Level of [place] Independent with ADLs and functional mobility   Lives With Daughter   Receives Help From Family   ADL Assistance Independent   IADLs Needs assistance   Falls in the last 6 months 1 to 4  (1 per pt report )   Restrictions/Precautions   Weight Bearing Precautions Per Order No   Other Precautions Fall Risk;Multiple lines   General   Family/Caregiver Present No   Cognition   Overall Cognitive Status WFL   Arousal/Participation Cooperative   Orientation Level Oriented X4   Memory Within functional limits   Following Commands Follows all commands and directions without difficulty   Comments Pt pleasant and cooperative to participate in therapy session    RLE Assessment   RLE Assessment   (funcitonally 4/5 )   LLE Assessment   LLE Assessment   (funcitonally 4/5 )   Bed Mobility   Supine to Sit 6  Modified independent   Additional items HOB elevated; Increased time required   Sit to Supine Unable to assess   Additional Comments Pt supine in bed upon arrival  Pt sitting upright in bedside chair with all needs within reach at the end of therapy session  Transfers   Sit to Stand 7  Independent   Stand to Sit 7  Independent   Additional Comments transfers without AD    Ambulation/Elevation   Gait pattern Excessively slow; Short stride; Inconsistent josé miguel   Gait Assistance 5  Supervision   Assistive Device None   Distance 2 x 100ft    Stair Management Assistance 5  Supervision   Stair Management Technique One rail L;Alternating pattern; Foreward;Reciprocal   Number of Stairs 3  (limited by lines )   Balance   Static Sitting Good   Dynamic Sitting Fair +   Static Standing Fair   Dynamic Standing Fair   Ambulatory Fair   Activity Tolerance   Activity Tolerance Patient tolerated treatment well   Medical Staff Made Aware OT; co-eval performed this date 2* increased medical complexity and multiple co-morbidities    Nurse Made Aware RN cleared pt to participate in therapy session    Assessment   Prognosis Good   Assessment Pt seen for mod complexity PT evaluation  Pt with active PT eval/treat and up with A orders  Pt is a 73 y o  male who was admitted to [facility] on 9/15/2021 with upper GI bleed  Pt's active problems include: syncope, ambulatory dysfunction, acute blood loss anemia, stage 3a chronic kidney disease, HTN, CVA, stenosis of R internal carotid artery, COPD, alcohol abuse  Pt  has a past medical history of PRITI (acute kidney injury) ([facility] [address] ) (9/5/2021), Anxiety, Back pain, Claustrophobia, COPD (chronic obstructive pulmonary disease) ([facility] ), Dysphagia (9/5/2021), GERD (gastroesophageal reflux disease), Hypertension, Lumbar back pain, Panic attacks, Stenosis of right carotid artery, Stroke [facility]), Wears glasses, and Wears partial dentures  Pt resides with daughter in 1 level mobile home with 3 STEand was independent without AD prior to hospital admission  Pt performed bed mobility tasks at mod I level  Pt performed STS from EOB without AD at I level  Pt ambulated in hallway without AD at S level  Pt negotiated 3 steps using HR at S level  Pt was left sitting upright in bedside chair at the end of PT session with all needs in reach  Pt with no questions or concerns regarding d/c home; pt with no further acute inpatient PT needs at this time- please re-consult if needed  PT to d/c pt and recommends home with increased social support  Encourage pt to ambulate at least 3x/day with nursing staff while remaining in hospital  The patient's [address] Short Form Raw Score is 22, Standardized Score is 47 4  A standardized score greater than 42 9 suggests the patient may benefit from discharge to home  Please also refer to the recommendation of the Physical Therapist for safe discharge planning  Goals   Patient Goals to go home    Plan   Treatment/Interventions Functional transfer training;LE strengthening/ROM; Elevations; Endurance training;Patient/family training;Bed mobility;Gait training;Spoke to nursing;Spoke to case management;OT   PT Frequency   (D/C PT- eval only this date )   Recommendation   PT Discharge Recommendation No rehabilitation needs  (increased social support )   Equipment Recommended   (none)   PT - OK to Discharge Yes  (once medically cleared )   AM-PAC Basic Mobility Inpatient   Turning in Bed Without Bedrails 4   Lying on Back to Sitting on Edge of Flat Bed 4   Moving Bed to Chair 4   Standing Up From Chair 4   Walk in Room 3   Climb 3-5 Stairs 3   Basic Mobility Inpatient Raw Score 22   Basic Mobility Standardized Score 47 4           David Sue, PT, DPT

## 2021-09-17 ENCOUNTER — ANESTHESIA EVENT (INPATIENT)
Dept: GASTROENTEROLOGY | Facility: HOSPITAL | Age: 66
DRG: 378 | End: 2021-09-17
Payer: COMMERCIAL

## 2021-09-17 ENCOUNTER — ANESTHESIA (INPATIENT)
Dept: GASTROENTEROLOGY | Facility: HOSPITAL | Age: 66
DRG: 378 | End: 2021-09-17
Payer: COMMERCIAL

## 2021-09-17 ENCOUNTER — APPOINTMENT (INPATIENT)
Dept: GASTROENTEROLOGY | Facility: HOSPITAL | Age: 66
DRG: 378 | End: 2021-09-17
Payer: COMMERCIAL

## 2021-09-17 ENCOUNTER — APPOINTMENT (INPATIENT)
Dept: RADIOLOGY | Facility: HOSPITAL | Age: 66
DRG: 378 | End: 2021-09-17
Payer: COMMERCIAL

## 2021-09-17 LAB
ANION GAP SERPL CALCULATED.3IONS-SCNC: 8 MMOL/L (ref 4–13)
ATRIAL RATE: 125 BPM
BUN SERPL-MCNC: 9 MG/DL (ref 5–25)
CALCIUM SERPL-MCNC: 8.5 MG/DL (ref 8.3–10.1)
CHLORIDE SERPL-SCNC: 108 MMOL/L (ref 100–108)
CO2 SERPL-SCNC: 22 MMOL/L (ref 21–32)
CREAT SERPL-MCNC: 1.08 MG/DL (ref 0.6–1.3)
ERYTHROCYTE [DISTWIDTH] IN BLOOD BY AUTOMATED COUNT: 22.5 % (ref 11.6–15.1)
GFR SERPL CREATININE-BSD FRML MDRD: 72 ML/MIN/1.73SQ M
GLUCOSE SERPL-MCNC: 103 MG/DL (ref 65–140)
HCT VFR BLD AUTO: 40.9 % (ref 36.5–49.3)
HGB BLD-MCNC: 11.9 G/DL (ref 12–17)
HGB BLD-MCNC: 12.2 G/DL (ref 12–17)
HGB BLD-MCNC: 12.5 G/DL (ref 12–17)
MCH RBC QN AUTO: 21.9 PG (ref 26.8–34.3)
MCHC RBC AUTO-ENTMCNC: 29.8 G/DL (ref 31.4–37.4)
MCV RBC AUTO: 73 FL (ref 82–98)
PLATELET # BLD AUTO: 271 THOUSANDS/UL (ref 149–390)
PMV BLD AUTO: 9.9 FL (ref 8.9–12.7)
POTASSIUM SERPL-SCNC: 3.6 MMOL/L (ref 3.5–5.3)
QRS AXIS: 47 DEGREES
QRSD INTERVAL: 86 MS
QT INTERVAL: 326 MS
QTC INTERVAL: 424 MS
RBC # BLD AUTO: 5.57 MILLION/UL (ref 3.88–5.62)
SODIUM SERPL-SCNC: 138 MMOL/L (ref 136–145)
T WAVE AXIS: 52 DEGREES
VENTRICULAR RATE: 102 BPM
WBC # BLD AUTO: 8 THOUSAND/UL (ref 4.31–10.16)

## 2021-09-17 PROCEDURE — 45378 DIAGNOSTIC COLONOSCOPY: CPT | Performed by: INTERNAL MEDICINE

## 2021-09-17 PROCEDURE — 93010 ELECTROCARDIOGRAM REPORT: CPT | Performed by: INTERNAL MEDICINE

## 2021-09-17 PROCEDURE — 80048 BASIC METABOLIC PNL TOTAL CA: CPT | Performed by: STUDENT IN AN ORGANIZED HEALTH CARE EDUCATION/TRAINING PROGRAM

## 2021-09-17 PROCEDURE — 93005 ELECTROCARDIOGRAM TRACING: CPT

## 2021-09-17 PROCEDURE — 85027 COMPLETE CBC AUTOMATED: CPT | Performed by: STUDENT IN AN ORGANIZED HEALTH CARE EDUCATION/TRAINING PROGRAM

## 2021-09-17 PROCEDURE — 99222 1ST HOSP IP/OBS MODERATE 55: CPT | Performed by: INTERNAL MEDICINE

## 2021-09-17 PROCEDURE — C9113 INJ PANTOPRAZOLE SODIUM, VIA: HCPCS | Performed by: INTERNAL MEDICINE

## 2021-09-17 PROCEDURE — 70450 CT HEAD/BRAIN W/O DYE: CPT

## 2021-09-17 PROCEDURE — 99232 SBSQ HOSP IP/OBS MODERATE 35: CPT | Performed by: INTERNAL MEDICINE

## 2021-09-17 PROCEDURE — 85018 HEMOGLOBIN: CPT | Performed by: INTERNAL MEDICINE

## 2021-09-17 PROCEDURE — G1004 CDSM NDSC: HCPCS

## 2021-09-17 PROCEDURE — 0DJD8ZZ INSPECTION OF LOWER INTESTINAL TRACT, VIA NATURAL OR ARTIFICIAL OPENING ENDOSCOPIC: ICD-10-PCS | Performed by: INTERNAL MEDICINE

## 2021-09-17 RX ORDER — BACLOFEN 10 MG/1
10 TABLET ORAL ONCE
Status: COMPLETED | OUTPATIENT
Start: 2021-09-17 | End: 2021-09-17

## 2021-09-17 RX ORDER — LORAZEPAM 1 MG/1
2 TABLET ORAL ONCE
Status: COMPLETED | OUTPATIENT
Start: 2021-09-17 | End: 2021-09-17

## 2021-09-17 RX ORDER — PROPOFOL 10 MG/ML
INJECTION, EMULSION INTRAVENOUS CONTINUOUS PRN
Status: DISCONTINUED | OUTPATIENT
Start: 2021-09-17 | End: 2021-09-17

## 2021-09-17 RX ORDER — PROPOFOL 10 MG/ML
INJECTION, EMULSION INTRAVENOUS AS NEEDED
Status: DISCONTINUED | OUTPATIENT
Start: 2021-09-17 | End: 2021-09-17

## 2021-09-17 RX ORDER — LIDOCAINE HYDROCHLORIDE 20 MG/ML
INJECTION, SOLUTION EPIDURAL; INFILTRATION; INTRACAUDAL; PERINEURAL AS NEEDED
Status: DISCONTINUED | OUTPATIENT
Start: 2021-09-17 | End: 2021-09-17

## 2021-09-17 RX ADMIN — LORAZEPAM 2 MG: 1 TABLET ORAL at 16:16

## 2021-09-17 RX ADMIN — LORAZEPAM 2 MG: 1 TABLET ORAL at 14:55

## 2021-09-17 RX ADMIN — CARVEDILOL 12.5 MG: 12.5 TABLET, FILM COATED ORAL at 17:07

## 2021-09-17 RX ADMIN — PANTOPRAZOLE SODIUM 40 MG: 40 INJECTION, POWDER, FOR SOLUTION INTRAVENOUS at 09:20

## 2021-09-17 RX ADMIN — CARVEDILOL 12.5 MG: 12.5 TABLET, FILM COATED ORAL at 09:20

## 2021-09-17 RX ADMIN — ALPRAZOLAM 0.25 MG: 0.25 TABLET ORAL at 23:06

## 2021-09-17 RX ADMIN — LORAZEPAM 2 MG: 1 TABLET ORAL at 09:32

## 2021-09-17 RX ADMIN — LIDOCAINE HYDROCHLORIDE 50 MG: 20 INJECTION, SOLUTION EPIDURAL; INFILTRATION; INTRACAUDAL; PERINEURAL at 11:47

## 2021-09-17 RX ADMIN — FERROUS SULFATE TAB 325 MG (65 MG ELEMENTAL FE) 325 MG: 325 (65 FE) TAB at 09:19

## 2021-09-17 RX ADMIN — SERTRALINE 100 MG: 100 TABLET, FILM COATED ORAL at 09:20

## 2021-09-17 RX ADMIN — NICOTINE 1 PATCH: 14 PATCH, EXTENDED RELEASE TRANSDERMAL at 17:06

## 2021-09-17 RX ADMIN — PANTOPRAZOLE SODIUM 40 MG: 40 INJECTION, POWDER, FOR SOLUTION INTRAVENOUS at 22:42

## 2021-09-17 RX ADMIN — BACLOFEN 10 MG: 10 TABLET ORAL at 23:06

## 2021-09-17 RX ADMIN — ATORVASTATIN CALCIUM 80 MG: 80 TABLET, FILM COATED ORAL at 17:07

## 2021-09-17 RX ADMIN — PROPOFOL 100 MG: 10 INJECTION, EMULSION INTRAVENOUS at 11:46

## 2021-09-17 RX ADMIN — LORATADINE 10 MG: 10 TABLET ORAL at 09:20

## 2021-09-17 RX ADMIN — THIAMINE HCL TAB 100 MG 100 MG: 100 TAB at 09:19

## 2021-09-17 RX ADMIN — TIOTROPIUM BROMIDE 18 MCG: 18 CAPSULE ORAL; RESPIRATORY (INHALATION) at 09:20

## 2021-09-17 RX ADMIN — PROPOFOL 120 MCG/KG/MIN: 10 INJECTION, EMULSION INTRAVENOUS at 11:46

## 2021-09-17 RX ADMIN — ALPRAZOLAM 0.25 MG: 0.25 TABLET ORAL at 02:01

## 2021-09-17 RX ADMIN — FENOFIBRATE 48 MG: 48 TABLET ORAL at 09:20

## 2021-09-17 RX ADMIN — HYDRALAZINE HYDROCHLORIDE 5 MG: 20 INJECTION, SOLUTION INTRAMUSCULAR; INTRAVENOUS at 00:10

## 2021-09-17 NOTE — ADDENDUM NOTE
Addendum  created 09/17/21 1517 by Yaritza Hernández CRNA    Flowsheet accepted, Intraprocedure Flowsheets edited

## 2021-09-17 NOTE — ASSESSMENT & PLAN NOTE
History of alcohol abuse  Monitor for withdrawals  Thiamine  [facility] protocol

## 2021-09-17 NOTE — ADDENDUM NOTE
Addendum  created 09/17/21 1257 by Kayleigh Gardner MD    Clinical Note Signed, Delete clinical note

## 2021-09-17 NOTE — ANESTHESIA POST-OP FOLLOW-UP NOTE
Patient: Robert Ruth  * No procedures listed *  Vitals:    09/17/21 1226   BP: 115/76   Pulse: 96   Resp: 18   Temp: 98 4 °F (36 9 °C)   SpO2: 100%     Towards end of the endoscopy, CRNA noted irregular R-R interval with no p-waves  Patient had couple episodes of hypotension with MAP <60 s/p treatment with phenylephrine  His VR was between   30mg of esmolol was given during periods of RVR  No chest pain or AMS  EKG revealed irregular R-R interval with no pwaves c/w Atrial fibrillation  Chem/CBC drawn  Discussed case with cardiology (Dr Yareli oBston) who recommended no treatment and will be evaluated once he gets up to floor  Given his stable hemodynamic and clinical status, he was transferred to floor with no issues

## 2021-09-17 NOTE — CONSULTS
Consultation - Cardiology   Julián Gan 73 y o  male MRN: [id number]  Unit/Bed#: [facility] 817-01 Encounter: [id number]    Assessment/Plan     Principal Problem:    Upper GI bleed  Active Problems:    Tobacco use    Alcohol abuse    HLD (hyperlipidemia)    COPD (chronic obstructive pulmonary disease) (HCC)    Iron deficiency anemia    Stenosis of right internal carotid artery    CVA (cerebral vascular accident) ([facility] )    Benign essential hypertension    Stage 3a chronic kidney disease ([facility] )    Acute blood loss anemia    Ambulatory dysfunction    Syncope      Assessment/Plan    1  Atrial fibrillation- new onset in GI lab during colonoscopy  Reported rate up to 110  Currently not on telemetry  Heart sounds regular and not tachycardic  Check EKG  [id number]=4/6  Recommend AC once anemia deemed stable/ cleared by neurology and GI  Neurology has previously recommended follow-up CT scan and if no evidence of bleeding recommended Eliquis 5 mg b i d  And continued aspirin with no need for Plavix  Continue BB  F/u with EKG  TTE 9/3/2021- LVEF 55% with grade 1 diastolic dysfunction  Mild right ventricular dilatation  Mild right atrial dilatation  Mild MR   TSH-1 6    2  Fall /dizziness- in setting of worsening anemia/ PRITI  Suspected hypovolemia/anemic  No telemetry to review  Will place on telemetry  Patient reports recurrent lightheadedness upon standing, suspect orthostasis  Presenting EKG 9/15 NSR  Check orthostatics    3  Symptomatic iron deficiency anemia  Reports melena  Presented with a hemoglobin 7 7 status post 2 unit PRBC hemoglobin stable 11-12  Undergoing GI workup  EGD without any source of bleeding  Colonoscopy report pending  IV Protonix    4  Recurrent ischemic stroke-  R MCA CVA 3/2021- placed on asa/plavix  R PCA stroke 9/2 - s/p tPA  Continued asa/plavix, per neurology if CT head stable in one week transition to eliquis 5mg BID  This has not been done  AP on hold during anemia w/u     5  HTN-he presented hypertensive, subsequent labile BP's  On carvedilol 12 5 b i d  He is not able to confirm his medications but is last discharge med rec indicates carvedilol 12 5 b i d  As well as metoprolol tartrate 25 every 12 hours  Hopefully he was not taking both beta-blockers  Additionally listed is amlodipine 5 mg and benazepril 10 mg which are currently on hold  Continue to monitor    6  HLD-atorvastatin 80 mg/Tricor 48 mg    7  Alcohol abuse- recommend complete cessation    History of Present Illness   Physician Requesting Consult: Arnold Morales MD  Reason for Consult / Principal Problem:  New onset atrial fibrillation    HPI: Christiana Hernandez is a 73y o  year old male with HTN, HLD , CKD 3, iron defiency anemia, RT MCA embolic CVA, 0/9378- started on asa/plavix,  COPD,  Chronic alcohol and tobacco abuse, ambulatory gait dysfunction , Rt CEA 3/2021, prediabetes  who presents with suspected Upper GI Bleed/melena ,syncope   Initially went to [facility]  and was transfused with 2 units of PRBCs  [facility]  EGD revealed no source of GI blood loss  During colonoscopy today he went into atrial fibrillation per Anesthesia with heart rates up to 110  Seen at bedside in 817  Not on telemetry  Heart rate sounds regular  Denies chest pain shortness of breath or palpitations  Does get lightheaded upon standing  He reports to me that he did not syncopized instead he got lightheaded in the bathroom at and fell  Denies loss of consciousness  He said since he was put on aspirin and Plavix when he drinks gets bloody stools  CT abdomen/pelvis -wall of the stomach somewhat thickened  Colonic diverticulosis without diverticulitis  No bowel obstruction  Cholelithiasis without evidence of acute cholecystitis  Borderline hepatosplenomegaly  TTE September 3, 2021 LVEF 55% with grade 1 diastolic dysfunction  RV mildly dilated  Mild right atrial dilatation  Mild MR   Mild TR     Notes indicate question of competency  Inpatient consult to Cardiology  Consult performed by: SVEN Fung  Consult ordered by: Jackson Rodriguez MD          Review of Systems   Constitutional: Positive for fatigue  HENT: Negative  Eyes: Negative  Respiratory: Negative for shortness of breath  Cardiovascular: Negative for chest pain, palpitations and leg swelling  Gastrointestinal: Positive for blood in stool  Endocrine: Negative  Genitourinary: Negative  Musculoskeletal: Positive for gait problem  Skin: Negative  Allergic/Immunologic: Negative  Neurological: Positive for weakness and light-headedness  Hematological: Bruises/bleeds easily  Psychiatric/Behavioral: Negative  All other systems reviewed and are negative        Historical Information   Past Medical History:   Diagnosis Date    PRITI (acute kidney injury) ([facility] [address] ) 9/5/2021    Anxiety     Back pain     Claustrophobia     COPD [facility]ch[facility] obstructive [facility]) [facility]     Dysphagia 9/5/2021    GERD (gastroesophageal reflux disease)     Hypertension     Lumbar back pain     Panic attacks     Stenosis of right carotid artery     Stroke [facility])     Wears glasses     Wears partial dentures     upper denture     Past Surgical History:   Procedure Laterality Date    COLONOSCOPY      IR CEREBRAL ANGIOGRAPHY  4/6/2021    OTHER SURGICAL HISTORY      fertility    WA Sylwia Henefer 3RD+ ORD SLCTV ABDL PEL/LXTR [facility] Right 4/6/2021    Procedure: ARTERIOGRAM, carotid Angio;  Surgeon: Moisés Medel MD;  Location: [facility];  Service: Vascular    WA THROMBOENDARTECTMY Matias Ta Right 4/13/2021    Procedure: RIGHT ENDARTERECTOMY ARTERY CAROTID WITH BOVINE PATCH;  Surgeon: Moisés Medel MD;  Location: [facility];  Service: Vascular     Social History     Substance and Sexual Activity   Alcohol Use Yes     Social History     Substance and Sexual Activity   Drug Use Never     Social History Tobacco Use   Smoking Status Light Tobacco Smoker    Packs/day: 0 25   Smokeless Tobacco Never Used   Tobacco Comment    also wears nicotine patches     Family History: No family history on file      Meds/Allergies   current meds:   Current Facility-Administered Medications   Medication Dose Route Frequency    acetaminophen (TYLENOL) tablet 650 mg  650 mg Oral Q6H PRN    albuterol (PROVENTIL HFA,VENTOLIN HFA) inhaler 2 puff  2 puff Inhalation Q4H PRN    ALPRAZolam (XANAX) tablet 0 25 mg  0 25 mg Oral BID PRN    aluminum-magnesium hydroxide-simethicone (MYLANTA) oral suspension 30 mL  30 mL Oral Q6H PRN    atorvastatin (LIPITOR) tablet 80 mg  80 mg Oral Daily With Dinner    carvedilol (COREG) tablet 12 5 mg  12 5 mg Oral BID With Meals    docusate sodium (COLACE) capsule 100 mg  100 mg Oral BID    fenofibrate (TRICOR) tablet 48 mg  48 mg Oral Daily    ferrous sulfate tablet 325 mg  325 mg Oral Daily With Breakfast    hydrALAZINE (APRESOLINE) injection 5 mg  5 mg Intravenous Q4H PRN    loratadine (CLARITIN) tablet 10 mg  10 mg Oral Daily    nicotine (NICODERM CQ) 14 mg/24hr TD 24 hr patch 1 patch  1 patch Transdermal Daily    ondansetron (ZOFRAN) injection 4 mg  4 mg Intravenous Q6H PRN    pantoprazole (PROTONIX) injection 40 mg  40 mg Intravenous Q12H YADY    polyethylene glycol (MIRALAX) packet 17 g  17 g Oral Daily    senna (SENOKOT) tablet 8 6 mg  8 6 mg Oral HS PRN    sertraline (ZOLOFT) tablet 100 mg  100 mg Oral Daily    thiamine tablet 100 mg  100 mg Oral Daily    tiotropium (SPIRIVA) capsule for inhaler 18 mcg  18 mcg Inhalation Daily    and PTA meds:    Medications Prior to Admission   Medication    acetaminophen (TYLENOL) 325 mg tablet    aclidinium (Tudorza Pressair) 400 MCG/ACT inhaler    ALBUTEROL IN    ALPRAZolam (XANAX) 0 25 mg tablet    amLODIPine-benazepril (LOTREL 5-10) 5-10 MG per capsule    aspirin (ECOTRIN LOW STRENGTH) 81 mg EC tablet    atorvastatin (LIPITOR) 80 mg tablet    baclofen 10 mg tablet    carvedilol (COREG) 12 5 mg tablet    clopidogrel (PLAVIX) 75 mg tablet    fenofibrate 160 MG tablet    ferrous sulfate 324 (65 Fe) mg    loratadine (CLARITIN) 10 mg tablet    metoprolol tartrate (LOPRESSOR) 25 mg tablet    nicotine (NICODERM CQ) 14 mg/24hr TD 24 hr patch    pantoprazole (PROTONIX) 40 mg tablet    senna (SENOKOT) 8 6 mg    sertraline (ZOLOFT) 100 mg tablet    thiamine 100 MG tablet    Varenicline Tartrate (CHANTIX PO)     Allergies   Allergen Reactions    Penicillins Anaphylaxis       Objective   Vitals: Blood pressure 115/76, pulse 96, temperature 98 4 °F (36 9 °C), temperature source Tympanic, resp  rate 18, height 5' 9" (1 753 m), weight 66 7 kg (147 lb), SpO2 100 %  Orthostatic Blood Pressures      Most Recent Value   Blood Pressure  115/76 filed at 09/17/2021 1226   Patient Position - Orthostatic VS  Lying filed at 09/17/2021 0640            Intake/Output Summary (Last 24 hours) at 9/17/2021 1238  Last data filed at 9/17/2021 1217  Gross per 24 hour   Intake 1100 ml   Output --   Net 1100 ml       Invasive Devices     Peripheral Intravenous Line            Peripheral IV 09/15/21 Right Hand 2 days    Peripheral IV 09/17/21 Dorsal (posterior); Right Hand <1 day                Physical Exam: /76   Pulse 96   Temp 98 4 °F (36 9 °C) (Tympanic)   Resp 18   Ht 5' 9" (1 753 m)   Wt 66 7 kg (147 lb)   SpO2 100%   BMI 21 71 kg/m²      General Appearance:    Alert, cooperative, no distress, appears stated age   Head:    Normocephalic, no scleral icterus   Eyes:    PERRL   Nose:   Nares normal, septum midline, mucosa normal, no drainage    Throat:   Lips, mucosa, and tongue normal   Neck:   Supple, symmetrical, trachea midline     no JVD       Lungs:     Clear to auscultation bilaterally, respirations unlabored   Chest Wall:    No tenderness or deformity    Heart:    Regular rate and rhythm, S1 and S2 normal, no murmur, rub   or gallop   Abdomen: Soft, non-tender   Extremities:   Extremities normal, atraumatic, no cyanosis or edema       Skin:   Skin color, texture, turgor normal, no rashes or lesions   Neurologic:   Alert and oriented to person place and time   No focal deficits       Lab Results:   Recent Results (from the past 72 hour(s))   CBC and differential    Collection Time: 09/15/21 12:54 AM   Result Value Ref Range    WBC 7 18 4 31 - 10 16 Thousand/uL    RBC 3 88 3 88 - 5 62 Million/uL    Hemoglobin 7 7 (L) 12 0 - 17 0 g/dL    Hematocrit 26 6 (L) 36 5 - 49 3 %    MCV 69 (L) 82 - 98 fL    MCH 19 8 (L) 26 8 - 34 3 pg    MCHC 28 9 (L) 31 4 - 37 4 g/dL    RDW 18 4 (H) 11 6 - 15 1 %    MPV 11 2 8 9 - 12 7 fL    Platelets 865 375 - 026 Thousands/uL    Neutrophils Relative 72 43 - 75 %    Immat GRANS % 0 0 - 2 %    Lymphocytes Relative 18 14 - 44 %    Monocytes Relative 8 4 - 12 %    Eosinophils Relative 2 0 - 6 %    Basophils Relative 0 0 - 1 %    Neutrophils Absolute 5 15 1 85 - 7 62 Thousands/µL    Immature Grans Absolute 0 02 0 00 - 0 20 Thousand/uL    Lymphocytes Absolute 1 28 0 60 - 4 47 Thousands/µL    Monocytes Absolute 0 60 0 17 - 1 22 Thousand/µL    Eosinophils Absolute 0 11 0 00 - 0 61 Thousand/µL    Basophils Absolute 0 02 0 00 - 0 10 Thousands/µL   Protime-INR    Collection Time: 09/15/21 12:54 AM   Result Value Ref Range    Protime 12 2 (H) 9 5 - 12 1 seconds    INR 1 08 0 90 - 1 10   APTT    Collection Time: 09/15/21 12:54 AM   Result Value Ref Range    PTT 25 23 - 31 seconds   Comprehensive metabolic panel    Collection Time: 09/15/21 12:54 AM   Result Value Ref Range    Sodium 133 133 - 145 mmol/L    Potassium 3 1 (L) 3 5 - 5 0 mmol/L    Chloride 100 96 - 108 mmol/L    CO2 21 (L) 22 - 33 mmol/L    ANION GAP 12 4 - 13 mmol/L    BUN 21 (H) 6 - 20 mg/dL    Creatinine 1 81 (H) 0 50 - 1 20 mg/dL    Glucose 91 65 - 140 mg/dL    Calcium 8 0 (L) 8 4 - 10 2 mg/dL    AST 12 (L) 15 - 41 U/L    ALT 6 5 - 63 U/L    Alkaline Phosphatase 24 9 10 - 129 U/L    Total Protein 6 0 (L) 6 4 - 8 3 g/dL    Albumin 3 5 3 4 - 4 8 g/dL    Total Bilirubin 0 29 (L) 0 30 - 1 20 mg/dL    eGFR 38 ml/min/1 73sq m   Magnesium    Collection Time: 09/15/21 12:54 AM   Result Value Ref Range    Magnesium 0 9 (LL) 1 6 - 2 6 mg/dL   Lipase    Collection Time: 09/15/21 12:54 AM   Result Value Ref Range    Lipase 81 (H) 13 - 60 u/L   D-Dimer    Collection Time: 09/15/21 12:54 AM   Result Value Ref Range    D-Dimer, Quant  3 48 (H) 0 19 - 0 49 mg/L FEU   Troponin I repeat in 3hrs    Collection Time: 09/15/21 12:54 AM   Result Value Ref Range    Troponin I <0 03 0 00 - 0 07 ng/mL   B-Type Natriuretic Peptide(BNP) CA, GH, [facility] Only    Collection Time: 09/15/21 12:54 AM   Result Value Ref Range     7 (H) 1 - 100 pg/mL   Ethanol    Collection Time: 09/15/21 12:54 AM   Result Value Ref Range    Ethanol Lvl 101 7 (H) <10 mg/dL   Phosphorus    Collection Time: 09/15/21 12:54 AM   Result Value Ref Range    Phosphorus 3 1 2 5 - 5 0 mg/dL   Novel Coronavirus (Covid-19),PCR [facility] - 2 Hour Stat    Collection Time: 09/15/21  1:29 AM    Specimen: Nasopharyngeal Swab   Result Value Ref Range    SARS-CoV-2 Negative Negative   Occult Bloood,Fecal Immunochemical    Collection Time: 09/15/21  2:16 AM   Result Value Ref Range    OCCULT BLD, FECAL IMMUNOLOGICAL Positive (A) Negative   Type and screen    Collection Time: 09/15/21  2:41 AM   Result Value Ref Range    ABO Grouping A     Rh Factor Positive     Antibody Screen Negative     Specimen Expiration Date [id number]    Magnesium    Collection Time: 09/15/21  9:00 AM   Result Value Ref Range    Magnesium 1 6 1 6 - 2 6 mg/dL   Potassium    Collection Time: 09/15/21  9:00 AM   Result Value Ref Range    Potassium 4 6 3 5 - 5 0 mmol/L   Iron Saturation %    Collection Time: 09/15/21  9:00 AM   Result Value Ref Range    Iron Saturation 25 20 - 50 %    TIBC 493 (H) 250 - 450 ug/dL    Iron 124 65 - 175 ug/dL   Ferritin    Collection Time: 09/15/21 9:00 AM   Result Value Ref Range    Ferritin 12 8 - 388 ng/mL   Hemoglobin    Collection Time: 09/15/21  9:42 PM   Result Value Ref Range    Hemoglobin 10 4 (L) 12 0 - 17 0 g/dL   Basic metabolic panel    Collection Time: 09/16/21  5:55 AM   Result Value Ref Range    Sodium 136 136 - 145 mmol/L    Potassium 3 7 3 5 - 5 3 mmol/L    Chloride 110 (H) 100 - 108 mmol/L    CO2 23 21 - 32 mmol/L    ANION GAP 3 (L) 4 - 13 mmol/L    BUN 12 5 - 25 mg/dL    Creatinine 0 90 0 60 - 1 30 mg/dL    Glucose 86 65 - 140 mg/dL    Calcium 8 0 (L) 8 3 - 10 1 mg/dL    eGFR 89 ml/min/1 73sq m   CBC and differential    Collection Time: 09/16/21  5:55 AM   Result Value Ref Range    WBC 6 12 4 31 - 10 16 Thousand/uL    RBC 4 72 3 88 - 5 62 Million/uL    Hemoglobin 10 5 (L) 12 0 - 17 0 g/dL    Hematocrit 34 8 (L) 36 5 - 49 3 %    MCV 74 (L) 82 - 98 fL    MCH 22 2 (L) 26 8 - 34 3 pg    MCHC 30 2 (L) 31 4 - 37 4 g/dL    RDW 21 5 (H) 11 6 - 15 1 %    MPV 10 8 8 9 - 12 7 fL    Platelets 737 424 - 831 Thousands/uL    nRBC 0 /100 WBCs    Neutrophils Relative 73 43 - 75 %    Immat GRANS % 0 0 - 2 %    Lymphocytes Relative 16 14 - 44 %    Monocytes Relative 8 4 - 12 %    Eosinophils Relative 2 0 - 6 %    Basophils Relative 1 0 - 1 %    Neutrophils Absolute 4 53 1 85 - 7 62 Thousands/µL    Immature Grans Absolute 0 02 0 00 - 0 20 Thousand/uL    Lymphocytes Absolute 0 98 0 60 - 4 47 Thousands/µL    Monocytes Absolute 0 46 0 17 - 1 22 Thousand/µL    Eosinophils Absolute 0 10 0 00 - 0 61 Thousand/µL    Basophils Absolute 0 03 0 00 - 0 10 Thousands/µL   Magnesium    Collection Time: 09/16/21  5:55 AM   Result Value Ref Range    Magnesium 2 2 1 6 - 2 6 mg/dL   Prepare Leukoreduced RBC: 2 Units    Collection Time: 09/16/21  6:30 AM   Result Value Ref Range    Unit Product Code J7054C12     Unit Number [id number]     Unit ABO O     Unit RH POS     Crossmatch Compatible     Unit Dispense Status Presumed Trans     Unit Product Volume 350 ml    Unit Product Code P9133K79     Unit Number [id number]     Unit ABO A     Unit [facility] POS     Crossmatch Compatible     Unit Dispense Status Presumed Trans     Unit Product Volume 350 ml   Hemoglobin    Collection Time: 09/16/21 10:21 AM   Result Value Ref Range    Hemoglobin 11 2 (L) 12 0 - 17 0 g/dL   Hemoglobin    Collection Time: 09/16/21  8:55 PM   Result Value Ref Range    Hemoglobin 11 7 (L) 12 0 - 17 0 g/dL   Hemoglobin    Collection Time: 09/17/21  9:08 AM   Result Value Ref Range    Hemoglobin 12 5 12 0 - 17 0 g/dL   CBC and Platelet    Collection Time: 09/17/21 12:22 PM   Result Value Ref Range    WBC 8 00 4 31 - 10 16 Thousand/uL    RBC 5 57 3 88 - 5 62 Million/uL    Hemoglobin 12 2 12 0 - 17 0 g/dL    Hematocrit 40 9 36 5 - 49 3 %    MCV 73 (L) 82 - 98 fL    MCH 21 9 (L) 26 8 - 34 3 pg    MCHC 29 8 (L) 31 4 - 37 4 g/dL    RDW 22 5 (H) 11 6 - 15 1 %    Platelets 012 047 - 999 Thousands/uL    MPV 9 9 8 9 - 12 7 fL     Imaging: I have personally reviewed pertinent reports  EKG:  Presenting EKG 9/15 sinus rhythm  EKG 9/17 atrial fibrillation      Code Status: Level 1 - Full Code  Advance Directive and Living Will:      Power of :    POLST:      Counseling / Coordination of Care  Total floor / unit time spent today 45minutes  Greater than 50% of total time was spent with the patient and / or family counseling and / or coordination of care

## 2021-09-17 NOTE — ANESTHESIA POSTPROCEDURE EVALUATION
Post-Op Assessment Note    Cardiovascular status: A-fib +/- RVR  Pain management: adequate     Mental Status:  Alert and awake   Hydration Status:  Euvolemic   PONV Controlled:  Controlled   Airway Patency:  Patent      Post Op Vitals Reviewed: Yes      Staff: Anesthesiologist         No complications documented  BP      Temp      Pulse     Resp      SpO2      Towards end of the endoscopy, CRNA noted irregular R-R interval with no p-waves  Patient had couple episodes of hypotension with MAP <60 s/p treatment with phenylephrine  His VR was between   30mg of esmolol was given during periods of RVR  No chest pain or AMS  EKG revealed irregular R-R interval with no pwaves c/w Atrial fibrillation  Chem/CBC drawn  Discussed case with cardiology (Dr Rubén Perez) who recommended no treatment and will be evaluated once he gets up to floor   Given his stable hemodynamic and clinical status, he was transferred to floor with no issues

## 2021-09-17 NOTE — ASSESSMENT & PLAN NOTE
Presyncopal symptoms melena low hemoglobin  Received packed cells at [facility]  Abdominal CT scan noted  EGD without any acute abnormalities  Plan for colonoscopy today

## 2021-09-17 NOTE — PROGRESS NOTES
[address] [facility]  Progress Note Eric Whalen 1955, 73 y o  male MRN: [id number]  Unit/Bed#: [facility] 817-01 Encounter: [id number]  Primary Care Provider: Ariel Echevarria DO   Date and time admitted to hospital: 9/15/2021  3:21 PM    Syncope  Assessment & Plan  2/2  hypovolemia GI bleed versus vasovagal  Has since resolved      Ambulatory dysfunction  Assessment & Plan  Multifactorial  Safe ambulation  Fall precautions  Physical therapy    Acute blood loss anemia  Assessment & Plan  Due to GI bleed    Received 2 units of PRBCs for hemoglobin of 7 7  Hemoglobin has since stabilized  EGD performed without any acute abnormalities  Plan for colonoscopy per GI    Stage 3a chronic kidney disease [facility])  Assessment & Plan  Lab Results   Component Value Date    EGFR 89 09/16/2021    EGFR 38 09/15/2021    EGFR 52 09/06/2021    CREATININE 0 90 09/16/2021    CREATININE 1 81 (H) 09/15/2021    CREATININE 1 40 (H) 09/06/2021     Monitor kidney function closely  Avoid nephrotoxins    Benign essential hypertension  Assessment & Plan  Monitor blood pressures  Avoid hypotension    Iron deficiency anemia  Assessment & Plan  Check iron panel  Iron supplementation    COPD (chronic obstructive pulmonary disease) (HCC)  Assessment & Plan  Continue respiratory treatment    HLD (hyperlipidemia)  Assessment & Plan  Continue statin    Alcohol abuse  Assessment & Plan  History of alcohol abuse  Monitor for withdrawals  Thiamine  CIWA protocol    * Upper GI bleed  Assessment & Plan  Presyncopal symptoms melena low hemoglobin  Received packed cells at [facility]  Abdominal CT scan noted  EGD without any acute abnormalities  Plan for colonoscopy today        VTE Pharmacologic Prophylaxis:   Pharmacologic: Pharmacologic VTE Prophylaxis contraindicated due to GI bleed  Mechanical VTE Prophylaxis in Place: Yes    Patient Centered Rounds: I have performed bedside rounds with nursing staff today     Discussions with Specialists or Other Care Team Provider:  Cm, nursing    Education and Discussions with Family / Patient: pt    Time Spent for Care: 30 minutes  More than 50% of total time spent on counseling and coordination of care as described above  Current Length of Stay: 2 day(s)    Current Patient Status: Inpatient   Certification Statement: The patient will continue to require additional inpatient hospital stay due to Pending further GI evaluation    Discharge Plan:  See above    Code Status: Level 1 - Full Code      Subjective:   No acute complaints    Objective:     Vitals:   Temp (24hrs), Av 2 °F (36 8 °C), Min:97 7 °F (36 5 °C), Max:98 9 °F (37 2 °C)    Temp:  [97 7 °F (36 5 °C)-98 9 °F (37 2 °C)] 97 9 °F (36 6 °C)  HR:  [60-96] 81  Resp:  [16-22] 16  BP: (104-209)/(57-94) 148/83  SpO2:  [92 %-98 %] 92 %  Body mass index is 21 71 kg/m²  Input and Output Summary (last 24 hours): Intake/Output Summary (Last 24 hours) at 2021 0907  Last data filed at 2021 1528  Gross per 24 hour   Intake 950 ml   Output --   Net 950 ml       Physical Exam:     Physical Exam  Constitutional:       General: He is not in acute distress  Appearance: He is well-developed  He is not diaphoretic  HENT:      Head: Normocephalic and atraumatic  Nose: Nose normal       Mouth/Throat:      Pharynx: No oropharyngeal exudate  Eyes:      General: No scleral icterus  Conjunctiva/sclera: Conjunctivae normal    Neck:      Vascular: No JVD  Cardiovascular:      Rate and Rhythm: Normal rate and regular rhythm  Heart sounds: Normal heart sounds  No murmur heard  No friction rub  No gallop  Pulmonary:      Effort: Pulmonary effort is normal  No respiratory distress  Breath sounds: Normal breath sounds  No wheezing or rales  Chest:      Chest wall: No tenderness  Abdominal:      General: Bowel sounds are normal  There is no distension  Palpations: Abdomen is soft  Tenderness: There is no abdominal tenderness  There is no guarding  Musculoskeletal:         General: No tenderness or deformity  Normal range of motion  Cervical back: Normal range of motion  Lymphadenopathy:      Cervical: No cervical adenopathy  Skin:     General: Skin is warm and dry  Findings: No erythema  Neurological:      Mental Status: He is alert  Mental status is at baseline  Cranial Nerves: No cranial nerve deficit  Coordination: Coordination normal       Deep Tendon Reflexes: Reflexes normal            Additional Data:     Labs:    Results from last 7 days   Lab Units 09/16/21 2055 09/16/21  0555   WBC Thousand/uL  --  6 12   HEMOGLOBIN g/dL 11 7* 10 5*   HEMATOCRIT %  --  34 8*   PLATELETS Thousands/uL  --  221   NEUTROS PCT %  --  73   LYMPHS PCT %  --  16   MONOS PCT %  --  8   EOS PCT %  --  2     Results from last 7 days   Lab Units 09/16/21  0555 09/15/21  0054   SODIUM mmol/L 136 133   POTASSIUM mmol/L 3 7 3 1*   CHLORIDE mmol/L 110* 100   CO2 mmol/L 23 21*   BUN mg/dL 12 21*   CREATININE mg/dL 0 90 1 81*   ANION GAP mmol/L 3* 12   CALCIUM mg/dL 8 0* 8 0*   ALBUMIN g/dL  --  3 5   TOTAL BILIRUBIN mg/dL  --  0 29*   ALK PHOS U/L  --  24 9   ALT U/L  --  6   AST U/L  --  12*   GLUCOSE RANDOM mg/dL 86 91     Results from last 7 days   Lab Units 09/15/21  0054   INR  1 08                       * I Have Reviewed All Lab Data Listed Above  * Additional Pertinent Lab Tests Reviewed:  All Labs Within Last 24 Hours Reviewed    Imaging:    Imaging Reports Reviewed Today Include: na  Imaging Personally Reviewed by Myself Includes:  na    Recent Cultures (last 7 days):           Last 24 Hours Medication List:   Current Facility-Administered Medications   Medication Dose Route Frequency Provider Last Rate    acetaminophen  650 mg Oral Q6H PRN Cindi Neal MD      albuterol  2 puff Inhalation Q4H PRN SVEN Ochoa      ALPRAZolam  0 25 mg Oral BID PRN Ibeth Curtis MD      aluminum-magnesium hydroxide-simethicone  30 mL Oral Q6H PRN Ibeth Curtis MD      atorvastatin  80 mg Oral Daily With Rupert Guillermo MD      carvedilol  12 5 mg Oral BID With Meals Ibeth Curtis MD      docusate sodium  100 mg Oral BID Ibeth Curtis MD      fenofibrate  48 mg Oral Daily Ibeth Curtis MD      ferrous sulfate  325 mg Oral Daily With Breakfast Ibeth Curtis MD      hydrALAZINE  5 mg Intravenous Q4H PRN Kevin Seo PA-C      loratadine  10 mg Oral Daily Ibeth Curtis MD      nicotine  1 patch Transdermal Daily Ibeth Curtis MD      ondansetron  4 mg Intravenous Q6H PRN Ibeth Curtis MD      pantoprazole  40 mg Intravenous Q12H Garthnickolas Peña MD      polyethylene glycol  17 g Oral Daily Ibeth Curtis MD      senna  8 6 mg Oral HS PRN Ibeth Curtis MD      sertraline  100 mg Oral Daily Ibeth Curtis MD      thiamine  100 mg Oral Daily Ibeth Curtis MD      tiotropium  18 mcg Inhalation Daily Ibeth Curtis MD          Today, Patient Was Seen By: Tk Rush MD    ** Please Note: Dictation voice to text software may have been used in the creation of this document   **

## 2021-09-17 NOTE — PROGRESS NOTES
Progress Note- Rosanne Lomeli 73 y o  male MRN: [id number]    Unit/Bed#: [facility] 958-99 Encounter: [id number]      Assessment and Plan:  Patient is a 28-year-old male with past medical history of COPD, CKD stage 3, status post right CEA, recent ischemic stroke on aspirin and Plavix who presented with syncopal episode, generalized fatigue, found to be anemic requiring blood transfusions  1  Symptomatic iron deficiency anemia; requiring blood transfusions  Hb remained stable currently around 11-12  Presented with Hb of 7 7, s/p 2 PRBC transfusions in total, iron panel suggestive of IKE  S/P EGD 9/16 not revealing any source of GI blood loss  Plan for colonoscopy today  Finished prep well  Clear BMs in AM  Further recs following the procedure  2  Recent ischemic stroke  Patient is at a high risk of holding plavix and aspirin  Explained to the patient why we are holding it for now and he expresses understanding  Recs to follow after colonoscopy   ______________________________________________________________________    Subjective:     Patient seen and examined at bedside  Feeling fine  Offers no new complaints  [place] to go home  Denies any abdo pain, nausea, vomiting, BRBPR, melena   Finished prep well and having clear BMs in AM     Medication Administration - last 24 hours from 09/16/2021 0926 to 09/17/2021 0926       Date/Time Order Dose Route Action Action by     09/16/2021 1728 atorvastatin (LIPITOR) tablet 80 mg 80 mg Oral Given Rhoda ÁNGEL Arteaga     09/17/2021 0920 carvedilol (COREG) tablet 12 5 mg 12 5 mg Oral Given Xuan Ellsworth RN     09/16/2021 1728 carvedilol (COREG) tablet 12 5 mg 12 5 mg Oral Given Rhoda Arteaga RN     09/16/2021 0932 carvedilol (COREG) tablet 12 5 mg 12 5 mg Oral Given Rhoda Arteaga RN     09/17/2021 0919 ferrous sulfate tablet 325 mg 325 mg Oral Given Xuan Ellsworth RN     09/16/2021 0931 ferrous sulfate tablet 325 mg 325 mg Oral Not Given Autumn ÁNGEL Arteaga     09/17/2021 0940 loratadine (CLARITIN) tablet 10 mg 10 mg Oral Given Cecille Catchings, RN     09/16/2021 0932 loratadine (CLARITIN) tablet 10 mg 10 mg Oral Given Autumn Reif, RN     09/17/2021 0919 thiamine tablet 100 mg 100 mg Oral Given Cecille Catchings, RN     09/16/2021 0932 thiamine tablet 100 mg 100 mg Oral Not Given Autumn Reif, RN     09/17/2021 0920 sertraline (ZOLOFT) tablet 100 mg 100 mg Oral Given Cecille Catchings, RN     09/16/2021 0932 sertraline (ZOLOFT) tablet 100 mg 100 mg Oral Given Autumn Reif, RN     09/17/2021 0201 ALPRAZolam Roger Julissa) tablet 0 25 mg 0 25 mg Oral Given Kylah Or, RN     09/16/2021 1528 sodium chloride 0 9 % infusion 0 mL/hr Intravenous Stopped Autumn Reif, RN     09/17/2021 0912 docusate sodium (COLACE) capsule 100 mg 100 mg Oral Not Given Cecille Catchings, RN     09/16/2021 1744 docusate sodium (COLACE) capsule 100 mg 100 mg Oral Not Given Autumn Reif, RN     09/16/2021 0932 docusate sodium (COLACE) capsule 100 mg 100 mg Oral Not Given Autumn Reif, RN     09/17/2021 0912 polyethylene glycol (MIRALAX) packet 17 g 17 g Oral Not Given Cecille Catchings, RN     09/16/2021 0931 polyethylene glycol (MIRALAX) packet 17 g 17 g Oral Not Given Autumn Reif, RN     09/16/2021 1728 nicotine (NICODERM CQ) 14 mg/24hr TD 24 hr patch 1 patch 1 patch Transdermal Medication Applied Autumn Reif, RN     09/16/2021 1716 nicotine (NICODERM CQ) 14 mg/24hr TD 24 hr patch 1 patch 1 patch Transdermal Patch Removed Autumn Reif, RN     09/17/2021 0920 pantoprazole (PROTONIX) injection 40 mg 40 mg Intravenous Given Cecille Catchings, RN     09/16/2021 2118 pantoprazole (PROTONIX) injection 40 mg 40 mg Intravenous Given Joriya Boyce, RN     09/16/2021 0933 pantoprazole (PROTONIX) injection 40 mg 40 mg Intravenous Given Autumn Reif, RN     09/16/2021 1624 magnesium sulfate 2 g/50 mL IVPB (premix) 2 g 0 g Intravenous Stopped Rhoda Arteaga RN     09/17/2021 0920 tiotropium (SPIRIVA) capsule for inhaler 18 mcg 18 mcg Inhalation Given Rafaela MAHMOOD Estelle Lim, ÁNGEL     09/16/2021 0931 tiotropium (SPIRIVA) capsule for inhaler 18 mcg 18 mcg Inhalation Given [facility]     09/17/2021 0920 fenofibrate (TRICOR) tablet 48 mg 48 mg Oral Given Dylan Campbell RN     09/16/2021 0932 fenofibrate (TRICOR) tablet 48 mg 48 mg Oral Given Rhoda JenniÁNGEL     09/16/2021 1408 albuterol (PROVENTIL HFA,VENTOLIN HFA) inhaler 2 puff 2 puff Inhalation Given Rhoda [facility], ÁNGEL     09/16/2021 0930 albuterol (PROVENTIL HFA,VENTOLIN HFA) inhaler 2 puff 2 puff Inhalation [facility]     09/16/2021 1408 lactated ringers infusion 0 mL/kg/hr Intravenous Stopped Autumn [facility]     09/16/2021 1326 lactated ringers infusion   Intravenous Anesthesia Volume Adjustment Sujatha Trinidad CRNA     09/16/2021 1305 lactated ringers infusion   Intravenous New Bag Sujatha Trinidad CRNA     09/16/2021 1558 LORazepam (ATIVAN) injection 2 mg 2 mg Intravenous Given Prashant Freeze     09/16/2021 1802 polyethylene glycol (GOLYTELY) bowel prep 4,000 mL 4,000 mL Oral Given [facility]     09/17/2021 0010 hydrALAZINE (APRESOLINE) injection 5 mg 5 mg Intravenous Given Fátima Otto RN          Objective:     Vitals: Blood pressure 148/83, pulse 77, temperature 97 9 °F (36 6 °C), resp  rate 16, height 5' 9" (1 753 m), weight 66 7 kg (147 lb), SpO2 92 %  ,Body mass index is 21 71 kg/m²        Intake/Output Summary (Last 24 hours) at 9/17/2021 0926  Last data filed at 9/16/2021 1528  Gross per 24 hour   Intake 950 ml   Output --   Net 950 ml       Physical Exam:   General Appearance: Awake and alert, in no acute distress  Abdomen: Soft, non-tender, non-distended; bowel sounds normal; no masses or no organomegaly    Invasive Devices     Peripheral Intravenous Line            Peripheral IV 09/15/21 Right Hand 2 days                Lab Results:  Admission on 09/15/2021   Component Date Value    Hemoglobin 09/15/2021 10 4*    Iron Saturation 09/15/2021 25     TIBC 09/15/2021 493*    Iron 09/15/2021 124  Ferritin 09/15/2021 12     Sodium 09/16/2021 136     Potassium 09/16/2021 3 7     Chloride 09/16/2021 110*    CO2 09/16/2021 23     ANION GAP 09/16/2021 3*    BUN 09/16/2021 12     Creatinine 09/16/2021 0 90     Glucose 09/16/2021 86     Calcium 09/16/2021 8 0*    eGFR 09/16/2021 89     WBC 09/16/2021 6 12     RBC 09/16/2021 4 72     Hemoglobin 09/16/2021 10 5*    Hematocrit 09/16/2021 34 8*    MCV 09/16/2021 74*    MCH 09/16/2021 22 2*    MCHC 09/16/2021 30 2*    RDW 09/16/2021 21 5*    MPV 09/16/2021 10 8     Platelets 15/26/9930 221     nRBC 09/16/2021 0     Neutrophils Relative 09/16/2021 73     Immat GRANS % 09/16/2021 0     Lymphocytes Relative 09/16/2021 16     Monocytes Relative 09/16/2021 8     Eosinophils Relative 09/16/2021 2     Basophils Relative 09/16/2021 1     Neutrophils Absolute 09/16/2021 4 53     Immature Grans Absolute 09/16/2021 0 02     Lymphocytes Absolute 09/16/2021 0 98     Monocytes Absolute 09/16/2021 0 46     Eosinophils Absolute 09/16/2021 0 10     Basophils Absolute 09/16/2021 0 03     Magnesium 09/16/2021 2 2     Hemoglobin 09/16/2021 11 2*    Hemoglobin 09/16/2021 11 7*    Hemoglobin 09/17/2021 12 5        Imaging Studies: I have personally reviewed pertinent imaging studies

## 2021-09-17 NOTE — UTILIZATION REVIEW
ADDITIONAL CLINICAL REQUESTED INCLUDING VITALS AND PROGRESS NOTES    Continued Stay Review    Date: 9/17                    Current Patient Class: Inpatient  Current Level of Care: MEd/surg    HPI:66 y o  male initially admitted on 9/15    Assessment/Plan:   GI progress note 9/17: Symptomatic iron deficiency anemia; requiring blood transfusions  Hb remained stable currently around 11-12  Presented with Hb of 7 7, s/p 2 PRBC transfusions in total, iron panel suggestive of IKE  S/P EGD 9/16 not revealing any source of GI blood loss  Plan for colonoscopy today  Finished prep well  Clear BMs in AM      9/16 Progress note:   Upper GI bleed  Assessment & Plan  Presyncopal symptoms melena low hemoglobin  Received packed cells at [facility]  Abdominal CT scan noted  IV pantoprazole  Clear liquid diet for now  EGD today     Syncope  Assessment & Plan  Possibly due to hypovolemia GI bleed versus vasovagal  Monitor orthostatics        Ambulatory dysfunction  Assessment & Plan  Multifactorial  Safe ambulation  Fall precautions  Physical therapy     Acute blood loss anemia  Assessment & Plan  Due to GI bleed    Received 2 units of PRBCs for hemoglobin of 7 7  Today's hemoglobin 10 5     Stage 3a chronic kidney disease [facility])  Assessment & Plan        Lab Results   Component Value Date     EGFR 89 09/16/2021     EGFR 38 09/15/2021     EGFR 52 09/06/2021     CREATININE 0 90 09/16/2021     CREATININE 1 81 (H) 09/15/2021     CREATININE 1 40 (H) 09/06/2021      Monitor kidney function closely  Avoid nephrotoxins     Benign essential hypertension  Assessment & Plan  Monitor blood pressures  Avoid hypotension     CVA (cerebral vascular accident) [facility])  Assessment & Plan  Recent stroke  Patient is scheduled for outpatient CT scan head  Recent Neurology input noted, plan is for him to follow-up with Neurology after CT scan for initiation of Eliquis  Continue atorvastatin     Stenosis of right internal carotid artery  Assessment & Plan  Right internal carotid artery stenosis  Continue atorvastatin  Plavix aspirin on hold presently in view of GI bleed  Outpatient vascular follow-up        COPD (chronic obstructive pulmonary disease) ([address] )  Assessment & Plan  Continue respiratory treatment     Alcohol abuse  Assessment & Plan  History of alcohol abuse  Monitor for withdrawals  Thiamine  WA protocol     Tobacco use  Assessment & Plan  Tobacco cessation discussed  Pre contemplative    Vital Signs:   /Time  Temp Pulse Resp  BP  MAP (mmHg)  SpO2  O2 Flow Rate (L/min)  O2 Device Patient Position - Orthostatic VS   09/17/21 1113  98 4 °F (36 9 °C) 89 18  122/77  --  96 %  --  None (Room air) --   09/17/21 0925  -- 77 --  148/83  --  --  --  -- --   09/17/21 07:44:05  97 9 °F (36 6 °C) 81 16  148/83  105  92 %  --  -- --   09/17/21 06:40:46  98 1 °F (36 7 °C) 96 16  155/69  98  93 %  --  -- Lying   09/17/21 02:49:20  98 3 °F (36 8 °C) 75 17  161/68  99  93 %  --  -- --   09/17/21 0249  98 3 °F (36 8 °C) -- --  --  --  --  --  -- --   09/16/21 2300  -- 68 --  197/93Abnormal   --  --  --  -- --   09/16/21 2254  -- -- --  194/88Abnormal   --  --  --  -- --   09/16/21 22:52:02  98 1 °F (36 7 °C) 68 18  197/93Abnormal   128  95 %  --  -- --   09/16/21 19:13:20  97 7 °F (36 5 °C) 76 18  163/74  104  97 %  --  -- --   09/16/21 17:06:45  -- 72 16  189/80Abnormal   116  96 %  --  -- --   09/16/21 15:27:14  98 °F (36 7 °C) 64 --  198/84Abnormal   122  98 %  --  -- --   09/16/21 15:25:58  98 °F (36 7 °C) 70 22  209/94Abnormal   132  97 %  --  -- --   09/16/21 1346  -- 61 18  105/58  --  96 %  --  None (Room air) --   09/16/21 1330  98 9 °F (37 2 °C) 70 16  104/59  --  97 %  4 L/min  Simple mask --   09/16/21 1241  98 8 °F (37 1 °C) 60 18  176/77Abnormal   --  98 %  --  None (Room air) --   09/16/21 10:54:48  98 1 °F (36 7 °C) 63 16  132/57  82  95 %  --  -- --   09/16/21 07:33:25  97 9 °F (36 6 °C) 71 16  165/78  107  97 %  --  -- -- 09/16/21 06:07:22  -- 93 --  170/78  109  96 %  --  -- --   09/16/21 0300  -- 70 --  156/74  --  --  --  -- --   09/16/21 02:58:19  98 5 °F (36 9 °C) 72 18  156/74  101  94 %  --  -- --   09/15/21 22:33:44  98 2 °F (36 8 °C) 62 18  136/58  84  98 %  --  -- --   09/15/21 2100  -- -- --  --  --  97 %  --  None (Room air) --   09/15/21 18:18:07  -- 75 --  176/85Abnormal   115  95 %  --  -- Standing - Orthostatic VS   09/15/21 18:16:02  -- 68 --  176/86Abnormal   116  97 %  --  -- Sitting - Orthostatic VS   09/15/21 18:13:37  -- 66 --  177/87Abnormal   117  95 %  --  -- Lying - Orthostatic VS   09/15/21 1709  -- -- --  --  --  --  --  None (Room air) --   09/15/21 16:22:03  98 1 °F (36 7 °C) 65 20  180/86Abnormal   117  96 %  --  -- Lying         Pertinent Labs/Diagnostic Results:   Results from last 7 days   Lab Units 09/15/21  0129   SARS-COV-2  Negative     Results from last 7 days   Lab Units 09/17/21  0908 09/16/21 2055 09/16/21  1021 09/16/21  0555 09/15/21  2142 09/15/21  0054   WBC Thousand/uL  --   --   --  6 12  --  7 18   HEMOGLOBIN g/dL 12 5 11 7* 11 2* 10 5* 10 4* 7 7*   HEMATOCRIT %  --   --   --  34 8*  --  26 6*   PLATELETS Thousands/uL  --   --   --  221  --  253   NEUTROS ABS Thousands/µL  --   --   --  4 53  --  5 15         Results from last 7 days   Lab Units 09/16/21  0555 09/15/21  0900 09/15/21  0054   SODIUM mmol/L 136  --  133   POTASSIUM mmol/L 3 7 4 6 3 1*   CHLORIDE mmol/L 110*  --  100   CO2 mmol/L 23  --  21*   ANION GAP mmol/L 3*  --  12   BUN mg/dL 12  --  21*   CREATININE mg/dL 0 90  --  1 81*   EGFR ml/min/1 73sq m 89  --  38   CALCIUM mg/dL 8 0*  --  8 0*   MAGNESIUM mg/dL 2 2 1 6 0 9*   PHOSPHORUS mg/dL  --   --  3 1     Results from last 7 days   Lab Units 09/15/21  0054   AST U/L 12*   ALT U/L 6   ALK PHOS U/L 24 9   TOTAL PROTEIN g/dL 6 0*   ALBUMIN g/dL 3 5   TOTAL BILIRUBIN mg/dL 0 29*         Results from last 7 days   Lab Units 09/16/21  0555 09/15/21  0054   GLUCOSE RANDOM mg/dL 86 91         Results from last 7 days   Lab Units 09/15/21  0054   TROPONIN I ng/mL <0 03     Results from last 7 days   Lab Units 09/15/21  0054   D-DIMER QUANTITATIVE mg/L FEU 3 48*     Results from last 7 days   Lab Units 09/15/21  0054   PROTIME seconds 12 2*   INR  1 08   PTT seconds 25         Results from last 7 days   Lab Units 09/15/21  0054   BNP pg/mL 111 7*     Results from last 7 days   Lab Units 09/15/21  0900   FERRITIN ng/mL 12       Results from last 7 days   Lab Units 09/15/21  0054   LIPASE u/L 81*         Results from last 7 days   Lab Units 09/15/21  0054   ETHANOL LVL mg/dL 101 7*       Medications:   Scheduled Medications:  atorvastatin, 80 mg, Oral, Daily With Dinner  carvedilol, 12 5 mg, Oral, BID With Meals  docusate sodium, 100 mg, Oral, BID  fenofibrate, 48 mg, Oral, Daily  ferrous sulfate, 325 mg, Oral, Daily With Breakfast  loratadine, 10 mg, Oral, Daily  nicotine, 1 patch, Transdermal, Daily  pantoprazole, 40 mg, Intravenous, Q12H YADY  polyethylene glycol, 17 g, Oral, Daily  sertraline, 100 mg, Oral, Daily  thiamine, 100 mg, Oral, Daily  tiotropium, 18 mcg, Inhalation, Daily      Continuous IV Infusions:     PRN Meds:  acetaminophen, 650 mg, Oral, Q6H PRN  albuterol, 2 puff, Inhalation, Q4H PRN  ALPRAZolam, 0 25 mg, Oral, BID PRN  aluminum-magnesium hydroxide-simethicone, 30 mL, Oral, Q6H PRN  hydrALAZINE, 5 mg, Intravenous, Q4H PRN  ondansetron, 4 mg, Intravenous, Q6H PRN  senna, 8 6 mg, Oral, HS PRN        Discharge Plan: D  Network Utilization Review Department  ATTENTION: Please call with any questions or concerns to [phone] and carefully listen to the prompts so that you are directed to the right person  All voicemails are confidential   [facility] List all requests for admission clinical reviews, approved or denied determinations and any other requests to dedicated fax number below belonging to the campus where the patient is receiving treatment   List of dedicated fax numbers for the Facilities:  FACILITY NAME UR FAX NUMBER   ADMISSION DENIALS (Administrative/Medical Necessity) [phone]   [address]  (Maternity/NICU/Pediatrics) [address] [facility] Priti Ferrer [id number] [address] [place]lin G[address]mez [address] [facility] [address] [facility] [phone]

## 2021-09-17 NOTE — PLAN OF CARE
Problem: Potential for Falls  Goal: Patient will remain free of falls  Description: INTERVENTIONS:  - Educate patient/family on patient safety including physical limitations  - Instruct patient to call for assistance with activity   - Consult OT/PT to assist with strengthening/mobility   - Keep Call bell within reach  - Keep bed low and locked with side rails adjusted as appropriate  - Keep care items and personal belongings within reach  - Initiate and maintain comfort rounds  - Make Fall Risk Sign visible to staff  -   - Apply yellow socks and bracelet for high fall risk patients  - Consider moving patient to room near nurses station  Outcome: Progressing     Problem: MOBILITY - ADULT  Goal: Maintain or return to baseline ADL function  Description: INTERVENTIONS:  -  Assess patient's ability to carry out ADLs; assess patient's baseline for ADL function and identify physical deficits which impact ability to perform ADLs (bathing, care of mouth/teeth, toileting, grooming, dressing, etc )  - Assess/evaluate cause of self-care deficits   - Assess range of motion  - Assess patient's mobility; develop plan if impaired  - Assess patient's need for assistive devices and provide as appropriate  - Encourage maximum independence but intervene and supervise when necessary  - Involve family in performance of ADLs  - Assess for home care needs following discharge   - Consider OT consult to assist with ADL evaluation and planning for discharge  - Provide patient education as appropriate  Outcome: Progressing  Goal: Maintains/Returns to pre admission functional level  Description: INTERVENTIONS:  - Perform BMAT or MOVE assessment daily    - Set and communicate daily mobility goal to care team and patient/family/caregiver  - Collaborate with rehabilitation services on mobility goals if consulted  - Perform Range of Motion 3 times a day  - Reposition patient every 2 hours    - Dangle patient 3 times a day  - Stand patient 3 times a day  - Ambulate patient 3 times a day  - Out of bed to chair 3 times a day   - Out of bed for meals 3 times a day  - Out of bed for toileting  - Record patient progress and toleration of activity level   Outcome: Progressing     Problem: RESPIRATORY - ADULT  Goal: Achieves optimal ventilation and oxygenation  Description: INTERVENTIONS:  - Assess for changes in respiratory status  - Assess for changes in mentation and behavior  - Position to facilitate oxygenation and minimize respiratory effort  - Oxygen administered by appropriate delivery if ordered  - Initiate smoking cessation education as indicated  - Encourage broncho-pulmonary hygiene including cough, deep breathe, Incentive Spirometry  - Assess the need for suctioning and aspirate as needed  - Assess and instruct to report SOB or any respiratory difficulty  - Respiratory Therapy support as indicated  Outcome: Progressing     Problem: GASTROINTESTINAL - ADULT  Goal: Minimal or absence of nausea and/or vomiting  Description: INTERVENTIONS:  - Administer IV fluids if ordered to ensure adequate hydration  - Maintain NPO status until nausea and vomiting are resolved  - Nasogastric tube if ordered  - Administer ordered antiemetic medications as needed  - Provide nonpharmacologic comfort measures as appropriate  - Advance diet as tolerated, if ordered  - Consider nutrition services referral to assist patient with adequate nutrition and appropriate food choices  Outcome: Progressing     Problem: HEMATOLOGIC - ADULT  Goal: Maintains hematologic stability  Description: INTERVENTIONS  - Assess for signs and symptoms of bleeding or hemorrhage  - Monitor labs  - Administer supportive blood products/factors as ordered and appropriate  Outcome: Progressing

## 2021-09-17 NOTE — ANESTHESIA POSTPROCEDURE EVALUATION
Post-Op Assessment Note    CV Status:  Stable  Pain Score: 0    Pain management: adequate     Mental Status:  Alert and awake   Hydration Status:  Unstable   PONV Controlled:  Controlled   Airway Patency:  Patent      Post Op Vitals Reviewed: Yes      Staff: Anesthesiologist, with CRNAs   Comments: Pt  new onset Afib during procedure  No complications documented  BP   93/67 w/ Edwin bolus   Temp      Pulse  98   Resp   20   SpO2   98   New onset Afib intraop  Hypotensive  Being teated with fluid and phenylephrine   D12 lead EKG

## 2021-09-17 NOTE — ASSESSMENT & PLAN NOTE
Due to GI bleed    Received 2 units of PRBCs for hemoglobin of 7 7  Hemoglobin has since stabilized  EGD performed without any acute abnormalities  Plan for colonoscopy per GI

## 2021-09-18 VITALS
RESPIRATION RATE: 18 BRPM | SYSTOLIC BLOOD PRESSURE: 158 MMHG | HEIGHT: 69 IN | TEMPERATURE: 97.6 F | BODY MASS INDEX: 21.77 KG/M2 | OXYGEN SATURATION: 94 % | WEIGHT: 147 LBS | HEART RATE: 72 BPM | DIASTOLIC BLOOD PRESSURE: 84 MMHG

## 2021-09-18 PROCEDURE — 99239 HOSP IP/OBS DSCHRG MGMT >30: CPT | Performed by: INTERNAL MEDICINE

## 2021-09-18 PROCEDURE — 99232 SBSQ HOSP IP/OBS MODERATE 35: CPT | Performed by: INTERNAL MEDICINE

## 2021-09-18 RX ADMIN — FENOFIBRATE 48 MG: 48 TABLET ORAL at 08:27

## 2021-09-18 RX ADMIN — LORATADINE 10 MG: 10 TABLET ORAL at 08:26

## 2021-09-18 RX ADMIN — CARVEDILOL 12.5 MG: 12.5 TABLET, FILM COATED ORAL at 08:27

## 2021-09-18 RX ADMIN — TIOTROPIUM BROMIDE 18 MCG: 18 CAPSULE ORAL; RESPIRATORY (INHALATION) at 08:27

## 2021-09-18 RX ADMIN — FERROUS SULFATE TAB 325 MG (65 MG ELEMENTAL FE) 325 MG: 325 (65 FE) TAB at 08:26

## 2021-09-18 RX ADMIN — SERTRALINE 100 MG: 100 TABLET, FILM COATED ORAL at 08:27

## 2021-09-18 RX ADMIN — THIAMINE HCL TAB 100 MG 100 MG: 100 TAB at 08:27

## 2021-09-18 NOTE — DISCHARGE SUMMARY
[facility]  Discharge- Annabell Councilman 1955, 73 y o  male MRN: [id number]  Unit/Bed#: [facility] 817-01 Encounter: [id number]  Primary Care Provider: Rosa Blevins DO   Date and time admitted to hospital: 9/15/2021  3:21 PM    Ambulatory dysfunction  Assessment & Plan  Multifactorial  Safe ambulation  Fall precautions      Acute blood loss anemia  Assessment & Plan  Due to GI bleed    Received 2 units of PRBCs for hemoglobin of 7 7  Hemoglobin has since stabilized  EGD performed without any acute abnormalities  Colonoscopy negative    Benign essential hypertension  Assessment & Plan  Monitor blood pressures  Avoid hypotension    Stenosis of right internal carotid artery  Assessment & Plan  Right internal carotid artery stenosis  Continue atorvastatin  Plavix aspirin on hold presently in view of GI bleed  Outpatient vascular follow-up      Iron deficiency anemia  Assessment & Plan  Check iron panel  Iron supplementation    COPD (chronic obstructive pulmonary disease) (HCC)  Assessment & Plan  Continue respiratory treatment    HLD (hyperlipidemia)  Assessment & Plan  Continue statin    Alcohol abuse  Assessment & Plan  History of alcohol abuse  Alcohol counseling provided    Tobacco use  Assessment & Plan  Tobacco cessation discussed  Pre contemplative    * Upper GI bleed  Assessment & Plan  Presyncopal symptoms melena low hemoglobin  Received packed cells at [facility]  Abdominal CT scan noted  EGD without any acute abnormalities  colonoscopy negative      Discharging Physician / Practitioner: Margo Treadwell MD  PCP: Rosa Blevins DO  Admission Date:   Admission Orders (From admission, onward)     Ordered        09/15/21 1529  Inpatient Admission  Once                   Discharge Date: 09/18/21    Medical Problems     Resolved Problems  Date Reviewed: 9/18/2021    None                Consultations During Hospital Stay:  · GI    Procedures Performed: · EGD/colonoscopy    Significant Findings / Test Results:   EGD    Result Date: 9/16/2021  Impression: 3cm hiatal hernia Normal esophagus, stomach and duodenum No source of melena RECOMMENDATION: If agreeable to colonoscopy, can plan to do this tomorrow  ATTENDING ATTESTATION:  I was present throughout the entire procedure from insertion to complete withdrawal of the scope  I performed all interventions myself or oversaw the fellow  Oriana Pang DO     Colonoscopy    Result Date: 9/17/2021  Impression: Poor bowel preparation  Liquid stool and mucus seen throughout the colon  No obvious masses or large lesions  Small lesions less than 10 mm may have been missed  No obvious bleeding noted  Two sessile polyps in the ascending colon and sigmoid colon not removed  Normal terminal ileum  RECOMMENDATION: Repeat colonoscopy in 6 months due to an inadequate bowel preparation  Patient went into atrial fibrillation with mild hypotension after the procedure  Cardiology will be contacted  Okay to continue Plavix  ATTENDING ATTESTATION:  I was present throughout the entire procedure from insertion to complete withdrawal of the scope  I performed all interventions myself or oversaw the fellow  Oriana Pang DO<br>Ghislaine Arana DO     CT abdomen pelvis wo contrast    Result Date: 9/15/2021  Impression: The wall of the stomach appears somewhat thickened  Clinical correlation and follow-up is recommended  Colonic diverticulosis without evidence of acute diverticulitis  No bowel obstruction  There is minimal free fluid in the pelvis  Cholelithiasis  No CT evidence of acute cholecystitis  Borderline hepatosplenomegaly  Atherosclerosis  Other nonemergent findings, as described above  Please see discussion  Workstation performed: [id number]     XR chest 1 view portable    Result Date: 9/15/2021  Impression: No acute cardiopulmonary disease   Workstation performed: [id number]     CT head wo contrast    Result Date: 9/17/2021  · Impression: Decreased hypodensity of subacute infarct in right posterior thalamus, right posterior hippocampus, and right medial occipital lobe (right PCA territory infarct)  No new acute intracranial abnormality  Workstation performed: [id number]     Incidental Findings:   · none     Test Results Pending at Discharge (will require follow up):   · none     Outpatient Tests Requested:  · none    Complications:  none    Reason for Admission: GI bleed    Hospital Course:     Annabell Councilman is a 73 y o  male patient who originally presented to the hospital on 9/15/2021 due to GI bleed which self resolved  EGD/colonoscopy negative  Colonoscopy with poor prep will need to be repeated  Found to be in new onset a fib started on eliquis   Will f/u outpatient with pcp in 1 week    Please see above list of diagnoses and related plan for additional information  Condition at Discharge: stable     Discharge Day Visit / Exam:     Subjective:  No acute complaints  Vitals: Blood Pressure: 164/81 (09/18/21 0758)  Pulse: 72 (09/18/21 0725)  Temperature: 98 1 °F (36 7 °C) (09/18/21 0758)  Temp Source: Oral (09/17/21 1710)  Respirations: 18 (09/18/21 0758)  Height: 5' 9" (175 3 cm) (09/15/21 1532)  Weight - Scale: 66 7 kg (147 lb) (09/15/21 1532)  SpO2: 94 % (09/17/21 2248)  Exam:   Physical Exam  Constitutional:       General: He is not in acute distress  Appearance: He is well-developed  He is not diaphoretic  HENT:      Head: Normocephalic and atraumatic  Nose: Nose normal       Mouth/Throat:      Pharynx: No oropharyngeal exudate  Eyes:      General: No scleral icterus  Conjunctiva/sclera: Conjunctivae normal    Neck:      Vascular: No JVD  Cardiovascular:      Rate and Rhythm: Normal rate and regular rhythm  Heart sounds: Normal heart sounds  No murmur heard  No friction rub  No gallop  Pulmonary:      Effort: Pulmonary effort is normal  No respiratory distress        Breath sounds: Normal breath sounds  No wheezing or rales  Chest:      Chest wall: No tenderness  Abdominal:      General: Bowel sounds are normal  There is no distension  Palpations: Abdomen is soft  Tenderness: There is no abdominal tenderness  There is no guarding  Musculoskeletal:         General: No tenderness or deformity  Normal range of motion  Cervical back: Normal range of motion  Lymphadenopathy:      Cervical: No cervical adenopathy  Skin:     General: Skin is warm and dry  Findings: No erythema  Neurological:      Mental Status: He is alert  Mental status is at baseline  Cranial Nerves: No cranial nerve deficit  Coordination: Coordination normal       Deep Tendon Reflexes: Reflexes normal          Discussion with Family: pt    Discharge instructions/Information to patient and family:   See after visit summary for information provided to patient and family  Provisions for Follow-Up Care:  See after visit summary for information related to follow-up care and any pertinent home health orders  Disposition:     Home    For Discharges to [facility]:   · Not Applicable to this Patient - Not Applicable to this Patient    Planned Readmission: none     Discharge Statement:  I spent 60 minutes discharging the patient  This time was spent on the day of discharge  I had direct contact with the patient on the day of discharge  Greater than 50% of the total time was spent examining patient, answering all patient questions, arranging and discussing plan of care with patient as well as directly providing post-discharge instructions  Additional time then spent on discharge activities  Discharge Medications:  See after visit summary for reconciled discharge medications provided to patient and family        ** Please Note: This note has been constructed using a voice recognition system **

## 2021-09-18 NOTE — PROGRESS NOTES
Cardiology Progress Note - Cosme Marie 73 y o  male MRN: [id number]    Unit/Bed#: [facility] 817-01 Encounter: [id number]        Assessment/Recommendations:    Principal Problem:    Upper GI bleed  Active Problems:    Tobacco use    Alcohol abuse    HLD (hyperlipidemia)    COPD (chronic obstructive pulmonary disease) (HCC)    Iron deficiency anemia    Stenosis of right internal carotid artery    CVA (cerebral vascular accident) ([facility] [address] )    Benign essential hypertension    [facility]    Acute blood loss anemia    Ambulatory dysfunction    Syncope    New onset AF: seen post colonoscopy  Not on tele at present  Heart rate seems to be controlled now  He is asymptomatic from this  Discussed need for anticoagulation therapy as advised by Neurology in the past to prevent recurrent stroke  Risk of bleeding with anticoagulation also discussed  Agreeable to start Eliquis with close primary care and neurology follow-up, if okay with GI and primary service  Continue carvedilol      Recurrent ischemic CVA: was advised anticoagulation by neuro before      Blood loss anemia:  Workup has been negative so far  Hemoglobin 11 9  EGD and colonoscopy negative  Discussed plan with patient  Subjective:     Overnight events reviewed  He is asking to go home  He denies any bleeding  Denies palpitations  Denies any chest pain  No active bleeding reported  Review of Systems   Constitutional: Negative for fever  Respiratory: Negative for chest tightness, shortness of breath and wheezing  Cardiovascular: Negative for chest pain, palpitations and leg swelling  Skin: Negative for rash  Neurological: Negative for syncope  Hematological: Does not bruise/bleed easily  Cardiac testing:   ECHO  LEFT VENTRICLE:  Systolic function was normal by visual assessment  Ejection fraction was estimated to be 55 %  There were no regional wall motion abnormalities    Doppler parameters were consistent with abnormal left ventricular relaxation (grade 1 diastolic dysfunction)      RIGHT VENTRICLE:  The ventricle was mildly dilated      RIGHT ATRIUM:  The atrium was mildly dilated      MITRAL VALVE:  There was mild regurgitation      TRICUSPID VALVE:  There was mild regurgitation      Current Facility-Administered Medications:     acetaminophen (TYLENOL) tablet 650 mg, 650 mg, Oral, Q6H PRN, Filipe Knight MD    albuterol (PROVENTIL HFA,VENTOLIN HFA) inhaler 2 puff, 2 puff, Inhalation, Q4H PRN, SVEN Pedro, 2 puff at 09/16/21 1408    ALPRAZolam (XANAX) tablet 0 25 mg, 0 25 mg, Oral, BID PRN, Filipe Knight MD, 0 25 mg at 09/17/21 2306    aluminum-magnesium hydroxide-simethicone (MYLANTA) oral suspension 30 mL, 30 mL, Oral, Q6H PRN, Filipe Knight MD    atorvastatin (LIPITOR) tablet 80 mg, 80 mg, Oral, Daily With Dinner, Filipe Knight MD, 80 mg at 09/17/21 1707    carvedilol (COREG) tablet 12 5 mg, 12 5 mg, Oral, BID With Meals, Filipe Knight MD, 12 5 mg at 09/18/21 0827    docusate sodium (COLACE) capsule 100 mg, 100 mg, Oral, BID, Filipe Knight MD, 100 mg at 09/15/21 1806    fenofibrate (TRICOR) tablet 48 mg, 48 mg, Oral, Daily, Filipe Knight MD, 48 mg at 09/18/21 0827    ferrous sulfate tablet 325 mg, 325 mg, Oral, Daily With Breakfast, Filipe Knight MD, 325 mg at 09/18/21 0826    hydrALAZINE (APRESOLINE) injection 5 mg, 5 mg, Intravenous, Q4H PRN, Aspen Rader PA-C, 5 mg at 09/17/21 0010    loratadine (CLARITIN) tablet 10 mg, 10 mg, Oral, Daily, Filipe Knight MD, 10 mg at 09/18/21 0826    nicotine (NICODERM CQ) 14 mg/24hr TD 24 hr patch 1 patch, 1 patch, Transdermal, Daily, Filipe Knight MD, 1 patch at 09/17/21 1706    ondansetron (ZOFRAN) injection 4 mg, 4 mg, Intravenous, Q6H PRN, Filipe Knight MD    pantoprazole (PROTONIX) injection 40 mg, 40 mg, Intravenous, Q12H [address], Filipe Knight MD, 40 mg at 09/17/21 2242    polyethylene glycol (MIRALAX) packet 17 g, 17 g, Oral, Daily, Cindi Neal MD    senna (SENOKOT) tablet 8 6 mg, 8 6 mg, Oral, HS PRN, Cindi Neal MD    sertraline (ZOLOFT) tablet 100 mg, 100 mg, Oral, Daily, Cindi Neal MD, 100 mg at 09/18/21 0827    thiamine tablet 100 mg, 100 mg, Oral, Daily, Cindi Neal MD, 100 mg at 09/18/21 0827    tiotropium (SPIRIVA) capsule for inhaler 18 mcg, 18 mcg, Inhalation, Daily, Cindi Neal MD, 18 mcg at 09/18/21 0827     Objective:     Vitals:   Blood pressure 164/81, pulse 72, temperature 98 1 °F (36 7 °C), resp  rate 18, height 5' 9" (1 753 m), weight 66 7 kg (147 lb), SpO2 94 %  Body mass index is 21 71 kg/m²  Orthostatic Blood Pressures      Most Recent Value   Blood Pressure  164/81 filed at 09/18/2021 1646   Patient Position - Orthostatic VS  Lying filed at 09/17/2021 5748         Systolic (47QUW), LQF:074 , Min:93 , AEQ:745     Diastolic (20SXT), OEE:08, Min:67, Max:83      Intake/Output Summary (Last 24 hours) at 9/18/2021 0846  Last data filed at 9/17/2021 1217  Gross per 24 hour   Intake 150 ml   Output --   Net 150 ml     Weight (last 2 days)     None          Physical Exam  Constitutional:       General: He is not in acute distress  HENT:      Head: Normocephalic  Cardiovascular:      Rate and Rhythm: Normal rate  Heart sounds: S1 normal and S2 normal  Murmur heard  Systolic murmur is present with a grade of 2/6  Pulmonary:      Effort: No respiratory distress  Breath sounds: No wheezing or rales  Abdominal:      Tenderness: There is no abdominal tenderness  Skin:     General: Skin is warm  Neurological:      Mental Status: He is alert  Mental status is at baseline             Labs & Results:    Results from last 7 days   Lab Units 09/15/21  0054   TROPONIN I ng/mL <0 03     Results from last 7 days   Lab Units 09/17/21  2139 09/17/21  1222 09/17/21  0908 09/16/21  9227 09/15/21  0054   WBC Thousand/uL  --  8 00  --  6 12 7 18   HEMOGLOBIN g/dL 11 9* 12 2 12 5 10 5* 7 7*   HEMATOCRIT %  --  40 9  --  34 8* 26 6*   PLATELETS Thousands/uL  --  271  --  221 253         Results from last 7 days   Lab Units 09/17/21  1222 09/16/21  0555 09/15/21  0900 09/15/21  0054   POTASSIUM mmol/L 3 6 3 7 4 6 3 1*   CHLORIDE mmol/L 108 110*  --  100   CO2 mmol/L 22 23  --  21*   BUN mg/dL 9 12  --  21*   CREATININE mg/dL 1 08 0 90  --  1 81*   CALCIUM mg/dL 8 5 8 0*  --  8 0*   ALK PHOS U/L  --   --   --  24 9   ALT U/L  --   --   --  6   AST U/L  --   --   --  12*     Results from last 7 days   Lab Units 09/15/21  0054   INR  1 08   PTT seconds 25     Results from last 7 days   Lab Units 09/16/21  0555 09/15/21  0900 09/15/21  0054   MAGNESIUM mg/dL 2 2 1 6 0 9*     No results for input(s): NTBNP in the last 72 hours  Available cardiology studies, imaging and lab results independently reviewed

## 2021-09-18 NOTE — ASSESSMENT & PLAN NOTE
Due to GI bleed    Received 2 units of PRBCs for hemoglobin of 7 7  Hemoglobin has since stabilized  EGD performed without any acute abnormalities  Colonoscopy negative

## 2021-09-18 NOTE — CONSULTS
Consultation - Neuropsychology/Psychology Department  Kayleigh Gunderson 73 y o  male MRN: [id number]  Unit/Bed#: [facility] 630-90 Encounter: [id number]        Reason for Consultation:  Kayleigh Gunderson is a 73y o  year old male who was referred for a Neuropsychological Exam to assess cognitive functioning and comment on capacity to make informed medical decisions        History of Present Illness  Syncope    Physician Requesting Consult: Shantel James MD    PROBLEM LIST:  Patient Active Problem List   Diagnosis    Left arm weakness    Tobacco use    Alcohol abuse    HLD (hyperlipidemia)    [facility]) [facility]    Iron deficiency anemia    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    Symptomatic carotid artery stenosis, right    Lumbar back pain    GERD (gastroesophageal reflux disease)    Stenosis of right internal carotid artery    [facility] [facility]    Benign essential hypertension    Pre-diabetes    Stage 3a chronic kidney disease ([facility]    Upper GI bleed    Acute blood loss anemia    Ambulatory dysfunction    Syncope         Historical Information   Past Medical History:   Diagnosis Date    PRITI (acute kidney injury) ([facility] [address] [facility] ) 9/5/2021    Anxiety     Back pain     Claustrophobia     COPD (chronic obstructive pulmonary disease) ([facility]     Dysphagia 9/5/2021    GERD (gastroesophageal reflux disease)     Hypertension     Lumbar back pain     Panic attacks     Stenosis of right carotid artery     Stroke ([facility] [facility]     Wears glasses     Wears partial dentures     upper denture     Past Surgical History:   Procedure Laterality Date    COLONOSCOPY      IR CEREBRAL ANGIOGRAPHY  4/6/2021    OTHER SURGICAL HISTORY      fertility    ND SLCTV CATHJ 3RD+ ORD SLCTV ABDL PEL/LXTR [facility] Right 4/6/2021    Procedure: ARTERIOGRAM, carotid Angio;  Surgeon: Pramod Calzada MD;  Location: [facility];  Service: Vascular    ND THROMBOENDARTECTMY [facility] Right 4/13/2021    Procedure: RIGHT ENDARTERECTOMY ARTERY CAROTID WITH BOVINE PATCH;  Surgeon: Aman Burns MD;  Location: [facility];  Service: Vascular     Social History   Social History     Substance and Sexual Activity   Alcohol Use Yes     Social History     Substance and Sexual Activity   Drug Use Never     Social History     Tobacco Use   Smoking Status Light Tobacco Smoker    Packs/day: 0 25   Smokeless Tobacco Never Used   Tobacco Comment    also wears nicotine patches     Family History: No family history on file      Meds/Allergies   current meds:   Current Facility-Administered Medications   Medication Dose Route Frequency    acetaminophen (TYLENOL) tablet 650 mg  650 mg Oral Q6H PRN    albuterol (PROVENTIL HFA,VENTOLIN HFA) inhaler 2 puff  2 puff Inhalation Q4H PRN    ALPRAZolam (XANAX) tablet 0 25 mg  0 25 mg Oral BID PRN    aluminum-magnesium hydroxide-simethicone (MYLANTA) oral suspension 30 mL  30 mL Oral Q6H PRN    atorvastatin (LIPITOR) tablet 80 mg  80 mg Oral Daily With Dinner    carvedilol (COREG) tablet 12 5 mg  12 5 mg Oral BID With Meals    docusate sodium (COLACE) capsule 100 mg  100 mg Oral BID    fenofibrate (TRICOR) tablet 48 mg  48 mg Oral Daily    ferrous sulfate tablet 325 mg  325 mg Oral Daily With Breakfast    hydrALAZINE (APRESOLINE) injection 5 mg  5 mg Intravenous Q4H PRN    loratadine (CLARITIN) tablet 10 mg  10 mg Oral Daily    nicotine (NICODERM CQ) 14 mg/24hr TD 24 hr patch 1 patch  1 patch Transdermal Daily    ondansetron (ZOFRAN) injection 4 mg  4 mg Intravenous Q6H PRN    pantoprazole (PROTONIX) injection 40 mg  40 mg Intravenous Q12H YADY    polyethylene glycol (MIRALAX) packet 17 g  17 g Oral Daily    senna (SENOKOT) tablet 8 6 mg  8 6 mg Oral HS PRN    sertraline (ZOLOFT) tablet 100 mg  100 mg Oral Daily    thiamine tablet 100 mg  100 mg Oral Daily    tiotropium (SPIRIVA) capsule for inhaler 18 mcg  18 mcg Inhalation Daily       Allergies Allergen Reactions    Penicillins Anaphylaxis         Family and Social Support:   No data recorded    Behavioral Observations: Alert, oriented x 3, cooperative; affect appeared appropriate to content and patient denied depressed mood and anxiety; no overt evidence of psychotic process; patient appeared aware of reason for hospitalization and medical history;    Cognitive Examination    General Cognitive Functioning MMSE =Average 27/28; General Fund of Information = Average    Attention/Concentration Auditory Selective Attention = Average; Auditory Vigilance = Average; Information Processing Speed = Average    Frontal Systems/Executive Functioning Mental Flexibility/Cognitive Control = Average; Working Memory = Average Abstract Reasoning = Average;  Generative Ability = Average, Commonsense Reasoning and Judgement = Average    Language Functioning Confrontation naming = Average, Phonemic Fluency = Average; Semantic Retrieval = Average; Comprehension of Complex Ideational Material = Average; Praxis = Within Normal Limits; Repetition = Within Normal Limits; Basic Reading = Within Normal Limits; Following Commands = Within Normal Limits    Memory Functioning three word recall = average; Visual Recognition = Average    Visuo-Spatial Abilities Not Assessed    Functional Knowledge  Health & Safety Knowledge = Average;     Summary/Impression:  Patient was seen on 9/17/21  Results of Neuropsychological Exam revealed adequate cognitive functioning and on a measure assessing awareness of personal health status and ability to evaluate health problems, handle medical emergencies and take safety precautions, patient performed within normal limits  At this time, patient appears to have capacity to make informed medical decisions   Alcohol Abuse

## 2021-09-18 NOTE — NURSING NOTE
Informed by another staff member that pt 's IV was removed  Pt states that he is unsure of how it happened  Informed pt that a new site needed to be placed and educated pt as to why  IV are needed  Pt refused IV  Notified provider

## 2021-09-18 NOTE — ASSESSMENT & PLAN NOTE
Presyncopal symptoms melena low hemoglobin  Received packed cells at [facility]  Abdominal CT scan noted  EGD without any acute abnormalities  colonoscopy negative

## 2021-09-20 ENCOUNTER — TELEPHONE (OUTPATIENT)
Dept: NEUROLOGY | Facility: CLINIC | Age: 66
End: 2021-09-20

## 2021-09-30 NOTE — UTILIZATION REVIEW
Notification of Discharge   This is a Notification of Discharge from our facility [address]  Please be advised that this patient has been discharge from our facility  Below you will find the admission and discharge date and time including the patients disposition  UTILIZATION REVIEW CONTACT:  Lorne Angulo  Utilization   Network Utilization Review Department  Phone: [phone] x carefully listen to the prompts  All voicemails are confidential   Email: [email or web address]     PHYSICIAN ADVISORY SERVICES:  FOR [id number] REVIEW - MEDICAL NECESSITY DENIAL  Phone: [phone]  Fax: [phone]  Email: [email or web address]     PRESENTATION DATE: 9/2/2021  7:52 PM  OBERVATION ADMISSION DATE:   INPATIENT ADMISSION DATE: 9/2/21  9:32 PM   DISCHARGE DATE: 9/6/2021  1:13 PM  DISPOSITION: Home/Self Care Home/Self Care      IMPORTANT INFORMATION:  Send all requests for admission clinical reviews, approved or denied determinations and any other requests to dedicated fax number below belonging to the campus where the patient is receiving treatment   List of dedicated fax numbers:  [address] DENIALS (Administrative/Medical Necessity) [phone]   [address] (Maternity/NICU/Pediatrics) [phone]   Liliana Russo [phone]   Sg Cordero [phone]   Cosme Duque [phone]   [facility] [address] [place] [address] [phone]   [facility]  [phone]   [address] [facility], [facility]  [address] [facility] [address] [facility] [phone]

## 2021-10-22 ENCOUNTER — OFFICE VISIT (OUTPATIENT)
Dept: NEUROLOGY | Facility: CLINIC | Age: 66
End: 2021-10-22
Payer: COMMERCIAL

## 2021-10-22 VITALS
WEIGHT: 150 LBS | BODY MASS INDEX: 22.15 KG/M2 | HEART RATE: 73 BPM | SYSTOLIC BLOOD PRESSURE: 130 MMHG | TEMPERATURE: 98.4 F | DIASTOLIC BLOOD PRESSURE: 60 MMHG

## 2021-10-22 DIAGNOSIS — Z72.0 TOBACCO USE: Chronic | ICD-10-CM

## 2021-10-22 DIAGNOSIS — I63.531 CEREBROVASCULAR ACCIDENT (CVA) DUE TO OCCLUSION OF RIGHT POSTERIOR CEREBRAL ARTERY (HCC): Primary | ICD-10-CM

## 2021-10-22 DIAGNOSIS — F10.10 ALCOHOL ABUSE: Chronic | ICD-10-CM

## 2021-10-22 DIAGNOSIS — I10 BENIGN ESSENTIAL HYPERTENSION: Chronic | ICD-10-CM

## 2021-10-22 DIAGNOSIS — E78.00 PURE HYPERCHOLESTEROLEMIA: Chronic | ICD-10-CM

## 2021-10-22 DIAGNOSIS — R73.03 PRE-DIABETES: ICD-10-CM

## 2021-10-22 PROCEDURE — 99214 OFFICE O/P EST MOD 30 MIN: CPT | Performed by: PHYSICIAN ASSISTANT

## 2021-11-05 RX ORDER — PRAVASTATIN SODIUM 40 MG
40 TABLET ORAL DAILY
COMMUNITY
Start: 2021-10-22 | End: 2022-04-24 | Stop reason: HOSPADM

## 2021-11-05 RX ORDER — OMEPRAZOLE 20 MG/1
20 CAPSULE, DELAYED RELEASE ORAL DAILY
COMMUNITY
Start: 2021-10-04

## 2021-11-05 RX ORDER — ALBUTEROL SULFATE 90 UG/1
2 AEROSOL, METERED RESPIRATORY (INHALATION) AS NEEDED
COMMUNITY
Start: 2021-10-08

## 2021-11-10 ENCOUNTER — OFFICE VISIT (OUTPATIENT)
Dept: VASCULAR SURGERY | Facility: CLINIC | Age: 66
End: 2021-11-10
Payer: COMMERCIAL

## 2021-11-10 VITALS
BODY MASS INDEX: 22.22 KG/M2 | HEART RATE: 66 BPM | SYSTOLIC BLOOD PRESSURE: 158 MMHG | DIASTOLIC BLOOD PRESSURE: 92 MMHG | WEIGHT: 150 LBS | HEIGHT: 69 IN | RESPIRATION RATE: 20 BRPM

## 2021-11-10 DIAGNOSIS — I65.23 CAROTID STENOSIS, ASYMPTOMATIC, BILATERAL: Primary | ICD-10-CM

## 2021-11-10 PROBLEM — Z98.890 STATUS POST CAROTID ENDARTERECTOMY: Status: ACTIVE | Noted: 2021-11-10

## 2021-11-10 PROCEDURE — 99214 OFFICE O/P EST MOD 30 MIN: CPT | Performed by: SURGERY

## 2021-11-10 RX ORDER — BACLOFEN 10 MG/1
10 TABLET ORAL 3 TIMES DAILY PRN
COMMUNITY
Start: 2021-11-03 | End: 2022-04-24 | Stop reason: HOSPADM

## 2022-01-31 ENCOUNTER — TELEPHONE (OUTPATIENT)
Dept: NEUROLOGY | Facility: CLINIC | Age: 67
End: 2022-01-31

## 2022-02-10 ENCOUNTER — TELEPHONE (OUTPATIENT)
Dept: NEUROLOGY | Facility: CLINIC | Age: 67
End: 2022-02-10

## 2022-02-22 ENCOUNTER — APPOINTMENT (EMERGENCY)
Dept: RADIOLOGY | Facility: HOSPITAL | Age: 67
DRG: 444 | End: 2022-02-22
Payer: COMMERCIAL

## 2022-02-22 ENCOUNTER — APPOINTMENT (EMERGENCY)
Dept: CT IMAGING | Facility: HOSPITAL | Age: 67
DRG: 444 | End: 2022-02-22
Payer: COMMERCIAL

## 2022-02-22 ENCOUNTER — HOSPITAL ENCOUNTER (INPATIENT)
Facility: HOSPITAL | Age: 67
LOS: 7 days | Discharge: HOME | DRG: 444 | End: 2022-03-01
Attending: EMERGENCY MEDICINE | Admitting: INTERNAL MEDICINE
Payer: COMMERCIAL

## 2022-02-22 ENCOUNTER — APPOINTMENT (EMERGENCY)
Dept: ULTRASOUND IMAGING | Facility: HOSPITAL | Age: 67
DRG: 444 | End: 2022-02-22
Payer: COMMERCIAL

## 2022-02-22 DIAGNOSIS — E83.42 HYPOMAGNESEMIA: ICD-10-CM

## 2022-02-22 DIAGNOSIS — E87.6 HYPOKALEMIA: ICD-10-CM

## 2022-02-22 DIAGNOSIS — K80.42 CHOLEDOCHOLITHIASIS WITH ACUTE CHOLECYSTITIS: Primary | ICD-10-CM

## 2022-02-22 DIAGNOSIS — F10.10 ALCOHOL ABUSE: Chronic | ICD-10-CM

## 2022-02-22 DIAGNOSIS — R56.9 SEIZURE (HCC): ICD-10-CM

## 2022-02-22 DIAGNOSIS — I65.21 STENOSIS OF RIGHT INTERNAL CAROTID ARTERY: ICD-10-CM

## 2022-02-22 DIAGNOSIS — I48.91 ATRIAL FIBRILLATION WITH RVR (HCC): ICD-10-CM

## 2022-02-22 DIAGNOSIS — K80.50 CHOLEDOCHOLITHIASIS: ICD-10-CM

## 2022-02-22 DIAGNOSIS — I48.0 PAF (PAROXYSMAL ATRIAL FIBRILLATION) (HCC): ICD-10-CM

## 2022-02-22 DIAGNOSIS — I63.531 CEREBROVASCULAR ACCIDENT (CVA) DUE TO OCCLUSION OF RIGHT POSTERIOR CEREBRAL ARTERY (HCC): Chronic | ICD-10-CM

## 2022-02-22 DIAGNOSIS — K81.9 CHOLECYSTITIS: ICD-10-CM

## 2022-02-22 PROBLEM — D72.829 LEUKOCYTOSIS: Status: ACTIVE | Noted: 2022-02-22

## 2022-02-22 LAB
2HR DELTA HS TROPONIN: 7 NG/L
4HR DELTA HS TROPONIN: 9 NG/L
ALBUMIN SERPL BCP-MCNC: 3.2 G/DL (ref 3.5–5)
ALP SERPL-CCNC: 376 U/L (ref 46–116)
ALT SERPL W P-5'-P-CCNC: 125 U/L (ref 12–78)
ANION GAP SERPL CALCULATED.3IONS-SCNC: 14 MMOL/L (ref 4–13)
AST SERPL W P-5'-P-CCNC: 102 U/L (ref 5–45)
BASOPHILS # BLD AUTO: 0.03 THOUSANDS/ΜL (ref 0–0.1)
BASOPHILS NFR BLD AUTO: 0 % (ref 0–1)
BILIRUB SERPL-MCNC: 1.57 MG/DL (ref 0.2–1)
BUN SERPL-MCNC: 12 MG/DL (ref 5–25)
CALCIUM ALBUM COR SERPL-MCNC: 9.2 MG/DL (ref 8.3–10.1)
CALCIUM SERPL-MCNC: 8.6 MG/DL (ref 8.3–10.1)
CARDIAC TROPONIN I PNL SERPL HS: 14 NG/L
CARDIAC TROPONIN I PNL SERPL HS: 21 NG/L
CARDIAC TROPONIN I PNL SERPL HS: 23 NG/L
CHLORIDE SERPL-SCNC: 94 MMOL/L (ref 100–108)
CO2 SERPL-SCNC: 28 MMOL/L (ref 21–32)
CREAT SERPL-MCNC: 1.12 MG/DL (ref 0.6–1.3)
EOSINOPHIL # BLD AUTO: 0.08 THOUSAND/ΜL (ref 0–0.61)
EOSINOPHIL NFR BLD AUTO: 1 % (ref 0–6)
ERYTHROCYTE [DISTWIDTH] IN BLOOD BY AUTOMATED COUNT: 13.6 % (ref 11.6–15.1)
ETHANOL SERPL-MCNC: 82 MG/DL (ref 0–3)
GFR SERPL CREATININE-BSD FRML MDRD: 68 ML/MIN/1.73SQ M
GLUCOSE SERPL-MCNC: 115 MG/DL (ref 65–140)
HCT VFR BLD AUTO: 39.3 % (ref 36.5–49.3)
HGB BLD-MCNC: 13.2 G/DL (ref 12–17)
IMM GRANULOCYTES # BLD AUTO: 0.07 THOUSAND/UL (ref 0–0.2)
IMM GRANULOCYTES NFR BLD AUTO: 1 % (ref 0–2)
LIPASE SERPL-CCNC: 413 U/L (ref 73–393)
LYMPHOCYTES # BLD AUTO: 0.74 THOUSANDS/ΜL (ref 0.6–4.47)
LYMPHOCYTES NFR BLD AUTO: 7 % (ref 14–44)
MAGNESIUM SERPL-MCNC: 1.2 MG/DL (ref 1.6–2.6)
MCH RBC QN AUTO: 28.1 PG (ref 26.8–34.3)
MCHC RBC AUTO-ENTMCNC: 33.6 G/DL (ref 31.4–37.4)
MCV RBC AUTO: 84 FL (ref 82–98)
MONOCYTES # BLD AUTO: 0.71 THOUSAND/ΜL (ref 0.17–1.22)
MONOCYTES NFR BLD AUTO: 7 % (ref 4–12)
NEUTROPHILS # BLD AUTO: 9.13 THOUSANDS/ΜL (ref 1.85–7.62)
NEUTS SEG NFR BLD AUTO: 84 % (ref 43–75)
NRBC BLD AUTO-RTO: 0 /100 WBCS
NT-PROBNP SERPL-MCNC: 423 PG/ML
PLATELET # BLD AUTO: 223 THOUSANDS/UL (ref 149–390)
PMV BLD AUTO: 10.2 FL (ref 8.9–12.7)
POTASSIUM SERPL-SCNC: 3.2 MMOL/L (ref 3.5–5.3)
PROT SERPL-MCNC: 7.3 G/DL (ref 6.4–8.2)
RBC # BLD AUTO: 4.69 MILLION/UL (ref 3.88–5.62)
SODIUM SERPL-SCNC: 136 MMOL/L (ref 136–145)
TSH SERPL DL<=0.05 MIU/L-ACNC: 0.55 UIU/ML (ref 0.36–3.74)
WBC # BLD AUTO: 10.76 THOUSAND/UL (ref 4.31–10.16)

## 2022-02-22 PROCEDURE — 99285 EMERGENCY DEPT VISIT HI MDM: CPT

## 2022-02-22 PROCEDURE — 71275 CT ANGIOGRAPHY CHEST: CPT

## 2022-02-22 PROCEDURE — 36415 COLL VENOUS BLD VENIPUNCTURE: CPT | Performed by: EMERGENCY MEDICINE

## 2022-02-22 PROCEDURE — 85025 COMPLETE CBC W/AUTO DIFF WBC: CPT | Performed by: EMERGENCY MEDICINE

## 2022-02-22 PROCEDURE — 80053 COMPREHEN METABOLIC PANEL: CPT | Performed by: EMERGENCY MEDICINE

## 2022-02-22 PROCEDURE — 83880 ASSAY OF NATRIURETIC PEPTIDE: CPT | Performed by: EMERGENCY MEDICINE

## 2022-02-22 PROCEDURE — 96376 TX/PRO/DX INJ SAME DRUG ADON: CPT

## 2022-02-22 PROCEDURE — 83690 ASSAY OF LIPASE: CPT | Performed by: EMERGENCY MEDICINE

## 2022-02-22 PROCEDURE — 96375 TX/PRO/DX INJ NEW DRUG ADDON: CPT

## 2022-02-22 PROCEDURE — 94760 N-INVAS EAR/PLS OXIMETRY 1: CPT

## 2022-02-22 PROCEDURE — 82077 ASSAY SPEC XCP UR&BREATH IA: CPT | Performed by: EMERGENCY MEDICINE

## 2022-02-22 PROCEDURE — 96365 THER/PROPH/DIAG IV INF INIT: CPT

## 2022-02-22 PROCEDURE — 71045 X-RAY EXAM CHEST 1 VIEW: CPT

## 2022-02-22 PROCEDURE — 76705 ECHO EXAM OF ABDOMEN: CPT

## 2022-02-22 PROCEDURE — 74174 CTA ABD&PLVS W/CONTRAST: CPT

## 2022-02-22 PROCEDURE — 96366 THER/PROPH/DIAG IV INF ADDON: CPT

## 2022-02-22 PROCEDURE — 96367 TX/PROPH/DG ADDL SEQ IV INF: CPT

## 2022-02-22 PROCEDURE — 96368 THER/DIAG CONCURRENT INF: CPT

## 2022-02-22 PROCEDURE — 94762 N-INVAS EAR/PLS OXIMTRY CONT: CPT

## 2022-02-22 PROCEDURE — 84443 ASSAY THYROID STIM HORMONE: CPT | Performed by: EMERGENCY MEDICINE

## 2022-02-22 PROCEDURE — 83735 ASSAY OF MAGNESIUM: CPT | Performed by: EMERGENCY MEDICINE

## 2022-02-22 PROCEDURE — 99223 1ST HOSP IP/OBS HIGH 75: CPT | Performed by: INTERNAL MEDICINE

## 2022-02-22 PROCEDURE — 84484 ASSAY OF TROPONIN QUANT: CPT | Performed by: EMERGENCY MEDICINE

## 2022-02-22 PROCEDURE — 99291 CRITICAL CARE FIRST HOUR: CPT | Performed by: EMERGENCY MEDICINE

## 2022-02-22 PROCEDURE — 93005 ELECTROCARDIOGRAM TRACING: CPT

## 2022-02-22 RX ORDER — SODIUM CHLORIDE 9 MG/ML
3 INJECTION INTRAVENOUS
Status: DISCONTINUED | OUTPATIENT
Start: 2022-02-22 | End: 2022-03-01 | Stop reason: HOSPADM

## 2022-02-22 RX ORDER — SIMETHICONE 80 MG
80 TABLET,CHEWABLE ORAL 4 TIMES DAILY PRN
Status: DISCONTINUED | OUTPATIENT
Start: 2022-02-22 | End: 2022-03-01 | Stop reason: HOSPADM

## 2022-02-22 RX ORDER — ALBUTEROL SULFATE 90 UG/1
1 AEROSOL, METERED RESPIRATORY (INHALATION) EVERY 6 HOURS PRN
Status: DISCONTINUED | OUTPATIENT
Start: 2022-02-22 | End: 2022-03-01 | Stop reason: HOSPADM

## 2022-02-22 RX ORDER — LORAZEPAM 2 MG/ML
1 INJECTION INTRAMUSCULAR ONCE
Status: COMPLETED | OUTPATIENT
Start: 2022-02-22 | End: 2022-02-22

## 2022-02-22 RX ORDER — POTASSIUM CHLORIDE 14.9 MG/ML
20 INJECTION INTRAVENOUS ONCE
Status: COMPLETED | OUTPATIENT
Start: 2022-02-22 | End: 2022-02-22

## 2022-02-22 RX ORDER — CEFAZOLIN SODIUM 2 G/50ML
2000 SOLUTION INTRAVENOUS EVERY 8 HOURS
Status: DISCONTINUED | OUTPATIENT
Start: 2022-02-22 | End: 2022-02-28

## 2022-02-22 RX ORDER — SERTRALINE HYDROCHLORIDE 100 MG/1
100 TABLET, FILM COATED ORAL
Status: DISCONTINUED | OUTPATIENT
Start: 2022-02-22 | End: 2022-03-01 | Stop reason: HOSPADM

## 2022-02-22 RX ORDER — DILTIAZEM HYDROCHLORIDE 5 MG/ML
25 INJECTION INTRAVENOUS ONCE
Status: COMPLETED | OUTPATIENT
Start: 2022-02-22 | End: 2022-02-22

## 2022-02-22 RX ORDER — PRAVASTATIN SODIUM 40 MG
40 TABLET ORAL
Status: DISCONTINUED | OUTPATIENT
Start: 2022-02-22 | End: 2022-02-28

## 2022-02-22 RX ORDER — LEVOFLOXACIN 5 MG/ML
750 INJECTION, SOLUTION INTRAVENOUS EVERY 24 HOURS
Status: DISCONTINUED | OUTPATIENT
Start: 2022-02-23 | End: 2022-02-22

## 2022-02-22 RX ORDER — FOLIC ACID 1 MG/1
1 TABLET ORAL DAILY
Status: DISCONTINUED | OUTPATIENT
Start: 2022-02-23 | End: 2022-03-01 | Stop reason: HOSPADM

## 2022-02-22 RX ORDER — MORPHINE SULFATE 4 MG/ML
4 INJECTION, SOLUTION INTRAMUSCULAR; INTRAVENOUS ONCE
Status: COMPLETED | OUTPATIENT
Start: 2022-02-22 | End: 2022-02-22

## 2022-02-22 RX ORDER — SODIUM CHLORIDE, SODIUM GLUCONATE, SODIUM ACETATE, POTASSIUM CHLORIDE, MAGNESIUM CHLORIDE, SODIUM PHOSPHATE, DIBASIC, AND POTASSIUM PHOSPHATE .53; .5; .37; .037; .03; .012; .00082 G/100ML; G/100ML; G/100ML; G/100ML; G/100ML; G/100ML; G/100ML
75 INJECTION, SOLUTION INTRAVENOUS CONTINUOUS
Status: DISCONTINUED | OUTPATIENT
Start: 2022-02-22 | End: 2022-02-23

## 2022-02-22 RX ORDER — MAGNESIUM SULFATE HEPTAHYDRATE 40 MG/ML
2 INJECTION, SOLUTION INTRAVENOUS ONCE
Status: COMPLETED | OUTPATIENT
Start: 2022-02-22 | End: 2022-02-22

## 2022-02-22 RX ORDER — ONDANSETRON 2 MG/ML
4 INJECTION INTRAMUSCULAR; INTRAVENOUS ONCE
Status: COMPLETED | OUTPATIENT
Start: 2022-02-22 | End: 2022-02-22

## 2022-02-22 RX ORDER — LANOLIN ALCOHOL/MO/W.PET/CERES
100 CREAM (GRAM) TOPICAL DAILY
Status: DISCONTINUED | OUTPATIENT
Start: 2022-02-23 | End: 2022-03-01 | Stop reason: HOSPADM

## 2022-02-22 RX ORDER — PANTOPRAZOLE SODIUM 20 MG/1
20 TABLET, DELAYED RELEASE ORAL
Status: DISCONTINUED | OUTPATIENT
Start: 2022-02-23 | End: 2022-03-01 | Stop reason: HOSPADM

## 2022-02-22 RX ORDER — ONDANSETRON 2 MG/ML
4 INJECTION INTRAMUSCULAR; INTRAVENOUS EVERY 6 HOURS PRN
Status: DISCONTINUED | OUTPATIENT
Start: 2022-02-22 | End: 2022-03-01 | Stop reason: HOSPADM

## 2022-02-22 RX ORDER — LEVOFLOXACIN 5 MG/ML
750 INJECTION, SOLUTION INTRAVENOUS ONCE
Status: COMPLETED | OUTPATIENT
Start: 2022-02-22 | End: 2022-02-22

## 2022-02-22 RX ORDER — POTASSIUM CHLORIDE 20 MEQ/1
40 TABLET, EXTENDED RELEASE ORAL
Status: COMPLETED | OUTPATIENT
Start: 2022-02-22 | End: 2022-02-22

## 2022-02-22 RX ORDER — LORAZEPAM 2 MG/ML
INJECTION INTRAMUSCULAR
Status: DISPENSED
Start: 2022-02-22 | End: 2022-02-22

## 2022-02-22 RX ORDER — METRONIDAZOLE 500 MG/1
500 TABLET ORAL ONCE
Status: COMPLETED | OUTPATIENT
Start: 2022-02-22 | End: 2022-02-22

## 2022-02-22 RX ORDER — HYDRALAZINE HYDROCHLORIDE 20 MG/ML
10 INJECTION INTRAMUSCULAR; INTRAVENOUS ONCE
Status: COMPLETED | OUTPATIENT
Start: 2022-02-22 | End: 2022-02-22

## 2022-02-22 RX ORDER — METRONIDAZOLE 500 MG/1
500 TABLET ORAL EVERY 8 HOURS SCHEDULED
Status: DISCONTINUED | OUTPATIENT
Start: 2022-02-22 | End: 2022-03-01 | Stop reason: HOSPADM

## 2022-02-22 RX ORDER — HYDROMORPHONE HCL/PF 1 MG/ML
0.5 SYRINGE (ML) INJECTION ONCE
Status: COMPLETED | OUTPATIENT
Start: 2022-02-22 | End: 2022-02-22

## 2022-02-22 RX ORDER — NICOTINE 21 MG/24HR
1 PATCH, TRANSDERMAL 24 HOURS TRANSDERMAL DAILY
Status: DISCONTINUED | OUTPATIENT
Start: 2022-02-23 | End: 2022-03-01 | Stop reason: HOSPADM

## 2022-02-22 RX ORDER — NITROGLYCERIN 0.4 MG/1
0.4 TABLET SUBLINGUAL
Status: DISCONTINUED | OUTPATIENT
Start: 2022-02-22 | End: 2022-03-01 | Stop reason: HOSPADM

## 2022-02-22 RX ORDER — ACETAMINOPHEN 325 MG/1
650 TABLET ORAL EVERY 6 HOURS PRN
Status: DISCONTINUED | OUTPATIENT
Start: 2022-02-22 | End: 2022-03-01 | Stop reason: HOSPADM

## 2022-02-22 RX ORDER — DILTIAZEM HYDROCHLORIDE 5 MG/ML
15 INJECTION INTRAVENOUS ONCE
Status: COMPLETED | OUTPATIENT
Start: 2022-02-22 | End: 2022-02-22

## 2022-02-22 RX ORDER — CARVEDILOL 12.5 MG/1
12.5 TABLET ORAL 2 TIMES DAILY WITH MEALS
Status: DISCONTINUED | OUTPATIENT
Start: 2022-02-22 | End: 2022-02-26

## 2022-02-22 RX ADMIN — ONDANSETRON 4 MG: 2 INJECTION INTRAMUSCULAR; INTRAVENOUS at 10:30

## 2022-02-22 RX ADMIN — NITROGLYCERIN 0.4 MG: 0.4 TABLET SUBLINGUAL at 09:54

## 2022-02-22 RX ADMIN — SODIUM CHLORIDE, SODIUM LACTATE, POTASSIUM CHLORIDE, AND CALCIUM CHLORIDE 1000 ML: .6; .31; .03; .02 INJECTION, SOLUTION INTRAVENOUS at 14:45

## 2022-02-22 RX ADMIN — HYDROMORPHONE HYDROCHLORIDE 0.5 MG: 1 INJECTION, SOLUTION INTRAMUSCULAR; INTRAVENOUS; SUBCUTANEOUS at 15:40

## 2022-02-22 RX ADMIN — SODIUM CHLORIDE, SODIUM GLUCONATE, SODIUM ACETATE, POTASSIUM CHLORIDE, MAGNESIUM CHLORIDE, SODIUM PHOSPHATE, DIBASIC, AND POTASSIUM PHOSPHATE 75 ML/HR: .53; .5; .37; .037; .03; .012; .00082 INJECTION, SOLUTION INTRAVENOUS at 20:27

## 2022-02-22 RX ADMIN — CARVEDILOL 12.5 MG: 12.5 TABLET, FILM COATED ORAL at 19:29

## 2022-02-22 RX ADMIN — DILTIAZEM HYDROCHLORIDE 10 MG/HR: 5 INJECTION INTRAVENOUS at 10:25

## 2022-02-22 RX ADMIN — CEFAZOLIN SODIUM 2000 MG: 2 SOLUTION INTRAVENOUS at 19:30

## 2022-02-22 RX ADMIN — MAGNESIUM SULFATE HEPTAHYDRATE 2 G: 40 INJECTION, SOLUTION INTRAVENOUS at 10:45

## 2022-02-22 RX ADMIN — HYDRALAZINE HYDROCHLORIDE 10 MG: 20 INJECTION INTRAMUSCULAR; INTRAVENOUS at 14:37

## 2022-02-22 RX ADMIN — POTASSIUM CHLORIDE 20 MEQ: 200 INJECTION, SOLUTION INTRAVENOUS at 10:41

## 2022-02-22 RX ADMIN — METRONIDAZOLE 500 MG: 500 TABLET ORAL at 22:27

## 2022-02-22 RX ADMIN — SODIUM CHLORIDE 1000 ML: 0.9 INJECTION, SOLUTION INTRAVENOUS at 10:41

## 2022-02-22 RX ADMIN — DILTIAZEM HYDROCHLORIDE 15 MG: 5 INJECTION INTRAVENOUS at 10:01

## 2022-02-22 RX ADMIN — DILTIAZEM HYDROCHLORIDE 30 MG: 30 TABLET, FILM COATED ORAL at 20:27

## 2022-02-22 RX ADMIN — ENOXAPARIN SODIUM 40 MG: 40 INJECTION SUBCUTANEOUS at 19:28

## 2022-02-22 RX ADMIN — METRONIDAZOLE 500 MG: 500 TABLET ORAL at 15:41

## 2022-02-22 RX ADMIN — LORAZEPAM 1 MG: 2 INJECTION INTRAMUSCULAR; INTRAVENOUS at 16:05

## 2022-02-22 RX ADMIN — POTASSIUM CHLORIDE 40 MEQ: 1500 TABLET, EXTENDED RELEASE ORAL at 19:29

## 2022-02-22 RX ADMIN — SERTRALINE HYDROCHLORIDE 100 MG: 50 TABLET ORAL at 22:27

## 2022-02-22 RX ADMIN — LEVOFLOXACIN 750 MG: 5 INJECTION, SOLUTION INTRAVENOUS at 15:42

## 2022-02-22 RX ADMIN — MORPHINE SULFATE 4 MG: 4 INJECTION INTRAVENOUS at 10:30

## 2022-02-22 RX ADMIN — POTASSIUM CHLORIDE 40 MEQ: 1500 TABLET, EXTENDED RELEASE ORAL at 22:27

## 2022-02-22 RX ADMIN — PRAVASTATIN SODIUM 40 MG: 40 TABLET ORAL at 19:29

## 2022-02-22 RX ADMIN — NITROGLYCERIN 0.4 MG: 0.4 TABLET SUBLINGUAL at 09:44

## 2022-02-22 RX ADMIN — IOHEXOL 100 ML: 350 INJECTION, SOLUTION INTRAVENOUS at 14:51

## 2022-02-22 NOTE — ED PROVIDER NOTES
History  Chief Complaint   Patient presents with    Chest Pain     Pt here via EMS c/o CP intermitten for a week that started at 4AM  PER EMS pt was in rapid AFIB -180  Pt given 15mg cardizem PTA  Pt still c/o CP  71-year-old male presenting with EMS for evaluation of intermittent chest pain for about 1 week, this started again this morning  He describes it as pressure with associated intermittent palpitations  EMS found him in rapid AFib with heart rate around 160-180  He is given 15 mg of Cardizem prior to arrival, which brought his chest pain down from a 10 to a 7  His heart rate is now in the 90s but still in AFib  He states he does take Eliquis daily for history of multiple strokes  He was also given 3 baby aspirins prior to arrival       History provided by:  Patient   used: No        Prior to Admission Medications   Prescriptions Last Dose Informant Patient Reported? Taking?    ALBUTEROL IN  Self Yes Yes   Sig: Inhale every 4 (four) hours as needed Unknown dose     ALPRAZolam (XANAX) 0 25 mg tablet  Self Yes Yes   Sig: TAKE 1 TABLET BY ORAL ROUTE 4 TIMES EVERY DAY AS NEEDED   acetaminophen (TYLENOL) 325 mg tablet Not Taking at Unknown time Self No No   Sig: Take 2 tablets (650 mg total) by mouth every 6 (six) hours as needed for mild pain   Patient not taking: Reported on 2/22/2022    aclidinium (Tudorza Pressair) 400 MCG/ACT inhaler  Self Yes Yes   Sig: Inhale 1 puff 2 (two) times a day     albuterol (PROVENTIL HFA,VENTOLIN HFA) 90 mcg/act inhaler  Self Yes Yes   apixaban (ELIQUIS) 5 mg 2/21/2022 at Unknown time Self No Yes   Sig: Take 1 tablet (5 mg total) by mouth 2 (two) times a day   apixaban (ELIQUIS) 5 mg  Self No No   Sig: Take 1 tablet (5 mg total) by mouth 2 (two) times a day   atorvastatin (LIPITOR) 80 mg tablet   No No   Sig: Take 1 tablet (80 mg total) by mouth daily   baclofen 10 mg tablet  Self Yes No   carvedilol (COREG) 12 5 mg tablet  Self Yes No   Sig: Take 12 5 mg by mouth 2 (two) times a day with meals   Patient not taking: Reported on 11/10/2021    clopidogrel (PLAVIX) 75 mg tablet Not Taking at Unknown time Self No No   Sig: TAKE 1 TABLET BY MOUTH EVERY DAY FOR 19 DOSES   Patient not taking: Reported on 11/10/2021   fenofibrate 160 MG tablet  Self Yes No   Sig: Take 160 mg by mouth daily   ferrous sulfate 324 (65 Fe) mg  Self No No   Sig: Take 1 tablet (324 mg total) by mouth daily before breakfast   Patient not taking: Reported on 11/10/2021    loratadine (CLARITIN) 10 mg tablet  Self No No   Sig: Take 1 tablet (10 mg total) by mouth daily   nicotine (NICODERM CQ) 14 mg/24hr TD 24 hr patch  Self No No   Sig: Place 1 patch on the skin daily   omeprazole (PriLOSEC) 20 mg delayed release capsule  Self Yes No   pantoprazole (PROTONIX) 40 mg tablet   No No   Sig: Take 1 tablet (40 mg total) by mouth daily in the early morning   pravastatin (PRAVACHOL) 40 mg tablet  Self Yes No   senna (SENOKOT) 8 6 mg  Self No No   Sig: Take 1 tablet (8 6 mg total) by mouth daily at bedtime as needed for constipation   Patient not taking: Reported on 9/15/2021   sertraline (ZOLOFT) 100 mg tablet  Self Yes No   Sig: Take by mouth   thiamine 100 MG tablet  Self No No   Sig: Take 1 tablet (100 mg total) by mouth daily      Facility-Administered Medications: None       Past Medical History:   Diagnosis Date    PRITI (acute kidney injury) ([facility] [address] ) 9/5/2021    Anxiety     Back pain     Claustrophobia     [facility])     Dysphagia 9/5/2021    GERD (gastroesophageal reflux disease)     Hypertension     Lumbar back pain     Panic attacks     Stenosis of right carotid artery     Stroke ([facility] [address] )     Wears glasses     Wears partial dentures     upper denture       Past Surgical History:   Procedure Laterality Date    COLONOSCOPY      IR CEREBRAL ANGIOGRAPHY  4/6/2021    OTHER SURGICAL HISTORY      fertility    [place] SLCTV CATHJ 3RD+ ORD SLCTV ABDL PEL/LXTR [facility] Right 4/6/2021    Procedure: ARTERIOGRAM, carotid Angio;  Surgeon: Dariusz Cummings MD;  Location: [facility];  Service: Vascular    NM THROMBOENDARTECTMY [place] Right 4/13/2021    Procedure: RIGHT ENDARTERECTOMY ARTERY CAROTID WITH BOVINE PATCH;  Surgeon: Dariusz Cummings MD;  Location:  MAIN OR;  Service: Vascular       No family history on file  I have reviewed and agree with the history as documented  E-Cigarette/Vaping    E-Cigarette Use Never User      E-Cigarette/Vaping Substances    Nicotine No     THC No     CBD No     Flavoring No     Other No     Unknown No      Social History     Tobacco Use    Smoking status: Light Tobacco Smoker     Packs/day: 0 25    Smokeless tobacco: Never Used    Tobacco comment: also wears nicotine patches   Vaping Use    Vaping Use: Never used   Substance Use Topics    Alcohol use: Yes    Drug use: Never       Review of Systems   Constitutional: Positive for diaphoresis  Cardiovascular: Positive for chest pain and palpitations  All other systems reviewed and are negative  Physical Exam  Physical Exam  Vitals and nursing note reviewed  Constitutional:       General: He is not in acute distress  Appearance: Normal appearance  He is well-developed and normal weight  HENT:      Head: Normocephalic and atraumatic  Right Ear: External ear normal       Left Ear: External ear normal       Nose: Nose normal  No congestion or rhinorrhea  Mouth/Throat:      Mouth: Mucous membranes are moist       Pharynx: Oropharynx is clear  Eyes:      General: No scleral icterus  Right eye: No discharge  Left eye: No discharge  Extraocular Movements: Extraocular movements intact  Conjunctiva/sclera: Conjunctivae normal       Pupils: Pupils are equal, round, and reactive to light  Cardiovascular:      Rate and Rhythm: Tachycardia present  Rhythm irregular  Pulses: Normal pulses        Heart sounds: Heart sounds not distant  Murmur heard  No systolic murmur is present  Pulmonary:      Effort: Pulmonary effort is normal  No respiratory distress  Breath sounds: Normal breath sounds  Abdominal:      General: Abdomen is flat  Bowel sounds are normal       Palpations: Abdomen is soft  Tenderness: There is abdominal tenderness in the right upper quadrant and epigastric area  Musculoskeletal:         General: No swelling  Normal range of motion  Cervical back: Normal range of motion and neck supple  Right lower leg: No tenderness  No edema  Left lower leg: No tenderness  No edema  Skin:     General: Skin is warm and dry  Capillary Refill: Capillary refill takes less than 2 seconds  Neurological:      General: No focal deficit present  Mental Status: He is alert and oriented to person, place, and time  Mental status is at baseline     Psychiatric:         Mood and Affect: Mood normal          Behavior: Behavior normal          Vital Signs  ED Triage Vitals [02/22/22 0937]   Temperature Pulse Respirations Blood Pressure SpO2   98 1 °F (36 7 °C) 85 18 151/95 95 %      Temp Source Heart Rate Source Patient Position - Orthostatic VS BP Location FiO2 (%)   Oral Monitor Sitting Right arm --      Pain Score       7           Vitals:    02/22/22 1530 02/22/22 1549 02/22/22 1600 02/22/22 1700   BP: (!) 205/84 (!) 228/112 (!) 188/85 160/77   Pulse: (!) 106 103 102 96   Patient Position - Orthostatic VS: Lying  Lying Lying         Visual Acuity      ED Medications  Medications   nitroglycerin (NITROSTAT) SL tablet 0 4 mg (0 4 mg Sublingual Given 2/22/22 0954)   sodium chloride (PF) 0 9 % injection 3 mL (has no administration in time range)   LORazepam (ATIVAN) 2 mg/mL injection **ADS Override Pull** (0 mg  Hold 2/22/22 1702)   diltiazem (CARDIZEM) injection 25 mg (0 mg Intravenous Given to EMS 2/22/22 0943)   diltiazem (CARDIZEM) 125 mg in sodium chloride 0 9 % 125 mL infusion (5 mg/hr Intravenous Rate/Dose Change 2/22/22 1515)   diltiazem (CARDIZEM) injection 15 mg (15 mg Intravenous Given 2/22/22 1001)   morphine (PF) 4 mg/mL injection 4 mg (4 mg Intravenous Given 2/22/22 1030)   ondansetron (ZOFRAN) injection 4 mg (4 mg Intravenous Given 2/22/22 1030)   magnesium sulfate 2 g/50 mL IVPB (premix) 2 g (0 g Intravenous Stopped 2/22/22 1233)   potassium chloride 20 mEq IVPB (premix) (0 mEq Intravenous Stopped 2/22/22 1234)   lactated ringers bolus 1,000 mL (0 mL Intravenous Stopped 2/22/22 1604)   sodium chloride 0 9 % bolus 1,000 mL (0 mL Intravenous Stopped 2/22/22 1141)   hydrALAZINE (APRESOLINE) injection 10 mg (10 mg Intravenous Given 2/22/22 1437)   iohexol (OMNIPAQUE) 350 MG/ML injection (SINGLE-DOSE) 100 mL (100 mL Intravenous Given 2/22/22 1451)   HYDROmorphone (DILAUDID) injection 0 5 mg (0 5 mg Intravenous Given 2/22/22 1540)   levofloxacin (LEVAQUIN) IVPB (premix in dextrose) 750 mg 150 mL (750 mg Intravenous New Bag 2/22/22 1542)   metroNIDAZOLE (FLAGYL) tablet 500 mg (500 mg Oral Given 2/22/22 1541)   LORazepam (ATIVAN) injection 1 mg (1 mg Intravenous Given 2/22/22 1605)       Diagnostic Studies  Results Reviewed     Procedure Component Value Units Date/Time    HS Troponin I 4hr [[id number]]  (Normal) Collected: 02/22/22 1420    Lab Status: Final result Specimen: Blood from Arm, Right Updated: 02/22/22 1456     hs TnI 4hr 23 ng/L      Delta 4hr hsTnI 9 ng/L     HS Troponin I 2hr [[id number]]  (Normal) Collected: 02/22/22 1237    Lab Status: Final result Specimen: Blood from Arm, Right Updated: 02/22/22 1323     hs TnI 2hr 21 ng/L      Delta 2hr hsTnI 7 ng/L     Lipase [[id number]]  (Abnormal) Collected: 02/22/22    Lab Status: Final result Specimen: Blood Updated: 02/22/22 1208     Lipase 413 u/L     Ethanol [[id number]]  (Abnormal) Collected: 02/22/22 1039    Lab Status: Final result Specimen: Blood from Arm, Right Updated: 02/22/22 1141     Ethanol Lvl 82 mg/dL     NT-BNP PRO [[id number]]  (Abnormal) Collected: 02/22/22    Lab Status: Final result Specimen: Blood Updated: 02/22/22 1054     NT-proBNP 423 pg/mL     TSH, 3rd generation [[id number]]  (Normal) Collected: 02/22/22    Lab Status: Final result Specimen: Blood Updated: 02/22/22 1054     TSH 3RD GENERATON 0 553 uIU/mL     Narrative:      Patients undergoing fluorescein dye angiography may retain small amounts of fluorescein in the body for 48-72 hours post procedure  Samples containing fluorescein can produce falsely depressed TSH values  If the patient had this procedure,a specimen should be resubmitted post fluorescein clearance        HS Troponin 0hr (reflex protocol) [[id number]]  (Normal) Collected: 02/22/22    Lab Status: Final result Specimen: Blood Updated: 02/22/22 1032     hs TnI 0hr 14 ng/L     Magnesium [[id number]]  (Abnormal) Collected: 02/22/22    Lab Status: Final result Specimen: Blood Updated: 02/22/22 1026     Magnesium 1 2 mg/dL     Comprehensive metabolic panel [[id number]]  (Abnormal) Collected: 02/22/22    Lab Status: Final result Specimen: Blood Updated: 02/22/22 1025     Sodium 136 mmol/L      Potassium 3 2 mmol/L      Chloride 94 mmol/L      CO2 28 mmol/L      ANION GAP 14 mmol/L      BUN 12 mg/dL      Creatinine 1 12 mg/dL      Glucose 115 mg/dL      Calcium 8 6 mg/dL      Corrected Calcium 9 2 mg/dL       U/L       U/L      Alkaline Phosphatase 376 U/L      Total Protein 7 3 g/dL      Albumin 3 2 g/dL      Total Bilirubin 1 57 mg/dL      eGFR 68 ml/min/1 73sq m     Narrative:      [place] guidelines for Chronic Kidney Disease (CKD):     Stage 1 with normal or high GFR (GFR > 90 mL/min/1 73 square meters)    Stage 2 Mild CKD (GFR = 60-89 mL/min/1 73 square meters)    Stage 3A Moderate CKD (GFR = 45-59 mL/min/1 73 square meters)    Stage 3B Moderate CKD (GFR = 30-44 mL/min/1 73 square meters)    Stage 4 Severe CKD (GFR = 15-29 mL/min/1 73 square meters)    Stage 5 End Stage CKD (GFR <15 mL/min/1 73 square meters)  Note: GFR calculation is accurate only with a steady state creatinine    CBC and differential [[id number]]  (Abnormal) Collected: 02/22/22 1018    Lab Status: Final result Specimen: Blood Updated: 02/22/22 1023     WBC 10 76 Thousand/uL      RBC 4 69 Million/uL      Hemoglobin 13 2 g/dL      Hematocrit 39 3 %      MCV 84 fL      MCH 28 1 pg      MCHC 33 6 g/dL      RDW 13 6 %      MPV 10 2 fL      Platelets 378 Thousands/uL      nRBC 0 /100 WBCs      Neutrophils Relative 84 %      Immat GRANS % 1 %      Lymphocytes Relative 7 %      Monocytes Relative 7 %      Eosinophils Relative 1 %      Basophils Relative 0 %      Neutrophils Absolute 9 13 Thousands/µL      Immature Grans Absolute 0 07 Thousand/uL      Lymphocytes Absolute 0 74 Thousands/µL      Monocytes Absolute 0 71 Thousand/µL      Eosinophils Absolute 0 08 Thousand/µL      Basophils Absolute 0 03 Thousands/µL     Narrative: This is an appended report  These results have been appended to a previously verified report  US right upper quadrant   Final Result by Larissa Maynard MD (02/22 4872)      Cholelithiasis and choledocholithiasis  Positive Godoy's sign with gallbladder wall thickening and edema and potentially tiny amount of pericholecystic fluid  These latter findings are most indicative of acute cholecystitis       The examination demonstrates a significant  finding and was documented as such in [facility] for liaison and referring practitioner immediate notification         Workstation performed: [id number]         CTA dissection protocol chest/abdomen/pelvis   Final Result by Andie Cole MD (02/22 9373)      Gallbladder wall thickening with edema and cholelithiasis, evaluate for acute cholecystitis      Biliary dilation common bile duct measuring 1 4 cm, concerning for biliary obstruction, MRCP may be considered to evaluate for choledocholithiasis      No thoracic aortic dissection      No pulmonary embolism      Opacification of the segmental bronchi of the left lower lobe, image 83 series 3, short interval follow-up at 3 months suggested this may be due to secretions or due to focal lesion      Incidentally detected the less than 6 mm nodules in the both lung with the most prominent in the subpleural region in the left upper lobe  Based on current Fleischner Society 2017 Guidelines on incidental pulmonary nodule,  follow-up CT at 12 months can    be considered  Mild reticulation in the subpleural region, raises concern for mild interstitial lung disease      Hepatic steatosis      Again noted is thickening of the wall of the stomach as seen on the previous study, remains indeterminate      Atherosclerotic disease in the aorta with the suggestion of more than 50% stenosis in the right common iliac artery   The study was marked in EPIC for immediate notification  Workstation performed: [id number]         X-ray chest 1 view portable   Final Result by Adam Puentes MD (02/22 1441)      No acute cardiopulmonary disease                    Workstation performed: [id number]                    Procedures  ECG 12 Lead Documentation Only    Date/Time: 2/22/2022 9:45 AM  Performed by: Chayito Javed DO  Authorized by: Chayito Javed DO     Indications / Diagnosis:  Chest pain   ECG reviewed by me, the ED Provider: yes    Patient location:  ED  Interpretation:     Interpretation: abnormal    Rate:     ECG rate:  96    ECG rate assessment: tachycardic    Rhythm:     Rhythm: atrial fibrillation    Ectopy:     Ectopy: none    QRS:     QRS axis:  Normal    QRS intervals:  Normal  Conduction:     Conduction: normal    ST segments:     ST segments:  Normal  T waves:     T waves: normal    ECG 12 Lead Documentation Only    Date/Time: 2/22/2022 2:57 PM  Performed by: Chayito Javed DO  Authorized by: Chayito aJved DO     Indications / Diagnosis:  Repeat ekg, afib  ECG reviewed by me, the ED Provider: yes    Patient location:  ED  Previous ECG:     Previous ECG:  Compared to current    Similarity:  Changes noted    Comparison to cardiac monitor: Yes    Interpretation:     Interpretation: normal    Rate:     ECG rate:  90    ECG rate assessment: normal    Rhythm:     Rhythm: sinus rhythm    Ectopy:     Ectopy: none    QRS:     QRS axis:  Normal    QRS intervals:  Normal  Conduction:     Conduction: normal    ST segments:     ST segments:  Normal  T waves:     T waves: normal    CriticalCare Time  Performed by: Sarahy Yuan DO  Authorized by: Sarahy Yuan DO     Critical care provider statement:     Critical care time (minutes):  60    Critical care time was exclusive of:  Separately billable procedures and treating other patients and teaching time    Critical care was necessary to treat or prevent imminent or life-threatening deterioration of the following conditions:  Cardiac failure and metabolic crisis    Critical care was time spent personally by me on the following activities:  Obtaining history from patient or surrogate, development of treatment plan with patient or surrogate, discussions with consultants, discussions with primary provider, evaluation of patient's response to treatment, examination of patient, review of old charts, ordering and review of radiographic studies, re-evaluation of patient's condition, ordering and review of laboratory studies and ordering and performing treatments and interventions    I assumed direction of critical care for this patient from another provider in my specialty: no               ED Course  ED Course as of 02/22/22 1712   Tue Feb 22, 2022   1034 Patient is starting to complain of increasing chest abdominal pain  On repeat examination, he is tender to his epigastric right upper quadrant  Will medicate with IV morphine and Zofran  Magnesium level very low 1 2, potassium 3 2, troponin 14, LFTs both elevated    Will add lipase CT chest abdomen pelvis  States he does drink alcohol frequently but not every day  He admits 8 beers every other day  1212 Patient states feeling better, pain improving  Ct still pending  1259 Pt states feeling better, now sleeping  1426 Pt feeling better - converted to sinus rhythm, confirmed on repeat ekg at 14:18    1458 Bp up  Hydralazine given  Pt states hasn't taken home bp meds today  1552 D/w gen surg, Dr Hussain Anne, will be down to eval    1600:  Surgery evaluated, not recommending acute surgery at this time, recommend GI evaluation and ERCP prior to surgery  Discussed case with Internal Medicine, Dr Ge, accepts to service  D/w GI on call, Kelley Marquez, aware and will see in consulation                                            MDM  Number of Diagnoses or Management Options  Atrial fibrillation with RVR ([facility] [address] ): new and requires workup  Choledocholithiasis with acute cholecystitis: new and requires workup  Hypokalemia: new and requires workup  Hypomagnesemia: new and requires workup     Amount and/or Complexity of Data Reviewed  Clinical lab tests: ordered and reviewed  Tests in the radiology section of CPT®: ordered and reviewed  Decide to obtain previous medical records or to obtain history from someone other than the patient: yes  Discuss the patient with other providers: yes  Independent visualization of images, tracings, or specimens: yes    Risk of Complications, Morbidity, and/or Mortality  Presenting problems: high  Diagnostic procedures: high  Management options: high    Patient Progress  Patient progress: improved      Disposition  Final diagnoses:   Choledocholithiasis with acute cholecystitis   Atrial fibrillation with RVR ([facility] [address] )   Hypomagnesemia   Hypokalemia     Time reflects when diagnosis was documented in both MDM as applicable and the Disposition within this note     Time User Action Codes Description Comment    2/22/2022  4:31 PM Flavio Perez Add [K80 50] Choledocholithiasis     2/22/2022  4:36 PM Garold Constant Add [K81 0] Acute cholecystitis     2/22/2022  4:36 PM Garold Constant Add [K80 42] Choledocholithiasis with acute cholecystitis     2/22/2022  4:36 PM Garold Constant Remove [K81 0] Acute cholecystitis     2/22/2022  4:36 PM Garold Constant Modify [K80 50] Choledocholithiasis     2/22/2022  4:36 PM Garold Constant Modify [K80 42] Choledocholithiasis with acute cholecystitis     2/22/2022  4:36 PM Garold Constant Add [I48 91] Atrial fibrillation with RVR ([address] )     2/22/2022  4:36 PM Urszula Skill [E83 42] Hypomagnesemia     2/22/2022  4:36 PM Garold Constant Add [E87 6] Hypokalemia       ED Disposition     ED Disposition Condition Date/Time Comment    Admit Stable Tue Feb 22, 2022  4:35 PM Case was discussed with Dr Margie Chavez and the patient's admission status was agreed to be Admission Status: inpatient status to the service of Dr Margie Chavez   Follow-up Information    None         Patient's Medications   Discharge Prescriptions    No medications on file       No discharge procedures on file      PDMP Review       Value Time User    PDMP Reviewed  Yes 9/6/2021  2:40 PM Chani Moser MD          ED Provider  Electronically Signed by           Meghna Torre DO  02/22/22 6529

## 2022-02-22 NOTE — CONSULTS
Consultation - General Surgery   Cong Pappas 73 y o  male MRN: [id number]  Unit/Bed#: ED 27 Encounter: [id number]    Assessment/Plan     Assessment:  72 yo male w/ acute cholecystis, choledocholithiasis, Afib with RVR, and etOH withdrawal    HTN, Tachycardic, afebrile  CTAP-GB wall thickening with edema and cholelithaisis  Biliary dilation CBD measuring 1 4 cm     RUQ US-Cholelithiasis and choledocholithiasis  Positive jaramillo's sign, wall thickening and edema  Plan:  -No acute surgical intervention  Recommend GI evaluation for CBD stone and ERCP  -Monitor LFTs  -Hold Eliquis and plavix  -If patient is to clinically worsen would recommend perc leatha tube  -Recommend IV abx with Ancef/flagy  -Medical admission  -Surgery will follow  History of Present Illness     HPI:  Cong Pappas is a 73 y o  male who presents with epigastric and lower chest pain that has been ongoing for over a week  He states this morning the pain became unbearable  Patient called EMS where he was found to be in rapid Afib, of note he has no hx of afib  Endorses chills, no fevers  Reports that he has had this pain intermittently over the past 2 months, but never as bad as it has been this week  No nausea or vomiting  Endorses constipation, passing flatus  No blood in BMs  Medical history significant for a stroke and CEA in 4/2021  Patient takes eliquis and plavix daily for hx of stroke, last took dose today  In the ED was found to still be in Afib, and was started on a cardizem gtt  Workup positive for cholecystitis, cholelithiasis, and choledocholithiasis  Leukocytosis of 10 76 and transaminitis with mild elevations of AST/ALT/tbili  Of note patient has a history of etoh abuse for which he states he drinks 6-8 beers daily  Last drink was last night when he drank wine  In ED concern for etOH withdraw and was given ativan  Consult to Surgery - General  Consult performed by:  Ana Devine DO  Consult ordered by: Fabio Combs DO          Review of Systems   Constitutional: Positive for chills  Negative for fever  HENT: Positive for congestion  Eyes: Negative  Respiratory: Positive for shortness of breath  Cardiovascular: Positive for chest pain  Gastrointestinal: Positive for abdominal pain and constipation  Negative for diarrhea, nausea and vomiting  Endocrine: Negative  Genitourinary: Negative  Musculoskeletal: Negative  Skin: Negative  Allergic/Immunologic: Negative  Neurological: Negative  Hematological: Negative  Psychiatric/Behavioral: Negative          Historical Information   Past Medical History:   Diagnosis Date    [facility]ITI [facility]acute kidney injury) ([facility] [address] ) 9/5/2021    Anxiety     Back pain     Claustrophobia     [facility]ch[facility] obstructive [facility] disease) [facility])     Dysphagia 9/5/2021    GERD (gastroesophageal reflux disease)     Hypertension     Lumbar back pain     Panic attacks     Stenosis of right carotid artery     Stroke [facility])     Wears glasses     Wears partial dentures     upper denture     Past Surgical History:   Procedure Laterality Date    COLONOSCOPY      IR CEREBRAL ANGIOGRAPHY  4/6/2021    OTHER SURGICAL HISTORY      fertility    RI SLCTV CATHJ 3RD+ ORD SLCTV ABDL PEL/LXTR [facility] Right 4/6/2021    Procedure: ARTERIOGRAM, carotid Angio;  Surgeon: Ellyn Galicia MD;  Location: [facility];  Service: Vascular    RI THROMBOENDARTECTMY Ashley Faiza Right 4/13/2021    Procedure: RIGHT ENDARTERECTOMY ARTERY CAROTID WITH BOVINE PATCH;  Surgeon: Ellyn Galicia MD;  Location: [facility];  Service: Vascular     Social History   Social History     Substance and Sexual Activity   Alcohol Use Yes     Social History     Substance and Sexual Activity   Drug Use Never     E-Cigarette/Vaping    E-Cigarette Use Never User      E-Cigarette/Vaping Substances    Nicotine No     THC No     CBD No     Flavoring No     Other No     Unknown No      Social History     Tobacco Use Smoking Status Light Tobacco Smoker    Packs/day: 0 25   Smokeless Tobacco Never Used   Tobacco Comment    also wears nicotine patches     Family History: No family history on file  Meds/Allergies   all current active meds have been reviewed  Allergies   Allergen Reactions    Penicillins Anaphylaxis       Objective   First Vitals:   Blood Pressure: 151/95 (02/22/22 0937)  Pulse: 85 (02/22/22 0937)  Temperature: 98 1 °F (36 7 °C) (02/22/22 0937)  Temp Source: Oral (02/22/22 0937)  Respirations: 18 (02/22/22 0937)  Weight - Scale: 71 6 kg (157 lb 13 6 oz) (02/22/22 0937)  SpO2: 95 % (02/22/22 0937)    Current Vitals:   Blood Pressure: (!) 173/85 (02/22/22 1730)  Pulse: 94 (02/22/22 1730)  Temperature: 98 1 °F (36 7 °C) (02/22/22 0937)  Temp Source: Oral (02/22/22 6785)  Respirations: 18 (02/22/22 1730)  Weight - Scale: 71 6 kg (157 lb 13 6 oz) (02/22/22 0937)  SpO2: 97 % (02/22/22 1730)    No intake or output data in the 24 hours ending 02/22/22 1807    Invasive Devices  Report    Peripheral Intravenous Line            Peripheral IV 02/22/22 Left Forearm <1 day    Peripheral IV 02/22/22 Right Forearm <1 day                Physical Exam  General: diaphoretic  HENT: NCAT MMM  Neck: supple, no JVD  CV: irregular, tachycardic  Lungs: nl wob  No resp distress  On 2L NC  ABD: Soft, epigastric abd tenderness, mild tenderness in the RUQ, nondistended  Extrem: No CCE  Neuro: tremulous     Lab Results:   I have personally reviewed pertinent lab results    , CBC:   Lab Results   Component Value Date    WBC 10 76 (H) 02/22/2022    HGB 13 2 02/22/2022    HCT 39 3 02/22/2022    MCV 84 02/22/2022     02/22/2022    MCH 28 1 02/22/2022    MCHC 33 6 02/22/2022    RDW 13 6 02/22/2022    MPV 10 2 02/22/2022    NRBC 0 02/22/2022   , CMP:   Lab Results   Component Value Date    SODIUM 136 02/22/2022    K 3 2 (L) 02/22/2022    CL 94 (L) 02/22/2022    CO2 28 02/22/2022    BUN 12 02/22/2022    CREATININE 1 12 02/22/2022 CALCIUM 8 6 02/22/2022     (H) 02/22/2022     (H) 02/22/2022    ALKPHOS 376 (H) 02/22/2022    EGFR 68 02/22/2022   , Coagulation: No results found for: PT, INR, APTT  Imaging: I have personally reviewed pertinent films in PACS  EKG, Pathology, and Other Studies: I have personally reviewed pertinent films in PACS

## 2022-02-22 NOTE — ED NOTES
Patient transported to [facility] [address], RN  02/22/22 7131

## 2022-02-23 ENCOUNTER — ANESTHESIA EVENT (INPATIENT)
Dept: GASTROENTEROLOGY | Facility: HOSPITAL | Age: 67
DRG: 444 | End: 2022-02-23
Payer: COMMERCIAL

## 2022-02-23 ENCOUNTER — APPOINTMENT (INPATIENT)
Dept: GASTROENTEROLOGY | Facility: HOSPITAL | Age: 67
DRG: 444 | End: 2022-02-23
Payer: COMMERCIAL

## 2022-02-23 ENCOUNTER — APPOINTMENT (INPATIENT)
Dept: RADIOLOGY | Facility: HOSPITAL | Age: 67
DRG: 444 | End: 2022-02-23
Payer: COMMERCIAL

## 2022-02-23 ENCOUNTER — ANESTHESIA (INPATIENT)
Dept: GASTROENTEROLOGY | Facility: HOSPITAL | Age: 67
DRG: 444 | End: 2022-02-23
Payer: COMMERCIAL

## 2022-02-23 PROBLEM — K80.20 CHOLELITHIASIS: Status: ACTIVE | Noted: 2022-02-23

## 2022-02-23 PROBLEM — I48.0 PAF (PAROXYSMAL ATRIAL FIBRILLATION) (HCC): Chronic | Status: ACTIVE | Noted: 2022-02-22

## 2022-02-23 PROBLEM — I63.9 CVA (CEREBRAL VASCULAR ACCIDENT) (HCC): Chronic | Status: ACTIVE | Noted: 2021-09-02

## 2022-02-23 PROBLEM — E87.6 HYPOKALEMIA: Status: RESOLVED | Noted: 2022-02-22 | Resolved: 2022-02-23

## 2022-02-23 PROBLEM — Z98.890 S/P CAROTID ENDARTERECTOMY: Chronic | Status: ACTIVE | Noted: 2021-11-10

## 2022-02-23 LAB
ALBUMIN SERPL BCP-MCNC: 2.5 G/DL (ref 3.5–5)
ALP SERPL-CCNC: 320 U/L (ref 46–116)
ALT SERPL W P-5'-P-CCNC: 95 U/L (ref 12–78)
ANION GAP SERPL CALCULATED.3IONS-SCNC: 9 MMOL/L (ref 4–13)
AST SERPL W P-5'-P-CCNC: 89 U/L (ref 5–45)
BASOPHILS # BLD AUTO: 0.01 THOUSANDS/ΜL (ref 0–0.1)
BASOPHILS NFR BLD AUTO: 0 % (ref 0–1)
BILIRUB SERPL-MCNC: 1.51 MG/DL (ref 0.2–1)
BUN SERPL-MCNC: 11 MG/DL (ref 5–25)
CALCIUM ALBUM COR SERPL-MCNC: 8.9 MG/DL (ref 8.3–10.1)
CALCIUM SERPL-MCNC: 7.7 MG/DL (ref 8.3–10.1)
CHLORIDE SERPL-SCNC: 100 MMOL/L (ref 100–108)
CO2 SERPL-SCNC: 25 MMOL/L (ref 21–32)
CREAT SERPL-MCNC: 1.07 MG/DL (ref 0.6–1.3)
EOSINOPHIL # BLD AUTO: 0.07 THOUSAND/ΜL (ref 0–0.61)
EOSINOPHIL NFR BLD AUTO: 2 % (ref 0–6)
ERYTHROCYTE [DISTWIDTH] IN BLOOD BY AUTOMATED COUNT: 14.1 % (ref 11.6–15.1)
GFR SERPL CREATININE-BSD FRML MDRD: 72 ML/MIN/1.73SQ M
GLUCOSE SERPL-MCNC: 87 MG/DL (ref 65–140)
HCT VFR BLD AUTO: 32.8 % (ref 36.5–49.3)
HGB BLD-MCNC: 10.4 G/DL (ref 12–17)
IMM GRANULOCYTES # BLD AUTO: 0.02 THOUSAND/UL (ref 0–0.2)
IMM GRANULOCYTES NFR BLD AUTO: 1 % (ref 0–2)
LIPASE SERPL-CCNC: 107 U/L (ref 73–393)
LYMPHOCYTES # BLD AUTO: 0.43 THOUSANDS/ΜL (ref 0.6–4.47)
LYMPHOCYTES NFR BLD AUTO: 11 % (ref 14–44)
MCH RBC QN AUTO: 27.8 PG (ref 26.8–34.3)
MCHC RBC AUTO-ENTMCNC: 31.7 G/DL (ref 31.4–37.4)
MCV RBC AUTO: 88 FL (ref 82–98)
MONOCYTES # BLD AUTO: 0.38 THOUSAND/ΜL (ref 0.17–1.22)
MONOCYTES NFR BLD AUTO: 10 % (ref 4–12)
NEUTROPHILS # BLD AUTO: 3.07 THOUSANDS/ΜL (ref 1.85–7.62)
NEUTS SEG NFR BLD AUTO: 76 % (ref 43–75)
NRBC BLD AUTO-RTO: 0 /100 WBCS
PLATELET # BLD AUTO: 168 THOUSANDS/UL (ref 149–390)
PMV BLD AUTO: 10.3 FL (ref 8.9–12.7)
POTASSIUM SERPL-SCNC: 4.5 MMOL/L (ref 3.5–5.3)
PROT SERPL-MCNC: 6 G/DL (ref 6.4–8.2)
RBC # BLD AUTO: 3.74 MILLION/UL (ref 3.88–5.62)
SODIUM SERPL-SCNC: 134 MMOL/L (ref 136–145)
WBC # BLD AUTO: 3.98 THOUSAND/UL (ref 4.31–10.16)

## 2022-02-23 PROCEDURE — 0FC98ZZ EXTIRPATION OF MATTER FROM COMMON BILE DUCT, VIA NATURAL OR ARTIFICIAL OPENING ENDOSCOPIC: ICD-10-PCS | Performed by: INTERNAL MEDICINE

## 2022-02-23 PROCEDURE — 99232 SBSQ HOSP IP/OBS MODERATE 35: CPT | Performed by: INTERNAL MEDICINE

## 2022-02-23 PROCEDURE — NC001 PR NO CHARGE: Performed by: SURGERY

## 2022-02-23 PROCEDURE — 80053 COMPREHEN METABOLIC PANEL: CPT

## 2022-02-23 PROCEDURE — 83690 ASSAY OF LIPASE: CPT

## 2022-02-23 PROCEDURE — 97163 PT EVAL HIGH COMPLEX 45 MIN: CPT

## 2022-02-23 PROCEDURE — 43264 ERCP REMOVE DUCT CALCULI: CPT | Performed by: INTERNAL MEDICINE

## 2022-02-23 PROCEDURE — BF101ZZ FLUOROSCOPY OF BILE DUCTS USING LOW OSMOLAR CONTRAST: ICD-10-PCS | Performed by: INTERNAL MEDICINE

## 2022-02-23 PROCEDURE — 99223 1ST HOSP IP/OBS HIGH 75: CPT | Performed by: INTERNAL MEDICINE

## 2022-02-23 PROCEDURE — 74328 X-RAY BILE DUCT ENDOSCOPY: CPT

## 2022-02-23 PROCEDURE — 99221 1ST HOSP IP/OBS SF/LOW 40: CPT | Performed by: SURGERY

## 2022-02-23 PROCEDURE — C1769 GUIDE WIRE: HCPCS

## 2022-02-23 PROCEDURE — 99223 1ST HOSP IP/OBS HIGH 75: CPT | Performed by: PHYSICIAN ASSISTANT

## 2022-02-23 PROCEDURE — 85025 COMPLETE CBC W/AUTO DIFF WBC: CPT

## 2022-02-23 RX ORDER — METOCLOPRAMIDE HYDROCHLORIDE 5 MG/ML
10 INJECTION INTRAMUSCULAR; INTRAVENOUS ONCE AS NEEDED
Status: DISCONTINUED | OUTPATIENT
Start: 2022-02-23 | End: 2022-02-23 | Stop reason: HOSPADM

## 2022-02-23 RX ORDER — HYDROMORPHONE HCL IN WATER/PF 6 MG/30 ML
0.2 PATIENT CONTROLLED ANALGESIA SYRINGE INTRAVENOUS
Status: DISCONTINUED | OUTPATIENT
Start: 2022-02-23 | End: 2022-02-23 | Stop reason: HOSPADM

## 2022-02-23 RX ORDER — LIDOCAINE HYDROCHLORIDE 10 MG/ML
INJECTION, SOLUTION EPIDURAL; INFILTRATION; INTRACAUDAL; PERINEURAL AS NEEDED
Status: DISCONTINUED | OUTPATIENT
Start: 2022-02-23 | End: 2022-02-23

## 2022-02-23 RX ORDER — DEXAMETHASONE SODIUM PHOSPHATE 10 MG/ML
INJECTION, SOLUTION INTRAMUSCULAR; INTRAVENOUS AS NEEDED
Status: DISCONTINUED | OUTPATIENT
Start: 2022-02-23 | End: 2022-02-23

## 2022-02-23 RX ORDER — ONDANSETRON 2 MG/ML
4 INJECTION INTRAMUSCULAR; INTRAVENOUS ONCE AS NEEDED
Status: DISCONTINUED | OUTPATIENT
Start: 2022-02-23 | End: 2022-02-23 | Stop reason: HOSPADM

## 2022-02-23 RX ORDER — ALBUTEROL SULFATE 2.5 MG/3ML
2.5 SOLUTION RESPIRATORY (INHALATION) ONCE AS NEEDED
Status: DISCONTINUED | OUTPATIENT
Start: 2022-02-23 | End: 2022-02-23 | Stop reason: HOSPADM

## 2022-02-23 RX ORDER — FENTANYL CITRATE 50 UG/ML
INJECTION, SOLUTION INTRAMUSCULAR; INTRAVENOUS AS NEEDED
Status: DISCONTINUED | OUTPATIENT
Start: 2022-02-23 | End: 2022-02-23

## 2022-02-23 RX ORDER — FENTANYL CITRATE/PF 50 MCG/ML
25 SYRINGE (ML) INJECTION
Status: DISCONTINUED | OUTPATIENT
Start: 2022-02-23 | End: 2022-02-23 | Stop reason: HOSPADM

## 2022-02-23 RX ORDER — EPHEDRINE SULFATE 50 MG/ML
INJECTION INTRAVENOUS AS NEEDED
Status: DISCONTINUED | OUTPATIENT
Start: 2022-02-23 | End: 2022-02-23

## 2022-02-23 RX ORDER — PROPOFOL 10 MG/ML
INJECTION, EMULSION INTRAVENOUS AS NEEDED
Status: DISCONTINUED | OUTPATIENT
Start: 2022-02-23 | End: 2022-02-23

## 2022-02-23 RX ORDER — SUCCINYLCHOLINE/SOD CL,ISO/PF 100 MG/5ML
SYRINGE (ML) INTRAVENOUS AS NEEDED
Status: DISCONTINUED | OUTPATIENT
Start: 2022-02-23 | End: 2022-02-23

## 2022-02-23 RX ORDER — SODIUM CHLORIDE, SODIUM LACTATE, POTASSIUM CHLORIDE, CALCIUM CHLORIDE 600; 310; 30; 20 MG/100ML; MG/100ML; MG/100ML; MG/100ML
175 INJECTION, SOLUTION INTRAVENOUS CONTINUOUS
Status: DISCONTINUED | OUTPATIENT
Start: 2022-02-23 | End: 2022-02-24

## 2022-02-23 RX ORDER — SODIUM CHLORIDE, SODIUM LACTATE, POTASSIUM CHLORIDE, CALCIUM CHLORIDE 600; 310; 30; 20 MG/100ML; MG/100ML; MG/100ML; MG/100ML
INJECTION, SOLUTION INTRAVENOUS CONTINUOUS PRN
Status: DISCONTINUED | OUTPATIENT
Start: 2022-02-23 | End: 2022-02-23

## 2022-02-23 RX ADMIN — CEFAZOLIN SODIUM 2000 MG: 2 SOLUTION INTRAVENOUS at 18:04

## 2022-02-23 RX ADMIN — ALBUTEROL SULFATE 1 PUFF: 90 AEROSOL, METERED RESPIRATORY (INHALATION) at 12:23

## 2022-02-23 RX ADMIN — DILTIAZEM HYDROCHLORIDE 30 MG: 30 TABLET, FILM COATED ORAL at 01:36

## 2022-02-23 RX ADMIN — FENTANYL CITRATE 50 MCG: 50 INJECTION, SOLUTION INTRAMUSCULAR; INTRAVENOUS at 16:22

## 2022-02-23 RX ADMIN — PROPOFOL 200 MG: 10 INJECTION, EMULSION INTRAVENOUS at 16:15

## 2022-02-23 RX ADMIN — SODIUM CHLORIDE, SODIUM LACTATE, POTASSIUM CHLORIDE, AND CALCIUM CHLORIDE 175 ML/HR: .6; .31; .03; .02 INJECTION, SOLUTION INTRAVENOUS at 18:05

## 2022-02-23 RX ADMIN — PANTOPRAZOLE SODIUM 20 MG: 20 TABLET, DELAYED RELEASE ORAL at 05:41

## 2022-02-23 RX ADMIN — SODIUM CHLORIDE, SODIUM LACTATE, POTASSIUM CHLORIDE, AND CALCIUM CHLORIDE 175 ML/HR: .6; .31; .03; .02 INJECTION, SOLUTION INTRAVENOUS at 23:06

## 2022-02-23 RX ADMIN — CEFAZOLIN SODIUM 2000 MG: 2 SOLUTION INTRAVENOUS at 02:44

## 2022-02-23 RX ADMIN — FENTANYL CITRATE 50 MCG: 50 INJECTION, SOLUTION INTRAMUSCULAR; INTRAVENOUS at 16:15

## 2022-02-23 RX ADMIN — METRONIDAZOLE 500 MG: 500 TABLET ORAL at 21:32

## 2022-02-23 RX ADMIN — PRAVASTATIN SODIUM 40 MG: 40 TABLET ORAL at 18:04

## 2022-02-23 RX ADMIN — DILTIAZEM HYDROCHLORIDE 30 MG: 30 TABLET, FILM COATED ORAL at 05:41

## 2022-02-23 RX ADMIN — THIAMINE HCL TAB 100 MG 100 MG: 100 TAB at 08:28

## 2022-02-23 RX ADMIN — FOLIC ACID 1 MG: 1 TABLET ORAL at 08:28

## 2022-02-23 RX ADMIN — MULTIPLE VITAMINS W/ MINERALS TAB 1 TABLET: TAB ORAL at 08:28

## 2022-02-23 RX ADMIN — PHENYLEPHRINE HYDROCHLORIDE 40 MCG/MIN: 10 INJECTION INTRAVENOUS at 16:30

## 2022-02-23 RX ADMIN — METRONIDAZOLE 500 MG: 500 TABLET ORAL at 14:40

## 2022-02-23 RX ADMIN — DEXAMETHASONE SODIUM PHOSPHATE 8 MG: 10 INJECTION, SOLUTION INTRAMUSCULAR; INTRAVENOUS at 16:35

## 2022-02-23 RX ADMIN — Medication 100 MG: at 16:15

## 2022-02-23 RX ADMIN — Medication 40 MG: at 16:34

## 2022-02-23 RX ADMIN — CARVEDILOL 12.5 MG: 12.5 TABLET, FILM COATED ORAL at 18:04

## 2022-02-23 RX ADMIN — DILTIAZEM HYDROCHLORIDE 30 MG: 30 TABLET, FILM COATED ORAL at 11:26

## 2022-02-23 RX ADMIN — METRONIDAZOLE 500 MG: 500 TABLET ORAL at 05:41

## 2022-02-23 RX ADMIN — DILTIAZEM HYDROCHLORIDE 30 MG: 30 TABLET, FILM COATED ORAL at 18:04

## 2022-02-23 RX ADMIN — ONDANSETRON 4 MG: 2 INJECTION INTRAMUSCULAR; INTRAVENOUS at 16:50

## 2022-02-23 RX ADMIN — CARVEDILOL 12.5 MG: 12.5 TABLET, FILM COATED ORAL at 08:28

## 2022-02-23 RX ADMIN — LIDOCAINE HYDROCHLORIDE 50 MG: 10 INJECTION, SOLUTION EPIDURAL; INFILTRATION; INTRACAUDAL at 16:15

## 2022-02-23 RX ADMIN — SODIUM CHLORIDE, SODIUM LACTATE, POTASSIUM CHLORIDE, AND CALCIUM CHLORIDE: .6; .31; .03; .02 INJECTION, SOLUTION INTRAVENOUS at 16:12

## 2022-02-23 RX ADMIN — INDOMETHACIN 100 MG: 50 SUPPOSITORY RECTAL at 16:23

## 2022-02-23 RX ADMIN — IOHEXOL 13 ML: 240 INJECTION, SOLUTION INTRATHECAL; INTRAVASCULAR; INTRAVENOUS; ORAL at 16:51

## 2022-02-23 RX ADMIN — EPHEDRINE SULFATE 10 MG: 50 INJECTION, SOLUTION INTRAVENOUS at 16:33

## 2022-02-23 RX ADMIN — SERTRALINE HYDROCHLORIDE 100 MG: 50 TABLET ORAL at 21:31

## 2022-02-23 RX ADMIN — SODIUM CHLORIDE, SODIUM GLUCONATE, SODIUM ACETATE, POTASSIUM CHLORIDE, MAGNESIUM CHLORIDE, SODIUM PHOSPHATE, DIBASIC, AND POTASSIUM PHOSPHATE 75 ML/HR: .53; .5; .37; .037; .03; .012; .00082 INJECTION, SOLUTION INTRAVENOUS at 10:31

## 2022-02-23 RX ADMIN — CEFAZOLIN SODIUM 2000 MG: 2 SOLUTION INTRAVENOUS at 11:26

## 2022-02-23 NOTE — ASSESSMENT & PLAN NOTE
Hx of COPD   - uncontrolled since his [address] [facility] prescription not filled recently   - Albuterol inhaler usage only     Plan:  - Consider restarting maintenance therapy   - Continue Albuterol

## 2022-02-23 NOTE — UTILIZATION REVIEW
Initial Clinical Review    Admission: Date/Time/Statement:   Admission Orders (From admission, onward)     Ordered        02/22/22 1637  Inpatient Admission  Once                      Orders Placed This Encounter   Procedures    Inpatient Admission     Standing Status:   Standing     Number of Occurrences:   1     Order Specific Question:   Level of Care     Answer:   Med Surg [16]     Order Specific Question:   Estimated length of stay     Answer:   More than 2 Midnights     Order Specific Question:   Certification     Answer:   I certify that inpatient services are medically necessary for this patient for a duration of greater than two midnights  See H&P and MD Progress Notes for additional information about the patient's course of treatment  ED Arrival Information     Expected Arrival Acuity    - 2/22/2022 09:31 Emergent         Means of arrival Escorted by Service Admission type    Ambulance [facility] Emergency         Arrival complaint    Rapid Afib        Chief Complaint   Patient presents with    Chest Pain     Pt here via EMS c/o CP intermitten for a week that started at 4AM  PER EMS pt was in rapid AFIB -180  Pt given 15mg cardizem PTA  Pt still c/o CP  Initial Presentation:  74 yo PMH significant for HTN, CVA, ETOH abuse (8 beers Q other day) to ED by EMS presents w abdominal pain  Reports 1-2 wk intermittent  worsening severe mid abd pain lasting few min then resolving  Day of admission patient had a glass of wine w pain onset worsening radiating to umbilicus & not abating  En route to ED noted for episode of AFib & given 3 baby ASA  IN ED EKG w Afib, exam: Inebriated on exam, reporting chest pain, abd tenderness in RUQ & epigastric area  +murmur, rhythm irregular w tachycardia   Labs Leukocytosis, hypomagnesemia/ hypokalemia/ elevated LFTs;  US RUQ reveals acute cholecystitis, CTA chest, abd reveals biliary dilation common bile duct w concern for obstruction, MRCP recommended  Admit Inpatient due to Cholecystitis, new onset PAF, Hypokalemia, Hypomagnesemia, benign HTN, COPD, Alcohol abuse   PLAN  IV Antibx, consult Acute Gen Surgery, no acute surgical intervention currently, IVF, NPO at MN, Hold Eliquis,  ERCP in am , prn antiemetics  Replete & monitor potassium & MAG, cont Carvedilol/ Coreg BID, IV Cardizem, CIWA, consult GI & Cardiology   GEN Surgery   Clinical & DX  findings for acute cholecystitis & choledocholithiasis, Afib w RVR, etOH withdrawal  No acute surgical intervention; rec GI eval for CBD stone & ERCP, monito rLFTs, hold eliquis & Plavix  IF clinically worsens rec Perc ALFREDITO tube, cont IV antibx    Date: 2/23/2022   Day 2:   Attending MD  Acute cholecystitis w choledocholithiasis likely precipitating factor fo Afib currently resolved, cont IVF, IV antibx, NPO for ERCP  CIWA, DC Cardizem infusion & begin PO   GI:   Atypical CP w RUQ US reveal cholecystitis & Choledocholithiasis, plan ERCP, hold AC & eliquis  Cont IV antibx  Pending MRCP rec management   Attending   Cont w abd pain although decrease intensity  Exam w wheezing, abd tenderness midepigastric region  CARDIOLOGY  Pre OP cardiac risk assessment: for ERCP denies angina/ anginal sympt; wo acute decompensated HF, EKG w no ischemic change & neg TROPs   Proceed w ERCP, restart AC when deemed safe from GI standpoint, cotn Cardizem & Carvedilol, recent ECHO reviewed  ED Triage Vitals [02/22/22 0937]   Temperature Pulse Respirations Blood Pressure SpO2   98 1 °F (36 7 °C) 85 18 151/95 95 %      Temp Source Heart Rate Source Patient Position - Orthostatic VS BP Location FiO2 (%)   Oral Monitor Sitting Right arm --      Pain Score       7          Wt Readings from Last 1 Encounters:   02/22/22 71 6 kg (157 lb 13 6 oz)     Additional Vital Signs:    Date/Time Temp Pulse Resp BP MAP (mmHg) SpO2 Calculated FIO2 (%) - Nasal Cannula Nasal Cannula O2 Flow Rate (L/min) O2 Device Patient Position - Orthostatic VS   02/23/22 1126 -- 82 -- 140/60 -- -- -- -- -- --   02/23/22 0700 98 5 °F (36 9 °C) 73 18 134/63 -- 93 % -- -- None (Room air) Lying   02/23/22 0542 -- 69 -- 132/61 -- -- -- -- -- Lying   02/23/22 0356 -- -- -- -- -- -- -- -- None (Room air) --   02/23/22 0136 -- -- -- 114/58 -- -- -- -- -- --   02/22/22 2251 -- -- -- -- -- 98 % 28 2 L/min Nasal cannula --   02/22/22 2058 97 6 °F (36 4 °C) 75 18 128/71 94 96 % 28 2 L/min Nasal cannula Lying   02/22/22 2047 -- 76 18 136/67 97 98 % -- -- Nasal cannula Lying   02/22/22 1930 -- 88 -- 186/92 Abnormal  -- -- -- -- -- --   02/22/22 1929 -- 85 -- 186/92 Abnormal  -- -- -- -- -- --   02/22/22 1900 -- 78 20 156/78 110 96 % 28 2 L/min Nasal cannula --   02/22/22 1730 -- 94 18 173/85 Abnormal  122 97 % 28 2 L/min Nasal cannula Sitting   02/22/22 1700 -- 96 18 160/77 111 96 % 28 2 L/min Nasal cannula Lying   02/22/22 1600 -- 102 18 188/85 Abnormal  122 94 % 28 2 L/min Nasal cannula Lying   02/22/22 1549 -- 103 -- 228/112 Abnormal  -- -- -- -- -- --   02/22/22 1530 -- 106 Abnormal  18 205/84 Abnormal  121 96 % -- -- -- Lying   02/22/22 1500 -- -- -- 211/84 Abnormal  -- -- -- -- -- --   02/22/22 1430 -- 88 18 182/86 Abnormal  124 99 % 28 2 L/min Nasal cannula --   02/22/22 1400 -- 86 18 220/92 Abnormal  132 98 % 32 3 L/min Nasal cannula Lying   02/22/22 1330 -- 80 18 199/110 Abnormal  150 100 % -- -- None (Room air) Lying   02/22/22 1245 -- 86 18 159/77 111 93 % -- -- None (Room air) Lying   02/22/22 1130 -- 98 20 167/76 109 91 % -- -- None (Room air) Lying   02/22/22 1115 -- 102 20 163/74 107 91 % -- -- None (Room air) Lying   02/22/22 1100 -- 96 20 179/84 Abnormal  121 92 % -- -- None (Room air) Lying   02/22/22 1045 -- 104 20 143/75 96 95 % -- -- None (Room air) Lying   02/22/22 1030 -- 98 20 123/65 87 97 % -- -- None (Room air) Sitting   02/22/22 1015 -- 98 18 135/71 90 93 % -- -- None (Room air) Sitting   02/22/22 1008 -- 103 18 119/62 -- 92 % -- -- None (Room air) Sitting   02/22/22 1000 -- 126 Abnormal  18 157/77 109 93 % -- -- None (Room air) Sitting   02/22/22 0945 -- 108 Abnormal  18 157/78 -- 94 % -- -- None (Room air) Lying   02/22/22 0937 98 1 °F (36 7 °C) 85 18 151/95 118 95 % -- -- None (Room air) Sitting     Weights (last 14 days)    Date/Time Weight Weight Method   02/22/22 0937 71 6 kg (157 lb 13 6 oz) Stretcher scale       Pertinent Labs/Diagnostic Test Results:   US right upper quadrant   Final Result by Oren Quijano MD (02/22 1612)      Cholelithiasis and choledocholithiasis  Positive Godoy's sign with gallbladder wall thickening and edema and potentially tiny amount of pericholecystic fluid  These latter findings are most indicative of acute cholecystitis       The examination demonstrates a significant  finding and was documented as such in Epic for liaison and referring practitioner immediate notification  CTA dissection protocol chest/abdomen/pelvis   Final Result by Dorothy Vaughan MD (02/22 6309)      Gallbladder wall thickening with edema and cholelithiasis, evaluate for acute cholecystitis      Biliary dilation common bile duct measuring 1 4 cm, concerning for biliary obstruction, MRCP may be considered to evaluate for choledocholithiasis      No thoracic aortic dissection      No pulmonary embolism      Opacification of the segmental bronchi of the left lower lobe, image 83 series 3, short interval follow-up at 3 months suggested this may be due to secretions or due to focal lesion      Incidentally detected the less than 6 mm nodules in the both lung with the most prominent in the subpleural region in the left upper lobe  Based on current Fleischner Society 2017 Guidelines on incidental pulmonary nodule,  follow-up CT at 12 months can    be considered           Mild reticulation in the subpleural region, raises concern for mild interstitial lung disease      Hepatic steatosis      Again noted is thickening of the wall of the stomach as seen on the previous study, remains indeterminate      Atherosclerotic disease in the aorta with the suggestion of more than 50% stenosis in the right common iliac artery   X-ray chest 1 view portable   Final Result by Bobbi Sandoval MD (02/22 1441)      No acute cardiopulmonary disease              Results from last 7 days   Lab Units 02/23/22  0507 02/22/22  1018   WBC Thousand/uL 3 98* 10 76*   HEMOGLOBIN g/dL 10 4* 13 2   HEMATOCRIT % 32 8* 39 3   PLATELETS Thousands/uL 168 223   NEUTROS ABS Thousands/µL 3 07 9 13*         Results from last 7 days   Lab Units 02/23/22  0507 02/22/22  0000   SODIUM mmol/L 134* 136   POTASSIUM mmol/L 4 5 3 2*   CHLORIDE mmol/L 100 94*   CO2 mmol/L 25 28   ANION GAP mmol/L 9 14*   BUN mg/dL 11 12   CREATININE mg/dL 1 07 1 12   EGFR ml/min/1 73sq m 72 68   CALCIUM mg/dL 7 7* 8 6   MAGNESIUM mg/dL  --  1 2*     Results from last 7 days   Lab Units 02/23/22  0507 02/22/22  0000   AST U/L 89* 102*   ALT U/L 95* 125*   ALK PHOS U/L 320* 376*   TOTAL PROTEIN g/dL 6 0* 7 3   ALBUMIN g/dL 2 5* 3 2*   TOTAL BILIRUBIN mg/dL 1 51* 1 57*         Results from last 7 days   Lab Units 02/23/22  0507 02/22/22  0000   GLUCOSE RANDOM mg/dL 87 115             No results found for: BETA-HYDROXYBUTYRATE                   Results from last 7 days   Lab Units 02/22/22  1420 02/22/22  1237 02/22/22  0000   HS TNI 0HR ng/L  --   --  14   HS TNI 2HR ng/L  --  21  --    HSTNI D2 ng/L  --  7  --    HS TNI 4HR ng/L 23  --   --    HSTNI D4 ng/L 9  --   --              Results from last 7 days   Lab Units 02/22/22  0000   TSH 3RD GENERATON uIU/mL 0 553                     Results from last 7 days   Lab Units 02/22/22  0000   NT-PRO BNP pg/mL 423*             Results from last 7 days   Lab Units 02/23/22  0507 02/22/22  0000   LIPASE u/L 107 413*           Results from last 7 days   Lab Units 02/22/22  1039   ETHANOL LVL mg/dL 82*          2/22 ekg Rhythm:     Rhythm: atrial fibrillation    Ectopy:   ED Treatment:   Medication Administration from 02/22/2022 0931 to 02/22/2022 2055       Date/Time Order Dose Route Action     02/22/2022 0943 diltiazem (CARDIZEM) injection 25 mg 0 mg Intravenous Given to EMS     02/22/2022 0954 nitroglycerin (NITROSTAT) SL tablet 0 4 mg 0 4 mg Sublingual Given     02/22/2022 0944 nitroglycerin (NITROSTAT) SL tablet 0 4 mg 0 4 mg Sublingual Given     02/22/2022 1515 diltiazem (CARDIZEM) 125 mg in sodium chloride 0 9 % 125 mL infusion 5 mg/hr Intravenous Rate/Dose Change     02/22/2022 1025 diltiazem (CARDIZEM) 125 mg in sodium chloride 0 9 % 125 mL infusion 10 mg/hr Intravenous New Bag     02/22/2022 1001 diltiazem (CARDIZEM) injection 15 mg 15 mg Intravenous Given     02/22/2022 1030 morphine (PF) 4 mg/mL injection 4 mg 4 mg Intravenous Given     02/22/2022 1030 ondansetron (ZOFRAN) injection 4 mg 4 mg Intravenous Given     02/22/2022 1233 magnesium sulfate 2 g/50 mL IVPB (premix) 2 g 0 g Intravenous Stopped     02/22/2022 1045 magnesium sulfate 2 g/50 mL IVPB (premix) 2 g 2 g Intravenous New Bag     02/22/2022 1041 potassium chloride 20 mEq IVPB (premix) 20 mEq Intravenous New Bag     02/22/2022 1445 lactated ringers bolus 1,000 mL 1,000 mL Intravenous New Bag     02/22/2022 1041 sodium chloride 0 9 % bolus 1,000 mL 1,000 mL Intravenous New Bag     02/22/2022 1702 LORazepam (ATIVAN) 2 mg/mL injection **ADS Override Pull** 0 mg  Hold     02/22/2022 1437 hydrALAZINE (APRESOLINE) injection 10 mg 10 mg Intravenous Given     02/22/2022 1540 HYDROmorphone (DILAUDID) injection 0 5 mg 0 5 mg Intravenous Given     02/22/2022 1542 levofloxacin (LEVAQUIN) IVPB (premix in dextrose) 750 mg 150 mL 750 mg Intravenous New Bag     02/22/2022 1541 metroNIDAZOLE (FLAGYL) tablet 500 mg 500 mg Oral Given     02/22/2022 1605 LORazepam (ATIVAN) injection 1 mg 1 mg Intravenous Given     02/22/2022 1929 carvedilol (COREG) tablet 12 5 mg 12 5 mg Oral Given     02/22/2022 1929 pravastatin (PRAVACHOL) tablet 40 mg 40 mg Oral Given     02/22/2022 2027 multi-electrolyte (PLASMALYTE-A/ISOLYTE-S PH 7 4) IV solution 75 mL/hr Intravenous New Bag     02/22/2022 1928 enoxaparin (LOVENOX) subcutaneous injection 40 mg 40 mg Subcutaneous Given     02/22/2022 1929 potassium chloride (K-DUR,KLOR-CON) CR tablet 40 mEq 40 mEq Oral Given     02/22/2022 1930 ceFAZolin (ANCEF) IVPB (premix in dextrose) 2,000 mg 50 mL 2,000 mg Intravenous New Bag     02/22/2022 2027 diltiazem (CARDIZEM) tablet 30 mg 30 mg Oral Given        Past Medical History:   Diagnosis Date    PRITI (acute kidney injury) ([facility] ) 9/5/2021    Anxiety     Back pain     Claustrophobia     [facility]     Dysphagia 9/5/2021    GERD (gastroesophageal reflux disease)     Hypertension     Hypokalemia 2/22/2022    Lumbar back pain     Panic attacks     Stenosis of right carotid artery     Stroke [facility])     Wears glasses     Wears partial dentures     upper denture     Present on Admission:   Acute Cholecystitis   Alcohol abuse   COPD [facility]chronic obstructive pulmonary disease) ([facility])   Tobacco use   HLD (hyperlipidemia)   Benign essential hypertension   PAF (paroxysmal atrial fibrillation) ([facility])   Hx of CVA (cerebral vascular accident) ([facility] )      Admitting Diagnosis: Choledocholithiasis [K80 50]  Choledocholithiasis with acute cholecystitis [K80 42]  Hypokalemia [E87 6]  Hypomagnesemia [E83 42]  A-fib ([facility]) [I48 91]  Atrial fibrillation with RVR ([facility]) [I48 91]  Age/Sex: 73 y o  male  Admission Orders:  CIWA  Cont pulse oximetry  NPO  Daily wt  ERCP  SCD    Scheduled Medications:  carvedilol, 12 5 mg, Oral, BID With Meals  cefazolin, 2,000 mg, Intravenous, Q8H  diltiazem, 30 mg, Oral, Q6H YADY  enoxaparin, 40 mg, Subcutaneous, Daily  folic acid, 1 mg, Oral, Daily  metroNIDAZOLE, 500 mg, Oral, Q8H [address]  multivitamin-minerals, 1 tablet, Oral, Daily  nicotine, 1 patch, Transdermal, Daily  pantoprazole, 20 mg, Oral, Early Morning  pravastatin, 40 mg, Oral, Daily With Dinner  sertraline, 100 mg, Oral, HS  thiamine, 100 mg, Oral, Daily  Continuous IV Infusions:  multi-electrolyte, 75 mL/hr, Intravenous, Continuous    PRN Meds:  acetaminophen, 650 mg, Oral, Q6H PRN  albuterol, 1 puff, Inhalation, Q6H PRN  nitroglycerin, 0 4 mg, Sublingual, Q5 Min PRN  ondansetron, 4 mg, Intravenous, Q6H PRN  simethicone, 80 mg, Oral, 4x Daily PRN  sodium chloride (PF), 3 mL, Intravenous, Q1H PRN    IP CONSULT TO GASTROENTEROLOGY  IP CONSULT TO ACUTE CARE SURGERY  IP CONSULT TO CARDIOLOGY    Network Utilization Review Department  ATTENTION: Please call with any questions or concerns to [phone] and carefully listen to the prompts so that you are directed to the right person  All voicemails are confidential   Jannette Skipper all requests for admission clinical reviews, approved or denied determinations and any other requests to dedicated fax number below belonging to the campus where the patient is receiving treatment   List of dedicated fax numbers for the Facilities:  [address] DENIALS (Administrative/Medical Necessity) [phone]   [address] (Maternity/NICU/Pediatrics) [phone] [address]  [address] Medical [place] [address] [address] [place] [address] [address] [facility] [facility] [phone]

## 2022-02-23 NOTE — ASSESSMENT & PLAN NOTE
RUQ U/S:  Cholelithiasis and choledocholithiasis  Positive Godoy sign with gallbladder wall thickening and edema  GI consulted, appreciate recommendations  Plan is to have patient undergo an ERCP, but awaiting cardiology clearance due to new onset atrial fibrillation  Plan:  Per GI, patient to have ERCP today  NPO currently

## 2022-02-23 NOTE — ANESTHESIA POSTPROCEDURE EVALUATION
Post-Op Assessment Note    CV Status:  Stable  Pain Score: 0    Pain management: adequate     Mental Status:  Alert and awake   Hydration Status:  Euvolemic and stable   PONV Controlled:  Controlled   Airway Patency:  Patent and adequate      Post Op Vitals Reviewed: Yes      Staff: CRNA         No complications documented      /76 (02/23/22 1708)    Temp (!) 97 3 °F (36 3 °C) (02/23/22 1708)    Pulse  90   Resp 16 (02/23/22 1708)    SpO2 (P) 100 % (02/23/22 1708)

## 2022-02-23 NOTE — ASSESSMENT & PLAN NOTE
Lab Results   Component Value Date/Time    MG 1 2 (L) 02/22/2022 12:00 AM     Plan:  - Replete Mg 2 g

## 2022-02-23 NOTE — ASSESSMENT & PLAN NOTE
Patient has a known hx of Alcohol abuse  - Last drink at 04 AM on 02/22/2022  - Inebriated during initial encounter in ED    Plan:   - PHYLLIS Protocol  - Ativan   - Monitor vitals  - Monitor for DT's

## 2022-02-23 NOTE — ASSESSMENT & PLAN NOTE
Home medication:  Albuterol inhaler  Reports COPD is uncontrolled since his [facility] prescription has not been filled recently  Was previously on 2 L of oxygen and is now saturating well on room air  Plan:  Continue with albuterol as needed

## 2022-02-23 NOTE — PROGRESS NOTES
Progress Note - Sade Cabral 73 y o  male MRN: [id number]    Unit/Bed#: S -01 Encounter: [id number]      Assessment:  72 yo male w/ acute cholecystitis and choledocholithiaisis    AVSS  Labs-  WBC 3 98(10 76)  Tbili 1 51 (1 57)  Lfts slighlty decreased      Plan:  Per medicine notes, plan for ERCP with GI today  Continue antibiotics ancef/flagyl  No acute surgical intervention, if worsens would consider per leatha  Would continue to manage cholecystitis with abx therapy  Can evaluate cystic duct with ERCP  Monitor LFTs  Continue to hold Eliquis/plavix  Remainder of care per primary    Subjective:   Feeling much better this morning  Pain well controlled  No fevers/chills  No nausea or ovmiting    Objective:     Vitals: Blood pressure 132/61, pulse 69, temperature 97 6 °F (36 4 °C), temperature source Oral, resp  rate 18, weight 71 6 kg (157 lb 13 6 oz), SpO2 98 %  ,Body mass index is 23 31 kg/m²  Intake/Output Summary (Last 24 hours) at 2/23/2022 0724  Last data filed at 2/23/2022 0500  Gross per 24 hour   Intake --   Output 400 ml   Net -400 ml       Physical Exam:   General: NAD  HENT: NCAT MMM  Neck: supple, no JVD  CV: nl rate  Lungs: nl wob   No resp distress  ABD: Soft, nontender, nondistended  Extrem: No CCE  Neuro: AAOx3       Invasive Devices  Report    Peripheral Intravenous Line            Peripheral IV 02/22/22 Left Forearm 1 day    Peripheral IV 02/22/22 Right Forearm <1 day

## 2022-02-23 NOTE — ASSESSMENT & PLAN NOTE
POA potassium of 3 2  - 20 mEq given in ED    Plan:  - Replete with 40 mEq then 20 mEq after  - Repeat CMP in AM  - Monitor signs/symptoms of low potassium

## 2022-02-23 NOTE — ASSESSMENT & PLAN NOTE
Prior to admission, patient found to be in AFib and responded to Cardizem  By the time the patient was seen in the ED, AFib had resolved to normal sinus rhythm  Patient denied any prior history of AFib, however reported taking Eliquis due to a previous stroke and not necessarily AFib  Placed on telemetry, no events reported  Plan:  Please consult cardiology for clearance prior to ERCP per GI recommendations  Will continue with telemetry at this time  Will continue with diltiazem 30mg Q6hr [facility]

## 2022-02-23 NOTE — ANESTHESIA PREPROCEDURE EVALUATION
Procedure:  ERCP    Relevant Problems   CARDIO   (+) Benign essential hypertension   (+) HLD (hyperlipidemia)   (+) PAF (paroxysmal atrial fibrillation) (HCC)      GI/HEPATIC   (+) GERD (gastroesophageal reflux disease)   (+) Upper GI bleed      /RENAL   (+) Stage 3a chronic kidney disease (HCC)      HEMATOLOGY   (+) Acute blood loss anemia   (+) Iron deficiency anemia      MUSCULOSKELETAL   (+) Lumbar back pain      NEURO/PSYCH   (+) Hx of CVA (cerebral vascular accident) ([address] )   (+) 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      PULMONARY   (+) COPD (chronic obstructive pulmonary disease) (HCC)      Other   (+) Acute Cholecystitis   (+) Alcohol abuse   (+) Cholelithiasis   (+) Hypomagnesemia   (+) S/P carotid endarterectomy   (+) Tobacco use      2/23/22: Na 134, K 4 5, Cr 1 07, Hgb 10 4, Plt 168    TTE 9/5398: Normal systolic fxn, EF 65%, no regional wall motion abnormalities, grade 1 diastolic dysfxn, no major valvular disease  Physical Exam    Airway    Mallampati score: II  TM Distance: >3 FB  Neck ROM: full     Dental   Comment: No teeth on uppers  Lower teeth decaying and eroded  ,     Cardiovascular  Cardiovascular exam normal    Pulmonary  Pulmonary exam normal     Other Findings       PMHx of TIA, HTN, HLD, alcohol and tobacco use, COPD  Anesthesia Plan  ASA Score- 3     Anesthesia Type- general with ASA Monitors  Additional Monitors:   Airway Plan: ETT  Plan Factors-Exercise tolerance (METS): <4 METS  Chart reviewed  Patient summary reviewed  Patient is a current smoker  Patient did not smoke on day of surgery  Obstructive sleep apnea risk education given perioperatively  Induction- intravenous  Postoperative Plan-   Planned trial extubation    Informed Consent- Anesthetic plan and risks discussed with patient  I personally reviewed this patient with the CRNA  Discussed and agreed on the Anesthesia Plan with the CRNA  Rose Creston

## 2022-02-23 NOTE — ASSESSMENT & PLAN NOTE
Right carotid endarterectomy performed on 04/13/2021  Was previously on clopidogrel  Per Vascular Surgery notes from 01/2022, patient is no longer on clopidogrel

## 2022-02-23 NOTE — ASSESSMENT & PLAN NOTE
Afib with return to NSR in ED  - No previous history of Afib  - Last ECHO 09/2021 EF 55%   - MCG2BF8 VASc 4   - On Eliquis for previous CVA    Plan  - Hold eliquis for ERCP tomorrow  - Cardiology consult after ERCP  - Continue monitoring Vitals signs  - Continue Coreg 12 5 mg BID  - Nitro prn

## 2022-02-23 NOTE — PROGRESS NOTES
[facility]  Progress Note Cherelle Moran 1955, 73 y o  male MRN: [id number]  Unit/Bed#: S -01 Encounter: [id number]  Primary Care Provider: Estefani Verde DO   Date and time admitted to hospital: 2/22/2022  9:31 AM    * Acute Cholecystitis  Assessment & Plan  POA:  Worsening abdominal pain the 2 that has been intermittent and ongoing for the last 2 weeks  Reports of chills and nausea/vomiting  CTA chest/abdomen/pelvis:  Gallbladder wall thickening with edema and cholelithiasis  RUQ U/S: Cholelithiasis and choledocholithiasis  Positive Godoy's sign with gallbladder wall thickening and edema and potentially tiny amount of pericholecystic fluid  These latter findings are most indicative of acute cholecystitis  Elevated lipase, elevated LFTs, and leukocytosis  General surgery consulted, no surgical intervention warranted at this time  Patient is being treated with antibiotics:  Ancef and Flagyl  Patient is currently on multi electrolyte fluids 75 mL/hr  Plan:  GI was consulted, ERCP scheduled for today  Patient is NPO  Consulted Cardiology for cardiac clearance prior to ERCP  Continue with antibiotic treatment:  Ancef and Flagyl  Continue with IV fluid administration  Continue to monitor vitals and trend for any fevers  Zofran PRN  Cholelithiasis  Assessment & Plan  RUQ U/S:  Cholelithiasis and choledocholithiasis  Positive Godoy sign with gallbladder wall thickening and edema  GI consulted, appreciate recommendations  Plan is to have patient undergo an ERCP, but awaiting cardiology clearance due to new onset atrial fibrillation  Plan:  Per GI, patient to have ERCP today  NPO currently  PAF (paroxysmal atrial fibrillation) ([facility] [address] )  Assessment & Plan  Prior to admission, patient found to be in AFib and responded to Cardizem  By the time the patient was seen in the ED, AFib had resolved to normal sinus rhythm    Patient denied any prior history of AFib, however reported taking Eliquis due to a previous stroke and not necessarily AFib  Placed on telemetry, no events reported  Plan:  Please consult cardiology for clearance prior to ERCP per GI recommendations  Will continue with telemetry at this time  Will continue with diltiazem 30mg Q6hr [address]  Hypomagnesemia  Assessment & Plan  Magnesium on admission was decreased at 1 2  Repleted with 2 mg  S/P carotid endarterectomy  Assessment & Plan  Right carotid endarterectomy performed on 04/13/2021  Was previously on clopidogrel  Per Vascular Surgery notes from 01/2022, patient is no longer on clopidogrel  Benign essential hypertension  Assessment & Plan  PMHx of HTN (patient is poor historian, cannot recall taking any antihypertensive medication)  Plan:  Continue carvedilol/Coreg 12 5 mg BID  Hx of CVA (cerebral vascular accident) [facility])  Assessment & Plan  History of right PCA stroke that occurred while patient was on DAPT  Patient was previously on aspirin and clopidogrel  Per Prior Neurology note from 09/2021, later transitioned to Eliquis and aspirin  COPD (chronic obstructive pulmonary disease) ([facility] [address] )  Assessment & Plan  Home medication:  Albuterol inhaler  Reports COPD is uncontrolled since his [employer] prescription has not been filled recently  Was previously on 2 L of oxygen and is now saturating well on room air  Plan:  Continue with albuterol as needed  HLD (hyperlipidemia)  Assessment & Plan  Patient is poor historian and is unsure if he is still taking atorvastatin  Plan:  Continue pravastatin 40 mg daily  Alcohol abuse  Assessment & Plan  Patient has a known hx of Alcohol abuse  - Last drink at 04 AM on 02/22/2022  - Inebriated during initial encounter in ED    - On exam today, is AAOx3 and inebriation has resolved  Not exhibiting any tremors      Plan:   - [facility] Protocol  - Ativan   - Monitor vitals  - Monitor for DT's    Tobacco use  Assessment & Plan  Patient reports that he used to smoke more  However, he has cut back to smoking 3 cigarettes a day  Plan:  Continue with nicotine patch  Continue encouraging smoking cessation  Hypokalemia-resolved as of 2022  Assessment & Plan  Potassium on admission was decreased at 3 2  Repleted in ED  Repeat potassium today was 4 5 (WNL)  VTE Pharmacologic Prophylaxis: VTE Score: 4 Moderate Risk (Score 3-4) - Pharmacological DVT Prophylaxis Ordered: Currently holding AC due to ERCP  Patient Centered Rounds: I performed bedside rounds with nursing staff today  Discussions with Specialists or Other Care Team Provider:  Nursing, Cardiology, GI, case management    Education and Discussions with Family / Patient: Patient declined call to   Current Length of Stay: 1 day(s)  Current Patient Status: Inpatient   Discharge Plan: Anticipate discharge in 48-72 hrs to home  Code Status: Level 1 - Full Code    Subjective:   Patient was laying comfortably in bed upon entering the room  He states that prior to admission, he was having intermittent mid-epigastric abdominal pain and reported nausea/vomiting  Since he has been in the ED, he states his abdominal pain has now decreased to 4/10 in intensity and he has not had any further episodes of nausea or vomiting  He denies any lightheadedness, dizziness, chest pain, nor any shortness of breath  Objective:     Vitals:   Temp (24hrs), Av 1 °F (36 7 °C), Min:97 6 °F (36 4 °C), Max:98 5 °F (36 9 °C)    Temp:  [97 6 °F (36 4 °C)-98 5 °F (36 9 °C)] 98 5 °F (36 9 °C)  HR:  [] 82  Resp:  [18-20] 18  BP: (114-228)/() 140/60  SpO2:  [93 %-99 %] 93 %  Body mass index is 23 31 kg/m²  Input and Output Summary (last 24 hours):      Intake/Output Summary (Last 24 hours) at 2022 1406  Last data filed at 2022 1300  Gross per 24 hour   Intake 0 ml   Output 1450 ml   Net -1450 ml       Physical Exam:   Physical Exam  Vitals and nursing note reviewed  Constitutional:       General: He is not in acute distress  Appearance: Normal appearance  He is not ill-appearing or diaphoretic  HENT:      Head: Normocephalic and atraumatic  Nose: No congestion or rhinorrhea  Eyes:      General: No scleral icterus  Conjunctiva/sclera: Conjunctivae normal    Cardiovascular:      Rate and Rhythm: Normal rate and regular rhythm  Pulses: Normal pulses  Heart sounds: Normal heart sounds  No murmur heard  No friction rub  Pulmonary:      Effort: Pulmonary effort is normal  No respiratory distress  Breath sounds: Wheezing present  Chest:      Chest wall: No tenderness  Abdominal:      General: Bowel sounds are normal  There is no distension  Palpations: Abdomen is soft  Tenderness: There is abdominal tenderness (midepigastric region)  There is no guarding or rebound  Musculoskeletal:         General: No tenderness  Right lower leg: No edema  Left lower leg: No edema  Skin:     General: Skin is warm  Neurological:      General: No focal deficit present  Mental Status: He is alert and oriented to person, place, and time     Psychiatric:         Mood and Affect: Mood normal          Behavior: Behavior normal           Additional Data:     Labs:  Results from last 7 days   Lab Units 02/23/22  0507   WBC Thousand/uL 3 98*   HEMOGLOBIN g/dL 10 4*   HEMATOCRIT % 32 8*   PLATELETS Thousands/uL 168   NEUTROS PCT % 76*   LYMPHS PCT % 11*   MONOS PCT % 10   EOS PCT % 2     Results from last 7 days   Lab Units 02/23/22  0507   SODIUM mmol/L 134*   POTASSIUM mmol/L 4 5   CHLORIDE mmol/L 100   CO2 mmol/L 25   BUN mg/dL 11   CREATININE mg/dL 1 07   ANION GAP mmol/L 9   CALCIUM mg/dL 7 7*   ALBUMIN g/dL 2 5*   TOTAL BILIRUBIN mg/dL 1 51*   ALK PHOS U/L 320*   ALT U/L 95*   AST U/L 89*   GLUCOSE RANDOM mg/dL 87                       Lines/Drains:  Invasive Devices  Report    Peripheral Intravenous Line Peripheral IV 02/22/22 Left Forearm 1 day    Peripheral IV 02/22/22 Right Forearm 1 day                      Imaging: Reviewed radiology reports from this admission including: chest xray, abdominal/pelvic CT and RUQ U/S    Recent Cultures (last 7 days):         Last 24 Hours Medication List:   Current Facility-Administered Medications   Medication Dose Route Frequency Provider Last Rate    acetaminophen  650 mg Oral Q6H PRN Truman Merritts, DO      albuterol  1 puff Inhalation Q6H PRN Abundio Severe Dorian, DO      carvedilol  12 5 mg Oral BID With Meals Abundio Severe Dorian, DO      cefazolin  2,000 mg Intravenous Q8H Derek M Dorian, DO 2,000 mg (02/23/22 1126)    diltiazem  30 mg Oral Q6H [facility] Ranjana Dawson MD      enoxaparin  40 mg Subcutaneous Daily Derek M Dorian, DO      folic acid  1 mg Oral Daily Derek M Dorian, DO      metroNIDAZOLE  500 mg Oral [facility] Truman Luciana DO      multi-electrolyte  75 mL/hr Intravenous Continuous Santo Luciana DO 75 mL/hr (02/23/22 1031)    multivitamin-minerals  1 tablet Oral Daily Abundio Severe Dorian, DO      nicotine  1 patch Transdermal Daily Abundio Severe Dorian, DO      nitroglycerin  0 4 mg Sublingual Q5 Min PRN Luberta Nolasco, DO      ondansetron  4 mg Intravenous Q6H PRN Abundio Severe Dorian, DO      pantoprazole  20 mg Oral Early Morning Abundio Severe Dorian, DO      pravastatin  40 mg Oral Daily With [employer] M Dorian, DO      sertraline  100 mg Oral HS Derek M Dorian, DO      simethicone  80 mg Oral 4x Daily PRN Abundio Severe Dorian, DO      sodium chloride (PF)  3 mL Intravenous Q1H PRN Luberta Nolasco, DO      thiamine  100 mg Oral Daily Bill Chowdhury DO          Today, Patient Was Seen By: Yusuf Ramos DO    **Please Note: This note may have been constructed using a voice recognition system  **

## 2022-02-23 NOTE — CONSULTS
Consultation - [facility]  Bria Schwartz 73 y o  male MRN: [id number]  Unit/Bed#: S -01 Encounter: [id number]        Consults    Reason for Consult / Principal Problem:  Choledocholithiasis    ASSESSMENT and PLAN:      1  Choledocholithiasis  Patient with history of carotid stenosis/hx of CVA on Eliquis, COPD who presented with atypical chest pain with right upper quadrant ultrasound showing cholecystitis and choledocholithiasis with CBD 14 mm and CBD stone identified  Troponin negative x3  Found in Afib with return to NSR  Total bilirubin 1 5, alk-phos 376  White count now 4  Lipase now normal   Patient was placed on antibiotics  -plan for ERCP today   -last dose of Eliquis was Monday morning   -patient brought medications to the emergency room which were brought down to pharmacy  I reached out to pharmacy who reports that Plavix is not 1 of these  Per vascular surgery note 1 month ago, patient not taking Plavix either   -continue to hold Eliquis and anticoagulation  Further recommendations after ERCP  -continue to monitor white count and vital signs     -antibiotics per surgery team    -------------------------------------------------------------------------------------------------------------------    HPI:     Bria Schwartz is a 71-year-old male with past medical history of carotid stenosis, history of CVA on Eliquis, A fib, COPD, hyperlipidemia who presented to the emergency room for chest pain  Found to be in AFib with return to normal sinus rhythm in the emergency room  Right upper quadrant ultrasound showing positive Godoy sign with gallbladder wall thickening, CBD of 14 mm and common bile duct stone noted measuring 15 mm in long axis dimension  LFTs with total bilirubin 1 57, alk-phos 376 and AST and /125  White count 10 on arrival now 3 9  Hemoglobin 10 4  Platelets a 615   Lipase was 400 on arrival now normal   Patient was placed on antibiotics including Ancef and Flagyl  Surgery was consulted who recommended GI consult and antibiotic treatment of acute cholecystitis  Patient reports he has been having severe chest pain on and off for the past 2 weeks  He reports that comes and goes  He did not note anything that made it better or worse including food intake  He reports his upper abdomen felt more bloated than normal   He had 3-4 episodes of vomiting over the past few weeks  Reports feeling hot and cold occasionally no documented fevers  Reports appetite and weight have been stable  He reports having brown stools however urine has been darker in color  Denies any constipation or diarrhea  He reports drinking around 6-8 cans of alcohol every other day  He takes PPI daily  Denies abdominal surgeries  No family history of GI malignancies  Patient presented to the emergency room yesterday at 8:00 a m  He reports his last dose of Eliquis was the day prior, Monday morning  In some knows during this hospitalization they report holding Plavix  When asked if patient takes this medication he reports he is unsure  He brought all of his medications that he takes to the hospital and they were brought down to the pharmacy  I spoke with the pharmacy who stated that these medications did not include Plavix  Per chart review, vascular surgery noted that patient is no longer on Plavix as well  REVIEW OF SYSTEMS:    CONSTITUTIONAL: +chills  Good appetite, and no recent weight loss  HEENT: No earache or tinnitus  Denies hearing loss or visual disturbances  CARDIOVASCULAR: +chest pain   RESPIRATORY: Denies any cough, hemoptysis, shortness of breath or dyspnea on exertion  GASTROINTESTINAL: As noted in the History of Present Illness  GENITOURINARY: +dark urine  NEUROLOGIC: No dizziness or vertigo, denies headaches  MUSCULOSKELETAL: Denies any muscle or joint pain  SKIN: Denies skin rashes or itching     ENDOCRINE: Denies excessive thirst  Denies intolerance to heat or cold  PSYCHOSOCIAL: Denies depression or anxiety  Denies any recent memory loss  Historical Information   Past Medical History:   Diagnosis Date    PRITI (acute kidney injury) ([address] ) 9/5/2021    Anxiety     Back pain     Claustrophobia     [facility]     Dysphagia 9/5/2021    GERD (gastroesophageal reflux disease)     Hypertension     Lumbar back pain     Panic attacks     Stenosis of right carotid artery     Stroke [facility])     Wears glasses     Wears partial dentures     upper denture     Past Surgical History:   Procedure Laterality Date    COLONOSCOPY      IR CEREBRAL ANGIOGRAPHY  4/6/2021    OTHER SURGICAL HISTORY      fertility    GA SLCTV CATHJ 3RD+ ORD SLCTV ABDL PEL/LXTR [facility] Right 4/6/2021    Procedure: ARTERIOGRAM, carotid Angio;  Surgeon: Terrence Slaughter MD;  Location: [facility];  Service: Vascular    GA THROMBOENDARTECTMY [facility] Right 4/13/2021    Procedure: RIGHT ENDARTERECTOMY ARTERY CAROTID WITH BOVINE PATCH;  Surgeon: Terrence Slaughter MD;  Location: [facility];  Service: Vascular     Social History   Social History     Substance and Sexual Activity   Alcohol Use Yes     Social History     Substance and Sexual Activity   Drug Use Never     Social History     Tobacco Use   Smoking Status Light Tobacco Smoker    Packs/day: 0 25   Smokeless Tobacco Never Used   Tobacco Comment    also wears nicotine patches     History reviewed  No pertinent family history      Meds/Allergies     Medications Prior to Admission   Medication    aclidinium [employer]) 400 MCG/ACT inhaler    albuterol (PROVENTIL HFA,VENTOLIN HFA) 90 mcg/act inhaler    ALPRAZolam (XANAX) 0 25 mg tablet    apixaban (ELIQUIS) 5 mg    acetaminophen (TYLENOL) 325 mg tablet    atorvastatin (LIPITOR) 80 mg tablet    baclofen 10 mg tablet    carvedilol (COREG) 12 5 mg tablet    clopidogrel (PLAVIX) 75 mg tablet    fenofibrate 160 MG tablet    ferrous sulfate 324 (65 Fe) mg    loratadine (CLARITIN) 10 mg tablet    nicotine (NICODERM CQ) 14 mg/24hr TD 24 hr patch    omeprazole (PriLOSEC) 20 mg delayed release capsule    pantoprazole (PROTONIX) 40 mg tablet    pravastatin (PRAVACHOL) 40 mg tablet    senna (SENOKOT) 8 6 mg    sertraline (ZOLOFT) 100 mg tablet    thiamine 100 MG tablet     Current Facility-Administered Medications   Medication Dose Route Frequency    acetaminophen (TYLENOL) tablet 650 mg  650 mg Oral Q6H PRN    albuterol (PROVENTIL HFA,VENTOLIN HFA) inhaler 1 puff  1 puff Inhalation Q6H PRN    carvedilol (COREG) tablet 12 5 mg  12 5 mg Oral BID With Meals    ceFAZolin (ANCEF) IVPB (premix in dextrose) 2,000 mg 50 mL  2,000 mg Intravenous Q8H    diltiazem (CARDIZEM) tablet 30 mg  30 mg Oral Q6H [address]    enoxaparin (LOVENOX) subcutaneous injection 40 mg  40 mg Subcutaneous Daily    folic acid (FOLVITE) tablet 1 mg  1 mg Oral Daily    metroNIDAZOLE (FLAGYL) tablet 500 mg  500 mg Oral Q8H [address]    multi-electrolyte (PLASMALYTE-A/ISOLYTE-S PH 7 4) IV solution  75 mL/hr Intravenous Continuous    multivitamin-minerals (CENTRUM) tablet 1 tablet  1 tablet Oral Daily    nicotine (NICODERM CQ) 14 mg/24hr TD 24 hr patch 1 patch  1 patch Transdermal Daily    nitroglycerin (NITROSTAT) SL tablet 0 4 mg  0 4 mg Sublingual Q5 Min PRN    ondansetron (ZOFRAN) injection 4 mg  4 mg Intravenous Q6H PRN    pantoprazole (PROTONIX) EC tablet 20 mg  20 mg Oral Early Morning    pravastatin (PRAVACHOL) tablet 40 mg  40 mg Oral Daily With Dinner    sertraline (ZOLOFT) tablet 100 mg  100 mg Oral HS    simethicone (MYLICON) chewable tablet 80 mg  80 mg Oral 4x Daily PRN    sodium chloride (PF) 0 9 % injection 3 mL  3 mL Intravenous Q1H PRN    thiamine tablet 100 mg  100 mg Oral Daily       Allergies   Allergen Reactions    Penicillins Anaphylaxis           Objective     Blood pressure 134/63, pulse 73, temperature 98 5 °F (36 9 °C), temperature source Oral, resp  rate 18, weight 71 6 kg (157 lb 13 6 oz), SpO2 93 %  Intake/Output Summary (Last 24 hours) at 2/23/2022 1017  Last data filed at 2/23/2022 0910  Gross per 24 hour   Intake --   Output 950 ml   Net -950 ml         PHYSICAL EXAM:      General Appearance:   Alert, cooperative, no distress, appears stated age  Sitting comfortably in bed  Does not appear in distress  No jaundice or scleral icterus noted  HEENT:   Normocephalic, atraumatic  Neck:  Supple, symmetrical, trachea midline  Lungs:   Clear to auscultation bilaterally; no rales, rhonchi or wheezing; respirations unlabored    Heart[de-identified]   S1 and S2 normal; regular rate and rhythm; no murmur, rub, or gallop  Abdomen:   + reports some tenderness in the epigastric and right upper quadrant area  Soft, non-distended; normal bowel sounds; no masses, no organomegaly    Genitalia:   Deferred    Rectal:   Deferred    Extremities:  No cyanosis, clubbing or edema    Pulses:  2+ and symmetric all extremities    Skin:  Skin color, texture, turgor normal, no rashes or lesions    Lymph nodes:  No palpable cervical, axillary or inguinal lymphadenopathy        Lab Results:   Results from last 7 days   Lab Units 02/23/22  0507   WBC Thousand/uL 3 98*   HEMOGLOBIN g/dL 10 4*   HEMATOCRIT % 32 8*   PLATELETS Thousands/uL 168   NEUTROS PCT % 76*   LYMPHS PCT % 11*   MONOS PCT % 10   EOS PCT % 2     Results from last 7 days   Lab Units 02/23/22  0507   POTASSIUM mmol/L 4 5   CHLORIDE mmol/L 100   CO2 mmol/L 25   BUN mg/dL 11   CREATININE mg/dL 1 07   CALCIUM mg/dL 7 7*   ALK PHOS U/L 320*   ALT U/L 95*   AST U/L 89*         Results from last 7 days   Lab Units 02/23/22  0507   LIPASE u/L 107       Imaging Studies: I have personally reviewed pertinent imaging studies  X-ray chest 1 view portable    Result Date: 2/22/2022  Impression: No acute cardiopulmonary disease   Workstation performed: [id number]     CTA dissection protocol chest/abdomen/pelvis    Result Date: 2/22/2022  Impression: Gallbladder wall thickening with edema and cholelithiasis, evaluate for acute cholecystitis Biliary dilation common bile duct measuring 1 4 cm, concerning for biliary obstruction, MRCP may be considered to evaluate for choledocholithiasis No thoracic aortic dissection No pulmonary embolism Opacification of the segmental bronchi of the left lower lobe, image 83 series 3, short interval follow-up at 3 months suggested this may be due to secretions or due to focal lesion Incidentally detected the less than 6 mm nodules in the both lung with the most prominent in the subpleural region in the left upper lobe  Based on current Fleischner Society 2017 Guidelines on incidental pulmonary nodule,  follow-up CT at 12 months can be considered  Mild reticulation in the subpleural region, raises concern for mild interstitial lung disease Hepatic steatosis Again noted is thickening of the wall of the stomach as seen on the previous study, remains indeterminate Atherosclerotic disease in the aorta with the suggestion of more than 50% stenosis in the right common iliac artery The study was marked in EPIC for immediate notification  Workstation performed: Kayla Frances right upper quadrant    Result Date: 2/22/2022  Impression: Cholelithiasis and choledocholithiasis  Positive Godoy's sign with gallbladder wall thickening and edema and potentially tiny amount of pericholecystic fluid  These latter findings are most indicative of acute cholecystitis The examination demonstrates a significant  finding and was documented as such in [facility] for liaison and referring practitioner immediate notification  Workstation performed: [id number]           Patient was seen and examined by Dr Debora Mendenhall  All [facility] medical decisions were made by Dr Debora Mendenhall  Thank you for allowing us to participate in the care of this present patient  We will follow-up with you closely

## 2022-02-23 NOTE — PHYSICAL THERAPY NOTE
PHYSICAL THERAPY EVALUATION NOTE    Patient Name: Pita Garnica  EJWGU'B Date: 2/23/2022  AGE:   73 y o  Mrn:   [id number]  ADMIT DX:  Choledocholithiasis [K80 50]  Choledocholithiasis with acute cholecystitis [K80 42]  Hypokalemia [E87 6]  Hypomagnesemia [E83 42]  A-fib (HCC) [I48 91]  Atrial fibrillation with RVR ([facility] ) [I48 91]    Past Medical History:   Diagnosis Date    PRITI (acute kidney injury) ([facility] ) 9/5/2021    Anxiety     Back pain     Claustrophobia     [facility])     Dysphagia 9/5/2021    GERD (gastroesophageal reflux disease)     Hypertension     Lumbar back pain     Panic attacks     Stenosis of right carotid artery     Stroke [facility])     Wears glasses     Wears partial dentures     upper denture     Length Of Stay: 1  PHYSICAL THERAPY EVALUATION :    02/23/22 1206   PT Last Visit   PT Visit Date 02/23/22   Pain Assessment   Pain Assessment Tool 0-10   Pain Score No Pain   Restrictions/Precautions   Other Precautions Bed Alarm; Chair Alarm;Multiple lines;Telemetry; Fall Risk  (continuous pulse ox)   Home Living   Type of [address] [place] [address] One level; Other (Comment)  (3 EVELYNE)   Additional Comments lives alone  ambulates w/o assistive device  owns cane  2 falls in last 6 months  independent w/ ADLs  sister drives  General   Additional Pertinent History 2/22/22 at 19:29, blood pressure was 186/92   Family/Caregiver Present No   Cognition   Arousal/Participation Alert   Orientation Level Oriented to person; Other (Comment)  (pt was identified w/ full name, birth date)   Following Commands Follows one step commands without difficulty   Comments room air resting pulse ox 93% and 86 BPM, active 91% and 103 BPM    Subjective   Subjective pt seen supine in bed  agreed to PT eval  denied pain or dizziness      RUE Assessment   RUE Assessment WFL   LUE Assessment   LUE Assessment WFL   RLE Assessment   RLE Assessment WFL  (4- to 4/5)   LLE Assessment   LLE Assessment WFL  (4- to 4/5)   Coordination   Movements are Fluid and Coordinated 0   Coordination and Movement Description intention tremors UEs and LEs   Light Touch   RLE Light Touch Grossly intact   LLE Light Touch Grossly intact   Bed Mobility   Supine to Sit 7  Independent   Additional items HOB elevated   Sit to Supine 7  Independent  (pt declined remaining out of bed due to fatigue)   Additional items HOB elevated   Transfers   Sit to Stand 5  Supervision   Additional items Increased time required   Stand to Sit 5  Supervision   Additional items Increased time required;Verbal cues  (for body positioning)   Ambulation/Elevation   Gait pattern Decreased foot clearance; Foward flexed   Gait Assistance 5  Supervision   Additional items Verbal cues  (for breathing technique)   Assistive Device None   Distance 20 feet  seated rest break  30 feet  (additional not possible due to fatigue)   Stair Management Assistance Not tested  (due to limited ambulation tolerance)   Balance   Static Sitting Good   Dynamic Sitting Fair   Static Standing Fair -   Ambulatory Fair -   Activity Tolerance   Activity Tolerance Patient limited by fatigue   Nurse Made Aware spoke to Howard Jim CM   Assessment   Prognosis Fair   Problem List Decreased strength;Decreased endurance; Impaired balance;Decreased mobility; Decreased safety awareness   Assessment Pt presents with abdominal pain  Dx: choledocholithiasis, a-fib, hypokalemia, hypomagnesemia, and leukocytosis  order placed for PT eval and tx, w/ activity order of up and OOB as tolerated  pt presents w/ comorbidities of back pain, COPD, HTN, back pain, and CVA and personal factors of stair(s) to enter home, positive fall history, anxiety and tobacco use  pt presents w/ weakness, decreased endurance, impaired balance, gait deviations, decreased safety awareness and fall risk   these impairments are evident in findings from physical examination (weakness), mobility assessment (need for standby assist w/ all phases of mobility when usually mobilizing independently, tolerance to only 30 feet of ambulation and need for cueing for mobility technique), and Barthel Index: 60/100  pt needed input for mobility and breathing technique  pt is at risk for falls due to physical and safety awareness deficits  pt's clinical presentation is unstable/unpredictable (evident in poor blood pressure control, need for standby assist w/ all phases of mobility when usually mobilizing independently, tolerance to only 30 feet of ambulation and need for input for mobility technique/safety)  pt needs inpatient PT tx to improve mobility deficits and progress mobility training as appropriate  discharge recommendation is for home w/ family support and home PT to reduce fall risk and maximize level of functional independence  Goals   Patient Goals go home  STG Expiration Date 03/05/22   Short Term Goal #1 pt will: Increase bilateral LE strength 1/2 grade to facilitate independent mobility, Perform all transfers independently to improve independence, Ambulate 250 ft  without assistive device independently w/o LOB to facilitate return to independent level of mobility and function, Navigate 3 stairs independently with unilateral handrail to facilitate return to previous living environment, Increase ambulatory balance 1 grade to decrease risk for falls, Complete exercise program independently, Tolerate 3 hr OOB to faciliate upright tolerance, Improve Barthel Index score to 80 or greater to facilitate independence, and Complete Timed Up and Go or Comfortable Gait Speed to further assess mobility and monitor progress   PT Treatment Day 0   Plan   Treatment/Interventions Functional transfer training;LE strengthening/ROM; Elevations; Therapeutic exercise; Endurance training;Patient/family training;Gait training   PT Frequency 4-6x/wk   Recommendation   PT Discharge Recommendation Home with home health rehabilitation   [address] Mobility Inpatient   Turning in Bed Without Bedrails 4   Lying on Back to Sitting on Edge of Flat Bed 4   Moving Bed to Chair 3   Standing Up From Chair 3   Walk in Room 3   Climb 3-5 Stairs 1   Basic Mobility Inpatient Raw Score 18   Basic Mobility Standardized Score 41 05   Highest Level Of Mobility   JH-HLM Goal 6: Walk 10 steps or more   JH-HLM Highest Level of Mobility 6: Walk 10 steps or more   JH-HLM Goal Achieved Yes   Barthel Index   Feeding 10   Bathing 0   Grooming Score 5   Dressing Score 10   Bladder Score 10   Bowels Score 10   Toilet Use Score 5   Transfers (Bed/Chair) Score 10   Mobility (Level Surface) Score 0   Stairs Score 0   Barthel Index Score 60   End of Consult   Patient Position at End of Consult Supine;Bed/Chair alarm activated; All needs within reach     The patient's AM-PAC Basic Mobility Inpatient Short Form Raw Score is 18  A Raw score of greater than 16 suggests the patient may benefit from discharge to home  Please also refer to the recommendation of the Physical Therapist for safe discharge planning  Skilled PT recommended while in hospital and upon DC to progress pt toward treatment goals       Juana Hudson, PT

## 2022-02-23 NOTE — PLAN OF CARE
Problem: MOBILITY - ADULT  Goal: Maintain or return to baseline ADL function  Description: INTERVENTIONS:  -  Assess patient's ability to carry out ADLs; assess patient's baseline for ADL function and identify physical deficits which impact ability to perform ADLs (bathing, care of mouth/teeth, toileting, grooming, dressing, etc )  - Assess/evaluate cause of self-care deficits   - Assess range of motion  - Assess patient's mobility; develop plan if impaired  - Assess patient's need for assistive devices and provide as appropriate  - Encourage maximum independence but intervene and supervise when necessary  - Involve family in performance of ADLs  - Assess for home care needs following discharge   - Consider OT consult to assist with ADL evaluation and planning for discharge  - Provide patient education as appropriate  Outcome: Progressing  Goal: Maintains/Returns to pre admission functional level  Description: INTERVENTIONS:  - Perform BMAT or MOVE assessment daily    - Set and communicate daily mobility goal to care team and patient/family/caregiver  - Collaborate with rehabilitation services on mobility goals if consulted  - Perform Range of Motion  times a day  - Reposition patient every  hours    - Dangle patient  times a day  - Stand patient  times a day  - Ambulate patient  times a day  - Out of bed to chair  times a day   - Out of bed for meals  times a day  - Out of bed for toileting  - Record patient progress and toleration of activity level   Outcome: Progressing     Problem: Potential for Falls  Goal: Patient will remain free of falls  Description: INTERVENTIONS:  - Educate patient/family on patient safety including physical limitations  - Instruct patient to call for assistance with activity   - Consult OT/PT to assist with strengthening/mobility   - Keep Call bell within reach  - Keep bed low and locked with side rails adjusted as appropriate  - Keep care items and personal belongings within reach  - Initiate and maintain comfort rounds  - Make Fall Risk Sign visible to staff  - Offer Toileting every  Hours, in advance of need  - Initiate/Maintain alarm  - Obtain necessary fall risk management equipment:   - Apply yellow socks and bracelet for high fall risk patients  - Consider moving patient to room near nurses station  Outcome: Progressing     Problem: NEUROSENSORY - ADULT  Goal: Remains free of injury related to seizures activity  Description: INTERVENTIONS  - Maintain airway, patient safety  and administer oxygen as ordered  - Monitor patient for seizure activity, document and report duration and description of seizure to physician/advanced practitioner  - If seizure occurs,  ensure patient safety during seizure  - Reorient patient post seizure  - Seizure pads on all 4 side rails  - Instruct patient/family to notify RN of any seizure activity including if an aura is experienced  - Instruct patient/family to call for assistance with activity based on nursing assessment  - Administer anti-seizure medications if ordered    Outcome: Progressing  Goal: Achieves maximal functionality and self care  Description: INTERVENTIONS  - Monitor swallowing and airway patency with patient fatigue and changes in neurological status  - Encourage and assist patient to increase activity and self care     - Encourage visually impaired, hearing impaired and aphasic patients to use assistive/communication devices  Outcome: Progressing     Problem: RESPIRATORY - ADULT  Goal: Achieves optimal ventilation and oxygenation  Description: INTERVENTIONS:  - Assess for changes in respiratory status  - Assess for changes in mentation and behavior  - Position to facilitate oxygenation and minimize respiratory effort  - Oxygen administered by appropriate delivery if ordered  - Initiate smoking cessation education as indicated  - Encourage broncho-pulmonary hygiene including cough, deep breathe, Incentive Spirometry  - Assess the need for suctioning and aspirate as needed  - Assess and instruct to report SOB or any respiratory difficulty  - Respiratory Therapy support as indicated  Outcome: Progressing     Problem: METABOLIC, FLUID AND ELECTROLYTES - ADULT  Goal: Electrolytes maintained within normal limits  Description: INTERVENTIONS:  - Monitor labs and assess patient for signs and symptoms of electrolyte imbalances  - Administer electrolyte replacement as ordered  - Monitor response to electrolyte replacements, including repeat lab results as appropriate  - Instruct patient on fluid and nutrition as appropriate  Outcome: Progressing  Goal: Fluid balance maintained  Description: INTERVENTIONS:  - Monitor labs   - Monitor I/O and WT  - Instruct patient on fluid and nutrition as appropriate  - Assess for signs & symptoms of volume excess or deficit  Outcome: Progressing     Problem: HEMATOLOGIC - ADULT  Goal: Maintains hematologic stability  Description: INTERVENTIONS  - Assess for signs and symptoms of bleeding or hemorrhage  - Monitor labs  - Administer supportive blood products/factors as ordered and appropriate  Outcome: Progressing     Problem: PAIN - ADULT  Goal: Verbalizes/displays adequate comfort level or baseline comfort level  Description: Interventions:  - Encourage patient to monitor pain and request assistance  - Assess pain using appropriate pain scale  - Administer analgesics based on type and severity of pain and evaluate response  - Implement non-pharmacological measures as appropriate and evaluate response  - Consider cultural and social influences on pain and pain management  - Notify physician/advanced practitioner if interventions unsuccessful or patient reports new pain  Outcome: Progressing     Problem: SAFETY ADULT  Goal: Maintain or return to baseline ADL function  Description: INTERVENTIONS:  -  Assess patient's ability to carry out ADLs; assess patient's baseline for ADL function and identify physical deficits which impact ability to perform ADLs (bathing, care of mouth/teeth, toileting, grooming, dressing, etc )  - Assess/evaluate cause of self-care deficits   - Assess range of motion  - Assess patient's mobility; develop plan if impaired  - Assess patient's need for assistive devices and provide as appropriate  - Encourage maximum independence but intervene and supervise when necessary  - Involve family in performance of ADLs  - Assess for home care needs following discharge   - Consider OT consult to assist with ADL evaluation and planning for discharge  - Provide patient education as appropriate  Outcome: Progressing  Goal: Maintains/Returns to pre admission functional level  Description: INTERVENTIONS:  - Perform BMAT or MOVE assessment daily    - Set and communicate daily mobility goal to care team and patient/family/caregiver  - Collaborate with rehabilitation services on mobility goals if consulted  - Perform Range of Motion  times a day  - Reposition patient every  hours    - Dangle patient  times a day  - Stand patient  times a day  - Ambulate patient  times a day  - Out of bed to chair  times a day   - Out of bed for meals  times a day  - Out of bed for toileting  - Record patient progress and toleration of activity level   Outcome: Progressing  Goal: Patient will remain free of falls  Description: INTERVENTIONS:  - Educate patient/family on patient safety including physical limitations  - Instruct patient to call for assistance with activity   - Consult OT/PT to assist with strengthening/mobility   - Keep Call bell within reach  - Keep bed low and locked with side rails adjusted as appropriate  - Keep care items and personal belongings within reach  - Initiate and maintain comfort rounds  - Make Fall Risk Sign visible to staff  - Offer Toileting every  Hours, in advance of need  - Initiate/[place]  - Obtain necessary fall risk management equipment:   - Apply yellow socks and bracelet for high fall risk patients  - Consider moving patient to room near nurses station  Outcome: Progressing     Problem: DISCHARGE PLANNING  Goal: Discharge to home or other facility with appropriate resources  Description: INTERVENTIONS:  - Identify barriers to discharge w/patient and caregiver  - Arrange for needed discharge resources and transportation as appropriate  - Identify discharge learning needs (meds, wound care, etc )  - Arrange for interpretive services to assist at discharge as needed  - Refer to Case Management Department for coordinating discharge planning if the patient needs post-hospital services based on physician/advanced practitioner order or complex needs related to functional status, cognitive ability, or social support system  Outcome: Progressing     Problem: Knowledge Deficit  Goal: Patient/family/caregiver demonstrates understanding of disease process, treatment plan, medications, and discharge instructions  Description: Complete learning assessment and assess knowledge base    Interventions:  - Provide teaching at level of understanding  - Provide teaching via preferred learning methods  Outcome: Progressing

## 2022-02-23 NOTE — CONSULTS
Consultation - Cardiology Team One  Cong Pappas 73 y o  male MRN: [id number]  Unit/Bed#: S -01 Encounter: [id number]    Inpatient consult to Cardiology  Consult performed by: Nida Gann PA-C  Consult ordered by: Delia Parmar DO          Physician Requesting Consult: Tim Nguyen MD  Reason for Consult / Principal Problem:  Cardiac clearance prior to ERCP    Assessment:    1  Preoperative cardiac risk assessment:  For ERCP  Patient denies angina or anginal equivalent and is without signs or symptoms acute decompensated heart failure  EKG with no acute ischemic change  HS troponin negative x3   2  Cholelithiasis/choledocholithiasis:  Presents with 4 days of epigastric pain  Imaging confirmed cholelithiasis and choledocholithiasis  Elevated total bili, LFTs and lipase  Evaluated by GI with plan for ERCP today   3  Paroxysmal atrial fibrillation:  Presented with AFib with RVR with rates in the 100s and was started on Cardizem infusion with bolus with conversion to NSR  Currently maintaining NSR on Cardizem 30 mg q6h and carvedilol 12 5 mg BID  Typically anticoagulated on Eliquis 5 mg BID that is currently on hold  4  Preserved biventricular systolic function: EF 05% with no WMA, G1DD, normal RV function, no significant valvular abnormality on echocardiogram 09/2021  Volume status appears stable  5  Essential hypertension:  Average /79  Pain likely driving BP  Currently on carvedilol 12 5 mg BID and  6  Hyperlipidemia:  Lipid panel 09/2021 , , HDL 50, LDL 83  Patient has both atorvastatin and pravastatin listed on med list but unsure which one he is taking  Also on fenofibrate  Statins are currently on hold in the setting of elevated LFTs  7  CKD stage 3:  Baseline creatinine 0 9-1 1  Creatinine currently at baseline of 1 07   8  Iron deficiency anemia:  Baseline hemoglobin 11-12  Hemoglobin 13 2 on admission down to 10 4 today  9   Recurrent ischemic CVA:  In 03/2021 and 09/2021  10  Alcohol abuse:  Drinks 8-10 beers every other day  CIWA protocol primary team  11  Tobacco abuse:  Smokes 1 PPD  Complete smoking cessation advised    Plan/Recommendations:  · Patient acceptable risk to proceed with ERCP without any additional testing  · Restart anticoagulation when deemed safe from a GI standpoint  · Continue Cardizem and carvedilol at current doses  · Recent echocardiogram reviewed with no indication to repeat at this time  ____________________________________________________________________    CC:  Chest pain      History of Present Illness   HPI: Robert Strange is a 73y o  year old male who has paroxysmal atrial fibrillation, essential hypertension, hyperlipidemia, CKD stage 3, iron deficiency anemia, recurrent ischemic CVA 03/2021 and 09/2021, COPD, chronic alcohol and tobacco abuse  Patient presents to the emergency room at [facility] via EMS on 02/22/2022 for intermittent epigastric chest pain x1 week with associated palpitations  On arrival to the ED patient was tachycardic in the 100s with /95 and oxygen saturation 95% on room air  EKG revealed AFib with ventricular rates in the 90s  CXR revealed no acute cardiopulmonary disease  CTA C/A/P revealed gallbladder thickening with edema and cholelithiasis concerning for acute cholecystitis, biliary dilatation common bile duct measuring 1 4 cm concerning for biliary obstruction, no aortic dissection or PE  RUQ revealed cholelithiasis and choledocholithiasis  Labs revealed potassium 3 2 with elevated LFTs, total bilirubin 1 57, magnesium 1 2, lipase 413, negative HS troponin x3, NT proBNP 423, WBC 10 otherwise stable CBC  Patient received 15 mg IV Cardizem and was started on a Cardizem infusion with conversion to NSR and subsequent discontinuation  Patient was placed on oral Cardizem 30 mg q6h  He received magnesium and potassium repletion  Patient was evaluated by GI and recommended for ERCP  Cardiology has been consulted for preoperative cardiac clearance  Home medication regimen includes atorvastatin 80 mg daily, fenofibrate 160 mg daily, Eliquis 5 mg BID, carvedilol 12 5 mg, BID, Plavix 75 mg daily, and pravastatin 40 mg daily? Echocardiogram 09/03/2021:  EF 55% with no WMA, G1DD, normal RV function, no significant valvular abnormality  EKG reviewed personally: 2/22/2022-normal sinus rhythm with sinus arrhythmia at a rate of 71 beats per minute  When compared to EKG from earlier the same day AFib at a controlled ventricular rate of 96 beats per minute was noted  When compared to EKG from 09/17/2021 AFib with RVR at 102 beats per minute was noted  Telemetry reviewed personally:  Normal sinus rhythm with heart rate 60-80 bpm      Review of Systems   Constitutional: Negative  Negative for chills  Cardiovascular: Negative for chest pain, dyspnea on exertion, leg swelling, near-syncope, orthopnea, palpitations, paroxysmal nocturnal dyspnea and syncope  Respiratory: Negative  Negative for cough, shortness of breath and wheezing  Endocrine: Negative  Hematologic/Lymphatic: Negative  Skin: Negative  Musculoskeletal: Negative  Gastrointestinal: Positive for abdominal pain  Negative for diarrhea, nausea and vomiting  Neurological: Negative for dizziness, light-headedness and weakness  Psychiatric/Behavioral: Negative  Negative for altered mental status  All other systems reviewed and are negative      Historical Information   Past Medical History:   Diagnosis Date    PRITI [facility]acute [facility]) ([facility] [address] ) 9/5/2021    Anxiety     Back pain     Claustrophobia     [facility]     Dysphagia 9/5/2021    GERD (gastroesophageal reflux disease)     Hypertension     Hypokalemia 2/22/2022    Lumbar back pain     Panic attacks     Stenosis of right carotid artery     Stroke [facility])     Wears glasses     Wears partial dentures     upper denture     Past Surgical History:   Procedure Laterality Date    COLONOSCOPY      IR CEREBRAL ANGIOGRAPHY  4/6/2021    OTHER SURGICAL HISTORY      fertility    HI SLCTV CATHJ 3RD+ ORD SLCTV ABDL PEL/LXTR [facility] Right 4/6/2021    Procedure: ARTERIOGRAM, carotid Angio;  Surgeon: Tito Saldivar MD;  Location: [facility];  Service: Vascular    HI THROMBOENDARTECTMY [facility] Right 4/13/2021    Procedure: RIGHT ENDARTERECTOMY ARTERY CAROTID WITH BOVINE PATCH;  Surgeon: Tito Saldivar MD;  Location:  MAIN OR;  Service: Vascular     Social History     Substance and Sexual Activity   Alcohol Use Yes     Social History     Substance and Sexual Activity   Drug Use Never     Social History     Tobacco Use   Smoking Status Light Tobacco Smoker    Packs/day: 0 25   Smokeless Tobacco Never Used   Tobacco Comment    also wears nicotine patches     Family History: History reviewed  No pertinent family history      Meds/Allergies   all current active meds have been reviewed, current meds:   Current Facility-Administered Medications   Medication Dose Route Frequency    acetaminophen (TYLENOL) tablet 650 mg  650 mg Oral Q6H PRN    albuterol (PROVENTIL HFA,VENTOLIN HFA) inhaler 1 puff  1 puff Inhalation Q6H PRN    carvedilol (COREG) tablet 12 5 mg  12 5 mg Oral BID With Meals    ceFAZolin (ANCEF) IVPB (premix in dextrose) 2,000 mg 50 mL  2,000 mg Intravenous Q8H    diltiazem (CARDIZEM) tablet 30 mg  30 mg Oral Q6H [address]    enoxaparin (LOVENOX) subcutaneous injection 40 mg  40 mg Subcutaneous Daily    folic acid (FOLVITE) tablet 1 mg  1 mg Oral Daily    metroNIDAZOLE (FLAGYL) tablet 500 mg  500 mg Oral Q8H [address]    multi-electrolyte (PLASMALYTE-A/ISOLYTE-S PH 7 4) IV solution  75 mL/hr Intravenous Continuous    multivitamin-minerals (CENTRUM) tablet 1 tablet  1 tablet Oral Daily    nicotine (NICODERM CQ) 14 mg/24hr TD 24 hr patch 1 patch  1 patch Transdermal Daily    nitroglycerin (NITROSTAT) SL tablet 0 4 mg 0 4 mg Sublingual Q5 Min PRN    ondansetron (ZOFRAN) injection 4 mg  4 mg Intravenous Q6H PRN    pantoprazole (PROTONIX) EC tablet 20 mg  20 mg Oral Early Morning    pravastatin (PRAVACHOL) tablet 40 mg  40 mg Oral Daily With Dinner    sertraline (ZOLOFT) tablet 100 mg  100 mg Oral HS    simethicone (MYLICON) chewable tablet 80 mg  80 mg Oral 4x Daily PRN    sodium chloride (PF) 0 9 % injection 3 mL  3 mL Intravenous Q1H PRN    thiamine tablet 100 mg  100 mg Oral Daily    and PTA meds:   Prior to Admission Medications   Prescriptions Last Dose Informant Patient Reported? Taking?    ALPRAZolam (XANAX) 0 25 mg tablet  Self Yes Yes   Sig: TAKE 1 TABLET BY ORAL ROUTE 4 TIMES EVERY DAY AS NEEDED   acetaminophen (TYLENOL) 325 mg tablet Not Taking at Unknown time Self No No   Sig: Take 2 tablets (650 mg total) by mouth every 6 (six) hours as needed for mild pain   Patient not taking: Reported on 2022    aclidinium (Tudorza Pressair) 400 MCG/ACT inhaler  Self Yes Yes   Sig: Inhale 1 puff 2 (two) times a day     albuterol (PROVENTIL HFA,VENTOLIN HFA) 90 mcg/act inhaler  Self Yes Yes   Si puff every 6 (six) hours as needed     apixaban (ELIQUIS) 5 mg 2022 at Unknown time Self No Yes   Sig: Take 1 tablet (5 mg total) by mouth 2 (two) times a day   atorvastatin (LIPITOR) 80 mg tablet   No No   Sig: Take 1 tablet (80 mg total) by mouth daily   baclofen 10 mg tablet  Self Yes No   Sig: 10 mg 3 (three) times a day as needed     carvedilol (COREG) 12 5 mg tablet  Self Yes No   Sig: Take 12 5 mg by mouth 2 (two) times a day with meals   Patient not taking: Reported on 11/10/2021    clopidogrel (PLAVIX) 75 mg tablet Not Taking at Unknown time Self No No   Sig: TAKE 1 TABLET BY MOUTH EVERY DAY FOR 19 DOSES   Patient not taking: Reported on 11/10/2021   fenofibrate 160 MG tablet  Self Yes No   Sig: Take 160 mg by mouth daily   ferrous sulfate 324 (65 Fe) mg  Self No No   Sig: Take 1 tablet (324 mg total) by mouth daily before breakfast   loratadine (CLARITIN) 10 mg tablet  Self No No   Sig: Take 1 tablet (10 mg total) by mouth daily   nicotine (NICODERM CQ) 14 mg/24hr TD 24 hr patch  Self No No   Sig: Place 1 patch on the skin daily   omeprazole (PriLOSEC) 20 mg delayed release capsule  Self Yes No   Sig: Take 20 mg by mouth daily     pantoprazole (PROTONIX) 40 mg tablet   No No   Sig: Take 1 tablet (40 mg total) by mouth daily in the early morning   pravastatin (PRAVACHOL) 40 mg tablet  Self Yes No   Sig: Take 40 mg by mouth daily     senna (SENOKOT) 8 6 mg  Self No No   Sig: Take 1 tablet (8 6 mg total) by mouth daily at bedtime as needed for constipation   Patient not taking: Reported on 9/15/2021   sertraline (ZOLOFT) 100 mg tablet  Self Yes No   Sig: Take 100 mg by mouth daily     thiamine 100 MG tablet  Self No No   Sig: Take 1 tablet (100 mg total) by mouth daily      Facility-Administered Medications: None     multi-electrolyte, 75 mL/hr, Last Rate: 75 mL/hr (02/23/22 1031)        Allergies   Allergen Reactions    Penicillins Anaphylaxis       Objective   Vitals: Blood pressure 140/60, pulse 82, temperature 98 5 °F (36 9 °C), temperature source Oral, resp  rate 18, weight 71 6 kg (157 lb 13 6 oz), SpO2 93 %  ,     Body mass index is 23 31 kg/m²  ,     Systolic (33UHW), BVK:098 , Min:114 , CIE:569     Diastolic (66OJO), BHH:12, Min:58, Max:112    Wt Readings from Last 3 Encounters:   02/22/22 71 6 kg (157 lb 13 6 oz)   11/10/21 68 kg (150 lb)   10/22/21 68 kg (150 lb)      Lab Results   Component Value Date    CREATININE 1 07 02/23/2022    CREATININE 1 12 02/22/2022    CREATININE 1 08 09/17/2021             Intake/Output Summary (Last 24 hours) at 2/23/2022 1348  Last data filed at 2/23/2022 1129  Gross per 24 hour   Intake --   Output 1150 ml   Net -1150 ml     Weight (last 2 days)     Date/Time Weight    02/22/22 0937 71 6 (157 85)        Invasive Devices  Report    Peripheral Intravenous Line            Peripheral IV 02/22/22 Left Forearm 1 day    Peripheral IV 02/22/22 Right Forearm 1 day                  Physical Exam  Vitals and nursing note reviewed  Constitutional:       General: He is not in acute distress  Appearance: He is well-developed  Comments: On RA in NAD   HENT:      Head: Normocephalic and atraumatic  Neck:      Vascular: No JVD  Cardiovascular:      Rate and Rhythm: Normal rate and regular rhythm  Heart sounds: Normal heart sounds  No murmur heard  No friction rub  No gallop  Pulmonary:      Effort: Pulmonary effort is normal  No respiratory distress  Breath sounds: Normal breath sounds  No wheezing or rales  Chest:      Chest wall: No tenderness  Abdominal:      General: Bowel sounds are normal  There is no distension  Palpations: Abdomen is soft  Tenderness: There is no abdominal tenderness  Musculoskeletal:         General: No tenderness  Normal range of motion  Cervical back: Normal range of motion and neck supple  Right lower leg: No edema  Left lower leg: No edema  Skin:     General: Skin is warm and dry  Coloration: Skin is not pale  Findings: No erythema  Neurological:      Mental Status: He is alert and oriented to person, place, and time  Psychiatric:         Mood and Affect: Mood normal          Behavior: Behavior normal          Thought Content:  Thought content normal          Judgment: Judgment normal            LABORATORY RESULTS:      CBC with diff:   Results from last 7 days   Lab Units 02/23/22  0507 02/22/22  1018   WBC Thousand/uL 3 98* 10 76*   HEMOGLOBIN g/dL 10 4* 13 2   HEMATOCRIT % 32 8* 39 3   MCV fL 88 84   PLATELETS Thousands/uL 168 223   MCH pg 27 8 28 1   MCHC g/dL 31 7 33 6   RDW % 14 1 13 6   MPV fL 10 3 10 2   NRBC AUTO /100 WBCs 0 0       CMP:  Results from last 7 days   Lab Units 02/23/22  0507 02/22/22  0000   POTASSIUM mmol/L 4 5 3 2*   CHLORIDE mmol/L 100 94*   CO2 mmol/L 25 28   BUN mg/dL 11 12 CREATININE mg/dL 1 07 1 12   CALCIUM mg/dL 7 7* 8 6   AST U/L 89* 102*   ALT U/L 95* 125*   ALK PHOS U/L 320* 376*   EGFR ml/min/1 73sq m 72 68       BMP:  Results from last 7 days   Lab Units 22  0507 22  0000   POTASSIUM mmol/L 4 5 3 2*   CHLORIDE mmol/L 100 94*   CO2 mmol/L 25 28   BUN mg/dL 11 12   CREATININE mg/dL 1 07 1 12   CALCIUM mg/dL 7 7* 8 6          Lab Results   Component Value Date    NTBNP 423 (H) 2022            Results from last 7 days   Lab Units 22  0000   MAGNESIUM mg/dL 1 2*                 Results from last 7 days   Lab Units 22  0000   TSH 3RD GENERATON uIU/mL 0 553           Lipid Profile:   No results found for: CHOL  Lab Results   Component Value Date    HDL 58 2021    HDL 59 2021     Lab Results   Component Value Date    LDLCALC 83 2021    LDLCALC 91 2021     Lab Results   Component Value Date    TRIG 168 (H) 2021    TRIG 199 (H) 2021         Cardiac testing:   Results for orders placed during the hospital encounter of 21    Echo complete with contrast if indicated    Narrative  [place] [address], [address] [facility]  [phone]    Transthoracic Echocardiogram  2D, M-mode, Doppler, and Color Doppler    Study date:  03-Sep-2021    Patient: Marleni Sampson  MR number: [id number]  Account number: [de-identified]  : 1955  Age: 73 years  Gender: Male  Status: Inpatient  Location: ICU  Height: 69 in  Weight: 151 lb  BP: 170/ 109 mmHg    Indications: Stroke  Diagnoses: I63 9 - Cerebral infarction, unspecified    Sonographer:  Carnella Halsted, RCS  Primary Physician:  Khadar Flor DO  Referring Physician:  SVEN Flores  Group:  [facility]  Interpreting Physician:  Eugenio Carpenter MD    SUMMARY    LEFT VENTRICLE:  Systolic function was normal by visual assessment  Ejection fraction was estimated to be 55 %    There were no regional wall motion abnormalities  Doppler parameters were consistent with abnormal left ventricular relaxation (grade 1 diastolic dysfunction)  RIGHT VENTRICLE:  The ventricle was mildly dilated  RIGHT ATRIUM:  The atrium was mildly dilated  MITRAL VALVE:  There was mild regurgitation  TRICUSPID VALVE:  There was mild regurgitation  HISTORY: PRIOR HISTORY: TIA, Hypertension, Hyperlipidemia, Alcohol and Tobacco use, COPD  PROCEDURE: The study was performed in the ICU  This was a routine study  The transthoracic approach was used  The study included complete 2D imaging, M-mode, complete spectral Doppler, and color Doppler  The heart rate was 66 bpm, at the  start of the study  Images were obtained from the parasternal, apical, subcostal, and suprasternal notch acoustic windows  Echocardiographic views were limited due to poor acoustic window availability  Image quality was adequate  LEFT VENTRICLE: Size was normal  Systolic function was normal by visual assessment  Ejection fraction was estimated to be 55 %  There were no regional wall motion abnormalities  Wall thickness was normal  DOPPLER: There was an increased  relative contribution of atrial contraction to ventricular filling  Doppler parameters were consistent with abnormal left ventricular relaxation (grade 1 diastolic dysfunction)  RIGHT VENTRICLE: The ventricle was mildly dilated  Systolic function was normal  DOPPLER: Systolic pressure was not estimated  LEFT ATRIUM: Size was normal     RIGHT ATRIUM: The atrium was mildly dilated  MITRAL VALVE: Valve structure was normal  There was normal leaflet separation  DOPPLER: The transmitral velocity was within the normal range  There was no evidence for stenosis  There was mild regurgitation  AORTIC VALVE: The valve was trileaflet  Leaflets exhibited normal thickness and normal cuspal separation  DOPPLER: Transaortic velocity was within the normal range  There was no evidence for stenosis   There was no regurgitation  TRICUSPID VALVE: The valve structure was normal  There was normal leaflet separation  DOPPLER: The transtricuspid velocity was within the normal range  There was no evidence for stenosis  There was mild regurgitation  PULMONIC VALVE: Leaflets exhibited normal thickness, no calcification, and normal cuspal separation  DOPPLER: The transpulmonic velocity was within the normal range  There was no regurgitation  PERICARDIUM: There was no pericardial effusion  AORTA: The root exhibited normal size  SYSTEMIC VEINS: IVC: The inferior vena cava was normal in size and course  Respirophasic changes were normal     SYSTEM MEASUREMENT TABLES    2D  %FS: 32 14 %  Ao Diam: 3 78 cm  EDV(Teich): 110 66 ml  EF(Teich): 60 18 %  ESV(Teich): 44 06 ml  IVSd: 0 99 cm  LA Area: 16 96 cm2  LA Diam: 3 81 cm  LVEDV MOD A4C: 88 35 ml  LVEF MOD A4C: 54 15 %  LVESV MOD A4C: 40 51 ml  LVIDd: 4 86 cm  LVIDs: 3 3 cm  LVLd A4C: 7 11 cm  LVLs A4C: 6 16 cm  LVPWd: 1 02 cm  RA Area: 18 06 cm2  RVIDd: 4 65 cm  SV MOD A4C: 47 84 ml  SV(Teich): 66 6 ml    CW  AV MaxPG: 3 76 mmHg  AV Vmax: 0 97 m/s    MM  TAPSE: 2 27 cm    PW  E' Sept: 0 09 m/s  E/E' Sept: 8 12  LVOT Vmax: 1 11 m/s  LVOT maxP 89 mmHg  MV A Cj: 0 88 m/s  MV Dec Mason: 4 82 m/s2  MV DecT: 156 01 ms  MV E Cj: 0 75 m/s  MV E/A Ratio: 0 86  MV PHT: 45 24 ms  MVA By PHT: 4 86 cm2    [employer] Accredited Echocardiography Laboratory    Prepared and electronically signed by    Alejandro Pichardo MD  Signed 03-Sep-2021 11:19:06    No results found for this or any previous visit  No valid procedures specified    Results for orders placed during the hospital encounter of 20    NM myocardial perfusion spect (stress and/or rest)    [address]  [phone]    Rest/Stress Gated SPECT Myocardial Perfusion Imaging After Regadenoson    Patient: Jeff Alvarez  MR number: [id number]  Account number: [de-identified]  : 1955  Age: 62 years  Gender: Male  Status: Outpatient  Location: [address] [place] [address] and Vascular [place]  Height: 69 in  Weight: 155 lb  BP: 142/ 60 mmHg    Allergies: ALLERGIES NOT ON FILE    Diagnosis: R07 9 - Chest pain, unspecified    Referring Physician:  Linda Huang DO  Primary Physician:  Linda Huang DO  Technician:  KANE Sosa  RN:  Nitesh Knowles RN  Group:  [facility]  Cardiology Fellow:  Deandre Zelaya MD  Report Prepared by[de-identified]  Nitesh Knowles RN  Interpreting Physician:  Catherine Mcmillan MD    INDICATIONS: Detection of Coronary artery disease  HISTORY: Childhood rheumatic fever  The patient is a 62year old  male  Chest pain status: chest pain  Other symptoms: dyspnea and decreased effort tolerance  Coronary artery disease risk factors: smoking  Cardiovascular history:  none significant  Medications: a beta blocker and a lipid lowering agent  PHYSICAL EXAM: Baseline physical exam screening: no wheezes audible  REST ECG: Normal sinus rhythm  PROCEDURE: The study was performed in the the [address] and [facility]  A regadenoson infusion pharmacologic stress test was performed  Gated SPECT myocardial perfusion imaging was performed after stress  Systolic blood pressure  was 142 mmHg, at the start of the study  Diastolic blood pressure was 60 mmHg, at the start of the study  The heart rate was 64 bpm, at the start of the study  IV double checked  Regadenoson protocol:  HR bpm SBP mmHg DBP mmHg Symptoms  Baseline 64 142 60 none  Immediate 109 122 64 mild dyspnea  1 min 97 140 76 none  No medications or fluids given  STRESS SUMMARY: Duration of pharmacologic stress was 3 min and 0 sec  Functional capacity could not be adequately assessed  Maximal heart rate during stress was 109 bpm  The heart rate response to stress was blunted   There was resting  hypertension with an appropriate blood pressure response to stress  There was no chest pain during stress  The stress test was terminated due to protocol completion  Pre oxygen saturation: 98 %  Peak oxygen saturation: 98 %  The stress ECG  was negative for ischemia and normal  There were no stress arrhythmias or conduction abnormalities  ISOTOPE ADMINISTRATION:  Resting isotope administration Stress isotope administration  Agent Tetrofosmin Tetrofosmin  Dose 10 66 mCi 31 6 mCi  Date 01/27/2020 01/27/2020  Injection time 07:55 09:55  Imaging time 08:43 10:39  Injection-image interval 48 min 44 min    The radiopharmaceutical was injected one minute before the end of exercise  MYOCARDIAL PERFUSION IMAGING:  The image quality was fair  Rotating projection images reveal mild diaphragmatic attenuation  Left ventricular size was normal  The TID ratio was 0 74  PERFUSION DEFECTS:  -  There were no significnat perfusion defects  PERFUSION QUANTITATION:  The summed perfusion score measured 0 during stress  GATED SPECT:  The calculated left ventricular ejection fraction was 71 %  Left ventricular ejection fraction was within normal limits by visual estimate  There was no left ventricular regional abnormality  SUMMARY:  -  Stress results: There was resting hypertension with an appropriate blood pressure response to stress  There was no chest pain during stress  -  ECG conclusions: The stress ECG was negative for ischemia and normal   -  Perfusion imaging: There were no significnat perfusion defects   -  Gated SPECT: The calculated left ventricular ejection fraction was 71 %  Left ventricular ejection fraction was within normal limits by visual estimate  There was no left ventricular regional abnormality  IMPRESSIONS: Normal study after vasodilation and low level exercise  Myocardial perfusion imaging was normal at rest and with stress   Left ventricular systolic function was normal     Prepared and signed by    Lorna Watst MD  Signed 01/27/2020 14:00:03        Imaging: I have personally reviewed pertinent reports  X-ray chest 1 view portable    Result Date: 2/22/2022  Narrative: CHEST INDICATION:   chest pain  COMPARISON:  9/15/2021 EXAM PERFORMED/VIEWS:  XR CHEST PORTABLE FINDINGS: Cardiomediastinal silhouette appears unremarkable  The lungs are clear  No pneumothorax or pleural effusion  Osseous structures appear within normal limits for patient age  Impression: No acute cardiopulmonary disease  Workstation performed: [id number]     CTA dissection protocol chest/abdomen/pelvis    Result Date: 2/22/2022  Narrative: CTA - CHEST, ABDOMEN AND PELVIS - WITHOUT AND WITH IV CONTRAST INDICATION:   severe chest pain  COMPARISON:  None  TECHNIQUE: CT examination of the chest, abdomen and pelvis was performed both prior to and after the administration of intravenous contrast   The noncontrast portion of this examination was performed utilizing low radiation dose technique  Thin section angiographic arterial phase post contrast technique was used in order to evaluate for aortic dissection  3D reformatted images and volume rendering were performed on an independent workstation  Additionally, axial, sagittal, and coronal 2D reformatted images were created from the source data and submitted for interpretation  Radiation dose length product (DLP) for this visit:  855 mGy-cm   This examination, like all CT scans performed in the [facility], was performed utilizing techniques to minimize radiation dose exposure, including the use of iterative reconstruction and automated exposure control  IV Contrast:  100 mL of iohexol (OMNIPAQUE) Enteric Contrast:  Enteric contrast was not administered  FINDINGS: AORTA:  There is no aortic dissection or intramural hematoma  There is no aortic aneurysm  The aortic root measures 2 3 cm Aortic sinus measures 3 9 cm There is no dissection flap seen   Atherosclerotic calcifications seen in the brachiocephalic, left common carotid artery Atherosclerotic calcification seen in the region of the left vertebral artery Focal area of atherosclerotic calcification seen in the left subclavian without significant stenosis Descending thoracic aorta multifocal areas of foot calcific plaquing seen There is no dissection flap seen Abdominal aorta; no dissection flap seen Celiac trunk, hepatic artery are patent  Splenic artery is patent SMA; there is atherosclerotic calcification at the proximal portion of the SMA with narrowing in the range of for 50-60%  AIMEE is patent Renal arteries are patent Atherosclerotic calcific location seen in the renal arteries without narrowing Iliac vessels where mixed plaquing seen within the right common iliac artery with more than 50% narrowing Bilateral common femoral arteries are patent internal iliac arteries are patent CHEST LUNGS:  A subpleural nodular density seen in the left upper lobe in image 35 series 3 Right upper lobe lung nodule seen, measuring about 3 mm Trachea and central bronchial patent  Mild reticulation seen in the subpleural region in the upper lobe and right lower chest and mild interstitial lung disease Mild bronchiolar thickening seen in the left lower lobe and right lower lobe bronchus Opacification of the posterior basilar segmental bronchi seen at the left lung base PLEURA:  No pleural effusion seen No pneumothorax seen HEART/PULMONARY ARTERIAL TREE:  Unremarkable for patient's age  No pulmonary embolism seen MEDIASTINUM AND CAROLYNN:  No significant mediastinal lymph node enlargement seen CHEST WALL AND LOWER NECK:   Unremarkable  ABDOMEN LIVER/BILIARY TREE:  Hepatic steatosis seen GALLBLADDER:  Cholelithiasis is seen Mild gallbladder wall thickening seen The CBD measures 1 4 cm, is dilated SPLEEN:  Unremarkable  PANCREAS:  No pancreatic ductal dilation seen No pancreatic atrophy seen ADRENAL GLANDS:  Unremarkable   KIDNEYS/URETERS:  No hydronephrosis No renal calculi STOMACH AND BOWEL:  Small hiatal hernia seen Again noted is a thickening of the wall of the stomach APPENDIX:  No findings to suggest appendicitis  ABDOMINOPELVIC CAVITY:  No ascites or free intraperitoneal air  No lymphadenopathy  PELVIS REPRODUCTIVE ORGANS:  Prostate calcification seen URINARY BLADDER:  Unremarkable  ABDOMINAL WALL/INGUINAL REGIONS:  Unremarkable  OSSEOUS STRUCTURES:  No acute compression collapse vertebra seen No gross lytic lesion seen     Impression: Gallbladder wall thickening with edema and cholelithiasis, evaluate for acute cholecystitis Biliary dilation common bile duct measuring 1 4 cm, concerning for biliary obstruction, MRCP may be considered to evaluate for choledocholithiasis No thoracic aortic dissection No pulmonary embolism Opacification of the segmental bronchi of the left lower lobe, image 83 series 3, short interval follow-up at 3 months suggested this may be due to secretions or due to focal lesion Incidentally detected the less than 6 mm nodules in the both lung with the most prominent in the subpleural region in the left upper lobe  Based on current Fleischner Society 2017 Guidelines on incidental pulmonary nodule,  follow-up CT at 12 months can be considered  Mild reticulation in the subpleural region, raises concern for mild interstitial lung disease Hepatic steatosis Again noted is thickening of the wall of the stomach as seen on the previous study, remains indeterminate Atherosclerotic disease in the aorta with the suggestion of more than 50% stenosis in the right common iliac artery The study was marked in EPIC for immediate notification  Workstation performed: Thelam Quinones right upper quadrant    Result Date: 2/22/2022  Narrative: RIGHT UPPER QUADRANT ULTRASOUND INDICATION:     Right upper quadrant pain, history of gallstones   COMPARISON:  None TECHNIQUE:   Real-time ultrasound of the right upper quadrant was performed with a curvilinear transducer with both volumetric sweeps and still imaging techniques  FINDINGS: PANCREAS:  Poorly visualized AORTA AND IVC:  Visualized portions are normal for patient age  LIVER: Size:  Within normal range  The liver measures 14 8 cm in the midclavicular line  Contour:  Surface contour is smooth  Parenchyma:  Echogenicity and echotexture are within normal limits  No liver mass identified  Limited imaging of the main portal vein shows it to be patent and hepatopetal   BILIARY: Gallstones identified, with positive Godoy's sign and gallbladder wall thickening measuring approximately 5 mm  Code bladder wall edema noted  There may also be trace pericholecystic fluid  CBD measures 14 0 mm  Common bile duct calculus identified, may measure as much as 15 mm in longest dimension  KIDNEY: Right kidney measures 10 0 x 4 9 x 4 4 cm  Volume 115 2 mL Kidney within normal limits  ASCITES:   Otherwise no ascites     Impression: Cholelithiasis and choledocholithiasis  Positive Godoy's sign with gallbladder wall thickening and edema and potentially tiny amount of pericholecystic fluid  These latter findings are most indicative of acute cholecystitis The examination demonstrates a significant  finding and was documented as such in [facility] for liaison and referring practitioner immediate notification  Workstation performed: [id number]     US bedside procedure    Result Date: 2/22/2022  Narrative: 1 2 [id number]  2 [id number] 13 1    Counseling / Coordination of Care  Total floor / unit time spent today 45 minutes  Greater than 50% of total time was spent with the patient and / or family counseling and / or coordination of care  A description of the counseling / coordination of care: Review of history, current assessment, development of a plan  Code Status: Level 1 - Full Code    ** Please Note: Dragon 360 Dictation voice to text software may have been used in the creation of this document   **

## 2022-02-23 NOTE — PLAN OF CARE
Problem: MOBILITY - ADULT  Goal: Maintain or return to baseline ADL function  Description: INTERVENTIONS:  -  Assess patient's ability to carry out ADLs; assess patient's baseline for ADL function and identify physical deficits which impact ability to perform ADLs (bathing, care of mouth/teeth, toileting, grooming, dressing, etc )  - Assess/evaluate cause of self-care deficits   - Assess range of motion  - Assess patient's mobility; develop plan if impaired  - Assess patient's need for assistive devices and provide as appropriate  - Encourage maximum independence but intervene and supervise when necessary  - Involve family in performance of ADLs  - Assess for home care needs following discharge   - Consider OT consult to assist with ADL evaluation and planning for discharge  - Provide patient education as appropriate  Outcome: Progressing  Goal: Maintains/Returns to pre admission functional level  Description: INTERVENTIONS:  - Perform BMAT or MOVE assessment daily    - Set and communicate daily mobility goal to care team and patient/family/caregiver  - Collaborate with rehabilitation services on mobility goals if consulted  - Perform Range of Motion  times a day  - Reposition patient every hours    - Dangle patient  times a day  - Stand patient times a day  - Ambulate patient  times a day  - Out of bed to chair times a day   - Out of bed for meals times a day  - Out of bed for toileting  - Record patient progress and toleration of activity level   Outcome: Progressing

## 2022-02-23 NOTE — ASSESSMENT & PLAN NOTE
POA:  Worsening abdominal pain the 2 that has been intermittent and ongoing for the last 2 weeks  Reports of chills and nausea/vomiting  CTA chest/abdomen/pelvis:  Gallbladder wall thickening with edema and cholelithiasis  RUQ U/S: Cholelithiasis and choledocholithiasis  Positive Godoy's sign with gallbladder wall thickening and edema and potentially tiny amount of pericholecystic fluid  These latter findings are most indicative of acute cholecystitis  Elevated lipase, elevated LFTs, and leukocytosis  General surgery consulted, no surgical intervention warranted at this time  Patient is being treated with antibiotics:  Ancef and Flagyl  Patient is currently on multi electrolyte fluids 75 mL/hr  Plan:  GI was consulted, ERCP scheduled for today  Patient is NPO  Consulted Cardiology for cardiac clearance prior to ERCP  Continue with antibiotic treatment:  Ancef and Flagyl  Continue with IV fluid administration  Continue to monitor vitals and trend for any fevers  Zofran PRN

## 2022-02-23 NOTE — ASSESSMENT & PLAN NOTE
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

## 2022-02-23 NOTE — PLAN OF CARE
Problem: PHYSICAL THERAPY ADULT  Goal: Performs mobility at highest level of function for planned discharge setting  See evaluation for individualized goals  Description: Treatment/Interventions: Functional transfer training,LE strengthening/ROM,Elevations,Therapeutic exercise,Endurance training,Patient/family training,Gait training          See flowsheet documentation for full assessment, interventions and recommendations  Outcome: Progressing  Note: Prognosis: Fair  Problem List: Decreased strength,Decreased endurance,Impaired balance,Decreased mobility,Decreased safety awareness  Assessment: Pt presents with abdominal pain  Dx: choledocholithiasis, a-fib, hypokalemia, hypomagnesemia, and leukocytosis  order placed for PT eval and tx, w/ activity order of up and OOB as tolerated  pt presents w/ comorbidities of back pain, COPD, HTN, back pain, and CVA and personal factors of stair(s) to enter home, positive fall history, anxiety and tobacco use  pt presents w/ weakness, decreased endurance, impaired balance, gait deviations, decreased safety awareness and fall risk  these impairments are evident in findings from physical examination (weakness), mobility assessment (need for standby assist w/ all phases of mobility when usually mobilizing independently, tolerance to only 30 feet of ambulation and need for cueing for mobility technique), and Barthel Index: 60/100  pt needed input for mobility and breathing technique  pt is at risk for falls due to physical and safety awareness deficits  pt's clinical presentation is unstable/unpredictable (evident in poor blood pressure control, need for standby assist w/ all phases of mobility when usually mobilizing independently, tolerance to only 30 feet of ambulation and need for input for mobility technique/safety)  pt needs inpatient PT tx to improve mobility deficits and progress mobility training as appropriate   discharge recommendation is for home w/ family support and home PT to reduce fall risk and maximize level of functional independence  PT Discharge Recommendation: Home with home health rehabilitation          See flowsheet documentation for full assessment

## 2022-02-23 NOTE — H&P
[facility]  H&P- Susan Welch 1955, 73 y o  male MRN: [id number]  Unit/Bed#: S -01 Encounter: [id number]  Primary Care Provider: Larissa Hoover DO   Date and time admitted to hospital: 2/22/2022  9:31 AM    * Cholecystitis  Assessment & Plan  POA Progressively worsening abdominal pain that resolved after presenting in ED  - Afebrile  - Hemodynamically stable  - CTA Chest, Abdomen, and Pelvis with contrast Gallbladder wall thickening with edema and cholelithiasis  - U/S  Gallstones identified, with positive Godoy's sign and gallbladder wall thickening measuring approximately 5 mm, Cholelithiasis and choledocholithiasis, positive Godoy's sign with gallbladder wall thickening and edema and potentially tiny amount of pericholecystic fluid  - Elevated Lipase  - Elevated LFTs  - Elevated WBCs  - Gen Surg consulted, ERCP scheduled for tomorrow     Plan:  - ERCP tomorrow   - NPO at midnight  - AM CMP  - AM CBC  - Hold Eliquis  - Start Abx: Ancef 2 grams q 8 h  + Flagyl 500 mg q 8 h  - Zofran for Nausea  - Monitor Vital Signs     PAF (paroxysmal atrial fibrillation) (HCC)  Assessment & Plan  Afib with return to NSR in ED  - No previous history of Afib  - Last ECHO 09/2021 EF 55%   - DOA0ID5 VASc 4   - On Eliquis for previous CVA    Plan  - Hold eliquis for ERCP tomorrow  - Cardiology consult after ERCP  - Continue monitoring Vitals signs  - Continue Coreg 12 5 mg BID  - Nitro prn     Hypokalemia  Assessment & Plan  POA potassium of 3 2  - 20 mEq given in ED    Plan:  - Replete with 40 mEq then 20 mEq after  - Repeat CMP in AM  - Monitor signs/symptoms of low potassium     Hypomagnesemia  Assessment & Plan  Lab Results   Component Value Date/Time    MG 1 2 (L) 02/22/2022 12:00 AM     Plan:  - Replete Mg 2 g     Leukocytosis  Assessment & Plan  Leukocytosis most like 2/2 to Acute Cholecystitis    Plan: Follow plan for Acute Cholecystitis     Benign essential hypertension  Assessment & Plan  Chart review shows hx of HTN (Pt poor historian, cannot recall if taking any anti-hypertensive medication)    Plan:  - continue carvedilol/Coreg 12 5 mg BID    COPD (chronic obstructive pulmonary disease) (HCC)  Assessment & Plan  Hx of COPD   - uncontrolled since his Tudorza Pressair prescription not filled recently   - Albuterol inhaler usage only     Plan:  - Consider restarting maintenance therapy   - Continue Albuterol     HLD (hyperlipidemia)  Assessment & Plan  History of Hyperlipidemia   - pt unsure if he was still taking atorvastatin/lipitor   - sister said she would bring in his medications tomorrow     Plan:  - Continue Pravachol 40 mg q daily   - F/u with sister tomorrow for home medication review     Alcohol abuse  Assessment & Plan  Patient has a known hx of Alcohol abuse  - Last drink at 04 AM on 02/22/2022  - Inebriated during initial encounter in ED    Plan:   - JASVIRWA Protocol  - Ativan   - Monitor vitals  - Monitor for DT's    VTE Prophylaxis: Heparin  / sequential compression device   Code Status: Full  POLST: POLST is not applicable to this patient     Anticipated Length of Stay:  Patient will be admitted on an Inpatient basis with an anticipated length of stay of   2 midnights  Justification for Hospital Stay: Cholecystitis    Chief Complaint:   Abdominal Pain     History of Present Illness:    Sunita See is a 73 y o  male who presents with abdominal pain  The patient has a past medical history significant for CVA, alcohol abuse, and hypertension, presents for progressively worsening abdominal pain  The patient reports over the course of the last week/two weeks he has been having extremely painful intermittent mid-abdominal pain that lasts for a few minutes and then goes away  He says last week he had a really bad episode of the pain, and almost called 9-1-1 but then the pain subsided and didn't see any reason to call them    He says today, around 4 AM he had a glass of wine and noticed that the same abdominal pain had returned, and thought it was just some gas  But the pain progressively worsened and didn't lupillo  He says the pain radiates downwards towards his umbilicus, and rates it as a 7/10  He denies any nausea/vomiting/constipation/chills/diarrhea/fevers/SOB/chest pain  He says after EMS ride to ED, his abdominal pain had subsided and hasn't been experiencing any pain since  Of note, the patient had an episode of A-FIB on the way to ED, and was given Cardizem  He denies any  History of Afib, but does report taking eliquis due to previous stroke  In ED, the patient was in NSR  Review of Systems:    Review of Systems   Constitutional: Positive for diaphoresis  Negative for activity change, fatigue and unexpected weight change  HENT: Negative for congestion and sneezing  Eyes: Negative for visual disturbance  Respiratory: Negative for shortness of breath and wheezing  Cardiovascular: Negative for chest pain, palpitations and leg swelling  Gastrointestinal: Positive for abdominal pain  Negative for abdominal distention, blood in stool, constipation, diarrhea, nausea and vomiting  Genitourinary: Negative for difficulty urinating, dysuria, flank pain, frequency and urgency  Musculoskeletal: Negative for back pain, myalgias and neck pain  Neurological: Negative for dizziness, weakness, light-headedness and headaches  Psychiatric/Behavioral: Negative for dysphoric mood  The patient is not nervous/anxious          Past Medical and Surgical History:     Past Medical History:   Diagnosis Date    PRITI (acute kidney injury) ([facility] [address] ) 9/5/2021    Anxiety     Back pain     Claustrophobia     [facility] obstructive [facility]     Dysphagia 9/5/2021    GERD (gastroesophageal reflux disease)     Hypertension     Lumbar back pain     Panic attacks     Stenosis of right carotid artery     [facility]roke [facility])     Wears glasses     Wears partial dentures upper denture       Past Surgical History:   Procedure Laterality Date    COLONOSCOPY      IR CEREBRAL ANGIOGRAPHY  4/6/2021    OTHER SURGICAL HISTORY      fertility    [facility] Viry Robledo 3RD+ ORD SLCTV ABDL PEL/LXTR [facility] Right 4/6/2021    Procedure: ARTERIOGRAM, carotid Angio;  Surgeon: Luiz Alexandre MD;  Location: [facility];  Service: Vascular    AL THROMBOENDARTECTMY Jocelynejune Stewatr Right 4/13/2021    Procedure: RIGHT ENDARTERECTOMY ARTERY CAROTID WITH BOVINE PATCH;  Surgeon: Luiz Alexandre MD;  Location: [facility];  Service: Vascular       Meds/Allergies:    Prior to Admission medications    Medication Sig Start Date End Date Taking?  Authorizing Provider   aclidinium Nguyễn Said Pressair) 400 MCG/ACT inhaler Inhale 1 puff 2 (two) times a day     Yes Historical Provider, MD   albuterol (PROVENTIL HFA,VENTOLIN HFA) 90 mcg/act inhaler 1 puff every 6 (six) hours as needed   10/8/21  Yes Historical Provider, MD   ALPRAZolam (XANAX) 0 25 mg tablet TAKE 1 TABLET BY ORAL ROUTE 4 TIMES EVERY DAY AS NEEDED 6/15/21  Yes Historical Provider, MD   apixaban (ELIQUIS) 5 mg Take 1 tablet (5 mg total) by mouth 2 (two) times a day 9/17/21  Yes Chai Cannon MD   ALBUTEROL IN Inhale every 4 (four) hours as needed Unknown dose    2/22/22 Yes Historical Provider, MD   acetaminophen (TYLENOL) 325 mg tablet Take 2 tablets (650 mg total) by mouth every 6 (six) hours as needed for mild pain  Patient not taking: Reported on 2/22/2022 4/14/21   Raquel Santos PA-C   atorvastatin (LIPITOR) 80 mg tablet Take 1 tablet (80 mg total) by mouth daily 9/6/21 10/22/21  Destiny Siddiqi MD   baclofen 10 mg tablet 10 mg 3 (three) times a day as needed   11/3/21   Historical Provider, MD   carvedilol (COREG) 12 5 mg tablet Take 12 5 mg by mouth 2 (two) times a day with meals  Patient not taking: Reported on 11/10/2021     Historical Provider, MD   clopidogrel (PLAVIX) 75 mg tablet TAKE 1 TABLET BY MOUTH EVERY DAY FOR 19 DOSES  Patient not taking: Reported on 11/10/2021 7/28/21   La Kim PA-C   fenofibrate 160 MG tablet Take 160 mg by mouth daily 6/25/21   Historical Provider, MD   ferrous sulfate 324 (65 Fe) mg Take 1 tablet (324 mg total) by mouth daily before breakfast 9/6/21   Jeronimo Moser MD   loratadine (CLARITIN) 10 mg tablet Take 1 tablet (10 mg total) by mouth daily 9/6/21 11/10/21  Joellen Hassan MD   nicotine (NICODERM CQ) 14 mg/24hr TD 24 hr patch Place 1 patch on the skin daily 9/7/21   Joellen Hassan MD   omeprazole (PriLOSEC) 20 mg delayed release capsule Take 20 mg by mouth daily   10/4/21   Historical Provider, MD   pantoprazole (PROTONIX) 40 mg tablet Take 1 tablet (40 mg total) by mouth daily in the early morning 9/6/21 10/22/21  Joellen Hassan MD   pravastatin (PRAVACHOL) 40 mg tablet Take 40 mg by mouth daily   10/22/21   Historical Provider, MD   senna (SENOKOT) 8 6 mg Take 1 tablet (8 6 mg total) by mouth daily at bedtime as needed for constipation  Patient not taking: Reported on 9/15/2021 9/6/21   Jeronimo Moser MD   sertraline (ZOLOFT) 100 mg tablet Take 100 mg by mouth daily   1/19/11   Historical Provider, MD   thiamine 100 MG tablet Take 1 tablet (100 mg total) by mouth daily 3/31/21 11/10/21  Talha Rene MD   apixaban (ELIQUIS) 5 mg Take 1 tablet (5 mg total) by mouth 2 (two) times a day 9/18/21 2/22/22  Margot Amaya MD     I have reviewed home medications with patient family member  Allergies:    Allergies   Allergen Reactions    Penicillins Anaphylaxis       Social History:     Marital Status: /Civil Union   Occupation: unemployed  Patient Pre-hospital Living Situation: home by himself  Patient Pre-hospital Level of Mobility: Slight difficulty with ambulations  Patient Pre-hospital Diet Restrictions: none  Substance Use History:   Social History     Substance and Sexual Activity   Alcohol Use Yes     Social History     Tobacco Use   Smoking Status Light Tobacco Smoker    Packs/day: 0 25   Smokeless Tobacco Never Used   Tobacco Comment    also wears nicotine patches     Social History     Substance and Sexual Activity   Drug Use Never       Family History:    non-contributory    Physical Exam:     Vitals:   Blood Pressure: 128/71 (02/22/22 5310)  Pulse: 75 (02/22/22 2058)  Temperature: 97 6 °F (36 4 °C) (02/22/22 2058)  Temp Source: Oral (02/22/22 3915)  Respirations: 18 (02/22/22 9221)  Weight - Scale: 71 6 kg (157 lb 13 6 oz) (02/22/22 0937)  SpO2: 98 % (02/22/22 2251)    Physical Exam  Constitutional:       General: He is not in acute distress  Appearance: He is ill-appearing and diaphoretic  He is not toxic-appearing  Comments: Pt passing out during encounter   HENT:      Head: Normocephalic and atraumatic  Right Ear: External ear normal       Left Ear: External ear normal       Nose: Nose normal       Mouth/Throat:      Mouth: Mucous membranes are dry  Eyes:      Extraocular Movements: Extraocular movements intact  Conjunctiva/sclera: Conjunctivae normal    Neck:      Vascular: No carotid bruit  Cardiovascular:      Rate and Rhythm: Normal rate and regular rhythm  Pulses: Normal pulses  Heart sounds: Normal heart sounds  No murmur heard  No friction rub  No gallop  Pulmonary:      Effort: Pulmonary effort is normal       Breath sounds: Wheezing present  Abdominal:      General: Bowel sounds are normal  There is distension  Palpations: There is no mass  Tenderness: There is no abdominal tenderness  There is no right CVA tenderness, left CVA tenderness, guarding or rebound  Hernia: No hernia is present  Musculoskeletal:         General: No swelling or tenderness  Cervical back: Normal range of motion  No rigidity or tenderness  Right lower leg: No edema  Left lower leg: No edema  Skin:     Capillary Refill: Capillary refill takes less than 2 seconds     Neurological:      Cranial Nerves: No cranial nerve deficit  Motor: Weakness (Left Upper Extremity (Chronic)) present  Comments: Inebriated during encounter           Additional Data:     Lab Results: I have personally reviewed pertinent reports  Results from last 7 days   Lab Units 02/22/22  1018   WBC Thousand/uL 10 76*   HEMOGLOBIN g/dL 13 2   HEMATOCRIT % 39 3   PLATELETS Thousands/uL 223   NEUTROS PCT % 84*   LYMPHS PCT % 7*   MONOS PCT % 7   EOS PCT % 1     Results from last 7 days   Lab Units 02/22/22  0000   POTASSIUM mmol/L 3 2*   CHLORIDE mmol/L 94*   CO2 mmol/L 28   BUN mg/dL 12   CREATININE mg/dL 1 12   CALCIUM mg/dL 8 6   ALK PHOS U/L 376*   ALT U/L 125*   AST U/L 102*           Imaging: I have personally reviewed pertinent reports  X-ray chest 1 view portable    Result Date: 2/22/2022  Narrative: CHEST INDICATION:   chest pain  COMPARISON:  9/15/2021 EXAM PERFORMED/VIEWS:  XR CHEST PORTABLE FINDINGS: Cardiomediastinal silhouette appears unremarkable  The lungs are clear  No pneumothorax or pleural effusion  Osseous structures appear within normal limits for patient age  Impression: No acute cardiopulmonary disease  Workstation performed: [id number]     CTA dissection protocol chest/abdomen/pelvis    Result Date: 2/22/2022  Narrative: CTA - CHEST, ABDOMEN AND PELVIS - WITHOUT AND WITH IV CONTRAST INDICATION:   severe chest pain  COMPARISON:  None  TECHNIQUE: CT examination of the chest, abdomen and pelvis was performed both prior to and after the administration of intravenous contrast   The noncontrast portion of this examination was performed utilizing low radiation dose technique  Thin section angiographic arterial phase post contrast technique was used in order to evaluate for aortic dissection  3D reformatted images and volume rendering were performed on an independent workstation  Additionally, axial, sagittal, and coronal 2D reformatted images were created from the source data and submitted for interpretation  Radiation dose length product (DLP) for this visit:  855 mGy-cm   This examination, like all CT scans performed in the [facility], was performed utilizing techniques to minimize radiation dose exposure, including the use of iterative reconstruction and automated exposure control  IV Contrast:  100 mL of iohexol (OMNIPAQUE) Enteric Contrast:  Enteric contrast was not administered  FINDINGS: AORTA:  There is no aortic dissection or intramural hematoma  There is no aortic aneurysm  The aortic root measures 2 3 cm Aortic sinus measures 3 9 cm There is no dissection flap seen  Atherosclerotic calcifications seen in the brachiocephalic, left common carotid artery Atherosclerotic calcification seen in the region of the left vertebral artery Focal area of atherosclerotic calcification seen in the left subclavian without significant stenosis Descending thoracic aorta multifocal areas of foot calcific plaquing seen There is no dissection flap seen Abdominal aorta; no dissection flap seen Celiac trunk, hepatic artery are patent  Splenic artery is patent SMA; there is atherosclerotic calcification at the proximal portion of the SMA with narrowing in the range of for 50-60%  AIMEE is patent Renal arteries are patent Atherosclerotic calcific location seen in the renal arteries without narrowing Iliac vessels where mixed plaquing seen within the right common iliac artery with more than 50% narrowing Bilateral common femoral arteries are patent internal iliac arteries are patent CHEST LUNGS:  A subpleural nodular density seen in the left upper lobe in image 35 series 3 Right upper lobe lung nodule seen, measuring about 3 mm Trachea and central bronchial patent    Mild reticulation seen in the subpleural region in the upper lobe and right lower chest and mild interstitial lung disease Mild bronchiolar thickening seen in the left lower lobe and right lower lobe bronchus Opacification of the posterior basilar segmental bronchi seen at the left lung base PLEURA:  No pleural effusion seen No pneumothorax seen HEART/PULMONARY ARTERIAL TREE:  Unremarkable for patient's age  No pulmonary embolism seen MEDIASTINUM AND CAROLYNN:  No significant mediastinal lymph node enlargement seen CHEST WALL AND LOWER NECK:   Unremarkable  ABDOMEN LIVER/BILIARY TREE:  Hepatic steatosis seen GALLBLADDER:  Cholelithiasis is seen Mild gallbladder wall thickening seen The CBD measures 1 4 cm, is dilated SPLEEN:  Unremarkable  PANCREAS:  No pancreatic ductal dilation seen No pancreatic atrophy seen ADRENAL GLANDS:  Unremarkable  KIDNEYS/URETERS:  No hydronephrosis No renal calculi STOMACH AND BOWEL:  Small hiatal hernia seen Again noted is a thickening of the wall of the stomach APPENDIX:  No findings to suggest appendicitis  ABDOMINOPELVIC CAVITY:  No ascites or free intraperitoneal air  No lymphadenopathy  PELVIS REPRODUCTIVE ORGANS:  Prostate calcification seen URINARY BLADDER:  Unremarkable  ABDOMINAL WALL/INGUINAL REGIONS:  Unremarkable  OSSEOUS STRUCTURES:  No acute compression collapse vertebra seen No gross lytic lesion seen     Impression: Gallbladder wall thickening with edema and cholelithiasis, evaluate for acute cholecystitis Biliary dilation common bile duct measuring 1 4 cm, concerning for biliary obstruction, MRCP may be considered to evaluate for choledocholithiasis No thoracic aortic dissection No pulmonary embolism Opacification of the segmental bronchi of the left lower lobe, image 83 series 3, short interval follow-up at 3 months suggested this may be due to secretions or due to focal lesion Incidentally detected the less than 6 mm nodules in the both lung with the most prominent in the subpleural region in the left upper lobe  Based on current Fleischner Society 2017 Guidelines on incidental pulmonary nodule,  follow-up CT at 12 months can be considered   Mild reticulation in the subpleural region, raises concern for mild interstitial lung disease Hepatic steatosis Again noted is thickening of the wall of the stomach as seen on the previous study, remains indeterminate Atherosclerotic disease in the aorta with the suggestion of more than 50% stenosis in the right common iliac artery The study was marked in EPIC for immediate notification  Workstation performed: Adrien Gonzalez right upper quadrant    Result Date: 2/22/2022  Narrative: RIGHT UPPER QUADRANT ULTRASOUND INDICATION:     Right upper quadrant pain, history of gallstones  COMPARISON:  None TECHNIQUE:   Real-time ultrasound of the right upper quadrant was performed with a curvilinear transducer with both volumetric sweeps and still imaging techniques  FINDINGS: PANCREAS:  Poorly visualized AORTA AND IVC:  Visualized portions are normal for patient age  LIVER: Size:  Within normal range  The liver measures 14 8 cm in the midclavicular line  Contour:  Surface contour is smooth  Parenchyma:  Echogenicity and echotexture are within normal limits  No liver mass identified  Limited imaging of the main portal vein shows it to be patent and hepatopetal   BILIARY: Gallstones identified, with positive Godoy's sign and gallbladder wall thickening measuring approximately 5 mm  Code bladder wall edema noted  There may also be trace pericholecystic fluid  CBD measures 14 0 mm  Common bile duct calculus identified, may measure as much as 15 mm in longest dimension  KIDNEY: Right kidney measures 10 0 x 4 9 x 4 4 cm  Volume 115 2 mL Kidney within normal limits  ASCITES:   Otherwise no ascites     Impression: Cholelithiasis and choledocholithiasis  Positive Godoy's sign with gallbladder wall thickening and edema and potentially tiny amount of pericholecystic fluid  These latter findings are most indicative of acute cholecystitis The examination demonstrates a significant  finding and was documented as such in [facility] for liaison and referring practitioner immediate notification  Workstation performed: [id number]      bedside procedure    Result Date: 2/22/2022  Narrative: 1 2 840 190108  2 444 161 [id number] 13 1      EKG, Pathology, and Other Studies Reviewed on Admission:   · EKG: NSR    Epic / Care Everywhere Records Reviewed: Yes     ** Please Note: This note has been constructed using a voice recognition system   **

## 2022-02-23 NOTE — ASSESSMENT & PLAN NOTE
Patient is poor historian and is unsure if he is still taking atorvastatin  Plan:  Continue pravastatin 40 mg daily

## 2022-02-23 NOTE — ASSESSMENT & PLAN NOTE
Patient has a known hx of Alcohol abuse  - Last drink at 04 AM on 02/22/2022  - Inebriated during initial encounter in ED    - On exam today, is AAOx3 and inebriation has resolved  Not exhibiting any tremors      Plan:   - PHYLLIS Protocol  - Ativan   - Monitor vitals  - Monitor for DT's

## 2022-02-23 NOTE — ASSESSMENT & PLAN NOTE
Chart review shows hx of HTN (Pt poor historian, cannot recall if taking any anti-hypertensive medication)    Plan:  - continue carvedilol/Coreg 12 5 mg BID

## 2022-02-23 NOTE — PROGRESS NOTES
Progress Note - General Surgery   Emily Beasley 73 y o  male MRN: [id number]  Unit/Bed#: S -01 Encounter: [id number]    Assessment:  72 yo male w/ acute cholecystitis and choledocholithiaisis now s/p ERCP on 2/23    WBC pending from 3 98  T bili pending from 1 51    Plan:  No plan for acute surgical intervention, plan for interval cholecystectomy given duration of symptoms  Will discuss need for HIDA vs perc leatha this morning  Continue antibiotics ancef/flagyl, likely transition to augmentin on discharge  Ok to advance to lo fat diet  PRN analgesia  PRN antiemetics  Rest of care per primary, anticipate discharge in the next 24-48 hours if tolerating PO and no recurrence of symptoms    Subjective/Objective     Subjective: No acute events overnight, states he has no pain this morning, has not yet taken PO but denies nausea or vomiting    Objective:     Blood pressure 138/63, pulse 68, temperature (!) 97 3 °F (36 3 °C), resp  rate 18, weight 71 6 kg (157 lb 13 6 oz), SpO2 95 %  ,Body mass index is 23 31 kg/m²        Intake/Output Summary (Last 24 hours) at 2/24/2022 0766  Last data filed at 2/24/2022 0557  Gross per 24 hour   Intake 750 ml   Output 2200 ml   Net -1450 ml       Invasive Devices  Report    Peripheral Intravenous Line            Peripheral IV 02/22/22 Left Forearm 2 days    Peripheral IV 02/22/22 Right Forearm 1 day                Physical Exam:  Gen:    NAD  CV:      warm, well-perfused  Lungs: No respiratory distress  Abd:     soft, NT/ND  Ext:      no CCE  Neuro: A&Ox3

## 2022-02-23 NOTE — ASSESSMENT & PLAN NOTE
History of Hyperlipidemia   - pt unsure if he was still taking atorvastatin/lipitor   - sister said she would bring in his medications tomorrow     Plan:  - Continue Pravachol 40 mg q daily   - F/u with sister tomorrow for home medication review

## 2022-02-23 NOTE — ASSESSMENT & PLAN NOTE
PMHx of HTN (patient is poor historian, cannot recall taking any antihypertensive medication)  Plan:  Continue carvedilol/Coreg 12 5 mg BID

## 2022-02-23 NOTE — ASSESSMENT & PLAN NOTE
POA Progressively worsening abdominal pain that resolved after presenting in ED  - Afebrile  - Hemodynamically stable  - CTA Chest, Abdomen, and Pelvis with contrast Gallbladder wall thickening with edema and cholelithiasis  - U/S  Gallstones identified, with positive Godoy's sign and gallbladder wall thickening measuring approximately 5 mm, Cholelithiasis and choledocholithiasis, positive Godoy's sign with gallbladder wall thickening and edema and potentially tiny amount of pericholecystic fluid  - Elevated Lipase  - Elevated LFTs  - Elevated WBCs  - Gen Surg consulted, ERCP scheduled for tomorrow     Plan:  - ERCP tomorrow   - NPO at midnight  - AM CMP  - AM CBC  - Hold Eliquis  - Start Abx: Ancef 2 grams q 8 h  + Flagyl 500 mg q 8 h  - Zofran for Nausea  - Monitor Vital Signs

## 2022-02-23 NOTE — ASSESSMENT & PLAN NOTE
Patient reports that he used to smoke more  However, he has cut back to smoking 3 cigarettes a day  Plan:  Continue with nicotine patch  Continue encouraging smoking cessation

## 2022-02-23 NOTE — PLAN OF CARE
Problem: MOBILITY - ADULT  Goal: Maintain or return to baseline ADL function  Description: INTERVENTIONS:  -  Assess patient's ability to carry out ADLs; assess patient's baseline for ADL function and identify physical deficits which impact ability to perform ADLs (bathing, care of mouth/teeth, toileting, grooming, dressing, etc )  - Assess/evaluate cause of self-care deficits   - Assess range of motion  - Assess patient's mobility; develop plan if impaired  - Assess patient's need for assistive devices and provide as appropriate  - Encourage maximum independence but intervene and supervise when necessary  - Involve family in performance of ADLs  - Assess for home care needs following discharge   - Consider OT consult to assist with ADL evaluation and planning for discharge  - Provide patient education as appropriate  Outcome: Progressing  Goal: Maintains/Returns to pre admission functional level  Description: INTERVENTIONS:  - Perform BMAT or MOVE assessment daily    - Set and communicate daily mobility goal to care team and patient/family/caregiver     - Collaborate with rehabilitation services on mobility goals if consulted  - Perform Range of Motion   - Reposition patient  - Dangle patient   - Stand patient   - Ambulate patient   - Out of bed to chair   - Out of bed for meals   - Out of bed for toileting  - Record patient progress and toleration of activity level   Outcome: Progressing     Problem: Potential for Falls  Goal: Patient will remain free of falls  Description: INTERVENTIONS:  - Educate patient/family on patient safety including physical limitations  - Instruct patient to call for assistance with activity   - Consult OT/PT to assist with strengthening/mobility   - Keep Call bell within reach  - Keep bed low and locked with side rails adjusted as appropriate  - Keep care items and personal belongings within reach  - Initiate and maintain comfort rounds  - Make Fall Risk Sign visible to staff  - Offer Toileting every 2 Hours, in advance of need  - Initiate/Maintain bed/chair alarm  - Obtain necessary fall risk management equipment  - Apply yellow socks and bracelet for high fall risk patients  - Consider moving patient to room near nurses station  Outcome: Progressing     Problem: NEUROSENSORY - ADULT  Goal: Remains free of injury related to seizures activity  Description: INTERVENTIONS  - Maintain airway, patient safety  and administer oxygen as ordered  - Monitor patient for seizure activity, document and report duration and description of seizure to physician/advanced practitioner  - If seizure occurs,  ensure patient safety during seizure  - Reorient patient post seizure  - Seizure pads on all 4 side rails  - Instruct patient/family to notify RN of any seizure activity including if an aura is experienced  - Instruct patient/family to call for assistance with activity based on nursing assessment  - Administer anti-seizure medications if ordered    Outcome: Progressing  Goal: Achieves maximal functionality and self care  Description: INTERVENTIONS  - Monitor swallowing and airway patency with patient fatigue and changes in neurological status  - Encourage and assist patient to increase activity and self care     - Encourage visually impaired, hearing impaired and aphasic patients to use assistive/communication devices  Outcome: Progressing     Problem: RESPIRATORY - ADULT  Goal: Achieves optimal ventilation and oxygenation  Description: INTERVENTIONS:  - Assess for changes in respiratory status  - Assess for changes in mentation and behavior  - Position to facilitate oxygenation and minimize respiratory effort  - Oxygen administered by appropriate delivery if ordered  - Initiate smoking cessation education as indicated  - Encourage broncho-pulmonary hygiene including cough, deep breathe, Incentive Spirometry  - Assess the need for suctioning and aspirate as needed  - Assess and instruct to report SOB or any respiratory difficulty  - Respiratory Therapy support as indicated  Outcome: Progressing     Problem: METABOLIC, FLUID AND ELECTROLYTES - ADULT  Goal: Electrolytes maintained within normal limits  Description: INTERVENTIONS:  - Monitor labs and assess patient for signs and symptoms of electrolyte imbalances  - Administer electrolyte replacement as ordered  - Monitor response to electrolyte replacements, including repeat lab results as appropriate  - Instruct patient on fluid and nutrition as appropriate  Outcome: Progressing  Goal: Fluid balance maintained  Description: INTERVENTIONS:  - Monitor labs   - Monitor I/O and WT  - Instruct patient on fluid and nutrition as appropriate  - Assess for signs & symptoms of volume excess or deficit  Outcome: Progressing     Problem: HEMATOLOGIC - ADULT  Goal: Maintains hematologic stability  Description: INTERVENTIONS  - Assess for signs and symptoms of bleeding or hemorrhage  - Monitor labs  - Administer supportive blood products/factors as ordered and appropriate  Outcome: Progressing     Problem: PAIN - ADULT  Goal: Verbalizes/displays adequate comfort level or baseline comfort level  Description: Interventions:  - Encourage patient to monitor pain and request assistance  - Assess pain using appropriate pain scale  - Administer analgesics based on type and severity of pain and evaluate response  - Implement non-pharmacological measures as appropriate and evaluate response  - Consider cultural and social influences on pain and pain management  - Notify physician/advanced practitioner if interventions unsuccessful or patient reports new pain  Outcome: Progressing     Problem: SAFETY ADULT  Goal: Maintain or return to baseline ADL function  Description: INTERVENTIONS:  -  Assess patient's ability to carry out ADLs; assess patient's baseline for ADL function and identify physical deficits which impact ability to perform ADLs (bathing, care of mouth/teeth, toileting, grooming, dressing, etc )  - Assess/evaluate cause of self-care deficits   - Assess range of motion  - Assess patient's mobility; develop plan if impaired  - Assess patient's need for assistive devices and provide as appropriate  - Encourage maximum independence but intervene and supervise when necessary  - Involve family in performance of ADLs  - Assess for home care needs following discharge   - Consider OT consult to assist with ADL evaluation and planning for discharge  - Provide patient education as appropriate  Outcome: Progressing  Goal: Maintains/Returns to pre admission functional level  Description: INTERVENTIONS:  - Perform BMAT or MOVE assessment daily    - Set and communicate daily mobility goal to care team and patient/family/caregiver     - Collaborate with rehabilitation services on mobility goals if consulted  - Dangle patient  - Stand patient   - Ambulate patient   - Out of bed to chair  - Out of bed for meals   - Out of bed for toileting  - Record patient progress and toleration of activity level   Outcome: Progressing  Goal: Patient will remain free of falls  Description: INTERVENTIONS:  - Educate patient/family on patient safety including physical limitations  - Instruct patient to call for assistance with activity   - Consult OT/PT to assist with strengthening/mobility   - Keep Call bell within reach  - Keep bed low and locked with side rails adjusted as appropriate  - Keep care items and personal belongings within reach  - Initiate and maintain comfort rounds  - Make Fall Risk Sign visible to staff  - Offer Toileting every 2 Hours, in advance of need  - Initiate/Maintain bed/chair alarm  - Obtain necessary fall risk management equipment:  - Apply yellow socks and bracelet for high fall risk patients  - Consider moving patient to room near nurses station  Outcome: Progressing     Problem: DISCHARGE PLANNING  Goal: Discharge to home or other facility with appropriate resources  Description: INTERVENTIONS:  - Identify barriers to discharge w/patient and caregiver  - Arrange for needed discharge resources and transportation as appropriate  - Identify discharge learning needs (meds, wound care, etc )  - Arrange for interpretive services to assist at discharge as needed  - Refer to Case Management Department for coordinating discharge planning if the patient needs post-hospital services based on physician/advanced practitioner order or complex needs related to functional status, cognitive ability, or social support system  Outcome: Progressing     Problem: Knowledge Deficit  Goal: Patient/family/caregiver demonstrates understanding of disease process, treatment plan, medications, and discharge instructions  Description: Complete learning assessment and assess knowledge base    Interventions:  - Provide teaching at level of understanding  - Provide teaching via preferred learning methods  Outcome: Progressing

## 2022-02-24 ENCOUNTER — TELEPHONE (OUTPATIENT)
Dept: NEUROLOGY | Facility: CLINIC | Age: 67
End: 2022-02-24

## 2022-02-24 LAB
ALBUMIN SERPL BCP-MCNC: 2.8 G/DL (ref 3.5–5)
ALP SERPL-CCNC: 416 U/L (ref 46–116)
ALT SERPL W P-5'-P-CCNC: 84 U/L (ref 12–78)
ANION GAP SERPL CALCULATED.3IONS-SCNC: 8 MMOL/L (ref 4–13)
AST SERPL W P-5'-P-CCNC: 84 U/L (ref 5–45)
ATRIAL RATE: 131 BPM
ATRIAL RATE: 71 BPM
ATRIAL RATE: 90 BPM
BASOPHILS # BLD AUTO: 0.01 THOUSANDS/ΜL (ref 0–0.1)
BASOPHILS NFR BLD AUTO: 0 % (ref 0–1)
BILIRUB SERPL-MCNC: 1.22 MG/DL (ref 0.2–1)
BUN SERPL-MCNC: 13 MG/DL (ref 5–25)
CALCIUM ALBUM COR SERPL-MCNC: 9.8 MG/DL (ref 8.3–10.1)
CALCIUM SERPL-MCNC: 8.8 MG/DL (ref 8.3–10.1)
CHLORIDE SERPL-SCNC: 100 MMOL/L (ref 100–108)
CO2 SERPL-SCNC: 23 MMOL/L (ref 21–32)
CREAT SERPL-MCNC: 1.09 MG/DL (ref 0.6–1.3)
EOSINOPHIL # BLD AUTO: 0 THOUSAND/ΜL (ref 0–0.61)
EOSINOPHIL NFR BLD AUTO: 0 % (ref 0–6)
ERYTHROCYTE [DISTWIDTH] IN BLOOD BY AUTOMATED COUNT: 13.5 % (ref 11.6–15.1)
GFR SERPL CREATININE-BSD FRML MDRD: 70 ML/MIN/1.73SQ M
GLUCOSE SERPL-MCNC: 124 MG/DL (ref 65–140)
GLUCOSE SERPL-MCNC: 140 MG/DL (ref 65–140)
HCT VFR BLD AUTO: 33.4 % (ref 36.5–49.3)
HGB BLD-MCNC: 10.8 G/DL (ref 12–17)
IMM GRANULOCYTES # BLD AUTO: 0.03 THOUSAND/UL (ref 0–0.2)
IMM GRANULOCYTES NFR BLD AUTO: 1 % (ref 0–2)
LYMPHOCYTES # BLD AUTO: 0.31 THOUSANDS/ΜL (ref 0.6–4.47)
LYMPHOCYTES NFR BLD AUTO: 7 % (ref 14–44)
MCH RBC QN AUTO: 28.2 PG (ref 26.8–34.3)
MCHC RBC AUTO-ENTMCNC: 32.3 G/DL (ref 31.4–37.4)
MCV RBC AUTO: 87 FL (ref 82–98)
MONOCYTES # BLD AUTO: 0.16 THOUSAND/ΜL (ref 0.17–1.22)
MONOCYTES NFR BLD AUTO: 4 % (ref 4–12)
NEUTROPHILS # BLD AUTO: 4.07 THOUSANDS/ΜL (ref 1.85–7.62)
NEUTS SEG NFR BLD AUTO: 88 % (ref 43–75)
NRBC BLD AUTO-RTO: 0 /100 WBCS
P AXIS: 50 DEGREES
P AXIS: 63 DEGREES
PLATELET # BLD AUTO: 171 THOUSANDS/UL (ref 149–390)
PMV BLD AUTO: 10.8 FL (ref 8.9–12.7)
POTASSIUM SERPL-SCNC: 4.2 MMOL/L (ref 3.5–5.3)
PR INTERVAL: 158 MS
PR INTERVAL: 178 MS
PROT SERPL-MCNC: 6.3 G/DL (ref 6.4–8.2)
QRS AXIS: 52 DEGREES
QRS AXIS: 69 DEGREES
QRS AXIS: 71 DEGREES
QRSD INTERVAL: 68 MS
QRSD INTERVAL: 72 MS
QRSD INTERVAL: 80 MS
QT INTERVAL: 340 MS
QT INTERVAL: 348 MS
QT INTERVAL: 426 MS
QTC INTERVAL: 425 MS
QTC INTERVAL: 429 MS
QTC INTERVAL: 462 MS
RBC # BLD AUTO: 3.83 MILLION/UL (ref 3.88–5.62)
SODIUM SERPL-SCNC: 131 MMOL/L (ref 136–145)
T WAVE AXIS: 38 DEGREES
T WAVE AXIS: 52 DEGREES
T WAVE AXIS: 59 DEGREES
VENTRICULAR RATE: 71 BPM
VENTRICULAR RATE: 90 BPM
VENTRICULAR RATE: 96 BPM
WBC # BLD AUTO: 4.58 THOUSAND/UL (ref 4.31–10.16)

## 2022-02-24 PROCEDURE — 99232 SBSQ HOSP IP/OBS MODERATE 35: CPT | Performed by: SURGERY

## 2022-02-24 PROCEDURE — 80053 COMPREHEN METABOLIC PANEL: CPT | Performed by: INTERNAL MEDICINE

## 2022-02-24 PROCEDURE — 97116 GAIT TRAINING THERAPY: CPT

## 2022-02-24 PROCEDURE — 99232 SBSQ HOSP IP/OBS MODERATE 35: CPT | Performed by: INTERNAL MEDICINE

## 2022-02-24 PROCEDURE — 94762 N-INVAS EAR/PLS OXIMTRY CONT: CPT

## 2022-02-24 PROCEDURE — 93010 ELECTROCARDIOGRAM REPORT: CPT | Performed by: INTERNAL MEDICINE

## 2022-02-24 PROCEDURE — 97530 THERAPEUTIC ACTIVITIES: CPT

## 2022-02-24 PROCEDURE — 82948 REAGENT STRIP/BLOOD GLUCOSE: CPT

## 2022-02-24 PROCEDURE — 97110 THERAPEUTIC EXERCISES: CPT

## 2022-02-24 PROCEDURE — 85025 COMPLETE CBC W/AUTO DIFF WBC: CPT | Performed by: INTERNAL MEDICINE

## 2022-02-24 RX ORDER — SODIUM CHLORIDE, SODIUM LACTATE, POTASSIUM CHLORIDE, CALCIUM CHLORIDE 600; 310; 30; 20 MG/100ML; MG/100ML; MG/100ML; MG/100ML
75 INJECTION, SOLUTION INTRAVENOUS CONTINUOUS
Status: DISCONTINUED | OUTPATIENT
Start: 2022-02-24 | End: 2022-02-25

## 2022-02-24 RX ADMIN — METRONIDAZOLE 500 MG: 500 TABLET ORAL at 14:44

## 2022-02-24 RX ADMIN — METRONIDAZOLE 500 MG: 500 TABLET ORAL at 21:43

## 2022-02-24 RX ADMIN — ENOXAPARIN SODIUM 40 MG: 40 INJECTION SUBCUTANEOUS at 09:35

## 2022-02-24 RX ADMIN — DILTIAZEM HYDROCHLORIDE 30 MG: 30 TABLET, FILM COATED ORAL at 05:55

## 2022-02-24 RX ADMIN — DILTIAZEM HYDROCHLORIDE 30 MG: 30 TABLET, FILM COATED ORAL at 12:35

## 2022-02-24 RX ADMIN — METRONIDAZOLE 500 MG: 500 TABLET ORAL at 05:55

## 2022-02-24 RX ADMIN — SODIUM CHLORIDE, SODIUM LACTATE, POTASSIUM CHLORIDE, AND CALCIUM CHLORIDE 75 ML/HR: .6; .31; .03; .02 INJECTION, SOLUTION INTRAVENOUS at 13:59

## 2022-02-24 RX ADMIN — DILTIAZEM HYDROCHLORIDE 30 MG: 30 TABLET, FILM COATED ORAL at 01:12

## 2022-02-24 RX ADMIN — THIAMINE HCL TAB 100 MG 100 MG: 100 TAB at 09:35

## 2022-02-24 RX ADMIN — CEFAZOLIN SODIUM 2000 MG: 2 SOLUTION INTRAVENOUS at 18:13

## 2022-02-24 RX ADMIN — DILTIAZEM HYDROCHLORIDE 30 MG: 30 TABLET, FILM COATED ORAL at 18:13

## 2022-02-24 RX ADMIN — CARVEDILOL 12.5 MG: 12.5 TABLET, FILM COATED ORAL at 09:38

## 2022-02-24 RX ADMIN — MULTIPLE VITAMINS W/ MINERALS TAB 1 TABLET: TAB ORAL at 09:35

## 2022-02-24 RX ADMIN — ACETAMINOPHEN 650 MG: 325 TABLET, FILM COATED ORAL at 16:24

## 2022-02-24 RX ADMIN — NICOTINE 1 PATCH: 14 PATCH, EXTENDED RELEASE TRANSDERMAL at 09:35

## 2022-02-24 RX ADMIN — SODIUM CHLORIDE, SODIUM LACTATE, POTASSIUM CHLORIDE, AND CALCIUM CHLORIDE 175 ML/HR: .6; .31; .03; .02 INJECTION, SOLUTION INTRAVENOUS at 05:57

## 2022-02-24 RX ADMIN — PRAVASTATIN SODIUM 40 MG: 40 TABLET ORAL at 16:22

## 2022-02-24 RX ADMIN — SERTRALINE HYDROCHLORIDE 100 MG: 50 TABLET ORAL at 21:43

## 2022-02-24 RX ADMIN — CEFAZOLIN SODIUM 2000 MG: 2 SOLUTION INTRAVENOUS at 10:05

## 2022-02-24 RX ADMIN — CEFAZOLIN SODIUM 2000 MG: 2 SOLUTION INTRAVENOUS at 01:14

## 2022-02-24 RX ADMIN — ACETAMINOPHEN 650 MG: 325 TABLET, FILM COATED ORAL at 23:03

## 2022-02-24 RX ADMIN — FOLIC ACID 1 MG: 1 TABLET ORAL at 09:35

## 2022-02-24 RX ADMIN — PANTOPRAZOLE SODIUM 20 MG: 20 TABLET, DELAYED RELEASE ORAL at 05:55

## 2022-02-24 RX ADMIN — DILTIAZEM HYDROCHLORIDE 30 MG: 30 TABLET, FILM COATED ORAL at 23:02

## 2022-02-24 RX ADMIN — CARVEDILOL 12.5 MG: 12.5 TABLET, FILM COATED ORAL at 16:22

## 2022-02-24 NOTE — ASSESSMENT & PLAN NOTE
Prior to admission, found to be in AFib and responded to Cardizem  By the time the patient was in the ED, AFib had resolved to normal sinus rhythm  Patient denied any prior history of AFib, however reported taking Eliquis due to a previous stroke and not necessarily AFib  Cardiology was consulted and provided clearance prior to ERCP  Plan: Will continue with diltiazem 30mg Q6hr [address]

## 2022-02-24 NOTE — TELEPHONE ENCOUNTER
Called To Inform Patient Of [place] Of Upcoming Appointment With Maryann Cherry Due To Being Admitted [address] Was Full Could Not Leave Message

## 2022-02-24 NOTE — PLAN OF CARE
Problem: PHYSICAL THERAPY ADULT  Goal: Performs mobility at highest level of function for planned discharge setting  See evaluation for individualized goals  Description: Treatment/Interventions: Functional transfer training,LE strengthening/ROM,Elevations,Therapeutic exercise,Endurance training,Patient/family training,Gait training          See flowsheet documentation for full assessment, interventions and recommendations  Outcome: Progressing  Note: Prognosis: Fair  Problem List: Decreased strength,Decreased endurance,Impaired balance,Decreased mobility,Decreased safety awareness  Assessment: pt began tx session lying supine in the bed and was agreeable to participate in PT intervention  pt continues to be independent with all bed mobility including performing a supine<>sit EOB transfer  pt was able to sit EOB w/o LOB while performing TE activities >10 minutes  pt was also able to complete 20 standing marches in order to increase dynamic standing balance w/o LOB while using L UE bed rail to assist with balance  pt was able to perform 10 STS from EOB with /s in order to strengthen LE's and increase endurance and safety with all functional transfers w/o an AD  progress was noted this tx session as pt was able to increase ambulation distance and activity tolerance as pt ambulated 280'x1 ND while avoiding objects in the hallway w/o LOB  pt was educated on performing stair trials safely while ascending/descending stairs  pt was able to complete 15 steps safely in todays tx session with use of L hand rail and /s with VC's to slow down and take his time  post tx session pt in bed with call bell and all pt needs met  PT Discharge Recommendation: Home with home health rehabilitation          See flowsheet documentation for full assessment

## 2022-02-24 NOTE — DISCHARGE INSTRUCTIONS
Please call the office when you leave to schedule an appointment for 3   weeks  Please call [phone]                      [facility] [address], [place], [place]  [address] between [facility] and [facility]  Activity:             Walking is encouraged  Normal daily activities including climbing steps are okay  Do not engage in strenuous activity ( sit-ups or crutches) or contact sports for 4-6 weeks post-operatively    Return to Work:   Okay to return to work when you feel well if you desire  Diet:   You may return to your normal healthy diet  Pain Medication:   Please take as directed if needed  May use Advil or Motrin in addition  Recall, the pain medicine and anesthesia is associated with constipation  No driving while taking narcotic pain medications  Other:  If you have questions after discharge please call the office      If you have increased pain, fever >101 5, increased drainage, redness or a bad smell at your surgery site, please call us immediately or come directly to the Emergency Room

## 2022-02-24 NOTE — PROGRESS NOTES
[facility]  Progress Note Sinan Haq 1955, 73 y o  male MRN: [id number]  Unit/Bed#: S -01 Encounter: [id number]  Primary Care Provider: Lowell Monet DO   Date and time admitted to hospital: 2/22/2022  9:31 AM    * Acute Cholecystitis  Assessment & Plan  POA:  Worsening intermittent abdominal pain for 2 weeks  Reports of chills and nausea/vomiting  CTA chest/abdomen/pelvis:  Gallbladder wall thickening with edema and cholelithiasis  RUQ U/S: Cholelithiasis and choledocholithiasis  Positive Godoy's sign with gallbladder wall thickening and edema and potentially tiny amount of pericholecystic fluid  Initially presented with elevated lipase, elevated LFTs, and leukocytosis  LFTs are now trending down and no leukocytosis  Per General Surgery, no surgical intervention warranted at this time  Antibiotic treatment with IV Ancef (D2) and Flagyl (D2); on multi electrolyte fluids 75 mL/hr  Gastroenterology consulted, and ERCP was performed yesterday  · 2 gallstones were removed and common bile duct was dilated to 14 mm  Plan:  Per General Surgery, will continue with another day of IV abx and flagyl  Possible transition to PO medication tomorrow  · Follow-up outpatient for possible interval cholecystectomy  Continue with antibiotic treatment:  IV cefazolin and Flagyl   Per GI, will continue to monitor LFTs closely  Continue with IV fluid administration  If pt tolerates surgical soft diet overnight, may potentially get discharged in 24hrs  Continue to monitor vitals and trend for any fevers  Zofran PRN  Cholelithiasis  Assessment & Plan  RUQ U/S:  Cholelithiasis and choledocholithiasis  Positive Godoy sign with gallbladder wall thickening and edema  GI consulted, ERCP performed yesterday where 2 gallstones were removed and CBD was found to be dilated at 14mm  Plan:  Per GI, continue to monitor LFTs and WBCs      PAF (paroxysmal atrial fibrillation) [facility])  Assessment & Plan  Prior to admission, found to be in AFib and responded to Cardizem  By the time the patient was in the ED, AFib had resolved to normal sinus rhythm  Patient denied any prior history of AFib, however reported taking Eliquis due to a previous stroke and not necessarily AFib  Cardiology was consulted and provided clearance prior to ERCP  Plan: Will continue with diltiazem 30mg Q6hr [address]  S/P carotid endarterectomy  Assessment & Plan  Right carotid endarterectomy performed on 04/13/2021  Was previously on clopidogrel  Per Vascular Surgery notes from 01/2022, patient is no longer on clopidogrel  Benign essential hypertension  Assessment & Plan  PMHx of HTN (patient is poor historian, cannot recall taking any antihypertensive medication)  Plan:  Continue carvedilol/Coreg 12 5 mg BID  Hx of CVA (cerebral vascular accident) [facility])  Assessment & Plan  History of right PCA stroke that occurred while patient was on DAPT  Patient was previously on aspirin and clopidogrel  Per Prior Neurology note from 09/2021, later transitioned to Eliquis and aspirin  COPD (chronic obstructive pulmonary disease) ([facility] [address] )  Assessment & Plan  Home medication:  Albuterol inhaler  Reports COPD is uncontrolled since his [facility] prescription has not been filled recently  Continues to saturate well on RA  Plan:  Continue with albuterol as needed  HLD (hyperlipidemia)  Assessment & Plan  Patient is poor historian and is unsure if he is still taking atorvastatin  Plan:  Continue pravastatin 40 mg daily  Alcohol abuse  Assessment & Plan  Patient has a known hx of Alcohol abuse  - Last drink at 04 AM on 02/22/2022  - Inebriated during initial encounter in ED    - No signs of tremor were noted today and continues to be AAOx3  Plan:   - [facility] Protocol  - Ativan   - Monitor vitals  - Monitor for DT's    Tobacco use  Assessment & Plan  Patient reports that he used to smoke 1 5 PPD   Has now cut back to smoking 3 cigarettes a day  Plan:  Continue with nicotine patch  Continue encouraging smoking cessation  VTE Pharmacologic Prophylaxis: VTE Score: 4 Moderate Risk (Score 3-4) - Pharmacological DVT Prophylaxis Ordered: enoxaparin (Lovenox)  Patient Centered Rounds: I performed bedside rounds with nursing staff today  Discussions with Specialists or Other Care Team Provider:  Nursing, case management, general surgery    Education and Discussions with Family / Patient: Patient declined call to   Current Length of Stay: 2 day(s)  Current Patient Status: Inpatient   Discharge Plan: Anticipate discharge in 24-48 hrs to home  Code Status: Level 1 - Full Code    Subjective:   Patient was sitting upright at edge of bed upon entering the room  He continues to endorse that he feels better and he is no longer reporting of any abdominal pain  He is hungry  He also denies any nausea, vomiting, diarrhea, nor any lightheadedness or dizziness  He is eager to know when he will be able to go home  However, he states that he is okay staying in the hospital for now  Objective:     Vitals:   Temp (24hrs), Av 7 °F (36 5 °C), Min:97 3 °F (36 3 °C), Max:98 2 °F (36 8 °C)    Temp:  [97 3 °F (36 3 °C)-98 2 °F (36 8 °C)] 97 7 °F (36 5 °C)  HR:  [68-90] 69  Resp:  [16-20] 18  BP: (138-177)/(63-79) 177/79  SpO2:  [93 %-100 %] 94 %  Body mass index is 20 64 kg/m²  Input and Output Summary (last 24 hours): Intake/Output Summary (Last 24 hours) at 2022 1407  Last data filed at 2022 1401  Gross per 24 hour   Intake 750 ml   Output 2150 ml   Net -1400 ml       Physical Exam:   Physical Exam  Vitals and nursing note reviewed  Constitutional:       General: He is not in acute distress  Appearance: Normal appearance  He is not ill-appearing or diaphoretic  HENT:      Head: Normocephalic and atraumatic  Nose: No congestion or rhinorrhea     Eyes:      General: No scleral icterus  Conjunctiva/sclera: Conjunctivae normal    Cardiovascular:      Rate and Rhythm: Normal rate and regular rhythm  Pulses: Normal pulses  Heart sounds: Normal heart sounds  No murmur heard  No friction rub  Pulmonary:      Effort: Pulmonary effort is normal  No respiratory distress  Breath sounds: Wheezing present  Chest:      Chest wall: No tenderness  Abdominal:      General: Abdomen is flat  Bowel sounds are normal  There is no distension  Palpations: Abdomen is soft  Tenderness: There is no abdominal tenderness  There is no guarding or rebound  Musculoskeletal:         General: No tenderness  Right lower leg: No edema  Left lower leg: No edema  Skin:     General: Skin is warm  Neurological:      General: No focal deficit present  Mental Status: He is alert and oriented to person, place, and time  Psychiatric:         Mood and Affect: Mood normal          Behavior: Behavior normal          Thought Content:  Thought content normal          Judgment: Judgment normal           Additional Data:     Labs:  Results from last 7 days   Lab Units 02/24/22  0555   WBC Thousand/uL 4 58   HEMOGLOBIN g/dL 10 8*   HEMATOCRIT % 33 4*   PLATELETS Thousands/uL 171   NEUTROS PCT % 88*   LYMPHS PCT % 7*   MONOS PCT % 4   EOS PCT % 0     Results from last 7 days   Lab Units 02/24/22  0555   SODIUM mmol/L 131*   POTASSIUM mmol/L 4 2   CHLORIDE mmol/L 100   CO2 mmol/L 23   BUN mg/dL 13   CREATININE mg/dL 1 09   ANION GAP mmol/L 8   CALCIUM mg/dL 8 8   ALBUMIN g/dL 2 8*   TOTAL BILIRUBIN mg/dL 1 22*   ALK PHOS U/L 416*   ALT U/L 84*   AST U/L 84*   GLUCOSE RANDOM mg/dL 140         Results from last 7 days   Lab Units 02/24/22  0820   POC GLUCOSE mg/dl 124               Lines/Drains:  Invasive Devices  Report    Peripheral Intravenous Line            Peripheral IV 02/22/22 Left Forearm 2 days    Peripheral IV 02/22/22 Right Forearm 2 days Imaging: No pertinent imaging reviewed  Recent Cultures (last 7 days):         Last 24 Hours Medication List:   Current Facility-Administered Medications   Medication Dose Route Frequency Provider Last Rate    acetaminophen  650 mg Oral Q6H PRN Kenyatta Albrecht MD      albuterol  1 puff Inhalation Q6H PRN Kenyatta Albrecht MD      carvedilol  12 5 mg Oral BID With Meals Kenyatta Albrecht MD      cefazolin  2,000 mg Intravenous Q8H Kenyatta Albrecht MD 2,000 mg (02/24/22 1005)    diltiazem  30 mg Oral Q6H [facility] Kenyatta Albrecht MD      enoxaparin  40 mg Subcutaneous Daily Kenyatta Albrecht MD      folic acid  1 mg Oral Daily Kenyatta Albrecht MD      lactated ringers  75 mL/hr Intravenous Continuous Michael Otoole DO 75 mL/hr (02/24/22 5999)    metroNIDAZOLE  500 mg Oral Q8H [facility] Kenyatta Albrecht MD      multivitamin-minerals  1 tablet Oral Daily Kenyatta Albrecht MD      nicotine  1 patch Transdermal Daily Kenyatta Albrecht MD      nitroglycerin  0 4 mg Sublingual Q5 Min PRN Kenyatta Albrecht MD      ondansetron  4 mg Intravenous Q6H PRN Kenyatta Albrecht MD      pantoprazole  20 mg Oral Early Morning Kenyatta Albrecht MD      pravastatin  40 mg Oral Daily With Philip Ding MD      sertraline  100 mg Oral HS Kenyatta Albrecht MD      simethicone  80 mg Oral 4x Daily PRN Kenyatta Albrecht MD      sodium chloride (PF)  3 mL Intravenous Q1H PRN Kenyatta Albrecht MD      thiamine  100 mg Oral Daily Kenyatta Albrecht MD          Today, Patient Was Seen By: Loni Farias DO    **Please Note: This note may have been constructed using a voice recognition system  **

## 2022-02-24 NOTE — ASSESSMENT & PLAN NOTE
Patient has a known hx of Alcohol abuse  - Last drink at 04 AM on 02/22/2022  - Inebriated during initial encounter in ED    - No signs of tremor were noted today and continues to be AAOx3      Plan:   - [facility] Protocol  - Ativan   - Monitor vitals  - Monitor for DT's

## 2022-02-24 NOTE — PLAN OF CARE
Problem: MOBILITY - ADULT  Goal: Maintain or return to baseline ADL function  Description: INTERVENTIONS:  -  Assess patient's ability to carry out ADLs; assess patient's baseline for ADL function and identify physical deficits which impact ability to perform ADLs (bathing, care of mouth/teeth, toileting, grooming, dressing, etc )  - Assess/evaluate cause of self-care deficits   - Assess range of motion  - Assess patient's mobility; develop plan if impaired  - Assess patient's need for assistive devices and provide as appropriate  - Encourage maximum independence but intervene and supervise when necessary  - Involve family in performance of ADLs  - Assess for home care needs following discharge   - Consider OT consult to assist with ADL evaluation and planning for discharge  - Provide patient education as appropriate  Outcome: Progressing  Goal: Maintains/Returns to pre admission functional level  Description: INTERVENTIONS:  - Perform BMAT or MOVE assessment daily    - Set and communicate daily mobility goal to care team and patient/family/caregiver  - Collaborate with rehabilitation services on mobility goals if consulted  - Perform Range of Motion  times a day  - Reposition patient every  hours    - Dangle patient  times a day  - Stand patient  times a day  - Ambulate patient  times a day  - Out of bed to chair  times a day   - Out of bed for meals  times a day  - Out of bed for toileting  - Record patient progress and toleration of activity level   Outcome: Progressing     Problem: Potential for Falls  Goal: Patient will remain free of falls  Description: INTERVENTIONS:  - Educate patient/family on patient safety including physical limitations  - Instruct patient to call for assistance with activity   - Consult OT/PT to assist with strengthening/mobility   - Keep Call bell within reach  - Keep bed low and locked with side rails adjusted as appropriate  - Keep care items and personal belongings within reach  - Initiate and maintain comfort rounds  - Make Fall Risk Sign visible to staff  - Offer Toileting every  Hours, in advance of need  - Initiate/Maintain alarm  - Obtain necessary fall risk management equipment:   - Apply yellow socks and bracelet for high fall risk patients  - Consider moving patient to room near nurses station  Outcome: Progressing     Problem: NEUROSENSORY - ADULT  Goal: Remains free of injury related to seizures activity  Description: INTERVENTIONS  - Maintain airway, patient safety  and administer oxygen as ordered  - Monitor patient for seizure activity, document and report duration and description of seizure to physician/advanced practitioner  - If seizure occurs,  ensure patient safety during seizure  - Reorient patient post seizure  - Seizure pads on all 4 side rails  - Instruct patient/family to notify RN of any seizure activity including if an aura is experienced  - Instruct patient/family to call for assistance with activity based on nursing assessment  - Administer anti-seizure medications if ordered    Outcome: Progressing  Goal: Achieves maximal functionality and self care  Description: INTERVENTIONS  - Monitor swallowing and airway patency with patient fatigue and changes in neurological status  - Encourage and assist patient to increase activity and self care     - Encourage visually impaired, hearing impaired and aphasic patients to use assistive/communication devices  Outcome: Progressing     Problem: METABOLIC, FLUID AND ELECTROLYTES - ADULT  Goal: Electrolytes maintained within normal limits  Description: INTERVENTIONS:  - Monitor labs and assess patient for signs and symptoms of electrolyte imbalances  - Administer electrolyte replacement as ordered  - Monitor response to electrolyte replacements, including repeat lab results as appropriate  - Instruct patient on fluid and nutrition as appropriate  Outcome: Progressing  Goal: Fluid balance maintained  Description: INTERVENTIONS:  - Monitor labs   - Monitor I/O and WT  - Instruct patient on fluid and nutrition as appropriate  - Assess for signs & symptoms of volume excess or deficit  Outcome: Progressing     Problem: HEMATOLOGIC - ADULT  Goal: Maintains hematologic stability  Description: INTERVENTIONS  - Assess for signs and symptoms of bleeding or hemorrhage  - Monitor labs  - Administer supportive blood products/factors as ordered and appropriate  Outcome: Progressing     Problem: PAIN - ADULT  Goal: Verbalizes/displays adequate comfort level or baseline comfort level  Description: Interventions:  - Encourage patient to monitor pain and request assistance  - Assess pain using appropriate pain scale  - Administer analgesics based on type and severity of pain and evaluate response  - Implement non-pharmacological measures as appropriate and evaluate response  - Consider cultural and social influences on pain and pain management  - Notify physician/advanced practitioner if interventions unsuccessful or patient reports new pain  Outcome: Progressing     Problem: SAFETY ADULT  Goal: Maintain or return to baseline ADL function  Description: INTERVENTIONS:  -  Assess patient's ability to carry out ADLs; assess patient's baseline for ADL function and identify physical deficits which impact ability to perform ADLs (bathing, care of mouth/teeth, toileting, grooming, dressing, etc )  - Assess/evaluate cause of self-care deficits   - Assess range of motion  - Assess patient's mobility; develop plan if impaired  - Assess patient's need for assistive devices and provide as appropriate  - Encourage maximum independence but intervene and supervise when necessary  - Involve family in performance of ADLs  - Assess for home care needs following discharge   - Consider OT consult to assist with ADL evaluation and planning for discharge  - Provide patient education as appropriate  Outcome: Progressing  Goal: Maintains/Returns to pre admission functional level  Description: INTERVENTIONS:  - Perform BMAT or MOVE assessment daily    - Set and communicate daily mobility goal to care team and patient/family/caregiver  - Collaborate with rehabilitation services on mobility goals if consulted  - Perform Range of Motion  times a day  - Reposition patient every  hours    - Dangle patient  times a day  - Stand patient  times a day  - Ambulate patient  times a day  - Out of bed to chair  times a day   - Out of bed for meals  times a day  - Out of bed for toileting  - Record patient progress and toleration of activity level   Outcome: Progressing  Goal: Patient will remain free of falls  Description: INTERVENTIONS:  - Educate patient/family on patient safety including physical limitations  - Instruct patient to call for assistance with activity   - Consult OT/PT to assist with strengthening/mobility   - Keep Call bell within reach  - Keep bed low and locked with side rails adjusted as appropriate  - Keep care items and personal belongings within reach  - Initiate and maintain comfort rounds  - Make Fall Risk Sign visible to staff  - Offer Toileting every Hours, in advance of need  - Initiate/Maintain alarm  - Obtain necessary fall risk management equipment:   - Apply yellow socks and bracelet for high fall risk patients  - Consider moving patient to room near nurses station  Outcome: Progressing     Problem: DISCHARGE PLANNING  Goal: Discharge to home or other facility with appropriate resources  Description: INTERVENTIONS:  - Identify barriers to discharge w/patient and caregiver  - Arrange for needed discharge resources and transportation as appropriate  - Identify discharge learning needs (meds, wound care, etc )  - Arrange for interpretive services to assist at discharge as needed  - Refer to Case Management Department for coordinating discharge planning if the patient needs post-hospital services based on physician/advanced practitioner order or complex needs related to functional status, cognitive ability, or social support system  Outcome: Progressing     Problem: Knowledge Deficit  Goal: Patient/family/caregiver demonstrates understanding of disease process, treatment plan, medications, and discharge instructions  Description: Complete learning assessment and assess knowledge base    Interventions:  - Provide teaching at level of understanding  - Provide teaching via preferred learning methods  Outcome: Progressing

## 2022-02-24 NOTE — ASSESSMENT & PLAN NOTE
Patient reports that he used to smoke 1 5 PPD  Has now cut back to smoking 3 cigarettes a day  Plan:  Continue with nicotine patch  Continue encouraging smoking cessation

## 2022-02-24 NOTE — ASSESSMENT & PLAN NOTE
RUQ U/S:  Cholelithiasis and choledocholithiasis  Positive Godoy sign with gallbladder wall thickening and edema  GI consulted, ERCP performed yesterday where 2 gallstones were removed and CBD was found to be dilated at 14mm  Plan:  Per GI, continue to monitor LFTs and WBCs

## 2022-02-24 NOTE — PROGRESS NOTES
Progress Note - Jarvis Manzanares 73 y o  male MRN: [id number]    Unit/Bed#: S -01 Encounter: [id number]    Assessment and Plan:     1  Choledocholithiasis  ERCP performed yesterday status post sphincterotomy and extraction of 2 large stones, final cholangiogram did not show any filling defects  Vital signs stable overnight  Patient was afebrile  Hemoglobin stable at 10 8  White count normal at 4 5  Total bilirubin decreased slightly from 1 5-1 2  Alkaline phosphatase increased from 320-416  AST and ALT stayed the same at 84      -monitor liver function tests closely   -continue to watch white count and vital signs  -patient was tolerating clear liquid diet however was placed NPO by surgical team     -originally appears surgical team was planning for cholecystectomy in the outpatient setting however appears patient was placed NPO this morning for possible HIDA versus per leatha  Will follow-up on their further recommendations   -antibiotics per primary team   Currently on Ancef and Flagyl     -advised patient to reach out to team with any upper abdominal pain, nausea, vomiting     ----------------------------------------------------------------------------------------------------------------    Subjective:     Patient reports he is feeling great  He denies any abdominal pain, nausea or vomiting  Had a bowel movement prior to my arrival which she reports was dark appearing and close to black but reports this is common for him and likely will be normal later today  He was ordered clear liquid diet for this morning however was then placed NPO by surgical team for possible HIDA scan/perc leatha  Objective:     Vitals: Blood pressure (!) 177/79, pulse 69, temperature 97 7 °F (36 5 °C), temperature source Oral, resp  rate 18, weight 63 4 kg (139 lb 12 4 oz), SpO2 94 %  ,Body mass index is 20 64 kg/m²        Intake/Output Summary (Last 24 hours) at 2/24/2022 1144  Last data filed at 2/24/2022 1103  Gross per 24 hour   Intake 750 ml   Output 1575 ml   Net -825 ml       Physical Exam:     General Appearance: Alert, appears stated age and cooperative  Sitting up in bed  Does not appear in distress  No scleral icterus noted  Lungs: Clear to auscultation bilaterally, no rales or rhonchi, no labored breathing/accessory muscle use  Heart: Regular rate and rhythm, S1, S2 normal, no murmur, click, rub or gallop  Abdomen: + reports some tenderness to deep palpation in the right upper quadrant  Abdomen is benign  Soft, nondistended with bowel sounds present  Extremities: No cyanosis, clubbing, or edema    Invasive Devices  Report    Peripheral Intravenous Line            Peripheral IV 02/22/22 Left Forearm 2 days    Peripheral IV 02/22/22 Right Forearm 2 days                Lab Results:  Results from last 7 days   Lab Units 02/24/22  0555   WBC Thousand/uL 4 58   HEMOGLOBIN g/dL 10 8*   HEMATOCRIT % 33 4*   PLATELETS Thousands/uL 171   NEUTROS PCT % 88*   LYMPHS PCT % 7*   MONOS PCT % 4   EOS PCT % 0     Results from last 7 days   Lab Units 02/24/22  0555   POTASSIUM mmol/L 4 2   CHLORIDE mmol/L 100   CO2 mmol/L 23   BUN mg/dL 13   CREATININE mg/dL 1 09   CALCIUM mg/dL 8 8   ALK PHOS U/L 416*   ALT U/L 84*   AST U/L 84*     Invalid input(s): BILI      Results from last 7 days   Lab Units 02/23/22  0507   LIPASE u/L 107       Imaging Studies: I have personally reviewed pertinent imaging studies  ERCP    Result Date: 2/23/2022  Impression: As above RECOMMENDATION: Theresa Luevano MD     X-ray chest 1 view portable    Result Date: 2/22/2022  Impression: No acute cardiopulmonary disease  Workstation performed: [id number]     FL ERCP biliary only    Result Date: 2/24/2022  Impression: Please see procedure report for further details   Workstation performed: [id number]     CTA dissection protocol chest/abdomen/pelvis    Result Date: 2/22/2022  Impression: Gallbladder wall thickening with edema and cholelithiasis, evaluate for acute cholecystitis Biliary dilation common bile duct measuring 1 4 cm, concerning for biliary obstruction, MRCP may be considered to evaluate for choledocholithiasis No thoracic aortic dissection No pulmonary embolism Opacification of the segmental bronchi of the left lower lobe, image 83 series 3, short interval follow-up at 3 months suggested this may be due to secretions or due to focal lesion Incidentally detected the less than 6 mm nodules in the both lung with the most prominent in the subpleural region in the left upper lobe  Based on current Fleischner Society 2017 Guidelines on incidental pulmonary nodule,  follow-up CT at 12 months can be considered  Mild reticulation in the subpleural region, raises concern for mild interstitial lung disease Hepatic steatosis Again noted is thickening of the wall of the stomach as seen on the previous study, remains indeterminate Atherosclerotic disease in the aorta with the suggestion of more than 50% stenosis in the right common iliac artery The study was marked in EPIC for immediate notification  Workstation performed: Shira Schmitt right upper quadrant    Result Date: 2/22/2022  Impression: Cholelithiasis and choledocholithiasis  Positive Godoy's sign with gallbladder wall thickening and edema and potentially tiny amount of pericholecystic fluid  These latter findings are most indicative of acute cholecystitis The examination demonstrates a significant  finding and was documented as such in [employer] for liaison and referring practitioner immediate notification   Workstation performed: [id number]

## 2022-02-24 NOTE — ASSESSMENT & PLAN NOTE
Home medication:  Albuterol inhaler  Reports COPD is uncontrolled since his Angela Kugel prescription has not been filled recently  Continues to saturate well on RA  Plan:  Continue with albuterol as needed

## 2022-02-24 NOTE — PHYSICAL THERAPY NOTE
PHYSICAL THERAPY NOTE          Patient Name: Drew FOOTE Date: 2/24/2022 02/24/22 0915   PT Last Visit   PT Visit Date 02/24/22   Note Type   Note Type Treatment   Pain Assessment   Pain Assessment Tool 0-10   Pain Score No Pain   Patient's Stated Pain Goal No pain   Hospital Pain Intervention(s) Repositioned; Ambulation/increased activity; Rest   Multiple Pain Sites No   Restrictions/Precautions   Other Precautions Chair Alarm; Bed Alarm;Multiple lines; Fall Risk;Telemetry   General   Chart Reviewed Yes   Response to Previous Treatment Patient with no complaints from previous session  Family/Caregiver Present No   Cognition   Overall Cognitive Status Unable to assess   Arousal/Participation Alert; Responsive; Cooperative   Attention Attends with cues to redirect   Orientation Level Oriented X4   Memory Unable to assess   Following Commands Follows one step commands without difficulty   Comments pt was pleasant and cooperative throughout tx session    Subjective   Subjective pt was agreeable to participate in PT interention    Bed Mobility   Supine to Sit 7  Independent   Additional items HOB elevated   Sit to Supine 7  Independent   Additional items HOB elevated   Additional Comments pt was able to sit EOB w/o LOB while perfrorming TE activities    Transfers   Sit to Stand 5  Supervision   Additional items Assist x 1; Increased time required;Verbal cues   Stand to Sit 5  Supervision   Additional items Assist x 1; Increased time required;Verbal cues   Additional Comments pt does not require any AD in todays tx session for functional transfers or ambulation    Ambulation/Elevation   Gait pattern Decreased foot clearance; Foward flexed   Gait Assistance 5  Supervision   Additional items Assist x 1;Verbal cues  (for gait speed (too slow down) )   Assistive Device None   Distance 280'x1 no AD    Stair Management Assistance 5 Supervision   Additional items Assist x 1;Verbal cues; Increased time required   Stair Management Technique One rail L;Step to pattern; Foreward;Backward   Number of Stairs 15   Balance   Static Sitting Good   Dynamic Sitting Fair   Static Standing Fair   Ambulatory Fair -   Activity Tolerance   Activity Tolerance Patient tolerated treatment well   Nurse Made Aware [facility]   Exercises   Knee AROM Long Arc Quad Sitting;20 reps;AROM; Bilateral   Ankle Pumps Sitting;25 reps;AROM; Bilateral   Marching Sitting;20 reps;AROM; Bilateral   Balance training  standing marches x20 with L UE bed rail assistance for balance    Assessment   Prognosis Fair   Problem List Decreased strength;Decreased endurance; Impaired balance;Decreased mobility; Decreased safety awareness   Assessment pt began tx session lying supine in the bed and was agreeable to participate in PT intervention  pt continues to be independent with all bed mobility including performing a supine<>sit EOB transfer  pt was able to sit EOB w/o LOB while performing TE activities >10 minutes  pt was also able to complete 20 standing marches in order to increase dynamic standing balance w/o LOB while using L UE bed rail to assist with balance  pt was able to perform 10 STS from EOB with /s in order to strengthen LE's and increase endurance and safety with all functional transfers w/o an AD  progress was noted this tx session as pt was able to increase ambulation distance and activity tolerance as pt ambulated 280'x1 ND while avoiding objects in the hallway w/o LOB  pt was educated on performing stair trials safely while ascending/descending stairs  pt was able to complete 15 steps safely in todays tx session with use of L hand rail and /s with VC's to slow down and take his time  post tx session pt in bed with call bell and all pt needs met      Goals   Patient Goals to go home    STG Expiration Date 03/05/22   PT Treatment Day 1   Plan   Treatment/Interventions Functional transfer training;LE strengthening/ROM; Elevations; Therapeutic exercise; Endurance training;Patient/family training;Equipment eval/education;Gait training;Spoke to nursing   PT Frequency 4-6x/wk   Recommendation   PT Discharge Recommendation Home with home health rehabilitation   AM-PAC Basic Mobility Inpatient   Turning in Bed Without Bedrails 4   Lying on Back to Sitting on Edge of Flat Bed 4   Moving Bed to Chair 3   Standing Up From Chair 3   Walk in Room 3   Climb 3-5 Stairs 3   Basic Mobility Inpatient Raw Score 20   Basic Mobility Standardized Score 43 99   Highest Level Of Mobility   JH-HLM Goal 6: Walk 10 steps or more   JH-HLM Highest Level of Mobility 8: Walk 250 feet ot more   JH-HLM Goal Achieved Yes   Education   Education Provided Mobility training   Patient Demonstrates acceptance/verbal understanding   End of Consult   Patient Position at End of Consult Supine;Bed/Chair alarm activated; All needs within reach   The patient's AM-PAC Basic Mobility Inpatient Short Form Raw Score is 20  A Raw score of greater than 16 suggests the patient may benefit from discharge to home  Please also refer to the recommendation of the Physical Therapist for safe discharge planning       Jaja Juárez PTA

## 2022-02-24 NOTE — ASSESSMENT & PLAN NOTE
POA:  Worsening intermittent abdominal pain for 2 weeks  Reports of chills and nausea/vomiting  CTA chest/abdomen/pelvis:  Gallbladder wall thickening with edema and cholelithiasis  RUQ U/S: Cholelithiasis and choledocholithiasis  Positive Godoy's sign with gallbladder wall thickening and edema and potentially tiny amount of pericholecystic fluid  Initially presented with elevated lipase, elevated LFTs, and leukocytosis  LFTs are now trending down and no leukocytosis  Per General Surgery, no surgical intervention warranted at this time  Antibiotic treatment with IV Ancef (D2) and Flagyl (D2); on multi electrolyte fluids 75 mL/hr  Gastroenterology consulted, and ERCP was performed yesterday  · 2 gallstones were removed and common bile duct was dilated to 14 mm  Plan:  Per General Surgery, will continue with another day of IV abx and flagyl  Possible transition to PO medication tomorrow  · Follow-up outpatient for possible interval cholecystectomy  Continue with antibiotic treatment:  IV cefazolin and Flagyl   Per GI, will continue to monitor LFTs closely  Continue with IV fluid administration  If pt tolerates surgical soft diet overnight, may potentially get discharged in 24hrs  Continue to monitor vitals and trend for any fevers  Zofran PRN

## 2022-02-25 ENCOUNTER — APPOINTMENT (INPATIENT)
Dept: CT IMAGING | Facility: HOSPITAL | Age: 67
DRG: 444 | End: 2022-02-25
Payer: COMMERCIAL

## 2022-02-25 ENCOUNTER — APPOINTMENT (INPATIENT)
Dept: NEUROLOGY | Facility: HOSPITAL | Age: 67
DRG: 444 | End: 2022-02-25
Payer: COMMERCIAL

## 2022-02-25 PROBLEM — R56.9 SEIZURE (HCC): Status: ACTIVE | Noted: 2022-02-25

## 2022-02-25 LAB
ALBUMIN SERPL BCP-MCNC: 3.5 G/DL (ref 3.5–5)
ALP SERPL-CCNC: 390 U/L (ref 46–116)
ALT SERPL W P-5'-P-CCNC: 68 U/L (ref 12–78)
ANION GAP SERPL CALCULATED.3IONS-SCNC: 10 MMOL/L (ref 4–13)
AST SERPL W P-5'-P-CCNC: 60 U/L (ref 5–45)
BASOPHILS # BLD AUTO: 0.01 THOUSANDS/ΜL (ref 0–0.1)
BASOPHILS NFR BLD AUTO: 0 % (ref 0–1)
BILIRUB SERPL-MCNC: 0.93 MG/DL (ref 0.2–1)
BUN SERPL-MCNC: 16 MG/DL (ref 5–25)
CALCIUM SERPL-MCNC: 10 MG/DL (ref 8.3–10.1)
CHLORIDE SERPL-SCNC: 101 MMOL/L (ref 100–108)
CO2 SERPL-SCNC: 28 MMOL/L (ref 21–32)
CREAT SERPL-MCNC: 1.16 MG/DL (ref 0.6–1.3)
EOSINOPHIL # BLD AUTO: 0.02 THOUSAND/ΜL (ref 0–0.61)
EOSINOPHIL NFR BLD AUTO: 0 % (ref 0–6)
ERYTHROCYTE [DISTWIDTH] IN BLOOD BY AUTOMATED COUNT: 14.1 % (ref 11.6–15.1)
GFR SERPL CREATININE-BSD FRML MDRD: 65 ML/MIN/1.73SQ M
GLUCOSE SERPL-MCNC: 110 MG/DL (ref 65–140)
GLUCOSE SERPL-MCNC: 127 MG/DL (ref 65–140)
HCT VFR BLD AUTO: 40 % (ref 36.5–49.3)
HGB BLD-MCNC: 12.7 G/DL (ref 12–17)
IMM GRANULOCYTES # BLD AUTO: 0.03 THOUSAND/UL (ref 0–0.2)
IMM GRANULOCYTES NFR BLD AUTO: 1 % (ref 0–2)
LYMPHOCYTES # BLD AUTO: 1 THOUSANDS/ΜL (ref 0.6–4.47)
LYMPHOCYTES NFR BLD AUTO: 17 % (ref 14–44)
MCH RBC QN AUTO: 27.7 PG (ref 26.8–34.3)
MCHC RBC AUTO-ENTMCNC: 31.8 G/DL (ref 31.4–37.4)
MCV RBC AUTO: 87 FL (ref 82–98)
MONOCYTES # BLD AUTO: 0.4 THOUSAND/ΜL (ref 0.17–1.22)
MONOCYTES NFR BLD AUTO: 7 % (ref 4–12)
NEUTROPHILS # BLD AUTO: 4.36 THOUSANDS/ΜL (ref 1.85–7.62)
NEUTS SEG NFR BLD AUTO: 75 % (ref 43–75)
NRBC BLD AUTO-RTO: 0 /100 WBCS
PLATELET # BLD AUTO: 225 THOUSANDS/UL (ref 149–390)
PMV BLD AUTO: 10.6 FL (ref 8.9–12.7)
POTASSIUM SERPL-SCNC: 4.1 MMOL/L (ref 3.5–5.3)
PROT SERPL-MCNC: 7.4 G/DL (ref 6.4–8.2)
RBC # BLD AUTO: 4.58 MILLION/UL (ref 3.88–5.62)
SODIUM SERPL-SCNC: 139 MMOL/L (ref 136–145)
WBC # BLD AUTO: 5.82 THOUSAND/UL (ref 4.31–10.16)

## 2022-02-25 PROCEDURE — 93005 ELECTROCARDIOGRAM TRACING: CPT

## 2022-02-25 PROCEDURE — 97116 GAIT TRAINING THERAPY: CPT

## 2022-02-25 PROCEDURE — 99232 SBSQ HOSP IP/OBS MODERATE 35: CPT | Performed by: INTERNAL MEDICINE

## 2022-02-25 PROCEDURE — G1004 CDSM NDSC: HCPCS

## 2022-02-25 PROCEDURE — 82948 REAGENT STRIP/BLOOD GLUCOSE: CPT

## 2022-02-25 PROCEDURE — 99223 1ST HOSP IP/OBS HIGH 75: CPT | Performed by: PSYCHIATRY & NEUROLOGY

## 2022-02-25 PROCEDURE — 95819 EEG AWAKE AND ASLEEP: CPT | Performed by: PSYCHIATRY & NEUROLOGY

## 2022-02-25 PROCEDURE — 70450 CT HEAD/BRAIN W/O DYE: CPT

## 2022-02-25 PROCEDURE — 97530 THERAPEUTIC ACTIVITIES: CPT

## 2022-02-25 PROCEDURE — 99232 SBSQ HOSP IP/OBS MODERATE 35: CPT | Performed by: SURGERY

## 2022-02-25 PROCEDURE — 80053 COMPREHEN METABOLIC PANEL: CPT

## 2022-02-25 PROCEDURE — 85025 COMPLETE CBC W/AUTO DIFF WBC: CPT

## 2022-02-25 PROCEDURE — 95816 EEG AWAKE AND DROWSY: CPT

## 2022-02-25 PROCEDURE — 99291 CRITICAL CARE FIRST HOUR: CPT

## 2022-02-25 PROCEDURE — NC001 PR NO CHARGE

## 2022-02-25 RX ORDER — LEVOFLOXACIN 750 MG/1
750 TABLET ORAL EVERY 24 HOURS
Qty: 6 TABLET | Refills: 0 | Status: CANCELLED | OUTPATIENT
Start: 2022-02-25 | End: 2022-03-03

## 2022-02-25 RX ORDER — HYDRALAZINE HYDROCHLORIDE 20 MG/ML
10 INJECTION INTRAMUSCULAR; INTRAVENOUS EVERY 6 HOURS PRN
Status: DISCONTINUED | OUTPATIENT
Start: 2022-02-25 | End: 2022-03-01 | Stop reason: HOSPADM

## 2022-02-25 RX ORDER — LEVOFLOXACIN 750 MG/1
750 TABLET ORAL EVERY 24 HOURS
Status: CANCELLED | OUTPATIENT
Start: 2022-02-25

## 2022-02-25 RX ORDER — LORAZEPAM 2 MG/ML
INJECTION INTRAMUSCULAR
Status: DISPENSED
Start: 2022-02-25 | End: 2022-02-25

## 2022-02-25 RX ORDER — LORAZEPAM 2 MG/ML
INJECTION INTRAMUSCULAR
Status: COMPLETED
Start: 2022-02-25 | End: 2022-02-25

## 2022-02-25 RX ORDER — HYDROCHLOROTHIAZIDE 12.5 MG/1
12.5 TABLET ORAL ONCE
Status: DISCONTINUED | OUTPATIENT
Start: 2022-02-25 | End: 2022-02-26

## 2022-02-25 RX ORDER — LORAZEPAM 2 MG/ML
1 INJECTION INTRAMUSCULAR ONCE
Status: DISCONTINUED | OUTPATIENT
Start: 2022-02-25 | End: 2022-02-25

## 2022-02-25 RX ORDER — LORAZEPAM 2 MG/ML
2 INJECTION INTRAMUSCULAR ONCE
Status: DISCONTINUED | OUTPATIENT
Start: 2022-02-25 | End: 2022-02-25

## 2022-02-25 RX ORDER — CLOPIDOGREL BISULFATE 75 MG/1
75 TABLET ORAL DAILY
Status: DISCONTINUED | OUTPATIENT
Start: 2022-02-26 | End: 2022-03-01 | Stop reason: HOSPADM

## 2022-02-25 RX ORDER — LORAZEPAM 2 MG/ML
2 INJECTION INTRAMUSCULAR ONCE
Status: COMPLETED | OUTPATIENT
Start: 2022-02-25 | End: 2022-02-25

## 2022-02-25 RX ORDER — ASPIRIN 81 MG/1
81 TABLET, CHEWABLE ORAL DAILY
Status: DISCONTINUED | OUTPATIENT
Start: 2022-02-25 | End: 2022-02-25

## 2022-02-25 RX ADMIN — SODIUM CHLORIDE, SODIUM LACTATE, POTASSIUM CHLORIDE, AND CALCIUM CHLORIDE 75 ML/HR: .6; .31; .03; .02 INJECTION, SOLUTION INTRAVENOUS at 06:12

## 2022-02-25 RX ADMIN — METRONIDAZOLE 500 MG: 500 TABLET ORAL at 05:07

## 2022-02-25 RX ADMIN — CARVEDILOL 12.5 MG: 12.5 TABLET, FILM COATED ORAL at 09:09

## 2022-02-25 RX ADMIN — PRAVASTATIN SODIUM 40 MG: 40 TABLET ORAL at 16:14

## 2022-02-25 RX ADMIN — CEFAZOLIN SODIUM 2000 MG: 2 SOLUTION INTRAVENOUS at 11:03

## 2022-02-25 RX ADMIN — LORAZEPAM: 2 INJECTION INTRAMUSCULAR at 11:30

## 2022-02-25 RX ADMIN — CEFAZOLIN SODIUM 2000 MG: 2 SOLUTION INTRAVENOUS at 17:47

## 2022-02-25 RX ADMIN — DILTIAZEM HYDROCHLORIDE 30 MG: 30 TABLET, FILM COATED ORAL at 05:07

## 2022-02-25 RX ADMIN — SERTRALINE HYDROCHLORIDE 100 MG: 50 TABLET ORAL at 21:18

## 2022-02-25 RX ADMIN — THIAMINE HCL TAB 100 MG 100 MG: 100 TAB at 11:04

## 2022-02-25 RX ADMIN — FOLIC ACID 1 MG: 1 TABLET ORAL at 11:04

## 2022-02-25 RX ADMIN — ENOXAPARIN SODIUM 40 MG: 40 INJECTION SUBCUTANEOUS at 09:02

## 2022-02-25 RX ADMIN — NICOTINE 1 PATCH: 14 PATCH, EXTENDED RELEASE TRANSDERMAL at 09:11

## 2022-02-25 RX ADMIN — CARVEDILOL 12.5 MG: 12.5 TABLET, FILM COATED ORAL at 16:14

## 2022-02-25 RX ADMIN — CEFAZOLIN SODIUM 2000 MG: 2 SOLUTION INTRAVENOUS at 02:29

## 2022-02-25 RX ADMIN — LORAZEPAM: 2 INJECTION INTRAMUSCULAR; INTRAVENOUS at 11:30

## 2022-02-25 RX ADMIN — APIXABAN 5 MG: 5 TABLET, FILM COATED ORAL at 17:46

## 2022-02-25 RX ADMIN — METRONIDAZOLE 500 MG: 500 TABLET ORAL at 21:18

## 2022-02-25 RX ADMIN — MULTIPLE VITAMINS W/ MINERALS TAB 1 TABLET: TAB ORAL at 11:04

## 2022-02-25 RX ADMIN — DILTIAZEM HYDROCHLORIDE 30 MG: 30 TABLET, FILM COATED ORAL at 17:46

## 2022-02-25 RX ADMIN — PANTOPRAZOLE SODIUM 20 MG: 20 TABLET, DELAYED RELEASE ORAL at 05:07

## 2022-02-25 NOTE — PROGRESS NOTES
Progress Note - General Surgery   Geni Posey 73 y o  male MRN: [id number]  Unit/Bed#: S -01 Encounter: [id number]    Assessment:  74 yo male w/ acute cholecystitis and choledocholithiaisis now s/p ERCP on 2/23    AVSS, HTN  WBC 5 82 (4 58)  Hgb 12 7 (10 8)      Plan:  No plan for acute surgical intervention, plan for follow up in 4 weeks with interval leatha in 6-8 weeks  Would recommend course of levaquin/flagyl to complete a 10 day course of abx  Lo fat diet  Pain nausea control prn  When medically cleared from medical team can be discharged home with oral abx  Clear for dc from surgical perspective    Subjective/Objective     Subjective:  No acute events overnight  No fevers chills  No nausea or vomiting  Reports improvement in abd pain  States he just has rib pain from laying down all day  Objective:     Blood pressure (!) 200/80, pulse 67, temperature 98 2 °F (36 8 °C), temperature source Oral, resp  rate 19, weight 68 4 kg (150 lb 12 8 oz), SpO2 97 %  ,Body mass index is 22 27 kg/m²  Intake/Output Summary (Last 24 hours) at 2/25/2022 0645  Last data filed at 2/25/2022 0612  Gross per 24 hour   Intake 1420 ml   Output 2550 ml   Net -1130 ml       Invasive Devices  Report    Peripheral Intravenous Line            Peripheral IV 02/22/22 Right Forearm 2 days                Physical Exam:  General: NAD  HENT: NCAT MMM  Neck: supple, no JVD  CV: nl rate  Lungs: nl wob   No resp distress  ABD: Soft, nontender, nondistended  Extrem: No CCE  Neuro: AAOx3

## 2022-02-25 NOTE — PLAN OF CARE
Problem: MOBILITY - ADULT  Goal: Maintain or return to baseline ADL function  Description: INTERVENTIONS:  -  Assess patient's ability to carry out ADLs; assess patient's baseline for ADL function and identify physical deficits which impact ability to perform ADLs (bathing, care of mouth/teeth, toileting, grooming, dressing, etc )  - Assess/evaluate cause of self-care deficits   - Assess range of motion  - Assess patient's mobility; develop plan if impaired  - Assess patient's need for assistive devices and provide as appropriate  - Encourage maximum independence but intervene and supervise when necessary  - Involve family in performance of ADLs  - Assess for home care needs following discharge   - Consider OT consult to assist with ADL evaluation and planning for discharge  - Provide patient education as appropriate  Outcome: Progressing  Goal: Maintains/Returns to pre admission functional level  Description: INTERVENTIONS:  - Perform BMAT or MOVE assessment daily    - Set and communicate daily mobility goal to care team and patient/family/caregiver  - Collaborate with rehabilitation services on mobility goals if consulted  - Perform Range of Motion  times a day  - Reposition patient every  hours    - Dangle patient  times a day  - Stand patient  times a day  - Ambulate patient  times a day  - Out of bed to chair  times a day   - Out of bed for meals  times a day  - Out of bed for toileting  - Record patient progress and toleration of activity level   Outcome: Progressing     Problem: Potential for Falls  Goal: Patient will remain free of falls  Description: INTERVENTIONS:  - Educate patient/family on patient safety including physical limitations  - Instruct patient to call for assistance with activity   - Consult OT/PT to assist with strengthening/mobility   - Keep Call bell within reach  - Keep bed low and locked with side rails adjusted as appropriate  - Keep care items and personal belongings within reach  - Initiate and maintain comfort rounds  - Make Fall Risk Sign visible to staff  - Offer Toileting every  Hours, in advance of need  - Initiate/Maintain alarm  - Obtain necessary fall risk management equipment:   - Apply yellow socks and bracelet for high fall risk patients  - Consider moving patient to room near nurses station  Outcome: Progressing     Problem: NEUROSENSORY - ADULT  Goal: Remains free of injury related to seizures activity  Description: INTERVENTIONS  - Maintain airway, patient safety  and administer oxygen as ordered  - Monitor patient for seizure activity, document and report duration and description of seizure to physician/advanced practitioner  - If seizure occurs,  ensure patient safety during seizure  - Reorient patient post seizure  - Seizure pads on all 4 side rails  - Instruct patient/family to notify RN of any seizure activity including if an aura is experienced  - Instruct patient/family to call for assistance with activity based on nursing assessment  - Administer anti-seizure medications if ordered    Outcome: Progressing  Goal: Achieves maximal functionality and self care  Description: INTERVENTIONS  - Monitor swallowing and airway patency with patient fatigue and changes in neurological status  - Encourage and assist patient to increase activity and self care     - Encourage visually impaired, hearing impaired and aphasic patients to use assistive/communication devices  Outcome: Progressing     Problem: METABOLIC, FLUID AND ELECTROLYTES - ADULT  Goal: Electrolytes maintained within normal limits  Description: INTERVENTIONS:  - Monitor labs and assess patient for signs and symptoms of electrolyte imbalances  - Administer electrolyte replacement as ordered  - Monitor response to electrolyte replacements, including repeat lab results as appropriate  - Instruct patient on fluid and nutrition as appropriate  Outcome: Progressing  Goal: Fluid balance maintained  Description: INTERVENTIONS:  - Monitor labs   - Monitor I/O and WT  - Instruct patient on fluid and nutrition as appropriate  - Assess for signs & symptoms of volume excess or deficit  Outcome: Progressing     Problem: HEMATOLOGIC - ADULT  Goal: Maintains hematologic stability  Description: INTERVENTIONS  - Assess for signs and symptoms of bleeding or hemorrhage  - Monitor labs  - Administer supportive blood products/factors as ordered and appropriate  Outcome: Progressing     Problem: PAIN - ADULT  Goal: Verbalizes/displays adequate comfort level or baseline comfort level  Description: Interventions:  - Encourage patient to monitor pain and request assistance  - Assess pain using appropriate pain scale  - Administer analgesics based on type and severity of pain and evaluate response  - Implement non-pharmacological measures as appropriate and evaluate response  - Consider cultural and social influences on pain and pain management  - Notify physician/advanced practitioner if interventions unsuccessful or patient reports new pain  Outcome: Progressing     Problem: SAFETY ADULT  Goal: Maintain or return to baseline ADL function  Description: INTERVENTIONS:  -  Assess patient's ability to carry out ADLs; assess patient's baseline for ADL function and identify physical deficits which impact ability to perform ADLs (bathing, care of mouth/teeth, toileting, grooming, dressing, etc )  - Assess/evaluate cause of self-care deficits   - Assess range of motion  - Assess patient's mobility; develop plan if impaired  - Assess patient's need for assistive devices and provide as appropriate  - Encourage maximum independence but intervene and supervise when necessary  - Involve family in performance of ADLs  - Assess for home care needs following discharge   - Consider OT consult to assist with ADL evaluation and planning for discharge  - Provide patient education as appropriate  Outcome: Progressing  Goal: Maintains/Returns to pre admission functional level  Description: INTERVENTIONS:  - Perform BMAT or MOVE assessment daily    - Set and communicate daily mobility goal to care team and patient/family/caregiver  - Collaborate with rehabilitation services on mobility goals if consulted  - Perform Range of Motion  times a day  - Reposition patient every  hours    - Dangle patient  times a day  - Stand patient  times a day  - Ambulate patient  times a day  - Out of bed to chair  times a day   - Out of bed for meals  times a day  - Out of bed for toileting  - Record patient progress and toleration of activity level   Outcome: Progressing  Goal: Patient will remain free of falls  Description: INTERVENTIONS:  - Educate patient/family on patient safety including physical limitations  - Instruct patient to call for assistance with activity   - Consult OT/PT to assist with strengthening/mobility   - Keep Call bell within reach  - Keep bed low and locked with side rails adjusted as appropriate  - Keep care items and personal belongings within reach  - Initiate and maintain comfort rounds  - Make Fall Risk Sign visible to staff  - Offer Toileting every  Hours, in advance of need  - Initiate/Maintain alarm  - Obtain necessary fall risk management equipment  - Apply yellow socks and bracelet for high fall risk patients  - Consider moving patient to room near nurses station  Outcome: Progressing     Problem: DISCHARGE PLANNING  Goal: Discharge to home or other facility with appropriate resources  Description: INTERVENTIONS:  - Identify barriers to discharge w/patient and caregiver  - Arrange for needed discharge resources and transportation as appropriate  - Identify discharge learning needs (meds, wound care, etc )  - Arrange for interpretive services to assist at discharge as needed  - Refer to Case Management Department for coordinating discharge planning if the patient needs post-hospital services based on physician/advanced practitioner order or complex needs related to functional status, cognitive ability, or social support system  Outcome: Progressing     Problem: Knowledge Deficit  Goal: Patient/family/caregiver demonstrates understanding of disease process, treatment plan, medications, and discharge instructions  Description: Complete learning assessment and assess knowledge base    Interventions:  - Provide teaching at level of understanding  - Provide teaching via preferred learning methods  Outcome: Progressing

## 2022-02-25 NOTE — CONSULTS
[address] [place] [address] 73 y o  male MRN: [id number]  Unit/Bed#: ICU 03 Encounter: [id number]      -------------------------------------------------------------------------------------------------------------  Chief Complaint: seizure, postictal airway watch    History of Present Illness   HX and PE limited by: postictal state  Susan Welch is a 73 y o  male with a PMH of alcohol abuse, tobacco abuse, COPD, HTN, HLD who presented to [facility] ED on 2/22 with abdominal pain x2 weeks  Patient intoxicated on arrival to ED  Was admitted to [facility] for management of what was found to be cholecystitis now s/p ERCP on 2/23 where two large gallstones were removed  Patient was being followed by [facility] for concerns of alcohol withdrawal  Patient found to have worsening left sided numbness, uncontrolled fist clenching followed by expressive aphasia and ultimately tonic clonic seizure lasting approximately one minute  Received 2mg of IV ativan and sent for emergent head CT  CTH negative  Patient brought to the ICU for postictal airway monitoring      History obtained from chart review and the patient   -------------------------------------------------------------------------------------------------------------  Assessment and Plan:    Neuro:    Diagnosis: Seizure, alcohol withdrawal vs acute CVA  o Plan: Patient c/o left sided hand numbness/clenching, patient experienced expressive aphasia and then subsequent seizure starting 1109 this AM   o Received 2mg IV Ativan during rapid response for seizure  o Stat CTH obtained, no acute intracranial abnormalities  o Neurology consulted  o Recommending MRI and EEG, both orders placed  o Now returned to baseline, only mild confusion post ictal   Continue to trend neuro exam Q4H   GCS 15, NIH 0   Diagnosis: Alcohol abuse, concern for alcohol withdrawal  o Plan: Patient reporting alcohol consumption of 6 to 8 beers per day  o Was on CIWA throughout the admission, has received 0 doses of ativan   o Continue CIWA protocol  o Currently only with minor tremor  o Continue folic acid, thiamine   Diagnosis: Hx of CVA  o Plan: Most recent stroke alert 9/2021  o Found to have PCA occlusion, received tPA    CV:    Diagnosis: Paroxysmal Atrial Fibrillation  o Plan:  On arrival to ED, placed on cardizem infusion now d/c'd  o On eliquis as an outpatient for CVA  o Currently NSR   o Cardiology was following for ERCP clearance  o Continue cardizem   Diagnosis: Hypertension  o Plan: Appears to be noncompliant at with home regimen  o Continue HCTZ, cardizem, coreg  o PRN hydralazine (0 doses/24hrs)   o SBP currently 140's   Diagnosis: S/P carotid endarterectomy  o Plan: Right carotid endarterectomy  o Per vascular no longer on plavix as an outpatient    Diagnosis: HLD  o Plan: Continue Pravastatin    Pulm:   Diagnosis: Acute hypoxic respiratory failure post seizure   o Plan: Currently on 2L NC post seizure  o Continue to wean down oxygen to maintain spo2 >92%  o Lungs clear to auscultation bilaterally    GI:    Diagnosis: Acute cholecystitis  o Plan:  Presented with abdominal pain x2 weeks, found to have cholecystitis on CT scan, positive jaramillo sign  o S/p ERCP 2/23, 2 gallstones removed  o LFTs continuing to downtrend  o Tolerated surgical soft diet, previously NPO  o Continue Zofran prn  o Continue PPI    :    Diagnosis: No active issues  o Plan: Baseline Cr appears to be 0 9 to 1 2  o Currently within baseline   o Currently -3 liters on admission  o Continue to trend on BMP    F/E/N:    F- No maintenance fluids   E- replete as needed   N- NPO for now, pending bedside speech eval  Will need formal speech if fails      Heme/Onc:    Diagnosis: No acute issues  o Plan: Previously receiving lovenox for dvt ppx  o Restart eliquis in setting of possible repeat CVA  o Restart ASA  o CBC stable at 12 7  o Continue to trend    Endo:    Diagnosis: No active issues  o Plan: No hx of DM  o Blood sugar WNL  o No ssi    ID:    Diagnosis: Currently on ancef and flagyl 4/10  o Plan: to continue for full 10 day course  o Plan to D/C home on oral ABX if appropriate    o Currently afebrile  o WBC WNL  o Continue to trend    MSK/Skin:    Diagnosis: Pressure Ulcer PPX  o Plan: Up and out of bed with assistance  o Q2H repositioning while post ictal     Disposition: Transfer to Stepdown Level 1   Code Status: Level 1 - Full Code  --------------------------------------------------------------------------------------------------------------  Review of Systems   Unable to perform ROS: Mental status change (with intermittent confusion)   Constitutional: Negative  Negative for chills, diaphoresis and fever  HENT: Negative  Eyes: Negative  Respiratory: Negative  Negative for shortness of breath  Cardiovascular: Negative for chest pain  Gastrointestinal: Negative  Negative for diarrhea, nausea and vomiting  Endocrine: Negative  Genitourinary: Negative  Musculoskeletal: Negative  Skin: Negative  Allergic/Immunologic: Negative  Neurological: Positive for tremors, seizures and weakness  Negative for speech difficulty  Hematological: Negative  Psychiatric/Behavioral: Negative  All other systems reviewed and are negative  A 12-point, complete review of systems was reviewed and negative except as stated above     Physical Exam  Vitals and nursing note reviewed  Constitutional:       General: He is not in acute distress  HENT:      Head: Normocephalic and atraumatic  Right Ear: External ear normal       Left Ear: External ear normal       Nose: Nose normal       Mouth/Throat:      Mouth: Mucous membranes are dry  Pharynx: Oropharynx is clear  Eyes:      Extraocular Movements: Extraocular movements intact  Pupils: Pupils are equal, round, and reactive to light  Cardiovascular:      Rate and Rhythm: Regular rhythm  Bradycardia present        Pulses: Normal pulses  Heart sounds: Normal heart sounds  No murmur heard  No gallop  Pulmonary:      Effort: Pulmonary effort is normal  No respiratory distress  Breath sounds: Normal breath sounds  No wheezing  Chest:      Chest wall: No tenderness  Abdominal:      General: Abdomen is flat  Bowel sounds are normal  There is no distension  Palpations: Abdomen is soft  Tenderness: There is no abdominal tenderness  Musculoskeletal:         General: Normal range of motion  Cervical back: Normal range of motion and neck supple  No rigidity or tenderness  Right lower leg: No edema  Left lower leg: No edema  Skin:     General: Skin is warm and dry  Capillary Refill: Capillary refill takes less than 2 seconds  Neurological:      Mental Status: He is alert and oriented to person, place, and time  Comments: Intermittently disoriented but redirectable  Currently GCS 15, NIH 0       --------------------------------------------------------------------------------------------------------------  Vitals:   Vitals:    02/25/22 1114 02/25/22 1117 02/25/22 1158 02/25/22 1200   BP: 170/60 (!) 177/72 142/70 142/70   BP Location:   Right arm Right arm   Pulse:  88 80 80   Resp:  22 (!) 24 20   Temp:   (!) 96 8 °F (36 °C)    TempSrc:   Axillary    SpO2:  99% 94%    Weight:         Temp  Min: 96 8 °F (36 °C)  Max: 98 5 °F (36 9 °C)        Body mass index is 22 27 kg/m²      Laboratory and Diagnostics:  Results from last 7 days   Lab Units 02/25/22  0602 02/24/22  0555 02/23/22  0507 02/22/22  1018   WBC Thousand/uL 5 82 4 58 3 98* 10 76*   HEMOGLOBIN g/dL 12 7 10 8* 10 4* 13 2   HEMATOCRIT % 40 0 33 4* 32 8* 39 3   PLATELETS Thousands/uL 225 171 168 223   NEUTROS PCT % 75 88* 76* 84*   MONOS PCT % 7 4 10 7     Results from last 7 days   Lab Units 02/25/22  0602 02/24/22  0555 02/23/22  0507 02/22/22  0000   SODIUM mmol/L 139 131* 134* 136   POTASSIUM mmol/L 4 1 4 2 4 5 3 2*   CHLORIDE mmol/L 101 100 100 94*   CO2 mmol/L 28 23 25 28   ANION GAP mmol/L 10 8 9 14*   BUN mg/dL 16 13 11 12   CREATININE mg/dL 1 16 1 09 1 07 1 12   CALCIUM mg/dL 10 0 8 8 7 7* 8 6   GLUCOSE RANDOM mg/dL 110 140 87 115   ALT U/L 68 84* 95* 125*   AST U/L 60* 84* 89* 102*   ALK PHOS U/L 390* 416* 320* 376*   ALBUMIN g/dL 3 5 2 8* 2 5* 3 2*   TOTAL BILIRUBIN mg/dL 0 93 1 22* 1 51* 1 57*     Results from last 7 days   Lab Units 22  0000   MAGNESIUM mg/dL 1 2*                   ABG:    VBG:          Micro:        EKG: NS bradycardia rate 58  Imagin CTH negative for acute abnormality   I have personally reviewed pertinent reports        Historical Information   Past Medical History:   Diagnosis Date    PRITI (acute kidney injury) ([address] ) 2021    Anxiety     Back pain     Claustrophobia     COPD [facility]chronic obstructive pulmonary disease) ([facility])     Dysphagia 2021    GERD (gastroesophageal reflux disease)     Hypertension     Hypokalemia 2022    Lumbar back pain     Panic attacks     Stenosis of right carotid artery     Stroke [facility])     Wears glasses     Wears partial dentures     upper denture     Past Surgical History:   Procedure Laterality Date    COLONOSCOPY      IR CEREBRAL ANGIOGRAPHY  2021    OTHER SURGICAL HISTORY      fertility    SD SLCTV CATHJ 3RD+ ORD SLCTV ABDL PEL/LXTR [facility] Right 2021    Procedure: ARTERIOGRAM, carotid Angio;  Surgeon: Jana Michaels MD;  Location: [facility];  Service: Vascular    SD THROMBOENDARTECTMY [facility] Right 2021    Procedure: RIGHT ENDARTERECTOMY ARTERY CAROTID WITH BOVINE PATCH;  Surgeon: Jana Michaels MD;  [facility];  Service: Vascular     Social History   Social History     Substance and Sexual Activity   Alcohol Use Yes     Social History     Substance and Sexual Activity   Drug Use Never     Social History     Tobacco Use   Smoking Status Light Tobacco Smoker    Packs/day: 0 25   Smokeless Tobacco Never Used Tobacco Comment    also wears nicotine patches     Family History:   History reviewed  No pertinent family history  I have reviewed this patient's family history and commented on sigificant items within the HPI      Medications:  Current Facility-Administered Medications   Medication Dose Route Frequency    acetaminophen (TYLENOL) tablet 650 mg  650 mg Oral Q6H PRN    albuterol (PROVENTIL HFA,VENTOLIN HFA) inhaler 1 puff  1 puff Inhalation Q6H PRN    carvedilol (COREG) tablet 12 5 mg  12 5 mg Oral BID With Meals    ceFAZolin (ANCEF) IVPB (premix in dextrose) 2,000 mg 50 mL  2,000 mg Intravenous Q8H    diltiazem (CARDIZEM) tablet 30 mg  30 mg Oral Q6H [facility]    enoxaparin (LOVENOX) subcutaneous injection 40 mg  40 mg Subcutaneous Daily    folic acid (FOLVITE) tablet 1 mg  1 mg Oral Daily    hydrALAZINE (APRESOLINE) injection 10 mg  10 mg Intravenous Q6H PRN    [MAR Hold] hydrochlorothiazide (HYDRODIURIL) tablet 12 5 mg  12 5 mg Oral Once    LORazepam (ATIVAN) 2 mg/mL injection **ADS Override Pull**        LORazepam (ATIVAN) injection 2 mg  2 mg Intravenous Once    metroNIDAZOLE (FLAGYL) tablet 500 mg  500 mg Oral Q8H [facility]    multivitamin-minerals (CENTRUM) tablet 1 tablet  1 tablet Oral Daily    nicotine (NICODERM CQ) 14 mg/24hr TD 24 hr patch 1 patch  1 patch Transdermal Daily    nitroglycerin (NITROSTAT) SL tablet 0 4 mg  0 4 mg Sublingual Q5 Min PRN    ondansetron (ZOFRAN) injection 4 mg  4 mg Intravenous Q6H PRN    pantoprazole (PROTONIX) EC tablet 20 mg  20 mg Oral Early Morning    pravastatin (PRAVACHOL) tablet 40 mg  40 mg Oral Daily With Dinner    sertraline (ZOLOFT) tablet 100 mg  100 mg Oral HS    simethicone (MYLICON) chewable tablet 80 mg  80 mg Oral 4x Daily PRN    sodium chloride (PF) 0 9 % injection 3 mL  3 mL Intravenous Q1H PRN    thiamine tablet 100 mg  100 mg Oral Daily     Home medications:  Prior to Admission Medications   Prescriptions Last Dose Informant Patient Reported? Taking?    ALPRAZolam (XANAX) 0 25 mg tablet  Self Yes Yes   Sig: TAKE 1 TABLET BY ORAL ROUTE 4 TIMES EVERY DAY AS NEEDED   acetaminophen (TYLENOL) 325 mg tablet Not Taking at Unknown time Self No No   Sig: Take 2 tablets (650 mg total) by mouth every 6 (six) hours as needed for mild pain   Patient not taking: Reported on 2022    aclidinium (Tudorza Pressair) 400 MCG/ACT inhaler  Self Yes Yes   Sig: Inhale 1 puff 2 (two) times a day     albuterol (PROVENTIL HFA,VENTOLIN HFA) 90 mcg/act inhaler  Self Yes Yes   Si puff every 6 (six) hours as needed     apixaban (ELIQUIS) 5 mg 2022 at Unknown time Self No Yes   Sig: Take 1 tablet (5 mg total) by mouth 2 (two) times a day   atorvastatin (LIPITOR) 80 mg tablet   No No   Sig: Take 1 tablet (80 mg total) by mouth daily   baclofen 10 mg tablet  Self Yes No   Sig: 10 mg 3 (three) times a day as needed     carvedilol (COREG) 12 5 mg tablet  Self Yes No   Sig: Take 12 5 mg by mouth 2 (two) times a day with meals   Patient not taking: Reported on 11/10/2021    clopidogrel (PLAVIX) 75 mg tablet Not Taking at Unknown time Self No No   Sig: TAKE 1 TABLET BY MOUTH EVERY DAY FOR 19 DOSES   Patient not taking: Reported on 11/10/2021   fenofibrate 160 MG tablet  Self Yes No   Sig: Take 160 mg by mouth daily   ferrous sulfate 324 (65 Fe) mg  Self No No   Sig: Take 1 tablet (324 mg total) by mouth daily before breakfast   loratadine (CLARITIN) 10 mg tablet  Self No No   Sig: Take 1 tablet (10 mg total) by mouth daily   nicotine (NICODERM CQ) 14 mg/24hr TD 24 hr patch  Self No No   Sig: Place 1 patch on the skin daily   omeprazole (PriLOSEC) 20 mg delayed release capsule  Self Yes No   Sig: Take 20 mg by mouth daily     pantoprazole (PROTONIX) 40 mg tablet   No No   Sig: Take 1 tablet (40 mg total) by mouth daily in the early morning   pravastatin (PRAVACHOL) 40 mg tablet  Self Yes No   Sig: Take 40 mg by mouth daily     senna (SENOKOT) 8 6 mg  Self No No   Sig: Take 1 tablet (8 6 mg total) by mouth daily at bedtime as needed for constipation   Patient not taking: Reported on 9/15/2021   sertraline (ZOLOFT) 100 mg tablet  Self Yes No   Sig: Take 100 mg by mouth daily     thiamine 100 MG tablet  Self No No   Sig: Take 1 tablet (100 mg total) by mouth daily      Facility-Administered Medications: None     Allergies: Allergies   Allergen Reactions    Penicillins Anaphylaxis     ------------------------------------------------------------------------------------------------------------  Advance Directive and Living Will:      Power of :    POLST:    ------------------------------------------------------------------------------------------------------------  Anticipated Length of Stay is > 2 midnights    Care Time Delivered:   Upon my evaluation, this patient had a high probability of imminent or life-threatening deterioration due to seizure, which required my direct attention, intervention, and personal management  I have personally provided 30 minutes (1130 to 1200) of critical care time, exclusive of procedures, teaching, family meetings, and any prior time recorded by providers other than myself  SVEN Reyna        Portions of the record may have been created with voice recognition software  Occasional wrong word or "sound a like" substitutions may have occurred due to the inherent limitations of voice recognition software    Read the chart carefully and recognize, using context, where substitutions have occurred

## 2022-02-25 NOTE — NURSING NOTE
Nurse walked into patient room  Explained to patient about him not going home due to high blood pressure and doctor ordered small dose of water pill  Nurse handed pill to patient and patient dropped pill and tried speaking to nurse, words were garbled and didn't make any sense and patient right hand started twitching and and making weird finger movements  Nurse asked patient to squeeze her hands, which patient was not able to mainly with right hand, but weak with the left hand  Patient kept trying to talk to nurse, but again, patient had garbled speech that made no sense  Nurse maria a Reyes to come evaluate patient and nurse called a rapid response, as nurse got off phone to call rapid response, patient body flew backwards and tensed inwards looking like a tonic clonic seizure  Nurse yelled for help while pulling patient back into bed  Rapid response team came to room and vitals were taken, NRB mask was applied to the patient  Patient seized approximately 2-3 minutes  Patient transferred to higher level of care in ICU  CT scan of head ordered as well

## 2022-02-25 NOTE — PLAN OF CARE
Problem: PHYSICAL THERAPY ADULT  Goal: Performs mobility at highest level of function for planned discharge setting  See evaluation for individualized goals  Description: Treatment/Interventions: Functional transfer training,LE strengthening/ROM,Elevations,Therapeutic exercise,Endurance training,Patient/family training,Gait training          See flowsheet documentation for full assessment, interventions and recommendations  Outcome: Progressing  Note: Prognosis: Fair  Problem List: Decreased strength,Decreased endurance,Impaired balance,Decreased mobility,Decreased safety awareness  Assessment: pt began tx session lying supine in the bed and was agreeable to participate in PT intervention  pt continues to remains independent with all bed mobility including performing a supine<>sit EOB transfer  progress was noted this tx session as pt was able to perform all functional transfers with a decrease in level of assistance required mod I as pt demonstrated good recall from previous tx sessions and used hands to push up from bed rail into a standing position  pt continues to improve with functional moblity and activity tolerance as pt was able to ambulate 510'x1 w/o an AD  pt was able to scan environmenet and avoid objects in the hallway w/o LOB  pt was able to safely complete 15 steps in todays tx session w/o LOB but continues to require VC's to slow down and take his time  pt is currently meeting all STG's and will be placed on tomorrows schedule for a PT to see  post tx session pt seated EOB with call bell in arms reach and all pt needs met            PT Discharge Recommendation: (S) Home with home health rehabilitation (Spoke to [address] [place] [address] pt is okay for OPPT )          See flowsheet documentation for full assessment

## 2022-02-25 NOTE — ASSESSMENT & PLAN NOTE
- CIWA protocol   - No signs of withdrawal per discussion with ICU team   -Thiamine 100 mg daily   - Management per primary team

## 2022-02-25 NOTE — PHYSICAL THERAPY NOTE
PHYSICAL THERAPY NOTE          Patient Name: Yonis Reinoso  TLXWD'B Date: 2/25/2022 02/25/22 1024   PT Last Visit   PT Visit Date 02/25/22   Note Type   Note Type Treatment   Pain Assessment   Pain Assessment Tool 0-10   Pain Score No Pain   Restrictions/Precautions   Other Precautions Chair Alarm; Bed Alarm; Fall Risk   General   Chart Reviewed Yes   Response to Previous Treatment Patient with no complaints from previous session  Family/Caregiver Present No   Cognition   Overall Cognitive Status Unable to assess   Arousal/Participation Alert; Responsive; Cooperative   Attention Within functional limits   Orientation Level Oriented X4   Memory Unable to assess   Following Commands Follows one step commands without difficulty   Comments pt was pleasant and cooperative tghroughout tx session    Subjective   Subjective pt was agreeable to participate in PT intervention    Bed Mobility   Supine to Sit 7  Independent   Additional items HOB elevated   Sit to Supine 7  Independent   Additional items HOB elevated   Additional Comments pt was able to sit EOB w/o LOB while performing TE activities 8 minutes    Transfers   Sit to Stand 6  Modified independent   Additional items Assist x 1; Increased time required   Stand to Sit 6  Modified independent   Additional items Assist x 1; Increased time required   Additional Comments pt did not require any AD in todays tx session in order to perform functional transfers and ambulation    Ambulation/Elevation   Gait pattern Decreased foot clearance   Gait Assistance 5  Supervision   Additional items Assist x 1   Assistive Device None   Distance 510'x1 ND    Stair Management Assistance 5  Supervision   Additional items Assist x 1;Verbal cues; Increased time required   Stair Management Technique One rail L;Step to pattern; Foreward;Backward   Number of Stairs 15   Balance   Static Sitting Good   Dynamic Sitting Fair   Static Standing Fair   Ambulatory Fair -   Activity Tolerance   Activity Tolerance Patient tolerated treatment well   Nurse Made Aware Spoke to CM and RN Shayy    Exercises   Knee AROM Long Arc Quad Sitting;20 reps;AROM; Bilateral   Ankle Pumps Sitting;25 reps;AROM; Bilateral   Marching Sitting;20 reps;AROM; Bilateral   Balance training  standing marches x20 B with L UE support (bed rail)    Assessment   Prognosis Fair   Problem List Decreased strength;Decreased endurance; Impaired balance;Decreased mobility; Decreased safety awareness   Assessment pt began tx session lying supine in the bed and was agreeable to participate in PT intervention  pt continues to remains independent with all bed mobility including performing a supine<>sit EOB transfer  progress was noted this tx session as pt was able to perform all functional transfers with a decrease in level of assistance required mod I as pt demonstrated good recall from previous tx sessions and used hands to push up from bed rail into a standing position  pt continues to improve with functional moblity and activity tolerance as pt was able to ambulate 510'x1 w/o an AD  pt was able to scan environmenet and avoid objects in the hallway w/o LOB  pt was able to safely complete 15 steps in todays tx session w/o LOB but continues to require VC's to slow down and take his time  pt is currently meeting all STG's and will be placed on tomorrows schedule for a PT to see  post tx session pt seated EOB with call bell in arms reach and all pt needs met    Goals   Patient Goals to go home today    STG Expiration Date 03/05/22   PT Treatment Day 2   Plan   Treatment/Interventions Functional transfer training;LE strengthening/ROM; Elevations; Therapeutic exercise; Endurance training;Patient/family training;Equipment eval/education; Bed mobility;Gait training;Spoke to nursing;Spoke to case management   PT Frequency 4-6x/wk   Recommendation   PT Discharge Recommendation Home with home health rehabilitation  (Spoke to [address] [place] [address] pt is okay for OPPT )   Phong 8 in Bed Without Bedrails 4   Lying on Back to Sitting on Edge of Flat Bed 4   Moving Bed to Chair 3   Standing Up From Chair 3   Walk in Room 3   Climb 3-5 Stairs 3   Basic Mobility Inpatient Raw Score 20   Basic Mobility Standardized Score 43 99   Highest Level Of Mobility   JH-HLM Goal 6: Walk 10 steps or more   JH-HLM Highest Level of Mobility 8: Walk 250 feet ot more   JH-HLM Goal Achieved Yes   Education   Education Provided Mobility training   Patient Demonstrates acceptance/verbal understanding   End of Consult   Patient Position at End of Consult Seated edge of bed;Bed/Chair alarm activated; All needs within reach   The patient's AM-PAC Basic Mobility Inpatient Short Form Raw Score is 20  A Raw score of greater than 16 suggests the patient may benefit from discharge to home  Please also refer to the recommendation of the Physical Therapist for safe discharge planning        Jose Gutierrez, PTA

## 2022-02-25 NOTE — ASSESSMENT & PLAN NOTE
Perla Swenson is a 73 y o  male with prior CVA, most recent right PCA  occlusion and infarct s/p IV tPA (September 2021), maintained on Eliquis, known left ICA severe stenosis on Plavix, previous Right CEA, alcohol abuse, COPD, tobacco abuse [facility] with chest pain and reported new onset rapid Afib prior to arrival to ED per EMS  Rapid response called on 02/25/2022 for tonic-clonic seizure activity  2/25- First time witness tonic clonic seizure, s/p IV ativan 2 mg  Seizure described as abnormal movements in right hand, followed by confusion described as difficult with comprehension and answering questions  Movement in RUE stopped and patient progressed to a full tonic clonic seizure where eyes were reportedly deviated to the left, lasting approximately 1-3 minutes per discussion with nursing staff  CT head unremarkable  EEG- normal      Etiology of seizure d/t Rt MCA stroke in setting of p Afib and not on Eliquis d/t NPO status related to cholecystitis  Plan:  - MRI brain revealed- Early subacute infarct within the right frontal operculum and posterior lateral frontal lobe  - Routine EEG- normal  - Would not recommend initiating AEDs at this time given this is a first time event and provoked related to acute stroke   - Ativan PRN for seizure activity lasting >1 minute   - Okay to continue   - Okay to continue Plavix 75 mg daily at this time  - Seizure precautions   - Discussed seizure precautions:  · No driving, lifting heavy machinery, climbing heights, swimming unattended, hot tub baths or any other activity that will place you in danger in case of a seizure for at least six months    - [employer] form completed and faxed on 02/25/2022  · Patients sister reports patient does not drive since last stroke  - Medical management and supportive care per primary team, notify with changes

## 2022-02-25 NOTE — ASSESSMENT & PLAN NOTE
Patient has a known hx of Alcohol abuse  - Last drink at 04 AM on 02/22/2022  - Inebriated during initial encounter in ED    - AAOx3 today  No significant tremors observed      Plan:   - [facility] Protocol  - Ativan   - Monitor vitals  - Monitor for DT's

## 2022-02-25 NOTE — ASSESSMENT & PLAN NOTE
- New onset Afib noted this admission  - On Eliquis 5 mg BID at baseline prior to this admission for embolic stroke  - It was on hold due to NPO status in the setting of acute cholecystitis  - Management per primary team

## 2022-02-25 NOTE — ASSESSMENT & PLAN NOTE
Patient is poor historian and is unsure if he is still taking atorvastatin  Unable to get in touch with pt's sister via phone call  Plan:  Continue pravastatin 40 mg daily

## 2022-02-25 NOTE — ASSESSMENT & PLAN NOTE
- History of multiple strokes in the past   - Most recent stroke in September 2021; patient found to have a Right PCA occlusion and received IV tPA  · Patient was on DAPT (ASA and Plavix) at that time   During that admission, ASA was discontinued, patient was started on Eliquis 5 mg BID, and was instructed to continue Plavix 75 mg daily

## 2022-02-25 NOTE — ASSESSMENT & PLAN NOTE
- Acute cholecystitis noted on presentation  - S/p ERCP on 02/23, 2 gallstones removed  - No plan for  inpatient surgical intervention at this time, plan for outpatient laparoscopic cholecystectomy  - Continue to monitor LFTs  - Medical management per ICU team

## 2022-02-25 NOTE — INCIDENTAL FINDINGS
The following findings require follow up:  Radiographic finding    CTA dissection protocol chest/abdomen/pelvis  Finding: Less than 6 mm nodules in both lungs with the most prominent in the subpleural region in the left upper lobe  Follow up required:  Follow-up CT  Follow up should be done within 12 month(s)    Please notify the following clinician to assist with the follow up:   Alexa Deleon DO

## 2022-02-25 NOTE — PROGRESS NOTES
[facility]  Progress Note Pattie Sosa 1955, 73 y o  male MRN: [id number]  Unit/Bed#: ICU 03 Encounter: [id number]  Primary Care Provider: Gayle Garcia DO   Date and time admitted to hospital: 2/22/2022  9:31 AM    * Acute Cholecystitis  Assessment & Plan  POA:  Worsening intermittent abdominal pain for 2 weeks  Reports of chills and nausea/vomiting  CTA chest/abdomen/pelvis:  Gallbladder wall thickening with edema and cholelithiasis  RUQ U/S: Cholelithiasis and choledocholithiasis  Positive Godoy's sign with gallbladder wall thickening and edema and potentially tiny amount of pericholecystic fluid  Leukocytosis continues to have resolved and LFTs have now trended down even more  Antibiotic treatment with IV Ancef (D3) and Flagyl (D3); on LR  Gastroenterology consulted, and ERCP performed - 2 gallstones removed and common bile duct was dilated to 14 mm  Plan:  Discontinued IV fluids due to patient tolerating diet  Per General Surgery, patient to follow up outpatient for possible interval cholecystectomy  Can transition to PO abx (Levaquin and flagyl) and will complete a total of 10 days of abx  Per GI, will continue to monitor LFTs closely  Continue to monitor vitals and trend for any fevers  Zofran PRN  Cholelithiasis  Assessment & Plan  RUQ U/S:  Cholelithiasis and choledocholithiasis  Positive Godoy sign with gallbladder wall thickening and edema  GI consulted, ERCP removed 2 gallstones and CBD was found to be dilated at 14mm  Plan:  Per GI, continue to monitor LFTs and WBCs  PAF (paroxysmal atrial fibrillation) ([facility] [address] )  Assessment & Plan  Prior to admission, found to be in AFib and responded to Cardizem  By the time the patient was in the ED, AFib had resolved to normal sinus rhythm  Patient denied any prior history of AFib, however reported taking Eliquis due to a previous stroke and not necessarily AFib    Eliquis was held on admission due to potential for surgical intervention  Cardiology was consulted and provided clearance prior to ERCP  Enoxaparin maintained due to HIDA vs  perc leatha  Though later decision revealed outpatient follow-up for interval cholecystectomy  Continue with diltiazem 30mg Q6hr [address] and enoxaparin  S/P carotid endarterectomy  Assessment & Plan  Right carotid endarterectomy performed on 04/13/2021  Was previously on clopidogrel  Per Vascular Surgery notes from 01/2022, patient is no longer on clopidogrel  Benign essential hypertension  Assessment & Plan  PMHx of HTN (patient is poor historian, cannot recall taking any antihypertensive medication)  Plan:  Continue carvedilol/Coreg 12 5 mg BID  Hx of CVA (cerebral vascular accident) [facility])  Assessment & Plan  History of right PCA stroke that occurred while patient was on DAPT  Patient was previously on aspirin and clopidogrel  Per Prior Neurology note from 09/2021, later transitioned to Eliquis and aspirin  Eliquis was initially held on admission due to evaluation for surgical intervention  Patient was admitted and placed on enoxaparin  Yesterday, potential for to undergo HIDA scan versus possible perc leatha  Later decided outpatient f/u for interval cholecystectomy  For past 3 days, pt has been AAOx3  Continues to state that he feels fine  COPD (chronic obstructive pulmonary disease) ([facility] [address] )  Assessment & Plan  Home medication:  Albuterol inhaler  Reports COPD is uncontrolled since his Sherle Agent prescription has not been filled recently  Continues to saturate well on RA  Plan:  Continue with albuterol as needed  HLD (hyperlipidemia)  Assessment & Plan  Patient is poor historian and is unsure if he is still taking atorvastatin  Unable to get in touch with pt's sister via phone call  Plan:  Continue pravastatin 40 mg daily      Alcohol abuse  Assessment & Plan  Patient has a known hx of Alcohol abuse  - Last drink at 04 AM on 02/22/2022  - Inebriated during initial encounter in ED    - AAOx3 today  No significant tremors observed  Plan:   - [facility] Protocol  - Ativan   - Monitor vitals  - Monitor for DT's    Tobacco use  Assessment & Plan  Patient reports that he used to smoke 1 5 PPD  Has now cut back to smoking 3 cigarettes a day  Plan:  Continue with nicotine patch  Continue encouraging smoking cessation  VTE Pharmacologic Prophylaxis: VTE Score: 4 Moderate Risk (Score 3-4) - Pharmacological DVT Prophylaxis Ordered: enoxaparin (Lovenox)  Patient Centered Rounds: I performed bedside rounds with nursing staff today  Discussions with Specialists or Other Care Team Provider:  Nursing, case management, general surgery    Education and Discussions with Family / Patient: Attempted to update  (sister) via phone  Unable to contact  Current Length of Stay: 3 day(s)  Current Patient Status: Inpatient   Discharge Plan: Plan was to discharge patient to home today  Code Status: Level 1 - Full Code    Subjective:   Patient was resting in bed upon entering the room  He states that his stomach feels a little queasy" today and he is unsure why  However, he feels as though it is his anxiety getting the best of him  He denies any headache, chest pain, nausea, vomiting, diarrhea, abdominal pain, lightheadedness, nor any dizziness  He was very eager to know if he could go home today  I explained to him that he may not be able to go home today due to having high blood pressures last night into this morning  He was little frustrated by this, but he ultimately understands  Additionally, I spoke to him about smoking cessation and he states that he does not even want to continue with anymore  Besides this, no other complaints      Objective:     Vitals:   Temp (24hrs), Av 9 °F (36 6 °C), Min:96 8 °F (36 °C), Max:98 4 °F (36 9 °C)    Temp:  [96 8 °F (36 °C)-98 4 °F (36 9 °C)] 98 4 °F (36 9 °C)  HR:  [] 129  Resp:  [18-24] 19  BP: (142-211)/(60-88) 167/88  SpO2:  [94 %-99 %] 99 %  Body mass index is 22 27 kg/m²  Input and Output Summary (last 24 hours): Intake/Output Summary (Last 24 hours) at 2/25/2022 1649  Last data filed at 2/25/2022 1316  Gross per 24 hour   Intake 1300 ml   Output 1575 ml   Net -275 ml       Physical Exam:   Physical Exam  Vitals and nursing note reviewed  Constitutional:       General: He is not in acute distress  Appearance: Normal appearance  He is not ill-appearing or diaphoretic  HENT:      Head: Normocephalic and atraumatic  Nose: No congestion or rhinorrhea  Eyes:      Conjunctiva/sclera: Conjunctivae normal    Cardiovascular:      Rate and Rhythm: Normal rate and regular rhythm  Pulses: Normal pulses  Heart sounds: Normal heart sounds  No murmur heard  No friction rub  Pulmonary:      Effort: Pulmonary effort is normal       Breath sounds: Wheezing present  Chest:      Chest wall: No tenderness  Abdominal:      General: Abdomen is flat  Bowel sounds are normal  There is no distension  Palpations: Abdomen is soft  Tenderness: There is no abdominal tenderness  There is no guarding or rebound  Musculoskeletal:         General: No tenderness  Right lower leg: No edema  Left lower leg: No edema  Skin:     General: Skin is warm  Neurological:      Mental Status: He is alert and oriented to person, place, and time  Psychiatric:         Mood and Affect: Mood normal          Behavior: Behavior normal          Thought Content:  Thought content normal          Judgment: Judgment normal           Additional Data:     Labs:  Results from last 7 days   Lab Units 02/25/22  0602   WBC Thousand/uL 5 82   HEMOGLOBIN g/dL 12 7   HEMATOCRIT % 40 0   PLATELETS Thousands/uL 225   NEUTROS PCT % 75   LYMPHS PCT % 17   MONOS PCT % 7   EOS PCT % 0     Results from last 7 days   Lab Units 02/25/22  0602   SODIUM mmol/L 139   POTASSIUM mmol/L 4 1   CHLORIDE mmol/L 101   CO2 mmol/L 28   BUN mg/dL 16   CREATININE mg/dL 1 16   ANION GAP mmol/L 10   CALCIUM mg/dL 10 0   ALBUMIN g/dL 3 5   TOTAL BILIRUBIN mg/dL 0 93   ALK PHOS U/L 390*   ALT U/L 68   AST U/L 60*   GLUCOSE RANDOM mg/dL 110         Results from last 7 days   Lab Units 02/25/22  1111 02/24/22  0820   POC GLUCOSE mg/dl 127 124               Lines/Drains:  Invasive Devices  Report    Peripheral Intravenous Line            Peripheral IV 02/22/22 Right Forearm 3 days    Peripheral IV 02/25/22 Dorsal (posterior); Left Forearm <1 day                      Imaging: No pertinent imaging reviewed      Recent Cultures (last 7 days):         Last 24 Hours Medication List:   Current Facility-Administered Medications   Medication Dose Route Frequency Provider Last Rate    acetaminophen  650 mg Oral Q6H PRN Chinese DungSVEN      albuterol  1 puff Inhalation Q6H PRN SVEN Escobar      apixaban  5 mg Oral BID [facility] [place] [address]      carvedilol  12 5 mg Oral BID With Meals [facility]      cefazolin  2,000 mg Intravenous Q8H [facility] 2,000 mg (02/25/22 1103)    [START ON 2/26/2022] clopidogrel  75 mg Oral Daily [facility]      diltiazem  30 mg Oral Q6H [facility] [place] [address]      folic acid  1 mg Oral Daily [facility] [address]      hydrALAZINE  10 mg Intravenous Q6H PRN SVEN Escobar      hydrochlorothiazide  12 5 mg Oral Once Chinese DungSVEN      LORazepam           metroNIDAZOLE  500 mg Oral [facility] [facility], [address]      multivitamin-minerals  1 tablet Oral Daily [facility] [place] [facility] [place] [address]      nicotine  1 patch Transdermal Daily [facility] [place] [facility] [place] [address]      nitroglycerin  0 4 mg Sublingual Q5 Min PRN [facility]      ondansetron  4 mg Intravenous Q6H PRN [facility]      pantoprazole  20 mg Oral Early Morning [facility] SVEN Clancy      pravastatin  40 mg Oral Daily With [place], [place]      sertraline  100 mg Oral HS Elaine Cochran [place], [place]      simethicone  80 mg Oral 4x Daily PRN SVEN Joiner      sodium chloride (PF)  3 mL Intravenous Q1H PRN SVEN Dykes      thiamine  100 mg Oral Daily SVEN Joiner          Today, Patient Was Seen By: Brenden Colin DO    **Please Note: This note may have been constructed using a voice recognition system  **

## 2022-02-25 NOTE — PROGRESS NOTES
Progress Note - Keaton Centeno 73 y o  male MRN: [id number]    Unit/Bed#: ICU 03 Encounter: [id number]    Assessment and Plan:     1  Choledocholithiasis  ERCP performed 2 days ago with removal of 2 large stones  Bilirubin now normal   Alk-phos slowly down trended to 390  White count and hemoglobin normal   This morning he denies any abdominal pain, nausea or vomiting and was tolerating diet well  Abdominal exam is benign  -patient doing well from a GI standpoint   -continue to monitor liver function tests occasionally   -continue to monitor abdominal exam   -continue diet as tolerated     -as noted below, patient subsequently transferred to the ICU this afternoon due to witnessed episode of tonic clonic seizure  Rest of care per ICU team at this time  ----------------------------------------------------------------------------------------------------------------    Subjective:     Saw patient this morning he reported he was doing well however did feel "shaky"  He reported he may this aware to the primary team   He reports that he has a history of anxiety and thought it was possibly secondary to that  He denies any abdominal pain, nausea or vomiting  He was able to tolerate diet this morning including cream of wheat and [place] toast without difficulty  After seeing patient rapid response was called  Patient is now in the ICU due to new onset of seizures and acute change in neuro status  He had a witnessed tonic colonic seizure  Objective:     Vitals: Blood pressure 142/70, pulse 80, temperature (!) 96 8 °F (36 °C), temperature source Axillary, resp  rate (!) 24, weight 68 4 kg (150 lb 12 8 oz), SpO2 94 %  ,Body mass index is 22 27 kg/m²        Intake/Output Summary (Last 24 hours) at 2/25/2022 1204  Last data filed at 2/25/2022 9826  Gross per 24 hour   Intake 1540 ml   Output 2125 ml   Net -585 ml       Physical Exam:     General Appearance:  Patient was alert, oriented and cooperative this morning  Sitting upright eating breakfast and did not appear in distress  Lungs: Clear to auscultation bilaterally, no rales or rhonchi, no labored breathing/accessory muscle use  Heart: Regular rate and rhythm, S1, S2 normal, no murmur, click, rub or gallop  Abdomen: Soft, non-tender, non-distended; bowel sounds normal; no masses or no organomegaly  Extremities: No cyanosis, clubbing, or edema    Invasive Devices  Report    Peripheral Intravenous Line            Peripheral IV 02/22/22 Right Forearm 3 days                Lab Results:  Results from last 7 days   Lab Units 02/25/22  0602   WBC Thousand/uL 5 82   HEMOGLOBIN g/dL 12 7   HEMATOCRIT % 40 0   PLATELETS Thousands/uL 225   NEUTROS PCT % 75   LYMPHS PCT % 17   MONOS PCT % 7   EOS PCT % 0     Results from last 7 days   Lab Units 02/25/22  0602   POTASSIUM mmol/L 4 1   CHLORIDE mmol/L 101   CO2 mmol/L 28   BUN mg/dL 16   CREATININE mg/dL 1 16   CALCIUM mg/dL 10 0   ALK PHOS U/L 390*   ALT U/L 68   AST U/L 60*     Invalid input(s): BILI      Results from last 7 days   Lab Units 02/23/22  0507   LIPASE u/L 107       Imaging Studies: I have personally reviewed pertinent imaging studies  ERCP    Result Date: 2/23/2022  Impression: As above RECOMMENDATION: Cheo Dickey MD     X-ray chest 1 view portable    Result Date: 2/22/2022  Impression: No acute cardiopulmonary disease  Workstation performed: [id number]     CT head wo contrast    Result Date: 2/25/2022  Impression: Study slightly limited by patient motion artifact  No acute intracranial abnormality  Workstation performed: [id number]     FL ERCP biliary only    Result Date: 2/24/2022  Impression: Please see procedure report for further details   Workstation performed: [id number]     CTA dissection protocol chest/abdomen/pelvis    Result Date: 2/22/2022  Impression: Gallbladder wall thickening with edema and cholelithiasis, evaluate for acute cholecystitis Biliary dilation common bile duct measuring 1 4 cm, concerning for biliary obstruction, MRCP may be considered to evaluate for choledocholithiasis No thoracic aortic dissection No pulmonary embolism Opacification of the segmental bronchi of the left lower lobe, image 83 series 3, short interval follow-up at 3 months suggested this may be due to secretions or due to focal lesion Incidentally detected the less than 6 mm nodules in the both lung with the most prominent in the subpleural region in the left upper lobe  Based on current Fleischner Society 2017 Guidelines on incidental pulmonary nodule,  follow-up CT at 12 months can be considered  Mild reticulation in the subpleural region, raises concern for mild interstitial lung disease Hepatic steatosis Again noted is thickening of the wall of the stomach as seen on the previous study, remains indeterminate Atherosclerotic disease in the aorta with the suggestion of more than 50% stenosis in the right common iliac artery The study was marked in EPIC for immediate notification  Workstation performed: Cesar Vallejo right upper quadrant    Result Date: 2/22/2022  Impression: Cholelithiasis and choledocholithiasis  Positive Godoy's sign with gallbladder wall thickening and edema and potentially tiny amount of pericholecystic fluid  These latter findings are most indicative of acute cholecystitis The examination demonstrates a significant  finding and was documented as such in [place] for liaison and referring practitioner immediate notification   Workstation performed: [id number]

## 2022-02-25 NOTE — RAPID RESPONSE
Rapid Response Note  Yolanda Ortiz 73 y o  male MRN: [id number]  Unit/Bed#: S -01 Encounter: [id number]    Rapid Response Notification(s):   Response called date/time:  2/25/2022 11:09 AM  Response team arrival date/time:  2/25/2022 11:10 AM  Response end date/time:  2/25/2022 11:29 AM  [facility]  Rapid response location:  [facility] unit  Primary reason for rapid response call:  New onset of seizures and acute change in neuro status    Rapid Response Intervention(s):   Airway:  None  Breathing:  Oxygen  Circulation:  None  Fluids administered:  None  Medications administered:  Lorazepam (ativan 2mg)       Assessment:   · Witnessed tonic clonic seizure, total time elapsed roughly 1 minute  · Postictal state- arousable to sternal rub  GCS 8-9  · Stable on 5L NC immediately post ictal  · VS -180 SBP, O2 sat 95%    Plan:   · S/p 2mg of IV ativan  · Postictal state minimally arousable requiring sternal rub  · Transfer to [facility] for postictal airway monitoring  · CTH stat  · Neurology consult, considering EEG     Rapid Response Outcome:   Transfer:  Transfer to stepdown 1  Primary service notified of transfer: Yes    Code Status: Level 1 (Full Code)      Family notified of transfer:        Background/Situation:   Yolanda Ortiz is a 73 y o  male with a PMH of COPD, tobacco and alcohol abuse, previous PCA occlusion requiring tPA, HLD and HTN who presented to the ED 2/22 with acute abdominal pain  Patient was intoxicated at the time  Has been monitored by [facility] for alcohol withdrawal  Patient has not received a single dose of CIWA protocol ativan d/t low CIWA scores, only acute sign of withdrawal being a mild tremor  Around 1109 patient developed expressive aphasia for a few moments before he suddenly began to have a tonic clonic seizure  RRT called  On arrival given 2mg IV ativan  Seizure broke, post ictal patient minimally response requiring sternal rub   To escalate to SD1 under ICU team for airway watch      Review of Systems   Unable to perform ROS: Mental status change (postictal )   Constitutional: Negative  HENT: Negative  Eyes: Negative  Respiratory: Negative  Cardiovascular: Negative  Gastrointestinal: Negative  Endocrine: Negative  Genitourinary: Negative  Musculoskeletal: Negative  Skin: Negative  Allergic/Immunologic: Negative  Neurological: Positive for seizures and weakness  Hematological: Negative  Psychiatric/Behavioral: Negative  All other systems reviewed and are negative  Objective:   Vitals:    02/25/22 0507 02/25/22 0636 02/25/22 0806 02/25/22 0927   BP: (!) 179/85 (!) 200/80 (!) 190/70 (!) 180/70   BP Location: Left arm Left arm Right arm Left arm   Pulse:  67     Resp:  19     Temp:  98 2 °F (36 8 °C)     TempSrc:  Oral     SpO2:  97%     Weight: 68 4 kg (150 lb 12 8 oz)        Physical Exam  Vitals and nursing note reviewed  HENT:      Head: Normocephalic  Right Ear: External ear normal       Left Ear: External ear normal       Nose: Nose normal       Mouth/Throat:      Mouth: Mucous membranes are dry  Eyes:      Extraocular Movements: Extraocular movements intact  Pupils: Pupils are equal, round, and reactive to light  Cardiovascular:      Rate and Rhythm: Normal rate and regular rhythm  Pulses: Normal pulses  Heart sounds: Normal heart sounds  Pulmonary:      Effort: Pulmonary effort is normal  No respiratory distress  Breath sounds: Normal breath sounds  Abdominal:      General: Abdomen is flat  Bowel sounds are normal       Palpations: Abdomen is soft  Musculoskeletal:         General: Normal range of motion  Cervical back: Normal range of motion  Right lower leg: No edema  Left lower leg: No edema  Skin:     General: Skin is warm and dry  Capillary Refill: Capillary refill takes less than 2 seconds           Portions of the record may have been created with voice recognition software  Occasional wrong word or "sound a like" substitutions may have occurred due to the inherent limitations of voice recognition software  Read the chart carefully and recognize, using context, where substitutions have occurred      [address] [place] [address]

## 2022-02-25 NOTE — CASE MANAGEMENT
Case Management Assessment & Discharge Planning Note    Patient name Mary Ann Zurita  Location S /S -59 MRN [id number]  : 1955 Date 2022       Current Admission Date: 2022  Current Admission Diagnosis:Acute Cholecystitis   Patient Active Problem List    Diagnosis Date Noted    Cholelithiasis 2022    Acute Cholecystitis 2022    Hypomagnesemia 2022    PAF (paroxysmal atrial fibrillation) ([place] [address] ) 2022    S/P carotid endarterectomy 11/10/2021    Upper GI bleed 09/15/2021    Acute blood loss anemia 09/15/2021    Ambulatory dysfunction 09/15/2021    Syncope 09/15/2021    Pre-diabetes 2021    Stage 3a chronic kidney disease ([place] [address] ) 2021    Hx of CVA (cerebral vascular accident) ([place] [address] ) 2021    Stenosis of right internal carotid artery     Lumbar back pain     GERD (gastroesophageal reflux disease)     Left arm weakness 2021    Tobacco use 2021    Alcohol abuse 2021    HLD (hyperlipidemia) 2021    COPD (chronic obstructive pulmonary disease) ([place] [address] ) 2021    Iron deficiency anemia 2021    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     Symptomatic carotid artery stenosis, right 2021    Benign essential hypertension 02/15/2011      LOS (days): 3  Geometric Mean LOS (GMLOS) (days): 2 20  Days to GMLOS:-0 6     OBJECTIVE:    Risk of Unplanned Readmission Score: 20         Current admission status: Inpatient       Preferred Pharmacy:   [facility] [address] [address] [facility]  [address] [address] [place] [address]  Phone: [phone] Fax: [address], [place]  Telma Reich [place] [address] [place] [address] [address]  Phone: [phone] Fax: [phone]    Primary Care Provider: Rubio Cole DO    Primary Insurance: [facility]  Secondary Insurance:     ASSESSMENT:  [place] 26 Agents     Sara Lerma Health Care Agent - Sister   Primary Phone: [phone] (Mobile)               Advance Directives  Does patient have a [address]?: Yes  Does patient have Advance Directives?: Yes  Advance Directives: Living will,Power of  for health care  Primary Contact: sister Belinda Buckley         Readmission Root Cause  30 Day Readmission: No    Patient Information  Admitted from[de-identified] Home  Mental Status: Alert  During Assessment patient was accompanied by: Not accompanied during assessment  Assessment information provided by[de-identified] Patient  Primary Caregiver: Self  Support Systems: Family members  [place] of Residence: [address] [place] [address] do you live in?: [place] entry access options  Select all that apply : Stairs  Number of steps to enter home : 3  Do the steps have railings?: Yes  Type of Current Residence: [address] [place] [address] Arrangements: Lives Alone  Is patient a ?: Yes  Is patient active with [facility])?: No (He wants to resources "to go to the guys that really need them  ")    Activities of Daily Living Prior to Admission  Functional Status: Independent  Completes ADLs independently?: Yes  Ambulates independently?: Yes  Does patient use assisted devices?: No  Does patient currently own DME?: Yes  What DME does the patient currently own?: Straight [employer]ane  Does patient have a history of Outpatient Therapy (PT/OT)?: No  Does the patient have a history of Short-Term Rehab?: No  Does patient have a history of HHC?: No         Patient Information Continued  Does patient have prescription coverage?: Yes  Does patient receive dialysis treatments?: No  Does patient have a history of substance abuse?: Yes  Historical substance use preference: Alcohol/ETOH  Is patient currently in treatment for substance abuse?: No  Patient declined treatment information  ([facility] has already met w/ patient and will continue to follow up   Patient himself denies any concern with his use)  Does patient have a history of Mental Health Diagnosis?: Yes (Anxiety, panic attacks)  Is patient receiving treatment for mental health?: No  Patient declined treatment information  (he used to see a psychiatrist in [address] but had many life stressors then  He reports he is stable now and has no need for tx )  Has patient received inpatient treatment related to mental health in the last 2 years?: No    Means of Transportation  Means of Transport to Appts[de-identified] Drives Self    DISCHARGE DETAILS:    Discharge planning discussed with[de-identified] patient at bedside  Freedom of Choice: Yes  Comments - Freedom of Choice: CM met with patient at bedside, patient alert and oriented and sitting at EOB  Patient resides alone in a trailer home in [facility]  He reports that his sister and nephew will be staying with him this weekend to ensure a smooth transition home  There are 3 [place] with railings and he denies any issue navigating his home  He has access to, but does not use canes at home and is independent with ADLs  Patient admits to drinking beer but does not feel his consumption has any negative effects on him at this time and does not wish to stop  CM reviewed initial PT evaluation and recommendation for [facility] PT and subsequent treatments and improvement  Patient reports he's moving around with no issues and declines any HHC at this time  As per [facility], patient's BP is elevated so they will adjust some medications and would like to monitor him overnight  CM Dept will continue to follow though no CM needs have been identified at this time            Requested [address] [place] [address]         Is the patient interested in [facility] at discharge?: No    DME Referral Provided  Referral made for DME?: No    Treatment Team Recommendation: Home with [facility]  Discharge Destination Plan[de-identified] Home  Transport at Discharge : Family (sister)

## 2022-02-25 NOTE — ASSESSMENT & PLAN NOTE
· With reported alcohol use of 6-8 beers per day  · Last alcoholic beverage 9/40 PM  · Has been on CIWA per [facility]  · Has NOT received a dose of IV ativan per CIWA protocol  · Now s/p tonic clonic seizure, 2mg IV ativan  · Plan as above

## 2022-02-25 NOTE — ASSESSMENT & PLAN NOTE
History of right PCA stroke that occurred while patient was on DAPT  Patient was previously on aspirin and clopidogrel  Per Prior Neurology note from 09/2021, later transitioned to Eliquis and aspirin  Eliquis was initially held on admission due to evaluation for surgical intervention  Patient was admitted and placed on enoxaparin  Yesterday, potential for to undergo HIDA scan versus possible perc leatha  Later decided outpatient f/u for interval cholecystectomy  For past 3 days, pt has been AAOx3  Continues to state that he feels fine

## 2022-02-25 NOTE — ASSESSMENT & PLAN NOTE
POA:  Worsening intermittent abdominal pain for 2 weeks  Reports of chills and nausea/vomiting  CTA chest/abdomen/pelvis:  Gallbladder wall thickening with edema and cholelithiasis  RUQ U/S: Cholelithiasis and choledocholithiasis  Positive Godoy's sign with gallbladder wall thickening and edema and potentially tiny amount of pericholecystic fluid  Leukocytosis continues to have resolved and LFTs have now trended down even more  Antibiotic treatment with IV Ancef (D3) and Flagyl (D3); on LR  Gastroenterology consulted, and ERCP performed - 2 gallstones removed and common bile duct was dilated to 14 mm  Plan:  Discontinued IV fluids due to patient tolerating diet  Per General Surgery, patient to follow up outpatient for possible interval cholecystectomy  Can transition to PO abx (Levaquin and flagyl) and will complete a total of 10 days of abx  Per GI, will continue to monitor LFTs closely  Continue to monitor vitals and trend for any fevers  Zofran PRN

## 2022-02-25 NOTE — ASSESSMENT & PLAN NOTE
RUQ U/S:  Cholelithiasis and choledocholithiasis  Positive Godoy sign with gallbladder wall thickening and edema  GI consulted, ERCP removed 2 gallstones and CBD was found to be dilated at 14mm  Plan:  Per GI, continue to monitor LFTs and WBCs

## 2022-02-25 NOTE — ASSESSMENT & PLAN NOTE
Prior to admission, found to be in AFib and responded to Cardizem  By the time the patient was in the ED, AFib had resolved to normal sinus rhythm  Patient denied any prior history of AFib, however reported taking Eliquis due to a previous stroke and not necessarily AFib  Eliquis was held on admission due to potential for surgical intervention  Cardiology was consulted and provided clearance prior to ERCP  Enoxaparin maintained due to HIDA vs  perc leatha  Though later decision revealed outpatient follow-up for interval cholecystectomy  Continue with diltiazem 30mg Q6hr [facility] and enoxaparin

## 2022-02-25 NOTE — DISCHARGE INSTR - AVS FIRST PAGE
Dear Abdifatah Vergara,     It was our pleasure to care for you here at [facility]  It is our hope that we were always able to exceed the expected standards for your care during your stay  You were hospitalized due to acute cholecystitis  You were cared for on the 3rd floor by George Heath DO under the service of Sundeep Zee MD with the [facility] who covers for your primary care physician (PCP), Axel Moore DO, while you were hospitalized  If you have any questions or concerns related to this hospitalization, you may contact us at [phone]  For follow up as well as any medication refills, we recommend that you follow up with your primary care physician  A registered nurse will reach out to you by phone within a few days after your discharge to answer any additional questions that you may have after going home  However, at this time we provide for you here, the most important instructions / recommendations at discharge:     Notable Medication Adjustments -   Please start taking Levaquin 750 mg once a day for the next 2 days  Please continue taking Flagyl 500 mg 3 times a day for the next 2 days  Testing Required after Discharge -   None at this time  Important follow up information -   Referral has been made to general surgery  Please follow-up with them in 4 weeks  Other Instructions -   Please follow-up with your PCP in 1 week  If your symptoms worsen please return to the ER  Please review this entire after visit summary as additional general instructions including medication list, appointments, activity, diet, any pertinent wound care, and other additional recommendations from your care team that may be provided for you        Sincerely,     George Heath DO
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
## 2022-02-26 LAB
ALBUMIN SERPL BCP-MCNC: 3.8 G/DL (ref 3.5–5)
ALP SERPL-CCNC: 446 U/L (ref 46–116)
ALT SERPL W P-5'-P-CCNC: 69 U/L (ref 12–78)
AMMONIA PLAS-SCNC: 31 UMOL/L (ref 11–35)
ANION GAP SERPL CALCULATED.3IONS-SCNC: 13 MMOL/L (ref 4–13)
AST SERPL W P-5'-P-CCNC: 84 U/L (ref 5–45)
ATRIAL RATE: 66 BPM
BASOPHILS # BLD AUTO: 0.04 THOUSANDS/ΜL (ref 0–0.1)
BASOPHILS NFR BLD AUTO: 1 % (ref 0–1)
BILIRUB SERPL-MCNC: 1.13 MG/DL (ref 0.2–1)
BUN SERPL-MCNC: 14 MG/DL (ref 5–25)
CA-I BLD-SCNC: 1.06 MMOL/L (ref 1.12–1.32)
CALCIUM SERPL-MCNC: 10.1 MG/DL (ref 8.3–10.1)
CHLORIDE SERPL-SCNC: 95 MMOL/L (ref 100–108)
CO2 SERPL-SCNC: 28 MMOL/L (ref 21–32)
CREAT SERPL-MCNC: 1.05 MG/DL (ref 0.6–1.3)
EOSINOPHIL # BLD AUTO: 0.04 THOUSAND/ΜL (ref 0–0.61)
EOSINOPHIL NFR BLD AUTO: 1 % (ref 0–6)
ERYTHROCYTE [DISTWIDTH] IN BLOOD BY AUTOMATED COUNT: 14 % (ref 11.6–15.1)
GFR SERPL CREATININE-BSD FRML MDRD: 74 ML/MIN/1.73SQ M
GLUCOSE SERPL-MCNC: 94 MG/DL (ref 65–140)
HCT VFR BLD AUTO: 48 % (ref 36.5–49.3)
HGB BLD-MCNC: 16.3 G/DL (ref 12–17)
IMM GRANULOCYTES # BLD AUTO: 0.06 THOUSAND/UL (ref 0–0.2)
IMM GRANULOCYTES NFR BLD AUTO: 1 % (ref 0–2)
LYMPHOCYTES # BLD AUTO: 1.04 THOUSANDS/ΜL (ref 0.6–4.47)
LYMPHOCYTES NFR BLD AUTO: 13 % (ref 14–44)
MAGNESIUM SERPL-MCNC: 2 MG/DL (ref 1.6–2.6)
MCH RBC QN AUTO: 28.2 PG (ref 26.8–34.3)
MCHC RBC AUTO-ENTMCNC: 34 G/DL (ref 31.4–37.4)
MCV RBC AUTO: 83 FL (ref 82–98)
MONOCYTES # BLD AUTO: 0.59 THOUSAND/ΜL (ref 0.17–1.22)
MONOCYTES NFR BLD AUTO: 7 % (ref 4–12)
NEUTROPHILS # BLD AUTO: 6.2 THOUSANDS/ΜL (ref 1.85–7.62)
NEUTS SEG NFR BLD AUTO: 77 % (ref 43–75)
NRBC BLD AUTO-RTO: 0 /100 WBCS
P AXIS: 50 DEGREES
PHOSPHATE SERPL-MCNC: 3.9 MG/DL (ref 2.3–4.1)
PLATELET # BLD AUTO: 302 THOUSANDS/UL (ref 149–390)
PMV BLD AUTO: 10.5 FL (ref 8.9–12.7)
POTASSIUM SERPL-SCNC: 3.3 MMOL/L (ref 3.5–5.3)
PR INTERVAL: 148 MS
PROT SERPL-MCNC: 8.5 G/DL (ref 6.4–8.2)
QRS AXIS: 60 DEGREES
QRSD INTERVAL: 88 MS
QT INTERVAL: 434 MS
QTC INTERVAL: 454 MS
RBC # BLD AUTO: 5.78 MILLION/UL (ref 3.88–5.62)
SODIUM SERPL-SCNC: 136 MMOL/L (ref 136–145)
T WAVE AXIS: 51 DEGREES
VENTRICULAR RATE: 66 BPM
WBC # BLD AUTO: 7.97 THOUSAND/UL (ref 4.31–10.16)

## 2022-02-26 PROCEDURE — 83735 ASSAY OF MAGNESIUM: CPT | Performed by: PHYSICIAN ASSISTANT

## 2022-02-26 PROCEDURE — 99233 SBSQ HOSP IP/OBS HIGH 50: CPT | Performed by: INTERNAL MEDICINE

## 2022-02-26 PROCEDURE — 93005 ELECTROCARDIOGRAM TRACING: CPT

## 2022-02-26 PROCEDURE — 84100 ASSAY OF PHOSPHORUS: CPT | Performed by: PHYSICIAN ASSISTANT

## 2022-02-26 PROCEDURE — 80053 COMPREHEN METABOLIC PANEL: CPT | Performed by: PHYSICIAN ASSISTANT

## 2022-02-26 PROCEDURE — 99233 SBSQ HOSP IP/OBS HIGH 50: CPT | Performed by: PSYCHIATRY & NEUROLOGY

## 2022-02-26 PROCEDURE — 99232 SBSQ HOSP IP/OBS MODERATE 35: CPT | Performed by: SURGERY

## 2022-02-26 PROCEDURE — 94760 N-INVAS EAR/PLS OXIMETRY 1: CPT

## 2022-02-26 PROCEDURE — 82140 ASSAY OF AMMONIA: CPT | Performed by: PHYSICIAN ASSISTANT

## 2022-02-26 PROCEDURE — 93010 ELECTROCARDIOGRAM REPORT: CPT | Performed by: INTERNAL MEDICINE

## 2022-02-26 PROCEDURE — 85025 COMPLETE CBC W/AUTO DIFF WBC: CPT | Performed by: PHYSICIAN ASSISTANT

## 2022-02-26 PROCEDURE — 94640 AIRWAY INHALATION TREATMENT: CPT

## 2022-02-26 PROCEDURE — 82330 ASSAY OF CALCIUM: CPT | Performed by: PHYSICIAN ASSISTANT

## 2022-02-26 RX ORDER — POTASSIUM CHLORIDE 20 MEQ/1
40 TABLET, EXTENDED RELEASE ORAL ONCE
Status: COMPLETED | OUTPATIENT
Start: 2022-02-26 | End: 2022-02-26

## 2022-02-26 RX ORDER — LEVALBUTEROL 1.25 MG/.5ML
1.25 SOLUTION, CONCENTRATE RESPIRATORY (INHALATION) EVERY 8 HOURS
Status: DISCONTINUED | OUTPATIENT
Start: 2022-02-26 | End: 2022-02-26

## 2022-02-26 RX ORDER — CARVEDILOL 12.5 MG/1
25 TABLET ORAL 2 TIMES DAILY WITH MEALS
Status: DISCONTINUED | OUTPATIENT
Start: 2022-02-26 | End: 2022-03-01 | Stop reason: HOSPADM

## 2022-02-26 RX ORDER — MAGNESIUM HYDROXIDE/ALUMINUM HYDROXICE/SIMETHICONE 120; 1200; 1200 MG/30ML; MG/30ML; MG/30ML
30 SUSPENSION ORAL EVERY 4 HOURS PRN
Status: DISCONTINUED | OUTPATIENT
Start: 2022-02-26 | End: 2022-03-01 | Stop reason: HOSPADM

## 2022-02-26 RX ORDER — LEVALBUTEROL 1.25 MG/.5ML
1.25 SOLUTION, CONCENTRATE RESPIRATORY (INHALATION)
Status: DISCONTINUED | OUTPATIENT
Start: 2022-02-26 | End: 2022-02-28 | Stop reason: SDUPTHER

## 2022-02-26 RX ORDER — METOPROLOL TARTRATE 5 MG/5ML
5 INJECTION INTRAVENOUS ONCE
Status: COMPLETED | OUTPATIENT
Start: 2022-02-26 | End: 2022-02-26

## 2022-02-26 RX ORDER — METOPROLOL TARTRATE 5 MG/5ML
5 INJECTION INTRAVENOUS EVERY 6 HOURS PRN
Status: DISCONTINUED | OUTPATIENT
Start: 2022-02-26 | End: 2022-02-27

## 2022-02-26 RX ORDER — CALCIUM GLUCONATE 20 MG/ML
1 INJECTION, SOLUTION INTRAVENOUS ONCE
Status: COMPLETED | OUTPATIENT
Start: 2022-02-26 | End: 2022-02-26

## 2022-02-26 RX ORDER — ALPRAZOLAM 0.25 MG/1
0.25 TABLET ORAL
Status: DISCONTINUED | OUTPATIENT
Start: 2022-02-26 | End: 2022-03-01 | Stop reason: HOSPADM

## 2022-02-26 RX ADMIN — POTASSIUM CHLORIDE 40 MEQ: 1500 TABLET, EXTENDED RELEASE ORAL at 05:44

## 2022-02-26 RX ADMIN — DILTIAZEM HYDROCHLORIDE 30 MG: 30 TABLET, FILM COATED ORAL at 01:00

## 2022-02-26 RX ADMIN — CARVEDILOL 25 MG: 12.5 TABLET, FILM COATED ORAL at 16:30

## 2022-02-26 RX ADMIN — DILTIAZEM HYDROCHLORIDE 30 MG: 30 TABLET, FILM COATED ORAL at 05:00

## 2022-02-26 RX ADMIN — PRAVASTATIN SODIUM 40 MG: 40 TABLET ORAL at 16:30

## 2022-02-26 RX ADMIN — CLOPIDOGREL BISULFATE 75 MG: 75 TABLET ORAL at 08:22

## 2022-02-26 RX ADMIN — CEFAZOLIN SODIUM 2000 MG: 2 SOLUTION INTRAVENOUS at 09:58

## 2022-02-26 RX ADMIN — DILTIAZEM HYDROCHLORIDE 30 MG: 30 TABLET, FILM COATED ORAL at 12:19

## 2022-02-26 RX ADMIN — CARVEDILOL 25 MG: 12.5 TABLET, FILM COATED ORAL at 16:00

## 2022-02-26 RX ADMIN — PANTOPRAZOLE SODIUM 20 MG: 20 TABLET, DELAYED RELEASE ORAL at 05:01

## 2022-02-26 RX ADMIN — METRONIDAZOLE 500 MG: 500 TABLET ORAL at 12:19

## 2022-02-26 RX ADMIN — APIXABAN 5 MG: 5 TABLET, FILM COATED ORAL at 18:07

## 2022-02-26 RX ADMIN — PRAVASTATIN SODIUM 40 MG: 40 TABLET ORAL at 16:00

## 2022-02-26 RX ADMIN — CARVEDILOL 12.5 MG: 12.5 TABLET, FILM COATED ORAL at 08:22

## 2022-02-26 RX ADMIN — LEVALBUTEROL HYDROCHLORIDE 1.25 MG: 1.25 SOLUTION, CONCENTRATE RESPIRATORY (INHALATION) at 20:54

## 2022-02-26 RX ADMIN — SERTRALINE HYDROCHLORIDE 100 MG: 50 TABLET ORAL at 21:37

## 2022-02-26 RX ADMIN — THIAMINE HCL TAB 100 MG 100 MG: 100 TAB at 08:22

## 2022-02-26 RX ADMIN — IPRATROPIUM BROMIDE 0.5 MG: 0.5 SOLUTION RESPIRATORY (INHALATION) at 20:54

## 2022-02-26 RX ADMIN — HYDRALAZINE HYDROCHLORIDE 10 MG: 20 INJECTION INTRAMUSCULAR; INTRAVENOUS at 04:56

## 2022-02-26 RX ADMIN — CEFAZOLIN SODIUM 2000 MG: 2 SOLUTION INTRAVENOUS at 02:27

## 2022-02-26 RX ADMIN — METOPROLOL TARTRATE 5 MG: 5 INJECTION INTRAVENOUS at 06:55

## 2022-02-26 RX ADMIN — ALUMINA, MAGNESIA, AND SIMETHICONE ORAL SUSPENSION REGULAR STRENGTH 30 ML: 1200; 1200; 120 SUSPENSION ORAL at 05:45

## 2022-02-26 RX ADMIN — NICOTINE 1 PATCH: 14 PATCH, EXTENDED RELEASE TRANSDERMAL at 08:23

## 2022-02-26 RX ADMIN — METRONIDAZOLE 500 MG: 500 TABLET ORAL at 05:01

## 2022-02-26 RX ADMIN — FOLIC ACID 1 MG: 1 TABLET ORAL at 08:22

## 2022-02-26 RX ADMIN — APIXABAN 5 MG: 5 TABLET, FILM COATED ORAL at 08:22

## 2022-02-26 RX ADMIN — METRONIDAZOLE 500 MG: 500 TABLET ORAL at 21:37

## 2022-02-26 RX ADMIN — ALPRAZOLAM 0.25 MG: 0.25 TABLET ORAL at 21:37

## 2022-02-26 RX ADMIN — LEVALBUTEROL HYDROCHLORIDE 1.25 MG: 1.25 SOLUTION, CONCENTRATE RESPIRATORY (INHALATION) at 14:19

## 2022-02-26 RX ADMIN — IPRATROPIUM BROMIDE 0.5 MG: 0.5 SOLUTION RESPIRATORY (INHALATION) at 14:19

## 2022-02-26 RX ADMIN — CALCIUM GLUCONATE 1 G: 20 INJECTION, SOLUTION INTRAVENOUS at 12:20

## 2022-02-26 RX ADMIN — MULTIPLE VITAMINS W/ MINERALS TAB 1 TABLET: TAB ORAL at 08:22

## 2022-02-26 RX ADMIN — CEFAZOLIN SODIUM 2000 MG: 2 SOLUTION INTRAVENOUS at 18:07

## 2022-02-26 RX ADMIN — DILTIAZEM HYDROCHLORIDE 30 MG: 30 TABLET, FILM COATED ORAL at 18:06

## 2022-02-26 NOTE — PROGRESS NOTES
NEUROLOGY RESIDENCY PROGRESS NOTE     Name: Bria Schwartz   Age & Sex: 73 y o  male   MRN: [id number]  Unit/Bed#: ICU 03   Encounter: [id number]    Bria Schwartz will need follow up in in 6 weeks with neurovascular Attending/AP  He will not require outpatient neurological testing  Pending for discharge: MRI brain     ASSESSMENT & PLAN     Seizure [facility])  Assessment & Plan  Bria Schwartz is a 73 y o  male with prior CVA, most recent right PCA  occlusion and infarct s/p IV tPA (September 2021), maintained on Eliquis, known left ICA severe stenosis on Plavix, previous Right CEA, alcohol abuse, COPD, tobacco abuse who presents to [facility] ED on 02/22/2022 with chest pain and reported new onset rapid Afib prior to arrival to ED per EMS  Rapid response called on 02/25/2022 for tonic-clonic seizure activity  First time witness tonic clonic seizure, s/p IV ativan 2 mg  Seizure described as abnormal movements in right hand, followed by confusion described as difficult with comprehension and answering questions  Movement in RUE stopped and patient progressed to a full tonic clonic seizure where eyes were reportedly deviated to the left, lasting approximately 1-3 minutes per discussion with nursing staff  CT head unremarkable  EEG- normal      Etiology of seizure unclear  Cannot fully attribute to alcohol withdrawal at patient did not appear to have withdrawal symptoms throughout admission  Will obtain MRI brain to rule out acute infarct in the setting of significant atherosclerotic disease and new onset Afib while off anticoagulation  On exam patient is back to baseline       Plan:  - MRI brain pending   - Consider ambulatory EEG in the outpatient setting if routine EEG is inconclusive   - Would not recommend transfer for vEEG at this time  - Would not recommend initiating AEDs at this time given this is a first time event; will reconsider depending on EEG and MRI brain findings   - Ativan PRN for seizure activity lasting >1 minute   - Okay to continue Eliquis 5 mg BID at this time   - Okay to continue Plavix 75 mg daily at this time  - Seizure precautions   - Discussed seizure precautions:  · No driving, lifting heavy machinery, climbing heights, swimming unattended, hot tub baths or any other activity that will place you in danger in case of a seizure for at least six months  - [employer] form completed and faxed on 02/25/2022  · Patients sister reports patient does not drive since last stroke  - Medical management and supportive care per primary team, notify with changes     PAF (paroxysmal atrial fibrillation) ([facility] [address] )  Assessment & Plan  - New onset Afib noted this admission  - On Eliquis 5 mg BID at baseline prior to this admission for embolic stroke  - Does not appear to have been started this admission due to NPO status in the setting of acute cholecystitis  - Okay to continue at this time  - Management per primary team     Hx of CVA (cerebral vascular accident) [facility])  Assessment & Plan  - History of multiple strokes in the past   - Most recent stroke in September 2021; patient found to have a Right PCA occlusion and received IV tPA  · Patient was on DAPT (ASA and Plavix) at that time   During that admission, ASA was discontinued, patient was started on Eliquis 5 mg BID, and was instructed to continue Plavix 75 mg daily   - Patient denies residual deficits however patient was found to have left upper quadrantanopia on exam, 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 recent stroke    Alcohol abuse  Assessment & Plan  - CIWA protocol   - No signs of withdrawal per discussion with ICU team   - Management per primary team     Tobacco use  Assessment & Plan  - Smoking cessation education     * Acute Cholecystitis  Assessment & Plan  - Acute cholecystitis noted on presentation  - S/p ERCP on 02/23, 2 gallstones removed  - No plan for  inpatient surgical intervention at this time, plan for outpatient laparoscopic cholecystectomy  - Continue to monitor LFTs  - Medical management per ICU team      SUBJECTIVE     Patient was seen and examined  No acute events overnight  Afebrile, vitals remain stable, he was tachycardic for sometime  His blood pressure fluctuates in between systolic 081-580I  No more seizure activity  He says he is mostly back to baseline           OBJECTIVE     Patient ID: Pamela Schaffer is a 73 y o  male  Vitals:    22 0224 22 0443 22 0721 22 1102   BP:  (!) 180/84 111/65 (!) 171/84   BP Location:   Right arm Right arm   Pulse:  (!) 114 101 77   Resp:  (!) 27 22 (!) 29   Temp: 98 2 °F (36 8 °C)  98 °F (36 7 °C) 97 9 °F (36 6 °C)   TempSrc: Oral  Oral Oral   SpO2:  98% 96% 93%   Weight: 74 3 kg (163 lb 12 8 oz)         Temperature:   Temp (24hrs), Av °F (36 7 °C), Min:96 8 °F (36 °C), Max:98 9 °F (37 2 °C)    Temperature: 97 9 °F (36 6 °C)      Physical Exam  Vitals and nursing note reviewed  Constitutional:       Appearance: He is well-developed  HENT:      Head: Normocephalic and atraumatic  Eyes:      Conjunctiva/sclera: Conjunctivae normal    Cardiovascular:      Rate and Rhythm: Normal rate and regular rhythm  Heart sounds: No murmur heard  Pulmonary:      Effort: Pulmonary effort is normal  No respiratory distress  Breath sounds: Normal breath sounds  Abdominal:      Palpations: Abdomen is soft  Tenderness: There is no abdominal tenderness  Musculoskeletal:      Cervical back: Neck supple  Skin:     General: Skin is warm and dry  Neurological:      Mental Status: He is alert  Neurological Examination:     Mental Status: The patient was awake, alert, attentive, oriented to person, place, and time  No dysarthria or aphasia noted  Cranial Nerves:   I: smell Not tested   II: visual fields full on confrontation, Pupils equal, round, reactive to light with normal accomodation    III,IV,VI: extraocular muscles EOMI, no nystagmus   V: masseter and pterygoid strength full  Sensation in the V1 through V3 distributions intact to pinprick and light touch bilaterally  VII: Face is symmetric with no weakness noted  VIII: Audition intact to finger rub bilaterally  IX/X: Uvula midline  Soft palate elevation symmetric  XI: Trapezius and SCM strength 5/5 B/L  XII: Tongue midline with no atrophy or fasciculations with appropriate movement  Motor Examination:   No pronator drift  Bulk: Normal  No atrophy Tone: Normal  Fasciculations: None  Deltoid Biceps Triceps WE   WF   FF IO     Right        5         5          5         5      5      5   5        Left           5        5          5          5      5     5   5                       IP        Quad   Ham     TA       Gastroc   Right      5            5          5         5                5  Left         5            5         5         5                5       Reflexes:                   Biceps Brachioradialis Triceps Patella Achilles Plantars   Right          2+            2+                  2+        2+       2+         Down   Left            2+             2+                 2+         2+       2+         Down     Clonus: None    Coordination: Patient able to perform normal finger-to-nose    Sensory: Normal sensation to light touch b/l upper and lower     Gait: deferred   LABORATORY DATA     Labs: I have personally reviewed pertinent reports      Results from last 7 days   Lab Units 02/26/22 0225 02/25/22  0602 02/24/22  0555   WBC Thousand/uL 7 97 5 82 4 58   HEMOGLOBIN g/dL 16 3 12 7 10 8*   HEMATOCRIT % 48 0 40 0 33 4*   PLATELETS Thousands/uL 302 225 171   NEUTROS PCT % 77* 75 88*   MONOS PCT % 7 7 4      Results from last 7 days   Lab Units 02/26/22 0225 02/25/22  0602 02/24/22  0555   SODIUM mmol/L 136 139 131*   POTASSIUM mmol/L 3 3* 4 1 4 2   CHLORIDE mmol/L 95* 101 100   CO2 mmol/L 28 28 23   BUN mg/dL 14 16 13   CREATININE mg/dL 1 05 1 16 1 09   CALCIUM mg/dL 10 1 10 0 8 8   ALK PHOS U/L 446* 390* 416*   ALT U/L 69 68 84*   AST U/L 84* 60* 84*     Results from last 7 days   Lab Units 02/26/22  0225 02/22/22  0000   MAGNESIUM mg/dL 2 0 1 2*     Results from last 7 days   Lab Units 02/26/22  0225   PHOSPHORUS mg/dL 3 9                    IMAGING & DIAGNOSTIC TESTING     Radiology Results: I have personally reviewed pertinent reports  CT head wo contrast   Final Result by Moy Freedman MD (02/25 1139)      Study slightly limited by patient motion artifact  No acute intracranial abnormality  Workstation performed: [id number]         FL ERCP biliary only   Final Result by Greg Blankenship MD (02/24 0720)      Please see procedure report for further details  Workstation performed: [id number]         US right upper quadrant   Final Result by Jasper Macias MD (02/22 1612)      Cholelithiasis and choledocholithiasis  Positive Godoy's sign with gallbladder wall thickening and edema and potentially tiny amount of pericholecystic fluid  These latter findings are most indicative of acute cholecystitis       The examination demonstrates a significant  finding and was documented as such in [facility] for liaison and referring practitioner immediate notification         Workstation performed: [id number]         CTA dissection protocol chest/abdomen/pelvis   Final Result by Eliana Quintanilla MD (02/22 2096)      Gallbladder wall thickening with edema and cholelithiasis, evaluate for acute cholecystitis      Biliary dilation common bile duct measuring 1 4 cm, concerning for biliary obstruction, MRCP may be considered to evaluate for choledocholithiasis      No thoracic aortic dissection      No pulmonary embolism      Opacification of the segmental bronchi of the left lower lobe, image 83 series 3, short interval follow-up at 3 months suggested this may be due to secretions or due to focal lesion      Incidentally detected the less than 6 mm nodules in the both lung with the most prominent in the subpleural region in the left upper lobe  Based on current Fleischner Society 2017 Guidelines on incidental pulmonary nodule,  follow-up CT at 12 months can    be considered  Mild reticulation in the subpleural region, raises concern for mild interstitial lung disease      Hepatic steatosis      Again noted is thickening of the wall of the stomach as seen on the previous study, remains indeterminate      Atherosclerotic disease in the aorta with the suggestion of more than 50% stenosis in the right common iliac artery   The study was marked in EPIC for immediate notification  Workstation performed: [id number]         X-ray chest 1 view portable   Final Result by Kong Monique MD (02/22 1441)      No acute cardiopulmonary disease  Workstation performed: [id number]         MRI inpatient order    (Results Pending)       Other Diagnostic Testing: I have personally reviewed pertinent reports        ACTIVE MEDICATIONS     Current Facility-Administered Medications   Medication Dose Route Frequency    acetaminophen (TYLENOL) tablet 650 mg  650 mg Oral Q6H PRN    albuterol (PROVENTIL HFA,VENTOLIN HFA) inhaler 1 puff  1 puff Inhalation Q6H PRN    ALPRAZolam (XANAX) tablet 0 25 mg  0 25 mg Oral HS PRN    aluminum-magnesium hydroxide-simethicone (MYLANTA) oral suspension 30 mL  30 mL Oral Q4H PRN    apixaban (ELIQUIS) tablet 5 mg  5 mg Oral BID    calcium gluconate 1 g in sodium chloride 0 9% 50 mL (premix)  1 g Intravenous Once    carvedilol (COREG) tablet 25 mg  25 mg Oral BID With Meals    ceFAZolin (ANCEF) IVPB (premix in dextrose) 2,000 mg 50 mL  2,000 mg Intravenous Q8H    clopidogrel (PLAVIX) tablet 75 mg  75 mg Oral Daily    diltiazem (CARDIZEM) tablet 30 mg  30 mg Oral P4O YADY    folic acid (FOLVITE) tablet 1 mg  1 mg Oral Daily    hydrALAZINE (APRESOLINE) injection 10 mg  10 mg Intravenous Q6H PRN    ipratropium (ATROVENT) 0 02 % inhalation solution 0 5 mg  0 5 mg Nebulization TID    levalbuterol (XOPENEX) inhalation solution 1 25 mg  1 25 mg Nebulization TID    metoprolol (LOPRESSOR) injection 5 mg  5 mg Intravenous Q6H PRN    metroNIDAZOLE (FLAGYL) tablet 500 mg  500 mg Oral Q8H [facility]    multivitamin-minerals (CENTRUM) tablet 1 tablet  1 tablet Oral Daily    nicotine (NICODERM CQ) 14 mg/24hr TD 24 hr patch 1 patch  1 patch Transdermal Daily    nitroglycerin (NITROSTAT) SL tablet 0 4 mg  0 4 mg Sublingual Q5 Min PRN    ondansetron (ZOFRAN) injection 4 mg  4 mg Intravenous Q6H PRN    pantoprazole (PROTONIX) EC tablet 20 mg  20 mg Oral Early Morning    pravastatin (PRAVACHOL) tablet 40 mg  40 mg Oral Daily With Dinner    sertraline (ZOLOFT) tablet 100 mg  100 mg Oral HS    simethicone (MYLICON) chewable tablet 80 mg  80 mg Oral 4x Daily PRN    sodium chloride (PF) 0 9 % injection 3 mL  3 mL Intravenous Q1H PRN    thiamine tablet 100 mg  100 mg Oral Daily       Prior to Admission medications    Medication Sig Start Date End Date Taking?  Authorizing Provider   aclidinium Mary Poag Pressair) 400 MCG/ACT inhaler Inhale 1 puff 2 (two) times a day     Yes Historical Provider, MD   albuterol (PROVENTIL HFA,VENTOLIN HFA) 90 mcg/act inhaler 1 puff every 6 (six) hours as needed   10/8/21  Yes Historical Provider, MD   ALPRAZolam (XANAX) 0 25 mg tablet TAKE 1 TABLET BY ORAL ROUTE 4 TIMES EVERY DAY AS NEEDED 6/15/21  Yes Historical Provider, MD   apixaban (ELIQUIS) 5 mg Take 1 tablet (5 mg total) by mouth 2 (two) times a day 9/17/21  Yes Chai Cannon MD   acetaminophen (TYLENOL) 325 mg tablet Take 2 tablets (650 mg total) by mouth every 6 (six) hours as needed for mild pain  Patient not taking: Reported on 2/22/2022 4/14/21   Georgette Simeon PA-C   atorvastatin (LIPITOR) 80 mg tablet Take 1 tablet (80 mg total) by mouth daily 9/6/21 10/22/21  Emperatriz Coleman MD   baclofen 10 mg tablet 10 mg 3 (three) times a day as needed   11/3/21   Historical Provider, MD   carvedilol (COREG) 12 5 mg tablet Take 12 5 mg by mouth 2 (two) times a day with meals  Patient not taking: Reported on 11/10/2021     Historical Provider, MD   clopidogrel (PLAVIX) 75 mg tablet TAKE 1 TABLET BY MOUTH EVERY DAY FOR 19 DOSES  Patient not taking: Reported on 11/10/2021 7/28/21   Angie Malone PA-C   fenofibrate 160 MG tablet Take 160 mg by mouth daily 6/25/21   Historical Provider, MD   ferrous sulfate 324 (65 Fe) mg Take 1 tablet (324 mg total) by mouth daily before breakfast 9/6/21   Ammy Moser MD   loratadine (CLARITIN) 10 mg tablet Take 1 tablet (10 mg total) by mouth daily 9/6/21 11/10/21  Zoila Elizondo MD   nicotine (NICODERM CQ) 14 mg/24hr TD 24 hr patch Place 1 patch on the skin daily 9/7/21   Zoila Elizondo MD   omeprazole (PriLOSEC) 20 mg delayed release capsule Take 20 mg by mouth daily   10/4/21   Historical Provider, MD   pantoprazole (PROTONIX) 40 mg tablet Take 1 tablet (40 mg total) by mouth daily in the early morning 9/6/21 10/22/21  Zoila Elizondo MD   pravastatin (PRAVACHOL) 40 mg tablet Take 40 mg by mouth daily   10/22/21   Historical Provider, MD   senna (SENOKOT) 8 6 mg Take 1 tablet (8 6 mg total) by mouth daily at bedtime as needed for constipation  Patient not taking: Reported on 9/15/2021 9/6/21   Ammy Moser MD   sertraline (ZOLOFT) 100 mg tablet Take 100 mg by mouth daily   1/19/11   Historical Provider, MD   thiamine 100 MG tablet Take 1 tablet (100 mg total) by mouth daily 3/31/21 11/10/21  Lennice Favre, MD         VTE Pharmacologic Prophylaxis: Apixaban (Eliquis)  VTE Mechanical Prophylaxis: sequential compression device    ==  MD Dorcas DwyerPremier Health [facility]'s Neurology Residency, PGY-3

## 2022-02-26 NOTE — PROGRESS NOTES
Progress Note - General Surgery   [facility] 73 y o  male MRN: [id number]  Unit/Bed#: ICU 03 Encounter: [id number]    Assessment:  74 yo male w/ acute cholecystitis and choledocholithiaisis now s/p ERCP on 2/23  Hospital course complicated by seizure possibly 2/2 alcohol withdrawal    Intermittently in Afib RVR overnight    WBC 7 97 (from 5 82)  AST/ALT 84/69 (from 60/68)   (from 390)  T bili 1 13 (from 0 93)    Plan:  No plan for acute surgical intervention, plan for follow up in 4 weeks to discuss interval cholecystectomy in 6-8 weeks  Levaquin/flagyl to complete 10 day course of antibiotics  Continue to trend LFT's, if bilirubin continues to rise would consider repeat US vs MRCP to rule out recurrent choledocholithiasis  Lo fat diet  PRN analgesia  Rest of care per ICU    Subjective/Objective     Subjective: No acute events overnight, tolerating lo fat diet without nausea or vomiting, pain controlled, no further seizure activity, complaining of "gas pains" in his epigastric region    Objective:    Blood pressure (!) 180/84, pulse (!) 114, temperature 98 2 °F (36 8 °C), temperature source Oral, resp  rate (!) 27, weight 74 3 kg (163 lb 12 8 oz), SpO2 98 %  ,Body mass index is 24 19 kg/m²  Intake/Output Summary (Last 24 hours) at 2/26/2022 0615  Last data filed at 2/26/2022 0000  Gross per 24 hour   Intake 360 ml   Output 1275 ml   Net -915 ml       Invasive Devices  Report    Peripheral Intravenous Line            Peripheral IV 02/25/22 Dorsal (posterior); Left Forearm <1 day    Peripheral IV 02/26/22 Left;Proximal;Ventral (anterior) Forearm <1 day                Physical Exam:  Gen:    NAD  CV:      warm, well-perfused  Lungs: No respiratory distress  Abd:     soft, NT/ND  Ext:      no CCE  Neuro: A&Ox3

## 2022-02-26 NOTE — PROGRESS NOTES
Daily Progress Note - [address]  73 y o  male MRN: [id number]  Unit/Bed#: ICU 03 Encounter: [id number]        ----------------------------------------------------------------------------------------  24hr events: Hypertensive, no other overnight events    ---------------------------------------------------------------------------------------  SUBJECTIVE  Complaining on mild abdominal discomfort, one episode of loose stools  No associated N/V    Review of Systems   Constitutional: Negative for appetite change, chills, diaphoresis, fatigue and fever  Respiratory: Negative for cough, shortness of breath and wheezing  Cardiovascular: Negative for chest pain, palpitations and leg swelling  Gastrointestinal: Positive for diarrhea  Negative for abdominal distention, abdominal pain, constipation, nausea and vomiting  Genitourinary: Negative for difficulty urinating and dysuria  Musculoskeletal: Negative for arthralgias  Neurological: Negative for dizziness, weakness, light-headedness, numbness and headaches       Review of systems was reviewed and negative unless stated above in HPI/24-hour events   ---------------------------------------------------------------------------------------  Assessment and Plan:    Neuro:    Diagnosis: Seizure episode  o Appreciate neuro recs  o No clear etiology for seizure episode  o EEG without any ictal/interictal activity  o MRI pending  o Seizure precautions  o CIWA protocol - for alcohol withdrawal   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 COPD vs interstitial lung disease  o Chronic tobacco use, wheezing on exam  o CT with mild interstitial lung disease  o Xopex/Atrovent prn -  o Outpatient pulm follow up with PFT      GI:    Diagnosis: Acute cholecystitis  o S/p ERCP 2/23, 2 stones removed  o Surgery following, no acute surgical intervention - outpatient f/u with cholecystectomy in 6-8 weeks  o Continue antibiotic for 10 day course  o Serial abdominal exams, monitor for symptoms  o On surgical soft diet      :    Diagnosis: No acute issues      F/E/N:    Plan: Not maintained on fluids , no acute issues   Potassium repleted      Heme/Onc:    Diagnosis: No acute issues      Endo:    Diagnosis: No acute issues      ID:    Diagnosis: On ancef and flagyl    MSK/Skin:    Diagnosis: No acute issues      Patient appropriate for transfer out of the ICU today?: No  Disposition: Transfer to Med Surg with Telemetry   Code Status: Level 1 - Full Code  ---------------------------------------------------------------------------------------  ICU CORE MEASURES    Prophylaxis   VTE Pharmacologic Prophylaxis: Apixaban (Eliquis)  VTE Mechanical Prophylaxis: sequential compression device  Stress Ulcer Prophylaxis: Pantoprazole PO    ABCDE Protocol (if indicated)  Plan to perform spontaneous awakening trial today? Not applicable  Plan to perform spontaneous breathing trial today? Not applicable  Obvious barriers to extubation? Not applicable  CAM-ICU: Negative    Invasive Devices Review  Invasive Devices  Report    Peripheral Intravenous Line            Peripheral IV 02/25/22 Dorsal (posterior); Left Forearm <1 day    Peripheral IV 02/26/22 Left;Proximal;Ventral (anterior) Forearm <1 day              Can any invasive devices be discontinued today?  Not applicable  ---------------------------------------------------------------------------------------  OBJECTIVE    Vitals   Vitals: 227 22 0224 22 0443 22 0721   BP: 150/83  (!) 180/84 111/65   BP Location: Right arm   Right arm   Pulse: 88  (!) 114 101   Resp: 16  (!) 27 22   Temp: 98 9 °F (37 2 °C) 98 2 °F (36 8 °C)  98 °F (36 7 °C)   TempSrc: Oral Oral  Oral   SpO2: 98%  98% 96%   Weight:  74 3 kg (163 lb 12 8 oz)       Temp (24hrs), Av 1 °F (36 7 °C), Min:96 8 °F (36 °C), Max:98 9 °F (37 2 °C)  Current: Temperature: 98 °F (36 7 °C)    Respiratory:  SpO2: SpO2: 96 %  Nasal Cannula O2 Flow Rate (L/min): 2 L/min    Invasive/non-invasive ventilation settings   Respiratory  Report   Lab Data (Last 4 hours)    None         O2/Vent Data (Last 4 hours)    None                Physical Exam  Vitals and nursing note reviewed  Constitutional:       General: He is not in acute distress  Appearance: He is well-developed  He is not diaphoretic  HENT:      Head: Normocephalic and atraumatic  Eyes:      General: No scleral icterus  Conjunctiva/sclera: Conjunctivae normal    Cardiovascular:      Rate and Rhythm: Normal rate and regular rhythm  Heart sounds: Normal heart sounds  No murmur heard  Pulmonary:      Effort: Pulmonary effort is normal       Breath sounds: Wheezing (throughout) present  No rales  Abdominal:      General: Bowel sounds are normal  There is no distension  Palpations: Abdomen is soft  Tenderness: There is no abdominal tenderness  Musculoskeletal:      Right lower leg: No edema  Left lower leg: No edema  Skin:     General: Skin is warm and dry  Neurological:      Mental Status: He is alert and oriented to person, place, and time  Sensory: No sensory deficit  Motor: No weakness        Coordination: Coordination normal    Psychiatric:         Mood and Affect: Mood normal          Behavior: Behavior normal              Laboratory and Diagnostics:  Results from last 7 days   Lab Units 22  0602 22  0555 22  0505 02/22/22  1018   WBC Thousand/uL 7 97 5 82 4 58 3 98* 10 76*   HEMOGLOBIN g/dL 16 3 12 7 10 8* 10 4* 13 2   HEMATOCRIT % 48 0 40 0 33 4* 32 8* 39 3   PLATELETS Thousands/uL 302 225 171 168 223   NEUTROS PCT % 77* 75 88* 76* 84*   MONOS PCT % 7 7 4 10 7     Results from last 7 days   Lab Units 02/26/22  0225 02/25/22  0602 02/24/22  0555 02/23/22  0507 02/22/22  0000   SODIUM mmol/L 136 139 131* 134* 136   POTASSIUM mmol/L 3 3* 4 1 4 2 4 5 3 2*   CHLORIDE mmol/L 95* 101 100 100 94*   CO2 mmol/L 28 28 23 25 28   ANION GAP mmol/L 13 10 8 9 14*   BUN mg/dL 14 16 13 11 12   CREATININE mg/dL 1 05 1 16 1 09 1 07 1 12   CALCIUM mg/dL 10 1 10 0 8 8 7 7* 8 6   GLUCOSE RANDOM mg/dL 94 110 140 87 115   ALT U/L 69 68 84* 95* 125*   AST U/L 84* 60* 84* 89* 102*   ALK PHOS U/L 446* 390* 416* 320* 376*   ALBUMIN g/dL 3 8 3 5 2 8* 2 5* 3 2*   TOTAL BILIRUBIN mg/dL 1 13* 0 93 1 22* 1 51* 1 57*     Results from last 7 days   Lab Units 02/26/22 0225 02/22/22  0000   MAGNESIUM mg/dL 2 0 1 2*   PHOSPHORUS mg/dL 3 9  --                    ABG:    VBG:          Micro        EKG: Reviewed EKG from 2/25  Imaging:  I have personally reviewed pertinent reports  Intake and Output  I/O       02/24 0701 02/25 0700 02/25 0701 02/26 0700 02/26 0701 02/27 0700    P  O  420 360     I V  (mL/kg) 1000 (14 6)      Total Intake(mL/kg) 1420 (20 8) 360 (4 8)     Urine (mL/kg/hr) 2550 (1 6) 1275 (0 7)     Total Output 2550 1275     Net -1929 -151                    Height and Weights         Body mass index is 24 19 kg/m²    Weight (last 2 days)     Date/Time Weight    02/26/22 0224 74 3 (163 8)    02/25/22 0507 68 4 (150 8)    02/24/22 0600 63 4 (139 77)            Nutrition       Diet Orders   (From admission, onward)             Start     Ordered    02/24/22 1155  Diet Surgical; Surgical Soft/Lite Meal  Diet effective now        References:    Nutrtion Support Algorithm Enteral vs  Parenteral   Question Answer Comment   Diet Type Surgical Surgical Surgical Soft/Lite Meal    RD to adjust diet per protocol?  Yes        02/24/22 1154    02/22/22 2206  Room Service  Once        Question:  Type of Service  Answer:  Room Service - Appropriate with Assistance    02/22/22 2206                    Active Medications  Scheduled Meds:  Current Facility-Administered Medications   Medication Dose Route Frequency Provider Last Rate    acetaminophen  650 mg Oral Q6H PRN Cindy Ro, CRNP      albuterol  1 puff Inhalation Q6H PRN Damienelayne Clancy, CRNP      aluminum-magnesium hydroxide-simethicone  30 mL Oral Q4H PRN Yosvany Avalos PA-C      apixaban  5 mg Oral BID [facility] [place], [address]      carvedilol  12 5 mg Oral BID With Meals [facility] [place], [address]      cefazolin  2,000 mg Intravenous Q8H Damien Beers [place], CRNP 2,000 mg (02/26/22 0227)    clopidogrel  75 mg Oral Daily [facility] [place], CRNP      diltiazem  30 mg Oral Q6H [address] Percy Chente [place], [address]      folic acid  1 mg Oral Daily [facility] [place], [address]      hydrALAZINE  10 mg Intravenous Q6H PRN Cindy Rodrigues, CRNP      hydrochlorothiazide  12 5 mg Oral Once Cindy Rodrigues, CRNP      metroNIDAZOLE  500 mg Oral [facility] Percy Crandall, [address]      multivitamin-minerals  1 tablet Oral Daily [facility] [place], [address]      nicotine  1 patch Transdermal Daily [facility] [place], [address]      nitroglycerin  0 4 mg Sublingual Q5 Min PRN [facility] [place], CRNP      ondansetron  4 mg Intravenous Q6H PRN [facility] [place], CRNP      pantoprazole  20 mg Oral Early Morning [facility] [place], CRNP      pravastatin  40 mg Oral Daily With [employer], CRNP      sertraline  100 mg Oral HS [facility] [place], [address]      simethicone  80 mg Oral 4x Daily PRN [facility] Julieth, CRNP      sodium chloride (PF)  3 mL Intravenous Q1H PRN Cindy Ro, CRNP      thiamine  100 mg Oral Daily [facility] [place]       Continuous Infusions:     PRN Meds:   acetaminophen, 650 mg, Q6H PRN  albuterol, 1 puff, Q6H PRN  aluminum-magnesium hydroxide-simethicone, 30 mL, Q4H PRN  hydrALAZINE, 10 mg, Q6H PRN  nitroglycerin, 0 4 mg, Q5 Min PRN  ondansetron, 4 mg, Q6H PRN  simethicone, 80 mg, 4x Daily PRN  sodium chloride (PF), 3 mL, Q1H PRN        Allergies   Allergies   Allergen Reactions    Penicillins Anaphylaxis     ---------------------------------------------------------------------------------------  Advance Directive and Living Will:      Power of :    POLST:    ---------------------------------------------------------------------------------------  Care Time Delivered:   No Critical Care time spent     Lowell Ellison MD      Portions of the record may have been created with voice recognition software  Occasional wrong word or "sound a like" substitutions may have occurred due to the inherent limitations of voice recognition software    Read the chart carefully and recognize, using context, where substitutions have occurred

## 2022-02-27 ENCOUNTER — APPOINTMENT (INPATIENT)
Dept: CT IMAGING | Facility: HOSPITAL | Age: 67
DRG: 444 | End: 2022-02-27
Payer: COMMERCIAL

## 2022-02-27 ENCOUNTER — APPOINTMENT (INPATIENT)
Dept: MRI IMAGING | Facility: HOSPITAL | Age: 67
DRG: 444 | End: 2022-02-27
Payer: COMMERCIAL

## 2022-02-27 PROCEDURE — 94760 N-INVAS EAR/PLS OXIMETRY 1: CPT

## 2022-02-27 PROCEDURE — 70496 CT ANGIOGRAPHY HEAD: CPT

## 2022-02-27 PROCEDURE — 94640 AIRWAY INHALATION TREATMENT: CPT

## 2022-02-27 PROCEDURE — G1004 CDSM NDSC: HCPCS

## 2022-02-27 PROCEDURE — 70498 CT ANGIOGRAPHY NECK: CPT

## 2022-02-27 PROCEDURE — 70551 MRI BRAIN STEM W/O DYE: CPT

## 2022-02-27 PROCEDURE — 99233 SBSQ HOSP IP/OBS HIGH 50: CPT | Performed by: INTERNAL MEDICINE

## 2022-02-27 RX ORDER — GUAIFENESIN 100 MG/5ML
200 SOLUTION ORAL EVERY 4 HOURS PRN
Status: DISCONTINUED | OUTPATIENT
Start: 2022-02-27 | End: 2022-02-27

## 2022-02-27 RX ADMIN — CEFAZOLIN SODIUM 2000 MG: 2 SOLUTION INTRAVENOUS at 01:17

## 2022-02-27 RX ADMIN — CLOPIDOGREL BISULFATE 75 MG: 75 TABLET ORAL at 09:35

## 2022-02-27 RX ADMIN — IOHEXOL 85 ML: 350 INJECTION, SOLUTION INTRAVENOUS at 20:34

## 2022-02-27 RX ADMIN — METRONIDAZOLE 500 MG: 500 TABLET ORAL at 21:14

## 2022-02-27 RX ADMIN — SERTRALINE HYDROCHLORIDE 100 MG: 50 TABLET ORAL at 21:14

## 2022-02-27 RX ADMIN — DILTIAZEM HYDROCHLORIDE 30 MG: 30 TABLET, FILM COATED ORAL at 04:56

## 2022-02-27 RX ADMIN — APIXABAN 5 MG: 5 TABLET, FILM COATED ORAL at 09:34

## 2022-02-27 RX ADMIN — DILTIAZEM HYDROCHLORIDE 30 MG: 30 TABLET, FILM COATED ORAL at 01:18

## 2022-02-27 RX ADMIN — CARVEDILOL 25 MG: 12.5 TABLET, FILM COATED ORAL at 09:38

## 2022-02-27 RX ADMIN — LEVALBUTEROL HYDROCHLORIDE 1.25 MG: 1.25 SOLUTION, CONCENTRATE RESPIRATORY (INHALATION) at 07:23

## 2022-02-27 RX ADMIN — CEFAZOLIN SODIUM 2000 MG: 2 SOLUTION INTRAVENOUS at 17:26

## 2022-02-27 RX ADMIN — DILTIAZEM HYDROCHLORIDE 30 MG: 30 TABLET, FILM COATED ORAL at 12:29

## 2022-02-27 RX ADMIN — METRONIDAZOLE 500 MG: 500 TABLET ORAL at 04:56

## 2022-02-27 RX ADMIN — PRAVASTATIN SODIUM 40 MG: 40 TABLET ORAL at 17:25

## 2022-02-27 RX ADMIN — DILTIAZEM HYDROCHLORIDE 30 MG: 30 TABLET, FILM COATED ORAL at 17:25

## 2022-02-27 RX ADMIN — PANTOPRAZOLE SODIUM 20 MG: 20 TABLET, DELAYED RELEASE ORAL at 04:56

## 2022-02-27 RX ADMIN — METRONIDAZOLE 500 MG: 500 TABLET ORAL at 14:30

## 2022-02-27 RX ADMIN — IPRATROPIUM BROMIDE 0.5 MG: 0.5 SOLUTION RESPIRATORY (INHALATION) at 20:53

## 2022-02-27 RX ADMIN — CARVEDILOL 25 MG: 12.5 TABLET, FILM COATED ORAL at 17:25

## 2022-02-27 RX ADMIN — MULTIPLE VITAMINS W/ MINERALS TAB 1 TABLET: TAB ORAL at 09:34

## 2022-02-27 RX ADMIN — FOLIC ACID 1 MG: 1 TABLET ORAL at 09:34

## 2022-02-27 RX ADMIN — LEVALBUTEROL HYDROCHLORIDE 1.25 MG: 1.25 SOLUTION, CONCENTRATE RESPIRATORY (INHALATION) at 13:44

## 2022-02-27 RX ADMIN — CEFAZOLIN SODIUM 2000 MG: 2 SOLUTION INTRAVENOUS at 09:35

## 2022-02-27 RX ADMIN — IPRATROPIUM BROMIDE 0.5 MG: 0.5 SOLUTION RESPIRATORY (INHALATION) at 07:23

## 2022-02-27 RX ADMIN — THIAMINE HCL TAB 100 MG 100 MG: 100 TAB at 09:35

## 2022-02-27 RX ADMIN — IPRATROPIUM BROMIDE 0.5 MG: 0.5 SOLUTION RESPIRATORY (INHALATION) at 13:44

## 2022-02-27 RX ADMIN — NICOTINE 1 PATCH: 14 PATCH, EXTENDED RELEASE TRANSDERMAL at 09:35

## 2022-02-27 RX ADMIN — LEVALBUTEROL HYDROCHLORIDE 1.25 MG: 1.25 SOLUTION, CONCENTRATE RESPIRATORY (INHALATION) at 20:53

## 2022-02-27 NOTE — ASSESSMENT & PLAN NOTE
Patient had seizure like episode that may have been due to an acute CVA, which is seen on MRI  Appreciate neurology input  Continue seizure precautions  Will continue without AED at this time

## 2022-02-27 NOTE — ASSESSMENT & PLAN NOTE
Prior to admission, found to be in AFib and responded to Cardizem  By the time the patient was in the ED, AFib had resolved to normal sinus rhythm     Eliquis was held on admission due to potential for surgical intervention  Will continue to hold Eliquis given CVA and risk for hemorrhagic conversion  Monitor on telemetry  Continue with diltiazem

## 2022-02-27 NOTE — ASSESSMENT & PLAN NOTE
Home medication:  Albuterol inhaler  Reports COPD is uncontrolled since his [facility] prescription has not been filled recently  Continues to saturate well on RA  Plan:  Continue with albuterol as needed

## 2022-02-27 NOTE — ASSESSMENT & PLAN NOTE
Daily Note     Today's date: 2019  Patient name: Silverio Pardo  : 1934  MRN: [id number]  Referring provider: Trung Espinoza MD  Dx:   Encounter Diagnosis     ICD-10-CM    1  History of stroke Z86 73    2  Wheelchair dependence Z99 3        Subjective: Feeling well today  Objective: See treatment diary below       Assessment: Albe to take 6 steps in RW with max assist for b/l LE advancement and upright posture  She was significantly fatigued by end of session and could nolonger hold standing position  Plan next session to continue to attemptwalking as she is able  Plan: Progress treatment as tolerated  Continue to trial gait training with use of paper plate       Short Term Goal Expiration Date:(2019)  Long Term Goal Expiration Date: (2020)  POC Expiration Date: (2020)        Precautions fall risk, DM, cervical dystonia, spinal cord stimulator        Manual                                                                                          Exercise Diary       bridges         ankle pumps         heel slide         SLR         supine hip abd         standing         seated LAQ         seated hip flex         Static standing Solo, 1 attempts, 5 35''x1' Solo: 5 min x 1 Solo,  W/RW-2 attempts  // bars: 2 5 min, 1 5 min RW 2 min x 3     Gait Solo, // bars 5 attempts, ranging from 1-6 steps Solo //  5 attempts, 8 steps, 6 steps x 3, 4 steps x 1 Solo,  W/ RW-3 attemps  //bars-3 attempts Solo RW - 6 steps x 1  2 steps x 2 max a     Multiple STS         Minisquats         NuStep L6, , 10 min, R foot wrapped L6 10 min  L6, , 10 min, R foot wrapped      STS    10x                                                                              Patient reports that he used to smoke 1 5 PPD  Has now cut back to smoking 3 cigarettes a day  Plan:  Continue with nicotine patch  Continue encouraging smoking cessation

## 2022-02-27 NOTE — QUICK NOTE
Mri brain completed today and reviewed  Acute rt mca cva cortical territory  Stronger suspicion for cardoembolic as his eliquis was held for 3 days and restarted on the 25th, the day of the rapid response with new onset GTC  Ct head that day was stable with no evidence of hemorrhage or early evidence of cva  No further seizures since then  He has not been started on aed as routine eeg didn't show ictal/interictal activity  He has hx of significant bilat extracranial carotid stenosis, s/p stent on rt carotid in the past  He had acute rt pca cva in September 2020  Has PAF, currently in sinus  For now we will hold the eliquis given sizable acute cva  We will continue the plavix for now  Repeat cta head/neck to make sure stenosis hasn't developed within stent that can serve as atheroembolic stoke nidus  The acute cva is likely responsible for the seizure  Plan will need to be determined based on cta results  Patient will be evaluated tomorrow  Normotensive bp for now given strong likehood stroke was on Friday and concluding 48 hour permissive htn period

## 2022-02-27 NOTE — ASSESSMENT & PLAN NOTE
History of right PCA stroke that occurred while patient was on DAPT  Patient was previously on aspirin and clopidogrel  Per Prior Neurology note from 09/2021, later transitioned to Eliquis and aspirin  Eliquis was initially held on admission due to evaluation for surgical intervention  Now with CVA on MRI    Appreciate neurology input  Continue to hold Eliquis, continue with Plavix  CTA head and neck ordered to evaluate carotid disease

## 2022-02-27 NOTE — PROGRESS NOTES
[facility]  Progress Note Suzanne Number [id number], 73 y o  male MRN: [id number]  Unit/Bed#: S -01 Encounter: [id number]  Primary Care Provider: George Joshi DO   Date and time admitted to hospital: 2/22/2022  9:31 AM    Seizure [facility])  Assessment & Plan  Patient had seizure like episode that may have been due to an acute CVA, which is seen on MRI  Appreciate neurology input  Continue seizure precautions  Will continue without AED at this time    Cholelithiasis  Assessment & Plan  RUQ U/S:  Cholelithiasis and choledocholithiasis  Positive Godoy sign with gallbladder wall thickening and edema  GI consulted, ERCP removed 2 gallstones and CBD was found to be dilated at 14mm  Plan:  Per GI, continue to monitor LFTs and WBCs  PAF (paroxysmal atrial fibrillation) ([facility] [place] [address] )  Assessment & Plan  Prior to admission, found to be in AFib and responded to Cardizem  By the time the patient was in the ED, AFib had resolved to normal sinus rhythm  Eliquis was held on admission due to potential for surgical intervention  Will continue to hold Eliquis given CVA and risk for hemorrhagic conversion  Monitor on telemetry  Continue with diltiazem    S/P carotid endarterectomy  Assessment & Plan  Right carotid endarterectomy performed on 04/13/2021  Was previously on clopidogrel  Per Vascular Surgery notes from 01/2022, patient is no longer on clopidogrel  Will continue Plavix given severe carotid disease    Benign essential hypertension  Assessment & Plan  PMHx of HTN (patient is poor historian, cannot recall taking any antihypertensive medication)  Plan:  Continue carvedilol/Coreg 12 5 mg BID  Hx of CVA (cerebral vascular accident) [facility])  Assessment & Plan  History of right PCA stroke that occurred while patient was on DAPT  Patient was previously on aspirin and clopidogrel  Per Prior Neurology note from 09/2021, later transitioned to Eliquis and aspirin    Eliquis was initially held on admission due to evaluation for surgical intervention  Now with CVA on MRI  Appreciate neurology input  Continue to hold Eliquis, continue with Plavix  CTA head and neck ordered to evaluate carotid disease    COPD (chronic obstructive pulmonary disease) ([facility] [address] )  Assessment & Plan  Home medication:  Albuterol inhaler  Reports COPD is uncontrolled since his [facility] Form prescription has not been filled recently  Continues to saturate well on RA  Plan:  Continue with albuterol as needed  Alcohol abuse  Assessment & Plan  Patient has a known hx of Alcohol abuse  - Last drink at 04 AM on 02/22/2022  - Inebriated during initial encounter in ED    - AAOx3 today  No significant tremors observed  Plan:   - CIWA Protocol  - Ativan   - Monitor vitals  - Monitor for DT's    Tobacco use  Assessment & Plan  Patient reports that he used to smoke 1 5 PPD  Has now cut back to smoking 3 cigarettes a day  Plan:  Continue with nicotine patch  Continue encouraging smoking cessation  * Acute Cholecystitis  Assessment & Plan  POA:  Worsening intermittent abdominal pain for 2 weeks  Reports of chills and nausea/vomiting  CTA chest/abdomen/pelvis:  Gallbladder wall thickening with edema and cholelithiasis  RUQ U/S: Cholelithiasis and choledocholithiasis  Positive Godoy's sign with gallbladder wall thickening and edema and potentially tiny amount of pericholecystic fluid  Leukocytosis continues to have resolved and LFTs have now trended down even more  Antibiotic treatment with IV Ancef (D3) and Flagyl (D3); on LR  Gastroenterology consulted, and ERCP performed - 2 gallstones removed and common bile duct was dilated to 14 mm  Plan:  Discontinued IV fluids due to patient tolerating diet  Per General Surgery, patient to follow up outpatient for possible interval cholecystectomy  Can transition to PO abx (Levaquin and flagyl) and will complete a total of 10 days of abx     Per GI, will continue to monitor LFTs closely  Continue to monitor vitals and trend for any fevers  Zofran PRN  VTE Pharmacologic Prophylaxis: VTE Score: 4 Moderate Risk (Score 3-4) - Pharmacological DVT Prophylaxis Contraindicated  Sequential Compression Devices Ordered  Patient Centered Rounds: I performed bedside rounds with nursing staff today  Discussions with Specialists or Other Care Team Provider: Neuro and vascular surgery    Education and Discussions with Family / Patient: Patient declined call to   Time Spent for Care: 45 minutes  More than 50% of total time spent on counseling and coordination of care as described above  Current Length of Stay: 5 day(s)  Current Patient Status: Inpatient   Certification Statement: The patient will continue to require additional inpatient hospital stay due to further workup for stroke  Discharge Plan: Anticipate discharge in 24-48 hrs to home  Code Status: Level 1 - Full Code    Subjective:   Patient has no complaints  He went for MRI this morning  He denies further seizure episodes, tremulousness, N/V, fevers, CP, SOB, lightheadedness  Objective:     Vitals:   Temp (24hrs), Av 1 °F (36 7 °C), Min:98 °F (36 7 °C), Max:98 2 °F (36 8 °C)    Temp:  [98 °F (36 7 °C)-98 2 °F (36 8 °C)] 98 1 °F (36 7 °C)  HR:  [64-80] 70  Resp:  [16-22] 18  BP: (118-177)/(57-84) 128/71  SpO2:  [94 %-99 %] 96 %  Body mass index is 24 19 kg/m²  Input and Output Summary (last 24 hours):   No intake or output data in the 24 hours ending 22 3227    Physical Exam:   Physical Exam  Vitals and nursing note reviewed  Constitutional:       Appearance: Normal appearance  He is normal weight  Comments: Chronically ill-appearing   HENT:      Head: Normocephalic and atraumatic  Right Ear: External ear normal       Left Ear: External ear normal       Nose: Nose normal       Mouth/Throat:      Mouth: Mucous membranes are moist       Pharynx: Oropharynx is clear     Eyes: Conjunctiva/sclera: Conjunctivae normal       Pupils: Pupils are equal, round, and reactive to light  Cardiovascular:      Rate and Rhythm: Normal rate and regular rhythm  Pulses: Normal pulses  Heart sounds: Normal heart sounds  Pulmonary:      Effort: Pulmonary effort is normal       Breath sounds: Normal breath sounds  Abdominal:      General: Abdomen is flat  Bowel sounds are normal       Palpations: Abdomen is soft  Musculoskeletal:         General: No swelling or tenderness  Cervical back: Neck supple  No muscular tenderness  Skin:     General: Skin is warm and dry  Capillary Refill: Capillary refill takes less than 2 seconds  Neurological:      General: No focal deficit present  Mental Status: He is alert and oriented to person, place, and time  Mental status is at baseline  Cranial Nerves: No cranial nerve deficit  Sensory: No sensory deficit  Psychiatric:         Mood and Affect: Mood normal          Behavior: Behavior normal          Thought Content: Thought content normal          Judgment: Judgment normal           Additional Data:     Labs:  Results from last 7 days   Lab Units 02/26/22  0225   WBC Thousand/uL 7 97   HEMOGLOBIN g/dL 16 3   HEMATOCRIT % 48 0   PLATELETS Thousands/uL 302   NEUTROS PCT % 77*   LYMPHS PCT % 13*   MONOS PCT % 7   EOS PCT % 1     Results from last 7 days   Lab Units 02/26/22  0225   SODIUM mmol/L 136   POTASSIUM mmol/L 3 3*   CHLORIDE mmol/L 95*   CO2 mmol/L 28   BUN mg/dL 14   CREATININE mg/dL 1 05   ANION GAP mmol/L 13   CALCIUM mg/dL 10 1   ALBUMIN g/dL 3 8   TOTAL BILIRUBIN mg/dL 1 13*   ALK PHOS U/L 446*   ALT U/L 69   AST U/L 84*   GLUCOSE RANDOM mg/dL 94         Results from last 7 days   Lab Units 02/25/22  1111 02/24/22  0820   POC GLUCOSE mg/dl 127 124               Lines/Drains:  Invasive Devices  Report    Peripheral Intravenous Line            Peripheral IV 02/25/22 Dorsal (posterior); Left Forearm 2 days Peripheral IV 02/26/22 Left;Proximal;Ventral (anterior) Forearm 1 day                  Telemetry:  Telemetry Orders (From admission, onward)             48 Hour Telemetry Monitoring  Continuous x 48 hours        References:    Telemetry Guidelines   Question:  Reason for 48 Hour Telemetry  Answer:  Arrhythmias Requiring Medical Therapy (eg  SVT, Vtach/fib, Bradycardia, Uncontrolled A-fib)                 Telemetry Reviewed: Normal Sinus Rhythm  Indication for Continued Telemetry Use: Acute CVA             Imaging: Reviewed radiology reports from this admission including: MRI brain    Recent Cultures (last 7 days):         Last 24 Hours Medication List:   Current Facility-Administered Medications   Medication Dose Route Frequency Provider Last Rate    acetaminophen  650 mg Oral Q6H PRN Giselle Crum MD      albuterol  1 puff Inhalation Q6H PRN Giselle Crum MD      ALPRAZolam  0 25 mg Oral HS PRN Giselle Crum MD      aluminum-magnesium hydroxide-simethicone  30 mL Oral Q4H PRN Giselle Crum MD      carvedilol  25 mg Oral BID With Meals Giselle Crum MD      cefazolin  2,000 mg Intravenous Q8H Giselle Crum MD 2,000 mg (02/27/22 0935)    clopidogrel  75 mg Oral Daily Giselle Crum MD      diltiazem  30 mg Oral Q6H [address] Giselle Crum MD      folic acid  1 mg Oral Daily Giselle Crum MD      hydrALAZINE  10 mg Intravenous Q6H PRN Giselle Crum MD      ipratropium  0 5 mg Nebulization TID Giselle Crum MD      levalbuterol  1 25 mg Nebulization TID Giselle Crum MD      metroNIDAZOLE  500 mg Oral [facility] [address] Giselle Crum MD      multivitamin-minerals  1 tablet Oral Daily Giselle Crum MD      nicotine  1 patch Transdermal Daily Giselle Crum MD      nitroglycerin  0 4 mg Sublingual Q5 Min PRN Giselle Crum MD      ondansetron  4 mg Intravenous Q6H PRN Giselle Crum MD      pantoprazole  20 mg Oral Early Morning Giselle Crum MD      pravastatin  40 mg Oral Daily With Shakir Emmanuel MD      sertraline  100 mg Oral HS Love Shankar MD      simethicone  80 mg Oral 4x Daily PRN Love Shankar MD      sodium chloride (PF)  3 mL Intravenous Q1H PRN Love Shankar MD      thiamine  100 mg Oral Daily Love Shankar MD          Today, Patient Was Seen By: Alexsandra Roldan MD    **Please Note: This note may have been constructed using a voice recognition system  **

## 2022-02-27 NOTE — ASSESSMENT & PLAN NOTE
Right carotid endarterectomy performed on 04/13/2021  Was previously on clopidogrel  Per Vascular Surgery notes from 01/2022, patient is no longer on clopidogrel    Will continue Plavix given severe carotid disease

## 2022-02-28 ENCOUNTER — HOSPITAL ENCOUNTER (INPATIENT)
Dept: VASCULAR ULTRASOUND | Facility: HOSPITAL | Age: 67
Discharge: HOME | DRG: 444 | End: 2022-02-28
Payer: COMMERCIAL

## 2022-02-28 PROBLEM — I63.411 CEREBROVASCULAR ACCIDENT (CVA) DUE TO EMBOLISM OF RIGHT MIDDLE CEREBRAL ARTERY (HCC): Status: RESOLVED | Noted: 2021-03-29 | Resolved: 2022-02-28

## 2022-02-28 LAB
ANION GAP SERPL CALCULATED.3IONS-SCNC: 12 MMOL/L (ref 4–13)
ATRIAL RATE: 120 BPM
ATRIAL RATE: 202 BPM
BUN SERPL-MCNC: 21 MG/DL (ref 5–25)
CALCIUM SERPL-MCNC: 9.4 MG/DL (ref 8.3–10.1)
CHLORIDE SERPL-SCNC: 99 MMOL/L (ref 100–108)
CO2 SERPL-SCNC: 25 MMOL/L (ref 21–32)
CREAT SERPL-MCNC: 1.15 MG/DL (ref 0.6–1.3)
ERYTHROCYTE [DISTWIDTH] IN BLOOD BY AUTOMATED COUNT: 13.7 % (ref 11.6–15.1)
GFR SERPL CREATININE-BSD FRML MDRD: 66 ML/MIN/1.73SQ M
GLUCOSE SERPL-MCNC: 144 MG/DL (ref 65–140)
HCT VFR BLD AUTO: 42.5 % (ref 36.5–49.3)
HGB BLD-MCNC: 13.8 G/DL (ref 12–17)
MAGNESIUM SERPL-MCNC: 1.3 MG/DL (ref 1.6–2.6)
MCH RBC QN AUTO: 28 PG (ref 26.8–34.3)
MCHC RBC AUTO-ENTMCNC: 32.5 G/DL (ref 31.4–37.4)
MCV RBC AUTO: 86 FL (ref 82–98)
PLATELET # BLD AUTO: 248 THOUSANDS/UL (ref 149–390)
PMV BLD AUTO: 10.3 FL (ref 8.9–12.7)
POTASSIUM SERPL-SCNC: 3.6 MMOL/L (ref 3.5–5.3)
QRS AXIS: 48 DEGREES
QRS AXIS: 54 DEGREES
QRSD INTERVAL: 80 MS
QRSD INTERVAL: 80 MS
QT INTERVAL: 338 MS
QT INTERVAL: 348 MS
QTC INTERVAL: 428 MS
QTC INTERVAL: 467 MS
RBC # BLD AUTO: 4.93 MILLION/UL (ref 3.88–5.62)
SODIUM SERPL-SCNC: 136 MMOL/L (ref 136–145)
T WAVE AXIS: 26 DEGREES
T WAVE AXIS: 72 DEGREES
VENTRICULAR RATE: 115 BPM
VENTRICULAR RATE: 91 BPM
WBC # BLD AUTO: 7.42 THOUSAND/UL (ref 4.31–10.16)

## 2022-02-28 PROCEDURE — 93010 ELECTROCARDIOGRAM REPORT: CPT | Performed by: INTERNAL MEDICINE

## 2022-02-28 PROCEDURE — 99233 SBSQ HOSP IP/OBS HIGH 50: CPT | Performed by: INTERNAL MEDICINE

## 2022-02-28 PROCEDURE — 83735 ASSAY OF MAGNESIUM: CPT | Performed by: INTERNAL MEDICINE

## 2022-02-28 PROCEDURE — 85027 COMPLETE CBC AUTOMATED: CPT | Performed by: INTERNAL MEDICINE

## 2022-02-28 PROCEDURE — 99223 1ST HOSP IP/OBS HIGH 75: CPT | Performed by: SURGERY

## 2022-02-28 PROCEDURE — 99232 SBSQ HOSP IP/OBS MODERATE 35: CPT | Performed by: PSYCHIATRY & NEUROLOGY

## 2022-02-28 PROCEDURE — 80048 BASIC METABOLIC PNL TOTAL CA: CPT | Performed by: INTERNAL MEDICINE

## 2022-02-28 PROCEDURE — 94760 N-INVAS EAR/PLS OXIMETRY 1: CPT

## 2022-02-28 PROCEDURE — 93880 EXTRACRANIAL BILAT STUDY: CPT

## 2022-02-28 PROCEDURE — 94640 AIRWAY INHALATION TREATMENT: CPT

## 2022-02-28 RX ORDER — LEVALBUTEROL INHALATION SOLUTION 1.25 MG/3ML
1.25 SOLUTION RESPIRATORY (INHALATION)
Status: DISCONTINUED | OUTPATIENT
Start: 2022-02-28 | End: 2022-03-01 | Stop reason: HOSPADM

## 2022-02-28 RX ORDER — ATORVASTATIN CALCIUM 40 MG/1
40 TABLET, FILM COATED ORAL
Status: DISCONTINUED | OUTPATIENT
Start: 2022-02-28 | End: 2022-03-01 | Stop reason: HOSPADM

## 2022-02-28 RX ORDER — MAGNESIUM SULFATE HEPTAHYDRATE 40 MG/ML
4 INJECTION, SOLUTION INTRAVENOUS ONCE
Status: COMPLETED | OUTPATIENT
Start: 2022-02-28 | End: 2022-03-01

## 2022-02-28 RX ORDER — LEVOFLOXACIN 750 MG/1
750 TABLET ORAL EVERY 24 HOURS
Status: DISCONTINUED | OUTPATIENT
Start: 2022-02-28 | End: 2022-03-01 | Stop reason: HOSPADM

## 2022-02-28 RX ORDER — DILTIAZEM HYDROCHLORIDE 120 MG/1
120 CAPSULE, COATED, EXTENDED RELEASE ORAL DAILY
Status: DISCONTINUED | OUTPATIENT
Start: 2022-02-28 | End: 2022-03-01 | Stop reason: HOSPADM

## 2022-02-28 RX ADMIN — METRONIDAZOLE 500 MG: 500 TABLET ORAL at 14:01

## 2022-02-28 RX ADMIN — THIAMINE HCL TAB 100 MG 100 MG: 100 TAB at 09:48

## 2022-02-28 RX ADMIN — METRONIDAZOLE 500 MG: 500 TABLET ORAL at 05:53

## 2022-02-28 RX ADMIN — ATORVASTATIN CALCIUM 40 MG: 40 TABLET, FILM COATED ORAL at 16:23

## 2022-02-28 RX ADMIN — CEFAZOLIN SODIUM 2000 MG: 2 SOLUTION INTRAVENOUS at 02:04

## 2022-02-28 RX ADMIN — MAGNESIUM SULFATE HEPTAHYDRATE 4 G: 40 INJECTION, SOLUTION INTRAVENOUS at 17:21

## 2022-02-28 RX ADMIN — CARVEDILOL 25 MG: 12.5 TABLET, FILM COATED ORAL at 16:23

## 2022-02-28 RX ADMIN — CLOPIDOGREL BISULFATE 75 MG: 75 TABLET ORAL at 09:48

## 2022-02-28 RX ADMIN — CARVEDILOL 25 MG: 12.5 TABLET, FILM COATED ORAL at 09:56

## 2022-02-28 RX ADMIN — ALPRAZOLAM 0.25 MG: 0.25 TABLET ORAL at 21:24

## 2022-02-28 RX ADMIN — LEVALBUTEROL HYDROCHLORIDE 1.25 MG: 1.25 SOLUTION, CONCENTRATE RESPIRATORY (INHALATION) at 08:37

## 2022-02-28 RX ADMIN — METRONIDAZOLE 500 MG: 500 TABLET ORAL at 21:24

## 2022-02-28 RX ADMIN — DILTIAZEM HYDROCHLORIDE 30 MG: 30 TABLET, FILM COATED ORAL at 05:53

## 2022-02-28 RX ADMIN — FOLIC ACID 1 MG: 1 TABLET ORAL at 09:48

## 2022-02-28 RX ADMIN — SERTRALINE HYDROCHLORIDE 100 MG: 50 TABLET ORAL at 21:24

## 2022-02-28 RX ADMIN — ALUMINA, MAGNESIA, AND SIMETHICONE ORAL SUSPENSION REGULAR STRENGTH 30 ML: 1200; 1200; 120 SUSPENSION ORAL at 14:06

## 2022-02-28 RX ADMIN — NICOTINE 1 PATCH: 14 PATCH, EXTENDED RELEASE TRANSDERMAL at 09:49

## 2022-02-28 RX ADMIN — IPRATROPIUM BROMIDE 0.5 MG: 0.5 SOLUTION RESPIRATORY (INHALATION) at 08:37

## 2022-02-28 RX ADMIN — IPRATROPIUM BROMIDE 0.5 MG: 0.5 SOLUTION RESPIRATORY (INHALATION) at 20:29

## 2022-02-28 RX ADMIN — MULTIPLE VITAMINS W/ MINERALS TAB 1 TABLET: TAB ORAL at 09:48

## 2022-02-28 RX ADMIN — LEVOFLOXACIN 750 MG: 750 TABLET, FILM COATED ORAL at 10:01

## 2022-02-28 RX ADMIN — DILTIAZEM HYDROCHLORIDE 120 MG: 120 CAPSULE, COATED, EXTENDED RELEASE ORAL at 09:48

## 2022-02-28 RX ADMIN — DILTIAZEM HYDROCHLORIDE 30 MG: 30 TABLET, FILM COATED ORAL at 00:25

## 2022-02-28 RX ADMIN — LEVALBUTEROL HYDROCHLORIDE 1.25 MG: 1.25 SOLUTION RESPIRATORY (INHALATION) at 20:29

## 2022-02-28 RX ADMIN — PANTOPRAZOLE SODIUM 20 MG: 20 TABLET, DELAYED RELEASE ORAL at 05:53

## 2022-02-28 NOTE — ASSESSMENT & PLAN NOTE
History of right PCA stroke that occurred while patient was on DAPT  Patient was previously on aspirin and clopidogrel  Per Prior Neurology note from 09/2021, later transitioned to Eliquis and aspirin  Eliquis was initially held on admission due to evaluation for surgical intervention  Now with CVA on MRI    Appreciate neurology input- planning for repeat CT head tomorrow  Continue to hold Eliquis, continue with Plavix and lipitor  CTA head and neck ordered to evaluate carotid disease- shows extensive disease, Vascular surgery to see

## 2022-02-28 NOTE — ASSESSMENT & PLAN NOTE
Home medication:  Albuterol inhaler  Reports COPD is uncontrolled since his [facility] Guardian prescription has not been filled recently  Continues to saturate well on RA  Plan:  Continue with albuterol as needed

## 2022-02-28 NOTE — PROGRESS NOTES
NEUROLOGY RESIDENCY PROGRESS NOTE     Name: Drew Anderson   Age & Sex: 73 y o  male   MRN: [id number]  Unit/Bed#: S -01   Encounter: [id number]    Drew Anderson will need follow up in in 6 weeks with neurovascular Resident/Attending   He will not require outpatient neurological testing  Pending for discharge: None  ASSESSMENT & PLAN     Cerebrovascular accident (CVA) due to embolism of right middle cerebral artery [facility])  Assessment & Plan  Drew Anderson is a 73 y o  male with prior CVA, most recent right PCA  occlusion and infarct s/p IV tPA (September 2021), maintained on Eliquis, known left ICA severe stenosis on Plavix, previous Right CEA, alcohol abuse, COPD, tobacco abuse who presents to [place] ED on 02/22/2022 with chest pain and reported new onset rapid Afib prior to arrival to ED per EMS  Rapid response called on 02/25/2022 for tonic-clonic seizure activity, MRI revealed- Early subacute infarct within the right frontal operculum and posterior lateral frontal lobe  Impression- Embolic in origin likely cardio-embolic d/t P-Afib and pt not on Eliquis d/t NPO status related to cholecystitis  Plan-  ·  CT head tomorrow am if stable, can start Eliquis 5 mg bid   · Continue Plavix 75 mg daily  ·  Continue Lipitor 40 mg daily    Work up-   CTA 2/27/22- severe short-segment stenosis (approximately 90% narrowing) in right common carotid artery origin due to calcified atherosclerotic disease  Unchanged severe stenosis (approximately 70% narrowing) in right common carotid artery due to calcified atherosclerotic disease  Unchanged severe short-segment stenosis (approximately 80% narrowing) in left common carotid artery origin due to heavily calcified atherosclerotic disease  Unchanged severe heavily calcified atherosclerotic disease with moderate stenosis in left carotid bifurcation    Unchanged severe short-segment stenosis left vertebral artery V4 segment due to heavily calcified atherosclerotic disease  Right carotid endarterectomy site is patent  Lab Results   Component Value Date    HGBA1C 6 0 (H) 09/02/2021   ·   · No results found for: TSH  Lab Results   Component Value Date    CHOLESTEROL 175 09/02/2021    TRIG 168 (H) 09/02/2021    HDL 58 09/02/2021    LDLCALC 83 09/02/2021   ·       Seizure [facility])  Assessment & Plan  Pita Garnica is a 73 y o  male with prior CVA, most recent right PCA  occlusion and infarct s/p IV tPA (September 2021), maintained on Eliquis, known left ICA severe stenosis on Plavix, previous Right CEA, alcohol abuse, COPD, tobacco abuse who presents to [facility] ED on 02/22/2022 with chest pain and reported new onset rapid Afib prior to arrival to ED per EMS  Rapid response called on 02/25/2022 for tonic-clonic seizure activity  2/25- First time witness tonic clonic seizure, s/p IV ativan 2 mg  Seizure described as abnormal movements in right hand, followed by confusion described as difficult with comprehension and answering questions  Movement in RUE stopped and patient progressed to a full tonic clonic seizure where eyes were reportedly deviated to the left, lasting approximately 1-3 minutes per discussion with nursing staff  CT head unremarkable  EEG- normal      Etiology of seizure d/t Rt MCA stroke in setting of p Afib and not on Eliquis d/t NPO status related to cholecystitis  Plan:  - MRI brain revealed- Early subacute infarct within the right frontal operculum and posterior lateral frontal lobe     - Routine EEG- normal  - Would not recommend initiating AEDs at this time given this is a first time event and provoked related to acute stroke   - Ativan PRN for seizure activity lasting >1 minute   - Okay to continue   - Okay to continue Plavix 75 mg daily at this time  - Seizure precautions   - Discussed seizure precautions:  · No driving, lifting heavy machinery, climbing heights, swimming unattended, hot tub baths or any other activity that will place you in danger in case of a seizure for at least six months  - [employer] form completed and faxed on 02/25/2022  · Patients sister reports patient does not drive since last stroke  - Medical management and supportive care per primary team, notify with changes     PAF (paroxysmal atrial fibrillation) ([facility] [address] )  Assessment & Plan  - New onset Afib noted this admission  - On Eliquis 5 mg BID at baseline prior to this admission for embolic stroke  - It was on hold due to NPO status in the setting of acute cholecystitis  - Management per primary team     Hx of CVA (cerebral vascular accident) [facility])  Assessment & Plan  - History of multiple strokes in the past   - Most recent stroke in September 2021; patient found to have a Right PCA occlusion and received IV tPA  · Patient was on DAPT (ASA and Plavix) at that time  During that admission, ASA was discontinued, patient was started on Eliquis 5 mg BID, and was instructed to continue Plavix 75 mg daily     Alcohol abuse  Assessment & Plan  - CIWA protocol   - No signs of withdrawal per discussion with ICU team   -Thiamine 100 mg daily   - Management per primary team     Tobacco use  Assessment & Plan  - Smoking cessation education     * Acute Cholecystitis  Assessment & Plan  - Acute cholecystitis noted on presentation  - S/p ERCP on 02/23, 2 gallstones removed  - No plan for  inpatient surgical intervention at this time, plan for outpatient laparoscopic cholecystectomy  - Continue to monitor LFTs  - Medical management per ICU team            SUBJECTIVE     Patient was seen and examined  No acute events overnight  doing well, denies any new symptoms, vitals remained stable  He says he would like to go home  Also says that he would like to cut down alcohol and mostly stop it       Pertinent Negatives include: numbness, weakness, speech or visual changes, headaches, amaurosis, diplopia, other visual changes, paralysis/weakness, numbness or tingling, involuntary movements, tremor, speech impairment         OBJECTIVE     Patient ID: Susan Welch is a 73 y o  male  Vitals:    22 2149 22 0553 22 0700 22 0840   BP: 160/75 163/77 119/55    BP Location:   Right arm    Pulse: 96  76    Resp: 16  18    Temp: 97 8 °F (36 6 °C)  97 5 °F (36 4 °C)    TempSrc: Oral  Oral    SpO2: 97%  99% 99%   Weight:          Temperature:   Temp (24hrs), Av 8 °F (36 6 °C), Min:97 5 °F (36 4 °C), Max:98 1 °F (36 7 °C)    Temperature: 97 5 °F (36 4 °C)      Physical Exam  Vitals and nursing note reviewed  Constitutional:       Appearance: He is well-developed  HENT:      Head: Normocephalic and atraumatic  Eyes:      Conjunctiva/sclera: Conjunctivae normal    Cardiovascular:      Heart sounds: No murmur heard  Pulmonary:      Effort: Pulmonary effort is normal    Abdominal:      Palpations: Abdomen is soft  Musculoskeletal:      Cervical back: Neck supple  Skin:     General: Skin is warm and dry  Neurological:      Mental Status: He is alert  Neurological Examination:     Mental Status: The patient was awake, alert, attentive, oriented to person, place, and time  No dysarthria or aphasia noted  Cranial Nerves:   I: smell Not tested   II: visual fields Full to confrontation  Pupils equal, round, reactive to light with normal accomodation  III,IV,VI: extraocular muscles EOMI, no nystagmus   V: masseter and pterygoid strength full  Sensation in the V1 through V3 distributions intact to pinprick and light touch bilaterally  VII: Face is symmetric with no weakness noted  VIII: Audition intact to finger rub bilaterally  IX/X: Uvula midline  Soft palate elevation symmetric  XI: Trapezius and SCM strength 5/5 B/L  XII: Tongue midline with no atrophy or fasciculations with appropriate movement  Motor Examination:   No pronator drift  Bulk: Normal  No atrophy Tone: Normal  Fasciculations: None        Deltoid Biceps Triceps WE   WF   FF IO Right        5         5          5         5      5      5   5        Left           5        5          5          5      5     5   5                       IP        Quad   Ham     TA       Gastroc   Right      5            5          5         5                5  Left         5            5         5         5                5       Reflexes:                   Biceps Brachioradialis Triceps Patella Achilles Plantars   Right          2+            2+                  2+        2+       2+         Down   Left            2+             2+                 2+         2+       2+         Down     Clonus: None    Coordination: Patient able to perform normal finger-to-nose and heel to shin appropriately  Normal rapid alternating movements  Sensory: Normal sensation to light touch, pin prick  throughout  Gait: deferred     LABORATORY DATA     Labs: I have personally reviewed pertinent reports  Results from last 7 days   Lab Units 02/28/22  1017 02/26/22 0225 02/25/22  0602 02/24/22  0555 02/24/22  0555   WBC Thousand/uL 7 42 7 97 5 82   < > 4 58   HEMOGLOBIN g/dL 13 8 16 3 12 7   < > 10 8*   HEMATOCRIT % 42 5 48 0 40 0   < > 33 4*   PLATELETS Thousands/uL 248 302 225   < > 171   NEUTROS PCT %  --  77* 75  --  88*   MONOS PCT %  --  7 7  --  4    < > = values in this interval not displayed  Results from last 7 days   Lab Units 02/28/22  1017 02/26/22 0225 02/25/22  0602 02/24/22  0555 02/24/22  0555   SODIUM mmol/L 136 136 139   < > 131*   POTASSIUM mmol/L 3 6 3 3* 4 1   < > 4 2   CHLORIDE mmol/L 99* 95* 101   < > 100   CO2 mmol/L 25 28 28   < > 23   BUN mg/dL 21 14 16   < > 13   CREATININE mg/dL 1 15 1 05 1 16   < > 1 09   CALCIUM mg/dL 9 4 10 1 10 0   < > 8 8   ALK PHOS U/L  --  446* 390*  --  416*   ALT U/L  --  69 68  --  84*   AST U/L  --  84* 60*  --  84*    < > = values in this interval not displayed       Results from last 7 days   Lab Units 02/28/22  1017 02/26/22 0225 02/22/22  0000   MAGNESIUM mg/dL 1 3* 2 0 1 2*     Results from last 7 days   Lab Units 02/26/22  0225   PHOSPHORUS mg/dL 3 9                    IMAGING & DIAGNOSTIC TESTING     Radiology Results: I have personally reviewed pertinent reports  CTA head and neck w wo contrast   Final Result by Linda Vinson MD (02/28 6052)      Evolving acute infarct in right frontal operculum  No acute intracranial hemorrhage  Old infarct in right occipital lobe with unchanged right focal distal PCA focal occlusion  Unchanged severe short-segment stenosis (approximately 90% narrowing) in right common carotid artery origin due to calcified atherosclerotic disease  Unchanged severe stenosis (approximately 70% narrowing) in right common carotid artery due to calcified atherosclerotic disease  Unchanged severe short-segment stenosis (approximately 80% narrowing) in left common carotid artery origin due to heavily calcified atherosclerotic disease  Unchanged severe heavily calcified atherosclerotic disease with moderate stenosis in left carotid bifurcation  Unchanged severe short-segment stenosis left vertebral artery V4 segment due to heavily calcified atherosclerotic disease  Right carotid endarterectomy site is patent  Additional chronic/incidental findings as detailed above  The study was marked in [facility] for immediate notification  Workstation performed: [id number]         MRI brain seizure wo contrast   Final Result by Arley Ferris DO (02/27 6435)      Early subacute infarct within the right frontal operculum and posterior lateral frontal lobe see series 3 images 17 through 26 without hemorrhagic transformation  Moderate chronic microangiopathic change noted within the brain parenchyma  There is an old infarct within the right occipital lobe anteriorly        This examination was marked "immediate notification" in Epic in order to begin the standard process by which the radiology reading room liaison alerts the referring practitioner  Workstation performed: [id number]         CT head wo contrast   Final Result by Carey Zeng MD (02/25 1139)      Study slightly limited by patient motion artifact  No acute intracranial abnormality  Workstation performed: [id number]         FL ERCP biliary only   Final Result by Darren Sahu MD (02/24 0720)      Please see procedure report for further details  Workstation performed: [id number]         US right upper quadrant   Final Result by Pam Castro MD (02/22 1612)      Cholelithiasis and choledocholithiasis  Positive Godoy's sign with gallbladder wall thickening and edema and potentially tiny amount of pericholecystic fluid  These latter findings are most indicative of acute cholecystitis       The examination demonstrates a significant  finding and was documented as such in Epic for liaison and referring practitioner immediate notification  Workstation performed: [id number]         CTA dissection protocol chest/abdomen/pelvis   Final Result by Esther Toth MD (02/22 0830)      Gallbladder wall thickening with edema and cholelithiasis, evaluate for acute cholecystitis      Biliary dilation common bile duct measuring 1 4 cm, concerning for biliary obstruction, MRCP may be considered to evaluate for choledocholithiasis      No thoracic aortic dissection      No pulmonary embolism      Opacification of the segmental bronchi of the left lower lobe, image 83 series 3, short interval follow-up at 3 months suggested this may be due to secretions or due to focal lesion      Incidentally detected the less than 6 mm nodules in the both lung with the most prominent in the subpleural region in the left upper lobe  Based on current Fleischner Society 2017 Guidelines on incidental pulmonary nodule,  follow-up CT at 12 months can    be considered           Mild reticulation in the subpleural region, raises concern for mild interstitial lung disease      Hepatic steatosis      Again noted is thickening of the wall of the stomach as seen on the previous study, remains indeterminate      Atherosclerotic disease in the aorta with the suggestion of more than 50% stenosis in the right common iliac artery   The study was marked in EPIC for immediate notification  Workstation performed: [id number]         X-ray chest 1 view portable   Final Result by Luzmaria Cassidy MD (02/22 1441)      No acute cardiopulmonary disease  Workstation performed: [id number]         VAS carotid complete study    (Results Pending)   CT head wo contrast    (Results Pending)       Other Diagnostic Testing: I have personally reviewed pertinent reports        ACTIVE MEDICATIONS     Current Facility-Administered Medications   Medication Dose Route Frequency    acetaminophen (TYLENOL) tablet 650 mg  650 mg Oral Q6H PRN    albuterol (PROVENTIL HFA,VENTOLIN HFA) inhaler 1 puff  1 puff Inhalation Q6H PRN    ALPRAZolam (XANAX) tablet 0 25 mg  0 25 mg Oral HS PRN    aluminum-magnesium hydroxide-simethicone (MYLANTA) oral suspension 30 mL  30 mL Oral Q4H PRN    atorvastatin (LIPITOR) tablet 40 mg  40 mg Oral Daily With Dinner    carvedilol (COREG) tablet 25 mg  25 mg Oral BID With Meals    clopidogrel (PLAVIX) tablet 75 mg  75 mg Oral Daily    diltiazem (CARDIZEM CD) 24 hr capsule 120 mg  120 mg Oral Daily    folic acid (FOLVITE) tablet 1 mg  1 mg Oral Daily    hydrALAZINE (APRESOLINE) injection 10 mg  10 mg Intravenous Q6H PRN    ipratropium (ATROVENT) 0 02 % inhalation solution 0 5 mg  0 5 mg Nebulization TID    levalbuterol (XOPENEX) inhalation solution 1 25 mg  1 25 mg Nebulization TID    levofloxacin (LEVAQUIN) tablet 750 mg  750 mg Oral Q24H    metroNIDAZOLE (FLAGYL) tablet 500 mg  500 mg Oral Q8H [facility]    multivitamin-minerals (CENTRUM) tablet 1 tablet  1 tablet Oral Daily    nicotine (NICODERM CQ) 14 mg/24hr TD 24 hr patch 1 patch  1 patch Transdermal Daily    nitroglycerin (NITROSTAT) SL tablet 0 4 mg  0 4 mg Sublingual Q5 Min PRN    ondansetron (ZOFRAN) injection 4 mg  4 mg Intravenous Q6H PRN    pantoprazole (PROTONIX) EC tablet 20 mg  20 mg Oral Early Morning    sertraline (ZOLOFT) tablet 100 mg  100 mg Oral HS    simethicone (MYLICON) chewable tablet 80 mg  80 mg Oral 4x Daily PRN    sodium chloride (PF) 0 9 % injection 3 mL  3 mL Intravenous Q1H PRN    thiamine tablet 100 mg  100 mg Oral Daily       Prior to Admission medications    Medication Sig Start Date End Date Taking?  Authorizing Provider   aclidinium Imtiaz Kim Pressair) 400 MCG/ACT inhaler Inhale 1 puff 2 (two) times a day     Yes Historical Provider, MD   albuterol (PROVENTIL HFA,VENTOLIN HFA) 90 mcg/act inhaler 1 puff every 6 (six) hours as needed   10/8/21  Yes Historical Provider, MD   ALPRAZolam (XANAX) 0 25 mg tablet TAKE 1 TABLET BY ORAL ROUTE 4 TIMES EVERY DAY AS NEEDED 6/15/21  Yes Historical Provider, MD   apixaban (ELIQUIS) 5 mg Take 1 tablet (5 mg total) by mouth 2 (two) times a day 9/17/21  Yes Chai Cannon MD   acetaminophen (TYLENOL) 325 mg tablet Take 2 tablets (650 mg total) by mouth every 6 (six) hours as needed for mild pain  Patient not taking: Reported on 2/22/2022 4/14/21   Reynold Barber PA-C   atorvastatin (LIPITOR) 80 mg tablet Take 1 tablet (80 mg total) by mouth daily 9/6/21 10/22/21  Erwin Sotomayor MD   baclofen 10 mg tablet 10 mg 3 (three) times a day as needed   11/3/21   Historical Provider, MD   carvedilol (COREG) 12 5 mg tablet Take 12 5 mg by mouth 2 (two) times a day with meals  Patient not taking: Reported on 11/10/2021     Historical Provider, MD   clopidogrel (PLAVIX) 75 mg tablet TAKE 1 TABLET BY MOUTH EVERY DAY FOR 19 DOSES  Patient not taking: Reported on 11/10/2021 7/28/21   Everett Peaches, PA-C   fenofibrate 160 MG tablet Take 160 mg by mouth daily 6/25/21   Historical Provider, MD   ferrous sulfate 324 (65 Fe) mg Take 1 tablet (324 mg total) by mouth daily before breakfast 9/6/21   Francia Moser MD   loratadine (CLARITIN) 10 mg tablet Take 1 tablet (10 mg total) by mouth daily 9/6/21 11/10/21  Silva Real MD   nicotine (NICODERM CQ) 14 mg/24hr TD 24 hr patch Place 1 patch on the skin daily 9/7/21   Silva Real MD   omeprazole (PriLOSEC) 20 mg delayed release capsule Take 20 mg by mouth daily   10/4/21   Historical Provider, MD   pantoprazole (PROTONIX) 40 mg tablet Take 1 tablet (40 mg total) by mouth daily in the early morning 9/6/21 10/22/21  Silva Real MD   pravastatin (PRAVACHOL) 40 mg tablet Take 40 mg by mouth daily   10/22/21   Historical Provider, MD   senna (SENOKOT) 8 6 mg Take 1 tablet (8 6 mg total) by mouth daily at bedtime as needed for constipation  Patient not taking: Reported on 9/15/2021 9/6/21   Francia Moser MD   sertraline (ZOLOFT) 100 mg tablet Take 100 mg by mouth daily   1/19/11   Historical Provider, MD   thiamine 100 MG tablet Take 1 tablet (100 mg total) by mouth daily 3/31/21 11/10/21  Jesse Price MD       VTE Mechanical Prophylaxis: sequential compression device    ==  MD [facility] Res[facility], PGY-3

## 2022-02-28 NOTE — ASSESSMENT & PLAN NOTE
Geni Posey is a 73 y o  male with prior CVA, most recent right PCA  occlusion and infarct s/p IV tPA (September 2021), maintained on Eliquis, known left ICA severe stenosis on Plavix, previous Right CEA, alcohol abuse, COPD, tobacco abuse who presents to [facility] ED on 02/22/2022 with chest pain and reported new onset rapid Afib prior to arrival to ED per EMS  Rapid response called on 02/25/2022 for tonic-clonic seizure activity, MRI revealed- Early subacute infarct within the right frontal operculum and posterior lateral frontal lobe  Impression- Embolic in origin likely cardio-embolic d/t P-Afib and pt not on Eliquis d/t NPO status related to cholecystitis  Plan-  ·  CT head stable this am   · Start Eliquis 5 mg bid   · Continue Plavix 75 mg daily  ·  Continue Lipitor 40 mg daily  · PT/OT   · Follow up outpatient vascular neurology  ·  No other inpatient recs  pls call for questions or concerns  Work up-   CTA 2/27/22- severe short-segment stenosis (approximately 90% narrowing) in right common carotid artery origin due to calcified atherosclerotic disease  Unchanged severe stenosis (approximately 70% narrowing) in right common carotid artery due to calcified atherosclerotic disease  Unchanged severe short-segment stenosis (approximately 80% narrowing) in left common carotid artery origin due to heavily calcified atherosclerotic disease  Unchanged severe heavily calcified atherosclerotic disease with moderate stenosis in left carotid bifurcation  Unchanged severe short-segment stenosis left vertebral artery V4 segment due to heavily calcified atherosclerotic disease  Right carotid endarterectomy site is patent        Lab Results   Component Value Date    HGBA1C 6 0 (H) 09/02/2021   ·   · No results found for: TSH  Lab Results   Component Value Date    CHOLESTEROL 175 09/02/2021    TRIG 168 (H) 09/02/2021    HDL 58 09/02/2021    LDLCALC 83 09/02/2021   ·

## 2022-02-28 NOTE — PLAN OF CARE
Problem: Potential for Falls  Goal: Patient will remain free of falls  Description: INTERVENTIONS:  - Educate patient/family on patient safety including physical limitations  - Instruct patient to call for assistance with activity   - Consult OT/PT to assist with strengthening/mobility   - Keep Call bell within reach  - Keep bed low and locked with side rails adjusted as appropriate  - Keep care items and personal belongings within reach  - Initiate and maintain comfort rounds  - Make Fall Risk Sign visible to staff     - Apply yellow socks and bracelet for high fall risk patients  - Consider moving patient to room near nurses station  Outcome: Progressing     Problem: NEUROSENSORY - ADULT  Goal: Remains free of injury related to seizures activity  Description: INTERVENTIONS  - Maintain airway, patient safety  and administer oxygen as ordered  - Monitor patient for seizure activity, document and report duration and description of seizure to physician/advanced practitioner  - If seizure occurs,  ensure patient safety during seizure  - Reorient patient post seizure  - Seizure pads on all 4 side rails  - Instruct patient/family to notify RN of any seizure activity including if an aura is experienced  - Instruct patient/family to call for assistance with activity based on nursing assessment  - Administer anti-seizure medications if ordered    Outcome: Progressing  Goal: Achieves maximal functionality and self care  Description: INTERVENTIONS  - Monitor swallowing and airway patency with patient fatigue and changes in neurological status  - Encourage and assist patient to increase activity and self care     - Encourage visually impaired, hearing impaired and aphasic patients to use assistive/communication devices  Outcome: Progressing     Problem: METABOLIC, FLUID AND ELECTROLYTES - ADULT  Goal: Electrolytes maintained within normal limits  Description: INTERVENTIONS:  - Monitor labs and assess patient for signs and symptoms of electrolyte imbalances  - Administer electrolyte replacement as ordered  - Monitor response to electrolyte replacements, including repeat lab results as appropriate  - Instruct patient on fluid and nutrition as appropriate  Outcome: Progressing  Goal: Fluid balance maintained  Description: INTERVENTIONS:  - Monitor labs   - Monitor I/O and WT  - Instruct patient on fluid and nutrition as appropriate  - Assess for signs & symptoms of volume excess or deficit  Outcome: Progressing     Problem: HEMATOLOGIC - ADULT  Goal: Maintains hematologic stability  Description: INTERVENTIONS  - Assess for signs and symptoms of bleeding or hemorrhage  - Monitor labs  - Administer supportive blood products/factors as ordered and appropriate  Outcome: Progressing     Problem: PAIN - ADULT  Goal: Verbalizes/displays adequate comfort level or baseline comfort level  Description: Interventions:  - Encourage patient to monitor pain and request assistance  - Assess pain using appropriate pain scale  - Administer analgesics based on type and severity of pain and evaluate response  - Implement non-pharmacological measures as appropriate and evaluate response  - Consider cultural and social influences on pain and pain management  - Notify physician/advanced practitioner if interventions unsuccessful or patient reports new pain  Outcome: Progressing

## 2022-02-28 NOTE — ASSESSMENT & PLAN NOTE
PMHx of HTN (patient is poor historian, cannot recall taking any antihypertensive medication)      Plan:  Continue carvedilol

## 2022-02-28 NOTE — CONSULTS
Consultation -Vascular Surgery  Shantell Carlisle 73 y o  male MRN: [id number]  Unit/Bed#: S -01 Encounter: [id number]        ASSESSMENT:  74 yo M PMH ETOH abuse, multiple CVAs, b/l ALPHONSO s/p R CEA now s/p seizure, new infarct  pAF on arrival to ED 2/22    MRI brain: Early subacute infarct within the right frontal operculum and posterior lateral frontal lobe     CTA head/neck: unchanged severe stenosis in R CCA, L CCA, L carotid bifurcation, L vertebral A  R CEA site patent    Plan:  - will obtain carotid duplex  - Eliquis to be restarted tomorrow if CT head stable, per neurology  - continue statin/Plavix    Rest of care per primary      Reason for Consult / Principal Problem: ALPHONSO    HPI: Shantell Carlisle is a 73y o  year old male PMH ETOH abuse, COPD, GERD, HTN, multiple CVAs, b/l ALPHONSO s/p [facility] consulted for b/l ALPHONSO  Pt was admitted 2/22/22 for acute cholecystitis, choledocholithiasis  He underwent ERCP and was being managed with abx  He had been improving and was close to discharge when pt had a seizure  Stroke work up revealed acute infarct  Pt has known severe ALPHONSO b/l  Has been managed on Eliquis/plavix as outpt  Of note, pt had pAF on presentation to hospital with return to NSR in ED  He does not have any prior hx of afib in the past  His Eliquis was held from admission for ERCP and restarted 2/25 pm, after seizure  Review of Systems   Constitutional: Negative for chills and fever  Respiratory: Negative  Gastrointestinal: Negative for abdominal pain  Genitourinary: Negative  Musculoskeletal: Negative for neck pain  Skin: Negative for color change  Neurological: Positive for seizures  Negative for dizziness, facial asymmetry, speech difficulty, weakness and headaches  Psychiatric/Behavioral: Negative for confusion  All other systems reviewed and are negative        Historical Information   Past Medical History:   Diagnosis Date    PRITI (acute kidney injury) ([address] ) 9/5/2021    Anxiety     Back pain     Claustrophobia     COPD (chronic obstructive pulmonary disease) (HCC)     Dysphagia 9/5/2021    GERD (gastroesophageal reflux disease)     Hypertension     Hypokalemia 2/22/2022    Lumbar back pain     Panic attacks     Stenosis of right carotid artery     Stroke [facility])     Wears glasses     Wears partial dentures     upper denture     Past Surgical History:   Procedure Laterality Date    COLONOSCOPY      IR CEREBRAL ANGIOGRAPHY  4/6/2021    OTHER SURGICAL HISTORY      fertility    MT SLCTV CATHJ 3RD+ ORD SLCTV ABDL PEL/LXTR [facility] Right 4/6/2021    Procedure: ARTERIOGRAM, carotid Angio;  Surgeon: Bri Baugh MD;  Location: [facility];  Service: Vascular    MT THROMBOENDARTECTMY [place] [facility] Right 4/13/2021    Procedure: RIGHT ENDARTERECTOMY ARTERY CAROTID WITH BOVINE PATCH;  Surgeon: Bri Baugh MD;  Location: [facility];  Service: Vascular     Social History   Social History     Substance and Sexual Activity   Alcohol Use Yes     Social History     Substance and Sexual Activity   Drug Use Never     Social History     Tobacco Use   Smoking Status Light Tobacco Smoker    Packs/day: 0 25   Smokeless Tobacco Never Used   Tobacco Comment    also wears nicotine patches     History reviewed  No pertinent family history      Meds/Allergies     Medications Prior to Admission   Medication    aclidinium [address]) 400 MCG/ACT inhaler    albuterol (PROVENTIL HFA,VENTOLIN HFA) 90 mcg/act inhaler    ALPRAZolam (XANAX) 0 25 mg tablet    apixaban (ELIQUIS) 5 mg    acetaminophen (TYLENOL) 325 mg tablet    atorvastatin (LIPITOR) 80 mg tablet    baclofen 10 mg tablet    carvedilol (COREG) 12 5 mg tablet    clopidogrel (PLAVIX) 75 mg tablet    fenofibrate 160 MG tablet    ferrous sulfate 324 (65 Fe) mg    loratadine (CLARITIN) 10 mg tablet    nicotine (NICODERM CQ) 14 mg/24hr TD 24 hr patch    omeprazole (PriLOSEC) 20 mg delayed release capsule    pantoprazole (PROTONIX) 40 mg tablet    pravastatin (PRAVACHOL) 40 mg tablet    senna (SENOKOT) 8 6 mg    sertraline (ZOLOFT) 100 mg tablet    thiamine 100 MG tablet     Current Facility-Administered Medications   Medication Dose Route Frequency    acetaminophen (TYLENOL) tablet 650 mg  650 mg Oral Q6H PRN    albuterol (PROVENTIL HFA,VENTOLIN HFA) inhaler 1 puff  1 puff Inhalation Q6H PRN    ALPRAZolam (XANAX) tablet 0 25 mg  0 25 mg Oral HS PRN    aluminum-magnesium hydroxide-simethicone (MYLANTA) oral suspension 30 mL  30 mL Oral Q4H PRN    atorvastatin (LIPITOR) tablet 40 mg  40 mg Oral Daily With Dinner    carvedilol (COREG) tablet 25 mg  25 mg Oral BID With Meals    clopidogrel (PLAVIX) tablet 75 mg  75 mg Oral Daily    diltiazem (CARDIZEM CD) 24 hr capsule 120 mg  120 mg Oral Daily    folic acid (FOLVITE) tablet 1 mg  1 mg Oral Daily    hydrALAZINE (APRESOLINE) injection 10 mg  10 mg Intravenous Q6H PRN    ipratropium (ATROVENT) 0 02 % inhalation solution 0 5 mg  0 5 mg Nebulization TID    levalbuterol (XOPENEX) inhalation solution 1 25 mg  1 25 mg Nebulization TID    levofloxacin (LEVAQUIN) tablet 750 mg  750 mg Oral Q24H    metroNIDAZOLE (FLAGYL) tablet 500 mg  500 mg Oral Q8H [address]    multivitamin-minerals (CENTRUM) tablet 1 tablet  1 tablet Oral Daily    nicotine (NICODERM CQ) 14 mg/24hr TD 24 hr patch 1 patch  1 patch Transdermal Daily    nitroglycerin (NITROSTAT) SL tablet 0 4 mg  0 4 mg Sublingual Q5 Min PRN    ondansetron (ZOFRAN) injection 4 mg  4 mg Intravenous Q6H PRN    pantoprazole (PROTONIX) EC tablet 20 mg  20 mg Oral Early Morning    sertraline (ZOLOFT) tablet 100 mg  100 mg Oral HS    simethicone (MYLICON) chewable tablet 80 mg  80 mg Oral 4x Daily PRN    sodium chloride (PF) 0 9 % injection 3 mL  3 mL Intravenous Q1H PRN    thiamine tablet 100 mg  100 mg Oral Daily       Allergies   Allergen Reactions    Penicillins Anaphylaxis       Objective Blood pressure 119/55, pulse 76, temperature 97 5 °F (36 4 °C), temperature source Oral, resp  rate 18, weight 74 3 kg (163 lb 12 8 oz), SpO2 99 %  Intake/Output Summary (Last 24 hours) at 2/28/2022 1309  Last data filed at 2/27/2022 1835  Gross per 24 hour   Intake 160 ml   Output --   Net 160 ml       PHYSICAL EXAM  Physical Exam  Vitals and nursing note reviewed  Constitutional:       General: He is not in acute distress  Appearance: Normal appearance  He is normal weight  He is not ill-appearing, toxic-appearing or diaphoretic  HENT:      Head: Normocephalic and atraumatic  Neck:      Comments: Incision from prior R CEA well healed  Cardiovascular:      Rate and Rhythm: Normal rate  Pulmonary:      Effort: Pulmonary effort is normal  No respiratory distress  Musculoskeletal:         General: No swelling or tenderness  Cervical back: Neck supple  Skin:     General: Skin is warm and dry  Capillary Refill: Capillary refill takes less than 2 seconds  Neurological:      General: No focal deficit present  Mental Status: He is alert and oriented to person, place, and time  Psychiatric:         Mood and Affect: Mood normal          Behavior: Behavior normal        Strength 5/5 b/l Upper and lower extremities    Lab Results:   I have personally reviewed pertinent lab results    , CBC:   Lab Results   Component Value Date    WBC 7 42 02/28/2022    HGB 13 8 02/28/2022    HCT 42 5 02/28/2022    MCV 86 02/28/2022     02/28/2022    MCH 28 0 02/28/2022    MCHC 32 5 02/28/2022    RDW 13 7 02/28/2022    MPV 10 3 02/28/2022   , CMP:   Lab Results   Component Value Date    SODIUM 136 02/28/2022    K 3 6 02/28/2022    CL 99 (L) 02/28/2022    CO2 25 02/28/2022    BUN 21 02/28/2022    CREATININE 1 15 02/28/2022    CALCIUM 9 4 02/28/2022    EGFR 66 02/28/2022   , Coagulation: No results found for: PT, INR, APTT, Urinalysis: No results found for: Neris Poole, Ronald Bustillos, Michell Saavedra, NITRITE, PROTEINUA, Redgie Fret, BLOODU  Imaging: I have personally reviewed pertinent reports  2/27/22 CTA head/neck: IMPRESSION:  Evolving acute infarct in right frontal operculum  No acute intracranial hemorrhage  Old infarct in right occipital lobe with unchanged right focal distal PCA focal occlusion  Unchanged severe short-segment stenosis (approximately 90% narrowing) in right common carotid artery origin due to calcified atherosclerotic disease  Unchanged severe stenosis (approximately 70% narrowing) in right common carotid artery due to calcified atherosclerotic disease  Unchanged severe short-segment stenosis (approximately 80% narrowing) in left common carotid artery origin due to heavily calcified atherosclerotic disease  Unchanged severe heavily calcified atherosclerotic disease with moderate stenosis in left carotid bifurcation  Unchanged severe short-segment stenosis left vertebral artery V4 segment due to heavily calcified atherosclerotic disease  Right carotid endarterectomy site is patent        EKG, Pathology, and Other Studies: I have personally reviewed pertinent reports  Counseling / Coordination of Care  Total time spent today  30 minutes  Greater than 50% of total time was spent with the patient and / or family counseling and / or coordination of care           Sammy Velasquez PA-C  2/28/2022 1:09 PM

## 2022-02-28 NOTE — ASSESSMENT & PLAN NOTE
MRI shows CVA of frontal lobe  Appreciate neuro input- planning for repeat CT head tomorrow to monitor for progression or conversion   If stable, may continue Eliquis on discharge  Continue plavix and Lipitor  Continue to hold eliquis

## 2022-02-28 NOTE — ASSESSMENT & PLAN NOTE
POA:  Worsening intermittent abdominal pain for 2 weeks  Reports of chills and nausea/vomiting  CTA chest/abdomen/pelvis:  Gallbladder wall thickening with edema and cholelithiasis  RUQ U/S: Cholelithiasis and choledocholithiasis  Positive Godoy's sign with gallbladder wall thickening and edema and potentially tiny amount of pericholecystic fluid  Leukocytosis continues to have resolved and LFTs have now trended down even more  Antibiotic treatment with IV Ancef (D3) and Flagyl (D3); on LR  Gastroenterology consulted, and ERCP performed - 2 gallstones removed and common bile duct was dilated to 14 mm  Plan:  Discontinued IV fluids due to patient tolerating diet  Per General Surgery, patient to follow up outpatient for possible cholecystectomy  Transitioned to PO abx (Levaquin and flagyl) and will complete a total of 10 days of abx  Per GI, will continue to monitor LFTs closely  Continue to monitor vitals and trend for any fevers  Zofran PRN

## 2022-02-28 NOTE — ASSESSMENT & PLAN NOTE
Prior to admission, found to be in AFib and responded to Cardizem  By the time the patient was in the ED, AFib had resolved to normal sinus rhythm     Eliquis was held on admission due to potential for surgical intervention  Will continue to hold Eliquis given CVA and risk for hemorrhagic conversion  Monitor on telemetry  Continue with diltiazem and Coreg

## 2022-02-28 NOTE — PROGRESS NOTES
[facility]  Progress Note - Susan Welch 1955, 73 y o  male MRN: [id number]  Unit/Bed#: S -01 Encounter: [id number]  Primary Care Provider: Larissa Hoover DO   Date and time admitted to hospital: 2/22/2022  9:31 AM    Seizure [facility])  Assessment & Plan  Patient had seizure like episode that may have been due to an acute CVA, which is seen on MRI  Appreciate neurology input  Continue seizure precautions  Will continue without AED at this time    PAF (paroxysmal atrial fibrillation) [address] [address] )  Assessment & Plan  Prior to admission, found to be in AFib and responded to Cardizem  By the time the patient was in the ED, AFib had resolved to normal sinus rhythm  Eliquis was held on admission due to potential for surgical intervention  Will continue to hold Eliquis given CVA and risk for hemorrhagic conversion  Monitor on telemetry  Continue with diltiazem and Coreg    Benign essential hypertension  Assessment & Plan  PMHx of HTN (patient is poor historian, cannot recall taking any antihypertensive medication)  Plan:  Continue carvedilol    Hx of CVA (cerebral vascular accident) [facility])  Assessment & Plan  History of right PCA stroke that occurred while patient was on DAPT  Patient was previously on aspirin and clopidogrel  Per Prior Neurology note from 09/2021, later transitioned to Eliquis and aspirin  Eliquis was initially held on admission due to evaluation for surgical intervention  Now with CVA on MRI  Appreciate neurology input- planning for repeat CT head tomorrow  Continue to hold Eliquis, continue with Plavix and lipitor  CTA head and neck ordered to evaluate carotid disease- shows extensive disease, Vascular surgery to see    Cerebrovascular accident (CVA) due to embolism of right middle cerebral artery [facility])  Assessment & Plan  MRI shows CVA of frontal lobe  Appreciate neuro input- planning for repeat CT head tomorrow to monitor for progression or conversion   If stable, may continue Eliquis on discharge  Continue plavix and Lipitor  Continue to hold eliquis     COPD (chronic obstructive pulmonary disease) ([facility] [address] )  Assessment & Plan  Home medication:  Albuterol inhaler  Reports COPD is uncontrolled since his [place] [employer] prescription has not been filled recently  Continues to saturate well on RA  Plan:  Continue with albuterol as needed  Alcohol abuse  Assessment & Plan  Patient has a known hx of Alcohol abuse  - Last drink at 04 AM on 02/22/2022  - Inebriated during initial encounter in ED    - AAOx3 today  No significant tremors observed  Plan:   - CIWA Protocol  - Ativan   - Monitor vitals  - Monitor for DT's    Tobacco use  Assessment & Plan  Patient reports that he used to smoke 1 5 PPD  Has now cut back to smoking 3 cigarettes a day  Plan:  Continue with nicotine patch  Continue encouraging smoking cessation  * Acute Cholecystitis  Assessment & Plan  POA:  Worsening intermittent abdominal pain for 2 weeks  Reports of chills and nausea/vomiting  CTA chest/abdomen/pelvis:  Gallbladder wall thickening with edema and cholelithiasis  RUQ U/S: Cholelithiasis and choledocholithiasis  Positive Godoy's sign with gallbladder wall thickening and edema and potentially tiny amount of pericholecystic fluid  Leukocytosis continues to have resolved and LFTs have now trended down even more  Antibiotic treatment with IV Ancef (D3) and Flagyl (D3); on LR  Gastroenterology consulted, and ERCP performed - 2 gallstones removed and common bile duct was dilated to 14 mm  Plan:  Discontinued IV fluids due to patient tolerating diet  Per General Surgery, patient to follow up outpatient for possible cholecystectomy  Transitioned to PO abx (Levaquin and flagyl) and will complete a total of 10 days of abx  Per GI, will continue to monitor LFTs closely  Continue to monitor vitals and trend for any fevers  Zofran PRN        VTE Pharmacologic Prophylaxis: VTE Score: 4 Moderate Risk (Score 3-4) - Pharmacological DVT Prophylaxis Contraindicated  Sequential Compression Devices Ordered  Patient Centered Rounds: I performed bedside rounds with nursing staff today  Discussions with Specialists or Other Care Team Provider: neurology     Education and Discussions with Family / Patient: Attempted to update  (sister) via phone  Left voicemail  Time Spent for Care: 30 minutes  More than 50% of total time spent on counseling and coordination of care as described above  Current Length of Stay: 6 day(s)  Current Patient Status: Inpatient   Certification Statement: The patient will continue to require additional inpatient hospital stay due to further imaging and evaluation by vascular surgery  Discharge Plan: Anticipate discharge in 24-48 hrs to home  Code Status: Level 1 - Full Code    Subjective:   Patient has no complaints this morning  He states he feels well  No further seizure-like episodes  He denies any chest pain, shortness breast, headedness nausea vomiting  Objective:     Vitals:   Temp (24hrs), Av 8 °F (36 6 °C), Min:97 5 °F (36 4 °C), Max:98 1 °F (36 7 °C)    Temp:  [97 5 °F (36 4 °C)-98 1 °F (36 7 °C)] 97 5 °F (36 4 °C)  HR:  [70-96] 76  Resp:  [16-18] 18  BP: (119-163)/(55-77) 119/55  SpO2:  [96 %-100 %] 99 %  Body mass index is 24 19 kg/m²  Input and Output Summary (last 24 hours): Intake/Output Summary (Last 24 hours) at 2022 1354  Last data filed at 2022 1835  Gross per 24 hour   Intake 160 ml   Output --   Net 160 ml       Physical Exam:   Physical Exam  Vitals and nursing note reviewed  Constitutional:       Appearance: Normal appearance  He is normal weight  HENT:      Head: Normocephalic and atraumatic  Right Ear: External ear normal       Left Ear: External ear normal       Nose: Nose normal       Mouth/Throat:      Mouth: Mucous membranes are moist       Pharynx: Oropharynx is clear     Eyes: Conjunctiva/sclera: Conjunctivae normal       Pupils: Pupils are equal, round, and reactive to light  Cardiovascular:      Rate and Rhythm: Normal rate and regular rhythm  Pulses: Normal pulses  Heart sounds: Normal heart sounds  Pulmonary:      Effort: Pulmonary effort is normal       Breath sounds: Normal breath sounds  Abdominal:      General: Abdomen is flat  Bowel sounds are normal       Palpations: Abdomen is soft  Musculoskeletal:         General: No swelling or tenderness  Cervical back: Neck supple  No muscular tenderness  Skin:     General: Skin is warm and dry  Capillary Refill: Capillary refill takes less than 2 seconds  Neurological:      General: No focal deficit present  Mental Status: He is alert and oriented to person, place, and time  Mental status is at baseline  Cranial Nerves: No cranial nerve deficit  Sensory: No sensory deficit  Psychiatric:         Mood and Affect: Mood normal          Behavior: Behavior normal          Thought Content: Thought content normal          Judgment: Judgment normal           Additional Data:     Labs:  Results from last 7 days   Lab Units 02/28/22 1017 02/26/22 0225 02/26/22 0225   WBC Thousand/uL 7 42   < > 7 97   HEMOGLOBIN g/dL 13 8   < > 16 3   HEMATOCRIT % 42 5   < > 48 0   PLATELETS Thousands/uL 248   < > 302   NEUTROS PCT %  --   --  77*   LYMPHS PCT %  --   --  13*   MONOS PCT %  --   --  7   EOS PCT %  --   --  1    < > = values in this interval not displayed       Results from last 7 days   Lab Units 02/28/22 1017 02/26/22 0225 02/26/22 0225   SODIUM mmol/L 136   < > 136   POTASSIUM mmol/L 3 6   < > 3 3*   CHLORIDE mmol/L 99*   < > 95*   CO2 mmol/L 25   < > 28   BUN mg/dL 21   < > 14   CREATININE mg/dL 1 15   < > 1 05   ANION GAP mmol/L 12   < > 13   CALCIUM mg/dL 9 4   < > 10 1   ALBUMIN g/dL  --   --  3 8   TOTAL BILIRUBIN mg/dL  --   --  1 13*   ALK PHOS U/L  --   --  446*   ALT U/L  --   --  69 AST U/L  --   --  84*   GLUCOSE RANDOM mg/dL 144*   < > 94    < > = values in this interval not displayed           Results from last 7 days   Lab Units 02/25/22  1111 02/24/22  0820   POC GLUCOSE mg/dl 127 124               Lines/Drains:  Invasive Devices  Report    Peripheral Intravenous Line            Peripheral IV 02/26/22 Left;Proximal;Ventral (anterior) Forearm 2 days    Peripheral IV 02/27/22 Left Antecubital <1 day                  Telemetry:  Telemetry Orders (From admission, onward)             48 Hour Telemetry Monitoring  Continuous x 48 hours        References:    Telemetry Guidelines   Question:  Reason for 48 Hour Telemetry  Answer:  Acute CVA (<24 hrs old, hemispheric strokes, selected brainstem strokes, cardiac arrhythmias)                 Telemetry Reviewed: Normal Sinus Rhythm  Indication for Continued Telemetry Use: Acute CVA             Imaging: Reviewed radiology reports from this admission including: CT head    Recent Cultures (last 7 days):         Last 24 Hours Medication List:   Current Facility-Administered Medications   Medication Dose Route Frequency Provider Last Rate    acetaminophen  650 mg Oral Q6H PRN Levi John MD      albuterol  1 puff Inhalation Q6H PRN Levi John MD      ALPRAZolam  0 25 mg Oral HS PRN Levi John MD      aluminum-magnesium hydroxide-simethicone  30 mL Oral Q4H PRN Levi John MD      atorvastatin  40 mg Oral Daily With Cassi Listen, MD      carvedilol  25 mg Oral BID With Meals Levi John MD      clopidogrel  75 mg Oral Daily Levi John MD      diltiazem  120 mg Oral Daily Joe Schneider MD      folic acid  1 mg Oral Daily Levi John MD      hydrALAZINE  10 mg Intravenous Q6H PRN Levi John MD      ipratropium  0 5 mg Nebulization TID Levi John MD      levalbuterol  1 25 mg Nebulization TID Levi John MD      levofloxacin  750 mg Oral Q24H Joe Schneider MD      metroNIDAZOLE  500 mg Oral [facility] MD Jose Rodriguez multivitamin-minerals  1 tablet Oral Daily Jessica Geronimo MD      nicotine  1 patch Transdermal Daily Jessica Geronimo MD      nitroglycerin  0 4 mg Sublingual Q5 Min PRN Jessica Geronimo MD      ondansetron  4 mg Intravenous Q6H PRN Jessica Geronimo MD      pantoprazole  20 mg Oral Early Morning Jessica Geronimo MD      sertraline  100 mg Oral HS Jessica Geronimo MD      simethicone  80 mg Oral 4x Daily PRN Jessica Geronimo MD      sodium chloride (PF)  3 mL Intravenous Q1H PRN Jessica Geronimo MD      thiamine  100 mg Oral Daily Jessica Geronimo MD          Today, Patient Was Seen By: Latha Pelayo MD    **Please Note: This note may have been constructed using a voice recognition system  **

## 2022-02-28 NOTE — CASE MANAGEMENT
Case Management Progress Note    Patient name Dre Steiner  Location S /S -48 MRN [id number]  : 1955 Date 2022       LOS (days): 6  Geometric Mean LOS (GMLOS) (days): 2 20  Days to GMLOS:-3 6        OBJECTIVE:        Current admission status: Inpatient  Preferred Pharmacy:   [address], [address] [place] [address]  [address]  Phone: [phone] Fax: [address] [place] 232 [facility] [[email or web address] 18 [facility] 94 [facility] 38 210 [facility]  Phone: [phone] Fax: [phone]    Primary Care Provider: Yazmin Wilburn DO    Primary Insurance: [facility]  Secondary Insurance:     PROGRESS NOTE:    Per rounding with Physician, pt will remain inpatient for at least 48 hours due to stroke work up  Family refusing C at this time

## 2022-03-01 ENCOUNTER — APPOINTMENT (INPATIENT)
Dept: CT IMAGING | Facility: HOSPITAL | Age: 67
DRG: 444 | End: 2022-03-01
Payer: COMMERCIAL

## 2022-03-01 ENCOUNTER — TELEPHONE (OUTPATIENT)
Dept: CT IMAGING | Facility: HOSPITAL | Age: 67
End: 2022-03-01

## 2022-03-01 VITALS
OXYGEN SATURATION: 98 % | SYSTOLIC BLOOD PRESSURE: 170 MMHG | WEIGHT: 163.8 LBS | TEMPERATURE: 98.5 F | HEART RATE: 64 BPM | BODY MASS INDEX: 24.19 KG/M2 | RESPIRATION RATE: 18 BRPM | DIASTOLIC BLOOD PRESSURE: 72 MMHG

## 2022-03-01 PROCEDURE — 99232 SBSQ HOSP IP/OBS MODERATE 35: CPT | Performed by: PSYCHIATRY & NEUROLOGY

## 2022-03-01 PROCEDURE — 99239 HOSP IP/OBS DSCHRG MGMT >30: CPT | Performed by: INTERNAL MEDICINE

## 2022-03-01 PROCEDURE — 70450 CT HEAD/BRAIN W/O DYE: CPT

## 2022-03-01 PROCEDURE — 94760 N-INVAS EAR/PLS OXIMETRY 1: CPT

## 2022-03-01 PROCEDURE — 93880 EXTRACRANIAL BILAT STUDY: CPT | Performed by: SURGERY

## 2022-03-01 PROCEDURE — 94640 AIRWAY INHALATION TREATMENT: CPT

## 2022-03-01 PROCEDURE — G1004 CDSM NDSC: HCPCS

## 2022-03-01 RX ORDER — SIMETHICONE 80 MG
80 TABLET,CHEWABLE ORAL 4 TIMES DAILY PRN
Qty: 30 TABLET | Refills: 0 | Status: SHIPPED | OUTPATIENT
Start: 2022-03-01 | End: 2022-04-24 | Stop reason: HOSPADM

## 2022-03-01 RX ORDER — DILTIAZEM HYDROCHLORIDE 120 MG/1
120 CAPSULE, COATED, EXTENDED RELEASE ORAL DAILY
Qty: 30 CAPSULE | Refills: 0 | Status: SHIPPED | OUTPATIENT
Start: 2022-03-02 | End: 2022-06-23 | Stop reason: ALTCHOICE

## 2022-03-01 RX ORDER — DILTIAZEM HYDROCHLORIDE 120 MG/1
120 CAPSULE, COATED, EXTENDED RELEASE ORAL DAILY
Qty: 30 CAPSULE | Refills: 0 | Status: SHIPPED | OUTPATIENT
Start: 2022-03-02 | End: 2022-03-01

## 2022-03-01 RX ORDER — CARVEDILOL 25 MG/1
25 TABLET ORAL 2 TIMES DAILY WITH MEALS
Qty: 60 TABLET | Refills: 0 | Status: SHIPPED | OUTPATIENT
Start: 2022-03-01 | End: 2022-03-01

## 2022-03-01 RX ORDER — LEVOFLOXACIN 750 MG/1
750 TABLET ORAL EVERY 24 HOURS
Qty: 2 TABLET | Refills: 0 | Status: SHIPPED | OUTPATIENT
Start: 2022-03-02 | End: 2022-03-01

## 2022-03-01 RX ORDER — METRONIDAZOLE 500 MG/1
500 TABLET ORAL EVERY 8 HOURS SCHEDULED
Qty: 11 TABLET | Refills: 0 | Status: SHIPPED | OUTPATIENT
Start: 2022-03-01 | End: 2022-03-05

## 2022-03-01 RX ORDER — METRONIDAZOLE 500 MG/1
500 TABLET ORAL EVERY 8 HOURS SCHEDULED
Qty: 11 TABLET | Refills: 0 | Status: SHIPPED | OUTPATIENT
Start: 2022-03-01 | End: 2022-03-01

## 2022-03-01 RX ORDER — LEVOFLOXACIN 750 MG/1
750 TABLET ORAL EVERY 24 HOURS
Qty: 2 TABLET | Refills: 0 | Status: SHIPPED | OUTPATIENT
Start: 2022-03-02 | End: 2022-03-04

## 2022-03-01 RX ORDER — SIMETHICONE 80 MG
80 TABLET,CHEWABLE ORAL 4 TIMES DAILY PRN
Qty: 30 TABLET | Refills: 0 | Status: SHIPPED | OUTPATIENT
Start: 2022-03-01 | End: 2022-03-01

## 2022-03-01 RX ORDER — CARVEDILOL 25 MG/1
25 TABLET ORAL 2 TIMES DAILY WITH MEALS
Qty: 60 TABLET | Refills: 0 | Status: SHIPPED | OUTPATIENT
Start: 2022-03-01 | End: 2022-07-14 | Stop reason: SDUPTHER

## 2022-03-01 RX ORDER — FOLIC ACID 1 MG/1
1 TABLET ORAL DAILY
Qty: 30 TABLET | Refills: 0 | Status: SHIPPED | OUTPATIENT
Start: 2022-03-02 | End: 2022-03-01

## 2022-03-01 RX ORDER — FOLIC ACID 1 MG/1
1 TABLET ORAL DAILY
Qty: 30 TABLET | Refills: 0 | Status: SHIPPED | OUTPATIENT
Start: 2022-03-02 | End: 2022-04-24 | Stop reason: HOSPADM

## 2022-03-01 RX ADMIN — IPRATROPIUM BROMIDE 0.5 MG: 0.5 SOLUTION RESPIRATORY (INHALATION) at 07:36

## 2022-03-01 RX ADMIN — ACETAMINOPHEN 650 MG: 325 TABLET, FILM COATED ORAL at 03:40

## 2022-03-01 RX ADMIN — THIAMINE HCL TAB 100 MG 100 MG: 100 TAB at 08:56

## 2022-03-01 RX ADMIN — METRONIDAZOLE 500 MG: 500 TABLET ORAL at 05:39

## 2022-03-01 RX ADMIN — MULTIPLE VITAMINS W/ MINERALS TAB 1 TABLET: TAB ORAL at 09:07

## 2022-03-01 RX ADMIN — CLOPIDOGREL BISULFATE 75 MG: 75 TABLET ORAL at 08:56

## 2022-03-01 RX ADMIN — LEVALBUTEROL HYDROCHLORIDE 1.25 MG: 1.25 SOLUTION RESPIRATORY (INHALATION) at 07:36

## 2022-03-01 RX ADMIN — FOLIC ACID 1 MG: 1 TABLET ORAL at 08:56

## 2022-03-01 RX ADMIN — PANTOPRAZOLE SODIUM 20 MG: 20 TABLET, DELAYED RELEASE ORAL at 05:39

## 2022-03-01 RX ADMIN — APIXABAN 5 MG: 5 TABLET, FILM COATED ORAL at 08:56

## 2022-03-01 RX ADMIN — DILTIAZEM HYDROCHLORIDE 120 MG: 120 CAPSULE, COATED, EXTENDED RELEASE ORAL at 08:56

## 2022-03-01 RX ADMIN — CARVEDILOL 25 MG: 12.5 TABLET, FILM COATED ORAL at 09:07

## 2022-03-01 RX ADMIN — NICOTINE 1 PATCH: 14 PATCH, EXTENDED RELEASE TRANSDERMAL at 08:56

## 2022-03-01 RX ADMIN — LEVOFLOXACIN 750 MG: 750 TABLET, FILM COATED ORAL at 08:56

## 2022-03-01 NOTE — DISCHARGE SUMMARY
Discharge Summary - [facility]  Internal Medicine    Patient Information: Abdifatah Vergara 73 y o  male MRN: [id number]  Unit/Bed#: S -01 Encounter: [id number]    Discharging Physician / Practitioner: Abram Lunsford MD  PCP: Axel Moore DO  Admission Date: 2/22/2022  Discharge Date: 03/01/22    Reason for Admission: Chest Pain (Pt here via EMS c/o CP intermitten for a week that started at 4AM  PER EMS pt was in rapid AFIB -180  Pt given 15mg cardizem PTA  Pt still c/o CP  )    Discharge Diagnoses:     Primary Discharge Diagnosis:     Principal Problem:    Acute Cholecystitis  Active Problems:    Tobacco use    Alcohol abuse    HLD (hyperlipidemia)    COPD (chronic obstructive pulmonary disease) (HCC)    Cerebrovascular accident (CVA) due to embolism of right middle cerebral artery (HCC)    Hx of CVA (cerebral vascular accident) ([facility] [address] )    Benign essential hypertension    S/P carotid endarterectomy    Hypomagnesemia    PAF (paroxysmal atrial fibrillation) ([facility] [address] )    Cholelithiasis    Seizure ([facility] )  Resolved Problems:    Hypokalemia      Consultations During Hospital Stay:  IP CONSULT TO GASTROENTEROLOGY  IP CONSULT TO ACUTE CARE SURGERY  IP CONSULT TO CARDIOLOGY  IP CONSULT TO NEUROLOGY  IP CONSULT TO VASCULAR SURGERY    Procedures Performed:   · ERCP  · EEG    Significant Findings:   ERCP with sphincterotomy and removal of 2 CBD stones: Refer to hospital course and above listed diagnosis related plan for details    · EEG normal      Imaging while in hospital:  CT head without Contrast    CTA Head and neck with and without contrast    MRI Brain seizzure without contrast    Bilateral  Carotid Aterial Duplex  RUQ USG  CTA Chest A/P  Incidental Findings:     Test Results Pending at Discharge (will require follow up):   · As per After Visit Summary     Outpatient Tests Requested:  · F/u with General Surgery for Possible Cholecystectomy in future       Complications:  Refer to hospital course and above listed diagnosis related plan, if any  Hospital Course:     Pita Garnica is a 73 y o  male patient with PMHx of atrial fibrillation, alcohol abuse, COPD, hyperlipidemia, hypertension who originally presented to the hospital on 2/22/2022 due to chief complaints of chest pain and was found to be in AFib with RVR  Patient had a prolonged and complicated hospital course and was initially found to have acute cholecystitis  Patient had a right upper quadrant ultrasound done which showed CBD dilatation and suspected gallstones as etiology  He was seen by GI and General surgery who recommended ERCP  Patient underwent ERCP and removal of the stones  However patient hospital course was complicated by confusion and encephalopathy and Urology was consulted  Patient was treated for DTs also, he was treated for alcohol withdrawal   Gradually patient's symptoms improved  Patient also was in sepsis and was treated with IV antibiotics, patient was eventually transitioned to oral antibiotic levofloxacin and metronidazole  During the patient's hospitalization since the patient had to undergo ERCP patient's Eliquis was on hold perioperatively and patient ended up having a stroke  He had a small PCA territory stroke off of Eliquis  He also underwent a thorough neurological workup including MRI of the brain, CT of the head, and vascular duplex of bilateral carotid arteries, patient was noted to have 70% stenosis of his right ICA  Patient needs to follow up with vascular surgery as an outpatient regarding the same  Patient was recommended to continue on Plavix along with Eliquis  He will also be on statin  He was counseled on stopping alcohol and patient is in agreement with this plan  Patient was then discharged to home and recommended to follow-up with PCP in 1-2 weeks  Prescriptions for Cardizem, Levaquin and Flagyl, and folic acid were sent to the patient's pharmacy  These are patient's new medications      Please see above list of diagnoses and related plan for additional information  Condition at Discharge: fair     Discharge Day Visit / Exam:     Subjective:  I have seen and examined the patient at bedside  Overnight events reviewed with the RN  Vitals: Blood Pressure: 170/72 (03/01/22 0717)  Pulse: 64 (03/01/22 0717)  Temperature: 98 5 °F (36 9 °C) (03/01/22 0717)  Temp Source: Oral (03/01/22 0717)  Respirations: 18 (03/01/22 0717)  Weight - Scale: 74 3 kg (163 lb 12 8 oz) (02/26/22 0224)  SpO2: 98 % (03/01/22 0736)  Exam:   Physical Exam  General Exam: Alert and Oriented x 3, NAD  Eyes: JAE  Neck: Supple  CVS: S1, S2 [place], RRR    R/S: Clear to auscultate anteriorly  Abd: Soft, NT, ND, BS+ve  Extremities: No edema noted  Skin: No acute Rash noted  CNS: No acute FND  Moves all 4 extremities  Psych: Co-operative, Not agitated  Discharge instructions/Information to patient and family:(Discharge Medications and Follow up):   See after visit summary for information provided to patient and family  Provisions for Follow-Up Care:  See after visit summary for information related to follow-up care and any pertinent home health orders  Disposition: Home    Planned Readmission:  No     Discharge Statement:  I spent 35 minutes discharging the patient  This time was spent on the day of discharge  I had direct contact with the patient on the day of discharge  Greater than 50% of the total time was spent examining patient, answering all patient questions, arranging and discussing plan of care with patient as well as directly providing post-discharge instructions  Additional time then spent on discharge activities  Discharge Medications:  See after visit summary for reconciled discharge medications provided to patient and family  ** Please Note: "This note has been constructed using a voice recognition system  Therefore there may be syntax, spelling, and/or grammatical errors   Please call if you have any questions  "**

## 2022-03-01 NOTE — PROGRESS NOTES
NEUROLOGY RESIDENCY PROGRESS NOTE     Name: Irina Moore   Age & Sex: 73 y o  male   MRN: [id number]  Unit/Bed#: S -01   Encounter: [id number]    Irina Moore will need follow up in in 6 weeks with neurovascular Resident/AP  He will not require outpatient neurological testing  Pending for discharge: None, stable for d/c from neuro     ASSESSMENT & PLAN     Cerebrovascular accident (CVA) due to embolism of right middle cerebral artery [facility])  Assessment & Plan  Irina Moore is a 73 y o  male with prior CVA, most recent right PCA  occlusion and infarct s/p IV tPA (September 2021), maintained on Eliquis, known left ICA severe stenosis on Plavix, previous Right CEA, alcohol abuse, COPD, tobacco abuse who presents to [facility] ED on 02/22/2022 with chest pain and reported new onset rapid Afib prior to arrival to ED per EMS  Rapid response called on 02/25/2022 for tonic-clonic seizure activity, MRI revealed- Early subacute infarct within the right frontal operculum and posterior lateral frontal lobe  Impression- Embolic in origin likely cardio-embolic d/t P-Afib and pt not on Eliquis d/t NPO status related to cholecystitis  Plan-  ·  CT head stable this am   · Start Eliquis 5 mg bid   · Continue Plavix 75 mg daily  ·  Continue Lipitor 40 mg daily  · PT/OT   · Follow up outpatient vascular neurology  ·  No other inpatient recs  pls call for questions or concerns  Work up-   CTA 2/27/22- severe short-segment stenosis (approximately 90% narrowing) in right common carotid artery origin due to calcified atherosclerotic disease  Unchanged severe stenosis (approximately 70% narrowing) in right common carotid artery due to calcified atherosclerotic disease  Unchanged severe short-segment stenosis (approximately 80% narrowing) in left common carotid artery origin due to heavily calcified atherosclerotic disease      Unchanged severe heavily calcified atherosclerotic disease with moderate stenosis in left carotid bifurcation  Unchanged severe short-segment stenosis left vertebral artery V4 segment due to heavily calcified atherosclerotic disease  Right carotid endarterectomy site is patent  Lab Results   Component Value Date    HGBA1C 6 0 (H) 09/02/2021   ·   · No results found for: TSH  Lab Results   Component Value Date    CHOLESTEROL 175 09/02/2021    TRIG 168 (H) 09/02/2021    HDL 58 09/02/2021    LDLCALC 83 09/02/2021   ·       Seizure [facility])  Assessment & Plan  Pita Garnica is a 73 y o  male with prior CVA, most recent right PCA  occlusion and infarct s/p IV tPA (September 2021), maintained on Eliquis, known left ICA severe stenosis on Plavix, previous Right CEA, alcohol abuse, COPD, tobacco abuse who presents to [facility] ED on 02/22/2022 with chest pain and reported new onset rapid Afib prior to arrival to ED per EMS  Rapid response called on 02/25/2022 for tonic-clonic seizure activity  2/25- First time witness tonic clonic seizure, s/p IV ativan 2 mg  Seizure described as abnormal movements in right hand, followed by confusion described as difficult with comprehension and answering questions  Movement in RUE stopped and patient progressed to a full tonic clonic seizure where eyes were reportedly deviated to the left, lasting approximately 1-3 minutes per discussion with nursing staff  CT head unremarkable  EEG- normal      Etiology of seizure d/t Rt MCA stroke in setting of p Afib and not on Eliquis d/t NPO status related to cholecystitis  Plan:  - MRI brain revealed- Early subacute infarct within the right frontal operculum and posterior lateral frontal lobe     - Routine EEG- normal  - Would not recommend initiating AEDs at this time given this is a first time event and provoked related to acute stroke   - Ativan PRN for seizure activity lasting >1 minute   - Okay to continue   - Okay to continue Plavix 75 mg daily at this time  - Seizure precautions   - Discussed seizure precautions:  · No driving, lifting heavy machinery, climbing heights, swimming unattended, hot tub baths or any other activity that will place you in danger in case of a seizure for at least six months  - [employer] form completed and faxed on 02/25/2022  · Patients sister reports patient does not drive since last stroke  - Medical management and supportive care per primary team, notify with changes     PAF (paroxysmal atrial fibrillation) ([facility] [address] )  Assessment & Plan  - New onset Afib noted this admission  - On Eliquis 5 mg BID at baseline prior to this admission for embolic stroke  - It was on hold due to NPO status in the setting of acute cholecystitis  - Management per primary team     Hx of CVA (cerebral vascular accident) [facility])  Assessment & Plan  - History of multiple strokes in the past   - Most recent stroke in September 2021; patient found to have a Right PCA occlusion and received IV tPA  · Patient was on DAPT (ASA and Plavix) at that time  During that admission, ASA was discontinued, patient was started on Eliquis 5 mg BID, and was instructed to continue Plavix 75 mg daily     Alcohol abuse  Assessment & Plan  - CIWA protocol   - No signs of withdrawal per discussion with ICU team   -Thiamine 100 mg daily   - Management per primary team     Tobacco use  Assessment & Plan  - Smoking cessation education     * Acute Cholecystitis  Assessment & Plan  - Acute cholecystitis noted on presentation  - S/p ERCP on 02/23, 2 gallstones removed  - No plan for  inpatient surgical intervention at this time, plan for outpatient laparoscopic cholecystectomy  - Continue to monitor LFTs  - Medical management per ICU team      SUBJECTIVE     Patient was seen and examined  No acute events overnight  he was having breakfast when I went to see him, denies any new symptoms    Reports feeling well    Pertinent Negatives include: numbness, weakness, speech or visual changes, headaches, amaurosis, diplopia, other visual changes, numbness or tingling, tremor, speech impairment         OBJECTIVE     Patient ID: Emily Beasley is a 73 y o  male  Vitals:    22 0717 22 0736   BP: 152/70  170/72    BP Location: Right arm  Right arm    Pulse: 67  64    Resp: 18  18    Temp: 98 4 °F (36 9 °C)  98 5 °F (36 9 °C)    TempSrc: Oral  Oral    SpO2: 93% 92% 94% 98%   Weight:          Temperature:   Temp (24hrs), Av 1 °F (36 7 °C), Min:97 5 °F (36 4 °C), Max:98 5 °F (36 9 °C)    Temperature: 98 5 °F (36 9 °C)      Physical Exam  Vitals and nursing note reviewed  Constitutional:       Appearance: He is well-developed  HENT:      Head: Normocephalic and atraumatic  Eyes:      Conjunctiva/sclera: Conjunctivae normal    Pulmonary:      Effort: Pulmonary effort is normal    Abdominal:      Palpations: Abdomen is soft  Musculoskeletal:      Cervical back: Neck supple  Skin:     General: Skin is warm and dry  Neurological:      Mental Status: He is alert  Neurological Examination:     Mental Status: The patient was awake, alert, attentive, oriented to person, place, and time  Recent and remote memory intact to conversation with no evidence of language dysfunction  No aphasia or dysarthria noted  Cranial Nerves:   I: smell Not tested   II: visual fields Full to confrontation  Pupils equal, round, reactive to light with normal accomodation  III,IV,VI: extraocular muscles EOMI, no nystagmus   V: masseter and pterygoid strength full  Sensation in the V1 through V3 distributions intact to pinprick and light touch bilaterally  VII: Face is symmetric with no weakness noted  VIII: Audition intact to finger rub bilaterally  IX/X: Uvula midline  Soft palate elevation symmetric  XI: Trapezius and SCM strength 5/5 B/L  XII: Tongue midline with no atrophy or fasciculations with appropriate movement  Motor Examination:   No pronator drift   Bulk: Normal  No atrophy Tone: Normal  Fasciculations: None  Deltoid Biceps Triceps WE   WF   FF IO     Right        5         5          5         5      5      5   5        Left           5        5          5          5      5     5   5                       IP        Quad   Ham     TA       Gastroc   Right      5            5          5         5                5  Left         5            5         5         5                5       Reflexes:                   Biceps Brachioradialis Triceps Patella Achilles Plantars   Right          2+            2+                  2+        2+       2+         Down   Left            2+             2+                 2+         2+       2+         Down     Clonus: None    Coordination: Patient able to perform normal finger-to-nose and heel to shin appropriately  Normal rapid alternating movements  Sensory: Normal sensation to light touch, pin prick  throughout  LABORATORY DATA     Labs: I have personally reviewed pertinent reports  Results from last 7 days   Lab Units 02/28/22  1017 02/26/22  0225 02/25/22  0602 02/24/22  0555 02/24/22  0555   WBC Thousand/uL 7 42 7 97 5 82   < > 4 58   HEMOGLOBIN g/dL 13 8 16 3 12 7   < > 10 8*   HEMATOCRIT % 42 5 48 0 40 0   < > 33 4*   PLATELETS Thousands/uL 248 302 225   < > 171   NEUTROS PCT %  --  77* 75  --  88*   MONOS PCT %  --  7 7  --  4    < > = values in this interval not displayed  Results from last 7 days   Lab Units 02/28/22  1017 02/26/22  0225 02/25/22  0602 02/24/22  0555 02/24/22  0555   SODIUM mmol/L 136 136 139   < > 131*   POTASSIUM mmol/L 3 6 3 3* 4 1   < > 4 2   CHLORIDE mmol/L 99* 95* 101   < > 100   CO2 mmol/L 25 28 28   < > 23   BUN mg/dL 21 14 16   < > 13   CREATININE mg/dL 1 15 1 05 1 16   < > 1 09   CALCIUM mg/dL 9 4 10 1 10 0   < > 8 8   ALK PHOS U/L  --  446* 390*  --  416*   ALT U/L  --  69 68  --  84*   AST U/L  --  84* 60*  --  84*    < > = values in this interval not displayed       Results from last 7 days   Lab Units 02/28/22  1017 02/26/22  0225   MAGNESIUM mg/dL 1 3* 2 0     Results from last 7 days   Lab Units 02/26/22  0225   PHOSPHORUS mg/dL 3 9                    IMAGING & DIAGNOSTIC TESTING     Radiology Results: I have personally reviewed pertinent reports  CT head wo contrast   Final Result by Sandy Christian DO (03/01 9190)      Redemonstration of right-sided subacute infarct, as described; no evidence of hemorrhagic transformation or acute intracranial process is identified  Workstation performed: [id number]         CTA head and neck w wo contrast   Final Result by Yousif Flores MD (02/28 8080)      Evolving acute infarct in right frontal operculum  No acute intracranial hemorrhage  Old infarct in right occipital lobe with unchanged right focal distal PCA focal occlusion  Unchanged severe short-segment stenosis (approximately 90% narrowing) in right common carotid artery origin due to calcified atherosclerotic disease  Unchanged severe stenosis (approximately 70% narrowing) in right common carotid artery due to calcified atherosclerotic disease  Unchanged severe short-segment stenosis (approximately 80% narrowing) in left common carotid artery origin due to heavily calcified atherosclerotic disease  Unchanged severe heavily calcified atherosclerotic disease with moderate stenosis in left carotid bifurcation  Unchanged severe short-segment stenosis left vertebral artery V4 segment due to heavily calcified atherosclerotic disease  Right carotid endarterectomy site is patent  Additional chronic/incidental findings as detailed above  The study was marked in [facility] for immediate notification                          Workstation performed: [id number]         MRI brain seizure wo contrast   Final Result by Arley Wynne DO (02/27 7065)      Early subacute infarct within the right frontal operculum and posterior lateral frontal lobe see series 3 images 17 through 26 without hemorrhagic transformation  Moderate chronic microangiopathic change noted within the brain parenchyma  There is an old infarct within the right occipital lobe anteriorly  This examination was marked "immediate notification" in Epic in order to begin the standard process by which the radiology reading room liaison alerts the referring practitioner  Workstation performed: [id number]         CT head wo contrast   Final Result by Bob Alvarez MD (02/25 1139)      Study slightly limited by patient motion artifact  No acute intracranial abnormality  Workstation performed: [id number]         FL ERCP biliary only   Final Result by Gilmar De Dios MD (02/24 0720)      Please see procedure report for further details  Workstation performed: [id number]         US right upper quadrant   Final Result by Jamari De Jesus MD (02/22 1612)      Cholelithiasis and choledocholithiasis  Positive Godoy's sign with gallbladder wall thickening and edema and potentially tiny amount of pericholecystic fluid  These latter findings are most indicative of acute cholecystitis       The examination demonstrates a significant  finding and was documented as such in [facility] for liaison and referring practitioner immediate notification         Workstation performed: [id number]         CTA dissection protocol chest/abdomen/pelvis   Final Result by Giselle Nolasco MD (02/22 9713)      Gallbladder wall thickening with edema and cholelithiasis, evaluate for acute cholecystitis      Biliary dilation common bile duct measuring 1 4 cm, concerning for biliary obstruction, MRCP may be considered to evaluate for choledocholithiasis      No thoracic aortic dissection      No pulmonary embolism      Opacification of the segmental bronchi of the left lower lobe, image 83 series 3, short interval follow-up at 3 months suggested this may be due to secretions or due to focal lesion      Incidentally detected the less than 6 mm nodules in the both lung with the most prominent in the subpleural region in the left upper lobe  Based on current Fleischner Society 2017 Guidelines on incidental pulmonary nodule,  follow-up CT at 12 months can    be considered  Mild reticulation in the subpleural region, raises concern for mild interstitial lung disease      Hepatic steatosis      Again noted is thickening of the wall of the stomach as seen on the previous study, remains indeterminate      Atherosclerotic disease in the aorta with the suggestion of more than 50% stenosis in the right common iliac artery   The study was marked in EPIC for immediate notification  Workstation performed: [id number]         X-ray chest 1 view portable   Final Result by Anushka Munoz MD (02/22 1441)      No acute cardiopulmonary disease  Workstation performed: [id number]         VAS carotid complete study    (Results Pending)       Other Diagnostic Testing: I have personally reviewed pertinent reports        ACTIVE MEDICATIONS     Current Facility-Administered Medications   Medication Dose Route Frequency    acetaminophen (TYLENOL) tablet 650 mg  650 mg Oral Q6H PRN    albuterol (PROVENTIL HFA,VENTOLIN HFA) inhaler 1 puff  1 puff Inhalation Q6H PRN    ALPRAZolam (XANAX) tablet 0 25 mg  0 25 mg Oral HS PRN    aluminum-magnesium hydroxide-simethicone (MYLANTA) oral suspension 30 mL  30 mL Oral Q4H PRN    apixaban (ELIQUIS) tablet 5 mg  5 mg Oral BID    atorvastatin (LIPITOR) tablet 40 mg  40 mg Oral Daily With Dinner    carvedilol (COREG) tablet 25 mg  25 mg Oral BID With Meals    clopidogrel (PLAVIX) tablet 75 mg  75 mg Oral Daily    diltiazem (CARDIZEM CD) 24 hr capsule 120 mg  120 mg Oral Daily    folic acid (FOLVITE) tablet 1 mg  1 mg Oral Daily    hydrALAZINE (APRESOLINE) injection 10 mg  10 mg Intravenous Q6H PRN    ipratropium (ATROVENT) 0 02 % inhalation solution 0 5 mg  0 5 mg Nebulization TID    levalbuterol (XOPENEX) inhalation solution 1 25 mg  1 25 mg Nebulization TID    levofloxacin (LEVAQUIN) tablet 750 mg  750 mg Oral Q24H    metroNIDAZOLE (FLAGYL) tablet 500 mg  500 mg Oral Q8H [facility]    multivitamin-minerals (CENTRUM) tablet 1 tablet  1 tablet Oral Daily    nicotine (NICODERM CQ) 14 mg/24hr TD 24 hr patch 1 patch  1 patch Transdermal Daily    nitroglycerin (NITROSTAT) SL tablet 0 4 mg  0 4 mg Sublingual Q5 Min PRN    ondansetron (ZOFRAN) injection 4 mg  4 mg Intravenous Q6H PRN    pantoprazole (PROTONIX) EC tablet 20 mg  20 mg Oral Early Morning    sertraline (ZOLOFT) tablet 100 mg  100 mg Oral HS    simethicone (MYLICON) chewable tablet 80 mg  80 mg Oral 4x Daily PRN    sodium chloride (PF) 0 9 % injection 3 mL  3 mL Intravenous Q1H PRN    thiamine tablet 100 mg  100 mg Oral Daily       Prior to Admission medications    Medication Sig Start Date End Date Taking?  Authorizing Provider   aclidinium Cathalene Rattler Pressair) 400 MCG/ACT inhaler Inhale 1 puff 2 (two) times a day     Yes Historical Provider, MD   albuterol (PROVENTIL HFA,VENTOLIN HFA) 90 mcg/act inhaler 1 puff every 6 (six) hours as needed   10/8/21  Yes Historical Provider, MD   ALPRAZolam (XANAX) 0 25 mg tablet TAKE 1 TABLET BY ORAL ROUTE 4 TIMES EVERY DAY AS NEEDED 6/15/21  Yes Historical Provider, MD   apixaban (ELIQUIS) 5 mg Take 1 tablet (5 mg total) by mouth 2 (two) times a day 9/17/21  Yes Chai Cannon MD   acetaminophen (TYLENOL) 325 mg tablet Take 2 tablets (650 mg total) by mouth every 6 (six) hours as needed for mild pain  Patient not taking: Reported on 2/22/2022 4/14/21   Catrachita Christie PA-C   atorvastatin (LIPITOR) 80 mg tablet Take 1 tablet (80 mg total) by mouth daily 9/6/21 10/22/21  Javon Guevara MD   baclofen 10 mg tablet 10 mg 3 (three) times a day as needed   11/3/21   Historical Provider, MD   carvedilol (COREG) 12 5 mg tablet Take 12 5 mg by mouth 2 (two) times a day with meals  Patient not taking: Reported on 11/10/2021     Historical Provider, MD   carvedilol (COREG) 25 mg tablet Take 1 tablet (25 mg total) by mouth 2 (two) times a day with meals 3/1/22 3/31/22  Corey Alvarez MD   clopidogrel (PLAVIX) 75 mg tablet TAKE 1 TABLET BY MOUTH EVERY DAY FOR 19 DOSES  Patient not taking: Reported on 11/10/2021 7/28/21   Tatianna Medina PA-C   diltiazem (CARDIZEM CD) 120 mg 24 hr capsule Take 1 capsule (120 mg total) by mouth daily 3/2/22 4/1/22  Corey Alvarez MD   fenofibrate 160 MG tablet Take 160 mg by mouth daily 6/25/21   Historical Provider, MD   ferrous sulfate 324 (65 Fe) mg Take 1 tablet (324 mg total) by mouth daily before breakfast 9/6/21   Josh Nelson MD   folic acid (FOLVITE) 1 mg tablet Take 1 tablet (1 mg total) by mouth daily 3/2/22 4/1/22  Corey Alvarez MD   loratadine (CLARITIN) 10 mg tablet Take 1 tablet (10 mg total) by mouth daily 9/6/21 11/10/21  Melvin Travis MD   nicotine (NICODERM CQ) 14 mg/24hr TD 24 hr patch Place 1 patch on the skin daily 9/7/21   Melvin Travis MD   omeprazole (PriLOSEC) 20 mg delayed release capsule Take 20 mg by mouth daily   10/4/21   Historical Provider, MD   pantoprazole (PROTONIX) 40 mg tablet Take 1 tablet (40 mg total) by mouth daily in the early morning 9/6/21 10/22/21  Melvin Travis MD   pravastatin (PRAVACHOL) 40 mg tablet Take 40 mg by mouth daily   10/22/21   Historical Provider, MD   senna (SENOKOT) 8 6 mg Take 1 tablet (8 6 mg total) by mouth daily at bedtime as needed for constipation  Patient not taking: Reported on 9/15/2021 9/6/21   Di Moser MD   sertraline (ZOLOFT) 100 mg tablet Take 100 mg by mouth daily   1/19/11   Historical Provider, MD   simethicone (MYLICON) 80 mg chewable tablet Chew 1 tablet (80 mg total) 4 (four) times a day as needed for flatulence 3/1/22   Corey Alvarez MD   thiamine 100 MG tablet Take 1 tablet (100 mg total) by mouth daily 3/31/21 11/10/21  Johnathon Darnell Joe Halsted, MD         VTE Pharmacologic Prophylaxis: Apixaban (Eliquis)  VTE Mechanical Prophylaxis: sequential compression device    ==  Daryle Childs, MD [facility], PGY-3

## 2022-03-02 NOTE — UTILIZATION REVIEW
Notification of Discharge   This is a Notification of Discharge from our facility [address]  Please be advised that this patient has been discharge from our facility  Below you will find the admission and discharge date and time including the patients disposition  UTILIZATION REVIEW CONTACT:  Cielo [place] MA  Utilization   Network Utilization Review Department  Phone: [phone] x carefully listen to the prompts  All voicemails are confidential   Email: [email or web address]     PHYSICIAN ADVISORY SERVICES:  FOR [id number] REVIEW - MEDICAL NECESSITY DENIAL  Phone: [phone]  Fax: [phone]  Email: [email or web address]     PRESENTATION DATE: 2/22/2022  9:31 AM  OBERVATION ADMISSION DATE:   INPATIENT ADMISSION DATE: 2/22/22  4:37 PM   DISCHARGE DATE: 3/1/2022  2:08 PM  DISPOSITION: Home/Self Care Home/Self Care      IMPORTANT INFORMATION:  Send all requests for admission clinical reviews, approved or denied determinations and any other requests to dedicated fax number below belonging to the campus where the patient is receiving treatment   List of dedicated fax numbers:  [address] DENIALS (Administrative/Medical Necessity) [phone]   [address] [place] [address] (Maternity/NICU/Pediatrics) [phone]   [facility] [phone]   [address] [facility] [phone]   [address] [place] [address] [phone]   [address] [place] [address] [address] [place] [address],[address] [phone]   [facility]  [phone]   [address] [place] [address], [facility]  [address] [facility] [address] [facility] [phone]

## 2022-03-03 ENCOUNTER — TELEPHONE (OUTPATIENT)
Dept: NEUROLOGY | Facility: CLINIC | Age: 67
End: 2022-03-03

## 2022-03-03 NOTE — TELEPHONE ENCOUNTER
LVM for pt to see if wants to sche HFU Appt 6 weeks after 4/12/22 or if wants to keep appt that is already sched with Tiny Constable on 3/25/22 at 9:45 am, I changed appt to HFU  If pt does not call back we are keeping appt that is already made  HFU/SLAN/ATT/AP/CVA due to embolism of right middle cerebral artery/DC3/1    Note from Chart:     Abdifatah Vergara will need follow up in in 6 weeks with neurovascular Resident/AP  He will not require outpatient neurological testing

## 2022-04-20 ENCOUNTER — APPOINTMENT (EMERGENCY)
Dept: CT IMAGING | Facility: HOSPITAL | Age: 67
DRG: 682 | End: 2022-04-20
Payer: COMMERCIAL

## 2022-04-20 ENCOUNTER — APPOINTMENT (EMERGENCY)
Dept: RADIOLOGY | Facility: HOSPITAL | Age: 67
DRG: 682 | End: 2022-04-20
Payer: COMMERCIAL

## 2022-04-20 ENCOUNTER — HOSPITAL ENCOUNTER (INPATIENT)
Facility: HOSPITAL | Age: 67
LOS: 1 days | DRG: 682 | End: 2022-04-21
Attending: EMERGENCY MEDICINE | Admitting: HOSPITALIST
Payer: COMMERCIAL

## 2022-04-20 DIAGNOSIS — F10.10 ALCOHOL ABUSE: Chronic | ICD-10-CM

## 2022-04-20 DIAGNOSIS — E86.0 DEHYDRATION: ICD-10-CM

## 2022-04-20 DIAGNOSIS — N17.9 AKI (ACUTE KIDNEY INJURY) (HCC): ICD-10-CM

## 2022-04-20 DIAGNOSIS — R77.8 ELEVATED TROPONIN: ICD-10-CM

## 2022-04-20 DIAGNOSIS — R41.82 ALTERED MENTAL STATUS: ICD-10-CM

## 2022-04-20 DIAGNOSIS — I48.91 ATRIAL FIBRILLATION WITH RVR (HCC): ICD-10-CM

## 2022-04-20 DIAGNOSIS — R44.3 HALLUCINATIONS: Primary | ICD-10-CM

## 2022-04-20 PROBLEM — R41.3 MEMORY CHANGES: Status: ACTIVE | Noted: 2022-04-20

## 2022-04-20 LAB
2HR DELTA HS TROPONIN: -17 NG/L
4HR DELTA HS TROPONIN: -23 NG/L
ALBUMIN SERPL BCP-MCNC: 4.1 G/DL (ref 3.5–5)
ALP SERPL-CCNC: 101 U/L (ref 46–116)
ALT SERPL W P-5'-P-CCNC: 39 U/L (ref 12–78)
AMMONIA PLAS-SCNC: 22 UMOL/L (ref 11–35)
AMPHETAMINES SERPL QL SCN: NEGATIVE
ANION GAP SERPL CALCULATED.3IONS-SCNC: 17 MMOL/L (ref 4–13)
AST SERPL W P-5'-P-CCNC: 61 U/L (ref 5–45)
BACTERIA UR QL AUTO: NORMAL /HPF
BARBITURATES UR QL: NEGATIVE
BASOPHILS # BLD AUTO: 0.06 THOUSANDS/ΜL (ref 0–0.1)
BASOPHILS NFR BLD AUTO: 1 % (ref 0–1)
BENZODIAZ UR QL: POSITIVE
BILIRUB SERPL-MCNC: 0.71 MG/DL (ref 0.2–1)
BILIRUB UR QL STRIP: ABNORMAL
BUN SERPL-MCNC: 45 MG/DL (ref 5–25)
CALCIUM SERPL-MCNC: 9.7 MG/DL (ref 8.3–10.1)
CARDIAC TROPONIN I PNL SERPL HS: 48 NG/L
CARDIAC TROPONIN I PNL SERPL HS: 54 NG/L
CARDIAC TROPONIN I PNL SERPL HS: 71 NG/L
CHLORIDE SERPL-SCNC: 96 MMOL/L (ref 100–108)
CLARITY UR: CLEAR
CO2 SERPL-SCNC: 25 MMOL/L (ref 21–32)
COCAINE UR QL: NEGATIVE
COLOR UR: YELLOW
CREAT SERPL-MCNC: 1.64 MG/DL (ref 0.6–1.3)
EOSINOPHIL # BLD AUTO: 0.03 THOUSAND/ΜL (ref 0–0.61)
EOSINOPHIL NFR BLD AUTO: 0 % (ref 0–6)
ERYTHROCYTE [DISTWIDTH] IN BLOOD BY AUTOMATED COUNT: 13.9 % (ref 11.6–15.1)
ETHANOL SERPL-MCNC: <3 MG/DL (ref 0–3)
FOLATE SERPL-MCNC: >20 NG/ML (ref 3.1–17.5)
GFR SERPL CREATININE-BSD FRML MDRD: 43 ML/MIN/1.73SQ M
GLUCOSE SERPL-MCNC: 88 MG/DL (ref 65–140)
GLUCOSE UR STRIP-MCNC: NEGATIVE MG/DL
HCT VFR BLD AUTO: 46.9 % (ref 36.5–49.3)
HGB BLD-MCNC: 15.7 G/DL (ref 12–17)
HGB UR QL STRIP.AUTO: ABNORMAL
IMM GRANULOCYTES # BLD AUTO: 0.03 THOUSAND/UL (ref 0–0.2)
IMM GRANULOCYTES NFR BLD AUTO: 0 % (ref 0–2)
KETONES UR STRIP-MCNC: ABNORMAL MG/DL
LEUKOCYTE ESTERASE UR QL STRIP: NEGATIVE
LYMPHOCYTES # BLD AUTO: 1.37 THOUSANDS/ΜL (ref 0.6–4.47)
LYMPHOCYTES NFR BLD AUTO: 17 % (ref 14–44)
MAGNESIUM SERPL-MCNC: 1.7 MG/DL (ref 1.6–2.6)
MCH RBC QN AUTO: 28.5 PG (ref 26.8–34.3)
MCHC RBC AUTO-ENTMCNC: 33.5 G/DL (ref 31.4–37.4)
MCV RBC AUTO: 85 FL (ref 82–98)
METHADONE UR QL: NEGATIVE
MONOCYTES # BLD AUTO: 0.81 THOUSAND/ΜL (ref 0.17–1.22)
MONOCYTES NFR BLD AUTO: 10 % (ref 4–12)
NEUTROPHILS # BLD AUTO: 5.83 THOUSANDS/ΜL (ref 1.85–7.62)
NEUTS SEG NFR BLD AUTO: 72 % (ref 43–75)
NITRITE UR QL STRIP: NEGATIVE
NON-SQ EPI CELLS URNS QL MICRO: NORMAL /HPF
NRBC BLD AUTO-RTO: 0 /100 WBCS
OPIATES UR QL SCN: NEGATIVE
OXYCODONE+OXYMORPHONE UR QL SCN: NEGATIVE
PCP UR QL: NEGATIVE
PH UR STRIP.AUTO: 5.5 [PH]
PLATELET # BLD AUTO: 200 THOUSANDS/UL (ref 149–390)
PMV BLD AUTO: 10.7 FL (ref 8.9–12.7)
POTASSIUM SERPL-SCNC: 3.5 MMOL/L (ref 3.5–5.3)
PROT SERPL-MCNC: 8.1 G/DL (ref 6.4–8.2)
PROT UR STRIP-MCNC: >=300 MG/DL
RBC # BLD AUTO: 5.5 MILLION/UL (ref 3.88–5.62)
RBC #/AREA URNS AUTO: NORMAL /HPF
SODIUM SERPL-SCNC: 138 MMOL/L (ref 136–145)
SP GR UR STRIP.AUTO: >=1.03 (ref 1–1.03)
THC UR QL: NEGATIVE
TSH SERPL DL<=0.05 MIU/L-ACNC: 1.06 UIU/ML (ref 0.45–4.5)
UROBILINOGEN UR QL STRIP.AUTO: 0.2 E.U./DL
VIT B12 SERPL-MCNC: 520 PG/ML (ref 100–900)
WBC # BLD AUTO: 8.13 THOUSAND/UL (ref 4.31–10.16)
WBC #/AREA URNS AUTO: NORMAL /HPF

## 2022-04-20 PROCEDURE — 84484 ASSAY OF TROPONIN QUANT: CPT | Performed by: EMERGENCY MEDICINE

## 2022-04-20 PROCEDURE — 99223 1ST HOSP IP/OBS HIGH 75: CPT | Performed by: HOSPITALIST

## 2022-04-20 PROCEDURE — 71045 X-RAY EXAM CHEST 1 VIEW: CPT

## 2022-04-20 PROCEDURE — 82077 ASSAY SPEC XCP UR&BREATH IA: CPT | Performed by: EMERGENCY MEDICINE

## 2022-04-20 PROCEDURE — 96374 THER/PROPH/DIAG INJ IV PUSH: CPT

## 2022-04-20 PROCEDURE — 36415 COLL VENOUS BLD VENIPUNCTURE: CPT | Performed by: EMERGENCY MEDICINE

## 2022-04-20 PROCEDURE — 84425 ASSAY OF VITAMIN B-1: CPT | Performed by: EMERGENCY MEDICINE

## 2022-04-20 PROCEDURE — 70450 CT HEAD/BRAIN W/O DYE: CPT

## 2022-04-20 PROCEDURE — 84443 ASSAY THYROID STIM HORMONE: CPT | Performed by: INTERNAL MEDICINE

## 2022-04-20 PROCEDURE — 80053 COMPREHEN METABOLIC PANEL: CPT | Performed by: EMERGENCY MEDICINE

## 2022-04-20 PROCEDURE — 81001 URINALYSIS AUTO W/SCOPE: CPT | Performed by: EMERGENCY MEDICINE

## 2022-04-20 PROCEDURE — 99285 EMERGENCY DEPT VISIT HI MDM: CPT

## 2022-04-20 PROCEDURE — 82607 VITAMIN B-12: CPT | Performed by: EMERGENCY MEDICINE

## 2022-04-20 PROCEDURE — 99291 CRITICAL CARE FIRST HOUR: CPT | Performed by: EMERGENCY MEDICINE

## 2022-04-20 PROCEDURE — 80307 DRUG TEST PRSMV CHEM ANLYZR: CPT | Performed by: EMERGENCY MEDICINE

## 2022-04-20 PROCEDURE — 93005 ELECTROCARDIOGRAM TRACING: CPT

## 2022-04-20 PROCEDURE — 85025 COMPLETE CBC W/AUTO DIFF WBC: CPT | Performed by: EMERGENCY MEDICINE

## 2022-04-20 PROCEDURE — 82746 ASSAY OF FOLIC ACID SERUM: CPT | Performed by: EMERGENCY MEDICINE

## 2022-04-20 PROCEDURE — 82140 ASSAY OF AMMONIA: CPT | Performed by: EMERGENCY MEDICINE

## 2022-04-20 PROCEDURE — 83735 ASSAY OF MAGNESIUM: CPT | Performed by: EMERGENCY MEDICINE

## 2022-04-20 RX ORDER — ATORVASTATIN CALCIUM 40 MG/1
80 TABLET, FILM COATED ORAL
Status: DISCONTINUED | OUTPATIENT
Start: 2022-04-21 | End: 2022-04-21 | Stop reason: HOSPADM

## 2022-04-20 RX ORDER — LANOLIN ALCOHOL/MO/W.PET/CERES
100 CREAM (GRAM) TOPICAL ONCE
Status: COMPLETED | OUTPATIENT
Start: 2022-04-20 | End: 2022-04-20

## 2022-04-20 RX ORDER — SIMETHICONE 80 MG
80 TABLET,CHEWABLE ORAL 4 TIMES DAILY PRN
Status: DISCONTINUED | OUTPATIENT
Start: 2022-04-20 | End: 2022-04-21 | Stop reason: HOSPADM

## 2022-04-20 RX ORDER — ONDANSETRON 2 MG/ML
4 INJECTION INTRAMUSCULAR; INTRAVENOUS EVERY 6 HOURS PRN
Status: DISCONTINUED | OUTPATIENT
Start: 2022-04-20 | End: 2022-04-21 | Stop reason: HOSPADM

## 2022-04-20 RX ORDER — SODIUM CHLORIDE, SODIUM LACTATE, POTASSIUM CHLORIDE, CALCIUM CHLORIDE 600; 310; 30; 20 MG/100ML; MG/100ML; MG/100ML; MG/100ML
75 INJECTION, SOLUTION INTRAVENOUS CONTINUOUS
Status: DISPENSED | OUTPATIENT
Start: 2022-04-20 | End: 2022-04-21

## 2022-04-20 RX ORDER — CARVEDILOL 12.5 MG/1
25 TABLET ORAL 2 TIMES DAILY WITH MEALS
Status: DISCONTINUED | OUTPATIENT
Start: 2022-04-21 | End: 2022-04-21 | Stop reason: HOSPADM

## 2022-04-20 RX ORDER — DILTIAZEM HYDROCHLORIDE 5 MG/ML
15 INJECTION INTRAVENOUS ONCE
Status: COMPLETED | OUTPATIENT
Start: 2022-04-20 | End: 2022-04-20

## 2022-04-20 RX ORDER — ALBUTEROL SULFATE 90 UG/1
1 AEROSOL, METERED RESPIRATORY (INHALATION) EVERY 6 HOURS PRN
Status: DISCONTINUED | OUTPATIENT
Start: 2022-04-20 | End: 2022-04-21 | Stop reason: HOSPADM

## 2022-04-20 RX ORDER — DILTIAZEM HYDROCHLORIDE 120 MG/1
120 CAPSULE, COATED, EXTENDED RELEASE ORAL ONCE
Status: COMPLETED | OUTPATIENT
Start: 2022-04-20 | End: 2022-04-20

## 2022-04-20 RX ORDER — ACETAMINOPHEN 325 MG/1
650 TABLET ORAL EVERY 6 HOURS PRN
Status: DISCONTINUED | OUTPATIENT
Start: 2022-04-20 | End: 2022-04-21 | Stop reason: HOSPADM

## 2022-04-20 RX ORDER — SENNOSIDES 8.6 MG
1 TABLET ORAL
Status: DISCONTINUED | OUTPATIENT
Start: 2022-04-20 | End: 2022-04-21 | Stop reason: HOSPADM

## 2022-04-20 RX ORDER — LANOLIN ALCOHOL/MO/W.PET/CERES
100 CREAM (GRAM) TOPICAL DAILY
Status: DISCONTINUED | OUTPATIENT
Start: 2022-04-21 | End: 2022-04-21 | Stop reason: HOSPADM

## 2022-04-20 RX ORDER — PANTOPRAZOLE SODIUM 40 MG/1
40 TABLET, DELAYED RELEASE ORAL
Status: DISCONTINUED | OUTPATIENT
Start: 2022-04-21 | End: 2022-04-21 | Stop reason: HOSPADM

## 2022-04-20 RX ORDER — ALPRAZOLAM 0.25 MG/1
0.25 TABLET ORAL 4 TIMES DAILY PRN
Status: DISCONTINUED | OUTPATIENT
Start: 2022-04-20 | End: 2022-04-20

## 2022-04-20 RX ORDER — DILTIAZEM HYDROCHLORIDE 120 MG/1
120 CAPSULE, COATED, EXTENDED RELEASE ORAL DAILY
Status: DISCONTINUED | OUTPATIENT
Start: 2022-04-21 | End: 2022-04-21 | Stop reason: HOSPADM

## 2022-04-20 RX ORDER — FOLIC ACID 1 MG/1
1 TABLET ORAL DAILY
Status: DISCONTINUED | OUTPATIENT
Start: 2022-04-21 | End: 2022-04-21 | Stop reason: HOSPADM

## 2022-04-20 RX ORDER — POTASSIUM CHLORIDE 20 MEQ/1
20 TABLET, EXTENDED RELEASE ORAL ONCE
Status: COMPLETED | OUTPATIENT
Start: 2022-04-21 | End: 2022-04-21

## 2022-04-20 RX ADMIN — DILTIAZEM HYDROCHLORIDE 120 MG: 120 CAPSULE, COATED, EXTENDED RELEASE ORAL at 17:53

## 2022-04-20 RX ADMIN — APIXABAN 2.5 MG: 2.5 TABLET, FILM COATED ORAL at 17:53

## 2022-04-20 RX ADMIN — SODIUM CHLORIDE 1000 ML: 0.9 INJECTION, SOLUTION INTRAVENOUS at 17:52

## 2022-04-20 RX ADMIN — DILTIAZEM HYDROCHLORIDE 15 MG: 5 INJECTION, SOLUTION INTRAVENOUS at 17:53

## 2022-04-20 RX ADMIN — SODIUM CHLORIDE, SODIUM LACTATE, POTASSIUM CHLORIDE, AND CALCIUM CHLORIDE 75 ML/HR: .6; .31; .03; .02 INJECTION, SOLUTION INTRAVENOUS at 20:23

## 2022-04-20 RX ADMIN — THIAMINE HCL TAB 100 MG 100 MG: 100 TAB at 16:54

## 2022-04-20 NOTE — ASSESSMENT & PLAN NOTE
Home regimen:  Cardizem and Eliquis  POA:  AFib with RVR  Denies any palpitations, but reports of questionable chest pain  In ED, received Cardizem and Eliquis  Elevated initial troponin, but may be due to AFib  Plan: Will continue with Cardizem and Eliquis  Continue with telemetry  Continue to trend troponins

## 2022-04-20 NOTE — ED PROVIDER NOTES
History  Chief Complaint   Patient presents with    Hallucinations     pt reports hallucinations for 1 month  reports talking to pepole and then they are gone  pt thought his former wife and son were in his bed this am  pt is aware of month and a brief strugle  pt knows who he is, his  and current agre and where and why he is here but can not say who the president is  family reports he has had a hard time wife memory and has been off since his last stroke     43-year-old male coming into the ED for evaluation of hallucinations for the past 1 month with poor memory often on  He has had a history of strokes and his last 1 was about 2 months ago  His family at bedside states that yesterday his speech seemed a little bit slurred, but he did have 6 teeth a week ago  States that he has both auditory and visual hallucinations, frequently sees people that are not there there speaking  He has no prior history of this before the last month  He does drink alcohol daily  History provided by:  Patient   used: No        Prior to Admission Medications   Prescriptions Last Dose Informant Patient Reported? Taking?    ALPRAZolam (XANAX) 0 25 mg tablet  Self Yes No   Sig: TAKE 1 TABLET BY ORAL ROUTE 4 TIMES EVERY DAY AS NEEDED   aclidinium (Tudorza Pressair) 400 MCG/ACT inhaler  Self Yes No   Sig: Inhale 1 puff 2 (two) times a day     albuterol (PROVENTIL HFA,VENTOLIN HFA) 90 mcg/act inhaler  Self Yes No   Si puff every 6 (six) hours as needed     apixaban (ELIQUIS) 5 mg  Self No No   Sig: Take 1 tablet (5 mg total) by mouth 2 (two) times a day   atorvastatin (LIPITOR) 80 mg tablet   No No   Sig: Take 1 tablet (80 mg total) by mouth daily   baclofen 10 mg tablet  Self Yes No   Sig: 10 mg 3 (three) times a day as needed     carvedilol (COREG) 25 mg tablet   No No   Sig: Take 1 tablet (25 mg total) by mouth 2 (two) times a day with meals   clopidogrel (PLAVIX) 75 mg tablet  Self No No   Sig: TAKE 1 TABLET BY MOUTH EVERY DAY FOR 19 DOSES   Patient not taking: Reported on 11/10/2021   diltiazem (CARDIZEM CD) 120 mg 24 hr capsule   No No   Sig: Take 1 capsule (120 mg total) by mouth daily   fenofibrate 160 MG tablet  Self Yes No   Sig: Take 160 mg by mouth daily   ferrous sulfate 324 (65 Fe) mg  Self No No   Sig: Take 1 tablet (324 mg total) by mouth daily before breakfast   folic acid (FOLVITE) 1 mg tablet   No No   Sig: Take 1 tablet (1 mg total) by mouth daily   loratadine (CLARITIN) 10 mg tablet  Self No No   Sig: Take 1 tablet (10 mg total) by mouth daily   nicotine (NICODERM CQ) 14 mg/24hr TD 24 hr patch  Self No No   Sig: Place 1 patch on the skin daily   omeprazole (PriLOSEC) 20 mg delayed release capsule  Self Yes No   Sig: Take 20 mg by mouth daily     pravastatin (PRAVACHOL) 40 mg tablet  Self Yes No   Sig: Take 40 mg by mouth daily     sertraline (ZOLOFT) 100 mg tablet  Self Yes No   Sig: Take 100 mg by mouth daily     simethicone (MYLICON) 80 mg chewable tablet   No No   Sig: Chew 1 tablet (80 mg total) 4 (four) times a day as needed for flatulence      Facility-Administered Medications: None       Past Medical History:   Diagnosis Date    PRITI (acute kidney injury) ([facility] [address] ) 9/5/2021    Anxiety     Back pain     Claustrophobia     COPD [facility]ch[facility] obstructive pulmonary disease[facility])     Dysphagia 9/5/2021    GERD (gastroesophageal reflux disease)     Hypertension     Hypokalemia 2/22/2022    Lumbar back pain     Panic attacks     Stenosis of right carotid artery     Stroke [facility])     Wears glasses     Wears partial dentures     upper denture       Past Surgical History:   Procedure Laterality Date    COLONOSCOPY      IR CEREBRAL ANGIOGRAPHY  4/6/2021    OTHER SURGICAL HISTORY      fertility    HI SLCTV CATHJ 3RD+ ORD SLCTV ABDL PEL/LXTR [facility] Right 4/6/2021    Procedure: ARTERIOGRAM, carotid Angio;  Surgeon: Haritha Dickey MD;  Location: [facility];  Service: Vascular    HI THROMBOENDARTECTMY NECK,NECK INCIS Right 4/13/2021    Procedure: RIGHT ENDARTERECTOMY ARTERY CAROTID WITH BOVINE PATCH;  Surgeon: Jay Mcmullen MD;  Location: [facility];  Service: Vascular       No family history on file  I have reviewed and agree with the history as documented  E-Cigarette/Vaping    E-Cigarette Use Never User      E-Cigarette/Vaping Substances    Nicotine No     THC No     CBD No     Flavoring No     Other No     Unknown No      Social History     Tobacco Use    Smoking status: Light Tobacco Smoker     Packs/day: 0 25    Smokeless tobacco: Never Used    Tobacco comment: also wears nicotine patches   Vaping Use    Vaping Use: Never used   Substance Use Topics    Alcohol use: Yes    Drug use: Never       Review of Systems   Psychiatric/Behavioral: Positive for confusion and hallucinations  All other systems reviewed and are negative  Physical Exam  Physical Exam  Vitals and nursing note reviewed  Constitutional:       General: He is not in acute distress  Appearance: Normal appearance  He is normal weight  HENT:      Head: Normocephalic and atraumatic  Right Ear: External ear normal       Left Ear: External ear normal       Nose: Nose normal  No congestion or rhinorrhea  Mouth/Throat:      Mouth: Mucous membranes are dry  Pharynx: Oropharynx is clear  Eyes:      General: No scleral icterus  Right eye: No discharge  Left eye: No discharge  Conjunctiva/sclera: Conjunctivae normal    Cardiovascular:      Rate and Rhythm: Tachycardia present  Rhythm irregular  Pulses: Normal pulses  Heart sounds: Normal heart sounds  Pulmonary:      Effort: Pulmonary effort is normal  No respiratory distress  Breath sounds: Normal breath sounds  Abdominal:      General: Abdomen is flat  Palpations: Abdomen is soft  Tenderness: There is no abdominal tenderness  Musculoskeletal:         General: No swelling   Normal range of motion  Cervical back: Normal range of motion and neck supple  Skin:     General: Skin is warm and dry  Capillary Refill: Capillary refill takes less than 2 seconds  Neurological:      General: No focal deficit present  Mental Status: He is alert and oriented to person, place, and time  Mental status is at baseline  Cranial Nerves: No cranial nerve deficit  Sensory: No sensory deficit  Motor: No weakness  Coordination: Coordination normal       Gait: Gait normal       Deep Tendon Reflexes: Reflexes normal       Comments: Chronic left facial droop     Psychiatric:         Mood and Affect: Mood normal          Behavior: Behavior normal          Vital Signs  ED Triage Vitals [04/20/22 1435]   Temperature Pulse Respirations Blood Pressure SpO2   97 9 °F (36 6 °C) 76 16 (!) 188/82 94 %      Temp Source Heart Rate Source Patient Position - Orthostatic VS BP Location FiO2 (%)   Oral Monitor Sitting Left arm --      Pain Score       --           Vitals:    04/20/22 1435 04/20/22 1800   BP: (!) 188/82    Pulse: 76 90   Patient Position - Orthostatic VS: Sitting          Visual Acuity  Visual Acuity      Most Recent Value   L Pupil Size (mm) 3   R Pupil Size (mm) 3          ED Medications  Medications   apixaban (ELIQUIS) tablet 2 5 mg (2 5 mg Oral Given 4/20/22 1753)   sodium chloride 0 9 % bolus 1,000 mL (1,000 mL Intravenous New Bag 4/20/22 1752)   thiamine tablet 100 mg (100 mg Oral Given 4/20/22 1654)   diltiazem (CARDIZEM) injection 15 mg (15 mg Intravenous Given 4/20/22 1753)   diltiazem (CARDIZEM CD) 24 hr capsule 120 mg (120 mg Oral Given 4/20/22 1753)       Diagnostic Studies  Results Reviewed     Procedure Component Value Units Date/Time    Rapid drug screen, urine [[id number]]  (Abnormal) Collected: 04/20/22 1751    Lab Status: Final result Specimen: Urine, Clean Catch Updated: 04/20/22 1828     Amph/Meth UR Negative     Barbiturate Ur Negative     Benzodiazepine Urine Positive     Cocaine Urine Negative     Methadone Urine Negative     Opiate Urine Negative     PCP Ur Negative     THC Urine Negative     Oxycodone Urine Negative    Narrative:      Presumptive report  If requested, specimen will be sent to reference lab for confirmation  FOR MEDICAL PURPOSES ONLY  IF CONFIRMATION NEEDED PLEASE CONTACT THE LAB WITHIN 5 DAYS  Drug Screen Cutoff Levels:  AMPHETAMINE/METHAMPHETAMINES  1000 ng/mL  BARBITURATES     200 ng/mL  BENZODIAZEPINES     200 ng/mL  COCAINE      300 ng/mL  METHADONE      300 ng/mL  OPIATES      300 ng/mL  PHENCYCLIDINE     25 ng/mL  THC       50 ng/mL  OXYCODONE      100 ng/mL    UA (URINE) with reflex to Scope [[id number]] Collected: 04/20/22 1751    Lab Status:  In process Specimen: Urine, Clean Catch Updated: 04/20/22 1800    Ethanol [[id number]]  (Normal) Collected: 04/20/22 1651    Lab Status: Final result Specimen: Blood from Arm, Right Updated: 04/20/22 1736     Ethanol Lvl <3 mg/dL     HS Troponin 0hr (reflex protocol) [[id number]]  (Abnormal) Collected: 04/20/22 1651    Lab Status: Final result Specimen: Blood from Arm, Right Updated: 04/20/22 1730     hs TnI 0hr 71 ng/L     HS Troponin I 2hr [[id number]]     Lab Status: No result Specimen: Blood     Comprehensive metabolic panel [[id number]]  (Abnormal) Collected: 04/20/22 1651    Lab Status: Final result Specimen: Blood from Arm, Right Updated: 04/20/22 1725     Sodium 138 mmol/L      Potassium 3 5 mmol/L      Chloride 96 mmol/L      CO2 25 mmol/L      ANION GAP 17 mmol/L      BUN 45 mg/dL      Creatinine 1 64 mg/dL      Glucose 88 mg/dL      Calcium 9 7 mg/dL      AST 61 U/L      ALT 39 U/L      Alkaline Phosphatase 101 U/L      Total Protein 8 1 g/dL      Albumin 4 1 g/dL      Total Bilirubin 0 71 mg/dL      eGFR 43 ml/min/1 73sq m     Narrative:      Errol guidelines for Chronic Kidney Disease (CKD):     Stage 1 with normal or high GFR (GFR > 90 mL/min/1 73 square meters)    Stage 2 Mild CKD (GFR = 60-89 mL/min/1 73 square meters)    Stage 3A Moderate CKD (GFR = 45-59 mL/min/1 73 square meters)    Stage 3B Moderate CKD (GFR = 30-44 mL/min/1 73 square meters)    Stage 4 Severe CKD (GFR = 15-29 mL/min/1 73 square meters)    Stage 5 End Stage CKD (GFR <15 mL/min/1 73 square meters)  Note: GFR calculation is accurate only with a steady state creatinine    Magnesium [[id number]]  (Normal) Collected: 04/20/22 1651    Lab Status: Final result Specimen: Blood from Arm, Right Updated: 04/20/22 1725     Magnesium 1 7 mg/dL     CBC and differential [[id number]] Collected: 04/20/22 1651    Lab Status: Final result Specimen: Blood from Arm, Right Updated: 04/20/22 1707     WBC 8 13 Thousand/uL      RBC 5 50 Million/uL      Hemoglobin 15 7 g/dL      Hematocrit 46 9 %      MCV 85 fL      MCH 28 5 pg      MCHC 33 5 g/dL      RDW 13 9 %      MPV 10 7 fL      Platelets 227 Thousands/uL      nRBC 0 /100 WBCs      Neutrophils Relative 72 %      Immat GRANS % 0 %      Lymphocytes Relative 17 %      Monocytes Relative 10 %      Eosinophils Relative 0 %      Basophils Relative 1 %      Neutrophils Absolute 5 83 Thousands/µL      Immature Grans Absolute 0 03 Thousand/uL      Lymphocytes Absolute 1 37 Thousands/µL      Monocytes Absolute 0 81 Thousand/µL      Eosinophils Absolute 0 03 Thousand/µL      Basophils Absolute 0 06 Thousands/µL     Vitamin B1, whole blood [[id number]] Collected: 04/20/22 1651    Lab Status: In process Specimen: Blood from Arm, Right Updated: 04/20/22 1701    Vitamin B12 [[id number]] Collected: 04/20/22 1651    Lab Status: In process Specimen: Blood from Arm, Right Updated: 04/20/22 1701    Folate [[id number]] Collected: 04/20/22 1651    Lab Status: In process Specimen: Blood from Arm, Right Updated: 04/20/22 1701    Ammonia [[id number]] Collected: 04/20/22 1651    Lab Status:  In process Specimen: Blood from Arm, Right Updated: 04/20/22 1701                 CT head without contrast   Final Result by Jimena Abdalla MD (04/20 4865)      No acute intracranial abnormality  Old right frontal and occipital lobe infarcts  Workstation performed: [id number]         XR chest 1 view portable    (Results Pending)              Procedures  ECG 12 Lead Documentation Only    Date/Time: 4/20/2022 5:09 PM  Performed by: Kelsey Ramos DO  Authorized by: Kelsey Ramos DO     Indications / Diagnosis:  Tachycardia  ECG reviewed by me, the ED Provider: yes    Patient location:  ED  Rate:     ECG rate:  142    ECG rate assessment: tachycardic    Rhythm:     Rhythm: atrial fibrillation    Ectopy:     Ectopy: none    QRS:     QRS axis:  Normal    QRS intervals:  Normal  Conduction:     Conduction: normal    ST segments:     ST segments:  Non-specific  T waves:     T waves: non-specific      CriticalCare Time  Performed by: Kelsey Ramos DO  Authorized by: Kelsey Ramos DO     Critical care provider statement:     Critical care time (minutes):  40    Critical care time was exclusive of:  Separately billable procedures and treating other patients and teaching time    Critical care was necessary to treat or prevent imminent or life-threatening deterioration of the following conditions: rapid afib      Critical care was time spent personally by me on the following activities:  Blood draw for specimens, obtaining history from patient or surrogate, development of treatment plan with patient or surrogate, discussions with primary provider, evaluation of patient's response to treatment, examination of patient, review of old charts, re-evaluation of patient's condition, ordering and review of radiographic studies, ordering and review of laboratory studies and ordering and performing treatments and interventions    I assumed direction of critical care for this patient from another provider in my specialty: no               ED Course  ED Course as of 04/20/22 1835   Wed Apr 20, 2022   1708 EKG shows rapid afib, @ 142  Pt states he didn't take his meds today, including cardizem and eliquis  Will give IV, then PO cardizem, home dose of eliquis  1812 HR improved into the 80s w/ cardizem  Labs show PRITI, elevated troponin  D/w SLIM, Dr Fátima Givens, accepts to service inpatient tele                                               MDM  Number of Diagnoses or Management Options  PRITI (acute kidney injury) ([place] [address] ): new and requires workup  Altered mental status: new and requires workup  Atrial fibrillation with RVR ([place] [address] ): new and requires workup  Dehydration: new and requires workup  Elevated troponin: new and requires workup  Hallucinations: new and requires workup     Amount and/or Complexity of Data Reviewed  Clinical lab tests: ordered and reviewed  Tests in the radiology section of CPT®: ordered and reviewed  Decide to obtain previous medical records or to obtain history from someone other than the patient: yes  Discuss the patient with other providers: yes    Risk of Complications, Morbidity, and/or Mortality  Presenting problems: moderate  Diagnostic procedures: moderate  Management options: moderate    Patient Progress  Patient progress: stable      Disposition  Final diagnoses:   Hallucinations   Atrial fibrillation with RVR ([place] [address] )   Elevated troponin   Altered mental status   PRITI (acute kidney injury) ([place] [address] )   Dehydration     Time reflects when diagnosis was documented in both MDM as applicable and the Disposition within this note     Time User Action Codes Description Comment    4/20/2022  6:13 PM Myrle Lea [R44 3] Hallucinations     4/20/2022  6:13 PM Cesar Valentine Add [I48 91] Atrial fibrillation with RVR ([place] [address] )     4/20/2022  6:13 PM Myrle Lea [R77 8] Elevated troponin     4/20/2022  6:14 PM Tati Dhaliwal Add [R41 82] Altered mental status     4/20/2022  6:14 PM Tati Dhaliwal Add [N17 9] PRITI (acute kidney injury) ([place] [address] )     4/20/2022  6:14 PM Tati Tipton Add [E86 0] Dehydration       ED Disposition     ED Disposition Condition Date/Time Comment    Admit Stable Wed Apr 20, 2022  6:13 PM Case was discussed with Dr Kevin Cason and the patient's admission status was agreed to be Admission Status: inpatient status to the service of Dr Kevin Cason   Follow-up Information    None         Patient's Medications   Discharge Prescriptions    No medications on file       No discharge procedures on file      PDMP Review       Value Time User    PDMP Reviewed  Yes 9/6/2021  2:40 PM Jer Moser MD          ED Provider  Electronically Signed by           Lynette Dean DO  04/20/22 4894

## 2022-04-20 NOTE — ASSESSMENT & PLAN NOTE
History of alcohol abuse  Per patient and patient's sister reports that he drinks about 4-8 cups of beer/wine  Per patient, questionable last drink as of 3 days ago  Upon physical exam, exhibits tremor and hallucinations (auditory and visual)  Plan:  Placed on CIWA protocol

## 2022-04-20 NOTE — ASSESSMENT & PLAN NOTE
Home regimen:  Albuterol and aclidinium inhalers  Patient's sister reports that he intermittently smokes cigarettes  Plan:  Continue with inhalers

## 2022-04-20 NOTE — ASSESSMENT & PLAN NOTE
Patient reports of auditory and visual hallucinations over the past month  Denies any thoughts of suicidal ideation or harming himself or others  Etiology:  Could possibly be due to alcohol withdrawal vs  Medication side effects vs   Early-onset dementia  Upon physical exam, exhibits resting tremor and visual hallucination - therefore, alcohol withdrawal could be more likely since patient states that his last alcoholic beverage was 3 days ago  It appears as though stroke may be less likely since symptoms have been going on for 1 month and no neurological deficits appreciated upon physical exam   · Less likely infection due to negative UA, chest x-ray pending final read but upon personal review does not appear to show any acute infiltrates or consolidation and is afebrile  If hallucinations worsen, may consider Neurology or Psychiatry consult  CT head negative for any new abnormalities  Though alcohol level was negative, may need to consider that has been 3 days  Urine drug screen positive for benzodiazepine (which is part of his home regimen - Xanax)  Plan:  Placed on CIWA protocol and is now receiving thiamine  Continue to monitor  Awaiting vitamin B1, vitamin B12, and folate labs

## 2022-04-20 NOTE — ASSESSMENT & PLAN NOTE
Elevated creatinine on admission 1 64  Baseline seems to be around 1-1 1  Etiology:  Likely prerenal due to decreased p o  intake s/p oral surgery 1 week ago  Plan: Will continue with lactated Ringer 75 cc/hour  Repeat BMP tomorrow morning

## 2022-04-21 ENCOUNTER — HOSPITAL ENCOUNTER (INPATIENT)
Facility: HOSPITAL | Age: 67
LOS: 3 days | Discharge: HOME HEALTH | DRG: 641 | End: 2022-04-24
Attending: EMERGENCY MEDICINE | Admitting: EMERGENCY MEDICINE
Payer: COMMERCIAL

## 2022-04-21 ENCOUNTER — APPOINTMENT (INPATIENT)
Dept: MRI IMAGING | Facility: HOSPITAL | Age: 67
DRG: 641 | End: 2022-04-21
Payer: COMMERCIAL

## 2022-04-21 VITALS
HEIGHT: 69 IN | SYSTOLIC BLOOD PRESSURE: 109 MMHG | DIASTOLIC BLOOD PRESSURE: 56 MMHG | OXYGEN SATURATION: 91 % | BODY MASS INDEX: 18.96 KG/M2 | WEIGHT: 128 LBS | HEART RATE: 61 BPM | RESPIRATION RATE: 16 BRPM | TEMPERATURE: 98.1 F

## 2022-04-21 DIAGNOSIS — R27.0 ATAXIA: Primary | ICD-10-CM

## 2022-04-21 PROBLEM — E87.2 METABOLIC ACIDOSIS, INCREASED ANION GAP: Status: RESOLVED | Noted: 2022-04-21 | Resolved: 2022-04-21

## 2022-04-21 PROBLEM — E87.29 METABOLIC ACIDOSIS, INCREASED ANION GAP: Status: ACTIVE | Noted: 2022-04-21

## 2022-04-21 PROBLEM — F10.20 ALCOHOL USE DISORDER, SEVERE, DEPENDENCE (HCC): Status: ACTIVE | Noted: 2022-04-21

## 2022-04-21 PROBLEM — E87.2 METABOLIC ACIDOSIS, INCREASED ANION GAP: Status: ACTIVE | Noted: 2022-04-21

## 2022-04-21 PROBLEM — F10.231 DELIRIUM TREMENS (HCC): Status: ACTIVE | Noted: 2022-04-21

## 2022-04-21 PROBLEM — E87.29 METABOLIC ACIDOSIS, INCREASED ANION GAP: Status: RESOLVED | Noted: 2022-04-21 | Resolved: 2022-04-21

## 2022-04-21 PROBLEM — E51.2 WERNICKE ENCEPHALOPATHY: Status: ACTIVE | Noted: 2022-04-21

## 2022-04-21 PROBLEM — Z72.0 TOBACCO USE: Chronic | Status: RESOLVED | Noted: 2021-03-29 | Resolved: 2022-04-21

## 2022-04-21 PROBLEM — F10.931 DELIRIUM TREMENS (HCC): Status: ACTIVE | Noted: 2022-04-21

## 2022-04-21 LAB
ANION GAP SERPL CALCULATED.3IONS-SCNC: 17 MMOL/L (ref 4–13)
ANION GAP SERPL CALCULATED.3IONS-SCNC: 3 MMOL/L (ref 5–14)
ATRIAL RATE: 137 BPM
ATRIAL RATE: 340 BPM
BUN SERPL-MCNC: 25 MG/DL (ref 5–25)
BUN SERPL-MCNC: 34 MG/DL (ref 5–25)
CALCIUM SERPL-MCNC: 8.6 MG/DL (ref 8.4–10.2)
CALCIUM SERPL-MCNC: 8.7 MG/DL (ref 8.3–10.1)
CHLORIDE SERPL-SCNC: 102 MMOL/L (ref 100–108)
CHLORIDE SERPL-SCNC: 106 MMOL/L (ref 97–108)
CO2 SERPL-SCNC: 21 MMOL/L (ref 21–32)
CO2 SERPL-SCNC: 29 MMOL/L (ref 22–30)
CREAT SERPL-MCNC: 0.93 MG/DL (ref 0.7–1.5)
CREAT SERPL-MCNC: 1.11 MG/DL (ref 0.6–1.3)
ERYTHROCYTE [DISTWIDTH] IN BLOOD BY AUTOMATED COUNT: 14 % (ref 11.6–15.1)
GFR SERPL CREATININE-BSD FRML MDRD: 69 ML/MIN/1.73SQ M
GFR SERPL CREATININE-BSD FRML MDRD: 85 ML/MIN/1.73SQ M
GLUCOSE SERPL-MCNC: 125 MG/DL (ref 70–99)
GLUCOSE SERPL-MCNC: 77 MG/DL (ref 65–140)
HCT VFR BLD AUTO: 43 % (ref 36.5–49.3)
HGB BLD-MCNC: 13.9 G/DL (ref 12–17)
INR PPP: 1.22 (ref 0.84–1.19)
MCH RBC QN AUTO: 28.6 PG (ref 26.8–34.3)
MCHC RBC AUTO-ENTMCNC: 32.3 G/DL (ref 31.4–37.4)
MCV RBC AUTO: 89 FL (ref 82–98)
PLATELET # BLD AUTO: 142 THOUSANDS/UL (ref 149–390)
PMV BLD AUTO: 10.4 FL (ref 8.9–12.7)
POTASSIUM SERPL-SCNC: 3.5 MMOL/L (ref 3.5–5.3)
POTASSIUM SERPL-SCNC: 4.4 MMOL/L (ref 3.6–5)
PROTHROMBIN TIME: 15.3 SECONDS (ref 11.6–14.5)
QRS AXIS: 44 DEGREES
QRS AXIS: 53 DEGREES
QRSD INTERVAL: 70 MS
QRSD INTERVAL: 78 MS
QT INTERVAL: 282 MS
QT INTERVAL: 294 MS
QTC INTERVAL: 433 MS
QTC INTERVAL: 434 MS
RBC # BLD AUTO: 4.86 MILLION/UL (ref 3.88–5.62)
SODIUM SERPL-SCNC: 138 MMOL/L (ref 137–147)
SODIUM SERPL-SCNC: 140 MMOL/L (ref 136–145)
T WAVE AXIS: -48 DEGREES
T WAVE AXIS: 239 DEGREES
VENTRICULAR RATE: 131 BPM
VENTRICULAR RATE: 142 BPM
WBC # BLD AUTO: 5.66 THOUSAND/UL (ref 4.31–10.16)

## 2022-04-21 PROCEDURE — 85027 COMPLETE CBC AUTOMATED: CPT | Performed by: INTERNAL MEDICINE

## 2022-04-21 PROCEDURE — 80048 BASIC METABOLIC PNL TOTAL CA: CPT | Performed by: INTERNAL MEDICINE

## 2022-04-21 PROCEDURE — 99239 HOSP IP/OBS DSCHRG MGMT >30: CPT | Performed by: HOSPITALIST

## 2022-04-21 PROCEDURE — 99291 CRITICAL CARE FIRST HOUR: CPT | Performed by: EMERGENCY MEDICINE

## 2022-04-21 PROCEDURE — 85610 PROTHROMBIN TIME: CPT | Performed by: INTERNAL MEDICINE

## 2022-04-21 PROCEDURE — 93010 ELECTROCARDIOGRAM REPORT: CPT | Performed by: INTERNAL MEDICINE

## 2022-04-21 PROCEDURE — 80048 BASIC METABOLIC PNL TOTAL CA: CPT | Performed by: EMERGENCY MEDICINE

## 2022-04-21 RX ORDER — LORAZEPAM 2 MG/ML
4 INJECTION INTRAMUSCULAR ONCE
Status: CANCELLED | OUTPATIENT
Start: 2022-04-21 | End: 2022-04-21

## 2022-04-21 RX ORDER — SIMETHICONE 80 MG
80 TABLET,CHEWABLE ORAL 4 TIMES DAILY PRN
Status: CANCELLED | OUTPATIENT
Start: 2022-04-21

## 2022-04-21 RX ORDER — LORAZEPAM 1 MG/1
2 TABLET ORAL ONCE
Status: COMPLETED | OUTPATIENT
Start: 2022-04-21 | End: 2022-04-21

## 2022-04-21 RX ORDER — ACETAMINOPHEN 325 MG/1
650 TABLET ORAL EVERY 6 HOURS PRN
Status: CANCELLED | OUTPATIENT
Start: 2022-04-21

## 2022-04-21 RX ORDER — CARVEDILOL 12.5 MG/1
25 TABLET ORAL 2 TIMES DAILY WITH MEALS
Status: DISCONTINUED | OUTPATIENT
Start: 2022-04-21 | End: 2022-04-24 | Stop reason: HOSPADM

## 2022-04-21 RX ORDER — FENOFIBRATE 48 MG/1
160 TABLET, COATED ORAL DAILY
Status: DISCONTINUED | OUTPATIENT
Start: 2022-04-21 | End: 2022-04-24 | Stop reason: HOSPADM

## 2022-04-21 RX ORDER — LORAZEPAM 2 MG/ML
4 INJECTION INTRAMUSCULAR ONCE
Status: DISCONTINUED | OUTPATIENT
Start: 2022-04-21 | End: 2022-04-21 | Stop reason: HOSPADM

## 2022-04-21 RX ORDER — ACETAMINOPHEN 325 MG/1
650 TABLET ORAL EVERY 6 HOURS PRN
Status: DISCONTINUED | OUTPATIENT
Start: 2022-04-21 | End: 2022-04-24 | Stop reason: HOSPADM

## 2022-04-21 RX ORDER — ONDANSETRON 2 MG/ML
4 INJECTION INTRAMUSCULAR; INTRAVENOUS EVERY 6 HOURS PRN
Status: CANCELLED | OUTPATIENT
Start: 2022-04-21

## 2022-04-21 RX ORDER — DIAZEPAM 5 MG/ML
10 INJECTION, SOLUTION INTRAMUSCULAR; INTRAVENOUS ONCE
Status: COMPLETED | OUTPATIENT
Start: 2022-04-21 | End: 2022-04-21

## 2022-04-21 RX ORDER — ALBUTEROL SULFATE 90 UG/1
1 AEROSOL, METERED RESPIRATORY (INHALATION) EVERY 6 HOURS PRN
Status: CANCELLED | OUTPATIENT
Start: 2022-04-21

## 2022-04-21 RX ORDER — PANTOPRAZOLE SODIUM 40 MG/1
40 TABLET, DELAYED RELEASE ORAL
Status: DISCONTINUED | OUTPATIENT
Start: 2022-04-21 | End: 2022-04-24 | Stop reason: HOSPADM

## 2022-04-21 RX ORDER — LORAZEPAM 2 MG/ML
4 INJECTION INTRAMUSCULAR ONCE
Status: COMPLETED | OUTPATIENT
Start: 2022-04-21 | End: 2022-04-21

## 2022-04-21 RX ORDER — SODIUM CHLORIDE 9 MG/ML
125 INJECTION, SOLUTION INTRAVENOUS CONTINUOUS
Status: DISCONTINUED | OUTPATIENT
Start: 2022-04-21 | End: 2022-04-24 | Stop reason: HOSPADM

## 2022-04-21 RX ORDER — HYDRALAZINE HYDROCHLORIDE 20 MG/ML
5 INJECTION INTRAMUSCULAR; INTRAVENOUS EVERY 6 HOURS PRN
Status: DISCONTINUED | OUTPATIENT
Start: 2022-04-21 | End: 2022-04-21 | Stop reason: HOSPADM

## 2022-04-21 RX ORDER — PANTOPRAZOLE SODIUM 40 MG/1
40 TABLET, DELAYED RELEASE ORAL
Status: CANCELLED | OUTPATIENT
Start: 2022-04-21

## 2022-04-21 RX ORDER — DEXTROSE AND SODIUM CHLORIDE 5; .9 G/100ML; G/100ML
125 INJECTION, SOLUTION INTRAVENOUS CONTINUOUS
Status: DISCONTINUED | OUTPATIENT
Start: 2022-04-21 | End: 2022-04-21

## 2022-04-21 RX ORDER — CHLORDIAZEPOXIDE HYDROCHLORIDE 5 MG/1
50 CAPSULE, GELATIN COATED ORAL EVERY 8 HOURS SCHEDULED
Status: DISCONTINUED | OUTPATIENT
Start: 2022-04-21 | End: 2022-04-21 | Stop reason: HOSPADM

## 2022-04-21 RX ORDER — ONDANSETRON 2 MG/ML
4 INJECTION INTRAMUSCULAR; INTRAVENOUS EVERY 6 HOURS PRN
Status: DISCONTINUED | OUTPATIENT
Start: 2022-04-21 | End: 2022-04-24 | Stop reason: HOSPADM

## 2022-04-21 RX ORDER — CHLORDIAZEPOXIDE HYDROCHLORIDE 25 MG/1
50 CAPSULE, GELATIN COATED ORAL EVERY 8 HOURS SCHEDULED
Status: CANCELLED | OUTPATIENT
Start: 2022-04-21

## 2022-04-21 RX ORDER — DILTIAZEM HYDROCHLORIDE 120 MG/1
120 CAPSULE, COATED, EXTENDED RELEASE ORAL DAILY
Status: CANCELLED | OUTPATIENT
Start: 2022-04-21

## 2022-04-21 RX ORDER — LANOLIN ALCOHOL/MO/W.PET/CERES
100 CREAM (GRAM) TOPICAL DAILY
Status: CANCELLED | OUTPATIENT
Start: 2022-04-21

## 2022-04-21 RX ORDER — SENNOSIDES 8.6 MG
1 TABLET ORAL
Status: CANCELLED | OUTPATIENT
Start: 2022-04-21

## 2022-04-21 RX ORDER — NICOTINE 21 MG/24HR
1 PATCH, TRANSDERMAL 24 HOURS TRANSDERMAL DAILY
Status: DISCONTINUED | OUTPATIENT
Start: 2022-04-21 | End: 2022-04-24 | Stop reason: HOSPADM

## 2022-04-21 RX ORDER — ATORVASTATIN CALCIUM 80 MG/1
80 TABLET, FILM COATED ORAL DAILY
Status: DISCONTINUED | OUTPATIENT
Start: 2022-04-21 | End: 2022-04-24 | Stop reason: HOSPADM

## 2022-04-21 RX ORDER — ALBUTEROL SULFATE 90 UG/1
2 AEROSOL, METERED RESPIRATORY (INHALATION) EVERY 6 HOURS PRN
Status: DISCONTINUED | OUTPATIENT
Start: 2022-04-21 | End: 2022-04-24 | Stop reason: HOSPADM

## 2022-04-21 RX ORDER — CARVEDILOL 12.5 MG/1
25 TABLET ORAL 2 TIMES DAILY WITH MEALS
Status: CANCELLED | OUTPATIENT
Start: 2022-04-21

## 2022-04-21 RX ORDER — HYDRALAZINE HYDROCHLORIDE 20 MG/ML
5 INJECTION INTRAMUSCULAR; INTRAVENOUS EVERY 6 HOURS PRN
Status: CANCELLED | OUTPATIENT
Start: 2022-04-21

## 2022-04-21 RX ORDER — ATORVASTATIN CALCIUM 40 MG/1
80 TABLET, FILM COATED ORAL
Status: CANCELLED | OUTPATIENT
Start: 2022-04-21

## 2022-04-21 RX ORDER — DILTIAZEM HYDROCHLORIDE 120 MG/1
120 CAPSULE, COATED, EXTENDED RELEASE ORAL DAILY
Status: DISCONTINUED | OUTPATIENT
Start: 2022-04-21 | End: 2022-04-24 | Stop reason: HOSPADM

## 2022-04-21 RX ORDER — FOLIC ACID 1 MG/1
1 TABLET ORAL DAILY
Status: CANCELLED | OUTPATIENT
Start: 2022-04-21

## 2022-04-21 RX ADMIN — THIAMINE HYDROCHLORIDE 500 MG: 100 INJECTION, SOLUTION INTRAMUSCULAR; INTRAVENOUS at 21:04

## 2022-04-21 RX ADMIN — FOLIC ACID 1 MG: 5 INJECTION, SOLUTION INTRAMUSCULAR; INTRAVENOUS; SUBCUTANEOUS at 14:57

## 2022-04-21 RX ADMIN — LORAZEPAM 2 MG: 1 TABLET ORAL at 05:30

## 2022-04-21 RX ADMIN — NICOTINE 1 PATCH: 21 PATCH, EXTENDED RELEASE TRANSDERMAL at 12:52

## 2022-04-21 RX ADMIN — NICOTINE 1 PATCH: 7 PATCH, EXTENDED RELEASE TRANSDERMAL at 08:10

## 2022-04-21 RX ADMIN — THIAMINE HYDROCHLORIDE 500 MG: 100 INJECTION, SOLUTION INTRAMUSCULAR; INTRAVENOUS at 15:43

## 2022-04-21 RX ADMIN — LORAZEPAM 2 MG: 1 TABLET ORAL at 03:39

## 2022-04-21 RX ADMIN — Medication 650 MG: at 12:43

## 2022-04-21 RX ADMIN — PANTOPRAZOLE SODIUM 40 MG: 40 TABLET, DELAYED RELEASE ORAL at 05:30

## 2022-04-21 RX ADMIN — DIAZEPAM 10 MG: 10 INJECTION, SOLUTION INTRAMUSCULAR; INTRAVENOUS at 09:20

## 2022-04-21 RX ADMIN — THIAMINE HCL TAB 100 MG 100 MG: 100 TAB at 08:10

## 2022-04-21 RX ADMIN — POTASSIUM CHLORIDE 20 MEQ: 1500 TABLET, EXTENDED RELEASE ORAL at 00:13

## 2022-04-21 RX ADMIN — FOLIC ACID 1 MG: 1 TABLET ORAL at 08:10

## 2022-04-21 RX ADMIN — LORAZEPAM 4 MG: 2 INJECTION INTRAMUSCULAR; INTRAVENOUS at 08:31

## 2022-04-21 RX ADMIN — LORAZEPAM 2 MG: 1 TABLET ORAL at 07:14

## 2022-04-21 RX ADMIN — SODIUM CHLORIDE 125 ML/HR: 0.9 INJECTION, SOLUTION INTRAVENOUS at 20:11

## 2022-04-21 RX ADMIN — APIXABAN 2.5 MG: 2.5 TABLET, FILM COATED ORAL at 08:10

## 2022-04-21 RX ADMIN — CARVEDILOL 25 MG: 12.5 TABLET, FILM COATED ORAL at 07:14

## 2022-04-21 RX ADMIN — DEXTROSE AND SODIUM CHLORIDE 125 ML/HR: 5; .9 INJECTION, SOLUTION INTRAVENOUS at 12:51

## 2022-04-21 NOTE — ASSESSMENT & PLAN NOTE
· History of paroxysmal atrial fibrillation  · Currently in NSR on monitor  · Continue to monitor  · Continue home doses of Eliquis, BB, CCB

## 2022-04-21 NOTE — ASSESSMENT & PLAN NOTE
Patient reports that his memory has worsened over the last month  Has history of multiple CVAs  Etiology:  Could be a result of multiple CVAs vs  vitamin deficiency in the setting of alcohol use and possible early-onset dementia  Plan:  Ordered for thiamine replacement  Awaiting a vitamin B1 and B12 labs

## 2022-04-21 NOTE — SPEECH THERAPY NOTE
Speech Language/Pathology    Speech/Language Pathology Progress Note    Patient Name: Lis Batista  SHLHV'N Date: 4/21/2022     Consult rec'd for swallow eval  Pt now transferred to ICU for DT's  Will hold swallow eval today and follow up tomorrow as able, as appropriate       Cyrus Crowder CCC-SLP  Speech Pathologist  Available via  tiger text

## 2022-04-21 NOTE — ASSESSMENT & PLAN NOTE
· No wheezing/ other evidence of acute exacerbation noted on exam  · Goal O2 sat of 90% or above  · Continue home medications

## 2022-04-21 NOTE — PLAN OF CARE
Problem: MOBILITY - ADULT  Goal: Maintain or return to baseline ADL function  Description: INTERVENTIONS:  -  Assess patient's ability to carry out ADLs; assess patient's baseline for ADL function and identify physical deficits which impact ability to perform ADLs (bathing, care of mouth/teeth, toileting, grooming, dressing, etc )  - Assess/evaluate cause of self-care deficits   - Assess range of motion  - Assess patient's mobility; develop plan if impaired  - Assess patient's need for assistive devices and provide as appropriate  - Encourage maximum independence but intervene and supervise when necessary  - Involve family in performance of ADLs  - Assess for home care needs following discharge   - Consider OT consult to assist with ADL evaluation and planning for discharge  - Provide patient education as appropriate  Outcome: Completed  Goal: Maintains/Returns to pre admission functional level  Description: INTERVENTIONS:  - Perform BMAT or MOVE assessment daily    - Set and communicate daily mobility goal to care team and patient/family/caregiver     - Collaborate with rehabilitation services on mobility goals if consulted  - Out of bed for toileting  - Record patient progress and toleration of activity level   Outcome: Completed     Problem: Potential for Falls  Goal: Patient will remain free of falls  Description: INTERVENTIONS:  - Educate patient/family on patient safety including physical limitations  - Instruct patient to call for assistance with activity   - Consult OT/PT to assist with strengthening/mobility   - Keep Call bell within reach  - Keep bed low and locked with side rails adjusted as appropriate  - Keep care items and personal belongings within reach  - Initiate and maintain comfort rounds  - Make Fall Risk Sign visible to staff  - Initiate/Maintain bed alarm  - Apply yellow socks and bracelet for high fall risk patients  - Consider moving patient to room near nurses station  Outcome: Completed

## 2022-04-21 NOTE — ASSESSMENT & PLAN NOTE
History of alcohol abuse  Per patient and patient's sister reports that he drinks about 4-8 cups of beer/wine  Per patient, questionable last drink as of 3 days ago  Upon physical exam, exhibits tremor and hallucinations (auditory and visual)  Plan:  Placed on CIWA protocol    CIWA score kept increasing, required more benzodiazepines  Patient was eventually transferred to [facility]

## 2022-04-21 NOTE — H&P
Kaykay Palafox [address] 1955, 73 y o  male MRN: [id number]  Unit/Bed#: S -01 Encounter: [id number]  Primary Care Provider: Brandon Pisano DO   Date and time admitted to hospital: 4/20/2022  2:36 PM    * Hallucination  Assessment & Plan  Patient reports of auditory and visual hallucinations over the past month  Denies any thoughts of suicidal ideation or harming himself or others  Etiology:  Could possibly be due to alcohol withdrawal vs  Medication side effects vs   Early-onset dementia  Upon physical exam, exhibits resting tremor and visual hallucination - therefore, alcohol withdrawal could be more likely since patient states that his last alcoholic beverage was 3 days ago  It appears as though stroke may be less likely since symptoms have been going on for 1 month and no neurological deficits appreciated upon physical exam   · Less likely infection due to negative UA, chest x-ray pending final read but upon personal review does not appear to show any acute infiltrates or consolidation and is afebrile  If hallucinations worsen, may consider Neurology or Psychiatry consult  CT head negative for any new abnormalities  Though alcohol level was negative, may need to consider that has been 3 days  Urine drug screen positive for benzodiazepine (which is part of his home regimen - Xanax)  Plan:  Placed on CIWA protocol and is now receiving thiamine  Continue to monitor  Awaiting vitamin B1, vitamin B12, and folate labs  Atrial fibrillation ([facility] [address] )  Assessment & Plan  Home regimen:  Cardizem and Eliquis  POA:  AFib with RVR  Denies any palpitations, but reports of questionable chest pain  In ED, received Cardizem and Eliquis  Elevated initial troponin, but may be due to AFib  Plan: Will continue with Cardizem and Eliquis  Continue with telemetry  Continue to trend troponins      Memory changes  Assessment & Plan  Patient reports that his memory has worsened over the last month  Has history of multiple CVAs  Etiology:  Could be a result of multiple CVAs vs  vitamin deficiency in the setting of alcohol use and possible early-onset dementia  Plan:  Ordered for thiamine replacement  Awaiting a vitamin B1 and B12 labs  PRITI (acute kidney injury) ([facility] [address] )  Assessment & Plan  Elevated creatinine on admission 1 64  Baseline seems to be around 1-1 1  Etiology:  Likely prerenal due to decreased p o  intake s/p oral surgery 1 week ago  Plan: Will continue with lactated Ringer 75 cc/hour  Repeat BMP tomorrow morning  Benign essential hypertension  Assessment & Plan  Home regimen:  Coreg  Elevated BP on arrival 188/82  Plan:   Continue with home regimen  Continue to monitor BP  GERD (gastroesophageal reflux disease)  Assessment & Plan  Home regimen:  Prilosec  Plan:  Continue with Protonix  COPD (chronic obstructive pulmonary disease) ([facility])  Assessment & Plan  Home regimen:  Albuterol and aclidinium inhalers  Patient's sister reports that he intermittently smokes cigarettes  Plan:  Continue with inhalers  Alcohol abuse  Assessment & Plan  History of alcohol abuse  Per patient and patient's sister reports that he drinks about 4-8 cups of beer/wine  Per patient, questionable last drink as of 3 days ago  Upon physical exam, exhibits tremor and hallucinations (auditory and visual)  Plan:  Placed on CIWA protocol  Tobacco use  Assessment & Plan  History of tobacco use  Per patient's sister, states that he intermittently smokes cigarettes and due to his new-onset memory problem can not remember how much he actually smokes  Plan:  Nicotine patch ordered  VTE Pharmacologic Prophylaxis: VTE Score: 7 High Risk (Score >/= 5) - Pharmacological DVT Prophylaxis Ordered: apixaban (Eliquis)  Sequential Compression Devices Ordered  Code Status: Level 1 - Full Code   Discussion with family: Updated  (sister) via phone      Anticipated Length of Stay: Patient will be admitted on an inpatient basis with an anticipated length of stay of greater than 2 midnights secondary to Atrial fibrillation with RVR and possible drug withdrawal vs  medication side effect       Chief Complaint:  Hallucination    History of Present Illness:  Roni Quinones is a 73 y o  male with a PMHx of HTN, history of multiple CVAs, alcohol abuse, and atrial fibrillation who presents due to hallucinations (auditory and visual) and tremor  Patient reports that over the past month he has been experiencing hallucinations and states that he sometimes sees figures and animals  He also states that he hears a voice talking about his medications  These hallucinations have made him more anxious, which makes him concerned to the point that he wanted to come to the hospital for further evaluation  Additionally, he reports that 3 days ago he drank 3 glasses of wine  However, upon talking to the patient's sister she states that the patient probably drank 4-8 cups of wine  The patient states that over the last 3 days he has been experiencing worsening hallucinations and now has a tremor  He also says that he has not slept in 3 days  Per patient and his sister, the patient has been experiencing a change in his memory and as a result he sometimes forgets to take his medications or it is possible that he takes more than is prescribed  The patient states that he lives alone and mentioned that he would not mind if a visiting nurse helped him with his medications  Besides this, he denies any coughing, pain with urination, abdominal pain, nor any nausea or vomiting  He does say that he was experiencing fever and chills as of 3 days ago  However, upon talking to his sister - due to the patient's change in memory, he may likely be a poor historian current symptoms  Review of Systems:  Review of Systems   Constitutional: Positive for appetite change (decreased)     HENT: Positive for dental problem (s/p removal of 6 teeth last week) and sore throat  Eyes: Negative for photophobia and visual disturbance  Respiratory: Negative for cough, chest tightness and shortness of breath  Cardiovascular: Negative for chest pain, palpitations and leg swelling  Gastrointestinal: Negative for abdominal pain, blood in stool, diarrhea, nausea and vomiting  Genitourinary: Negative for dysuria  Neurological: Positive for tremors and speech difficulty (s/p recent dental surgery (6 teeth removed))  Negative for dizziness, seizures, weakness, light-headedness and headaches  Psychiatric/Behavioral: Positive for confusion, hallucinations (Auditory and visual) and sleep disturbance  Negative for self-injury and suicidal ideas  The patient is nervous/anxious  Past Medical and Surgical History:   Past Medical History:   Diagnosis Date    PRITI (acute kidney injury) ([facility] [address] ) 9/5/2021    Anxiety     Back pain     Claustrophobia     [facility]ch[facility]) ([facility])     Dysphagia 9/5/2021    GERD (gastroesophageal reflux disease)     Hypertension     Hypokalemia 2/22/2022    Lumbar back pain     Panic attacks     Stenosis of right carotid artery     Stroke [facility])     Wears glasses     Wears partial dentures     upper denture       Past Surgical History:   Procedure Laterality Date    COLONOSCOPY      IR CEREBRAL ANGIOGRAPHY  4/6/2021    OTHER SURGICAL HISTORY      fertility    MD [address] 3RD+ ORD SLCTV ABDL PEL/LXTR [facility] Right 4/6/2021    Procedure: ARTERIOGRAM, carotid Angio;  Surgeon: Fausto Hassan MD;  Location: [facility];  Service: Vascular    MD THROMBOENDARTECTMY Meagan Ocampo Right 4/13/2021    Procedure: RIGHT ENDARTERECTOMY ARTERY CAROTID WITH BOVINE PATCH;  Surgeon: Fausto Hassan MD;  Location: [facility];  Service: Vascular       Meds/Allergies:  Prior to Admission medications    Medication Sig Start Date End Date Taking?  Authorizing Provider aclidinium (Tudorza Pressair) 400 MCG/ACT inhaler Inhale 1 puff 2 (two) times a day      Historical Provider, MD   albuterol (PROVENTIL HFA,VENTOLIN HFA) 90 mcg/act inhaler 1 puff every 6 (six) hours as needed   10/8/21   Historical Provider, MD   ALPRAZolam (XANAX) 0 25 mg tablet TAKE 1 TABLET BY ORAL ROUTE 4 TIMES EVERY DAY AS NEEDED 6/15/21   Historical Provider, MD   apixaban (ELIQUIS) 5 mg Take 1 tablet (5 mg total) by mouth 2 (two) times a day 9/17/21   Cameron Catalan MD   atorvastatin (LIPITOR) 80 mg tablet Take 1 tablet (80 mg total) by mouth daily 9/6/21 10/22/21  Arturo Whitten MD   baclofen 10 mg tablet 10 mg 3 (three) times a day as needed   11/3/21   Historical Provider, MD   carvedilol (COREG) 25 mg tablet Take 1 tablet (25 mg total) by mouth 2 (two) times a day with meals 3/1/22 3/31/22  Caroline Davis MD   clopidogrel (PLAVIX) 75 mg tablet TAKE 1 TABLET BY MOUTH EVERY DAY FOR 19 DOSES  Patient not taking: Reported on 11/10/2021 7/28/21   Areli Hassan PA-C   diltiazem (CARDIZEM CD) 120 mg 24 hr capsule Take 1 capsule (120 mg total) by mouth daily 3/2/22 4/1/22  Caroline Davis MD   fenofibrate 160 MG tablet Take 160 mg by mouth daily 6/25/21   Historical Provider, MD   ferrous sulfate 324 (65 Fe) mg Take 1 tablet (324 mg total) by mouth daily before breakfast 9/6/21   Josh Cohen MD   folic acid (FOLVITE) 1 mg tablet Take 1 tablet (1 mg total) by mouth daily 3/2/22 4/1/22  Caroline Davis MD   loratadine (CLARITIN) 10 mg tablet Take 1 tablet (10 mg total) by mouth daily 9/6/21 11/10/21  Arturo Whitten MD   nicotine (NICODERM CQ) 14 mg/24hr TD 24 hr patch Place 1 patch on the skin daily 9/7/21   Arturo Whitten MD   omeprazole (PriLOSEC) 20 mg delayed release capsule Take 20 mg by mouth daily   10/4/21   Historical Provider, MD   pravastatin (PRAVACHOL) 40 mg tablet Take 40 mg by mouth daily   10/22/21   Historical Provider, MD   sertraline (ZOLOFT) 100 mg tablet Take 100 mg by mouth daily 1/19/11   Historical Provider, MD   simethicone (MYLICON) 80 mg chewable tablet Chew 1 tablet (80 mg total) 4 (four) times a day as needed for flatulence 3/1/22   Marj Cross MD     Reviewed medications with patient and also via EPIC records        Allergies: Allergies   Allergen Reactions    Penicillins Anaphylaxis       Social History:  Marital Status: /Civil Union   Occupation: Unknown  Patient Pre-hospital Living Situation: Home  Patient Pre-hospital Level of Mobility: unable to be assessed at time of evaluation  Patient Pre-hospital Diet Restrictions: None  Substance Use History:   Social History     Substance and Sexual Activity   Alcohol Use Yes     Social History     Tobacco Use   Smoking Status Light Tobacco Smoker    Packs/day: 0 25   Smokeless Tobacco Never Used   Tobacco Comment    also wears nicotine patches     Social History     Substance and Sexual Activity   Drug Use Never       Family History:  No family history on file  Physical Exam:     Vitals:   Blood Pressure: 140/93 (04/20/22 2008)  Pulse: (!) 130 (04/20/22 2008)  Temperature: 97 8 °F (36 6 °C) (04/20/22 2008)  Temp Source: Oral (04/20/22 2008)  Respirations: 18 (04/20/22 1800)  Height: 5' 9" (175 3 cm) (04/20/22 1435)  Weight - Scale: 58 1 kg (128 lb) (04/20/22 1435)  SpO2: 98 % (04/20/22 1800)    Physical Exam  Vitals and nursing note reviewed  Constitutional:       General: He is not in acute distress  Appearance: He is not ill-appearing or diaphoretic  HENT:      Head: Normocephalic  Mouth/Throat:      Mouth: Mucous membranes are dry  Comments: S/p recent oral surgery - missing teeth  Eyes:      Conjunctiva/sclera: Conjunctivae normal    Cardiovascular:      Rate and Rhythm: Tachycardia present  Rhythm irregular  Pulses: Normal pulses  Heart sounds: Normal heart sounds  No murmur heard  No friction rub  Pulmonary:      Effort: Pulmonary effort is normal  No respiratory distress        Breath sounds: Normal breath sounds  Chest:      Chest wall: No tenderness  Abdominal:      General: Abdomen is flat  Bowel sounds are normal  There is no distension  Palpations: Abdomen is soft  Tenderness: There is no abdominal tenderness  There is no guarding or rebound  Musculoskeletal:         General: No tenderness  Right lower leg: No edema  Left lower leg: No edema  Skin:     General: Skin is dry  Neurological:      General: No focal deficit present  Mental Status: He is alert and oriented to person, place, and time  Cranial Nerves: No cranial nerve deficit  Sensory: No sensory deficit  Additional Data:     Lab Results:  Results from last 7 days   Lab Units 04/20/22  1651   WBC Thousand/uL 8 13   HEMOGLOBIN g/dL 15 7   HEMATOCRIT % 46 9   PLATELETS Thousands/uL 200   NEUTROS PCT % 72   LYMPHS PCT % 17   MONOS PCT % 10   EOS PCT % 0     Results from last 7 days   Lab Units 04/20/22  1651   SODIUM mmol/L 138   POTASSIUM mmol/L 3 5   CHLORIDE mmol/L 96*   CO2 mmol/L 25   BUN mg/dL 45*   CREATININE mg/dL 1 64*   ANION GAP mmol/L 17*   CALCIUM mg/dL 9 7   ALBUMIN g/dL 4 1   TOTAL BILIRUBIN mg/dL 0 71   ALK PHOS U/L 101   ALT U/L 39   AST U/L 61*   GLUCOSE RANDOM mg/dL 88                       Imaging: Reviewed radiology reports from this admission including: chest xray and CT head  CT head without contrast   Final Result by Rahat Esteban MD (04/20 1655)      No acute intracranial abnormality  Old right frontal and occipital lobe infarcts  Workstation performed: [id number]         XR chest 1 view portable    (Results Pending)       EKG and Other Studies Reviewed on Admission:   · EKG: Atrial fibrillation   bpm     ** Please Note: This note has been constructed using a voice recognition system   **

## 2022-04-21 NOTE — ASSESSMENT & PLAN NOTE
Patient reports that his memory has worsened over the last month  Has history of multiple CVAs  Etiology:  Could be a result of multiple CVAs vs  vitamin deficiency in the setting of alcohol use and possible early-onset dementia  B12 normal    Plan:  Ordered for thiamine replacement    Follow-up with B1

## 2022-04-21 NOTE — DISCHARGE SUMMARY
[address] [facility]  Discharge- Nancy Borrero 1955, 73 y o  male MRN: [id number]  Unit/Bed#: ICU 11 Encounter: [id number]  Primary Care Provider: Amado Fontanez DO   Date and time admitted to hospital: 4/20/2022  2:36 PM    * Hallucinations  Assessment & Plan  Patient reports of auditory and visual hallucinations over the past month  Denies any thoughts of suicidal ideation or harming himself or others  Etiology:  Could possibly be due to alcohol withdrawal vs  Medication side effects vs   Early-onset dementia  Upon physical exam, exhibits resting tremor and visual hallucination - therefore, alcohol withdrawal could be more likely since patient states that his last alcoholic beverage was 3 days ago  It appears as though stroke may be less likely since symptoms have been going on for 1 month and no neurological deficits appreciated upon physical exam   · Less likely infection due to negative UA, chest x-ray pending final read but upon personal review does not appear to show any acute infiltrates or consolidation and is afebrile  If hallucinations worsen, may consider Neurology or Psychiatry consult  CT head negative for any new abnormalities  Though alcohol level was negative, may need to consider that has been 3 days  Urine drug screen positive for benzodiazepine (which is part of his home regimen - Xanax)  Likely DT    Plan:  Placed on CIWA protocol and is now receiving thiamine  Continue to monitor  Transferred to [facility] toxicology unit because of increasing agitation, and CIWA >20   Psychiatry was consulted, they will follow up with the patient    Memory changes  Assessment & Plan  Patient reports that his memory has worsened over the last month  Has history of multiple CVAs  Etiology:  Could be a result of multiple CVAs vs  vitamin deficiency in the setting of alcohol use and possible early-onset dementia    B12 normal    Plan:  Ordered for thiamine replacement  Follow-up with B1    Atrial fibrillation ([facility] [address] )  Assessment & Plan  Home regimen:  Cardizem and Eliquis  POA:  AFib with RVR  Denies any palpitations, but reports of questionable chest pain  In ED, received Cardizem and Eliquis  Elevated initial troponin, but may be due to AFib  Plan:  Continue with Cardizem and Eliquis  Continue with telemetry  PRITI (acute kidney injury) ([facility] [address] )  Assessment & Plan  Elevated creatinine on admission 1 64  Baseline seems to be around 1-1 1  Etiology:  Likely prerenal due to decreased p o  intake s/p oral surgery 1 week ago  Resolved this a m  Benign essential hypertension  Assessment & Plan  Home regimen:  Coreg  Elevated BP on arrival 188/82  Plan:   Continue with home regimen  Continue to monitor BP  GERD (gastroesophageal reflux disease)  Assessment & Plan  Home regimen:  Prilosec  Plan:  Continue with Protonix  COPD (chronic obstructive pulmonary disease) ([facility])  Assessment & Plan  Home regimen:  Albuterol and aclidinium inhalers  Patient's sister reports that he intermittently smokes cigarettes  Plan:  Continue with inhalers  Alcohol abuse  Assessment & Plan  History of alcohol abuse  Per patient and patient's sister reports that he drinks about 4-8 cups of beer/wine  Per patient, questionable last drink as of 3 days ago  Upon physical exam, exhibits tremor and hallucinations (auditory and visual)  Plan:  Placed on CIWA protocol    CIWA score kept increasing, required more benzodiazepines  Patient was eventually transferred to [address] [place] [address] Problems             Resolved Problems  Date Reviewed: 4/21/2022          Resolved    Tobacco use 4/21/2022     Resolved by  Elaine Chaparro MD              Discharging Resident: Mustapha Weston MD  Discharging Attending: No att  providers found  PCP: Lis Barcenas DO  Admission Date:   Admission Orders (From admission, onward)     Ordered        04/20/22 1814  Inpatient Admission  Once                      Discharge Date: 04/21/22    Consultations During Hospital Stay:  · Toxicology  · Psychiatry    Procedures Performed:   · None    Significant Findings / Test Results:   XR chest 1 view portable   Final Result by Ricahrd Guadalupe MD (04/21 7416)      No acute cardiopulmonary disease  Workstation performed: [id number]         CT head without contrast   Final Result by Miquel Tejeda MD (04/20 1834)      No acute intracranial abnormality  Old right frontal and occipital lobe infarcts  Workstation performed: [id number]             Incidental Findings:   · None     Test Results Pending at Discharge (will require follow up):  · B1 level     Outpatient Tests Requested:  · As deemed necessary      Reason for Admission:  Hallucinations    Hospital Course:   Gabrielle Ta is a 73 y o  male patient with a PMHx of HTN, history of multiple CVAs, alcohol abuse, and atrial fibrillation who originally presented to the hospital on 4/20/2022 due to hallucinations (auditory and visual) and tremor  Patient reports that over the past month he has been experiencing hallucinations and states that he sometimes sees figures and animals  He also states that he hears a voice talking about his medications  These hallucinations have made him more anxious, which makes him concerned to the point that he wanted to come to the hospital for further evaluation        Additionally, he reports that 3 days ago he drank 3 glasses of wine  However, upon talking to the patient's sister she states that the patient probably drank 4-8 cups of wine  The patient states that over the last 3 days he has been experiencing worsening hallucinations and now has a tremor    He also says that he has not slept in 3 days      Per patient and his sister, the patient has been experiencing a change in his memory and as a result he sometimes forgets to take his medications or it is possible that he takes more than is prescribed  The patient states that he lives alone and mentioned that he would not mind if a visiting nurse helped him with his medications      Besides this, he denies any coughing, pain with urination, abdominal pain, nor any nausea or vomiting  He does say that he was experiencing fever and chills as of 3 days ago  However, upon talking to his sister - due to the patient's change in memory, he may likely be a poor historian current symptoms  In the hospital the patient continued having hallucinations, was still disoriented and his CIWA score kept increasing >20, he required several benzodiazepine administrations  Psychiatry was consulted and toxicology was consulted as well  The patient kept being agitated, so patient's level of care was changed to SD2  Decision was made to send the patient to toxicology unit in [facility] for further management  Please see above list of diagnoses and related plan for additional information  Condition at Discharge: guarded    Discharge Day Visit / Exam:   Subjective:  Unable to get subjective information from the patient because of hallucinations and mental status  Vitals: Blood Pressure: 109/56 (04/21/22 1030)  Pulse: 61 (04/21/22 1030)  Temperature: 98 1 °F (36 7 °C) (04/21/22 0953)  Temp Source: Axillary (04/21/22 0953)  Respirations: 16 (04/21/22 1030)  Height: 5' 9" (175 3 cm) (04/20/22 1435)  Weight - Scale: 58 1 kg (128 lb) (04/20/22 1435)  SpO2: 91 % (04/21/22 1030)  Exam:   Physical Exam  Vitals and nursing note reviewed  Constitutional:       General: He is in acute distress  Appearance: He is well-developed  He is ill-appearing  HENT:      Head: Normocephalic and atraumatic  Nose: No congestion or rhinorrhea  Mouth/Throat:      Pharynx: No oropharyngeal exudate or posterior oropharyngeal erythema  Eyes:      General: No scleral icterus  Right eye: No discharge           Left eye: No discharge  Conjunctiva/sclera: Conjunctivae normal    Cardiovascular:      Rate and Rhythm: Normal rate and regular rhythm  Pulses: Normal pulses  Heart sounds: No murmur heard  Pulmonary:      Effort: Pulmonary effort is normal  No respiratory distress  Breath sounds: Normal breath sounds  No wheezing  Abdominal:      General: There is no distension  Palpations: Abdomen is soft  Tenderness: There is no abdominal tenderness  There is no guarding  Musculoskeletal:         General: No swelling  Cervical back: Normal range of motion and neck supple  No rigidity  Right lower leg: No edema  Left lower leg: No edema  Skin:     General: Skin is warm and dry  Coloration: Skin is not jaundiced  Neurological:      Mental Status: He is alert  He is disoriented  Psychiatric:      Comments: Anxious, agitated, hallucinating, confused          Discussion with Family: Updated  (sister) via phone  Discharge instructions/Information to patient and family:   See after visit summary for information provided to patient and family  Provisions for Follow-Up Care:  See after visit summary for information related to follow-up care and any pertinent home health orders  Disposition:   [address] Transfer to [address] [place] [address],[address] Heart    Planned Readmission:  [address] [facility]    Discharge Medications:  See after visit summary for reconciled discharge medications provided to patient and/or family        **Please Note: This note may have been constructed using a voice recognition system**

## 2022-04-21 NOTE — ASSESSMENT & PLAN NOTE
Home regimen:  Coreg  Elevated BP on arrival 188/82  Plan:   Continue with home regimen  Continue to monitor BP

## 2022-04-21 NOTE — ASSESSMENT & PLAN NOTE
· Report of visual and auditory hallucinations over the past month, worsening in the last few days  · Has history of alcohol use disorder, concern for DT's and Wernicke encephalopathy with treatment as above  · No new findings on MRI, though prior recent CVA's could be contributing  · Per PT: Home with home health rehabilitation  · Pending OT eval, appreciate recommendations

## 2022-04-21 NOTE — PLAN OF CARE
Problem: MOBILITY - ADULT  Goal: Maintain or return to baseline ADL function  Description: INTERVENTIONS:  -  Assess patient's ability to carry out ADLs; assess patient's baseline for ADL function and identify physical deficits which impact ability to perform ADLs (bathing, care of mouth/teeth, toileting, grooming, dressing, etc )  - Assess/evaluate cause of self-care deficits   - Assess range of motion  - Assess patient's mobility; develop plan if impaired  - Assess patient's need for assistive devices and provide as appropriate  - Encourage maximum independence but intervene and supervise when necessary  - Involve family in performance of ADLs  - Assess for home care needs following discharge   - Consider OT consult to assist with ADL evaluation and planning for discharge  - Provide patient education as appropriate  Outcome: Progressing  Goal: Maintains/Returns to pre admission functional level  Description: INTERVENTIONS:  - Perform BMAT or MOVE assessment daily    - Set and communicate daily mobility goal to care team and patient/family/caregiver  - Collaborate with rehabilitation services on mobility goals if consulted  - Perform Range of Motion times a day  - Reposition patient every  hours    - Dangle patient  times a day  - Stand patient  times a day  - Ambulate patient  times a day  - Out of bed to chair  times a day   - Out of bed for meals  times a day  - Out of bed for toileting  - Record patient progress and toleration of activity level   Outcome: Progressing     Problem: Potential for Falls  Goal: Patient will remain free of falls  Description: INTERVENTIONS:  - Educate patient/family on patient safety including physical limitations  - Instruct patient to call for assistance with activity   - Consult OT/PT to assist with strengthening/mobility   - Keep Call bell within reach  - Keep bed low and locked with side rails adjusted as appropriate  - Keep care items and personal belongings within reach  - Initiate and maintain comfort rounds  - Make Fall Risk Sign visible to staff  - Offer Toileting every  Hours, in advance of need  - Initiate/Maintain alarm  - Obtain necessary fall risk management equipment:   - Apply yellow socks and bracelet for high fall risk patients  - Consider moving patient to room near nurses station  Outcome: Progressing

## 2022-04-21 NOTE — ASSESSMENT & PLAN NOTE
· Continue Eliquis  · MRI Brain re-demonstrates right frontal and occipital lobe infarcts without acute abnormality

## 2022-04-21 NOTE — CASE MANAGEMENT
Cm informed by medical staff pt is currently exhibiting acute withdrawal symptoms, including possible Dt's, and can not complete intake at this time  Pt currently on a one to one due to disorientation, fall risk, and impulsive behaviors

## 2022-04-21 NOTE — ASSESSMENT & PLAN NOTE
· Likely secondary to AKA  · Continue high dose thiamine  · Will continue to monitor BMP - resolved, switched to NS fluids drom D5NS

## 2022-04-21 NOTE — NURSING NOTE
CIWA score 32 at 0957  Per CIWA protocol, pt to receive additional 4 mg IV ativan, however uncomfortable giving that much additional ativan without speaking to pt dr prior Yan texted Dr Parul Trammell at 1000 and awaiting response  In interim pt appears to have significantly calmed down and is resting  Will continue to monitor pt closely and reassess CIWA at 1030

## 2022-04-21 NOTE — ASSESSMENT & PLAN NOTE
History of tobacco use  Per patient's sister, states that he intermittently smokes cigarettes and due to his new-onset memory problem can not remember how much he actually smokes  Plan:  Nicotine patch ordered

## 2022-04-21 NOTE — ASSESSMENT & PLAN NOTE
· Presented to [facility] with PRITI, creatinine now back to baseline  · Continue to monitor

## 2022-04-21 NOTE — ASSESSMENT & PLAN NOTE
Home regimen:  Cardizem and Eliquis  POA:  AFib with RVR  Denies any palpitations, but reports of questionable chest pain  In ED, received Cardizem and Eliquis  Elevated initial troponin, but may be due to AFib  Plan:  Continue with Cardizem and Eliquis  Continue with telemetry

## 2022-04-21 NOTE — ASSESSMENT & PLAN NOTE
Patient reports of auditory and visual hallucinations over the past month  Denies any thoughts of suicidal ideation or harming himself or others  Etiology:  Could possibly be due to alcohol withdrawal vs  Medication side effects vs   Early-onset dementia  Upon physical exam, exhibits resting tremor and visual hallucination - therefore, alcohol withdrawal could be more likely since patient states that his last alcoholic beverage was 3 days ago  It appears as though stroke may be less likely since symptoms have been going on for 1 month and no neurological deficits appreciated upon physical exam   · Less likely infection due to negative UA, chest x-ray pending final read but upon personal review does not appear to show any acute infiltrates or consolidation and is afebrile  If hallucinations worsen, may consider Neurology or Psychiatry consult  CT head negative for any new abnormalities  Though alcohol level was negative, may need to consider that has been 3 days  Urine drug screen positive for benzodiazepine (which is part of his home regimen - Xanax)  Likely DT    Plan:  Placed on CIWA protocol and is now receiving thiamine  Continue to monitor    Transferred to [facility] toxicology unit because of increasing agitation, and CIWA >20   Psychiatry was consulted, they will follow up with the patient

## 2022-04-21 NOTE — ASSESSMENT & PLAN NOTE
Elevated creatinine on admission 1 64  Baseline seems to be around 1-1 1  Etiology:  Likely prerenal due to decreased p o  intake s/p oral surgery 1 week ago  Resolved this a m

## 2022-04-21 NOTE — ASSESSMENT & PLAN NOTE
· Report of hallucinations, worsening mental status over the past month  · Ataxia present on exam, but improving per patient  · High probability of contribution from [address] encephalopathy/ thiamine deficiency  · Continue high dose thiamine  · MRI Brain showed prior right frontal and occipital lobe infarcts without acute findings

## 2022-04-21 NOTE — CASE MANAGEMENT
Case Management Discharge Planning Note    Patient name Roni Quinones  Location ICU 11/ICU 11 MRN [id number]  : 1955 Date 2022       Current Admission Date: 2022  Current Admission Diagnosis:Hallucinations   Patient Active Problem List    Diagnosis Date Noted    Delirium tremens ([facility] ) 2022    Wernicke encephalopathy 2022    Alcohol use disorder, severe, dependence ([place] [address] )     Metabolic acidosis, increased anion gap 2022    Atrial fibrillation ([place] [address] ) 2022    Hallucinations 2022    Memory changes 2022    Seizure ([place] [address] ) 2022    Cholelithiasis 2022    Acute Cholecystitis 2022    Hypomagnesemia 2022    PAF (paroxysmal atrial fibrillation) ([place] [address] ) 2022    S/P carotid endarterectomy 11/10/2021    Upper GI bleed 09/15/2021    Acute blood loss anemia 09/15/2021    Ambulatory dysfunction 09/15/2021    Syncope 09/15/2021    PRITI (acute kidney injury) ([place] [address] ) 2021    Pre-diabetes 2021    Stage 3a chronic kidney disease ([place] [address] ) 2021    Hx of CVA (cerebral vascular accident) ([place] [address] ) 2021    Stenosis of right internal carotid artery     Lumbar back pain     GERD (gastroesophageal reflux disease)     Left arm weakness 2021    Alcohol abuse 2021    HLD (hyperlipidemia) 2021    COPD (chronic obstructive pulmonary disease) ([place] [address] ) 2021    Iron deficiency anemia 2021    Cerebrovascular accident (CVA) due to embolism of right middle cerebral artery ([place] [address] ) 2021    Symptomatic carotid artery stenosis, right 2021    Benign essential hypertension 02/15/2011      LOS (days): 1  Geometric Mean LOS (GMLOS) (days): 3 10  Days to GMLOS:2 4     OBJECTIVE:  Risk of Unplanned Readmission Score: 23         Current admission status: Inpatient   Preferred Pharmacy:   [facility] [place]  [address] [place] [address]  Phone: [phone] Fax: [address], [place] - [place] [address] [facility] [address] [address]  Phone: [phone] Fax: [facility] [address]  [address] [place] [address]  Phone: [phone] Fax: [phone]    Primary Care Provider: Almas Thomas DO    Primary Insurance: [facility] [employer]  Secondary Insurance:     DISCHARGE DETAILS:                         CM department notified by provider that patient is requiring transfer to [address] [place] [address] for the Detox Unit  CM spoke with primary nurse regarding patient's requirements for transport  CMN was completed for Transport once a bed is available and PACS secures a transport time  Nursing and provider team will keep patient updated on transfer and transportation updates

## 2022-04-21 NOTE — ASSESSMENT & PLAN NOTE
· Family had reported patient consuming at least 4-8 drinks daily, though patient disputes this  · Treatment for withdrawal as above  · Case management consulted

## 2022-04-21 NOTE — UTILIZATION REVIEW
Initial Clinical Review    Admission: Date/Time/Statement:   Admission Orders (From admission, onward)     Ordered        22 1814  Inpatient Admission  Once                      Orders Placed This Encounter   Procedures    Inpatient Admission     Standing Status:   Standing     Number of Occurrences:   1     Order Specific Question:   Level of Care     Answer:   Med Surg [16]     Order Specific Question:   Bed request comments     Answer:   tele     Order Specific Question:   Estimated length of stay     Answer:   More than 2 Midnights     Order Specific Question:   Certification     Answer:   I certify that inpatient services are medically necessary for this patient for a duration of greater than two midnights  See H&P and MD Progress Notes for additional information about the patient's course of treatment  ED Arrival Information     Expected Arrival Acuity    - 2022 14:25 Urgent         Means of arrival Escorted by Service Admission type    Walk-In Self Critical Care/ICU Urgent         Arrival complaint    Hallucinations/hx Stroke        Chief Complaint   Patient presents with    Hallucinations     pt reports hallucinations for 1 month  reports talking to pepole and then they are gone  pt thought his former wife and son were in his bed this am  pt is aware of month and a brief strugle  pt knows who he is, his  and current agre and where and why he is here but can not say who the president is  family reports he has had a hard time wife memory and has been off since his last stroke       Initial Presentation: 73 y o  male  From home to ED admitted inpatient due to Hallucinations/PRITI/alcohol abuse  PMH of  HTN, history of multiple CVAs, alcohol abuse, and atrial fibrillation  Presented due to hallucinations with intermittent poor memory, starting month prior to arrival, and on day prior to arrival speech slurred  Drinks 4 to 8 cups beer/wine daily with possible last intake about 3 days ago  On exam: mucous membranes dry  Tachycardic  Rhythm irregular  Chronic left facial droop  Hypertensive  Tremulous  UA drug screen + benzodiazepine  Hs Tnl 71  Bun 45, creatinine 1 64 with baseline of 1 - 1 1  Lin Tami AST 61  Ecg rapid atrial fibrillation   Ct head showed old infarcts  In the ED given bolus of IVF  Given Cardizem bolus and home Cardizem and Eliquis  Plan is start CIWA, ativan as needed  Continue IVF and trend BMP  Telemetry and trend troponin  Date:  4/21/22    Day 2:  Patient in withdrawal   CIWA score increasing to max of 32 this am and necessitated transfer to stepdown unit  On exam agitated  Impulsive  Combative  Telemetry sinus  Generalized weakness  Plan is started on scheduled Librium, given Valium 10 mg IV, ativan 4 mg IV, ativan 2 mg po x 3 this am   Thiamine and folic acid continue    Continue CIWA    ED Triage Vitals   Temperature Pulse Respirations Blood Pressure SpO2   04/20/22 1435 04/20/22 1435 04/20/22 1435 04/20/22 1435 04/20/22 1435   97 9 °F (36 6 °C) 76 16 (!) 188/82 94 %      Temp Source Heart Rate Source Patient Position - Orthostatic VS BP Location FiO2 (%)   04/20/22 1435 04/20/22 1435 04/20/22 1435 04/20/22 1435 --   Oral Monitor Sitting Left arm       Pain Score       04/21/22 1000       No Pain          Wt Readings from Last 1 Encounters:   04/20/22 58 1 kg (128 lb)     Additional Vital Signs:   04/21/22 0957 -- 67 -- 130/70 -- -- -- --   04/21/22 0953 98 1 °F (36 7 °C) 67 28 Abnormal  132/59 85 93 % None (Room air) Lying   04/21/22 0900 -- 75 -- 149/72 -- -- -- --   04/21/22 0758 98 4 °F (36 9 °C) 92 18 163/77 -- 92 % None (Room air) Lying   04/21/22 0702 -- 99 -- 180/72 Abnormal  -- -- -- --   04/21/22 0500 98 8 °F (37 1 °C) 90 -- 172/87 Abnormal  -- 94 % None (Room air) Lying   04/21/22 0322 -- 112 Abnormal  -- 171/88 Abnormal  -- -- -- --   04/21/22 0000 -- 116 Abnormal  -- 170/81 -- -- -- --   04/20/22 2100 98 7 °F (37 1 °C) 100 18 170/83 119 95 % -- Lying   04/20/22 2008 97 8 °F (36 6 °C) 130 Abnormal  -- 140/93 -- --       CIWA   4/21/22:   6 at 1030   32 at 0957   26 at 0900   23 at 0827    25 at 0812   11 at 0702   9 at 0500   9 at 0322   9 at 0300    6 at 0000     4/20/22:  5 at 2000  Pertinent Labs/Diagnostic Test Results:   XR chest 1 view portable   Final Result by Gisela Singer MD (04/21 6242)      No acute cardiopulmonary disease  Workstation performed: [id number]         CT head without contrast   Final Result by Kevin Marina MD (04/20 1655)      No acute intracranial abnormality  Old right frontal and occipital lobe infarcts                    Workstation performed: [id number]           4/20/22 ecg Rate:     ECG rate:  142    ECG rate assessment: tachycardic    Rhythm:     Rhythm: atrial fibrillation    Ectopy:     Ectopy: none    QRS:     QRS axis:  Normal    QRS intervals:  Normal  Conduction:     Conduction: normal    ST segments:     ST segments:  Non-specific  T waves:     T waves: non-specific      Results from last 7 days   Lab Units 04/21/22  0536 04/20/22  1651   WBC Thousand/uL 5 66 8 13   HEMOGLOBIN g/dL 13 9 15 7   HEMATOCRIT % 43 0 46 9   PLATELETS Thousands/uL 142* 200   NEUTROS ABS Thousands/µL  --  5 83     Results from last 7 days   Lab Units 04/21/22  0535 04/20/22  1651   SODIUM mmol/L 140 138   POTASSIUM mmol/L 3 5 3 5   CHLORIDE mmol/L 102 96*   CO2 mmol/L 21 25   ANION GAP mmol/L 17* 17*   BUN mg/dL 34* 45*   CREATININE mg/dL 1 11 1 64*   EGFR ml/min/1 73sq m 69 43   CALCIUM mg/dL 8 7 9 7   MAGNESIUM mg/dL  --  1 7     Results from last 7 days   Lab Units 04/20/22  1651   AST U/L 61*   ALT U/L 39   ALK PHOS U/L 101   TOTAL PROTEIN g/dL 8 1   ALBUMIN g/dL 4 1   TOTAL BILIRUBIN mg/dL 0 71   AMMONIA umol/L 22     Results from last 7 days   Lab Units 04/21/22  0535 04/20/22  1651   GLUCOSE RANDOM mg/dL 77 88     Results from last 7 days   Lab Units 04/20/22  2155 04/20/22  1906 04/20/22  1651   HS TNI 0HR ng/L  --   --  71*   HS TNI 2HR ng/L  --  54*  --    HSTNI D2 ng/L  --  -17  --    HS TNI 4HR ng/L 48  --   --    HSTNI D4 ng/L -23  --   --      Results from last 7 days   Lab Units 04/21/22  0851   PROTIME seconds 15 3*   INR  1 22*     Results from last 7 days   Lab Units 04/20/22  1651   TSH 3RD GENERATON uIU/mL 1 056     Results from last 7 days   Lab Units 04/20/22  1751   CLARITY UA  Clear   COLOR UA  Yellow   SPEC GRAV UA  >=1 030   PH UA  5 5   GLUCOSE UA mg/dl Negative   KETONES UA mg/dl Trace*   BLOOD UA  Moderate*   PROTEIN UA mg/dl >=300*   NITRITE UA  Negative   BILIRUBIN UA  Small*   UROBILINOGEN UA E U /dl 0 2   LEUKOCYTES UA  Negative   WBC UA /hpf 0-1   RBC UA /hpf 0-1   BACTERIA UA /hpf Occasional   EPITHELIAL CELLS WET PREP /hpf Occasional     Results from last 7 days   Lab Units 04/20/22  1751   AMPH/METH  Negative   BARBITURATE UR  Negative   BENZODIAZEPINE UR  Positive*   COCAINE UR  Negative   METHADONE URINE  Negative   OPIATE UR  Negative   PCP UR  Negative   THC UR  Negative     Results from last 7 days   Lab Units 04/20/22  1651   ETHANOL LVL mg/dL <3       ED Treatment:   Medication Administration from 04/20/2022 1425 to 04/20/2022 1944       Date/Time Order Dose Route Action Comments     04/20/2022 1654 thiamine tablet 100 mg 100 mg Oral Given      04/20/2022 1753 apixaban (ELIQUIS) tablet 2 5 mg 2 5 mg Oral Given      04/20/2022 1753 diltiazem (CARDIZEM) injection 15 mg 15 mg Intravenous Given      04/20/2022 1753 diltiazem (CARDIZEM CD) 24 hr capsule 120 mg 120 mg Oral Given      04/20/2022 1752 sodium chloride 0 9 % bolus 1,000 mL 1,000 mL Intravenous New Bag         Past Medical History:   Diagnosis Date    [facility]I [facility]) ([facility] [address] ) 9/5/2021    Anxiety     Back pain     Claustrophobia     [facility]     Dysphagia 9/5/2021    GERD (gastroesophageal reflux disease)     Hypertension     Hypokalemia 2/22/2022    Lumbar back pain  Panic attacks     Stenosis of right carotid artery     Stroke [facility])     Wears glasses     Wears partial dentures     upper denture     Present on Admission:   Alcohol abuse   Benign essential hypertension   [facility]) ([facility]   GERD (gastroesophageal reflux disease)   [facility]) ([facility] )   Tobacco use      Admitting Diagnosis: Hallucinations [R44 3]  Dehydration [E86 0]  Altered mental status [R41 82]  Elevated troponin [R77 8]  PRITI (acute kidney injury) ([place] [address] ) [N17 9]  Atrial fibrillation with RVR ([place] [address] ) [I48 91]  Age/Sex: 73 y o  male  Admission Orders:  4/20/22 1814 inpatient   Scheduled Medications:  apixaban, 2 5 mg, Oral, BID  atorvastatin, 80 mg, Oral, Daily With Dinner  carvedilol, 25 mg, Oral, BID With Meals   chlordiazePOXIDE, 50 mg, Oral, Q8H YADY  diltiazem, 120 mg, Oral, Daily  folic acid, 1 mg, Oral, Daily   LORazepam, 4 mg, Intravenous, Once  multivitamin-minerals, 1 tablet, Oral, Daily  nicotine, 1 patch, Transdermal, Daily  pantoprazole, 40 mg, Oral, Early Morning  sertraline, 100 mg, Oral, Daily  thiamine, 100 mg, Oral, Daily  umeclidinium bromide, 1 puff, Inhalation, Daily    LORazepam (ATIVAN) injection 4 mg  Dose: 4 mg  Freq: Once Route: IV  Indications of Use: ACUTE ALCOHOL WITHDRAWAL SYNDROME  Start: 04/21/22 0830 End: 04/21/22 0831    LORazepam (ATIVAN) tablet 2 mg - used x 4 4/21/22   Dose: 2 mg  Freq: Once Route: PO  Indications of Use: ALCOHOL WITHDRAWAL SYNDROME    diazepam (VALIUM) injection 10 mg  Dose: 10 mg  Freq:  Once Route: IV  Indications of Use: ACUTE ALCOHOL WITHDRAWAL SYNDROME  Start: 04/21/22 0930 End: 04/21/22 0920    Continuous IV Infusions:  lactated ringers infusion  Rate: 75 mL/hr Dose: 75 mL/hr  Freq: Continuous Route: IV  Indications of Use: IV Hydration  Last Dose: Stopped (04/21/22 0956)  Start: 04/20/22 1945 End: 04/21/22 0922     PRN Meds: not used   acetaminophen, 650 mg, Oral, Q6H PRN  albuterol, 1 puff, Inhalation, Q6H PRN hydrALAZINE, 5 mg, Intravenous, Q6H PRN  ondansetron, 4 mg, Intravenous, Q6H PRN  senna, 1 tablet, Oral, HS PRN  simethicone, 80 mg, Oral, 4x Daily PRN    CIWA   Telemetry   Restraints non violent     IP CONSULT TO PSYCHIATRY    Network Utilization Review Department  ATTENTION: Please call with any questions or concerns to [phone] and carefully listen to the prompts so that you are directed to the right person  All voicemails are confidential   Eulalio Anderson all requests for admission clinical reviews, approved or denied determinations and any other requests to dedicated fax number below belonging to the campus where the patient is receiving treatment   List of dedicated fax numbers for the Facilities:  [address] DENIALS (Administrative/Medical Necessity) [phone]   [address] (Maternity/NICU/Pediatrics) [phone]   [address]  [address] [place] [address] [address] [address] [facility] [phone]

## 2022-04-21 NOTE — ASSESSMENT & PLAN NOTE
· Substantially improved this morning after treatment with phenobarbital and high dose thiamine  · Continue treatment for any further withdrawal [facility]  · Continue high dose thiamine, currently day 2 of high dose thiamine treatment  · Monitor closely

## 2022-04-21 NOTE — H&P
HISTORY & PHYSICAL EXAM  DEPARTMENT OF MEDICAL TOXICOLOGY  LEVEL 4 MEDICAL DETOX UNIT  Jeff Chavez 73 y o  male MRN: [id number]  Unit/Bed#: 5T DETOX 504-01 Encounter: [id number]      Reason for Admission/Principal Problem: Delirium tremens    Admitting Provider: Andre Bunn MD  Attending Provider: Andre Bunn MD   4/21/2022 12:31 PM        * Delirium tremens [facility])  Assessment & Plan  · Last drink reportedly 4 days ago, admitted to [facility] yesterday  · Evidence of worsening withdrawal and delirium during admission  · Received 10 mg lorazepam and 10 mg diazepam at [facility] prior to transfer  · [facility] for treatment of alcohol withdrawal with phenobarbital  · Initiate high dose thiamine  · Fall precautions    Wernicke encephalopathy  Assessment & Plan  · Report of hallucinations, worsening mental status over the past month  · Ataxia present on exam  · High probability of contribution from [address] encephalopathy/ thiamine deficiency  · Initiate high dose thiamine  · MRI Brain    Hallucinations  Assessment & Plan  · Report of visual and auditory hallucinations over the past month, worsening in the last few days  · Has history of alcohol use disorder, concern for DT's and Wernicke encephalopathy with treatment as above  · Also has history of CVA  · High risk for early onset dementia as well  · MRI Brain ordered  · Will consider neurology and/or psychiatry consult pending MRI results    Alcohol use disorder, severe, dependence ([facility] [address] )  Assessment & Plan  · Family had reported patient consuming at least 4-8 drinks daily  · Treatment for withdrawal as above  · Case management consulted    Metabolic acidosis, increased anion gap  Assessment & Plan  · Likely secondary to AKA  · Initiate high dose thiamine and dextrose containing fluids  · Will continue to monitor BMP    Atrial fibrillation (HCC)  Assessment & Plan  · History of paroxysmal atrial fibrillation  · Currently in NSR on monitor  · Continue to monitor  · Continue home doses of Eliquis, BB, CCB    Stage 3a chronic kidney disease ([facility] [address] )  Assessment & Plan  · Presented to [facility] with PRITI, creatinine now back to baseline  · Continue to monitor    Benign essential hypertension  Assessment & Plan  · Continue home medication    Hx of CV[facility]cere[facility] vascular [facility]  Assessment & Plan  · Continue Eliquis  · MRI Brain due to new mental status changes    GERD (gastroesophageal reflux disease)  Assessment & Plan  · Continue home medication    COPD (chronic obstructive pulmonary disease) ([facility] )  Assessment & Plan  · No wheezing/ other evidence of acute exacerbation noted on exam  · Goal O2 sat of 90% or above  · Continue home medications    HLD (hyperlipidemia)  Assessment & Plan  · Continue home medications          VTE Prophylaxis: Apixaban (Eliquis)  / sequential compression device   Code Status: Full Code      Anticipated Length of Stay:  Patient will be admitted on an Inpatient basis with an anticipated length of stay of at least 2 midnights  Justification for Hospital Stay: Delirium tremens    For any questions or concerns, please Tiger Text the advanced practitioner in the role of [facility] On Call      This patient qualifies for Level IV medically managed intensive inpatient services under the criteria set by the American Society of Addiction Medicine, including dimensions 1-3  The patient is in withdrawal (or is intoxicated with high risk of withdrawal), with severe and unstable medical and/or psychiatric (dual diagnosis) problems, requiring requires 24-hour medical and nursing care and the full resources of a [address] patient to medical detox unit and continue supportive care and stabilization of acute ethanol withdrawal per medical toxicology/detox treatment pathway   Monitor ethanol withdrawal severity via the Severity of Ethanol Withdrawal Scale (SEWS) Q4 hours and then hourly if/when SEWS > 6  Treat withdrawal per pathway and reassess Q30-60 minutes  Mild SEWS Score 1-6  Administer medications* (IV or PO; PO preferred):   If initial SEWS score: diazepam 10mg PO/IV x 1 AND phenobarbital 65 mg PO/IV x 1   If repeat SEWS score 1-6: phenobarbital 65 mg PO/IV q1 hour x 5 doses maximum   Reassessment:    SEWS q1 hour after each dose until SEWS 0 x 2 hours   VS q1 hours (until SEWS 0, then q4 hours)   Notify provider for bedside evaluation if 5-dose maximum is reached, RASS of -3 to -5, or SEWS score escalates to moderate or severe  Moderate SEWS Score 7-12  Administer medications* (IV):   If initial SEWS score: diazepam 10mg IV x 1 AND phenobarbital 260 mg IV x 1   If repeat SEWS score 7-12 or score escalated from mild: phenobarbital 130 mg IV q30 minutes x 5 doses maximum   Reassessment:   SEWS q30 minutes after each dose until SEWS < 7 (then hourly until SEWS 0 x 2 hours)   VS q30 minutes until SEWS < 7 (then hourly until SEWS 0, then q4 hours)   Notify provider for bedside evaluation if 5-dose maximum is reached, RASS of -3 to -5, or SEWS score escalates to severe  Severe SEWS Score ? 13  Administer medications* (IV):   If initial SEWS score: Diazepam 10 mg IV x 1 AND phenobarbital 650 mg IV piggyback x 1 over 15-30 minutes   If repeat SEWS score ? 13 or score escalated from mild or moderate: phenobarbital 130 mg IV q30 minutes x 5 doses maximum   Reassessment:   SEWS q30 minutes after each dose until SEWS < 7 (then hourly until SEWS 0 x 2 hours)    VS q30 minutes until SEWS < 7 (then hourly until SEWS 0, then q4 hours)   Notify provider for bedside evaluation if 5-dose maximum is reached or RASS of -3 to -5   *Hold medications and notify provider if CNS depression, respirations < 10/min, or RASS of -3 to -5           Medications to be administered adjunctively if more than 2 grams of phenobarbital is needed for stabilization of withdrawal; require attending approval     Dexmedetomidine infusion 0 1-1mcg/kg/hr IV infusion, titratable to reduced agitation (Goal: RASS -2)   Ketamine   o Acute agitated delirium: 1-2 mg/kg IV or 4-5 mg/kg IM  o Refractory withdrawal: 0 1-1mg/kg/hr IV infusion, titratable to reduced agitation (Goal: RASS -2)    Further evaluation, screening and treatment:  Evaluate complete metabolic panel, transaminases, INR, and lipase  Assess hepatic ultrasound for any sign of alcoholic liver disease or cirrhosis, and ultimately refer for further hepatic evaluation and care as/if indicated  Additional medications for ethanol associated malnutrition: Thiamine 100 mg IV daily, increase to 500 mg TID for signs/symptoms of Wernicke's Encephalopathy or Wernicke Korsakoff Syndrome   Folic acid 1 mg IV daily   Multivitamin PO daily      Will offer first monthly injection of Naltrexone 380 mg IM, once patient is stabilized, as it has been shown to assist in decreasing cravings for ethanol  Evaluate and treat for coexisting substance use, such as opioids and nicotine  Discuss risk factors for infectious disease, such as history of intravenous drug abuse, and offer hepatitis and HIV screening if indicated  Case management consultation to assist with coordination of subsequent treatment after discharge  Hx and PE limited by: Altered mental status    HPI: Kristel Payton is a 73y o  year old male who presented to the [facility] yesterday for evaluation of worsening mental status as well as about a month of visual and auditory hallucinations  Per my discussion with transferring physician, the patient had endorsed last drink 4 days ago and the patient's sister had endorse the patient likely drinking 4-8 alcoholic beverages daily  During admission patient had further decline in mental status with additional hallucinations, and was scoring very high on CIWA with scores over 20    Today received total of 10 mg of lorazepam and 10 mg of diazepam prior to transfer here  On evaluation of the patient on arrival here, he is confused, intermittently agitated, in mitts and attempting to strike staff  He is not able to answer my questions, but does occasionally mumble  It does appear that the patient has prescriptions for alprazolam and baclofen, but not clear how much he has been taking  Does have significant past medical history including hypertension, hyperlipidemia, paroxysmal atrial fibrillation, and cerebrovascular accident          Social History     Substance and Sexual Activity   Alcohol Use Yes     Social History     Substance and Sexual Activity   Drug Use Never     Social History     Tobacco Use   Smoking Status Light Tobacco Smoker    Packs/day: 0 25   Smokeless Tobacco Never Used   Tobacco Comment    also wears nicotine patches       Review of Systems   Unable to perform ROS: Mental status change       Historical Information   Past Medical History:   Diagnosis Date    [facility]I [facility] kidney [facility]) ([facility] [address] ) 9/5/2021    Anxiety     Back pain     Claustrophobia     COPD [facility]ch[facility] obstructive [facility] disease) ([facility]     Dysphagia 9/5/2021    GERD (gastroesophageal reflux disease)     Hypertension     Hypokalemia 2/22/2022    Lumbar back pain     Panic attacks     Stenosis of right carotid artery     Stroke [facility])     Wears glasses     Wears partial dentures     upper denture     Past Surgical History:   Procedure Laterality Date    COLONOSCOPY      IR CEREBRAL ANGIOGRAPHY  4/6/2021    OTHER SURGICAL HISTORY      fertility    CO [place] Shall 3RD+ ORD SLCTV ABDL PEL/LXTR [facility] Right 4/6/2021    Procedure: ARTERIOGRAM, carotid Angio;  Surgeon: Sonia Ocampo MD;  Location: [facility];  Service: Vascular    CO THROMBOENDARTECTMY Giorgi Thompson Right 4/13/2021    Procedure: RIGHT ENDARTERECTOMY ARTERY CAROTID WITH BOVINE PATCH;  Surgeon: Sonia Ocampo MD;  Location: [facility];  Service: Vascular     No family history on file  Social History   Marital Status: /Civil Union   Occupation: Unknown  Patient Pre-hospital Living Situation: Unknown  Patient Pre-hospital Level of Mobility: Unknown  Patient Pre-hospital Diet Restrictions: Unknown    Allergies   Allergen Reactions    Penicillins Anaphylaxis       Prior to Admission medications    Medication Sig Start Date End Date Taking?  Authorizing Provider   acljocy Azra Mocandis Vargas) 400 MCG/ACT inhaler Inhale 1 puff 2 (two) times a day      Historical Provider, MD   albuterol (PROVENTIL HFA,VENTOLIN HFA) 90 mcg/act inhaler 1 puff every 6 (six) hours as needed   10/8/21   Historical Provider, MD   ALPRAZolam (XANAX) 0 25 mg tablet TAKE 1 TABLET BY ORAL ROUTE 4 TIMES EVERY DAY AS NEEDED 6/15/21   Historical Provider, MD   apixaban (ELIQUIS) 5 mg Take 1 tablet (5 mg total) by mouth 2 (two) times a day 9/17/21   Syed Martinez MD   atorvastatin (LIPITOR) 80 mg tablet Take 1 tablet (80 mg total) by mouth daily 9/6/21 10/22/21  Liya Hardy MD   baclofen 10 mg tablet 10 mg 3 (three) times a day as needed   11/3/21   Historical Provider, MD   carvedilol (COREG) 25 mg tablet Take 1 tablet (25 mg total) by mouth 2 (two) times a day with meals 3/1/22 3/31/22  Stacy Blanca MD   clopidogrel (PLAVIX) 75 mg tablet TAKE 1 TABLET BY MOUTH EVERY DAY FOR 19 DOSES  Patient not taking: Reported on 11/10/2021 7/28/21   Phuc Sahni PA-C   diltiazem (CARDIZEM CD) 120 mg 24 hr capsule Take 1 capsule (120 mg total) by mouth daily 3/2/22 4/1/22  Stacy Blanca MD   fenofibrate 160 MG tablet Take 160 mg by mouth daily 6/25/21   Historical Provider, MD   ferrous sulfate 324 (65 Fe) mg Take 1 tablet (324 mg total) by mouth daily before breakfast 9/6/21   Josh Orta MD   folic acid (FOLVITE) 1 mg tablet Take 1 tablet (1 mg total) by mouth daily 3/2/22 4/1/22  Stacy Blanca MD   loratadine (CLARITIN) 10 mg tablet Take 1 tablet (10 mg total) by mouth daily 9/6/21 11/10/21 Bryce Espinoza MD   nicotine (NICODERM CQ) 14 mg/24hr TD 24 hr patch Place 1 patch on the skin daily 9/7/21   Bryce Espinoza MD   omeprazole (PriLOSEC) 20 mg delayed release capsule Take 20 mg by mouth daily   10/4/21   Historical Provider, MD   pravastatin (PRAVACHOL) 40 mg tablet Take 40 mg by mouth daily   10/22/21   Historical Provider, MD   sertraline (ZOLOFT) 100 mg tablet Take 100 mg by mouth daily   1/19/11   Historical Provider, MD   simethicone (MYLICON) 80 mg chewable tablet Chew 1 tablet (80 mg total) 4 (four) times a day as needed for flatulence 3/1/22   Heidi Gaines MD       Current Facility-Administered Medications   Medication Dose Route Frequency    acetaminophen (TYLENOL) tablet 650 mg  650 mg Oral Q6H PRN    albuterol (PROVENTIL HFA,VENTOLIN HFA) inhaler 2 puff  2 puff Inhalation Q6H PRN    apixaban (ELIQUIS) tablet 5 mg  5 mg Oral BID    atorvastatin (LIPITOR) tablet 80 mg  80 mg Oral Daily    carvedilol (COREG) tablet 25 mg  25 mg Oral BID With Meals    dextrose 5 % and sodium chloride 0 9 % infusion  125 mL/hr Intravenous Continuous    diltiazem (CARDIZEM CD) 24 hr capsule 120 mg  120 mg Oral Daily    enoxaparin (LOVENOX) subcutaneous injection 40 mg  40 mg Subcutaneous Daily    fenofibrate (TRICOR) tablet 168 mg  168 mg Oral Daily    folic acid 1 mg in sodium chloride 0 9 % 50 mL IVPB  1 mg Intravenous Daily    nicotine (NICODERM CQ) 21 mg/24 hr TD 24 hr patch 1 patch  1 patch Transdermal Daily    ondansetron (ZOFRAN) injection 4 mg  4 mg Intravenous Q6H PRN    pantoprazole (PROTONIX) EC tablet 40 mg  40 mg Oral Early Morning    phenobarbital in sodium chloride 0 9% 650 mg  650 mg Intravenous Once    thiamine (VITAMIN B1) 500 mg in sodium chloride 0 9 % 50 mL IVPB  500 mg Intravenous TID    umeclidinium bromide (INCRUSE ELLIPTA) 62 5 mcg/inh inhaler AEPB 1 puff  1 puff Inhalation Daily       Continuous Infusions:dextrose 5 % and sodium chloride 0 9 %, 125 mL/hr, Last Rate: 125 mL/hr (04/21/22 1251)             Objective       Intake/Output Summary (Last 24 hours) at 4/21/2022 1309  Last data filed at 4/21/2022 1251  Gross per 24 hour   Intake 950 ml   Output --   Net 950 ml       Invasive Devices:   Peripheral IV 04/21/22 Left;Ventral (anterior) Forearm (Active)   Site Assessment Clean;Dry; Intact 04/21/22 0800   Dressing Type Transparent 04/21/22 0800   Line Status Blood return noted; Flushed; Infusing 04/21/22 0800   Dressing Status Dry; Intact; Clean 04/21/22 0800       Vitals   Vitals:    04/21/22 1235 04/21/22 1237   BP: 150/76 150/76   TempSrc: Temporal Temporal   Pulse:  64   Resp: 16 16   Patient Position - Orthostatic VS: Lying    Temp: 97 5 °F (36 4 °C) 97 5 °F (36 4 °C)       Physical Exam  Constitutional:       Comments: Confused, intermittently agitated   HENT:      Head: Normocephalic and atraumatic  Mouth/Throat:      Mouth: Mucous membranes are moist    Eyes:      Conjunctiva/sclera: Conjunctivae normal       Pupils: Pupils are equal, round, and reactive to light  Comments: Tracks movements   Cardiovascular:      Rate and Rhythm: Normal rate and regular rhythm  Heart sounds: No murmur heard  No friction rub  No gallop  Pulmonary:      Effort: Pulmonary effort is normal  No respiratory distress  Breath sounds: Normal breath sounds  Abdominal:      General: There is no distension  Palpations: Abdomen is soft  Comments: No evident tenderness   Musculoskeletal:         General: No swelling  Cervical back: Neck supple  Right lower leg: No edema  Left lower leg: No edema  Comments: In mitts   Skin:     General: Skin is warm and dry  Neurological:      Mental Status: He is alert  Comments: Confused, intermittently agitated  Does not answer questions  Moves all extremities spontaneously without obvious focal deficit  His tremulous    On movement of extremities and when trying to set up a rollover, does appear to have ataxia including truncal ataxia  Data:    EKG, Pathology, and Other Studies: I have personally reviewed pertinent reports          Lab Results:  CBC ETOH     Lab Results   Component Value Date    WBC 5 66 04/21/2022    RBC 4 86 04/21/2022    HGB 13 9 04/21/2022    HCT 43 0 04/21/2022    MCV 89 04/21/2022    MCH 28 6 04/21/2022    MCHC 32 3 04/21/2022    RDW 14 0 04/21/2022     (L) 04/21/2022    MPV 10 4 04/21/2022      No results found for: LACTICACID   CMP UA         Component Value Date/Time    K 3 5 04/21/2022 0535     04/21/2022 0535    CO2 21 04/21/2022 0535    BUN 34 (H) 04/21/2022 0535    CREATININE 1 11 04/21/2022 0535         Component Value Date/Time    CALCIUM 8 7 04/21/2022 0535    ALKPHOS 101 04/20/2022 1651    AST 61 (H) 04/20/2022 1651    ALT 39 04/20/2022 1651      Lab Results   Component Value Date    CLARITYU Clear 04/20/2022    COLORU Yellow 04/20/2022    SPECGRAV >=1 030 04/20/2022    PHUR 5 5 04/20/2022    GLUCOSEU Negative 04/20/2022    KETONESU Trace (A) 04/20/2022    BLOODU Moderate (A) 04/20/2022    PROTEIN UA >=300 (A) 04/20/2022    PROTEIN  08/09/2021    NITRITE Negative 04/20/2022    BILIRUBINUR Small (A) 04/20/2022    UROBILINOGEN 0 2 04/20/2022    UROBILINOGEN - 08/09/2021    LEUKOCYTESUR Negative 04/20/2022    WBCUA 0-1 04/20/2022    RBCUA 0-1 04/20/2022    BACTERIA Occasional 04/20/2022    EPIS Occasional 04/20/2022        Liver Function Test: ASA     Lab Results   Component Value Date    TBILI 0 71 04/20/2022    ALKPHOS 101 04/20/2022    AST 61 (H) 04/20/2022    ALT 39 04/20/2022    TP 8 1 04/20/2022    ALB 4 1 04/20/2022      No results found for: SALICYLATE   Troponin APAP     Lab Results   Component Value Date    TROPONINI <0 03 09/15/2021      No results found for: ACTMNPHEN   VBG HCG     No results found for: PHVEN, ZOS6LIP, PO2VEN, RDB6KVY, BEVEN, Z2DHHCOVV, C3YUZAJ   No results found for: HCGQUANT   ABG Urine Drug Screen     No results found for: PHART, HND7IPX, PO2ART, NXJ9NRF, BEART, R3UHSIKAO, O2HGB, SOURC, ANUPAMA, VTAC, ACRATE, INSPIREDAIR, PEEP   Lab Results   Component Value Date    AMPMETHUR Negative 04/20/2022    BARBTUR Negative 04/20/2022    BDZUR Positive (A) 04/20/2022    COCAINEUR Negative 04/20/2022    METHADONEUR Negative 04/20/2022    OPIATEUR Negative 04/20/2022    PCPUR Negative 04/20/2022    THCUR Negative 04/20/2022    OXYCODONEUR Negative 04/20/2022      Lactate INR     No results found for: LACTICACID   Lab Results   Component Value Date    INR 1 22 (H) 04/21/2022      PTT Protime     Lab Results   Component Value Date/Time    PTT 25 09/15/2021 12:54 AM        Lab Results   Component Value Date/Time    PROTIME 15 3 (H) 04/21/2022 08:51 AM              Imaging Studies: I have personally reviewed pertinent reports  Counseling / Coordination of Care  Total floor / unit time spent today 60 minutes  Greater than 50% of total time was spent with the patient and / or family counseling and / or coordination of care  Minutes of critical care time: 40 minutes  -Critical care time was exclusive of separately billable procedures and teaching time    -Critical care was necessary to treat or prevent imminent or life-threatening deterioration of the following condition: CNS failure/compromise, toxidrome (ethanol withdrawal), metabolic crisis  -Critical care time was spent personally by me on the following activities as well as the above as per the course and rest of chart: obtaining history from patient/surrogate, development of a treatment plan, discussions with referring provider(s), evaluation of patient's response to the treatment, examination of the patient, performing treatments and interventions, re-evaluation of the patient's condition, review of old charts, ordering/interpreting laboratory studies, ordering/interpreting of radiographic studies  ** Please Note: This note has been constructed using a voice recognition system  **

## 2022-04-22 ENCOUNTER — APPOINTMENT (INPATIENT)
Dept: MRI IMAGING | Facility: HOSPITAL | Age: 67
DRG: 641 | End: 2022-04-22
Payer: COMMERCIAL

## 2022-04-22 LAB
ALBUMIN SERPL BCP-MCNC: 3.7 G/DL (ref 3–5.2)
ALP SERPL-CCNC: 68 U/L (ref 43–122)
ALT SERPL W P-5'-P-CCNC: 25 U/L
ANION GAP SERPL CALCULATED.3IONS-SCNC: 7 MMOL/L (ref 5–14)
AST SERPL W P-5'-P-CCNC: 41 U/L (ref 17–59)
BILIRUB SERPL-MCNC: 0.92 MG/DL
BUN SERPL-MCNC: 18 MG/DL (ref 5–25)
CALCIUM SERPL-MCNC: 8.7 MG/DL (ref 8.4–10.2)
CHLORIDE SERPL-SCNC: 107 MMOL/L (ref 97–108)
CO2 SERPL-SCNC: 26 MMOL/L (ref 22–30)
CREAT SERPL-MCNC: 0.89 MG/DL (ref 0.7–1.5)
ERYTHROCYTE [DISTWIDTH] IN BLOOD BY AUTOMATED COUNT: 13.8 % (ref 11.6–15.1)
GFR SERPL CREATININE-BSD FRML MDRD: 89 ML/MIN/1.73SQ M
GLUCOSE SERPL-MCNC: 68 MG/DL (ref 70–99)
HCT VFR BLD AUTO: 44.9 % (ref 36.5–49.3)
HGB BLD-MCNC: 14.3 G/DL (ref 12–17)
MAGNESIUM SERPL-MCNC: 1.7 MG/DL (ref 1.6–2.3)
MCH RBC QN AUTO: 28.3 PG (ref 26.8–34.3)
MCHC RBC AUTO-ENTMCNC: 31.8 G/DL (ref 31.4–37.4)
MCV RBC AUTO: 89 FL (ref 82–98)
PLATELET # BLD AUTO: 129 THOUSANDS/UL (ref 149–390)
PMV BLD AUTO: 11.2 FL (ref 8.9–12.7)
POTASSIUM SERPL-SCNC: 3.7 MMOL/L (ref 3.6–5)
PROT SERPL-MCNC: 6.7 G/DL (ref 5.9–8.4)
RBC # BLD AUTO: 5.05 MILLION/UL (ref 3.88–5.62)
SODIUM SERPL-SCNC: 140 MMOL/L (ref 137–147)
WBC # BLD AUTO: 4.15 THOUSAND/UL (ref 4.31–10.16)

## 2022-04-22 PROCEDURE — 85027 COMPLETE CBC AUTOMATED: CPT | Performed by: EMERGENCY MEDICINE

## 2022-04-22 PROCEDURE — 99233 SBSQ HOSP IP/OBS HIGH 50: CPT | Performed by: EMERGENCY MEDICINE

## 2022-04-22 PROCEDURE — 70553 MRI BRAIN STEM W/O & W/DYE: CPT

## 2022-04-22 PROCEDURE — A9585 GADOBUTROL INJECTION: HCPCS | Performed by: EMERGENCY MEDICINE

## 2022-04-22 PROCEDURE — 97163 PT EVAL HIGH COMPLEX 45 MIN: CPT

## 2022-04-22 PROCEDURE — 83735 ASSAY OF MAGNESIUM: CPT | Performed by: EMERGENCY MEDICINE

## 2022-04-22 PROCEDURE — 80053 COMPREHEN METABOLIC PANEL: CPT | Performed by: EMERGENCY MEDICINE

## 2022-04-22 PROCEDURE — G1004 CDSM NDSC: HCPCS

## 2022-04-22 PROCEDURE — 97116 GAIT TRAINING THERAPY: CPT

## 2022-04-22 RX ORDER — PHENOBARBITAL 64.8 MG/1
64.8 TABLET ORAL ONCE
Status: COMPLETED | OUTPATIENT
Start: 2022-04-22 | End: 2022-04-22

## 2022-04-22 RX ORDER — PHENOBARBITAL SODIUM 130 MG/ML
130 INJECTION INTRAMUSCULAR ONCE
Status: COMPLETED | OUTPATIENT
Start: 2022-04-22 | End: 2022-04-22

## 2022-04-22 RX ADMIN — APIXABAN 5 MG: 5 TABLET, FILM COATED ORAL at 08:24

## 2022-04-22 RX ADMIN — THIAMINE HYDROCHLORIDE 500 MG: 100 INJECTION, SOLUTION INTRAMUSCULAR; INTRAVENOUS at 17:46

## 2022-04-22 RX ADMIN — GADOBUTROL 6 ML: 604.72 INJECTION INTRAVENOUS at 12:03

## 2022-04-22 RX ADMIN — CARVEDILOL 25 MG: 12.5 TABLET, FILM COATED ORAL at 17:46

## 2022-04-22 RX ADMIN — THIAMINE HYDROCHLORIDE 500 MG: 100 INJECTION, SOLUTION INTRAMUSCULAR; INTRAVENOUS at 20:27

## 2022-04-22 RX ADMIN — ATORVASTATIN CALCIUM 80 MG: 80 TABLET, FILM COATED ORAL at 08:24

## 2022-04-22 RX ADMIN — DILTIAZEM HYDROCHLORIDE 120 MG: 120 CAPSULE, COATED, EXTENDED RELEASE ORAL at 08:24

## 2022-04-22 RX ADMIN — FENOFIBRATE 168 MG: 48 TABLET, FILM COATED ORAL at 08:24

## 2022-04-22 RX ADMIN — ALBUTEROL SULFATE 2 PUFF: 90 AEROSOL, METERED RESPIRATORY (INHALATION) at 18:00

## 2022-04-22 RX ADMIN — NICOTINE 1 PATCH: 21 PATCH, EXTENDED RELEASE TRANSDERMAL at 08:25

## 2022-04-22 RX ADMIN — PHENOBARBITAL SODIUM 130 MG: 130 INJECTION INTRAMUSCULAR; INTRAVENOUS at 01:18

## 2022-04-22 RX ADMIN — CARVEDILOL 25 MG: 12.5 TABLET, FILM COATED ORAL at 08:23

## 2022-04-22 RX ADMIN — FOLIC ACID 1 MG: 5 INJECTION, SOLUTION INTRAMUSCULAR; INTRAVENOUS; SUBCUTANEOUS at 08:24

## 2022-04-22 RX ADMIN — PHENOBARBITAL 64.8 MG: 64.8 TABLET ORAL at 09:02

## 2022-04-22 RX ADMIN — THIAMINE HYDROCHLORIDE 500 MG: 100 INJECTION, SOLUTION INTRAMUSCULAR; INTRAVENOUS at 08:24

## 2022-04-22 RX ADMIN — UMECLIDINIUM 1 PUFF: 62.5 AEROSOL, POWDER ORAL at 08:25

## 2022-04-22 RX ADMIN — APIXABAN 5 MG: 5 TABLET, FILM COATED ORAL at 17:45

## 2022-04-22 NOTE — NURSING NOTE
No urine since admission, [place] catheter in place  Bladder Scan 390mL, encouraged to void though pt stated "it'll come out when it's ready"   Bladder scan due at [facility]

## 2022-04-22 NOTE — PROGRESS NOTES
PROGRESS NOTE  DEPARTMENT OF MEDICAL TOXICOLOGY  LEVEL 4 MEDICAL DETOX UNIT  Les Monaco 73 y o  male MRN: [id number]  Unit/Bed#:  DETOX 504-01 Encounter: [id number]      Reason for Admission/Principal Problem: Delirium tremens    Rounding Provider: Esther Bellamy MD  Attending Provider: Esther Bellamy MD   4/21/2022 12:31 PM         * Delirium tremens ([facility] )  Assessment & Plan  · Substantially improved this morning after treatment with phenobarbital and high dose thiamine  · Continue treatment for any further withdrawal [facility]  · Continue high dose thiamine  · Monitor closely    Wernicke encephalopathy  Assessment & Plan  · Report of hallucinations, worsening mental status over the past month  · Ataxia present on exam  · High probability of contribution from [address] encephalopathy/ thiamine deficiency  · Continue high dose thiamine  · MRI Brain showed prior right frontal and occipital lobe infarcts without acute findings    Hallucinations  Assessment & Plan  · Report of visual and auditory hallucinations over the past month, worsening in the last few days  · Has history of alcohol use disorder, concern for DT's and Wernicke encephalopathy with treatment as above  · No new findings on MRI, though prior recent CVA's could be contributing  · Will get PT and OT eval including cognitive evaluation    Alcohol use disorder, severe, dependence ([facility] )  Assessment & Plan  · Family had reported patient consuming at least 4-8 drinks daily, though patient disputes this  · Treatment for withdrawal as above  · Case management consulted    Metabolic acidosis, increased anion gap-resolved as of 4/21/2022  Assessment & Plan  · Likely secondary to AKA  · Continue high dose thiamine  · Will continue to monitor BMP - resolved, switched to NS fluids drom D5NS    Atrial fibrillation (HCC)  Assessment & Plan  · History of paroxysmal atrial fibrillation  · Currently in NSR on monitor  · Continue to monitor  · Continue home doses of Eliquis, BB, CCB    Stage 3a chronic kidney disease ([facility] )  Assessment & Plan  · Presented to [facility] with PRITI, creatinine now back to baseline  · Continue to monitor    Benign essential hypertension  Assessment & Plan  · Continue home medication    Hx of CVA (cerebral vascular accident) ([facility] )  Assessment & Plan  · Continue Eliquis  · MRI Brain re-demonstrates right frontal and occipital lobe infarcts without acute abnormality    GERD (gastroesophageal reflux disease)  Assessment & Plan  · Continue home medication    COPD (chronic obstructive pulmonary disease) ([facility])  Assessment & Plan  · No wheezing/ other evidence of acute exacerbation noted on exam  · Goal O2 sat of 90% or above  · Continue home medications    HLD (hyperlipidemia)  Assessment & Plan  · Continue home medications          VTE Pharmacologic Prophylaxis:   Pharmacologic: Enoxaparin (Lovenox)  Mechanical VTE Prophylaxis in Place: yes    Code Status: Level 1 - Full Code    Patient Centered Rounds: I have performed bedside rounds with nursing staff today      Discussions with Specialists or Other Care Team Provider: Case management     Education and Discussions with Family / Patient: Discussed with patient    Minutes of critical care time: 31 minutes  -Critical care time was exclusive of separately billable procedures and teaching time    -Critical care was necessary to treat or prevent imminent or life-threatening deterioration of the following condition: CNS failure/compromise  -Critical care time was spent personally by me on the following activities as well as the above as per the course and rest of chart: obtaining history from patient/surrogate, review of old charts, development of a treatment plan, discussions with referring provider(s) and/or consultants, examination of the patient, performing treatments and interventions, evaluation of patient's response to the treatment, re-evaluation of the patient's condition, ordering/interpreting laboratory studies, ordering/interpreting of radiographic studies  Current Length of Stay: 1 day(s)    Current Patient Status: Inpatient     Certification Statement: The patient will continue to require additional inpatient hospital stay due to Delirium tremens, Wernicke encephalopathy Discharge Plan: Case management consulted for assistance with disposition when medically stabilized          Subjective: This morning patient appears much improved, awake and alert and able to answer many questions appropriately  He is oriented to person, place, year and current events  Not oriented to month  Patient does endorse drinking, often daily, but says he feels he only drinks 2 drinks daily at the most as opposed to the history that was obtained from his family  He does not remember any of the events of yesterday  He does endorse having hallucinations over the past month, both visual and auditory which is new for him      Objective:     Clinical Opiate Withdrawal Scale  Pulse: 57    SEWS Total Score: 0 (4/22/2022 11:00 AM)        Last 24 Hours Medication List:   Current Facility-Administered Medications   Medication Dose Route Frequency Provider Last Rate    acetaminophen  650 mg Oral Q6H PRN Liliane Escoto MD      albuterol  2 puff Inhalation Q6H PRN Liliane Escoto MD      apixaban  5 mg Oral BID Liliane Escoto MD      atorvastatin  80 mg Oral Daily Liliane Esctoo MD      carvedilol  25 mg Oral BID With Meals Liliane Escoto MD      diltiazem  120 mg Oral Daily Liliane Escoto MD      fenofibrate  168 mg Oral Daily Liliane Escoto MD      folic acid IVPB  1 mg Intravenous Daily Liliane Escoto MD 1 mg (04/22/22 7704)    nicotine  1 patch Transdermal Daily Liliane Escoto MD      ondansetron  4 mg Intravenous Q6H PRN Liliane Escoto MD      pantoprazole  40 mg Oral Early Morning Liliane Escoto MD      sodium chloride  125 mL/hr Intravenous Continuous SVEN Blankenship 125 mL/hr (04/21/22 2011)    thiamine  500 mg Intravenous TID Gabrielle Whittaker  mg (22 0824)    umeclidinium bromide  1 puff Inhalation Daily Gabrielle Whittaker MD           Vitals:   Temp (24hrs), Av 5 °F (36 4 °C), Min:96 8 °F (36 °C), Max:97 9 °F (36 6 °C)    Temp:  [96 8 °F (36 °C)-97 9 °F (36 6 °C)] 97 5 °F (36 4 °C)  HR:  [56-64] 57  Resp:  [14-18] 18  BP: (122-188)/() 142/70  SpO2:  [91 %-96 %] 92 %  Body mass index is 18 9 kg/m²  Input and Output Summary (last 24 hours): Intake/Output Summary (Last 24 hours) at 2022 1406  Last data filed at 2022 1300  Gross per 24 hour   Intake 1532 08 ml   Output 800 ml   Net 732 08 ml       Physical Exam:   Physical Exam  Vitals reviewed  Constitutional:       General: He is not in acute distress  HENT:      Head: Normocephalic and atraumatic  Mouth/Throat:      Mouth: Mucous membranes are moist       Pharynx: Oropharynx is clear  Eyes:      Extraocular Movements: Extraocular movements intact  Conjunctiva/sclera: Conjunctivae normal       Pupils: Pupils are equal, round, and reactive to light  Cardiovascular:      Rate and Rhythm: Normal rate and regular rhythm  Heart sounds: No murmur heard  No friction rub  No gallop  Pulmonary:      Effort: Pulmonary effort is normal  No respiratory distress  Breath sounds: Normal breath sounds  Abdominal:      General: There is no distension  Palpations: Abdomen is soft  Tenderness: There is no abdominal tenderness  Musculoskeletal:         General: No swelling  Cervical back: Neck supple  Right lower leg: No edema  Left lower leg: No edema  Skin:     General: Skin is warm and dry  Neurological:      General: No focal deficit present  Mental Status: He is alert  Comments: Oriented to person, place, year, current events  Not oriented to month  No tremor    Mild finger-to-nose ataxia present bilaterally   Psychiatric:         Mood and Affect: Mood normal  Additional Data:     Labs:   Results from last 7 days   Lab Units 04/22/22  0611 04/21/22  0536 04/20/22  1651   WBC Thousand/uL 4 15*   < > 8 13   HEMOGLOBIN g/dL 14 3   < > 15 7   HEMATOCRIT % 44 9   < > 46 9   PLATELETS Thousands/uL 129*   < > 200   NEUTROS PCT %  --   --  72   LYMPHS PCT %  --   --  17   MONOS PCT %  --   --  10   EOS PCT %  --   --  0    < > = values in this interval not displayed  Results from last 7 days   Lab Units 04/22/22  0611   SODIUM mmol/L 140   POTASSIUM mmol/L 3 7   CHLORIDE mmol/L 107   CO2 mmol/L 26   BUN mg/dL 18   CREATININE mg/dL 0 89   ANION GAP mmol/L 7   CALCIUM mg/dL 8 7   ALBUMIN g/dL 3 7   TOTAL BILIRUBIN mg/dL 0 92   ALK PHOS U/L 68   ALT U/L 25   AST U/L 41   GLUCOSE RANDOM mg/dL 68*      Results from last 7 days   Lab Units 04/21/22  0851   INR  1 22*                       * I Have Reviewed All Lab Data Listed Above  * Additional Pertinent Lab Tests Reviewed: Henri 66 Admission Reviewed      Imaging Studies: I have personally reviewed pertinent reports  Recent Cultures (last 7 days): Today, Patient Was Seen By: Natalie Bishop MD    ** Please Note: Dictation voice to text software may have been used in the creation of this document   **

## 2022-04-22 NOTE — DISCHARGE INSTR - OTHER ORDERS
Drug and Alcohol Resources in [facility]    If you have health insurance, including medical assistance, there should be a phone number on your insurance card that you can call to find out how to access services  The card may say, For Imelda Blancas or For Drug and Alcohol Services or For Substance Abuse Services call the number provided  [facility]  [facility], Kev, [address]  [phone]  A  is available Monday through Friday from 8:00 am to 5:00 pm to provide you with assistance on accessing services for substance abuse  If you do not have health insurance and are in financial need, this office may also help you get the funding for the services that are necessary  [facility] provides funding to support three Recovery Centers in [facility]  These centers offer a safe, sober environment to those in recovery  A variety of programming including 12-Step Meetings, Claudell Mormon, Life Skills Workshops, etc  is offered at each location  [address] [place] [address]  [phone]  www  cleanslatebangor  org [employer]  Mily Chan, [address]  [phone]  mjpsal-wr-rjtf  Cheryl De La Fuente [address] [facility] [address], [address] [facility]  [phone]   [address] [place] [address]  [phone]  www  leonardo  [facility], [address] [place] [address]  [phone]  [facility] 77  Confidential free help, from public health agencies, to find substance use treatment and information  [phone]    Link for Zoom Codes for Virtual 12 step Meetings  Elia ochoa uk  aspx  Alcoholics Anonymous  [facility]  [phone]    http://www hopkins com/  Narcotics Anonymous  [phone]  https://Playlogic/

## 2022-04-22 NOTE — PLAN OF CARE
Problem: PHYSICAL THERAPY ADULT  Goal: Performs mobility at highest level of function for planned discharge setting  See evaluation for individualized goals  Description: Treatment/Interventions: ADL retraining,Functional transfer training,LE strengthening/ROM,Elevations,Therapeutic exercise,Endurance training,Patient/family training,Equipment eval/education,Bed mobility,Gait training,Spoke to nursing,Spoke to case management,OT  Equipment Recommended: Cane       See flowsheet documentation for full assessment, interventions and recommendations  Outcome: Progressing  Note: Prognosis: Good  Problem List: Decreased strength,Decreased range of motion,Decreased endurance,Impaired balance,Decreased mobility,Impaired judgement,Decreased coordination,Decreased cognition,Decreased safety awareness,Impaired sensation     Barriers to Discharge: Inaccessible home environment,Decreased caregiver support        PT Discharge Recommendation: Home with home health rehabilitation          See flowsheet documentation for full assessment

## 2022-04-22 NOTE — PHYSICAL THERAPY NOTE
Physical Therapy Evaluation    Patient's Name: Shaun Fraser    Admitting Diagnosis  Hallucination [R44 3]    Problem List  Patient Active Problem List   Diagnosis    Left arm weakness    Alcohol abuse    HLD (hyperlipidemia)    COPD (chronic obstructive pulmonary disease) ([facility] )    Iron deficiency anemia    Cerebrovascular accident (CVA) due to embolism of right middle cerebral artery (HCC)    Symptomatic carotid artery stenosis, right    Lumbar back pain    GERD (gastroesophageal reflux disease)    Stenosis of right internal carotid artery    Hx of CVA (cerebral vascular accident) ([facility] )    Benign essential hypertension    [facility]    Pre-diabetes    Stage 3a chronic kidney disease ([facility]    Upper GI bleed    Acute blood loss anemia    Ambulatory dysfunction    Syncope    S/P carotid endarterectomy    Acute Cholecystitis    Hypomagnesemia    PA[facility]oxysmal atrial fibrillation) ([facility])    Cholelithiasis    Seizure ([facility]    Atrial fibrillation (HCC)    Hallucinations    Memory changes    Delirium tremens ([facility]    Wernicke encephalopathy    Alcohol use disorder, severe, dependence ([place] [address] )       Past Medical History  Past Medical History:   Diagnosis Date    PRITI (acute kidney injury) ([facility] ) 9/5/2021    Anxiety     Back pain     Claustrophobia     COPD (chronic obstructive pulmonary disease) ([facility] )     Dysphagia 9/5/2021    GERD (gastroesophageal reflux disease)     Hypertension     Hypokalemia 2/22/2022    Lumbar back pain     Panic attacks     Stenosis of right carotid artery     Stroke [facility])     Wears glasses     Wears partial dentures     upper denture       Past Surgical History  Past Surgical History:   Procedure Laterality Date    COLONOSCOPY      IR CEREBRAL ANGIOGRAPHY  4/6/2021    OTHER SURGICAL HISTORY      fertility    TN SLCTV CATHJ 3RD+ ORD SLCTV ABDL PEL/LXTR [facility] Right 4/6/2021    Procedure: ARTERIOGRAM, carotid Angio;  Surgeon: Horacio Garza Abdifatah Bustamante MD;  Location: [facility];  Service: Vascular    [place] THROMBOENDARTECTMY Tami Mata Right 4/13/2021    Procedure: RIGHT ENDARTERECTOMY ARTERY CAROTID WITH BOVINE PATCH;  Surgeon: Jay Mcmullen MD;  Location: [facility];  Service: Vascular       Recent Imaging  MRI brain w wo contrast   Final Result by Edy Mcintyre DO (04/22 1249)      No acute intracranial abnormality  Subacute right frontal lobe infarct, and subacute to chronic right occipital lobe infarcts are noted  There are chronic lacunar infarcts in the basal ganglia and thalamus as described  Workstation performed: [id number]             Recent Vital Signs  Vitals:    04/22/22 0303 04/22/22 0453 04/22/22 0654 04/22/22 1100   BP:  170/80 (!) 128/109 142/70   BP Location:   Left arm Right arm   Pulse: 60 64 62 57   Resp: 18 14 16 18   Temp:  (!) 96 8 °F (36 °C) 97 5 °F (36 4 °C) 97 5 °F (36 4 °C)   TempSrc:   Temporal Temporal   SpO2: 91% 92% 95% 92%   Weight:       Height:            04/22/22 1300   PT Last Visit   PT Visit Date 04/22/22   Note Type   Note type Evaluation   Pain Assessment   Pain Assessment Tool 0-10   Pain Score No Pain   Home Living   Type of Home Mobile home   Home Layout Stairs to enter with rails; Performs ADLs on one level; Able to live on main level with bedroom/bathroom   Home Equipment Cane   Prior Function   Level of Cooper Independent with ADLs and functional mobility; Needs assistance with IADLs   Lives With Alone   Receives Help From Family   ADL Assistance Independent   IADLs Needs assistance   General   Family/Caregiver Present No   Cognition   Arousal/Participation Cooperative   Orientation Level Oriented to person;Oriented to place   Memory Decreased recall of recent events;Decreased recall of precautions   Following Commands Follows one step commands without difficulty   RLE Assessment   RLE Assessment   (3+/5)   LLE Assessment   LLE Assessment   (3+/5)   Coordination   Movements are Fluid and Coordinated 0   Coordination and Movement Description unsteadiness with gait   Sensation X   Light Touch   RLE Light Touch Impaired   LLE Light Touch Impaired   Bed Mobility   Supine to Sit 5  Supervision   Additional items Increased time required   Sit to Supine 5  Supervision   Additional items Increased time required   Transfers   Sit to Stand 5  Supervision   Additional items Increased time required   Stand to Sit 5  Supervision   Additional items Increased time required   Toilet transfer 5  Supervision   Additional items Increased time required;Standard toilet   Additional Comments with [facility]   Ambulation/Elevation   Gait pattern Step through pattern;Decreased heel strike;Decreased toe off;Decreased foot clearance; Wide MACKENZIE; Improper Weight shift   Gait Assistance 5  Supervision   Additional items Verbal cues   Assistive Device [facility]   Distance 30ft   Balance   Static Sitting Fair +   Dynamic Sitting Fair +   Static Standing Fair   Dynamic Standing Fair -   Ambulatory Fair -   Endurance Deficit   Endurance Deficit Yes   Endurance Deficit Description reduced from baseline   Activity Tolerance   Activity Tolerance Patient limited by fatigue   Medical Staff Made Aware spoke to CM   Nurse Made Aware spoke to RN   Assessment   Prognosis Good   Problem List Decreased strength;Decreased range of motion;Decreased endurance; Impaired balance;Decreased mobility; Impaired judgement;Decreased coordination;Decreased cognition;Decreased safety awareness; Impaired sensation   Barriers to Discharge Inaccessible home environment;Decreased caregiver support   Goals   Patient Goals go home   STG Expiration Date 04/27/22   Short Term Goal #1 see eval note   PT Treatment Day 1   Plan   Treatment/Interventions ADL retraining;Functional transfer training;LE strengthening/ROM; Elevations; Therapeutic exercise; Endurance training;Patient/family training;Equipment eval/education; Bed mobility;Gait training;Spoke to nursing;Spoke to case management;OT   PT Frequency 2-3x/wk   Recommendation   PT Discharge Recommendation Home with home health rehabilitation   [facility] Mobility Inpatient   Turning in Bed Without Bedrails 4   Lying on Back to Sitting on Edge of Flat Bed 3   Moving Bed to Chair 3   Standing Up From Chair 3   Walk in Room 3   Climb 3-5 Stairs 3   Basic Mobility Inpatient Raw Score 19   Basic Mobility Standardized Score 42 48   Highest Level Of Mobility   JH-HLM Goal 6: Walk 10 steps or more   JH-HLM Highest Level of Mobility 8: Walk 250 feet ot more   JH-HLM Goal Achieved Yes   Additional Treatment Session   Start Time 1258   End Time 1309   Treatment Assessment Additional gait training of 200ft x2 with spc no LOB although pt with unsteadiness noted with gait  Will benefit from home PT and VNA    Equipment Use SPC   End of Consult   Patient Position at End of Consult Supine; All needs within reach         Ysabelkeshav 12 is a 73 y o  male [facility] 4/21/2022 for Delirium tremens ([facility] )  Pt  has a past medical history of PRITI (acute kidney injury) ([facility] ) (9/5/2021), Anxiety, Back pain, Claustrophobia, COPD (chronic obstructive pulmonary disease) ([facility] ), Dysphagia (9/5/2021), GERD (gastroesophageal reflux disease), Hypertension, Hypokalemia (2/22/2022), Lumbar back pain, Panic attacks, Stenosis of right carotid artery, Stroke [facility]), Wears glasses, and Wears partial dentures    PT was consulted and pt was seen on 4/22/2022 for mobility assessment and d/c planning  Pt presents supine in bed alert alert and agreeable to therapy  He is reporting no pain at this time, sensation is impaired to LEs  Balance is also impaired pt unsteady when standing and ambulating although not requiring assistance to complete  Using SPC  Strength and endurance are below baseline due to deconditioning  Impairments limiting pt at this time include weakness, impaired balance, decreased endurance, decreased coordination, increased fall risk, new onset of impairment of functional mobility, decreased ADLS, decreased IADLS, pain, decreased activity tolerance, decreased safety awareness, impaired judgement, ortheopedic restrictions, decreased cognition and decreased sensation  Pt is currently functioning at a supervision assistance x1 level for bed mobility, supervision assistance x1 level for transfers, supervision assistance x1 level for ambulation with [address]  The patient's AM-PAC Basic Mobility Inpatient Short Form Raw Score is 19  A Raw score of greater than 16 suggests the patient may benefit from discharge to home  Please also refer to the recommendation of the Physical Therapist for safe discharge planning  Goals                                                                                                                                    1) Bed mobility skills with modified independent assistance to facilitate safe return to previous living environment 2) Functional transfers with modified independent assistance to facilitate safe return to previous living environment  3) Ambulation with least restrictive AD modified independent assistance without LOB and stable vitals for safe ambulation home/ community distances  4) Stair training up/down flight 3 step/s with appropriate rail/s  and modified independent assistance for safe access to previous living environment  5) Improve balance grades to fair + to reduce risk of falls  6)Improve LE strength grades by 1 to increase independence w/ transfers and gait  7) PT for ongoing pt and family education; DME needs and D/C planning to promote highest level of function in least restrictive environment       Recommendations                                                                                                              Pt will benefit from continued skilled IP PT to address the above mentioned impairments in order to maximize recovery and increase functional independence when completing mobility and ADLs  See flow sheet for goals and POC       DME: Single Point Cane    Discharge Disposition:  Home with Home Physical Therapy      Catrachito Wu PT, DPT

## 2022-04-22 NOTE — UTILIZATION REVIEW
Initial Clinical Review    Pt initially presented to [address] [place] [address] ED  Pt was transferred by EMS to [address] [place] [address] for its Level IV medically managed intensive inpatient detox unit, not available at [facility]  Admission: Date/Time/Statement:   Admission Orders (From admission, onward)     Ordered        04/21/22 1241  Inpatient Admission  Once                      Orders Placed This Encounter   Procedures    Inpatient Admission     Standing Status:   Standing     Number of Occurrences:   1     Order Specific Question:   Level of Care     Answer:   Level 2 Stepdown / HOT [14]     Order Specific Question:   Estimated length of stay     Answer:   More than 2 Midnights     Order Specific Question:   Certification     Answer:   I certify that inpatient services are medically necessary for this patient for a duration of greater than two midnights  See H&P and MD Progress Notes for additional information about the patient's course of treatment  Initial Presentation: 73 y o  male who initially presented to [address] [place] [address], was then transferred by EMS to [address] [place] [address] as a direct admission to medical detox  Inpatient admission for evaluation and treatment of alcohol withdrawal syndrome, delirium tremens, and Wernicke encephalopathy  PMHx: Anxiety, COPD, GERD, HTN, Stroke  Presented w/ visual and auditory hallucinations  CIWA scores over 20  Received 10 mg lorazepam and 10 mg diazepam at [address] [place] [address]  Family reports at least 4-8 alcoholic beverages daily, last drink on 4/16  On exam, confused, intermittently agitated, attempting to strike staff, not answering questions, moves extremities spontaneously, tremors, appears to have ataxia  SEWS 0  Plan: SEWS monitoring w/ phenobarbital management, high-dose thiamine/folic acid supplement, IVF, telemetry, continuous pulse ox, continue home meds, trend labs, replete electrolytes as needed  MRI brain         Date: 04/22/22       Day 2: Pt appears more awake and alert today  Able to answer questions appropriately, reports only drinking 2 drinks most days  Does not recall events of yesterday  Does endorse visual and auditory hallucinations over the past month  On exam, oriented to person, place, year & current events but not the month, mild finger-to-nose ataxia bilaterally  SEWS 5  Plan: continue SEWS monitoring w/ phenobarbital management, high-dose thiamine/folic acid supplement, IVF, telemetry, continuous pulse ox, continue home meds, trend labs, replete electrolytes as needed  PT/OT/cognitive evals  Wt Readings from Last 1 Encounters:   04/21/22 58 1 kg (128 lb)     Additional Vital Signs:   Date/Time Temp Pulse Resp BP MAP (mmHg) SpO2 O2 Device   04/22/22 1100 97 5 °F (36 4 °C) 57 18 142/70 94 92 % None (Room air)   04/22/22 0654 97 5 °F (36 4 °C) 62 16 128/109 Abnormal  115 95 % None (Room air)   04/22/22 0453 96 8 °F (36 °C)  64 14 170/80 -- 92 % --   04/22/22 0303 -- 60 18 -- -- 91 % None (Room air)   04/22/22 0155 -- 60 -- -- -- 91 % None (Room air)   04/22/22 0111 97 2 °F (36 2 °C)  62 18 188/98 Abnormal  -- 91 % None (Room air)   04/21/22 2312 -- 58 -- 140/68 -- 95 % None (Room air)   04/21/22 1923 -- 56 16 -- -- 93 % None (Room air)   04/21/22 1902 97 9 °F (36 6 °C) 57 16 132/66 -- 96 % None (Room air)   04/21/22 1650 -- -- -- 122/60 -- -- --   04/21/22 1518 97 9 °F (36 6 °C) 61 16 144/56 -- 95 % None (Room air)   04/21/22 1400 -- 57 -- 143/62 -- -- --   04/21/22 1352 -- 60 -- 93/49 Abnormal  -- 94 % None (Room air)   04/21/22 1313 -- -- -- 87/51 Abnormal  61 93 % None (Room air)   04/21/22 1237 97 5 °F (36 4 °C) 64 16 150/76 -- -- --       Severity of Ethanol Withdrawal Scale (SEWS):   04/22/22 1100 04/22/22 1000 04/22/22 0859 04/22/22 0730 04/22/22 0454   ANXIETY: Do you feel that something bad is about to happen to you right now?   0  -MN 0  -MN 0  -MN -- 0  -JT   NAUSEA and DRY HEAVES or VOMITING? 0  -MN 0  -MN 0  -MN -- 0  -JT   SWEATING: (includes moist palms, sweating now)? Score 0 or 2 0  -MN 0  -MN 0  -MN -- 0  -JT   TREMOR: with arms extended eyes closed? 0  -MN 0  -MN 2  -MN -- 0  -JT   AGITATION: Fidgety, restless, pacing? 0  -MN 0  -MN 0  -MN -- 0  -JT   DISORIENTATION: 0  -MN 0  -MN 0  -MN -- 0  -JT   HALLUCINATIONS: 0  -MN 0  -MN 0  -MN -- 0  -JT   VITAL SIGNS: ANY (Pulse >053, Diastolic BP >18, Temp >64 7) 0  -MN 0  asleep  -MN 3  -MN -- 0  -JT   SEWS Total Score 0  -MN 0  -MN 5  -MN -- 0  -JT   [place]parks Agitation Sedation Scale (RASS) 0  -MN -2  -MN 0  -MN -1  -MN -2  -JT    04/22/22 0304 04/22/22 0155 04/22/22 0112 04/21/22 2312 04/21/22 1923   ANXIETY: Do you feel that something bad is about to happen to you right now?  0  asleep  -JT 0  0  -JT 3  -JT 0  asleep  -JT 0  asleep  -JT   NAUSEA and DRY HEAVES or VOMITING? 0  -JT 0  -JT 0  -JT 0  -JT 0  -JT   SWEATING: (includes moist palms, sweating now)? Score 0 or 2 0  -JT 0  -JT 0  -JT 0  -JT 0  -JT   TREMOR: with arms extended eyes closed? 0  -JT 0  -JT 0  -JT 0  -JT 0  -JT   AGITATION: Fidgety, restless, pacing? 0  -JT 0  -JT 0  -JT 0  -JT 0  -JT   DISORIENTATION: 0  -JT 0  -JT 3  -JT 0  -JT 0  -JT   HALLUCINATIONS: 0  -JT 0  -JT 0  -JT 0  -JT 0  -JT   VITAL SIGNS: ANY (Pulse >456, Diastolic BP >37, Temp >37 9) 0  -JT 0  -JT 3  -JT 0  -JT 0  -JT   SEWS Total Score 0  -JT 0  -JT 9  -JT 0  -JT 0  -JT   Sparks Agitation Sedation Scale (RASS) -1  -JT -1  -JT 0  -JT -2  -JT -2  -JT    04/21/22 1700 04/21/22 1400 04/21/22 1329     ANXIETY: Do you feel that something bad is about to happen to you right now? 0  -TO 0  -TO 0  -TO     NAUSEA and DRY HEAVES or VOMITING? 0  -TO 0  -TO 0  -TO     SWEATING: (includes moist palms, sweating now)? Score 0 or 2 0  -TO 0  -TO 0  -TO     TREMOR: with arms extended eyes closed? 0  -TO 0  -TO 0  -TO     AGITATION: Fidgety, restless, pacing?  0  -TO 0  -TO 0  -TO     DISORIENTATION: 0  -TO 0  -TO 0  -TO HALLUCINATIONS: 0  -TO 0  -TO 0  -TO     VITAL SIGNS: ANY (Pulse >231, Diastolic BP >45, Temp >62 0) 0  -TO 0  -TO 0  -TO     SEWS Total Score 0  -TO 0  -TO 0  -TO     Sparks Agitation Sedation Scale (RASS) -2  -TO -2  -TO -2  -TO           Pertinent Labs/Diagnostic Test Results:   MRI brain w wo contrast   Final Result by [facility],  (04/22 1249)      No acute intracranial abnormality  Subacute right frontal lobe infarct, and subacute to chronic right occipital lobe infarcts are noted  There are chronic lacunar infarcts in the basal ganglia and thalamus as described        Workstation performed: [id number]             Results from last 7 days   Lab Units 04/22/22  0611 04/21/22  0536 04/20/22  1651   WBC Thousand/uL 4 15* 5 66 8 13   HEMOGLOBIN g/dL 14 3 13 9 15 7   HEMATOCRIT % 44 9 43 0 46 9   PLATELETS Thousands/uL 129* 142* 200   NEUTROS ABS Thousands/µL  --   --  5 83     Results from last 7 days   Lab Units 04/22/22  0611 04/21/22  1813 04/21/22  0535 04/20/22  1651   SODIUM mmol/L 140 138 140 138   POTASSIUM mmol/L 3 7 4 4 3 5 3 5   CHLORIDE mmol/L 107 106 102 96*   CO2 mmol/L 26 29 21 25   ANION GAP mmol/L 7 3* 17* 17*   BUN mg/dL 18 25 34* 45*   CREATININE mg/dL 0 89 0 93 1 11 1 64*   EGFR ml/min/1 73sq m 89 85 69 43   CALCIUM mg/dL 8 7 8 6 8 7 9 7   MAGNESIUM mg/dL 1 7  --   --  1 7     Results from last 7 days   Lab Units 04/22/22  0611 04/20/22  1651   AST U/L 41 61*   ALT U/L 25 39   ALK PHOS U/L 68 101   TOTAL PROTEIN g/dL 6 7 8 1   ALBUMIN g/dL 3 7 4 1   TOTAL BILIRUBIN mg/dL 0 92 0 71   AMMONIA umol/L  --  22     Results from last 7 days   Lab Units 04/22/22  0611 04/21/22  1813 04/21/22  0535 04/20/22  1651   GLUCOSE RANDOM mg/dL 68* 125* 77 88     Results from last 7 days   Lab Units 04/20/22  2155 04/20/22  1906 04/20/22  1651   HS TNI 0HR ng/L  --   --  71*   HS TNI 2HR ng/L  --  54*  --    HSTNI D2 ng/L  --  -17  --    HS TNI 4HR ng/L 48  --   --    HSTNI D4 ng/L -23  --   -- Results from last 7 days   Lab Units 04/21/22  0851   PROTIME seconds 15 3*   INR  1 22*     Results from last 7 days   Lab Units 04/20/22  1651   TSH 3RD GENERATON uIU/mL 1 056     Results from last 7 days   Lab Units 04/20/22  1751   CLARITY UA  Clear   COLOR UA  Yellow   SPEC GRAV UA  >=1 030   PH UA  5 5   GLUCOSE UA mg/dl Negative   KETONES UA mg/dl Trace*   BLOOD UA  Moderate*   PROTEIN UA mg/dl >=300*   NITRITE UA  Negative   BILIRUBIN UA  Small*   UROBILINOGEN UA E U /dl 0 2   LEUKOCYTES UA  Negative   WBC UA /hpf 0-1   RBC UA /hpf 0-1   BACTERIA UA /hpf Occasional   EPITHELIAL CELLS WET PREP /hpf Occasional     Results from last 7 days   Lab Units 04/20/22  1751   AMPH/METH  Negative   BARBITURATE UR  Negative   BENZODIAZEPINE UR  Positive*   COCAINE UR  Negative   METHADONE URINE  Negative   OPIATE UR  Negative   PCP UR  Negative   THC UR  Negative     Results from last 7 days   Lab Units 04/20/22  1651   ETHANOL LVL mg/dL <3         Past Medical History:   Diagnosis Date    PRITI (acute kidney injury) ([place] [address] ) 9/5/2021    Anxiety     Back pain     Claustrophobia     [facility]ronic obstructive [facility]     Dysphagia 9/5/2021    GERD (gastroesophageal reflux disease)     Hypertension     Hypokalemia 2/22/2022    Lumbar back pain     Panic attacks     Stenosis of right carotid artery     Stroke [facility])     Wears glasses     Wears partial dentures     upper denture     Present on Admission:   Delirium tremens ([place] [address] )   Wernicke encephalopathy   Hallucinations   (Resolved) Tobacco use   Alcohol use disorder, severe, dependence ([facility]   (Resolved) Metabolic acidosis, increased anion gap   HLD (hyperlipidemia)   GERD (gastroesophageal reflux disease)   Hx of CVA (cerebral vascular accident) ([place] [address] )   Stage 3a chronic kidney disease ([place] [address] )   COPD (chronic obstructive pulmonary disease) [facility]   Benign essential hypertension   Atrial fibrillation ([facility])      Admitting Diagnosis: Hallucination [R44 3]  Age/Sex: 73 y o  male  Admission Orders:  Regular Diet  SCDs  Fall Precautions  SEWS monitoring  Telemetry & Continuous Pulse Ox  Scheduled Medications:  apixaban, 5 mg, Oral, BID  atorvastatin, 80 mg, Oral, Daily  carvedilol, 25 mg, Oral, BID With Meals  diltiazem, 120 mg, Oral, Daily  fenofibrate, 168 mg, Oral, Daily  folic acid IVPB, 1 mg, Intravenous, Daily  nicotine, 1 patch, Transdermal, Daily  pantoprazole, 40 mg, Oral, Early Morning  thiamine, 500 mg, Intravenous, TID  umeclidinium bromide, 1 puff, Inhalation, Daily    Continuous IV Infusions:  sodium chloride, 125 mL/hr, Intravenous, Continuous    PRN Meds:  acetaminophen, 650 mg, Oral, Q6H PRN  albuterol, 2 puff, Inhalation, Q6H PRN  ondansetron, 4 mg, Intravenous, Q6H PRN  phenobarbital, 650 mg, Intravenous; 4/21 x1  phenobarbital, 130 mg, Intravenous; 4/22 x1  phenobarbital, 64 8 mg, Oral; 4/22 x1      IP CONSULT TO CASE MANAGEMENT    Network Utilization Review Department  ATTENTION: Please call with any questions or concerns to [phone] and carefully listen to the prompts so that you are directed to the right person  All voicemails are confidential   Sarkislyshell Varma all requests for admission clinical reviews, approved or denied determinations and any other requests to dedicated fax number below belonging to the campus where the patient is receiving treatment   List of dedicated fax numbers for the Facilities:  [address] DENIALS (Administrative/Medical Necessity) [phone]   [address] (Maternity/NICU/Pediatrics) [phone]   [address] [phone]   [address]  [address] [facility] [place] [address] [address] [facility] [phone] [address] [place] [address] [address] [phone]
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
## 2022-04-22 NOTE — PROGRESS NOTES
04/22/22 0459   Urine Output/Assessment   Unmeasured Urine Occurrence 0   Bladder Scan Volume (mL) 126 mL   Due to void 1100       Abhishek had voided around 0100

## 2022-04-22 NOTE — PLAN OF CARE
Peggyann Schilder awoke, aox1, though following commands, R UE mitt removed       Problem: SAFETY,RESTRAINT: NV/NON-SELF DESTRUCTIVE BEHAVIOR  Goal: Remains free of harm/injury (restraint for non violent/non self-detsructive behavior)  Description: INTERVENTIONS:  - Instruct patient/family regarding restraint use   - Assess and monitor physiologic and psychological status   - Provide interventions and comfort measures to meet assessed patient needs   - Identify and implement measures to help patient regain control  - Assess readiness for release of restraint   Outcome: Completed  Goal: Returns to optimal restraint-free functioning  Description: INTERVENTIONS:  - Assess the patient's behavior and symptoms that indicate continued need for restraint  - Identify and implement measures to help patient regain control  - Assess readiness for release of restraint   Outcome: Completed

## 2022-04-23 ENCOUNTER — APPOINTMENT (INPATIENT)
Dept: RADIOLOGY | Facility: HOSPITAL | Age: 67
DRG: 641 | End: 2022-04-23
Payer: COMMERCIAL

## 2022-04-23 LAB
ANION GAP SERPL CALCULATED.3IONS-SCNC: 5 MMOL/L (ref 5–14)
BUN SERPL-MCNC: 13 MG/DL (ref 5–25)
CALCIUM SERPL-MCNC: 8.2 MG/DL (ref 8.4–10.2)
CHLORIDE SERPL-SCNC: 107 MMOL/L (ref 97–108)
CO2 SERPL-SCNC: 24 MMOL/L (ref 22–30)
CREAT SERPL-MCNC: 0.95 MG/DL (ref 0.7–1.5)
ERYTHROCYTE [DISTWIDTH] IN BLOOD BY AUTOMATED COUNT: 13.7 % (ref 11.6–15.1)
GFR SERPL CREATININE-BSD FRML MDRD: 83 ML/MIN/1.73SQ M
GLUCOSE SERPL-MCNC: 84 MG/DL (ref 70–99)
HCT VFR BLD AUTO: 36.7 % (ref 36.5–49.3)
HGB BLD-MCNC: 11.7 G/DL (ref 12–17)
MCH RBC QN AUTO: 28.2 PG (ref 26.8–34.3)
MCHC RBC AUTO-ENTMCNC: 31.9 G/DL (ref 31.4–37.4)
MCV RBC AUTO: 88 FL (ref 82–98)
PLATELET # BLD AUTO: 120 THOUSANDS/UL (ref 149–390)
PMV BLD AUTO: 11 FL (ref 8.9–12.7)
POTASSIUM SERPL-SCNC: 3.7 MMOL/L (ref 3.6–5)
RBC # BLD AUTO: 4.15 MILLION/UL (ref 3.88–5.62)
SODIUM SERPL-SCNC: 136 MMOL/L (ref 137–147)
WBC # BLD AUTO: 4.16 THOUSAND/UL (ref 4.31–10.16)

## 2022-04-23 PROCEDURE — 99239 HOSP IP/OBS DSCHRG MGMT >30: CPT | Performed by: EMERGENCY MEDICINE

## 2022-04-23 PROCEDURE — NC001 PR NO CHARGE: Performed by: EMERGENCY MEDICINE

## 2022-04-23 PROCEDURE — 80048 BASIC METABOLIC PNL TOTAL CA: CPT | Performed by: NURSE PRACTITIONER

## 2022-04-23 PROCEDURE — 71046 X-RAY EXAM CHEST 2 VIEWS: CPT

## 2022-04-23 PROCEDURE — 97167 OT EVAL HIGH COMPLEX 60 MIN: CPT

## 2022-04-23 PROCEDURE — 85027 COMPLETE CBC AUTOMATED: CPT | Performed by: NURSE PRACTITIONER

## 2022-04-23 PROCEDURE — 94664 DEMO&/EVAL PT USE INHALER: CPT

## 2022-04-23 RX ORDER — LORATADINE 10 MG/1
10 TABLET ORAL DAILY
Status: DISCONTINUED | OUTPATIENT
Start: 2022-04-23 | End: 2022-04-24 | Stop reason: HOSPADM

## 2022-04-23 RX ORDER — IPRATROPIUM BROMIDE AND ALBUTEROL SULFATE 2.5; .5 MG/3ML; MG/3ML
3 SOLUTION RESPIRATORY (INHALATION) EVERY 6 HOURS PRN
Status: DISCONTINUED | OUTPATIENT
Start: 2022-04-23 | End: 2022-04-24 | Stop reason: HOSPADM

## 2022-04-23 RX ADMIN — APIXABAN 5 MG: 5 TABLET, FILM COATED ORAL at 17:02

## 2022-04-23 RX ADMIN — UMECLIDINIUM 1 PUFF: 62.5 AEROSOL, POWDER ORAL at 10:12

## 2022-04-23 RX ADMIN — ATORVASTATIN CALCIUM 80 MG: 80 TABLET, FILM COATED ORAL at 09:04

## 2022-04-23 RX ADMIN — CARVEDILOL 25 MG: 12.5 TABLET, FILM COATED ORAL at 09:04

## 2022-04-23 RX ADMIN — SODIUM CHLORIDE 125 ML/HR: 0.9 INJECTION, SOLUTION INTRAVENOUS at 10:12

## 2022-04-23 RX ADMIN — PANTOPRAZOLE SODIUM 40 MG: 40 TABLET, DELAYED RELEASE ORAL at 05:37

## 2022-04-23 RX ADMIN — CARVEDILOL 25 MG: 12.5 TABLET, FILM COATED ORAL at 17:02

## 2022-04-23 RX ADMIN — ALBUTEROL SULFATE 2 PUFF: 90 AEROSOL, METERED RESPIRATORY (INHALATION) at 14:53

## 2022-04-23 RX ADMIN — THIAMINE HYDROCHLORIDE 500 MG: 100 INJECTION, SOLUTION INTRAMUSCULAR; INTRAVENOUS at 17:00

## 2022-04-23 RX ADMIN — DILTIAZEM HYDROCHLORIDE 120 MG: 120 CAPSULE, COATED, EXTENDED RELEASE ORAL at 09:04

## 2022-04-23 RX ADMIN — THIAMINE HYDROCHLORIDE 500 MG: 100 INJECTION, SOLUTION INTRAMUSCULAR; INTRAVENOUS at 09:07

## 2022-04-23 RX ADMIN — FENOFIBRATE 168 MG: 48 TABLET, FILM COATED ORAL at 09:04

## 2022-04-23 RX ADMIN — FOLIC ACID 1 MG: 5 INJECTION, SOLUTION INTRAMUSCULAR; INTRAVENOUS; SUBCUTANEOUS at 10:15

## 2022-04-23 RX ADMIN — LORATADINE 10 MG: 10 TABLET ORAL at 09:04

## 2022-04-23 RX ADMIN — THIAMINE HYDROCHLORIDE 500 MG: 100 INJECTION, SOLUTION INTRAMUSCULAR; INTRAVENOUS at 21:20

## 2022-04-23 RX ADMIN — ALBUTEROL SULFATE 2 PUFF: 90 AEROSOL, METERED RESPIRATORY (INHALATION) at 21:24

## 2022-04-23 RX ADMIN — APIXABAN 5 MG: 5 TABLET, FILM COATED ORAL at 09:04

## 2022-04-23 RX ADMIN — NICOTINE 1 PATCH: 21 PATCH, EXTENDED RELEASE TRANSDERMAL at 09:03

## 2022-04-23 RX ADMIN — SODIUM CHLORIDE 125 ML/HR: 0.9 INJECTION, SOLUTION INTRAVENOUS at 09:03

## 2022-04-23 RX ADMIN — ALBUTEROL SULFATE 2 PUFF: 90 AEROSOL, METERED RESPIRATORY (INHALATION) at 03:08

## 2022-04-23 RX ADMIN — SODIUM CHLORIDE 125 ML/HR: 0.9 INJECTION, SOLUTION INTRAVENOUS at 00:01

## 2022-04-23 NOTE — PLAN OF CARE
Problem: OCCUPATIONAL THERAPY ADULT  Goal: Performs self-care activities at highest level of function for planned discharge setting  See evaluation for individualized goals  Description: Treatment Interventions: ADL retraining,Cognitive reorientation,Continued evaluation          See flowsheet documentation for full assessment, interventions and recommendations  Note: Limitation: Decreased Safe judgement during ADL,Decreased endurance,Decreased high-level ADLs  Prognosis: Good  Assessment: Pt is a 73 y o  male seen for OT evaluation s/p admit to [address] [place] [address] on 4/21/2022 w/ Delirium tremens ([address] )  See above for extensive list of comorbidities affecting Pt's functional performance at time of assessment  Personal factors affecting Pt at time of IE include:difficulty performing IADLS  and health management   Upon evaluation: Pt requires supervision for transfers and ambulation with SPC  Pt able to manage clothing for toilet hygiene with supervision, no loss of balance noted  P on room air throughout with O2 sats ranging 92-94%  Completed formal cognitive assessment, see above for details  The following deficits impact occupational performance: weakness, decreased strength, decreased balance, decreased tolerance, impaired problem solving and decreased safety awareness  Pt to benefit from continued skilled OT services while in the hospital to address deficits as defined above and maximize level of functional independence w ADL's and functional mobility  Occupational performance areas to address include: bathing/shower, toilet hygiene, dressing, health maintenance, functional mobility and clothing management  From OT standpoint, recommendation at time of d/c would be home with social support  From OT standpoint, recommendation at time of d/c would be home  The patient's raw score on the AM-PAC Daily Activity inpatient short form low function score is 21, standardized score is 44 27   Patients with a standardized score greater than 39 4 are likely to benefit from discharge to home  Please refer to the recommendation of the Occupational Therapist for safe discharge planning       OT Discharge Recommendation: No rehabilitation needs

## 2022-04-23 NOTE — CASE MANAGEMENT
CM was informed by medical team that pt will need Home Health PT/OT upon discharge; CM placed referral via [address] and pt was accepted to [address] [place] [address] Road with the start of care on 4/28/2022  FAVIO faxed clinical information to [phone]

## 2022-04-23 NOTE — PROGRESS NOTES
PROGRESS NOTE  DEPARTMENT OF MEDICAL TOXICOLOGY  LEVEL 4 MEDICAL DETOX UNIT  Petra Samuels 73 y o  male MRN: [id number]  Unit/Bed#: 5T DETOX 172-53 Encounter: [id number]      Reason for Admission/Principal Problem: AUD, DT/Wernicke's  Rounding Provider: Siri Rodrigues MD  Attending Provider: Santa Bergeron MD   4/21/2022 12:31 PM           Alcohol use disorder, severe, dependence ([address] )  Assessment & Plan  · Family had reported patient consuming at least 4-8 drinks daily, though patient disputes this  · Treatment for withdrawal as above  · Case management consulted    Wernicke encephalopathy  Assessment & Plan  · Report of hallucinations, worsening mental status over the past month  · Ataxia present on exam, but improving per patient  · High probability of contribution from [address] encephalopathy/ thiamine deficiency  · Continue high dose thiamine  · MRI Brain showed prior right frontal and occipital lobe infarcts without acute findings    Hallucinations  Assessment & Plan  · Report of visual and auditory hallucinations over the past month, worsening in the last few days  · Has history of alcohol use disorder, concern for DT's and Wernicke encephalopathy with treatment as above  · No new findings on MRI, though prior recent CVA's could be contributing  · Per PT: Home with home health rehabilitation  · Pending OT eval, appreciate recommendations    Atrial fibrillation ([address] )  Assessment & Plan  · History of paroxysmal atrial fibrillation  · Currently in NSR on monitor  · Continue to monitor  · Continue home doses of Eliquis, BB, CCB    Stage 3a chronic kidney disease ([address] )  Assessment & Plan  · Presented to [facility] with PRITI, creatinine now back to baseline  · Continue to monitor    Benign essential hypertension  Assessment & Plan  · Continue home medication    Hx of CVA (cerebral vascular accident) ([address] )  Assessment & Plan  · Continue Eliquis  · MRI Brain re-demonstrates right frontal and occipital lobe infarcts without acute abnormality    GERD (gastroesophageal reflux disease)  Assessment & Plan  · Continue home medication    COPD (chronic obstructive pulmonary disease) ([facility])  Assessment & Plan  · No wheezing/ other evidence of acute exacerbation noted on exam  · Goal O2 sat of 90% or above  · Continue home medications    HLD (hyperlipidemia)  Assessment & Plan  · Continue home medications    * Delirium tremens ([address] )  Assessment & Plan  · Substantially improved this morning after treatment with phenobarbital and high dose thiamine  · Continue treatment for any further withdrawal per SEWS protocol  · Continue high dose thiamine, currently day 2 of high dose thiamine treatment  · Monitor closely    Metabolic acidosis, increased anion gap-resolved as of 4/21/2022  Assessment & Plan  · Likely secondary to AKA  · Continue high dose thiamine  · Will continue to monitor BMP - resolved, switched to NS fluids drom D5NS            VTE Pharmacologic Prophylaxis:   Pharmacologic: Apixaban (Eliquis)  Mechanical VTE Prophylaxis in Place: no    Code Status: Level 1 - Full Code    Patient Centered Rounds: I have performed bedside rounds with nursing staff today  Discussions with Specialists or Other Care Team Provider: No     Education and Discussions with Family / Patient: None    Time Spent for Care: 30 minutes  More than 50% of total time spent on counseling and coordination of care as described above  Current Length of Stay: 2 day(s)    Current Patient Status: Inpatient     Certification Statement: The patient will continue to require additional inpatient hospital stay due to ataxia Discharge Plan: within 24-48 hours for OT eval and completion of high dose thiamine        Subjective:   Patient states that he feels very well and much improved than prior before  Denies chest pain, shortness breath, poor p o  Intake, nausea, abdominal pain      Objective:     Clinical Opiate Withdrawal Scale  Pulse: 65    SEWS Total Score: 0 (4/23/2022 12:17 AM)        Last 24 Hours Medication List:   Current Facility-Administered Medications   Medication Dose Route Frequency Provider Last Rate    acetaminophen  650 mg Oral Q6H PRN Harmony Acosta MD      albuterol  2 puff Inhalation Q6H PRN Harmony Acosta MD      apixaban  5 mg Oral BID Harmony Acosta MD      atorvastatin  80 mg Oral Daily Harmony Acosta MD      carvedilol  25 mg Oral BID With Meals Harmony Acosta MD      diltiazem  120 mg Oral Daily Harmony Acosta MD      fenofibrate  168 mg Oral Daily Harmony Acosta MD      folic acid IVPB  1 mg Intravenous Daily Harmony Acosta MD 1 mg (22 1015)    ipratropium-albuterol  3 mL Nebulization Q6H PRN Obmaranda Avina PA-C      loratadine  10 mg Oral Daily SVEN Harris      nicotine  1 patch Transdermal Daily Harmony Acosta MD      ondansetron  4 mg Intravenous Q6H PRN Harmony Acosta MD      pantoprazole  40 mg Oral Early Morning Harmony Acosta MD      sodium chloride  125 mL/hr Intravenous Continuous Honey CarolinaGIANANP 125 mL/hr (22 1012)    thiamine  500 mg Intravenous TID Harmony Acosta  mg (22 9383)    umeclidinium bromide  1 puff Inhalation Daily Harmony Acosta MD           Vitals:   Temp (24hrs), Av 6 °F (36 4 °C), Min:97 1 °F (36 2 °C), Max:98 °F (36 7 °C)    Temp:  [97 1 °F (36 2 °C)-98 °F (36 7 °C)] 97 7 °F (36 5 °C)  HR:  [57-65] 65  Resp:  [18] 18  BP: (130-163)/(65-86) 131/68  SpO2:  [88 %-97 %] 93 %  Body mass index is 18 88 kg/m²  Input and Output Summary (last 24 hours): Intake/Output Summary (Last 24 hours) at 2022 1027  Last data filed at 2022 1015  Gross per 24 hour   Intake 580 ml   Output 100 ml   Net 480 ml       Physical Exam:   Physical Exam  Vitals and nursing note reviewed  Constitutional:       General: He is not in acute distress  Appearance: He is well-developed  He is not ill-appearing, toxic-appearing or diaphoretic     HENT:      Head: Normocephalic and atraumatic  Right Ear: External ear normal       Left Ear: External ear normal       Nose: Nose normal       Mouth/Throat:      Mouth: Mucous membranes are moist    Eyes:      General:         Right eye: No discharge  Left eye: No discharge  Extraocular Movements: Extraocular movements intact  Conjunctiva/sclera: Conjunctivae normal       Pupils: Pupils are equal, round, and reactive to light  Cardiovascular:      Rate and Rhythm: Normal rate and regular rhythm  Heart sounds: Normal heart sounds  No friction rub  No gallop  Pulmonary:      Effort: Pulmonary effort is normal  No respiratory distress  Breath sounds: Normal breath sounds  No stridor  No wheezing, rhonchi or rales  Chest:      Chest wall: No tenderness  Abdominal:      General: Bowel sounds are normal       Palpations: Abdomen is soft  Tenderness: There is no abdominal tenderness  There is no guarding or rebound  Musculoskeletal:         General: No tenderness or deformity  Normal range of motion  Cervical back: Normal range of motion and neck supple  Skin:     General: Skin is warm and dry  Capillary Refill: Capillary refill takes less than 2 seconds  Neurological:      Mental Status: He is alert and oriented to person, place, and time  GCS: GCS eye subscore is 4  GCS verbal subscore is 5  GCS motor subscore is 6  Motor: No tremor  Psychiatric:         Attention and Perception: Attention normal          Mood and Affect: Mood normal          Additional Data:     Labs:   Results from last 7 days   Lab Units 04/23/22  0422 04/21/22  0536 04/20/22  1651   WBC Thousand/uL 4 16*   < > 8 13   HEMOGLOBIN g/dL 11 7*   < > 15 7   HEMATOCRIT % 36 7   < > 46 9   PLATELETS Thousands/uL 120*   < > 200   NEUTROS PCT %  --   --  72   LYMPHS PCT %  --   --  17   MONOS PCT %  --   --  10   EOS PCT %  --   --  0    < > = values in this interval not displayed        Results from last 7 days   Lab Units 04/23/22  0422 04/22/22  0611 04/22/22  0611   SODIUM mmol/L 136*   < > 140   POTASSIUM mmol/L 3 7   < > 3 7   CHLORIDE mmol/L 107   < > 107   CO2 mmol/L 24   < > 26   BUN mg/dL 13   < > 18   CREATININE mg/dL 0 95   < > 0 89   ANION GAP mmol/L 5   < > 7   CALCIUM mg/dL 8 2*   < > 8 7   ALBUMIN g/dL  --   --  3 7   TOTAL BILIRUBIN mg/dL  --   --  0 92   ALK PHOS U/L  --   --  68   ALT U/L  --   --  25   AST U/L  --   --  41   GLUCOSE RANDOM mg/dL 84   < > 68*    < > = values in this interval not displayed  Results from last 7 days   Lab Units 04/21/22  0851   INR  1 22*                         * I Have Reviewed All Lab Data Listed Above  * Additional Pertinent Lab Tests Reviewed: Henri [facility] Admission Reviewed      Imaging Studies: I have personally reviewed pertinent reports  Recent Cultures (last 7 days): Today, Patient Was Seen By: Jose Miguel Patel MD    ** Please Note: Dictation voice to text software may have been used in the creation of this document   **

## 2022-04-23 NOTE — OCCUPATIONAL THERAPY NOTE
Occupational Therapy Cognitive Evaluation     Patient Name: Lala Ocampo  ZRDVM'Q Date: 4/23/2022  Problem List  Principal Problem:    Delirium tremens ([facility] )  Active Problems:    HLD (hyperlipidemia)    COPD (chronic obstructive pulmonary disease) (HCC)    GERD (gastroesophageal reflux disease)    Hx of CVA (cerebral vascular accident) ([facility] )    Benign essential hypertension    Stage 3a chronic kidney disease (HCC)    Atrial fibrillation (HCC)    Hallucinations    Wernicke encephalopathy    Alcohol use disorder, severe, dependence ([place] [id number] )    Past Medical History  Past Medical History:   Diagnosis Date    PRITI (acute kidney [facility]) ([facility] ) 9/5/2021    Anxiety     Back pain     Claustrophobia     [facility] obstructive [facility] disease) ([facility])     Dysphagia 9/5/2021    GERD (gastroesophageal reflux disease)     Hypertension     Hypokalemia 2/22/2022    Lumbar back pain     Panic attacks     Stenosis of right carotid artery     Stroke [facility])     Wears glasses     Wears partial dentures     upper denture     Past Surgical History  Past Surgical History:   Procedure Laterality Date    COLONOSCOPY      IR CEREBRAL ANGIOGRAPHY  4/6/2021    OTHER SURGICAL HISTORY      fertility    MD [address] 3RD+ ORD SLCTV ABDL PEL/LXTR [facility] [place] 4/6/2021    Procedure: ARTERIOGRAM, carotid Angio;  Surgeon: Apoorva Morocho MD;  Location: [facility];  Service: Vascular    MD THROMBOENDARTECTMY Zara [facility]ura Right 4/13/2021    Procedure: RIGHT ENDARTERECTOMY ARTERY CAROTID WITH BOVINE PATCH;  Surgeon: Apoorva Morocho MD;  Location: [facility];  Service: Vascular           04/23/22 1335   OT Last Visit   OT Visit Date 04/23/22   Note Type   Note type Evaluation   Restrictions/Precautions   Weight Bearing Precautions Per Order No   Pain Assessment   Pain Assessment Tool 0-10   Pain Score No Pain   Home Living   Type of Home Mobile home   Home Layout One level;Stairs to enter with rails   Bathroom Shower/Tub Walk-in shower   [address] [facility] [address] [facility]   Prior Function   Level of [place] Independent with ADLs and functional mobility   Lives With Alone   Receives Help From Family   ADL Assistance Independent   IADLs Independent   Comments Pt reports that he uses a cane when he leaves the home  Pt is independent with IADLs typically completing meal prep in the [facility]  Psychosocial   Psychosocial (WDL) WDL   ADL   Grooming Assistance 7  Independent   UB Bathing Assistance 7  Independent   LB Bathing Assistance 7  Independent   UB Dressing Assistance 7  Independent   LB Dressing Assistance 7  Independent   Toileting Assistance  5  Supervision/Setup   Transfers   Sit to Stand 5  Supervision   Stand to Sit 5  Supervision   Functional Mobility   Functional Mobility 5  Supervision   Additional Comments short distances   Additional items [facility]   Balance   Static Sitting Good   Dynamic Sitting Fair +   Static Standing Fair   Dynamic Standing Fair   Ambulatory Fair   Activity Tolerance   Activity Tolerance Patient tolerated treatment well   RUE Assessment   RUE Assessment WFL   LUE Assessment   LUE Assessment WFL   Hand Function   Gross Motor Coordination Functional   Fine Motor Coordination Functional   Sensation   Light Touch No apparent deficits   Proprioception   Proprioception No apparent deficits   Perception   Inattention/Neglect Appears intact   Cognition   Arousal/Participation Alert; Cooperative   Attention Within functional limits   Orientation Level Oriented X4   Memory Within functional limits   Following Commands Follows one step commands without difficulty   Cognition Assessment Tools ACLS   Score   (4 8)      4 8    Administered Geni Chinmay Cognitive Level Screen (ACLS)  The patient scored 4 8/6 0 indicating that they may live alone with daily assistance to monitor personal safety and check problem solving effectiveness  Behavior:  Asks for verification    Knows how 2 concurrent schedules fit together  Fits daily routine into schedule of others  Can avoid obvious hazards  More flexible and willing to adapt to new ideas  Insight into disability is fair  Processing is delayed  May be able to get to a regularly scheduled appointment without need for assistance  May frequently stop a task to examine objects  Seeks verification from others  Grooming:  Checks results of grooming, hair styling and make up application  Learns new grooming procedures slowly  May rigidly follow prescribed routines  Dressing: Considers all striking features of garments including color, pattern, condition and fit  May not attend to cleanliness or consider social standards  May don clothing slowly and check results in mirror  Bathing:  May coordinate bathing schedule with others  Attends to all features of bathing environment  May check results and vary rate  Reports low supplies to caregivers  Walking/Exercising:  Learns new routes over several days  Scans environment, reads street signs and attempts to use this information but may still get lost   Understands explanations of safety hazards  Learns an exercise program and does it without variation  Eating:  Eats and attends to social conversation though may not be able to talk and eat at the same time  Manages all utensils  Follows a meal time schedule  May be reminded to check food temperatures  Toileting:  Independently performs usual toileting routine  May not anticipate use of bathroom before trips  May learn bathroom etiquette like conserving paper  Medications: Follows new prescriptions slowly  Considers variables like time of day and amount but with difficulty  Follows verbal explanations  May follow a set schedule set up by others for renewing prescriptions  Utilize a daily pill box (set up by someone else)  Use of adaptive equipment:  Recognizes loss of strength, sensation and balance  May learn a series of new actions slowly  Housekeeping:  Initiates and completes a routine of housekeeping activities  Learns new procedures slowly  Notes all visible effects of cleaning and checking results  Corrects visible errors one at a time  May not anticipate supplies but reports low supplies to caregiver  May not identify potential hazards related to electric, gas, toxins, poisons and improper storage/disposal of items  Food preparation:  Does not plan for long term food needs  May learn to follow a plan for purchasing food items  May follow a weekly schedule for shopping  Memorizes a new sequence of actions to prepare dishes  Follows a written recipe in a rigid manner  Check stove after use  Spending Money:  Slowly follows budget or learns money management procedures  May need assistance to adjust to change in income or usual banking procedures  Shopping: Learns directions to new shopping areas  May learn to use a shopping list or use money saving procedures like coupons  May not read labels or consider whether it is practical or affordable  Laundry:  Completes laundry routine in a fixed manner  Memorizes procedures like using a new [employer]  May try to read new labels but needs assistance in interpreting them  May overload machine  Traveling:  Learns new routes or travel procedures slowly  May learn to use a wheelchair but has difficulty maneuvering or estimating space requirements  Does not anticipate hazards  May follow safety precautions pointed out by others  Telephone:  Learns to use a new telephone answering machine or pay phone slowly  Does not vary actions  May memorize correct times to call others  Driving:  Should NOT operate a motor vehicle        Assessment   Limitation Decreased Safe judgement during ADL;Decreased endurance;Decreased high-level ADLs   Prognosis Good   Assessment   Pt is a 73 y o  male seen for OT evaluation s/p admit to [address] detox on 4/21/2022 w/ Delirium tremens ([facility] )    See above for extensive list of comorbidities affecting Pt's functional performance at time of assessment  Personal factors affecting Pt at time of IE include:difficulty performing IADLS  and health management   Upon evaluation: Pt requires supervision for transfers and ambulation with SPC  Pt able to manage clothing for toilet hygiene with supervision, no loss of balance noted  P on room air throughout with O2 sats ranging 92-94%  Completed formal cognitive assessment, see above for details  The following deficits impact occupational performance: weakness, decreased strength, decreased balance, decreased tolerance, impaired problem solving and decreased safety awareness  Pt to benefit from continued skilled OT services while in the hospital to address deficits as defined above and maximize level of functional independence w ADL's and functional mobility  Occupational performance areas to address include: bathing/shower, toilet hygiene, dressing, health maintenance, functional mobility and clothing management  From OT standpoint, recommendation at time of d/c would be home with social support  From OT standpoint, recommendation at time of d/c would be home  The patient's raw score on the AM-PAC Daily Activity inpatient short form low function score is 21, standardized score is 44 27  Patients with a standardized score greater than 39 4 are likely to benefit from discharge to home  Please refer to the recommendation of the Occupational Therapist for safe discharge planning  Goals   STG Time Frame   (1 week)   Short Term Goals 1  Pt will complete functional ambulation Consuelo  2  Pt will complete LB dressing Consuelo  3  Pt will complete toilet hygiene Consuelo       Plan   Treatment Interventions ADL retraining;Cognitive reorientation;Continued evaluation   Goal Expiration Date 04/30/22   OT Frequency 1-2x/wk   Recommendation   OT Discharge Recommendation No rehabilitation needs   AM-PAC Daily Activity Inpatient   Lower Body Dressing 3   Bathing 3   Toileting 3   Upper Body Dressing 4   Grooming 4   Eating 4   Daily Activity Raw Score 21   Daily Activity Standardized Score (Calc for Raw Score >=11) 44 20

## 2022-04-23 NOTE — DISCHARGE SUMMARY
MEDICAL DETOX UNIT, LEVEL 4  Department of Medical Toxicology  Reason for Admission/Principal Problem:  Delirium tremens, EtOH use disorder  Admitting provider: MD Peter Mckeon MD   4/21/2022 12:31 PM       Discharging Physician / Practitioner: Corrine Monteiro MD  PCP: Víctor Chen DO  Admission Date:   Admission Orders (From admission, onward)     Ordered        04/21/22 1241  Inpatient Admission  Once                      Discharge Date: 04/24/22    Medical Problems             Resolved Problems  Date Reviewed: 4/21/2022          Resolved    Tobacco use 4/21/2022     Resolved by  Darrell Saunders MD    Metabolic acidosis, increased anion gap 4/21/2022     Resolved by  SVEN Chong                Alcohol use disorder, severe, dependence ([facility] )  Assessment & Plan  · Family had reported patient consuming at least 4-8 drinks daily, though patient disputes this  · Treatment for withdrawal complete  · Case management consulted -- outpatient resources only  Wernicke encephalopathy  Assessment & Plan  · Report of hallucinations, worsening mental status over the past month -- back to baseline mentation this AM   No hallucinations overnight    · Ataxia resolved  · Continue PO thiamine for presumed Wernicke's    Hallucinations  Assessment & Plan  · Report of visual and auditory hallucinations over the past month, worsening in the last few days  · Has history of alcohol use disorder, concern for DT's and Wernicke encephalopathy with treatment as above  · No new findings on MRI, though prior recent CVA's could be contributing  · Per PT: Home with home health rehabilitation  · Per OT: No rehabilitation needs    Atrial fibrillation ([facility] )  Assessment & Plan  · History of paroxysmal atrial fibrillation  · Currently in NSR on monitor  · Continue home doses of Eliquis, BB, CCB  · Outpatient follow up    Stage 3a chronic kidney disease ([facility] )  Assessment & Plan  · Presented to [facility] with PRITI, creatinine now back to baseline  · Outpatient follow up    Benign essential hypertension  Assessment & Plan  · Continue home medication    Hx of CVA (cerebral vascular accident) ([facility] )  Assessment & Plan  · Continue Eliquis  · MRI Brain re-demonstrates right frontal and occipital lobe infarcts without acute abnormality    GERD (gastroesophageal reflux disease)  Assessment & Plan  · Continue home medication    COPD (chronic obstructive pulmonary disease) ([facility] )  Assessment & Plan  · No wheezing/ other evidence of acute exacerbation noted on exam  · Goal O2 sat of 90% or above  · Continue home medications    HLD (hyperlipidemia)  Assessment & Plan  · Continue home medications    * Delirium tremens ([facility] )  Assessment & Plan  · Substantially improved after treatment with phenobarbital and high dose thiamine  · Continue treatment for any further withdrawal per [facility] protocol  · High dose thiamine 500 mg TID x 3 days completed  · Continue PO thiamine as outpatient    Metabolic acidosis, increased anion gap-resolved as of 4/21/2022  Assessment & Plan  · Resolved      Consultations During Hospital Stay:  · Case management  · Physical therapy    Procedures Performed:   · None    Significant Findings / Test Results:   · Metabolic acidosis increased anion gap - resolved    Incidental Findings:   · None     Test Results Pending at Discharge (will require follow up): · None     Outpatient Tests Requested:  · Home physical therapy    Complications:  None    Reason for Admission:  ETOH withdrawal, delirium tremens    Hospital Course:     Eladio Geiger is a 73 y o  male patient who originally presented [facility] on 4/21/2022 due to worsening mental status as well as about a month of visual and auditory hallucinations  Patient had endorsed last consumption of alcohol 4 days prior in patient's sister had endorse the patient was drinking approximately 48 alcoholic beverages daily    After evaluation and exam and [facility] emergency department patient was subsequently transferred to medical detox unit for medical management and monitoring of delirium tremens  During admission patient was exhibiting further decline in mental status with visual disturbances, agitation, and attempting to strike at staff  He was placed on SEWS protocol for treatment of alcohol withdrawal with phenobarbital and high-dose thiamine was initiated due to high probability Wernickes encephalopathy  The following morning patient appeared awake and alert and oriented  SEWS protocol was continued and he received a total of 845 mg a phenobarbital before SEWS protocol was discontinued  Symptoms of alcohol withdrawal were continuously monitored and patient did not exhibit any further symptoms  Physical therapy consulted with regard to ambulatory dysfunction and recommended patient be discharged to home with home physical therapy  Please see above list of diagnoses and related plan for additional information  Condition at Discharge: good     Discharge Day Visit / Exam:     Subjective:  Patient has no complaints and is eager to leave  Vitals: Blood Pressure: (!) 172/90 (04/24/22 0841)  Pulse: 94 (04/24/22 0748)  Temperature: 97 6 °F (36 4 °C) (04/24/22 0748)  Temp Source: Temporal (04/24/22 0748)  Respirations: 16 (04/24/22 0748)  Height: 5' 9" (175 3 cm) (04/21/22 1237)  Weight - Scale: 58 kg (127 lb 13 9 oz) (04/22/22 1512)  SpO2: 96 % (04/24/22 0748)    Exam:   Physical Exam  Vitals and nursing note reviewed  Constitutional:       General: He is not in acute distress  Appearance: Normal appearance  He is well-developed  He is not ill-appearing, toxic-appearing or diaphoretic  HENT:      Head: Normocephalic and atraumatic  Right Ear: External ear normal       Left Ear: External ear normal       Nose: Nose normal       Mouth/Throat:      Mouth: Mucous membranes are moist    Eyes:      General:         Right eye: No discharge           Left eye: No discharge  Extraocular Movements: Extraocular movements intact  Conjunctiva/sclera: Conjunctivae normal       Pupils: Pupils are equal, round, and reactive to light  Cardiovascular:      Rate and Rhythm: Normal rate and regular rhythm  Heart sounds: Normal heart sounds  No friction rub  No gallop  Pulmonary:      Effort: Pulmonary effort is normal  No respiratory distress  Breath sounds: Normal breath sounds  No stridor  No wheezing, rhonchi or rales  Chest:      Chest wall: No tenderness  Abdominal:      General: Bowel sounds are normal       Palpations: Abdomen is soft  Tenderness: There is no abdominal tenderness  Musculoskeletal:         General: No tenderness or deformity  Normal range of motion  Cervical back: Normal range of motion and neck supple  Right lower leg: No edema  Left lower leg: No edema  Skin:     General: Skin is warm and dry  Capillary Refill: Capillary refill takes less than 2 seconds  Neurological:      Mental Status: He is alert and oriented to person, place, and time  GCS: GCS eye subscore is 4  GCS verbal subscore is 5  GCS motor subscore is 6  Motor: No weakness or tremor  Gait: Gait normal    Psychiatric:         Mood and Affect: Mood normal        Discussion with Family:  Discussed with patient now with family    Discharge instructions/Information to patient and family:   See after visit summary for information provided to patient and family  Provisions for Follow-Up Care:  See after visit summary for information related to follow-up care and any pertinent home health orders  Disposition:     Home    For Discharges to [facility]:   · Not Applicable to this Patient - Not Applicable to this Patient    Planned Readmission: NA     Discharge Statement:  I spent 35 minutes discharging the patient  This time was spent on the day of discharge   I had direct contact with the patient on the day of discharge  Greater than 50% of the total time was spent examining patient, answering all patient questions, arranging and discussing plan of care with patient as well as directly providing post-discharge instructions  Additional time then spent on discharge activities  Discharge Medications:  See after visit summary for reconciled discharge medications provided to patient and family        ** Please Note: This note has been constructed using a voice recognition system **

## 2022-04-23 NOTE — RESPIRATORY THERAPY NOTE
RT Protocol Note  Misty Carrero 73 y o  male MRN: [id number]  Unit/Bed#: 5T DETOX 504-01 Encounter: [id number]    Assessment    Principal Problem:    Delirium tremens ([place] [address] )  Active Problems:    HLD (hyperlipidemia)    COPD (chronic obstructive pulmonary disease) (HCC)    GERD (gastroesophageal reflux disease)    Hx of CVA (cerebral vascular accident) ([place] [address] )    Benign essential hypertension    Stage 3a chronic kidney disease ([place] [address] )    Atrial fibrillation (HCC)    Hallucinations    Wernicke encephalopathy    Alcohol use disorder, severe, dependence ([place] [address] )      Home Pulmonary Medications:  Albuterol prn       Past Medical History:   Diagnosis Date    PRITI (acute kidney injury) ([place] [address] ) 9/5/2021    Anxiety     Back pain     Claustrophobia     COPD (chronic obstructive pulmonary disease) ([place] [address] )     Dysphagia 9/5/2021    GERD (gastroesophageal reflux disease)     Hypertension     Hypokalemia 2/22/2022    Lumbar back pain     Panic attacks     Stenosis of right carotid artery     Stroke ([place] [address] )     Wears glasses     Wears partial dentures     upper denture     Social History     Socioeconomic History    Marital status: /Civil Union     Spouse name: None    Number of children: None    Years of education: None    Highest education level: None   Occupational History    None   Tobacco Use    Smoking status: Light Tobacco Smoker     Packs/day: 0 25     Years: 47 00     Pack years: 11 75     Start date: 1/1/1975    Smokeless tobacco: Never Used    Tobacco comment: also wears nicotine patches   Vaping Use    Vaping Use: Never used   Substance and Sexual Activity    Alcohol use:  Yes     Alcohol/week: 3 0 standard drinks     Types: 3 Glasses of wine per week     Comment: only drinks 2-3 glasses of wine daily    Drug use: Never     Comment: never    Sexual activity: Not Currently     Partners: Female     Birth control/protection: Abstinence   Other Topics Concern    None   Social History Narrative    None Social Determinants of Health     Financial Resource Strain: Not on file   Food Insecurity: Not on file   Transportation Needs: Not on file   Physical Activity: Not on file   Stress: Not on file   Social Connections: Not on file   Intimate Partner Violence: Not on file   Housing Stability: Not on file       Subjective         Objective    Physical Exam:   Assessment Type: (P) Assess only  General Appearance: (P) Alert,Awake  Respiratory Pattern: (P) Normal  Chest Assessment: (P) Chest expansion symmetrical  Bilateral Breath Sounds: (P) Diminished,Coarse  Cough: (P) Non-productive,Strong,Moist    Vitals:  Blood pressure 131/68, pulse 65, temperature 97 7 °F (36 5 °C), temperature source Temporal, resp  rate 18, height 5' 9" (1 753 m), weight 58 kg (127 lb 13 9 oz), SpO2 93 %  Imaging and other studies: I have personally reviewed pertinent reports  Plan    Respiratory Plan: (P) Home Bronchodilator Patient pathway      Pt states breathing is at baseline  States cough is normal  Seems well versed on home regimen and understands inhalers  Pt declined neb tx at this time  Understand to call for inhaler when needed  All home meds ordered

## 2022-04-24 VITALS
HEIGHT: 69 IN | DIASTOLIC BLOOD PRESSURE: 90 MMHG | BODY MASS INDEX: 18.94 KG/M2 | WEIGHT: 127.87 LBS | TEMPERATURE: 97.6 F | OXYGEN SATURATION: 96 % | HEART RATE: 94 BPM | RESPIRATION RATE: 16 BRPM | SYSTOLIC BLOOD PRESSURE: 172 MMHG

## 2022-04-24 LAB
ANION GAP SERPL CALCULATED.3IONS-SCNC: 4 MMOL/L (ref 5–14)
BUN SERPL-MCNC: 11 MG/DL (ref 5–25)
CALCIUM SERPL-MCNC: 8.3 MG/DL (ref 8.4–10.2)
CHLORIDE SERPL-SCNC: 106 MMOL/L (ref 97–108)
CO2 SERPL-SCNC: 26 MMOL/L (ref 22–30)
CREAT SERPL-MCNC: 1 MG/DL (ref 0.7–1.5)
ERYTHROCYTE [DISTWIDTH] IN BLOOD BY AUTOMATED COUNT: 13.7 % (ref 11.6–15.1)
GFR SERPL CREATININE-BSD FRML MDRD: 78 ML/MIN/1.73SQ M
GLUCOSE SERPL-MCNC: 83 MG/DL (ref 70–99)
HCT VFR BLD AUTO: 36.1 % (ref 36.5–49.3)
HGB BLD-MCNC: 11.7 G/DL (ref 12–17)
MCH RBC QN AUTO: 28.8 PG (ref 26.8–34.3)
MCHC RBC AUTO-ENTMCNC: 32.4 G/DL (ref 31.4–37.4)
MCV RBC AUTO: 89 FL (ref 82–98)
PLATELET # BLD AUTO: 113 THOUSANDS/UL (ref 149–390)
PMV BLD AUTO: 11.1 FL (ref 8.9–12.7)
POTASSIUM SERPL-SCNC: 3.4 MMOL/L (ref 3.6–5)
RBC # BLD AUTO: 4.06 MILLION/UL (ref 3.88–5.62)
SODIUM SERPL-SCNC: 136 MMOL/L (ref 137–147)
WBC # BLD AUTO: 3.65 THOUSAND/UL (ref 4.31–10.16)

## 2022-04-24 PROCEDURE — 85027 COMPLETE CBC AUTOMATED: CPT

## 2022-04-24 PROCEDURE — 80048 BASIC METABOLIC PNL TOTAL CA: CPT

## 2022-04-24 RX ORDER — LANOLIN ALCOHOL/MO/W.PET/CERES
100 CREAM (GRAM) TOPICAL 3 TIMES DAILY
Status: DISCONTINUED | OUTPATIENT
Start: 2022-04-24 | End: 2022-04-24 | Stop reason: HOSPADM

## 2022-04-24 RX ADMIN — ACETAMINOPHEN 650 MG: 325 TABLET ORAL at 05:44

## 2022-04-24 RX ADMIN — NICOTINE 1 PATCH: 21 PATCH, EXTENDED RELEASE TRANSDERMAL at 08:02

## 2022-04-24 RX ADMIN — THIAMINE HYDROCHLORIDE 500 MG: 100 INJECTION, SOLUTION INTRAMUSCULAR; INTRAVENOUS at 08:12

## 2022-04-24 RX ADMIN — ATORVASTATIN CALCIUM 80 MG: 80 TABLET, FILM COATED ORAL at 08:03

## 2022-04-24 RX ADMIN — FOLIC ACID 1 MG: 5 INJECTION, SOLUTION INTRAMUSCULAR; INTRAVENOUS; SUBCUTANEOUS at 08:02

## 2022-04-24 RX ADMIN — FENOFIBRATE 168 MG: 48 TABLET, FILM COATED ORAL at 08:03

## 2022-04-24 RX ADMIN — DILTIAZEM HYDROCHLORIDE 120 MG: 120 CAPSULE, COATED, EXTENDED RELEASE ORAL at 08:03

## 2022-04-24 RX ADMIN — APIXABAN 5 MG: 5 TABLET, FILM COATED ORAL at 08:02

## 2022-04-24 RX ADMIN — LORATADINE 10 MG: 10 TABLET ORAL at 08:03

## 2022-04-24 RX ADMIN — PANTOPRAZOLE SODIUM 40 MG: 40 TABLET, DELAYED RELEASE ORAL at 05:39

## 2022-04-24 RX ADMIN — CARVEDILOL 25 MG: 12.5 TABLET, FILM COATED ORAL at 08:02

## 2022-04-24 RX ADMIN — UMECLIDINIUM 1 PUFF: 62.5 AEROSOL, POWDER ORAL at 08:03

## 2022-04-24 RX ADMIN — SODIUM CHLORIDE 125 ML/HR: 0.9 INJECTION, SOLUTION INTRAVENOUS at 03:08

## 2022-04-24 NOTE — ASSESSMENT & PLAN NOTE
· Family had reported patient consuming at least 4-8 drinks daily, though patient disputes this  · Treatment for withdrawal complete  · Case management consulted -- outpatient resources only

## 2022-04-24 NOTE — ASSESSMENT & PLAN NOTE
· Report of hallucinations, worsening mental status over the past month -- back to baseline mentation this AM   No hallucinations overnight    · Ataxia resolved  · Continue PO thiamine for presumed Wernicke's

## 2022-04-24 NOTE — ASSESSMENT & PLAN NOTE
· Report of visual and auditory hallucinations over the past month, worsening in the last few days  · Has history of alcohol use disorder, concern for DT's and Wernicke encephalopathy with treatment as above  · No new findings on MRI, though prior recent CVA's could be contributing  · Per PT: Home with home health rehabilitation  · Per OT: No rehabilitation needs

## 2022-04-24 NOTE — DISCHARGE INSTRUCTIONS
How to Stop Smoking   AMBULATORY CARE:   You will improve your health and the health of others around you  if you stop smoking  Your risk for heart and lung disease, cancer, stroke, heart attack, and vision problems will also decrease  Your adolescent can help prevent or stop harm to his or her brain or body  This will help him or her become a healthy adult  You can benefit from quitting no matter how long you have smoked  Prepare to stop smoking:  Nicotine is a highly addictive drug found in cigarettes  Withdrawal symptoms can happen when you stop smoking and make it hard to quit  These include anxiety, depression, irritability, trouble sleeping, and increased appetite  You increase your chances of success if you prepare to quit  · Set a quit date  Fazal Goodson a date that is within the next 2 weeks  Do not pick a day that you think may be stressful or busy  Write down the day or [place] it on your calender  · Tell friends and family that you plan to quit  Explain that you may have withdrawal symptoms when you try to quit  Ask them to support you  They may be able to encourage you and help reduce your stress to make it easier for you to quit  · Make a list of your reasons for quitting  Put the list somewhere you will see it every day, such as your refrigerator  You can look at the list when you have a craving  · Remove all tobacco and nicotine products from your home, car, and workplace  Also, remove anything else that will tempt you to smoke, such as lighters, matches, or ashtrays  Clean your car, home, and places at work that smell like smoke  The smell of smoke can trigger a craving  · Identify triggers that make you want to smoke  This may include activities, feelings, or people  Also write down 1 way you can deal with each of your triggers  For example, if you want to smoke as soon as you wake up, plan another activity during this time, such as exercise  · Make a plan for how you will quit  Learn about the tools that can help you quit, such as medicine, counseling, or nicotine replacement therapy  Choose at least 2 options to help you quit  · Help your adolescent make a plan to quit  The plan will be more successful if your adolescent makes his or her own decisions  Do not try to pressure him or her to quit immediately or in a certain way  Be supportive and offer help if needed  Tools to help you stop smoking:   · Counseling  from a trained healthcare provider can provide you with support and skills to quit smoking  The provider will also teach you to manage your withdrawal symptoms and cravings  You may receive counseling from one counselor, in group therapy, or through phone therapy called a quit line  · Nicotine replacement therapy (NRT)  such as nicotine patches, gum, or lozenges may help reduce your nicotine cravings  You may get these without a doctor's order  Do not use e-cigarettes or smokeless tobacco in place of cigarettes or to help you quit  They still contain nicotine  · Prescription medicines  such as nasal sprays or nicotine inhalers may help reduce your withdrawal symptoms  Other medicines may also be used to reduce your urge to smoke  Ask your healthcare provider about these medicines  You may need to start certain medicines 2 weeks before your quit date for them to work well  · Hypnosis  is a practice that helps guide you through thoughts and feelings  Hypnosis may help decrease your cravings and make you more willing to quit  · Acupuncture therapy  uses very thin needles to balance energy channels in the body  This is thought to help decrease cravings and symptoms of nicotine withdrawal     · Support groups  let you talk to others who are trying to quit or have already quit  It may be helpful to speak with others about how they quit  Manage your cravings:   · Avoid situations, people, and places that tempt you to smoke    Go to nonsmoking places, such as libraries or restaurants  Understand what tempts you and try to avoid these things  · Keep your hands busy  Hold things such as a stress ball or pen  · Put candy or toothpicks in your mouth  Keep lollipops, sugarless gum, or toothpicks with you at all times  · Do not have alcohol or caffeine  These drinks may tempt you to smoke  Drink healthy liquids such as water or juice instead  · Reward yourself when you resist your cravings  Rewards will motivate you and help you stay positive  · Do an activity that distracts you from your craving  Examples include cleaning, creating art, or gardening  Prevent weight gain after you quit:  You may gain a few pounds after you quit smoking  It is healthier for you to gain a few pounds than to continue to smoke  The following can help you prevent weight gain:  · Eat a variety of healthy foods  Healthy foods include fruits, vegetables, whole-grain breads, low-fat dairy products, beans, lean meats, and fish  Eat healthy snacks, such as low-fat yogurt, if you get hungry between meals  · Drink water before, during, and between meals  This will make your stomach feel full and help prevent you from overeating  Ask your healthcare provider how much liquid to drink each day and which liquids are best for you  · Be physically active  Activity may help reduce your cravings and reduce stress  Take a walk or do some kind of physical activity every day  Ask your healthcare provider which activities are right for you  For support and more information:   · [employer]  [address] [facility],[address]  [address]  Phone: [facility]  Phone: 4- 905 - 620-7964  Web Address: Dre ngo    · Smokefree  gov  Phone: 9- 100 - 869-6492  Web Address: www smokefree    [address] 2022 Information is for End User's use only and may not be sold, redistributed or otherwise used for commercial purposes   All illustrations and images included in CareNotes® are the copyrighted property of A D A M , Inc  or Bill Germain   The above information is an  only  It is not intended as medical advice for individual conditions or treatments  Talk to your doctor, nurse or pharmacist before following any medical regimen to see if it is safe and effective for you

## 2022-04-24 NOTE — ASSESSMENT & PLAN NOTE
· Substantially improved after treatment with phenobarbital and high dose thiamine  · Continue treatment for any further withdrawal per SEWS protocol  · High dose thiamine 500 mg TID x 3 days completed  · Continue PO thiamine as outpatient

## 2022-04-24 NOTE — ASSESSMENT & PLAN NOTE
· History of paroxysmal atrial fibrillation  · Currently in NSR on monitor  · Continue home doses of Eliquis, BB, CCB  · Outpatient follow up

## 2022-04-24 NOTE — CASE MANAGEMENT
Case Management Discharge Planning Note    [facility]  Location 5T DETOX 504/5T DETOX 50* MRN [id number]  : 1955 Date 2022       Current Admission Date: 2022  Current Admission Diagnosis:Delirium tremens [facility])   Patient Active Problem List    Diagnosis Date Noted    Delirium tremens ([place] [address] ) 2022    Wernicke encephalopathy 2022    Alcohol use disorder, severe, dependence ([place] [address] ) 2022    Atrial fibrillation ([place] [address] ) 2022    Hallucinations 2022    Memory changes 2022    Seizure ([place] [address] ) 2022    Cholelithiasis 2022    Acute Cholecystitis 2022    Hypomagnesemia 2022    PAF (paroxysmal atrial fibrillation) ([place] [address] ) 2022    S/P carotid endarterectomy 11/10/2021    Upper GI bleed 09/15/2021    Acute blood loss anemia 09/15/2021    Ambulatory dysfunction 09/15/2021    Syncope 09/15/2021    PRITI (acute kidney injury) ([place] [address] ) 2021    Pre-diabetes 2021    Stage 3a chronic kidney disease ([place] [address] ) 2021    Hx of CVA (cerebral vascular accident) ([place] [address] ) 2021    Stenosis of right internal carotid artery     Lumbar back pain     GERD (gastroesophageal reflux disease)     Left arm weakness 2021    Alcohol abuse 2021    HLD (hyperlipidemia) 2021    COPD (chronic obstructive pulmonary disease) ([place] [address] ) 2021    Iron deficiency anemia 2021    Cerebrovascular accident (CVA) due to embolism of right middle cerebral artery ([place] [address] ) 2021    Symptomatic carotid artery stenosis, right 2021    Benign essential hypertension 02/15/2011      LOS (days): 3  Geometric Mean LOS (GMLOS) (days):   Days to GMLOS:     OBJECTIVE:  Risk of Unplanned Readmission Score: 25         Current admission status: Inpatient   [facility] Pharmacy:   [facility] [address] [place] [address] [address] [facility]  [address] [place] [address]  Phone: [phone] Fax: [phone] [address] [place], [place] - [place] [address] [place] [address] [address] [address]  Phone: [phone] Fax: [phone] [place] [address]  [address] [place] [address]  Phone: [phone] Fax: [phone]    Primary Care Provider: Javier Garay DO    Primary Insurance: [facility]  Secondary Insurance:     DISCHARGE DETAILS: CM was informed by medical team that pt will be discharged today 4/24/2022  CM met with pt to review discharge plan; pt continues to decline assistance in setting up any substance use treatment  Pt is agreeable to home health care services; CM informed pt of referral that was placed and start of care date as 4/28/2022  CM informed pt that contact information for Mitzi Mcgarry is on pt's AVS  CM faxed pt's discharge paperwork to [address] [place] [address] and informed their staff of pt's discharge  CM encouraged pt to follow up with his PCP which he was agreeable to  Pt stated that his sister, Zafar Krishnan will be providing transportation home; CM attempted to make contact with Rea Chinos  CM left VM informing her of pt's discharge and requested a call back if she has any further questions regarding pt's discharge plan  Pt verified his pharmacy as [facility] in [place]  Pt signed discharge IMM and was provided copy  Pt will be discharged home with [facility] follow up       Discharge planning discussed with[de-identified] Patient  Freedom of Choice: Yes                   Contacts  Patient Contacts: Zafar Quiñonezerlin (CM left VM for pt's sister)  Relationship to Patient[de-identified] Family  Contact Method: Phone  Phone Number: [phone]  Reason/Outcome: Emergency [address] requested[de-identified] Physical [address] Name[de-identified]  ([address] [place] [address])  [address] Provider[de-identified] PCP  Home Health Services Needed[de-identified] Strengthening/Theraputic Exercises to Improve Function,Gait/ADL Training  Oxygen LPM Ordered (if applicable based on home health services needed)::  (None needed)  Homebound Criteria Met[de-identified]  (N/a)  Supporting Clincal Findings[de-identified]  (N/a)         Other Referral/Resources/Interventions Provided:  Referrals Provided[de-identified] Crisis Hotline,IOP,Peer Specialist,Support Group (inpatient substance use treatment)  Other: Clothing,Transportation    Would you like to participate in our [address] service program?  : No - Declined                                        IMM Given (Date):: 04/24/22  IMM Given to[de-identified] Patient  Family notified[de-identified] Attempted to notify pt's sister of discharge

## 2022-04-25 NOTE — UTILIZATION REVIEW
Notification of Discharge   This is a Notification of Discharge from our facility [address]  Please be advised that this patient has been discharge from our facility  Below you will find the admission and discharge date and time including the patients disposition  UTILIZATION REVIEW CONTACT:  Vidhi Winston MA  Utilization   Network Utilization Review Department  Phone: [phone] x carefully listen to the prompts  All voicemails are confidential   Email: [email or web address]     PHYSICIAN ADVISORY SERVICES:  FOR WYEJ-WC-KOPI REVIEW - MEDICAL NECESSITY DENIAL  Phone: [phone]  Fax: [phone]  Email: [email or web address]     PRESENTATION DATE: 4/21/2022 12:31 PM  OBERVATION ADMISSION DATE:   INPATIENT ADMISSION DATE: 4/21/22 12:31 PM   DISCHARGE DATE: 4/24/2022 10:08 AM  DISPOSITION: Home/Self Care Home/Self Care      IMPORTANT INFORMATION:  Send all requests for admission clinical reviews, approved or denied determinations and any other requests to dedicated fax number below belonging to the campus where the patient is receiving treatment   List of dedicated fax numbers:  [address] DENIALS (Administrative/Medical Necessity) [phone]   [address] (Maternity/NICU/Pediatrics) [phone]   [facility] [phone]   130 [facility] [phone]   [address] [place] [address] [phone]   [address] [phone]   [facility]  [phone]   2205 [facility]  2401 [facility] 1000 [address] [facility] [phone]

## 2022-04-27 NOTE — UTILIZATION REVIEW
Notification of Discharge   This is a Notification of Discharge from our facility [address]  Please be advised that this patient has been discharge from our facility  Below you will find the admission and discharge date and time including the patients disposition  UTILIZATION REVIEW CONTACT:  Coco Palmer MA  Utilization   Network Utilization Review Department  Phone: [phone] x carefully listen to the prompts  All voicemails are confidential   Email: [email or web address]     PHYSICIAN ADVISORY SERVICES:  FOR FBPE-KW-PIZY REVIEW - MEDICAL NECESSITY DENIAL  Phone: [phone]  Fax: [phone]  Email: [email or web address]     PRESENTATION DATE: 4/20/2022  2:36 PM  OBERVATION ADMISSION DATE:   INPATIENT ADMISSION DATE: 4/20/22  6:14 PM   DISCHARGE DATE: 4/21/2022 11:40 AM  DISPOSITION: Home with [place] with [address] INFORMATION:  Send all requests for admission clinical reviews, approved or denied determinations and any other requests to dedicated fax number below belonging to the campus where the patient is receiving treatment   List of dedicated fax numbers:  [address] [place] [address] DENIALS (Administrative/Medical Necessity) [phone]   [address] [facility] (Maternity/NICU/Pediatrics) [phone]   [facility] [phone]   [address] [place] [address] [phone]   [address] [place] [address] [phone]   [address] [place] [address] [address] [place] [address],4Th Floor [address] [phone]   [facility]  [phone]   [address] [place] [address]  2401 [facility] 1000 [address] [facility] [phone]

## 2022-04-29 NOTE — PROGRESS NOTES
· The patient having metabolic encephalopathy evidenced by hallucinations, tremulousness, agitation, altered mental status, most likely because of alcohol withdrawal related delirium, treated by bezodiazepines, continuous monitoring, rechecking CIWA scores and ultimately transferring the patient to the toxicology unit

## 2022-05-04 LAB — VIT B1 BLD-SCNC: 142.5 NMOL/L (ref 66.5–200)

## 2022-05-14 ENCOUNTER — HOSPITAL ENCOUNTER (EMERGENCY)
Facility: HOSPITAL | Age: 67
Discharge: HOME | End: 2022-05-15
Attending: EMERGENCY MEDICINE
Payer: COMMERCIAL

## 2022-05-14 ENCOUNTER — APPOINTMENT (EMERGENCY)
Dept: CT IMAGING | Facility: HOSPITAL | Age: 67
End: 2022-05-14
Payer: COMMERCIAL

## 2022-05-14 DIAGNOSIS — K80.20 GALLSTONE: ICD-10-CM

## 2022-05-14 DIAGNOSIS — R10.10 PAIN OF UPPER ABDOMEN: Primary | ICD-10-CM

## 2022-05-14 PROCEDURE — 80053 COMPREHEN METABOLIC PANEL: CPT | Performed by: EMERGENCY MEDICINE

## 2022-05-14 PROCEDURE — 83605 ASSAY OF LACTIC ACID: CPT | Performed by: EMERGENCY MEDICINE

## 2022-05-14 PROCEDURE — 85730 THROMBOPLASTIN TIME PARTIAL: CPT | Performed by: EMERGENCY MEDICINE

## 2022-05-14 PROCEDURE — 99285 EMERGENCY DEPT VISIT HI MDM: CPT | Performed by: EMERGENCY MEDICINE

## 2022-05-14 PROCEDURE — 36415 COLL VENOUS BLD VENIPUNCTURE: CPT | Performed by: EMERGENCY MEDICINE

## 2022-05-14 PROCEDURE — 85025 COMPLETE CBC W/AUTO DIFF WBC: CPT | Performed by: EMERGENCY MEDICINE

## 2022-05-14 PROCEDURE — 83690 ASSAY OF LIPASE: CPT | Performed by: EMERGENCY MEDICINE

## 2022-05-14 PROCEDURE — 85610 PROTHROMBIN TIME: CPT | Performed by: EMERGENCY MEDICINE

## 2022-05-14 PROCEDURE — 82077 ASSAY SPEC XCP UR&BREATH IA: CPT | Performed by: EMERGENCY MEDICINE

## 2022-05-14 PROCEDURE — 99285 EMERGENCY DEPT VISIT HI MDM: CPT

## 2022-05-14 RX ORDER — ONDANSETRON 2 MG/ML
4 INJECTION INTRAMUSCULAR; INTRAVENOUS ONCE
Status: COMPLETED | OUTPATIENT
Start: 2022-05-14 | End: 2022-05-15

## 2022-05-14 RX ORDER — SODIUM CHLORIDE 9 MG/ML
125 INJECTION, SOLUTION INTRAVENOUS CONTINUOUS
Status: DISCONTINUED | OUTPATIENT
Start: 2022-05-14 | End: 2022-05-15 | Stop reason: HOSPADM

## 2022-05-14 RX ORDER — HYDROMORPHONE HCL/PF 1 MG/ML
0.5 SYRINGE (ML) INJECTION ONCE
Status: COMPLETED | OUTPATIENT
Start: 2022-05-14 | End: 2022-05-15

## 2022-05-15 ENCOUNTER — APPOINTMENT (EMERGENCY)
Dept: CT IMAGING | Facility: HOSPITAL | Age: 67
End: 2022-05-15
Payer: COMMERCIAL

## 2022-05-15 ENCOUNTER — APPOINTMENT (EMERGENCY)
Dept: RADIOLOGY | Facility: HOSPITAL | Age: 67
End: 2022-05-15
Payer: COMMERCIAL

## 2022-05-15 VITALS
SYSTOLIC BLOOD PRESSURE: 172 MMHG | TEMPERATURE: 97.9 F | OXYGEN SATURATION: 98 % | DIASTOLIC BLOOD PRESSURE: 82 MMHG | HEART RATE: 58 BPM | RESPIRATION RATE: 18 BRPM

## 2022-05-15 LAB
ALBUMIN SERPL BCP-MCNC: 4.3 G/DL (ref 3.5–5)
ALP SERPL-CCNC: 68 U/L (ref 34–104)
ALT SERPL W P-5'-P-CCNC: 9 U/L (ref 7–52)
ANION GAP SERPL CALCULATED.3IONS-SCNC: 10 MMOL/L (ref 4–13)
APTT PPP: 41 SECONDS (ref 23–37)
AST SERPL W P-5'-P-CCNC: 25 U/L (ref 13–39)
ATRIAL RATE: 56 BPM
BACTERIA UR QL AUTO: NORMAL /HPF
BASOPHILS # BLD AUTO: 0.06 THOUSANDS/ΜL (ref 0–0.1)
BASOPHILS NFR BLD AUTO: 1 % (ref 0–1)
BILIRUB SERPL-MCNC: 0.47 MG/DL (ref 0.2–1)
BILIRUB UR QL STRIP: NEGATIVE
BUN SERPL-MCNC: 17 MG/DL (ref 5–25)
CALCIUM SERPL-MCNC: 9 MG/DL (ref 8.4–10.2)
CHLORIDE SERPL-SCNC: 101 MMOL/L (ref 96–108)
CLARITY UR: CLEAR
CO2 SERPL-SCNC: 26 MMOL/L (ref 21–32)
COLOR UR: ABNORMAL
CREAT SERPL-MCNC: 0.93 MG/DL (ref 0.6–1.3)
EOSINOPHIL # BLD AUTO: 0.14 THOUSAND/ΜL (ref 0–0.61)
EOSINOPHIL NFR BLD AUTO: 1 % (ref 0–6)
ERYTHROCYTE [DISTWIDTH] IN BLOOD BY AUTOMATED COUNT: 12.9 % (ref 11.6–15.1)
ETHANOL SERPL-MCNC: <10 MG/DL
GFR SERPL CREATININE-BSD FRML MDRD: 85 ML/MIN/1.73SQ M
GLUCOSE SERPL-MCNC: 98 MG/DL (ref 65–140)
GLUCOSE UR STRIP-MCNC: NEGATIVE MG/DL
HCT VFR BLD AUTO: 46.4 % (ref 36.5–49.3)
HGB BLD-MCNC: 15.6 G/DL (ref 12–17)
HGB UR QL STRIP.AUTO: ABNORMAL
IMM GRANULOCYTES # BLD AUTO: 0.04 THOUSAND/UL (ref 0–0.2)
IMM GRANULOCYTES NFR BLD AUTO: 0 % (ref 0–2)
INR PPP: 1.07 (ref 0.84–1.19)
KETONES UR STRIP-MCNC: NEGATIVE MG/DL
LACTATE SERPL-SCNC: 0.7 MMOL/L (ref 0.5–2)
LEUKOCYTE ESTERASE UR QL STRIP: NEGATIVE
LIPASE SERPL-CCNC: 56 U/L (ref 11–82)
LYMPHOCYTES # BLD AUTO: 1.23 THOUSANDS/ΜL (ref 0.6–4.47)
LYMPHOCYTES NFR BLD AUTO: 12 % (ref 14–44)
MCH RBC QN AUTO: 28.1 PG (ref 26.8–34.3)
MCHC RBC AUTO-ENTMCNC: 33.6 G/DL (ref 31.4–37.4)
MCV RBC AUTO: 84 FL (ref 82–98)
MONOCYTES # BLD AUTO: 0.52 THOUSAND/ΜL (ref 0.17–1.22)
MONOCYTES NFR BLD AUTO: 5 % (ref 4–12)
NEUTROPHILS # BLD AUTO: 7.99 THOUSANDS/ΜL (ref 1.85–7.62)
NEUTS SEG NFR BLD AUTO: 81 % (ref 43–75)
NITRITE UR QL STRIP: NEGATIVE
NON-SQ EPI CELLS URNS QL MICRO: NORMAL /HPF
NRBC BLD AUTO-RTO: 0 /100 WBCS
P AXIS: 57 DEGREES
PH UR STRIP.AUTO: 8 [PH]
PLATELET # BLD AUTO: 156 THOUSANDS/UL (ref 149–390)
PMV BLD AUTO: 11.3 FL (ref 8.9–12.7)
POTASSIUM SERPL-SCNC: 4.8 MMOL/L (ref 3.5–5.3)
PR INTERVAL: 138 MS
PROT SERPL-MCNC: 7.4 G/DL (ref 6.4–8.4)
PROT UR STRIP-MCNC: ABNORMAL MG/DL
PROTHROMBIN TIME: 13.9 SECONDS (ref 11.6–14.5)
QRS AXIS: 65 DEGREES
QRSD INTERVAL: 76 MS
QT INTERVAL: 446 MS
QTC INTERVAL: 430 MS
RBC # BLD AUTO: 5.55 MILLION/UL (ref 3.88–5.62)
RBC #/AREA URNS AUTO: NORMAL /HPF
SODIUM SERPL-SCNC: 137 MMOL/L (ref 135–147)
SP GR UR STRIP.AUTO: 1.02 (ref 1–1.03)
T WAVE AXIS: 67 DEGREES
UROBILINOGEN UR QL STRIP.AUTO: 0.2 E.U./DL
VENTRICULAR RATE: 56 BPM
WBC # BLD AUTO: 9.98 THOUSAND/UL (ref 4.31–10.16)
WBC #/AREA URNS AUTO: NORMAL /HPF

## 2022-05-15 PROCEDURE — 93005 ELECTROCARDIOGRAM TRACING: CPT

## 2022-05-15 PROCEDURE — 71045 X-RAY EXAM CHEST 1 VIEW: CPT

## 2022-05-15 PROCEDURE — 74176 CT ABD & PELVIS W/O CONTRAST: CPT

## 2022-05-15 PROCEDURE — 96375 TX/PRO/DX INJ NEW DRUG ADDON: CPT

## 2022-05-15 PROCEDURE — 96361 HYDRATE IV INFUSION ADD-ON: CPT

## 2022-05-15 PROCEDURE — 81001 URINALYSIS AUTO W/SCOPE: CPT | Performed by: EMERGENCY MEDICINE

## 2022-05-15 PROCEDURE — 96374 THER/PROPH/DIAG INJ IV PUSH: CPT

## 2022-05-15 PROCEDURE — G1004 CDSM NDSC: HCPCS

## 2022-05-15 PROCEDURE — 93010 ELECTROCARDIOGRAM REPORT: CPT | Performed by: INTERNAL MEDICINE

## 2022-05-15 RX ORDER — OXYCODONE HYDROCHLORIDE 5 MG/1
5 TABLET ORAL EVERY 4 HOURS PRN
Qty: 28 TABLET | Refills: 0 | Status: SHIPPED | OUTPATIENT
Start: 2022-05-15 | End: 2022-05-22

## 2022-05-15 RX ADMIN — SODIUM CHLORIDE 500 ML: 0.9 INJECTION, SOLUTION INTRAVENOUS at 00:15

## 2022-05-15 RX ADMIN — HYDROMORPHONE HYDROCHLORIDE 0.5 MG: 1 INJECTION, SOLUTION INTRAMUSCULAR; INTRAVENOUS; SUBCUTANEOUS at 00:15

## 2022-05-15 RX ADMIN — ONDANSETRON 4 MG: 2 INJECTION INTRAMUSCULAR; INTRAVENOUS at 00:15

## 2022-05-15 RX ADMIN — SODIUM CHLORIDE 125 ML/HR: 0.9 INJECTION, SOLUTION INTRAVENOUS at 00:15

## 2022-05-15 NOTE — ED PROVIDER NOTES
History  Chief Complaint   Patient presents with    Abdominal Pain     Patient reporting mid abd pain that began aprox   States pain feels similar to when he was diagnosed w/ gallstones  Patient states he has appointment in 2 days to see PCP about gallbladder  Hx HTN  Denies any N/V/D     Patient is a 73year old male with constant worsening upper abdominal pain since  tonight  No N/V/D  No fever  No urinary sx  No abdominal surgery  Is supposed to have ccy soon  No GI bleeding  Was last seen in this ED on 22 for hallucinations  [place] [facility] website checked on this patient and last Rx filled was on 22 for tylenol #3 for 2 day supply  History provided by:  Patient and EMS personnel   used: No    Abdominal Pain  Associated symptoms: no diarrhea, no fever, no nausea and no vomiting        Prior to Admission Medications   Prescriptions Last Dose Informant Patient Reported? Taking?    Stiolto Respimat 2 5-2 5 MCG/ACT inhaler   Yes No   aclidinium (Tudorza Pressair) 400 MCG/ACT inhaler  Self Yes No   Sig: Inhale 1 puff 2 (two) times a day     albuterol (PROVENTIL HFA,VENTOLIN HFA) 90 mcg/act inhaler  Self Yes No   Si puff every 6 (six) hours as needed     amLODIPine-benazepril (LOTREL 5-10) 5-10 MG per capsule   Yes No   apixaban (ELIQUIS) 5 mg  Self No No   Sig: Take 1 tablet (5 mg total) by mouth 2 (two) times a day   atorvastatin (LIPITOR) 80 mg tablet   No No   Sig: Take 1 tablet (80 mg total) by mouth daily   carvedilol (COREG) 25 mg tablet   No No   Sig: Take 1 tablet (25 mg total) by mouth 2 (two) times a day with meals   diltiazem (CARDIZEM CD) 120 mg 24 hr capsule   No No   Sig: Take 1 capsule (120 mg total) by mouth daily   fenofibrate 160 MG tablet  Self Yes No   Sig: Take 160 mg by mouth daily   ferrous sulfate 324 (65 Fe) mg  Self No No   Sig: Take 1 tablet (324 mg total) by mouth daily before breakfast   loratadine (CLARITIN) 10 mg tablet  Self No No   Sig: Take 1 tablet (10 mg total) by mouth daily   nicotine (NICODERM CQ) 14 mg/24hr TD 24 hr patch  Self No No   Sig: Place 1 patch on the skin daily   omeprazole (PriLOSEC) 20 mg delayed release capsule  Self Yes No   Sig: Take 20 mg by mouth daily     sertraline (ZOLOFT) 100 mg tablet  Self Yes No   Sig: Take 100 mg by mouth daily        Facility-Administered Medications: None       Past Medical History:   Diagnosis Date    PRITI (acute kidney injury) ([place] [id number] ) 9/5/2021    Anxiety     Back pain     Claustrophobia     COPD (chronic obstructive pulmonary disease) ([place] [address] )     Dysphagia 9/5/2021    GERD (gastroesophageal reflux disease)     Hypertension     Hypokalemia 2/22/2022    Lumbar back pain     Panic attacks     Stenosis of right carotid artery     Stroke [facility])     Wears glasses     Wears partial dentures     upper denture       Past Surgical History:   Procedure Laterality Date    COLONOSCOPY      IR CEREBRAL ANGIOGRAPHY  4/6/2021    OTHER SURGICAL HISTORY      fertility    [place] Merlin Gather 3RD+ ORD SLCTV ABDL PEL/LXTR [facility] Right 4/6/2021    Procedure: ARTERIOGRAM, carotid Angio;  Surgeon: Haritha Dickey MD;  Location: [facility] OR;  Service: Vascular    [place] THROMBOENDARTECTMY Tenzin Barros Right 4/13/2021    Procedure: RIGHT ENDARTERECTOMY ARTERY CAROTID WITH BOVINE PATCH;  Surgeon: Haritha Dickey MD;  Location:  MAIN OR;  Service: Vascular       History reviewed  No pertinent family history  I have reviewed and agree with the history as documented      E-Cigarette/Vaping    E-Cigarette Use Never User      E-Cigarette/Vaping Substances    Nicotine No     THC No     CBD No     Flavoring No     Other No     Unknown No      Social History     Tobacco Use    Smoking status: Light Tobacco Smoker     Packs/day: 0 25     Years: 47 00     Pack years: 11 75     Start date: 1/1/1975    Smokeless tobacco: Never Used    Tobacco comment: also wears nicotine patches   Vaping Use    Vaping Use: Never used Substance Use Topics    Alcohol use: Not Currently     Alcohol/week: 3 0 standard drinks     Types: 3 Glasses of wine per week     Comment: only drinks 2-3 glasses of wine daily    Drug use: Never     Comment: never       Review of Systems   Constitutional: Negative for fever  Gastrointestinal: Positive for abdominal pain  Negative for blood in stool, diarrhea, nausea and vomiting  Genitourinary: Negative for difficulty urinating  All other systems reviewed and are negative  Physical Exam  Physical Exam  Vitals and nursing note reviewed  Constitutional:       General: He is in acute distress (moderate)  HENT:      Head: Normocephalic and atraumatic  Mouth/Throat:      Comments: Mask in place  Eyes:      General: No scleral icterus  Cardiovascular:      Rate and Rhythm: Normal rate and regular rhythm  Heart sounds: Normal heart sounds  No murmur heard  Pulmonary:      Effort: Pulmonary effort is normal  No respiratory distress  Breath sounds: Normal breath sounds  Abdominal:      General: Bowel sounds are normal  There is no distension  Palpations: Abdomen is soft  Tenderness: There is abdominal tenderness (epigastric and RUQ)  There is no guarding or rebound  Musculoskeletal:         General: No deformity  Cervical back: Normal range of motion and neck supple  Right lower leg: No edema  Left lower leg: No edema  Skin:     General: Skin is warm and dry  Coloration: Skin is not jaundiced  Neurological:      General: No focal deficit present  Mental Status: He is alert and oriented to person, place, and time     Psychiatric:         Mood and Affect: Mood normal          Vital Signs  ED Triage Vitals   Temperature Pulse Respirations Blood Pressure SpO2   05/14/22 2316 05/14/22 2314 05/14/22 2314 05/14/22 2314 05/14/22 2314   97 9 °F (36 6 °C) 63 18 (!) 214/93 98 %      Temp Source Heart Rate Source Patient Position - Orthostatic VS BP Location FiO2 (%)   05/14/22 2316 05/14/22 2314 05/14/22 2314 05/14/22 2314 --   Oral Monitor Lying Right arm       Pain Score       05/14/22 2314       5           Vitals:    05/14/22 2314 05/15/22 0138 05/15/22 0215   BP: (!) 214/93 (!) 180/86 (!) 172/82   Pulse: 63 57 58   Patient Position - Orthostatic VS: Lying Lying          Visual Acuity      ED Medications  Medications   sodium chloride 0 9 % infusion (125 mL/hr Intravenous New Bag 5/15/22 0015)   sodium chloride 0 9 % bolus 500 mL (0 mL Intravenous Stopped 5/15/22 0115)   ondansetron (ZOFRAN) injection 4 mg (4 mg Intravenous Given 5/15/22 0015)   HYDROmorphone (DILAUDID) injection 0 5 mg (0 5 mg Intravenous Given 5/15/22 0015)   barium (READI-CAT 2) suspension 900 mL (900 mL Oral Given 5/15/22 0024)       Diagnostic Studies  Results Reviewed     Procedure Component Value Units Date/Time    Urine Microscopic [[id number]]  (Normal) Collected: 05/15/22 0003    Lab Status: Final result Specimen: Urine, Clean Catch Updated: 05/15/22 0116     RBC, UA 0-1 /hpf      WBC, UA None Seen /hpf      Epithelial Cells None Seen /hpf      Bacteria, UA None Seen /hpf     Lactic acid [[id number]]  (Normal) Collected: 05/14/22 2345    Lab Status: Final result Specimen: Blood from Arm, Right Updated: 05/15/22 0048     LACTIC ACID 0 7 mmol/L     Narrative:      Result may be elevated if tourniquet was used during collection      Lipase [[id number]]  (Normal) Collected: 05/14/22 2345    Lab Status: Final result Specimen: Blood from Arm, Right Updated: 05/15/22 0043     Lipase 56 u/L     Comprehensive metabolic panel [[id number]] Collected: 05/14/22 2345    Lab Status: Final result Specimen: Blood from Arm, Right Updated: 05/15/22 0043     Sodium 137 mmol/L      Potassium 4 8 mmol/L      Chloride 101 mmol/L      CO2 26 mmol/L      ANION GAP 10 mmol/L      BUN 17 mg/dL      Creatinine 0 93 mg/dL      Glucose 98 mg/dL      Calcium 9 0 mg/dL      AST 25 U/L      ALT 9 U/L      Alkaline Phosphatase 68 U/L      Total Protein 7 4 g/dL      Albumin 4 3 g/dL      Total Bilirubin 0 47 mg/dL      eGFR 85 ml/min/1 73sq m     Narrative:      National Kidney Disease Foundation guidelines for Chronic Kidney Disease (CKD):     Stage 1 with normal or high GFR (GFR > 90 mL/min/1 73 square meters)    Stage 2 Mild CKD (GFR = 60-89 mL/min/1 73 square meters)    Stage 3A Moderate CKD (GFR = 45-59 mL/min/1 73 square meters)    Stage 3B Moderate CKD (GFR = 30-44 mL/min/1 73 square meters)    Stage 4 Severe CKD (GFR = 15-29 mL/min/1 73 square meters)    Stage 5 End Stage CKD (GFR <15 mL/min/1 73 square meters)  Note: GFR calculation is accurate only with a steady state creatinine    Ethanol [[id number]]  (Normal) Collected: 05/14/22 2345    Lab Status: Final result Specimen: Blood from Arm, Right Updated: 05/15/22 0043     Ethanol Lvl <10 mg/dL     Protime-INR [[id number]]  (Normal) Collected: 05/14/22 2345    Lab Status: Final result Specimen: Blood from Arm, Right Updated: 05/15/22 0037     Protime 13 9 seconds      INR 1 07    APTT [[id number]]  (Abnormal) Collected: 05/14/22 2345    Lab Status: Final result Specimen: Blood from Arm, Right Updated: 05/15/22 0037     PTT 41 seconds     CBC and differential [[id number]]  (Abnormal) Collected: 05/14/22 2345    Lab Status: Final result Specimen: Blood from Arm, Right Updated: 05/15/22 0022     WBC 9 98 Thousand/uL      RBC 5 55 Million/uL      Hemoglobin 15 6 g/dL      Hematocrit 46 4 %      MCV 84 fL      MCH 28 1 pg      MCHC 33 6 g/dL      RDW 12 9 %      MPV 11 3 fL      Platelets 297 Thousands/uL      nRBC 0 /100 WBCs      Neutrophils Relative 81 %      Immat GRANS % 0 %      Lymphocytes Relative 12 %      Monocytes Relative 5 %      Eosinophils Relative 1 %      Basophils Relative 1 %      Neutrophils Absolute 7 99 Thousands/µL      Immature Grans Absolute 0 04 Thousand/uL      Lymphocytes Absolute 1 23 Thousands/µL      Monocytes Absolute 0 52 Thousand/µL      Eosinophils Absolute 0 14 Thousand/µL      Basophils Absolute 0 06 Thousands/µL     UA w Reflex to Microscopic w Reflex to Culture [[id number]]  (Abnormal) Collected: 05/15/22 0003    Lab Status: Final result Specimen: Urine, Clean Catch Updated: 05/15/22 0016     Color, UA Light Yellow     Clarity, UA Clear     Specific [place], UA 1 020     pH, UA 8 0     Leukocytes, UA Negative     Nitrite, UA Negative     Protein,  (2+) mg/dl      Glucose, UA Negative mg/dl      Ketones, UA Negative mg/dl      Urobilinogen, UA 0 2 E U /dl      Bilirubin, UA Negative     Blood, UA Trace-Intact                 CT abdomen pelvis wo contrast   ED Interpretation by Reuben Orta MD (05/15 0234)   Enteric contrast was administered       FINDINGS:     ABDOMEN     LOWER CHEST:  No clinically significant abnormality identified in the visualized lower chest      LIVER/BILIARY TREE:  Unremarkable      GALLBLADDER:  There are gallstone(s) within the gallbladder, without pericholecystic inflammatory changes      SPLEEN:  Unremarkable      PANCREAS:  Unremarkable      ADRENAL GLANDS:  Unremarkable      KIDNEYS/URETERS:  Mild fullness within the left collecting system  Left perinephric fat stranding  Minimal punctate nonobstructing calculi within the left renal pelvis        STOMACH AND BOWEL:  There is colonic diverticulosis without evidence of acute diverticulitis      APPENDIX:  No findings to suggest appendicitis      ABDOMINOPELVIC CAVITY:  No ascites  No pneumoperitoneum  No lymphadenopathy      VESSELS:  Unremarkable for patient's age      PELVIS     REPRODUCTIVE ORGANS:  Unremarkable for patient's age      URINARY BLADDER:  Unremarkable      ABDOMINAL WALL/INGUINAL REGIONS:  Un   remarkable      OSSEOUS STRUCTURES:  No acute fracture or destructive osseous lesion      IMPRESSION:     Mild fullness in the left collecting system with surrounding fat stranding may reflect an ascending ureteritis infection   Alternatively finding may represent a recently passed stone            Workstation performed: [id number]      Final Result by Naif Hummel MD (05/15 0231)      Mild fullness in the left collecting system with surrounding fat stranding may reflect an ascending ureteritis infection  Alternatively finding may represent a recently passed stone  Workstation performed: [id number]         XR chest 1 view portable   ED Interpretation by Saurabh Mercado MD (05/15 0124)   No acute disease read by me  Procedures  ECG 12 Lead Documentation Only    Date/Time: 5/15/2022 12:35 AM  Performed by: Saurabh Mercado MD  Authorized by: Saurabh Mercado MD     Indications / Diagnosis:  Abdominal pain  ECG reviewed by me, the ED Provider: yes    Patient location:  ED  Previous ECG:     Previous ECG:  Compared to current    Comparison ECG info:  4/20/22    Similarity:  Changes noted (not rapid a  fib now)  Rate:     ECG rate:  56    ECG rate assessment: bradycardic    Rhythm:     Rhythm: sinus bradycardia    Ectopy:     Ectopy: none    QRS:     QRS axis:  Normal    QRS intervals:  Normal  Conduction:     Conduction: normal    ST segments:     ST segments:  Normal  T waves:     T waves: normal    Comments:      Minimal voltage criteria for LVH, may be normal variant  ED Course  ED Course as of 05/15/22 0242   Sat May 14, 2022   2324 Per Radiology, there is a national shortage of IV contrast, all CT scans are to be ordered without contrast at this time unless otherwise indicated by radiologist for emergent situations including stroke alerts, rule out aortic dissection, trauma alerts, or high suspicion for pulmonary embolism  Will order CT without contrast at this time  Sun May 15, 2022   0124 Labs, EKG, CXR d/w patient  Pain improved  0236 CT d/w patient  Nontender abdomen prior to discharge  I doubt ureteritis since no UTI and no elevated WBC and no fever                                  SBIRT 20yo+ Flowsheet Row Most Recent Value   SBIRT (25 yo +)    In order to provide better care to our patients, we are screening all of our patients for alcohol and drug use  Would it be okay to ask you these screening questions? Yes Filed at: 05/14/2022 2314   Initial Alcohol Screen: US AUDIT-C     1  How often do you have a drink containing alcohol? 2 Filed at: 05/14/2022 2314   2  How many drinks containing alcohol do you have on a typical day you are drinking? 1 Filed at: 05/14/2022 2314   3a  Male UNDER 65: How often do you have five or more drinks on one occasion? 0 Filed at: 05/14/2022 2314   3b  FEMALE Any Age, or MALE 65+: How often do you have 4 or more drinks on one occassion? 0 Filed at: 05/14/2022 2314   Audit-C Score 3 Filed at: 05/14/2022 2314   JAQUELIN: How many times in the past year have you    Used an illegal drug or used a prescription medication for non-medical reasons? Never Filed at: 05/14/2022 2314                    MDM  Number of Diagnoses or Management Options  Diagnosis management comments: DDx including but not limited to: appendicitis, gastroenteritis, gastritis, PUD, GERD, gastroparesis, hepatitis, pancreatitis, colitis, enteritis, diverticulitis, food poisoning, epiploic appendagitis, mesenteric adenitis, mesenteric panniculitis, mesenteric ischemia, IBD, IBS, ileus, bowel obstruction, volvulus, internal hernia, cholecystitis, biliary colic, choledocholithiasis, perforated viscus, tumor, splenic etiology, constipation, AAA, renal colic, pyelonephritis, UTI; doubt cardiac etiology          Amount and/or Complexity of Data Reviewed  Clinical lab tests: ordered and reviewed  Tests in the radiology section of CPT®: ordered and reviewed  Decide to obtain previous medical records or to obtain history from someone other than the patient: yes  Obtain history from someone other than the patient: yes  Review and summarize past medical records: yes  Independent visualization of images, tracings, or specimens: yes        Disposition  Final diagnoses:   Pain of upper abdomen   Gallstone     Time reflects when diagnosis was documented in both MDM as applicable and the Disposition within this note     Time User Action Codes Description Comment    5/15/2022  2:34 AM Andrés Ramirez Add [R10 10] Pain of upper abdomen     5/15/2022  2:34 AM Andrés Ramirez Add [K80 20] Gallstone       ED Disposition     ED Disposition   Discharge    Condition   Stable    Date/Time   Sun May 15, 2022  2:39 AM    Comment   Toula Leisure discharge to home/self care  Follow-up Information     Follow up With Specialties Details Why Contact Info    Albin Smith DO Family Medicine Call in 2 days return sooner if increased pain, fever, vomiting, diarrhea, difficulty breathing or urinating  No driving with oxycodone  [facility]vette[place] [phone] Lexi Lee DO General Surgery Call in 2 days  [address]  [phone]            Patient's Medications   Discharge Prescriptions    OXYCODONE (ROXICODONE) 5 IMMEDIATE RELEASE TABLET    Take 1 tablet (5 mg total) by mouth every 4 (four) hours as needed for moderate pain for up to 7 days Max Daily Amount: 30 mg       Start Date: 5/15/2022 End Date: 5/22/2022       Order Dose: 5 mg       Quantity: 28 tablet    Refills: 0       No discharge procedures on file      PDMP Review       Value Time User    PDMP Reviewed  Yes 5/14/2022 11:14 PM Meeta Ibarra MD          ED Provider  Electronically Signed by           Meeta Ibarra MD  05/15/22 7916

## 2022-05-18 ENCOUNTER — OFFICE VISIT (OUTPATIENT)
Dept: SURGERY | Facility: CLINIC | Age: 67
End: 2022-05-18
Payer: COMMERCIAL

## 2022-05-18 ENCOUNTER — PREP FOR PROCEDURE (OUTPATIENT)
Dept: SURGERY | Facility: CLINIC | Age: 67
End: 2022-05-18

## 2022-05-18 VITALS
HEART RATE: 70 BPM | HEIGHT: 69 IN | SYSTOLIC BLOOD PRESSURE: 128 MMHG | WEIGHT: 140 LBS | BODY MASS INDEX: 20.73 KG/M2 | DIASTOLIC BLOOD PRESSURE: 73 MMHG

## 2022-05-18 DIAGNOSIS — J44.9 COPD (CHRONIC OBSTRUCTIVE PULMONARY DISEASE) (HCC): Chronic | ICD-10-CM

## 2022-05-18 DIAGNOSIS — K80.10 CHRONIC CALCULOUS CHOLECYSTITIS: Primary | ICD-10-CM

## 2022-05-18 DIAGNOSIS — K80.10 CALCULOUS CHOLECYSTITIS: Primary | ICD-10-CM

## 2022-05-18 DIAGNOSIS — F10.10 ALCOHOL ABUSE: Chronic | ICD-10-CM

## 2022-05-18 PROCEDURE — 99214 OFFICE O/P EST MOD 30 MIN: CPT | Performed by: SURGERY

## 2022-05-18 RX ORDER — LORAZEPAM 1 MG/1
TABLET ORAL
COMMUNITY
Start: 2022-03-18 | End: 2022-06-23 | Stop reason: ALTCHOICE

## 2022-05-18 RX ORDER — ACETAMINOPHEN AND CODEINE PHOSPHATE 300; 30 MG/1; MG/1
TABLET ORAL
COMMUNITY
Start: 2022-04-06 | End: 2022-06-23 | Stop reason: ALTCHOICE

## 2022-05-18 RX ORDER — CHLORHEXIDINE GLUCONATE 0.12 MG/ML
RINSE ORAL
COMMUNITY
Start: 2022-04-06 | End: 2022-06-23 | Stop reason: ALTCHOICE

## 2022-05-18 RX ORDER — AZITHROMYCIN 250 MG/1
TABLET, FILM COATED ORAL
COMMUNITY
Start: 2022-04-06 | End: 2022-06-23 | Stop reason: ALTCHOICE

## 2022-05-18 RX ORDER — BUSPIRONE HYDROCHLORIDE 15 MG/1
15 TABLET ORAL 2 TIMES DAILY
COMMUNITY
Start: 2022-04-27

## 2022-05-18 NOTE — PROGRESS NOTES
Assessment/Plan:    Diagnoses and all orders for this visit:    Chronic calculous cholecystitis    COPD (chronic obstructive pulmonary disease) (HCC)    Alcohol abuse    Risks and benefits of a laparoscopic leatha cyst the were discussed with him including bile duct injury, bowel injury, need for open surgery  I did explain that he has increased risk given underlying call abuse and COPD  He will need to hold his Eliquis for 48 hours prior to surgery  Subjective:      Patient ID: Roni Quinones is a 73 y o  male  Patient presents for gallbladder consult  States he has had episodes of epigastric pain after eating for one month  CT abdomen pelvis 5/14/2022: GALLBLADDER:  There are gallstone(s) within the gallbladder, without pericholecystic inflammatory changes  Admitted in March with choledocholithiasis and biliary sepsis  Underwent ERCP and extraction of stones  He was found to be in acute alcohol withdrawal   He did not return to our office  Some days ago he went to the emergency room with similar epigastric pain  He thought this was another episode of his gallbladder  Imaging as well as laboratory studies revealed no signs of acute cholecystitis  He admits to drinking a few glasses of wine a day  Still uses tobacco on a daily basis  History of a CVA a year ago requiring him to be on Eliquis  CMP and INR were normal during his recent ED evaluation    Abdominal Pain  The current episode started more than 1 month ago  The problem occurs intermittently  The problem has been unchanged  The pain is located in the epigastric region  The quality of the pain is sharp  The abdominal pain does not radiate  Associated symptoms include flatus, headaches and nausea  Pertinent negatives include no belching  The pain is aggravated by eating  The pain is relieved by nothing  Prior diagnostic workup includes CT scan  His past medical history is significant for gallstones         The following portions of the patient's history were reviewed and updated as appropriate:     He  has a past medical history of PRITI (acute kidney injury) ([facility] [address] ) (9/5/2021), Anxiety, Back pain, Claustrophobia, [facility] Dysphagia (9/5/2021), GERD (gastroesophageal reflux disease), Hypertension, Hypokalemia (2/22/2022), Lumbar back pain, Panic attacks, Stenosis of right carotid artery, Stroke [facility]), Wears glasses, and Wears partial dentures  He  has a past surgical history that includes Other surgical history; Colonoscopy; pr slctv cathj 3rd+ ord slctv abdl pel/lxtr brnch (Right, 4/6/2021); IR cerebral angiography (4/6/2021); and pr thromboendartectmy neck,neck incis (Right, 4/13/2021)  His family history is not on file  He  reports that he has been smoking  He started smoking about 47 years ago  He has a 11 75 pack-year smoking history  He has never used smokeless tobacco  He reports previous alcohol use of about 3 0 standard drinks of alcohol per week  He reports that he does not use drugs    Current Outpatient Medications   Medication Sig Dispense Refill    acetaminophen-codeine (TYLENOL #3) 300-30 mg per tablet       aclidinium (Tudorza Pressair) 400 MCG/ACT inhaler Inhale 1 puff 2 (two) times a day        albuterol (PROVENTIL HFA,VENTOLIN HFA) 90 mcg/act inhaler 1 puff every 6 (six) hours as needed        amLODIPine-benazepril (LOTREL 5-10) 5-10 MG per capsule       apixaban (ELIQUIS) 5 mg Take 1 tablet (5 mg total) by mouth 2 (two) times a day 60 tablet 0    atorvastatin (LIPITOR) 80 mg tablet Take 1 tablet (80 mg total) by mouth daily 30 tablet 0    azithromycin (ZITHROMAX) 250 mg tablet       busPIRone (BUSPAR) 15 mg tablet       carvedilol (COREG) 25 mg tablet Take 1 tablet (25 mg total) by mouth 2 (two) times a day with meals 60 tablet 0    chlorhexidine (PERIDEX) 0 12 % solution       diltiazem (CARDIZEM CD) 120 mg 24 hr capsule Take 1 capsule (120 mg total) by mouth daily 30 capsule 0    fenofibrate 160 MG tablet Take 160 mg by mouth daily      ferrous sulfate 324 (65 Fe) mg Take 1 tablet (324 mg total) by mouth daily before breakfast 30 tablet 0    loratadine (CLARITIN) 10 mg tablet Take 1 tablet (10 mg total) by mouth daily 30 tablet 0    LORazepam (ATIVAN) 1 mg tablet       nicotine (NICODERM CQ) 14 mg/24hr TD 24 hr patch Place 1 patch on the skin daily 28 patch 0    omeprazole (PriLOSEC) 20 mg delayed release capsule Take 20 mg by mouth daily        oxyCODONE (ROXICODONE) 5 immediate release tablet Take 1 tablet (5 mg total) by mouth every 4 (four) hours as needed for moderate pain for up to 7 days Max Daily Amount: 30 mg 28 tablet 0    sertraline (ZOLOFT) 100 mg tablet Take 100 mg by mouth daily        Stiolto Respimat 2 5-2 5 MCG/ACT inhaler        No current facility-administered medications for this visit  He is allergic to penicillins       Review of Systems   Constitutional: Positive for appetite change and fatigue  HENT: Positive for congestion, dental problem, postnasal drip, sinus pressure, sinus pain and sneezing  Respiratory: Positive for chest tightness, shortness of breath and wheezing  Gastrointestinal: Positive for abdominal distention, abdominal pain, flatus and nausea  Musculoskeletal: Positive for back pain and gait problem  Neurological: Positive for dizziness, light-headedness and headaches  Psychiatric/Behavioral: The patient is nervous/anxious  All other systems reviewed and are negative  Objective:      /73   Pulse 70   Ht 5' 9" (1 753 m)   Wt 63 5 kg (140 lb)   BMI 20 67 kg/m²          Physical Exam  Constitutional:       General: He is not in acute distress  HENT:      Mouth/Throat:      Mouth: Mucous membranes are moist    Cardiovascular:      Rate and Rhythm: Normal rate  Pulmonary:      Effort: Pulmonary effort is normal    Abdominal:      General: Abdomen is flat   Bowel sounds are normal       Palpations: Abdomen is soft  Tenderness: There is no abdominal tenderness  Hernia: No hernia is present  Skin:     General: Skin is warm and dry  Neurological:      Mental Status: He is alert         extremities: No edema

## 2022-05-25 ENCOUNTER — OFFICE VISIT (OUTPATIENT)
Dept: VASCULAR SURGERY | Facility: CLINIC | Age: 67
End: 2022-05-25
Payer: COMMERCIAL

## 2022-05-25 VITALS
HEART RATE: 82 BPM | DIASTOLIC BLOOD PRESSURE: 72 MMHG | BODY MASS INDEX: 21.42 KG/M2 | WEIGHT: 144.6 LBS | HEIGHT: 69 IN | SYSTOLIC BLOOD PRESSURE: 124 MMHG

## 2022-05-25 DIAGNOSIS — I65.23 BILATERAL CAROTID ARTERY STENOSIS: Primary | ICD-10-CM

## 2022-05-25 PROCEDURE — 99213 OFFICE O/P EST LOW 20 MIN: CPT | Performed by: SURGERY

## 2022-05-25 NOTE — ASSESSMENT & PLAN NOTE
s/p cardioembolic stroke, noted on MRI  Patient has undergone right CEA on 04/13/2021 for symptomatic carotid disease  Prior to the CEA patient had undergone digital subtraction angiography (4/6/21) as CTA had suggested severe stenosis of the proximal common carotid artery on both the right and left  The digital subtraction angiography study showed approximately 30% stenosis of the proximal common carotid artery at the origin and less severe stenosis in the midportion  On the left the origin of the left common carotid artery is approximately 70%  Although repeat CTA again suggestive of more severe stenosis of the bilateral common carotid arteries I would base my therapeutic plan on the digital subtraction angiography therefore, I am not recommending common carotid artery intervention  The internal carotid artery on the right is widely patent status post endarterectomy  The left internal carotid artery shows less than 50% stenosis  Pt is taking Eliquis and Atorvastatin  Continue surveillance of carotid occlusive disease

## 2022-05-25 NOTE — PROGRESS NOTES
Assessment/Plan:    Bilateral carotid artery stenosis  s/p cardioembolic stroke, noted on MRI  Patient has undergone right CEA on 04/13/2021 for symptomatic carotid disease  Prior to the CEA patient had undergone digital subtraction angiography (4/6/21) as CTA had suggested severe stenosis of the proximal common carotid artery on both the right and left  The digital subtraction angiography study showed approximately 30% stenosis of the proximal common carotid artery at the origin and less severe stenosis in the midportion  On the left the origin of the left common carotid artery is approximately 70%  Although repeat CTA again suggestive of more severe stenosis of the bilateral common carotid arteries I would base my therapeutic plan on the digital subtraction angiography therefore, I am not recommending common carotid artery intervention  The internal carotid artery on the right is widely patent status post endarterectomy  The left internal carotid artery shows less than 50% stenosis  Pt is taking Eliquis and Atorvastatin  Continue surveillance of carotid occlusive disease  Diagnoses and all orders for this visit:    Bilateral carotid artery stenosis          Subjective:      Patient ID: Zach Araya is a 73 y o  male  Pt s/p cardioembolic stroke, noted on MRI  Patient has undergone right CEA on 04/13/2021 for symptomatic carotid disease  Prior to the CEA patient had undergone digital subtraction angiography (4/6/21) as CTA had suggested severe stenosis of the proximal common carotid artery on both the right and left  The digital subtraction angiography study showed approximately 30% stenosis of the proximal common carotid artery at the origin and less severe stenosis in the midportion  On the left the origin of the left common carotid artery is approximately 70%    Although repeat CTA again suggestive of more severe stenosis of the bilateral common carotid arteries I would base my therapeutic plan on the digital subtraction angiography therefore, I am not recommending common carotid artery intervention  The internal carotid artery on the right is widely patent status post endarterectomy  The left internal carotid artery shows less than 50% stenosis  Pt is taking Eliquis and Atorvastatin  Pt is a smoker and smokes 5 cigarettes a day  MRI 4/22/22:  Subacute right frontal lobe infarct, and subacute to chronic right occipital lobe infarcts are noted  There are chronic lacunar infarcts in the basal ganglia and thalamus as described  The following portions of the patient's history were reviewed and updated as appropriate: allergies, current medications, past family history, past medical history, past social history, past surgical history and problem list     Review of Systems   Constitutional: Negative  HENT: Negative  Eyes: Negative  Respiratory: Negative  Cardiovascular: Negative  Gastrointestinal: Negative  Endocrine: Negative  Genitourinary: Negative  Musculoskeletal: Negative  Skin: Negative  Allergic/Immunologic: Negative  Neurological: Negative  Hematological: Negative  Psychiatric/Behavioral: Negative  Objective:      /72 (BP Location: Left arm, Patient Position: Sitting, Cuff Size: Standard)   Pulse 82   Ht 5' 9" (1 753 m)   Wt 65 6 kg (144 lb 9 6 oz)   BMI 21 35 kg/m²          Physical Exam  Vitals and nursing note reviewed  Constitutional:       Appearance: He is well-developed  HENT:      Head: Normocephalic and atraumatic  Eyes:      Conjunctiva/sclera: Conjunctivae normal       Pupils: Pupils are equal, round, and reactive to light  Neck:      Vascular: No JVD  Cardiovascular:      Rate and Rhythm: Normal rate and regular rhythm  Heart sounds: Normal heart sounds  Pulmonary:      Effort: Pulmonary effort is normal  No respiratory distress  Breath sounds: Normal breath sounds  No stridor   No wheezing or rales  Chest:      Chest wall: No tenderness  Abdominal:      General: There is no distension  Palpations: Abdomen is soft  There is no mass  Tenderness: There is no abdominal tenderness  There is no guarding or rebound  Musculoskeletal:         General: No tenderness or deformity  Normal range of motion  Cervical back: Normal range of motion and neck supple  Skin:     General: Skin is warm and dry  Findings: No erythema or rash  Neurological:      Mental Status: He is alert and oriented to person, place, and time  Psychiatric:         Behavior: Behavior normal          Thought Content:  Thought content normal

## 2022-06-14 NOTE — ASSESSMENT & PLAN NOTE
Home medication:  Albuterol inhaler  Reports COPD is uncontrolled since his [facility] prescription has not been filled recently  Continues to saturate well on RA  Plan:  Continue with albuterol as needed  awake, alert, oriented to person, place, time/situation and in no apparent distress. normal...

## 2022-06-20 NOTE — ASSESSMENT & PLAN NOTE
Patient is due for an appt. [employer] message sent asking patient to schedule. · Current smoker  · Reports mild wheezing and cough at baseline  · O2 sat 98% on room air  · Respiratory protocol  · Albuterol inhaler prn

## 2022-06-21 RX ORDER — CLINDAMYCIN PHOSPHATE 900 MG/50ML
900 INJECTION INTRAVENOUS ONCE
Status: DISCONTINUED | OUTPATIENT
Start: 2022-06-24 | End: 2022-07-14 | Stop reason: HOSPADM

## 2022-06-21 RX ORDER — INDOCYANINE GREEN AND WATER 25 MG
5 KIT INJECTION ONCE
Status: DISCONTINUED | OUTPATIENT
Start: 2022-06-24 | End: 2022-07-14

## 2022-06-23 RX ORDER — BACLOFEN 10 MG/1
10 TABLET ORAL DAILY
COMMUNITY

## 2022-06-23 RX ORDER — AMLODIPINE BESYLATE AND BENAZEPRIL HYDROCHLORIDE 5; 10 MG/1; MG/1
1 CAPSULE ORAL DAILY
COMMUNITY

## 2022-06-23 RX ORDER — PRAVASTATIN SODIUM 40 MG
40 TABLET ORAL DAILY
COMMUNITY

## 2022-06-23 NOTE — PRE-PROCEDURE INSTRUCTIONS
Pre-Surgery Instructions:   Medication Instructions    albuterol (PROVENTIL HFA,VENTOLIN HFA) 90 mcg/act inhaler Use day of surgery if needed and bring with you      amLODIPine-benazepril (LOTREL 5-10) 5-10 MG per capsule Hold day of surgery      apixaban (ELIQUIS) 5 mg Per pt no instructions received took Eliquis this am   Will send message to office to see how to proceed   baclofen 10 mg tablet Normally takes at night   busPIRone (BUSPAR) 15 mg tablet Take this medication day of surgery if normally taken in the morning   carvedilol (COREG) 25 mg tablet Take this medication day of surgery if normally taken in the morning   omeprazole (PriLOSEC) 20 mg delayed release capsule Take this medication day of surgery if normally taken in the morning   patient supplied medication Hold day of surgery      pravastatin (PRAVACHOL) 40 mg tablet Normally takes at night   sertraline (ZOLOFT) 100 mg tablet Take this medication day of surgery if normally taken in the morning   Stiolto Respimat 2 5-2 5 MCG/ACT inhaler Take this medication day of surgery if normally taken in the morning  My Surgical Experience    The following information was developed to assist you to prepare for your operation  What do I need to do before coming to the hospital?   Arrange for a responsible person to drive you to and from the hospital    Arrange care for your children at home  Children are not allowed in the recovery areas of the hospital   Plan to wear clothing that is easy to put on and take off  If you are having shoulder surgery, wear a shirt that buttons or zippers in the front  Bathing  o Shower the evening before and the morning of your surgery with an antibacterial soap   Please refer to the Pre Op Showering Instructions for Surgery Patients Sheet   o Remove nail polish and all body piercing jewelry  o Do not shave any body part for at least 24 hours before surgery-this includes face, arms, legs and upper body  Food  o Nothing to eat or drink after midnight the night before your surgery  This includes candy and chewing gum  o Exception: If your surgery is after 12:00pm (noon), you may have clear liquids such as 7-Up®, ginger ale, apple or cranberry juice, Jell-O®, water, or clear broth until 8:00 am  o Do not drink milk or juice with pulp on the morning before surgery  o Do not drink alcohol 24 hours before surgery  Medicine  o Follow instructions you received from your surgeon about which medicines you may take on the day of surgery  o If instructed to take medicine on the morning of surgery, take pills with just a small sip of water  Call your prescribing doctor for specific infroamtion on what to do if you take insulin    What should I bring to the hospital?    Bring:  Tatianna Theo or a walker, if you have them, for foot or knee surgery   A list of the daily medicines, vitamins, minerals, herbals and nutritional supplements you take  Include the dosages of medicines and the time you take them each day   Glasses, dentures or hearing aids   Minimal clothing; you will be wearing hospital sleepwear   Photo ID; required to verify your identity   If you have a Living Will or Power of , bring a copy of the documents   If you have an ostomy, bring an extra pouch and any supplies you use    Do not bring   Medicines or inhalers   Money, valuables or jewelry    What other information should I know about the day of surgery?  Notify your surgeons if you develop a cold, sore throat, cough, fever, rash or any other illness   Report to the Ambulatory Surgical/Same Day Surgery Unit   You will be instructed to stop at Registration only if you have not been pre-registered   Inform your  fi they do not stay that they will be asked by the staff to leave a phone number where they can be reached   Be available to be reached before surgery   In the event the operating room schedule changes, you may be asked to come in earlier or later than expected    *It is important to tell your doctor and others involved in your health care if you are taking or have been taking any non-prescription drugs, vitamins, minerals, herbals or other nutritional supplements   Any of these may interact with some food or medicines and cause a reaction

## 2022-06-27 ENCOUNTER — TELEPHONE (OUTPATIENT)
Dept: ANESTHESIOLOGY | Facility: CLINIC | Age: 67
End: 2022-06-27

## 2022-07-14 ENCOUNTER — HOSPITAL ENCOUNTER (EMERGENCY)
Facility: HOSPITAL | Age: 67
Discharge: HOME | End: 2022-07-14
Attending: EMERGENCY MEDICINE
Payer: COMMERCIAL

## 2022-07-14 ENCOUNTER — APPOINTMENT (EMERGENCY)
Dept: RADIOLOGY | Facility: HOSPITAL | Age: 67
End: 2022-07-14
Payer: COMMERCIAL

## 2022-07-14 ENCOUNTER — HOSPITAL ENCOUNTER (OUTPATIENT)
Facility: HOSPITAL | Age: 67
Setting detail: OUTPATIENT SURGERY
End: 2022-07-14
Attending: SURGERY | Admitting: SURGERY
Payer: COMMERCIAL

## 2022-07-14 VITALS
RESPIRATION RATE: 18 BRPM | DIASTOLIC BLOOD PRESSURE: 96 MMHG | HEIGHT: 69 IN | HEART RATE: 142 BPM | BODY MASS INDEX: 22.22 KG/M2 | SYSTOLIC BLOOD PRESSURE: 168 MMHG | OXYGEN SATURATION: 96 % | TEMPERATURE: 97.5 F | WEIGHT: 150 LBS

## 2022-07-14 VITALS
TEMPERATURE: 98.2 F | SYSTOLIC BLOOD PRESSURE: 142 MMHG | OXYGEN SATURATION: 96 % | DIASTOLIC BLOOD PRESSURE: 70 MMHG | RESPIRATION RATE: 18 BRPM | HEART RATE: 98 BPM

## 2022-07-14 DIAGNOSIS — T65.291A TOXIC EFFECT OF TOBACCO AND NICOTINE, ACCIDENTAL OR UNINTENTIONAL, INITIAL ENCOUNTER: Primary | ICD-10-CM

## 2022-07-14 DIAGNOSIS — I48.91 ATRIAL FIBRILLATION WITH RAPID VENTRICULAR RESPONSE (HCC): Primary | ICD-10-CM

## 2022-07-14 DIAGNOSIS — I48.0 PAF (PAROXYSMAL ATRIAL FIBRILLATION) (HCC): ICD-10-CM

## 2022-07-14 LAB
2HR DELTA HS TROPONIN: 1 NG/L
ALBUMIN SERPL BCP-MCNC: 4.1 G/DL (ref 3.5–5)
ALP SERPL-CCNC: 80 U/L (ref 34–104)
ALT SERPL W P-5'-P-CCNC: 10 U/L (ref 7–52)
ANION GAP SERPL CALCULATED.3IONS-SCNC: 10 MMOL/L (ref 4–13)
AST SERPL W P-5'-P-CCNC: 16 U/L (ref 13–39)
BASOPHILS # BLD AUTO: 0.05 THOUSANDS/ΜL (ref 0–0.1)
BASOPHILS NFR BLD AUTO: 1 % (ref 0–1)
BILIRUB SERPL-MCNC: 0.63 MG/DL (ref 0.2–1)
BUN SERPL-MCNC: 21 MG/DL (ref 5–25)
CALCIUM SERPL-MCNC: 9.5 MG/DL (ref 8.4–10.2)
CARDIAC TROPONIN I PNL SERPL HS: 5 NG/L
CARDIAC TROPONIN I PNL SERPL HS: 6 NG/L
CHLORIDE SERPL-SCNC: 106 MMOL/L (ref 96–108)
CO2 SERPL-SCNC: 23 MMOL/L (ref 21–32)
CREAT SERPL-MCNC: 1.11 MG/DL (ref 0.6–1.3)
EOSINOPHIL # BLD AUTO: 0.11 THOUSAND/ΜL (ref 0–0.61)
EOSINOPHIL NFR BLD AUTO: 2 % (ref 0–6)
ERYTHROCYTE [DISTWIDTH] IN BLOOD BY AUTOMATED COUNT: 13.2 % (ref 11.6–15.1)
GFR SERPL CREATININE-BSD FRML MDRD: 69 ML/MIN/1.73SQ M
GLUCOSE SERPL-MCNC: 92 MG/DL (ref 65–140)
HCT VFR BLD AUTO: 47.7 % (ref 36.5–49.3)
HGB BLD-MCNC: 15.8 G/DL (ref 12–17)
IMM GRANULOCYTES # BLD AUTO: 0.04 THOUSAND/UL (ref 0–0.2)
IMM GRANULOCYTES NFR BLD AUTO: 1 % (ref 0–2)
LYMPHOCYTES # BLD AUTO: 0.86 THOUSANDS/ΜL (ref 0.6–4.47)
LYMPHOCYTES NFR BLD AUTO: 12 % (ref 14–44)
MCH RBC QN AUTO: 28.4 PG (ref 26.8–34.3)
MCHC RBC AUTO-ENTMCNC: 33.1 G/DL (ref 31.4–37.4)
MCV RBC AUTO: 86 FL (ref 82–98)
MONOCYTES # BLD AUTO: 0.6 THOUSAND/ΜL (ref 0.17–1.22)
MONOCYTES NFR BLD AUTO: 8 % (ref 4–12)
NEUTROPHILS # BLD AUTO: 5.56 THOUSANDS/ΜL (ref 1.85–7.62)
NEUTS SEG NFR BLD AUTO: 76 % (ref 43–75)
NRBC BLD AUTO-RTO: 0 /100 WBCS
PLATELET # BLD AUTO: 152 THOUSANDS/UL (ref 149–390)
PMV BLD AUTO: 11.2 FL (ref 8.9–12.7)
POTASSIUM SERPL-SCNC: 4.4 MMOL/L (ref 3.5–5.3)
PROT SERPL-MCNC: 7.1 G/DL (ref 6.4–8.4)
RBC # BLD AUTO: 5.56 MILLION/UL (ref 3.88–5.62)
SODIUM SERPL-SCNC: 139 MMOL/L (ref 135–147)
WBC # BLD AUTO: 7.22 THOUSAND/UL (ref 4.31–10.16)

## 2022-07-14 PROCEDURE — 93005 ELECTROCARDIOGRAM TRACING: CPT

## 2022-07-14 PROCEDURE — 71045 X-RAY EXAM CHEST 1 VIEW: CPT

## 2022-07-14 PROCEDURE — 96365 THER/PROPH/DIAG IV INF INIT: CPT

## 2022-07-14 PROCEDURE — 1124F ACP DISCUSS-NO DSCNMKR DOCD: CPT | Performed by: EMERGENCY MEDICINE

## 2022-07-14 PROCEDURE — 85025 COMPLETE CBC W/AUTO DIFF WBC: CPT

## 2022-07-14 PROCEDURE — 96366 THER/PROPH/DIAG IV INF ADDON: CPT

## 2022-07-14 PROCEDURE — 80053 COMPREHEN METABOLIC PANEL: CPT

## 2022-07-14 PROCEDURE — 99291 CRITICAL CARE FIRST HOUR: CPT | Performed by: EMERGENCY MEDICINE

## 2022-07-14 PROCEDURE — 84484 ASSAY OF TROPONIN QUANT: CPT

## 2022-07-14 PROCEDURE — 36415 COLL VENOUS BLD VENIPUNCTURE: CPT

## 2022-07-14 PROCEDURE — 96375 TX/PRO/DX INJ NEW DRUG ADDON: CPT

## 2022-07-14 PROCEDURE — 99285 EMERGENCY DEPT VISIT HI MDM: CPT

## 2022-07-14 RX ORDER — CARVEDILOL 25 MG/1
25 TABLET ORAL 2 TIMES DAILY WITH MEALS
Qty: 60 TABLET | Refills: 0 | Status: SHIPPED | OUTPATIENT
Start: 2022-07-14 | End: 2022-08-13

## 2022-07-14 RX ORDER — FENTANYL CITRATE/PF 50 MCG/ML
25 SYRINGE (ML) INJECTION
Status: DISCONTINUED | OUTPATIENT
Start: 2022-07-14 | End: 2022-07-14 | Stop reason: HOSPADM

## 2022-07-14 RX ORDER — LIDOCAINE HYDROCHLORIDE 10 MG/ML
0.5 INJECTION, SOLUTION EPIDURAL; INFILTRATION; INTRACAUDAL; PERINEURAL ONCE AS NEEDED
Status: DISCONTINUED | OUTPATIENT
Start: 2022-07-14 | End: 2022-07-14 | Stop reason: HOSPADM

## 2022-07-14 RX ORDER — ONDANSETRON 2 MG/ML
4 INJECTION INTRAMUSCULAR; INTRAVENOUS ONCE AS NEEDED
Status: DISCONTINUED | OUTPATIENT
Start: 2022-07-14 | End: 2022-07-14 | Stop reason: HOSPADM

## 2022-07-14 RX ORDER — SODIUM CHLORIDE, SODIUM LACTATE, POTASSIUM CHLORIDE, CALCIUM CHLORIDE 600; 310; 30; 20 MG/100ML; MG/100ML; MG/100ML; MG/100ML
125 INJECTION, SOLUTION INTRAVENOUS CONTINUOUS
Status: DISCONTINUED | OUTPATIENT
Start: 2022-07-14 | End: 2022-07-14 | Stop reason: HOSPADM

## 2022-07-14 RX ORDER — AMLODIPINE BESYLATE 5 MG/1
5 TABLET ORAL ONCE
Status: COMPLETED | OUTPATIENT
Start: 2022-07-14 | End: 2022-07-14

## 2022-07-14 RX ORDER — HYDROMORPHONE HCL/PF 1 MG/ML
0.5 SYRINGE (ML) INJECTION
Status: DISCONTINUED | OUTPATIENT
Start: 2022-07-14 | End: 2022-07-14 | Stop reason: HOSPADM

## 2022-07-14 RX ORDER — METOPROLOL TARTRATE 5 MG/5ML
5 INJECTION INTRAVENOUS ONCE
Status: COMPLETED | OUTPATIENT
Start: 2022-07-14 | End: 2022-07-14

## 2022-07-14 RX ORDER — DEXAMETHASONE SODIUM PHOSPHATE 4 MG/ML
8 INJECTION, SOLUTION INTRA-ARTICULAR; INTRALESIONAL; INTRAMUSCULAR; INTRAVENOUS; SOFT TISSUE ONCE AS NEEDED
Status: DISCONTINUED | OUTPATIENT
Start: 2022-07-14 | End: 2022-07-14 | Stop reason: HOSPADM

## 2022-07-14 RX ADMIN — METOROPROLOL TARTRATE 5 MG: 5 INJECTION, SOLUTION INTRAVENOUS at 14:26

## 2022-07-14 RX ADMIN — AMLODIPINE BESYLATE 5 MG: 5 TABLET ORAL at 14:45

## 2022-07-14 RX ADMIN — SODIUM CHLORIDE, SODIUM LACTATE, POTASSIUM CHLORIDE, AND CALCIUM CHLORIDE 500 ML: .6; .31; .03; .02 INJECTION, SOLUTION INTRAVENOUS at 14:26

## 2022-07-14 NOTE — ED PROVIDER NOTES
History  Chief Complaint   Patient presents with    Rapid Heart Rate     Pt in pacu for routine leatha when vitals were taken for preop pts heart rate  with no symptoms  Pt has a hx of afib and cva in past  Has been off eliquis for 2 days for procedure      Mr Bobby Wellington is a 36-year-old male sent over from PACU for tachycardia, has a history of atrial fibrillation  Was supposed to have elective laparoscopic cholecystectomy today, was told to stop taking Eliquis several days ago, also ran out of Coreg several days ago, was advised not to take any other medications today in preparation for surgery  Patient feels fatigued, endorses poor sleep overnight in anticipation of surgery, however denies lightheadedness, dizziness, blurred vision, chest pain, shortness of breath, abdominal pain, peripheral edema, or unilateral calf pain or swelling  Did have an episode of chest pain 2 days ago, which started as a "twinge" between his shoulder blades which quickly turned into a non-radiating, pressure-like sensation over left chest, not accompanied by diaphoresis, shortness of breath, nausea, lightheadedness, or any other symptoms, which resolved without intervention approximately 1 hour later, has had no further episodes  No recent travel or sick contacts  Prior to Admission Medications   Prescriptions Last Dose Informant Patient Reported? Taking?    Stiolto Respimat 2 5-2 5 MCG/ACT inhaler  Self Yes No   Sig: Inhale 2 puffs daily   albuterol (PROVENTIL HFA,VENTOLIN HFA) 90 mcg/act inhaler   Yes No   Sig: Inhale 2 puffs if needed   amLODIPine-benazepril (LOTREL 5-10) 5-10 MG per capsule   Yes No   Sig: Take 1 capsule by mouth daily   apixaban (ELIQUIS) 5 mg  Self No No   Sig: Take 1 tablet (5 mg total) by mouth 2 (two) times a day   baclofen 10 mg tablet   Yes No   Sig: Take 10 mg by mouth in the morning   busPIRone (BUSPAR) 15 mg tablet  Self Yes No   Sig: 15 mg 2 (two) times a day   carvedilol (COREG) 25 mg tablet   No No   Sig: Take 1 tablet (25 mg total) by mouth 2 (two) times a day with meals   carvedilol (COREG) 25 mg tablet   No Yes   Sig: Take 1 tablet (25 mg total) by mouth 2 (two) times a day with meals   omeprazole (PriLOSEC) 20 mg delayed release capsule  Self Yes No   Sig: Take 20 mg by mouth daily     patient supplied medication   Yes No   Sig: Allergy Relief medicine 10 mg daily   pravastatin (PRAVACHOL) 40 mg tablet   Yes No   Sig: Take 40 mg by mouth daily   sertraline (ZOLOFT) 100 mg tablet  Self Yes No   Sig: Take 100 mg by mouth daily        Facility-Administered Medications: None       Past Medical History:   Diagnosis Date    PRITI (acute kidney injury) ([facility] [address] ) 09/05/2021    Anxiety     Back pain     Claustrophobia     COPD [facility]chronic obstructive pulmonary disease[facility] ([facility])     Dysphagia 09/05/2021    Fatty liver     GERD (gastroesophageal reflux disease)     History of transfusion     Hyperlipidemia     Hypertension     Hypokalemia 02/22/2022    Lumbar back pain     Panic attacks     Stenosis of right carotid artery     Stroke [facility])     Wears glasses     Wears partial dentures     upper denture       Past Surgical History:   Procedure Laterality Date    COLONOSCOPY      ERCP      IR CEREBRAL ANGIOGRAPHY  04/06/2021    OTHER SURGICAL HISTORY      fertility    CA SLCTV CATHJ 3RD+ ORD SLCTV ABDL PEL/LXTR [facility] Right 04/06/2021    Procedure: ARTERIOGRAM, carotid Angio;  Surgeon: Terrence Slaughter MD;  Location: [facility];  Service: Vascular    CA THROMBOENDARTECTMY NECK,NECK INCIS Right 04/13/2021    Procedure: RIGHT ENDARTERECTOMY ARTERY CAROTID WITH BOVINE PATCH;  Surgeon: Terrence Slaughter MD;  Location: [facility];  Service: Vascular       Family History   Problem Relation Age of Onset    Asthma Mother      I have reviewed and agree with the history as documented      E-Cigarette/Vaping    E-Cigarette Use Never User      E-Cigarette/Vaping Substances    Nicotine No     THC No     CBD No     Flavoring No     Other No     Unknown No      Social History     Tobacco Use    Smoking status: Light Tobacco Smoker     Packs/day: 0 25     Years: 47 00     Pack years: 11 75     Types: Cigarettes     Start date: 1/1/1975    Smokeless tobacco: Never Used   Vaping Use    Vaping Use: Never used   Substance Use Topics    Alcohol use: Yes     Alcohol/week: 7 0 standard drinks     Types: 2 Glasses of wine, 5 Cans of beer per week     Comment: per week    Drug use: Never     Comment: never        Review of Systems   Constitutional: Positive for fatigue  Negative for activity change, appetite change, chills, diaphoresis, fever and unexpected weight change  HENT: Negative  Negative for congestion, postnasal drip, rhinorrhea, sinus pain, sore throat and voice change  Eyes: Negative  Negative for visual disturbance  Respiratory: Negative  Negative for cough and shortness of breath  Cardiovascular: Negative  Negative for chest pain, palpitations and leg swelling  Gastrointestinal: Negative  Negative for abdominal distention, abdominal pain, constipation, diarrhea, nausea and vomiting  Endocrine: Negative  Genitourinary: Negative  Negative for decreased urine volume  Musculoskeletal: Negative  Negative for arthralgias, back pain, gait problem, joint swelling, myalgias and neck stiffness  Skin: Negative  Negative for color change, pallor, rash and wound  Allergic/Immunologic: Negative  Neurological: Negative  Negative for dizziness, syncope, facial asymmetry, speech difficulty, weakness, light-headedness and headaches  Hematological: Negative  Does not bruise/bleed easily  Psychiatric/Behavioral: Negative  Negative for confusion  All other systems reviewed and are negative        Physical Exam  ED Triage Vitals   Temperature Pulse Respirations Blood Pressure SpO2   07/14/22 1338 07/14/22 1338 07/14/22 1338 07/14/22 1338 07/14/22 1338   98 2 °F (36 8 °C) 84 16 (!) 186/101 94 %      Temp Source Heart Rate Source Patient Position - Orthostatic VS BP Location FiO2 (%)   07/14/22 1338 07/14/22 1426 -- 07/14/22 1426 --   Oral Monitor  Right arm       Pain Score       07/14/22 1426       No Pain             Orthostatic Vital Signs  Vitals:    07/14/22 1515 07/14/22 1530 07/14/22 1545 07/14/22 1600   BP:  142/70     Pulse: 90 94 94 98       Physical Exam  Vitals and nursing note reviewed  Constitutional:       General: He is not in acute distress  Appearance: Normal appearance  He is not ill-appearing or diaphoretic  HENT:      Head: Normocephalic and atraumatic  Right Ear: External ear normal       Left Ear: External ear normal       Nose: Nose normal       Mouth/Throat:      Mouth: Mucous membranes are moist       Pharynx: Oropharynx is clear  No oropharyngeal exudate  Eyes:      General: No scleral icterus  Right eye: No discharge  Left eye: No discharge  Extraocular Movements: Extraocular movements intact  Conjunctiva/sclera: Conjunctivae normal       Pupils: Pupils are equal, round, and reactive to light  Cardiovascular:      Rate and Rhythm: Tachycardia present  Rhythm irregular  Pulses: Normal pulses  Heart sounds: Normal heart sounds  No murmur heard  No friction rub  No gallop  Pulmonary:      Effort: Pulmonary effort is normal  No respiratory distress  Breath sounds: Normal breath sounds  No wheezing, rhonchi or rales  Chest:      Chest wall: No tenderness  Abdominal:      General: Abdomen is flat  Bowel sounds are normal  There is no distension  Palpations: Abdomen is soft  Tenderness: There is no abdominal tenderness  There is no right CVA tenderness, left CVA tenderness, guarding or rebound  Musculoskeletal:         General: No swelling or tenderness  Normal range of motion  Cervical back: Normal range of motion and neck supple  No tenderness  Right lower leg: No edema        Left lower leg: No edema  Lymphadenopathy:      Cervical: No cervical adenopathy  Skin:     General: Skin is warm and dry  Capillary Refill: Capillary refill takes less than 2 seconds  Coloration: Skin is not pale  Findings: No rash  Neurological:      General: No focal deficit present  Mental Status: He is alert and oriented to person, place, and time  Motor: No weakness     Psychiatric:         Mood and Affect: Mood normal          Behavior: Behavior normal          ED Medications  Medications   lactated ringers bolus 500 mL (0 mL Intravenous Stopped 7/14/22 1618)   metoprolol (LOPRESSOR) injection 5 mg (5 mg Intravenous Given 7/14/22 1426)   amLODIPine (NORVASC) tablet 5 mg (5 mg Oral Given 7/14/22 1445)       Diagnostic Studies  Results Reviewed     Procedure Component Value Units Date/Time    HS Troponin I 2hr [[id number]]  (Normal) Collected: 07/14/22 1630    Lab Status: Final result Specimen: Blood from Arm, Right Updated: 07/14/22 1720     hs TnI 2hr 6 ng/L      Delta 2hr hsTnI 1 ng/L     HS Troponin 0hr (reflex protocol) [[id number]]  (Normal) Collected: 07/14/22 1425    Lab Status: Final result Specimen: Blood from Arm, Right Updated: 07/14/22 1504     hs TnI 0hr 5 ng/L     Comprehensive metabolic panel [[id number]] Collected: 07/14/22 1425    Lab Status: Final result Specimen: Blood from Arm, Right Updated: 07/14/22 1459     Sodium 139 mmol/L      Potassium 4 4 mmol/L      Chloride 106 mmol/L      CO2 23 mmol/L      ANION GAP 10 mmol/L      BUN 21 mg/dL      Creatinine 1 11 mg/dL      Glucose 92 mg/dL      Calcium 9 5 mg/dL      AST 16 U/L      ALT 10 U/L      Alkaline Phosphatase 80 U/L      Total Protein 7 1 g/dL      Albumin 4 1 g/dL      Total Bilirubin 0 63 mg/dL      eGFR 69 ml/min/1 73sq m     Narrative:      Errol guidelines for Chronic Kidney Disease (CKD):     Stage 1 with normal or high GFR (GFR > 90 mL/min/1 73 square meters)    Stage 2 Mild CKD (GFR = 60-89 mL/min/1 73 square meters)    Stage 3A Moderate CKD (GFR = 45-59 mL/min/1 73 square meters)    Stage 3B Moderate CKD (GFR = 30-44 mL/min/1 73 square meters)    Stage 4 Severe CKD (GFR = 15-29 mL/min/1 73 square meters)    Stage 5 End Stage CKD (GFR <15 mL/min/1 73 square meters)  Note: GFR calculation is accurate only with a steady state creatinine    CBC and differential [[id number]]  (Abnormal) Collected: 07/14/22 1425    Lab Status: Final result Specimen: Blood from Arm, Right Updated: 07/14/22 1439     WBC 7 22 Thousand/uL      RBC 5 56 Million/uL      Hemoglobin 15 8 g/dL      Hematocrit 47 7 %      MCV 86 fL      MCH 28 4 pg      MCHC 33 1 g/dL      RDW 13 2 %      MPV 11 2 fL      Platelets 534 Thousands/uL      nRBC 0 /100 WBCs      Neutrophils Relative 76 %      Immat GRANS % 1 %      Lymphocytes Relative 12 %      Monocytes Relative 8 %      Eosinophils Relative 2 %      Basophils Relative 1 %      Neutrophils Absolute 5 56 Thousands/µL      Immature Grans Absolute 0 04 Thousand/uL      Lymphocytes Absolute 0 86 Thousands/µL      Monocytes Absolute 0 60 Thousand/µL      Eosinophils Absolute 0 11 Thousand/µL      Basophils Absolute 0 05 Thousands/µL                  XR chest 1 view portable   ED Interpretation by Dennie Him, DO (07/14 0296)   No acute cardiopulmonary findings  Final Result by Alphonso Tobias MD (07/14 2281)      No acute cardiopulmonary disease                    Workstation performed: [id number]               Procedures  ECG 12 Lead Documentation Only    Date/Time: 7/14/2022 1:33 PM  Performed by: Dennie Him, DO  Authorized by: Dennie Him, DO     Indications / Diagnosis:  Tachycardia  ECG reviewed by me, the ED Provider: yes    Patient location:  ED  Interpretation:     Interpretation: abnormal    Rate:     ECG rate:  118    ECG rate assessment: tachycardic    Rhythm:     Rhythm: atrial fibrillation    Ectopy:     Ectopy: none QRS:     QRS axis:  Normal    QRS intervals:  Normal  Conduction:     Conduction: normal    ST segments:     ST segments:  Normal  T waves:     T waves: inverted      Inverted:  AVL          ED Course  ED Course as of 07/16/22 0357   Thu Jul 14, 2022   1530 Pt with continued afib, rate now in 90's, BP improved to 142/91  Dispo pending 2 hour repeat troponin  SBIRT 20yo+    Flowsheet Row Most Recent Value   SBIRT (25 yo +)    In order to provide better care to our patients, we are screening all of our patients for alcohol and drug use  Would it be okay to ask you these screening questions? No Filed at: 07/14/2022 1434                MDM  Number of Diagnoses or Management Options  Atrial fibrillation with rapid ventricular response ([facility] [address] )  Diagnosis management comments: Mr Liv Han is a 60-year-old male with a history of atrial fibrillation sent over from PACU for AFib with RVR, state he is fatigued from lack of sleep but otherwise has no symptoms  Has not taken blood thinners or Coreg in several days, did not take his morning dose of any medications due to scheduled surgery today  Patient is well-appearing, conversational, vital signs as above with AFib with RVR on the monitor  Physical exam as above  CBC, CMP, troponins x2 unremarkable  EKG AFib with RVR, rate 118, without evidence of ischemia or infarct  Doubt ACS  Chest x-ray unremarkable  Given 5 mg IV Lopressor for rate control, home dose of amlodipine for blood pressure given he was unable to take his home dose this morning, is mildly hypertensive  Given small fluid bolus given patient has been NPO since last night  Patient monitored for several hours, heart rate improved to 90s, blood pressure improved to 140s over 70s  Patient remained asymptomatic throughout ED course  Coreg prescription given    To resume home meds, contact surgeon for rescheduling and further medication instructions prior to next surgery date   Strict return precautions given  Improved, ambulatory and discharged  Amount and/or Complexity of Data Reviewed  Clinical lab tests: ordered and reviewed  Tests in the radiology section of CPT®: ordered and reviewed  Review and summarize past medical records: yes  Discuss the patient with other providers: yes  Independent visualization of images, tracings, or specimens: yes        Disposition  Final diagnoses:   Atrial fibrillation with rapid ventricular response ([facility] [address] )     Time reflects when diagnosis was documented in both MDM as applicable and the Disposition within this note     Time User Action Codes Description Comment    7/14/2022  5:20 PM Dino Needle Add [I48 91] Atrial fibrillation with rapid ventricular response ([facility] [address] )     7/14/2022  5:21 PM Dino Needle Add [I48 0] PAF (paroxysmal atrial fibrillation) ([facility] [address] )     7/14/2022  5:21 PM [place] [I48 0] PAF (paroxysmal atrial fibrillation) ([facility] [address] )     7/14/2022  5:21 PM [place] [I48 0] PAF (paroxysmal atrial fibrillation) [facility])       ED Disposition     ED Disposition   Discharge    Condition   Stable    Date/Time   Thu Jul 14, 2022  5:20 PM    [address] discharge to home/self care                 Follow-up Information     Follow up With Specialties Details Why Contact Info    Anamaria Limon DO Family Medicine   [address]   [phone]            Discharge Medication List as of 7/14/2022  5:24 PM      CONTINUE these medications which have CHANGED    Details   carvedilol (COREG) 25 mg tablet Take 1 tablet (25 mg total) by mouth 2 (two) times a day with meals, Starting Thu 7/14/2022, Until Sat 8/13/2022, Normal         CONTINUE these medications which have NOT CHANGED    Details   albuterol (PROVENTIL HFA,VENTOLIN HFA) 90 mcg/act inhaler Inhale 2 puffs if needed, Starting Fri 10/8/2021, Historical Med      amLODIPine-benazepril (LOTREL 5-10) 5-10 MG per capsule Take 1 capsule by mouth daily, Historical Med      apixaban (ELIQUIS) 5 mg Take 1 tablet (5 mg total) by mouth 2 (two) times a day, Starting Fri 9/17/2021, Normal      baclofen 10 mg tablet Take 10 mg by mouth in the morning, Historical Med      busPIRone (BUSPAR) 15 mg tablet 15 mg 2 (two) times a day, Starting Wed 4/27/2022, Historical Med      omeprazole (PriLOSEC) 20 mg delayed release capsule Take 20 mg by mouth daily  , Starting Mon 10/4/2021, Historical Med      patient supplied medication Allergy Relief medicine 10 mg daily, Historical Med      pravastatin (PRAVACHOL) 40 mg tablet Take 40 mg by mouth daily, Historical Med      sertraline (ZOLOFT) 100 mg tablet Take 100 mg by mouth daily  , Starting Wed 1/19/2011, Historical Med      Stiolto Respimat 2 5-2 5 MCG/ACT inhaler Inhale 2 puffs daily, Starting Thu 2/24/2022, Historical Med           No discharge procedures on file  PDMP Review       Value Time User    PDMP Reviewed  Yes 5/14/2022 11:14 PM Luis Franco MD           ED Provider  Attending physically available and evaluated Drew Matters  I managed the patient along with the ED Attending      Electronically Signed by         Collette Boers, DO  07/16/22 1292

## 2022-07-14 NOTE — DISCHARGE INSTRUCTIONS
Please follow-up with your PCP early next week for recheck of your heart rate  A refill of your Coreg has been sent to your pharmacy  Please start taking it again in the morning  Please resume taking your home meds as scheduled starting this evening  Please reach out to your surgeon to find out when your surgery will be rescheduled for, also when to stop taking Eliquis prior to that date  Please return to the ER if you have any chest pain, high heart rate, palpitations, shortness of breath, lightheadedness, or any other concerning symptoms

## 2022-07-14 NOTE — NURSING NOTE
Pt in for elective surgery  Routine VS taken and pt HR irregular and tachy  Pt placed on monitor and pt in afib rate   Pt asymptomatic with same and appears in no distress  Anesthesia MD paged  IV placed   Pt states he had some chest pain a few nights ago, however denies palpitations

## 2022-07-14 NOTE — ED ATTENDING ATTESTATION
7/14/2022  [place]ale DO, saw and evaluated the patient  I have discussed the patient with the resident/non-physician practitioner and agree with the resident's/non-physician practitioner's findings, Plan of Care, and MDM as documented in the resident's/non-physician practitioner's note, except where noted  All available labs and Radiology studies were reviewed  I was present for key portions of any procedure(s) performed by the resident/non-physician practitioner and I was immediately available to provide assistance  At this point I agree with the current assessment done in the Emergency Department  I have conducted an independent evaluation of this patient a history and physical is as follows:    Patient is a 80-year-old male with a history of atrial fibrillation and cholecystitis who presents with tachycardia  Patient was supposed to have an elective laparoscopic cholecystectomy today  He has been off of his Eliquis for 2 days due to the scheduled procedure  He did not take his antihypertensives today  He has been out of his carvedilol for about 2 days  In preop, he was noted to have tachycardia and hypertension  He was sent to the ED for evaluation of AFib with RVR  Patient also describes an episode of chest and back pain 2 days ago  He states that the pain was similar to pain related to his gallbladder  He has not had any chest pain today  He also denies any shortness of breath, nausea, vomiting, diaphoresis or other concerns  On exam, patient is in no acute distress  Heart is irregularly irregular  Breath sounds decreased in bilateral bases  No lower extremity edema  No calf tenderness  + SCDs  EKG reveals atrial fibrillation with rapid ventricular response  No evidence of acute ischemia  Delta troponin is not significantly elevated  Patient was given IV metoprolol for rate control  His rate has improved and he will continue his medications at home    This includes beta-blocker and anticoagulation  He will need to reschedule his cholecystectomy with General surgery  Patient agrees to call them for follow-up and return to ED sooner if palpitations, chest pain, shortness of breath or other concerns  Portions of the above record have been created with voice recognition software  Occasional wrong word or "sound alike" substitutions may have occurred due to the inherent limitations of voice recognition software  Read the chart carefully and recognize, using context, where substitutions may have occurred  ED Course         Critical Care Time  CriticalCare Time  Performed by: Eleanor Marcum DO  Authorized by: Eleanor Marcum DO     Critical care provider statement:     Critical care start time:  7/14/2022 2:30 PM    Critical care end time:  7/14/2022 3:00 PM    Critical care time was exclusive of:  Separately billable procedures and treating other patients    Critical care was necessary to treat or prevent imminent or life-threatening deterioration of the following conditions:  Cardiac failure (Afib with RVR)    Critical care was time spent personally by me on the following activities:  Blood draw for specimens, obtaining history from patient or surrogate, development of treatment plan with patient or surrogate, evaluation of patient's response to treatment, examination of patient, interpretation of cardiac output measurements, ordering and performing treatments and interventions, ordering and review of laboratory studies, ordering and review of radiographic studies, re-evaluation of patient's condition and review of old charts  Comments:      Patient was given IV metoprolol for rate control of atrial fibrillation

## 2022-07-15 LAB
ATRIAL RATE: 120 BPM
ATRIAL RATE: 157 BPM
ATRIAL RATE: 164 BPM
ATRIAL RATE: 168 BPM
PR INTERVAL: 0 MS
QRS AXIS: 68 DEGREES
QRS AXIS: 74 DEGREES
QRSD INTERVAL: 68 MS
QRSD INTERVAL: 68 MS
QRSD INTERVAL: 78 MS
QRSD INTERVAL: 80 MS
QT INTERVAL: 290 MS
QT INTERVAL: 292 MS
QT INTERVAL: 292 MS
QT INTERVAL: 298 MS
QTC INTERVAL: 418 MS
QTC INTERVAL: 420 MS
QTC INTERVAL: 443 MS
QTC INTERVAL: 443 MS
T WAVE AXIS: 79 DEGREES
T WAVE AXIS: 83 DEGREES
T WAVE AXIS: 83 DEGREES
T WAVE AXIS: 88 DEGREES
VENTRICULAR RATE: 118 BPM
VENTRICULAR RATE: 126 BPM
VENTRICULAR RATE: 138 BPM
VENTRICULAR RATE: 138 BPM

## 2022-07-15 PROCEDURE — 93010 ELECTROCARDIOGRAM REPORT: CPT | Performed by: INTERNAL MEDICINE

## 2022-08-18 ENCOUNTER — OFFICE VISIT (OUTPATIENT)
Dept: SURGERY | Facility: CLINIC | Age: 67
End: 2022-08-18
Payer: COMMERCIAL

## 2022-08-18 ENCOUNTER — PREP FOR PROCEDURE (OUTPATIENT)
Dept: SURGERY | Facility: CLINIC | Age: 67
End: 2022-08-18

## 2022-08-18 DIAGNOSIS — K80.10 CHRONIC CALCULOUS CHOLECYSTITIS: Primary | ICD-10-CM

## 2022-08-18 PROCEDURE — 99214 OFFICE O/P EST MOD 30 MIN: CPT | Performed by: SURGERY

## 2022-08-18 NOTE — PROGRESS NOTES
Assessment/Plan:    Diagnoses and all orders for this visit:    Chronic calculous cholecystitis    Risks and benefits for a laparoscopic cholecystectomy were reviewed with him including potential for bile duct injury, bowel injury, or need for open surgery and he agrees  He was instructed to hold his Eliquis for 48 hours prior to surgery but take the remainder of his medications  Subjective:      Patient ID: Leslie Jarquin is a 78 y o  male  Presents to discuss Laparoscopic cholecystectomy  Previously scheduled 7/14/2022 but was cancelled due to elevated heart rate  PCP has cleared him for surgery  Feeling ok,  RLQ pain at times  He was evaluated in the emergency room on 07/14  Looks like he was given some Lopressor and had good rate control  Was further evaluated by his family physician who felt no further workup was required  Did hold all of his medications that day of surgery  No history of any underlying cardiac issues  Briefly he did have an episode of cholangitis in March of this year  He is also found to be an alcoholic withdrawal   Did undergo an ERCP and removal of common duct stones  Because of gallstones it was recommended to proceed with a laparoscopic cholecystectomy  The following portions of the patient's history were reviewed and updated as appropriate:     He  has a past medical history of PRITI (acute kidney injury) ([facility] [address] ) (09/05/2021), Anxiety, Back pain, Claustrophobia, [facility]ch[facility]) [facility] Dysphagia (09/05/2021), Fatty liver, GERD (gastroesophageal reflux disease), History of transfusion, Hyperlipidemia, Hypertension, Hypokalemia (02/22/2022), Lumbar back pain, Panic attacks, Stenosis of right carotid artery, Stroke [facility]), Wears glasses, and Wears partial dentures  He  has a past surgical history that includes Other surgical history; Colonoscopy; pr slctv cathj 3rd+ ord slctv abdl pel/lxtr brnch (Right, 04/06/2021);  IR cerebral angiography (04/06/2021); pr thromboendartectmy neck,neck incis (Right, 04/13/2021); and ERCP  His family history includes Asthma in his mother  He  reports that he has been smoking cigarettes  He started smoking about 47 years ago  He has a 11 75 pack-year smoking history  He has never used smokeless tobacco  He reports current alcohol use of about 7 0 standard drinks of alcohol per week  He reports that he does not use drugs  Current Outpatient Medications   Medication Sig Dispense Refill    albuterol (PROVENTIL HFA,VENTOLIN HFA) 90 mcg/act inhaler Inhale 2 puffs if needed      amLODIPine-benazepril (LOTREL 5-10) 5-10 MG per capsule Take 1 capsule by mouth daily      apixaban (ELIQUIS) 5 mg Take 1 tablet (5 mg total) by mouth 2 (two) times a day 60 tablet 0    baclofen 10 mg tablet Take 10 mg by mouth in the morning      busPIRone (BUSPAR) 15 mg tablet 15 mg 2 (two) times a day      carvedilol (COREG) 25 mg tablet Take 1 tablet (25 mg total) by mouth 2 (two) times a day with meals 60 tablet 0    omeprazole (PriLOSEC) 20 mg delayed release capsule Take 20 mg by mouth daily        patient supplied medication Allergy Relief medicine 10 mg daily      pravastatin (PRAVACHOL) 40 mg tablet Take 40 mg by mouth daily      sertraline (ZOLOFT) 100 mg tablet Take 100 mg by mouth daily        Stiolto Respimat 2 5-2 5 MCG/ACT inhaler Inhale 2 puffs daily       No current facility-administered medications for this visit  He is allergic to penicillins       Review of Systems   All other systems reviewed and are negative  Objective: There were no vitals taken for this visit  Physical Exam  Constitutional:       General: He is not in acute distress  Appearance: He is not ill-appearing  HENT:      Head: Atraumatic  Mouth/Throat:      Mouth: Mucous membranes are moist    Eyes:      Extraocular Movements: Extraocular movements intact     Pulmonary:      Effort: Pulmonary effort is normal  Abdominal:      General: Abdomen is flat  Palpations: Abdomen is soft  There is no mass  Tenderness: There is no abdominal tenderness  Hernia: No hernia is present  Musculoskeletal:      Cervical back: Normal range of motion  Skin:     General: Skin is warm and dry  Neurological:      Mental Status: He is alert         extremities: No edema

## 2022-08-18 NOTE — H&P (VIEW-ONLY)
Assessment/Plan:    Diagnoses and all orders for this visit:    Chronic calculous cholecystitis    Risks and benefits for a laparoscopic cholecystectomy were reviewed with him including potential for bile duct injury, bowel injury, or need for open surgery and he agrees  He was instructed to hold his Eliquis for 48 hours prior to surgery but take the remainder of his medications  Subjective:      Patient ID: Parish García is a 78 y o  male  Presents to discuss Laparoscopic cholecystectomy  Previously scheduled 7/14/2022 but was cancelled due to elevated heart rate  PCP has cleared him for surgery  Feeling ok,  RLQ pain at times  He was evaluated in the emergency room on 07/14  Looks like he was given some Lopressor and had good rate control  Was further evaluated by his family physician who felt no further workup was required  Did hold all of his medications that day of surgery  No history of any underlying cardiac issues  Briefly he did have an episode of cholangitis in March of this year  He is also found to be an alcoholic withdrawal   Did undergo an ERCP and removal of common duct stones  Because of gallstones it was recommended to proceed with a laparoscopic cholecystectomy  The following portions of the patient's history were reviewed and updated as appropriate:     He  has a past medical history of PRITI (acute kidney injury) ([facility] [address] ) (09/05/2021), Anxiety, Back pain, Claustrophobia, COPD (chronic obstructive pulmonary disease) ([facility] [address] ), Dysphagia (09/05/2021), Fatty liver, GERD (gastroesophageal reflux disease), History of transfusion, Hyperlipidemia, Hypertension, Hypokalemia (02/22/2022), Lumbar back pain, Panic attacks, Stenosis of right carotid artery, Stroke [facility]), Wears glasses, and Wears partial dentures  He  has a past surgical history that includes Other surgical history; Colonoscopy; pr slctv cathj 3rd+ ord slctv abdl pel/lxtr brnch (Right, 04/06/2021);  IR cerebral angiography (04/06/2021); pr thromboendartectmy neck,neck incis (Right, 04/13/2021); and ERCP  His family history includes Asthma in his mother  He  reports that he has been smoking cigarettes  He started smoking about 47 years ago  He has a 11 75 pack-year smoking history  He has never used smokeless tobacco  He reports current alcohol use of about 7 0 standard drinks of alcohol per week  He reports that he does not use drugs  Current Outpatient Medications   Medication Sig Dispense Refill    albuterol (PROVENTIL HFA,VENTOLIN HFA) 90 mcg/act inhaler Inhale 2 puffs if needed      amLODIPine-benazepril (LOTREL 5-10) 5-10 MG per capsule Take 1 capsule by mouth daily      apixaban (ELIQUIS) 5 mg Take 1 tablet (5 mg total) by mouth 2 (two) times a day 60 tablet 0    baclofen 10 mg tablet Take 10 mg by mouth in the morning      busPIRone (BUSPAR) 15 mg tablet 15 mg 2 (two) times a day      carvedilol (COREG) 25 mg tablet Take 1 tablet (25 mg total) by mouth 2 (two) times a day with meals 60 tablet 0    omeprazole (PriLOSEC) 20 mg delayed release capsule Take 20 mg by mouth daily        patient supplied medication Allergy Relief medicine 10 mg daily      pravastatin (PRAVACHOL) 40 mg tablet Take 40 mg by mouth daily      sertraline (ZOLOFT) 100 mg tablet Take 100 mg by mouth daily        Stiolto Respimat 2 5-2 5 MCG/ACT inhaler Inhale 2 puffs daily       No current facility-administered medications for this visit  He is allergic to penicillins       Review of Systems   All other systems reviewed and are negative  Objective: There were no vitals taken for this visit  Physical Exam  Constitutional:       General: He is not in acute distress  Appearance: He is not ill-appearing  HENT:      Head: Atraumatic  Mouth/Throat:      Mouth: Mucous membranes are moist    Eyes:      Extraocular Movements: Extraocular movements intact     Pulmonary:      Effort: Pulmonary effort is normal  Abdominal:      General: Abdomen is flat  Palpations: Abdomen is soft  There is no mass  Tenderness: There is no abdominal tenderness  Hernia: No hernia is present  Musculoskeletal:      Cervical back: Normal range of motion  Skin:     General: Skin is warm and dry  Neurological:      Mental Status: He is alert         extremities: No edema

## 2022-08-19 RX ORDER — CEFAZOLIN SODIUM 2 G/50ML
2000 SOLUTION INTRAVENOUS ONCE
Status: COMPLETED | OUTPATIENT
Start: 2022-08-26 | End: 2022-08-26

## 2022-08-25 ENCOUNTER — ANESTHESIA EVENT (OUTPATIENT)
Dept: PERIOP | Facility: HOSPITAL | Age: 67
End: 2022-08-25
Payer: COMMERCIAL

## 2022-08-25 NOTE — ANESTHESIA PREPROCEDURE EVALUATION
Procedure:  CHOLECYSTECTOMY LAPAROSCOPIC (N/A Abdomen)    Relevant Problems   CARDIO   (+) Atrial fibrillation (HCC)   (+) Benign essential hypertension   (+) HLD (hyperlipidemia)   (+) PAF (paroxysmal atrial fibrillation) (HCC)      GI/HEPATIC   (+) GERD (gastroesophageal reflux disease)   (+) Upper GI bleed (Resolved)      /RENAL   (+) PRITI (acute kidney injury) (HCC)   (+) Stage 3a chronic kidney disease (HCC)      HEMATOLOGY   (+) Acute blood loss anemia (Resolved)   (+) Iron deficiency anemia      MUSCULOSKELETAL   (+) Lumbar back pain      NEURO/PSYCH   (+) Cerebrovascular accident (CVA) due to embolism of right middle cerebral artery (HCC)   (+) Claustrophobia   (+) Hx of CVA (cerebral vascular accident) ([address] )   (+) Seizure ([address] )      PULMONARY   (+) COPD (chronic obstructive pulmonary disease) (HCC)   (+) Smoking      Other   (+) S/P carotid endarterectomy        Physical Exam    Airway    Mallampati score: III  TM Distance: >3 FB  Neck ROM: full     Dental   upper dentures and lower dentures,     Cardiovascular  Rhythm: regular, Rate: normal,     Pulmonary  Breath sounds clear to auscultation,     Other Findings        Anesthesia Plan  ASA Score- 3     Anesthesia Type- general with ASA Monitors  Additional Monitors:   Airway Plan: ETT  Plan Factors-    Chart reviewed  Patient is not a current smoker  Induction- intravenous  Postoperative Plan- Plan for postoperative opioid use  Informed Consent- Anesthetic plan and risks discussed with patient  I personally reviewed this patient with the CRNA  Discussed and agreed on the Anesthesia Plan with the CRNA  Richard Ocampo

## 2022-08-26 ENCOUNTER — HOSPITAL ENCOUNTER (OUTPATIENT)
Facility: HOSPITAL | Age: 67
Setting detail: OUTPATIENT SURGERY
Discharge: HOME | End: 2022-08-26
Attending: SURGERY | Admitting: SURGERY
Payer: COMMERCIAL

## 2022-08-26 ENCOUNTER — ANESTHESIA (OUTPATIENT)
Dept: PERIOP | Facility: HOSPITAL | Age: 67
End: 2022-08-26
Payer: COMMERCIAL

## 2022-08-26 VITALS
OXYGEN SATURATION: 95 % | TEMPERATURE: 97.2 F | SYSTOLIC BLOOD PRESSURE: 165 MMHG | RESPIRATION RATE: 18 BRPM | HEART RATE: 58 BPM | DIASTOLIC BLOOD PRESSURE: 72 MMHG

## 2022-08-26 DIAGNOSIS — K80.10 CHRONIC CALCULOUS CHOLECYSTITIS: ICD-10-CM

## 2022-08-26 DIAGNOSIS — K80.40 CALCULUS OF BILE DUCT WITH CHOLECYSTITIS WITHOUT OBSTRUCTION, UNSPECIFIED CHOLECYSTITIS ACUITY: Primary | ICD-10-CM

## 2022-08-26 PROBLEM — IMO0001 SMOKING: Status: ACTIVE | Noted: 2022-08-26

## 2022-08-26 PROBLEM — F17.200 SMOKING: Status: ACTIVE | Noted: 2022-08-26

## 2022-08-26 PROBLEM — K92.2 UPPER GI BLEED: Status: RESOLVED | Noted: 2021-09-15 | Resolved: 2022-08-26

## 2022-08-26 PROBLEM — D62 ACUTE BLOOD LOSS ANEMIA: Status: RESOLVED | Noted: 2021-09-15 | Resolved: 2022-08-26

## 2022-08-26 PROCEDURE — NC001 PR NO CHARGE: Performed by: SURGERY

## 2022-08-26 PROCEDURE — 88304 TISSUE EXAM BY PATHOLOGIST: CPT | Performed by: PATHOLOGY

## 2022-08-26 PROCEDURE — 47562 LAPAROSCOPIC CHOLECYSTECTOMY: CPT | Performed by: SURGERY

## 2022-08-26 RX ORDER — BUPIVACAINE HYDROCHLORIDE AND EPINEPHRINE 2.5; 5 MG/ML; UG/ML
INJECTION, SOLUTION EPIDURAL; INFILTRATION; INTRACAUDAL; PERINEURAL AS NEEDED
Status: DISCONTINUED | OUTPATIENT
Start: 2022-08-26 | End: 2022-08-26 | Stop reason: HOSPADM

## 2022-08-26 RX ORDER — OXYCODONE HYDROCHLORIDE 5 MG/1
5 TABLET ORAL EVERY 4 HOURS PRN
Qty: 10 TABLET | Refills: 0 | Status: SHIPPED | OUTPATIENT
Start: 2022-08-26 | End: 2022-09-05

## 2022-08-26 RX ORDER — MAGNESIUM HYDROXIDE 1200 MG/15ML
LIQUID ORAL AS NEEDED
Status: DISCONTINUED | OUTPATIENT
Start: 2022-08-26 | End: 2022-08-26 | Stop reason: HOSPADM

## 2022-08-26 RX ORDER — OXYCODONE HYDROCHLORIDE 5 MG/1
5 TABLET ORAL EVERY 4 HOURS PRN
Status: DISCONTINUED | OUTPATIENT
Start: 2022-08-26 | End: 2022-08-26 | Stop reason: HOSPADM

## 2022-08-26 RX ORDER — FENTANYL CITRATE 50 UG/ML
INJECTION, SOLUTION INTRAMUSCULAR; INTRAVENOUS AS NEEDED
Status: DISCONTINUED | OUTPATIENT
Start: 2022-08-26 | End: 2022-08-26

## 2022-08-26 RX ORDER — LIDOCAINE HYDROCHLORIDE 10 MG/ML
INJECTION, SOLUTION EPIDURAL; INFILTRATION; INTRACAUDAL; PERINEURAL AS NEEDED
Status: DISCONTINUED | OUTPATIENT
Start: 2022-08-26 | End: 2022-08-26

## 2022-08-26 RX ORDER — ONDANSETRON 2 MG/ML
4 INJECTION INTRAMUSCULAR; INTRAVENOUS ONCE AS NEEDED
Status: DISCONTINUED | OUTPATIENT
Start: 2022-08-26 | End: 2022-08-26 | Stop reason: HOSPADM

## 2022-08-26 RX ORDER — ONDANSETRON 2 MG/ML
INJECTION INTRAMUSCULAR; INTRAVENOUS AS NEEDED
Status: DISCONTINUED | OUTPATIENT
Start: 2022-08-26 | End: 2022-08-26

## 2022-08-26 RX ORDER — ONDANSETRON 2 MG/ML
4 INJECTION INTRAMUSCULAR; INTRAVENOUS EVERY 4 HOURS PRN
Status: DISCONTINUED | OUTPATIENT
Start: 2022-08-26 | End: 2022-08-26 | Stop reason: HOSPADM

## 2022-08-26 RX ORDER — HYDROMORPHONE HCL/PF 1 MG/ML
0.4 SYRINGE (ML) INJECTION
Status: DISCONTINUED | OUTPATIENT
Start: 2022-08-26 | End: 2022-08-26 | Stop reason: HOSPADM

## 2022-08-26 RX ORDER — SODIUM CHLORIDE, SODIUM LACTATE, POTASSIUM CHLORIDE, CALCIUM CHLORIDE 600; 310; 30; 20 MG/100ML; MG/100ML; MG/100ML; MG/100ML
75 INJECTION, SOLUTION INTRAVENOUS CONTINUOUS
Status: DISCONTINUED | OUTPATIENT
Start: 2022-08-26 | End: 2022-08-26 | Stop reason: HOSPADM

## 2022-08-26 RX ORDER — SODIUM CHLORIDE, SODIUM LACTATE, POTASSIUM CHLORIDE, CALCIUM CHLORIDE 600; 310; 30; 20 MG/100ML; MG/100ML; MG/100ML; MG/100ML
125 INJECTION, SOLUTION INTRAVENOUS CONTINUOUS
Status: DISCONTINUED | OUTPATIENT
Start: 2022-08-26 | End: 2022-08-26 | Stop reason: HOSPADM

## 2022-08-26 RX ORDER — EPHEDRINE SULFATE 50 MG/ML
INJECTION INTRAVENOUS AS NEEDED
Status: DISCONTINUED | OUTPATIENT
Start: 2022-08-26 | End: 2022-08-26

## 2022-08-26 RX ORDER — FENTANYL CITRATE/PF 50 MCG/ML
25 SYRINGE (ML) INJECTION
Status: DISCONTINUED | OUTPATIENT
Start: 2022-08-26 | End: 2022-08-26 | Stop reason: HOSPADM

## 2022-08-26 RX ORDER — SODIUM CHLORIDE, SODIUM LACTATE, POTASSIUM CHLORIDE, CALCIUM CHLORIDE 600; 310; 30; 20 MG/100ML; MG/100ML; MG/100ML; MG/100ML
INJECTION, SOLUTION INTRAVENOUS CONTINUOUS PRN
Status: DISCONTINUED | OUTPATIENT
Start: 2022-08-26 | End: 2022-08-26

## 2022-08-26 RX ORDER — MORPHINE SULFATE 10 MG/ML
4 INJECTION, SOLUTION INTRAMUSCULAR; INTRAVENOUS
Status: DISCONTINUED | OUTPATIENT
Start: 2022-08-26 | End: 2022-08-26 | Stop reason: HOSPADM

## 2022-08-26 RX ORDER — PROPOFOL 10 MG/ML
INJECTION, EMULSION INTRAVENOUS AS NEEDED
Status: DISCONTINUED | OUTPATIENT
Start: 2022-08-26 | End: 2022-08-26

## 2022-08-26 RX ORDER — SODIUM CHLORIDE 9 MG/ML
INJECTION, SOLUTION INTRAVENOUS AS NEEDED
Status: DISCONTINUED | OUTPATIENT
Start: 2022-08-26 | End: 2022-08-26 | Stop reason: HOSPADM

## 2022-08-26 RX ORDER — MIDAZOLAM HYDROCHLORIDE 2 MG/2ML
INJECTION, SOLUTION INTRAMUSCULAR; INTRAVENOUS AS NEEDED
Status: DISCONTINUED | OUTPATIENT
Start: 2022-08-26 | End: 2022-08-26

## 2022-08-26 RX ORDER — ROCURONIUM BROMIDE 10 MG/ML
INJECTION, SOLUTION INTRAVENOUS AS NEEDED
Status: DISCONTINUED | OUTPATIENT
Start: 2022-08-26 | End: 2022-08-26

## 2022-08-26 RX ADMIN — ROCURONIUM BROMIDE 40 MG: 10 INJECTION, SOLUTION INTRAVENOUS at 09:20

## 2022-08-26 RX ADMIN — SUGAMMADEX 270 MG: 100 INJECTION, SOLUTION INTRAVENOUS at 09:57

## 2022-08-26 RX ADMIN — PROPOFOL 200 MG: 10 INJECTION, EMULSION INTRAVENOUS at 09:20

## 2022-08-26 RX ADMIN — MIDAZOLAM 2 MG: 1 INJECTION INTRAMUSCULAR; INTRAVENOUS at 09:16

## 2022-08-26 RX ADMIN — SODIUM CHLORIDE, SODIUM LACTATE, POTASSIUM CHLORIDE, AND CALCIUM CHLORIDE: .6; .31; .03; .02 INJECTION, SOLUTION INTRAVENOUS at 09:03

## 2022-08-26 RX ADMIN — EPHEDRINE SULFATE 5 MG: 50 INJECTION, SOLUTION INTRAVENOUS at 09:35

## 2022-08-26 RX ADMIN — ONDANSETRON 4 MG: 2 INJECTION INTRAMUSCULAR; INTRAVENOUS at 09:58

## 2022-08-26 RX ADMIN — LIDOCAINE HYDROCHLORIDE 50 MG: 10 INJECTION, SOLUTION EPIDURAL; INFILTRATION; INTRACAUDAL; PERINEURAL at 09:20

## 2022-08-26 RX ADMIN — EPHEDRINE SULFATE 10 MG: 50 INJECTION, SOLUTION INTRAVENOUS at 09:40

## 2022-08-26 RX ADMIN — MIDAZOLAM 2 MG: 1 INJECTION INTRAMUSCULAR; INTRAVENOUS at 09:14

## 2022-08-26 RX ADMIN — FENTANYL CITRATE 50 MCG: 50 INJECTION INTRAMUSCULAR; INTRAVENOUS at 10:03

## 2022-08-26 RX ADMIN — FENTANYL CITRATE 100 MCG: 50 INJECTION INTRAMUSCULAR; INTRAVENOUS at 09:20

## 2022-08-26 RX ADMIN — CEFAZOLIN SODIUM 2000 MG: 2 SOLUTION INTRAVENOUS at 09:16

## 2022-08-26 NOTE — OP NOTE
OPERATIVE REPORT  PATIENT NAME: Robert Strange    :  1955  MRN: [id number]  Pt Location: AN OR ROOM 04    SURGERY DATE: 2022    Surgeon(s) and Role:     * Jo Ann Wood DO - Primary     * Bel Murrieta PA-C - Assisting  No qualified resident was available throughout the case  Her assistance was required for exposure and retraction  Preop Diagnosis:  Chronic calculous cholecystitis [K[address] 10]    Post-Op Diagnosis Codes:     * Chronic calculous cholecystitis [K80 10]    Procedure(s) (LRB):  CHOLECYSTECTOMY LAPAROSCOPIC (N/A)    Specimen(s):  ID Type Source Tests Collected by Time Destination   1 : CC family/referring MD Tissue Gallbladder TISSUE EXAM Catrachitoa Jai Otoole DO 2022 [id number]        Estimated Blood Loss:   Minimal    Drains:  * No LDAs found *    Anesthesia Type:   General/ local    Operative Indications:  Chronic calculous cholecystitis [K80 10]      Operative Findings:  Changes consistent with chronic calculous cholecystitis  In general the liver looked okay  Perhaps micro nodular appearance  ASA 3  Wound class 2  Height 69 in weight 68 kg/150 lb  BMI 22    Complications:   None    Procedure and Technique:  Patient was brought the operative suite and identified by visualization, conversation, by armband  Sequential compression pumps were placed  He was given Ancef perioperatively  Once under anesthesia abdomen is then prepped and draped in a sterile fashion  Time-out was performed was assured that the prep was dry  Local was instilled at the supraumbilical fold  Small vertical skin incision was made in the underlying subcutaneous tissues were bluntly dissected with Kocher clamps down to the fascia  Fascia lifted up and divided the midline  I poked through the underlying peritoneum gaining access into the abdominal cavity  11 mm trocar was placed under direct visualization    CO2 was attached creating pneumoperitoneum 3 other 5 mm bladeless trocars then placed in the right upper quadrant  Gallbladder was lifted cephalad  Some omental adhesions to the undersurface were taken down with the Harmonic scalpel  Neck of the gallbladder was grasped and retracted laterally and anteriorly  Careful dissection was carried out to identify both cystic duct and cystic artery structures  Once critical view was obtained both were divided to Ligaclips  Gallbladder was then taken off the liver using variable speed Harmonic scalpel  Gallbladder was placed into an Endo-Catch bag  Irrigation is carried out  Pneumoperitoneum was evacuated  Gallbladder was brought to the umbilical port site  Other trocars removed  Fascia at the umbilical port site was closed using 0 Vicryl in a figure-of-eight fashion  Local was instilled  Four Monocryl was used to close skin incisions in a subcuticular fashion  Wounds were washed and dried  Sterile skin glue was applied  He was awakened in the operating room and returned to the recovery area in stable condition having tolerated the procedure well     I was present for the entire procedure    Patient Disposition:  PACU       SIGNATURE: James Scherer DO  DATE: August 26, 2022  TIME: 10:00 AM

## 2022-08-26 NOTE — DISCHARGE INSTRUCTIONS
Laparoscopic Cholecystectomy    WHAT YOU SHOULD KNOW:    Cholecystitis is inflammation of your gallbladder  Your gallbladder stores bile, which helps break down the fat that you eat  It also helps remove certain chemicals from your body  You may have a sudden, severe attack (acute cholecystitis) or several mild attacks (chronic cholecystitis)  Laparoscopic cholecystectomy is surgery to remove your gallbladder  During this surgery, small incisions are made in your abdomen  A small scope and special tools are inserted through these incisions  Your gallbladder is removed from it normal location and taken out of your abdomen  The incisions are closed with sutures and a small amount of glue is applied over top incisions to help reinforce the incisions to optimize healing  AFTER YOU LEAVE:  Following discharge from the hospital, you may have some questions about your procedure, your activities or your general condition  These instructions may answer some of your questions and help you adjust during the first few days following your operation  You can expect to be sore and tender mostly around the incisions  This pain should last approximally 5 days and gradually improve daily  Incisions: Your doctor may have chosen to use a type of adhesive glue, to close your incision  The glue is used to cover the incision, assist in closure, and prevent contamination so to optimize healing  This adhesive glue will darken and may appear as a purple film over the incision  Do not pick at the glue, it will peel away on its own within one to two weeks  You may apply ice to the incisions to help with pain  Avoid heat as this my make the glue tacky   It is normal to have some bruising, swelling or mild discoloration around the incision  If increasing redness or pain develops, call our office immediately  You may wash the incision gently with soap and water then pat dry  Do not apply any creams or ointments  Dressings: You do not usually need to keep incisions covered with a dressing  A dressing is only required if you had a drain following your surgery and the drain was removed prior to discharge  If a dressing is required the doctor will discuss the dressing with prior to leaving  You may remove the dressing for showering, but reapply when dry  Bathing: You may shower daily with soap and water the day after the procedure  It is OK to GENTLY wash the incision with soap and water then pat dry  Do NOT soak incision in a tub, pool, or hot tub for 2 weeks  Diet: Eat low-fat foods for 4 to 6 weeks while your body learns to digest fat without a gallbladder  Slowly increase the amount of fat that you eat  We recommend you slowly advance your diet  Try to start with softer bland foods and gradually advance as tolerated  Be sure to consume plenty of water  Avoid alcohol  Activity/Restrictions: The evening following the procedure you should rest as much as possible, sitting, lying or reclining  you should be sure someone remains with you until the next morning  Gradually increase your activity daily  Walking 3-4 daily is good and  stairs are ok  Listen to your body  If you start to get tired or sore then rest    No strenuous activity or exercise for 3-4 weeks  No driving for 5 days or while taking narcotics for pain  Return or work: You may return to work or other activities as soon as your pain is controlled and you feel comfortable  For many people, this is 5 to 7 days after surgery  If your job requires heavy lifting you will need to be on light duty for 2-3 weeks  Follow up appointment: following discharge from the hospital call the office in 1-2 days to set up a post operative appointment to be seen in 2-3 weeks  The phone number and address should be provided in your discharge paper work  Medication: Please take all medications as prescribed   Call your healthcare provider if you think your medicine is not helping or if you have side effects  If you were given a prescription for Percocet, Norco, or Vicodin for pain be sure to eat prior to taking as these medications as they may cause nausea and vomiting on an empty stomach  DO NOT take Tylenol with these medication for a fever or for further pain control as these medications already contain Tylenol in them  Contact your healthcare provider if:   You have a fever over 101°F (38°C) or chills  You have pain or nausea that is not relieved by medicine  You have redness and swelling around your incisions, or blood or pus is leaking from your incisions  You are constipated or have diarrhea  Your skin or eyes are yellow, or your bowel movements are pale  You have questions or concerns about your surgery, condition, or care  Seek care immediately or call 911 if:   You cannot stop vomiting  Your bowel movements are black or bloody  You have pain in your abdomen and it is swollen or hard  Your arm or leg feels warm, tender, and painful  It may look swollen and red  You feel lightheaded, short of breath, and have chest pain  You cough up blood

## 2022-08-26 NOTE — INTERVAL H&P NOTE
H&P reviewed  After examining the patient I find no changes in the patients condition since the H&P had been written      Vitals:    08/26/22 0838   BP: (!) 186/85   Pulse: 55   Resp: 16   Temp: (!) 97 °F (36 1 °C)   SpO2: 98%

## 2022-08-26 NOTE — ANESTHESIA POSTPROCEDURE EVALUATION
Post-Op Assessment Note    CV Status:  Stable  Pain Score: 0    Pain management: adequate     Mental Status:  Sleepy   Hydration Status:  Euvolemic   PONV Controlled:  Controlled   Airway Patency:  Patent      Post Op Vitals Reviewed: Yes      Staff: CRNA         No complications documented      BP  167/75   Temp (!) 97 1 °F (36 2 °C) (08/26/22 1012)    Pulse 58 (08/26/22 1012)   Resp 16 (08/26/22 1012)    SpO2 100 % (08/26/22 1012)

## 2022-08-31 PROCEDURE — 88304 TISSUE EXAM BY PATHOLOGIST: CPT | Performed by: PATHOLOGY

## 2022-11-30 ENCOUNTER — HOSPITAL ENCOUNTER (OUTPATIENT)
Dept: VASCULAR ULTRASOUND | Facility: HOSPITAL | Age: 67
Discharge: HOME | End: 2022-11-30
Attending: SURGERY

## 2022-11-30 DIAGNOSIS — I65.23 CAROTID STENOSIS, ASYMPTOMATIC, BILATERAL: ICD-10-CM

## 2022-12-01 ENCOUNTER — TRANSCRIBE ORDERS (OUTPATIENT)
Dept: VASCULAR SURGERY | Facility: CLINIC | Age: 67
End: 2022-12-01

## 2022-12-01 DIAGNOSIS — I65.23 BILATERAL CAROTID ARTERY STENOSIS: Primary | ICD-10-CM

## 2023-03-07 NOTE — ASSESSMENT & PLAN NOTE
Home regimen:  Coreg  Elevated BP on arrival 188/82  Plan:   Continue with home regimen  Continue to monitor BP  Administered By (Optional): Dr FANNY Espinoza

## 2023-10-13 ENCOUNTER — HOSPITAL ENCOUNTER (OUTPATIENT)
Facility: HOSPITAL | Age: 68
Setting detail: OBSERVATION
Discharge: HOME HEALTH | End: 2023-10-14
Attending: SURGERY | Admitting: INTERNAL MEDICINE
Payer: COMMERCIAL

## 2023-10-13 ENCOUNTER — APPOINTMENT (EMERGENCY)
Dept: RADIOLOGY | Facility: HOSPITAL | Age: 68
End: 2023-10-13
Payer: COMMERCIAL
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
## 2023-10-13 DIAGNOSIS — I16.0 HYPERTENSIVE URGENCY: ICD-10-CM

## 2023-10-13 DIAGNOSIS — R55 SYNCOPE: Primary | ICD-10-CM

## 2023-10-13 DIAGNOSIS — S09.90XA CLOSED HEAD INJURY, INITIAL ENCOUNTER: ICD-10-CM

## 2023-10-13 DIAGNOSIS — N18.31 STAGE 3A CHRONIC KIDNEY DISEASE (HCC): Chronic | ICD-10-CM

## 2023-10-13 LAB
2HR DELTA HS TROPONIN: 1 NG/L
4HR DELTA HS TROPONIN: 6 NG/L
ABO GROUP BLD: NORMAL
ALBUMIN SERPL BCP-MCNC: 4.1 G/DL (ref 3.5–5)
ALP SERPL-CCNC: 105 U/L (ref 34–104)
ALT SERPL W P-5'-P-CCNC: 15 U/L (ref 7–52)
ANION GAP SERPL CALCULATED.3IONS-SCNC: 14 MMOL/L
AST SERPL W P-5'-P-CCNC: 26 U/L (ref 13–39)
ATRIAL RATE: 258 BPM
BASOPHILS # BLD AUTO: 0.02 THOUSANDS/ÂΜL (ref 0–0.1)
BASOPHILS NFR BLD AUTO: 0 % (ref 0–1)
BILIRUB SERPL-MCNC: 0.84 MG/DL (ref 0.2–1)
BLD GP AB SCN SERPL QL: NEGATIVE
BUN SERPL-MCNC: 19 MG/DL (ref 5–25)
CALCIUM SERPL-MCNC: 8.8 MG/DL (ref 8.4–10.2)
CARDIAC TROPONIN I PNL SERPL HS: 21 NG/L
CARDIAC TROPONIN I PNL SERPL HS: 22 NG/L
CARDIAC TROPONIN I PNL SERPL HS: 27 NG/L
CHLORIDE SERPL-SCNC: 97 MMOL/L (ref 96–108)
CO2 SERPL-SCNC: 26 MMOL/L (ref 21–32)
CREAT SERPL-MCNC: 1.15 MG/DL (ref 0.6–1.3)
EOSINOPHIL # BLD AUTO: 0.01 THOUSAND/ÂΜL (ref 0–0.61)
EOSINOPHIL NFR BLD AUTO: 0 % (ref 0–6)
ERYTHROCYTE [DISTWIDTH] IN BLOOD BY AUTOMATED COUNT: 14.6 % (ref 11.6–15.1)
GFR SERPL CREATININE-BSD FRML MDRD: 65 ML/MIN/1.73SQ M
GLUCOSE SERPL-MCNC: 96 MG/DL (ref 65–140)
HCT VFR BLD AUTO: 44.2 % (ref 36.5–49.3)
HGB BLD-MCNC: 14.9 G/DL (ref 12–17)
IMM GRANULOCYTES # BLD AUTO: 0.03 THOUSAND/UL (ref 0–0.2)
IMM GRANULOCYTES NFR BLD AUTO: 0 % (ref 0–2)
LYMPHOCYTES # BLD AUTO: 1.21 THOUSANDS/ÂΜL (ref 0.6–4.47)
LYMPHOCYTES NFR BLD AUTO: 15 % (ref 14–44)
MCH RBC QN AUTO: 27.6 PG (ref 26.8–34.3)
MCHC RBC AUTO-ENTMCNC: 33.7 G/DL (ref 31.4–37.4)
MCV RBC AUTO: 82 FL (ref 82–98)
MONOCYTES # BLD AUTO: 0.77 THOUSAND/ÂΜL (ref 0.17–1.22)
MONOCYTES NFR BLD AUTO: 9 % (ref 4–12)
NEUTROPHILS # BLD AUTO: 6.21 THOUSANDS/ÂΜL (ref 1.85–7.62)
NEUTS SEG NFR BLD AUTO: 76 % (ref 43–75)
NRBC BLD AUTO-RTO: 0 /100 WBCS
PLATELET # BLD AUTO: 173 THOUSANDS/UL (ref 149–390)
PMV BLD AUTO: 10.5 FL (ref 8.9–12.7)
POTASSIUM SERPL-SCNC: 3.5 MMOL/L (ref 3.5–5.3)
PROT SERPL-MCNC: 7.3 G/DL (ref 6.4–8.4)
QRS AXIS: 67 DEGREES
QRSD INTERVAL: 74 MS
QT INTERVAL: 356 MS
QTC INTERVAL: 454 MS
RBC # BLD AUTO: 5.4 MILLION/UL (ref 3.88–5.62)
RH BLD: POSITIVE
SODIUM SERPL-SCNC: 137 MMOL/L (ref 135–147)
SPECIMEN EXPIRATION DATE: NORMAL
T WAVE AXIS: 73 DEGREES
VENTRICULAR RATE: 98 BPM
VIT B12 SERPL-MCNC: 355 PG/ML (ref 180–914)
WBC # BLD AUTO: 8.25 THOUSAND/UL (ref 4.31–10.16)

## 2023-10-13 PROCEDURE — 71045 X-RAY EXAM CHEST 1 VIEW: CPT

## 2023-10-13 PROCEDURE — 85025 COMPLETE CBC W/AUTO DIFF WBC: CPT | Performed by: SURGERY

## 2023-10-13 PROCEDURE — 99223 1ST HOSP IP/OBS HIGH 75: CPT | Performed by: INTERNAL MEDICINE

## 2023-10-13 PROCEDURE — 93010 ELECTROCARDIOGRAM REPORT: CPT | Performed by: INTERNAL MEDICINE

## 2023-10-13 PROCEDURE — 80053 COMPREHEN METABOLIC PANEL: CPT | Performed by: SURGERY

## 2023-10-13 PROCEDURE — 72125 CT NECK SPINE W/O DYE: CPT

## 2023-10-13 PROCEDURE — 36415 COLL VENOUS BLD VENIPUNCTURE: CPT

## 2023-10-13 PROCEDURE — 86901 BLOOD TYPING SEROLOGIC RH(D): CPT | Performed by: SURGERY

## 2023-10-13 PROCEDURE — 86850 RBC ANTIBODY SCREEN: CPT | Performed by: SURGERY

## 2023-10-13 PROCEDURE — 86900 BLOOD TYPING SEROLOGIC ABO: CPT | Performed by: SURGERY

## 2023-10-13 PROCEDURE — 84484 ASSAY OF TROPONIN QUANT: CPT | Performed by: SURGERY

## 2023-10-13 PROCEDURE — 93005 ELECTROCARDIOGRAM TRACING: CPT

## 2023-10-13 PROCEDURE — 99285 EMERGENCY DEPT VISIT HI MDM: CPT

## 2023-10-13 PROCEDURE — 96374 THER/PROPH/DIAG INJ IV PUSH: CPT

## 2023-10-13 PROCEDURE — EDAIR PR ED AIR: Performed by: EMERGENCY MEDICINE

## 2023-10-13 PROCEDURE — 82607 VITAMIN B-12: CPT | Performed by: INTERNAL MEDICINE

## 2023-10-13 PROCEDURE — 99205 OFFICE O/P NEW HI 60 MIN: CPT | Performed by: SURGERY

## 2023-10-13 PROCEDURE — 70450 CT HEAD/BRAIN W/O DYE: CPT

## 2023-10-13 RX ORDER — PANTOPRAZOLE SODIUM 20 MG/1
20 TABLET, DELAYED RELEASE ORAL
Status: DISCONTINUED | OUTPATIENT
Start: 2023-10-13 | End: 2023-10-14 | Stop reason: HOSPADM

## 2023-10-13 RX ORDER — AMLODIPINE BESYLATE 5 MG/1
5 TABLET ORAL DAILY
Status: DISCONTINUED | OUTPATIENT
Start: 2023-10-13 | End: 2023-10-14 | Stop reason: HOSPADM

## 2023-10-13 RX ORDER — SERTRALINE HYDROCHLORIDE 100 MG/1
100 TABLET, FILM COATED ORAL DAILY
Status: DISCONTINUED | OUTPATIENT
Start: 2023-10-13 | End: 2023-10-14 | Stop reason: HOSPADM

## 2023-10-13 RX ORDER — SENNOSIDES 8.6 MG
2 TABLET ORAL DAILY PRN
Status: DISCONTINUED | OUTPATIENT
Start: 2023-10-13 | End: 2023-10-14 | Stop reason: HOSPADM

## 2023-10-13 RX ORDER — ONDANSETRON 2 MG/ML
4 INJECTION INTRAMUSCULAR; INTRAVENOUS EVERY 6 HOURS PRN
Status: DISCONTINUED | OUTPATIENT
Start: 2023-10-13 | End: 2023-10-14 | Stop reason: HOSPADM

## 2023-10-13 RX ORDER — NICOTINE 21 MG/24HR
1 PATCH, TRANSDERMAL 24 HOURS TRANSDERMAL DAILY
Status: DISCONTINUED | OUTPATIENT
Start: 2023-10-13 | End: 2023-10-14 | Stop reason: HOSPADM

## 2023-10-13 RX ORDER — DOCUSATE SODIUM 100 MG/1
100 CAPSULE, LIQUID FILLED ORAL 2 TIMES DAILY
Status: DISCONTINUED | OUTPATIENT
Start: 2023-10-13 | End: 2023-10-14 | Stop reason: HOSPADM

## 2023-10-13 RX ORDER — LISINOPRIL 10 MG/1
10 TABLET ORAL DAILY
Status: DISCONTINUED | OUTPATIENT
Start: 2023-10-13 | End: 2023-10-14 | Stop reason: HOSPADM

## 2023-10-13 RX ORDER — ONDANSETRON 2 MG/ML
4 INJECTION INTRAMUSCULAR; INTRAVENOUS ONCE
Status: COMPLETED | OUTPATIENT
Start: 2023-10-13 | End: 2023-10-13

## 2023-10-13 RX ORDER — ACETAMINOPHEN 325 MG/1
650 TABLET ORAL EVERY 6 HOURS PRN
Status: DISCONTINUED | OUTPATIENT
Start: 2023-10-13 | End: 2023-10-14 | Stop reason: HOSPADM

## 2023-10-13 RX ORDER — CARVEDILOL 25 MG/1
25 TABLET ORAL 2 TIMES DAILY WITH MEALS
Status: DISCONTINUED | OUTPATIENT
Start: 2023-10-13 | End: 2023-10-14 | Stop reason: HOSPADM

## 2023-10-13 RX ORDER — PRAVASTATIN SODIUM 40 MG
40 TABLET ORAL
Status: DISCONTINUED | OUTPATIENT
Start: 2023-10-13 | End: 2023-10-14 | Stop reason: HOSPADM

## 2023-10-13 RX ORDER — ALBUTEROL SULFATE 90 UG/1
2 AEROSOL, METERED RESPIRATORY (INHALATION) AS NEEDED
Status: DISCONTINUED | OUTPATIENT
Start: 2023-10-13 | End: 2023-10-14 | Stop reason: HOSPADM

## 2023-10-13 RX ORDER — LABETALOL HYDROCHLORIDE 5 MG/ML
10 INJECTION, SOLUTION INTRAVENOUS EVERY 4 HOURS PRN
Status: DISCONTINUED | OUTPATIENT
Start: 2023-10-13 | End: 2023-10-14 | Stop reason: HOSPADM

## 2023-10-13 RX ORDER — CALCIUM CARBONATE 500 MG/1
1000 TABLET, CHEWABLE ORAL DAILY PRN
Status: DISCONTINUED | OUTPATIENT
Start: 2023-10-13 | End: 2023-10-14 | Stop reason: HOSPADM

## 2023-10-13 RX ORDER — BACLOFEN 10 MG/1
10 TABLET ORAL DAILY
Status: DISCONTINUED | OUTPATIENT
Start: 2023-10-13 | End: 2023-10-14 | Stop reason: HOSPADM

## 2023-10-13 RX ORDER — IPRATROPIUM BROMIDE AND ALBUTEROL SULFATE .5; 3 MG/3ML; MG/3ML
1 SOLUTION RESPIRATORY (INHALATION) ONCE
Status: COMPLETED | OUTPATIENT
Start: 2023-10-13 | End: 2023-10-13

## 2023-10-13 RX ADMIN — AMLODIPINE BESYLATE 5 MG: 5 TABLET ORAL at 12:36

## 2023-10-13 RX ADMIN — UMECLIDINIUM 1 PUFF: 62.5 AEROSOL, POWDER ORAL at 17:01

## 2023-10-13 RX ADMIN — BUSPIRONE HYDROCHLORIDE 15 MG: 10 TABLET ORAL at 17:05

## 2023-10-13 RX ADMIN — PANTOPRAZOLE SODIUM 20 MG: 20 TABLET, DELAYED RELEASE ORAL at 12:36

## 2023-10-13 RX ADMIN — OLODATEROL RESPIMAT INHALATION SPRAY 2 PUFF: 2.5 SPRAY, METERED RESPIRATORY (INHALATION) at 17:01

## 2023-10-13 RX ADMIN — BACLOFEN 10 MG: 10 TABLET ORAL at 12:35

## 2023-10-13 RX ADMIN — BUSPIRONE HYDROCHLORIDE 15 MG: 10 TABLET ORAL at 12:35

## 2023-10-13 RX ADMIN — PRAVASTATIN SODIUM 40 MG: 40 TABLET ORAL at 17:01

## 2023-10-13 RX ADMIN — SERTRALINE 100 MG: 100 TABLET, FILM COATED ORAL at 12:35

## 2023-10-13 RX ADMIN — CARVEDILOL 25 MG: 25 TABLET, FILM COATED ORAL at 17:01

## 2023-10-13 RX ADMIN — NICOTINE 1 PATCH: 14 PATCH, EXTENDED RELEASE TRANSDERMAL at 12:36

## 2023-10-13 RX ADMIN — APIXABAN 5 MG: 5 TABLET, FILM COATED ORAL at 12:36

## 2023-10-13 RX ADMIN — ONDANSETRON 4 MG: 2 INJECTION INTRAMUSCULAR; INTRAVENOUS at 10:23

## 2023-10-13 RX ADMIN — APIXABAN 5 MG: 5 TABLET, FILM COATED ORAL at 17:05

## 2023-10-13 RX ADMIN — DOCUSATE SODIUM 100 MG: 100 CAPSULE, LIQUID FILLED ORAL at 12:36

## 2023-10-13 RX ADMIN — LISINOPRIL 10 MG: 10 TABLET ORAL at 12:36

## 2023-10-13 NOTE — LETTER
[address] [place] [address]  [address] [facility] [address]  Dept: [phone]    October 14, 2023     Patient: Kylah Anne   YOB: 1955   Date of Visit: 10/13/2023       To Whom it May Concern:    Ira Thomas is under my professional care. He was seen in the hospital from 10/13/2023 to 10/14/23. He will be discharged home later early evening 10/14/2023. If you have any questions or concerns, please don't hesitate to call.          Sincerely,          Jessica Palacios, DNP
You can access the FollowMyHealth Patient Portal offered by [facility] by registering at the following website: [email or web address]. By joining [employer]’s FollowMyHealth portal, you will also be able to view your health information using other applications (apps) compatible with our system.

## 2023-10-13 NOTE — H&P
[address] [facility]  H&P  Name: Evelina Yang 80 y.o. male I MRN: [id number]  Unit/Bed#: CW2 210-01 I Date of Admission: 10/13/2023   Date of Service: 10/13/2023 I Hospital Day: 0      Assessment/Plan   * Syncope and collapse  Assessment & Plan  Suspect this is due to hypertensive encephalopathy  BP severely elevated upon arrival to ED  Reports compliance with his medications but did have vomiting yesterday. Check orthostatic Bps, echocardiogram, B12  Recently had a neurologic evaluation at [facility] which was unrevealing except for carotid stenosis, no new CVA  Will restart his home medications and monitor BP and gait. PT/OT evaluation  If gait abnormality is persistent will check another MRI of the brain to rule out new infarction    Hypertensive urgency  Assessment & Plan  Reports compliance with his medications but had vomiting yesterday and presented in hypertensive urgency. Restart amlodipine benazepril, coreg  Monitor Bps  PRN labetalol  May need further medication adjustment if he remains uncontrolled    Atrial fibrillation [facility])  Assessment & Plan  Rates are controlled continue coreg  Eliquis for [facility]    Stage 3a chronic kidney disease [facility])  Assessment & Plan  Lab Results   Component Value Date    EGFR 65 10/13/2023    EGFR 69 07/14/2022    EGFR 85 05/14/2022    CREATININE 1.15 10/13/2023    CREATININE 1.11 07/14/2022    CREATININE 0.93 05/14/2022   Renal function is at his baseline  Avoid NSAIDS and nephrotoxins  Monitor renal function periodically while inpatient    COPD (chronic obstructive pulmonary disease) ([address])  Assessment & Plan  Respiratory status is at baseline  Continue maintenance inhalers           VTE Pharmacologic Prophylaxis: VTE Score: 3 Moderate Risk (Score 3-4) - Pharmacological DVT Prophylaxis Ordered: apixaban (Eliquis). Code Status: Level 1 - Full Code   Discussion with family: Patient declined call to .      Anticipated Length of Stay: Patient will be admitted on an observation basis with an anticipated length of stay of less than 2 midnights secondary to syncope. Total Time Spent on Date of Encounter in care of patient: 45 mins. This time was spent on one or more of the following: performing physical exam; counseling and coordination of care; obtaining or reviewing history; documenting in the medical record; reviewing/ordering tests, medications or procedures; communicating with other healthcare professionals and discussing with patient's family/caregivers. Chief Complaint: fall    History of Present Illness:  Evelina Yang is a 80 y.o. male with a PMH of CVA who presents with syncope. He reports having dyspepsia and vomiting prior to his syncope. He awoke early in the morning to use the toilet. When standing he lost consciousness and hit his head on the tub. He presented to the ED for evaluation and was cleared by the trauma resident for a medical observation. Currently he reports head pain where he struck his head. Review of Systems:  Review of Systems   All other systems reviewed and are negative.       Past Medical and Surgical History:   Past Medical History:   Diagnosis Date    PRITI (acute kidney injury) ([address]) 09/05/2021    Anxiety     Back pain     Claustrophobia     COPD (chronic obstructive pulmonary disease) (HCC)     Dysphagia 09/05/2021    Fatty liver     GERD (gastroesophageal reflux disease)     History of transfusion     Hyperlipidemia     Hypertension     Hypokalemia 02/22/2022    Lumbar back pain     Panic attacks     Stenosis of right carotid artery     Stroke [facility])     Wears glasses     Wears partial dentures     upper denture       Past Surgical History:   Procedure Laterality Date    COLONOSCOPY      ERCP      IR CEREBRAL ANGIOGRAPHY  04/06/2021    OTHER SURGICAL HISTORY      fertility    ND LAPAROSCOPY SURG CHOLECYSTECTOMY N/A 8/26/2022    Procedure: CHOLECYSTECTOMY LAPAROSCOPIC;  Surgeon: Pascual Otoole DO;  Location: [facility];  Service: General    [place] SLCTV CATHJ 3RD+ ORD SLCTV ABDL PEL/LXTR [facility] Right 04/06/2021    Procedure: ARTERIOGRAM, carotid Angio;  Surgeon: Andre Calle MD;  Location: [facility];  Service: Vascular    OH TEAEC W/PATCH GRF CAROTID VERTB [address] Right 04/13/2021    Procedure: RIGHT ENDARTERECTOMY ARTERY CAROTID WITH BOVINE PATCH;  Surgeon: Andre Calle MD;  Location: [facility] MAIN OR;  Service: Vascular       Meds/Allergies:  Prior to Admission medications    Medication Sig Start Date End Date Taking? Authorizing Provider   albuterol (PROVENTIL HFA,VENTOLIN HFA) 90 mcg/act inhaler Inhale 2 puffs if needed 10/8/21   Historical Provider, MD   amLODIPine-benazepril (LOTREL 5-10) 5-10 MG per capsule Take 1 capsule by mouth daily    Historical Provider, MD   apixaban (ELIQUIS) 5 mg Take 1 tablet (5 mg total) by mouth 2 (two) times a day 9/17/21   Ginger Solomon MD   baclofen 10 mg tablet Take 10 mg by mouth in the morning    Historical Provider, MD   busPIRone (BUSPAR) 15 mg tablet 15 mg 2 (two) times a day 4/27/22   Historical Provider, MD   carvedilol (COREG) 25 mg tablet Take 1 tablet (25 mg total) by mouth 2 (two) times a day with meals 7/14/22 8/13/22  Kamilah Ann DO   omeprazole (PriLOSEC) 20 mg delayed release capsule Take 20 mg by mouth daily   10/4/21   Historical Provider, MD   patient supplied medication Allergy Relief medicine 10 mg daily    Historical Provider, MD   pravastatin (PRAVACHOL) 40 mg tablet Take 40 mg by mouth daily    Historical Provider, MD   sertraline (ZOLOFT) 100 mg tablet Take 100 mg by mouth daily   1/19/11   Historical Provider, MD   Stiolto Respimat 2.5-2.5 MCG/ACT inhaler Inhale 2 puffs daily 2/24/22   Historical Provider, MD RESTREPO have reviewed home medications with patient personally. Allergies:    Allergies   Allergen Reactions    Penicillins Anaphylaxis       Social History:  Marital Status: /Civil Union   Occupation: disabled  Patient Pre-hospital Living Situation: Home  Patient Pre-hospital Level of Mobility: walks  Patient Pre-hospital Diet Restrictions: None  Substance Use History:   Social History     Substance and Sexual Activity   Alcohol Use Yes    Alcohol/week: 7.0 standard drinks of alcohol    Types: 2 Glasses of wine, 5 Cans of beer per week    Comment: per week     Social History     Tobacco Use   Smoking Status Every Day    Packs/day: 0.50    Years: 47.00    Total pack years: 23.50    Types: Cigarettes    Start date: 1/1/1975   Smokeless Tobacco Never     Social History     Substance and Sexual Activity   Drug Use Never    Comment: never       Family History:  Family History   Problem Relation Age of Onset    Asthma Mother        Physical Exam:     Vitals:   Blood Pressure: (!) 211/123 (10/13/23 1139)  Pulse: 94 (10/13/23 1139)  Temperature: 98 °F (36.7 °C) (10/13/23 1139)  Temp Source: Oral (10/13/23 1139)  Respirations: 20 (10/13/23 1139)  Weight - Scale: 67.8 kg (149 lb 7.6 oz) (10/13/23 0929)  SpO2: 96 % (10/13/23 1139)    Physical Exam  Constitutional:       General: He is not in acute distress. Appearance: Normal appearance. He is not ill-appearing or toxic-appearing. HENT:      Head: Normocephalic and atraumatic. Nose: Nose normal.      Mouth/Throat:      Mouth: Mucous membranes are moist.      Pharynx: Oropharynx is clear. Eyes:      General: No scleral icterus. Extraocular Movements: Extraocular movements intact. Pupils: Pupils are equal, round, and reactive to light. Cardiovascular:      Rate and Rhythm: Tachycardia present. Rhythm irregular. Heart sounds: No murmur heard. Pulmonary:      Effort: Pulmonary effort is normal.      Breath sounds: No wheezing or rales. Abdominal:      General: There is no distension. Palpations: Abdomen is soft. Tenderness: There is no abdominal tenderness. Musculoskeletal:      Cervical back: Normal range of motion. No rigidity.       Right lower leg: No edema. Left lower leg: No edema. Skin:     General: Skin is warm and dry. Neurological:      Mental Status: He is alert and oriented to person, place, and time. Cranial Nerves: No cranial nerve deficit. Motor: No weakness. Gait: Gait abnormal.   Psychiatric:         Mood and Affect: Mood normal.         Behavior: Behavior normal.          Additional Data:     Lab Results:  Results from last 7 days   Lab Units 10/13/23  0916   WBC Thousand/uL 8.25   HEMOGLOBIN g/dL 14.9   HEMATOCRIT % 44.2   PLATELETS Thousands/uL 173   NEUTROS PCT % 76*   LYMPHS PCT % 15   MONOS PCT % 9   EOS PCT % 0     Results from last 7 days   Lab Units 10/13/23  0916   SODIUM mmol/L 137   POTASSIUM mmol/L 3.5   CHLORIDE mmol/L 97   CO2 mmol/L 26   BUN mg/dL 19   CREATININE mg/dL 1.15   ANION GAP mmol/L 14   CALCIUM mg/dL 8.8   ALBUMIN g/dL 4.1   TOTAL BILIRUBIN mg/dL 0.84   ALK PHOS U/L 105*   ALT U/L 15   AST U/L 26   GLUCOSE RANDOM mg/dL 96                       Lines/Drains:  Invasive Devices       Peripheral Intravenous Line  Duration             Peripheral IV 10/13/23 Dorsal (posterior); Left Forearm <1 day                        Imaging: Reviewed radiology reports from this admission including: chest xray and CT head  TRAUMA - CT head wo contrast   Final Result by Brina Pierre MD (10/13 1014)      No acute intracranial abnormality. I personally discussed this study with Dr. Yee West TO on 10/13/2023 10:14 AM.                        Workstation performed: [id number]         TRAUMA - CT spine cervical wo contrast   Final Result by Brina Pierre MD (10/13 1014)      No cervical spine fracture or traumatic malalignment. I personally discussed this study with Dr. Yee West TO on 10/13/2023 10:12 AM.                        Workstation performed: [id number]         XR chest 1 view portable   Final Result by Brina Pierre MD (10/13 1016)      No acute cardiopulmonary disease.                   Workstation performed: [id number]             EKG and Other Studies Reviewed on Admission:   EKG: Atrial fibrillation. HR 95.    ** Please Note: This note has been constructed using a voice recognition system.  **

## 2023-10-13 NOTE — ASSESSMENT & PLAN NOTE
Reports compliance with his medications but had vomiting yesterday and presented in hypertensive urgency.   Restart amlodipine benazepril, coreg  Monitor Bps  PRN labetalol  May need further medication adjustment if he remains uncontrolled

## 2023-10-13 NOTE — ASSESSMENT & PLAN NOTE
Suspect this is due to hypertensive encephalopathy  BP severely elevated upon arrival to ED  Reports compliance with his medications but did have vomiting yesterday. Check orthostatic Bps, echocardiogram, B12  Recently had a neurologic evaluation at [facility] which was unrevealing except for carotid stenosis, no new CVA  Will restart his home medications and monitor BP and gait.   PT/OT evaluation  If gait abnormality is persistent will check another MRI of the brain to rule out new infarction

## 2023-10-13 NOTE — ED PROVIDER NOTES
Emergency Department Airway Evaluation and Management Form    History  Obtained from: patient   Review of patient's allergies indicates no known allergies. Chief Complaint:  Trauma Alert    HPI: Pt is a 80 y.o. male presents s/p fall multiple times in the last 24hrs on eliquis      I have reviewed and agree with the history as documented.       Physical Exam    Vitals:    10/13/23 0908   BP: (!) 219/106   Pulse: 100   Resp: 20   Temp: 98.5 °F (36.9 °C)   SpO2: 97%     Supplemental Oxygen:none    GCS: 15    Neuro: Alert and oriented  Psych: not combative, not anxious, cooperative for exam  Neck: In collar, No JVD, No midline tenderness  Cardio:  Normal  Respiratory: Normal  Mouth:  Normal  Pharynx: Normal    Monitor:  NSR      ED Medications      Current Facility-Administered Medications:     ipratropium-albuterol (FOR EMS ONLY) (DUO-NEB) 0.5-2.5 mg/3 mL inhalation solution 3 mL, 1 each, Does not apply, Once, Triage Protocol Emergency, MD    Current Outpatient Medications:     albuterol (PROVENTIL HFA,VENTOLIN HFA) 90 mcg/act inhaler, Inhale 2 puffs if needed, Disp: , Rfl:     amLODIPine-benazepril (LOTREL 5-10) 5-10 MG per capsule, Take 1 capsule by mouth daily, Disp: , Rfl:     apixaban (ELIQUIS) 5 mg, Take 1 tablet (5 mg total) by mouth 2 (two) times a day, Disp: 60 tablet, Rfl: 0    baclofen 10 mg tablet, Take 10 mg by mouth in the morning, Disp: , Rfl:     busPIRone (BUSPAR) 15 mg tablet, 15 mg 2 (two) times a day, Disp: , Rfl:     carvedilol (COREG) 25 mg tablet, Take 1 tablet (25 mg total) by mouth 2 (two) times a day with meals, Disp: 60 tablet, Rfl: 0    omeprazole (PriLOSEC) 20 mg delayed release capsule, Take 20 mg by mouth daily  , Disp: , Rfl:     patient supplied medication, Allergy Relief medicine 10 mg daily, Disp: , Rfl:     pravastatin (PRAVACHOL) 40 mg tablet, Take 40 mg by mouth daily, Disp: , Rfl:     sertraline (ZOLOFT) 100 mg tablet, Take 100 mg by mouth daily  , Disp: , Rfl:     Stiolto Respimat 2.5-2.5 MCG/ACT inhaler, Inhale 2 puffs daily, Disp: , Rfl:       Intubation    No intubation required    Final Diagnosis:  Head injury     ED Provider  Electronically Signed by         Teodoro Lamb DO  10/13/23 0966

## 2023-10-13 NOTE — PLAN OF CARE
Problem: Potential for Falls  Goal: Patient will remain free of falls  Description: INTERVENTIONS:  - Educate patient/family on patient safety including physical limitations  - Instruct patient to call for assistance with activity   - Consult OT/PT to assist with strengthening/mobility   - Keep Call bell within reach  - Keep bed low and locked with side rails adjusted as appropriate  - Keep care items and personal belongings within reach  - Initiate and maintain comfort rounds  - Make Fall Risk Sign visible to staff  - Offer Toileting every   Hours, in advance of need  - Initiate/Maintain  alarm  - Obtain necessary fall risk management equipment:    - Apply yellow socks and bracelet for high fall risk patients  - Consider moving patient to room near nurses station  Outcome: Progressing     Problem: PAIN - ADULT  Goal: Verbalizes/displays adequate comfort level or baseline comfort level  Description: Interventions:  - Encourage patient to monitor pain and request assistance  - Assess pain using appropriate pain scale  - Administer analgesics based on type and severity of pain and evaluate response  - Implement non-pharmacological measures as appropriate and evaluate response  - Consider cultural and social influences on pain and pain management  - Notify physician/advanced practitioner if interventions unsuccessful or patient reports new pain  Outcome: Progressing     Problem: INFECTION - ADULT  Goal: Absence or prevention of progression during hospitalization  Description: INTERVENTIONS:  - Assess and monitor for signs and symptoms of infection  - Monitor lab/diagnostic results  - Monitor all insertion sites, i.e. indwelling lines, tubes, and drains  - Monitor endotracheal if appropriate and nasal secretions for changes in amount and color  - [place] appropriate cooling/warming therapies per order  - Administer medications as ordered  - Instruct and encourage patient and family to use good hand hygiene technique  - Identify and instruct in appropriate isolation precautions for identified infection/condition  Outcome: Progressing  Goal: Absence of fever/infection during neutropenic period  Description: INTERVENTIONS:  - Monitor WBC    Outcome: Progressing     Problem: SAFETY ADULT  Goal: Patient will remain free of falls  Description: INTERVENTIONS:  - Educate patient/family on patient safety including physical limitations  - Instruct patient to call for assistance with activity   - Consult OT/PT to assist with strengthening/mobility   - Keep Call bell within reach  - Keep bed low and locked with side rails adjusted as appropriate  - Keep care items and personal belongings within reach  - Initiate and maintain comfort rounds  - Make Fall Risk Sign visible to staff  - Offer Toileting every   Hours, in advance of need  - Initiate/Maintain  alarm  - Obtain necessary fall risk management equipment:    - Apply yellow socks and bracelet for high fall risk patients  - Consider moving patient to room near nurses station  Outcome: Progressing  Goal: Maintain or return to baseline ADL function  Description: INTERVENTIONS:  -  Assess patient's ability to carry out ADLs; assess patient's baseline for ADL function and identify physical deficits which impact ability to perform ADLs (bathing, care of mouth/teeth, toileting, grooming, dressing, etc.)  - Assess/evaluate cause of self-care deficits   - Assess range of motion  - Assess patient's mobility; develop plan if impaired  - Assess patient's need for assistive devices and provide as appropriate  - Encourage maximum independence but intervene and supervise when necessary  - Involve family in performance of ADLs  - Assess for home care needs following discharge   - Consider OT consult to assist with ADL evaluation and planning for discharge  - Provide patient education as appropriate  Outcome: Progressing  Goal: Maintains/Returns to pre admission functional level  Description: INTERVENTIONS:  - Perform BMAT or MOVE assessment daily.   - Set and communicate daily mobility goal to care team and patient/family/caregiver. - Collaborate with rehabilitation services on mobility goals if consulted  - Perform Range of Motion   times a day. - Reposition patient every   hours. - Dangle patient   times a day  - Stand patient   times a day  - Ambulate patient   times a day  - Out of bed to chair   times a day   - Out of bed for meals    times a day  - Out of bed for toileting  - Record patient progress and toleration of activity level   Outcome: Progressing     Problem: DISCHARGE PLANNING  Goal: Discharge to home or other facility with appropriate resources  Description: INTERVENTIONS:  - Identify barriers to discharge w/patient and caregiver  - Arrange for needed discharge resources and transportation as appropriate  - Identify discharge learning needs (meds, wound care, etc.)  - Arrange for interpretive services to assist at discharge as needed  - Refer to Case Management Department for coordinating discharge planning if the patient needs post-hospital services based on physician/advanced practitioner order or complex needs related to functional status, cognitive ability, or social support system  Outcome: Progressing     Problem: Knowledge Deficit  Goal: Patient/family/caregiver demonstrates understanding of disease process, treatment plan, medications, and discharge instructions  Description: Complete learning assessment and assess knowledge base.   Interventions:  - Provide teaching at level of understanding  - Provide teaching via preferred learning methods  Outcome: Progressing

## 2023-10-13 NOTE — ASSESSMENT & PLAN NOTE
Lab Results   Component Value Date    EGFR 65 10/13/2023    EGFR 69 07/14/2022    EGFR 85 05/14/2022    CREATININE 1.15 10/13/2023    CREATININE 1.11 07/14/2022    CREATININE 0.93 05/14/2022   Renal function is at his baseline  Avoid NSAIDS and nephrotoxins  Monitor renal function periodically while inpatient

## 2023-10-13 NOTE — H&P
[facility]  H&P  Name: Roberto Anne 80 y.o. male I MRN: [id number]  Unit/Bed#: ED 24 I Date of Admission: 10/13/2023   Date of Service: 10/13/2023 I Hospital Day: 0      Trauma Alert: Level B  Model of Arrival: Ambulance  Trauma Team: Attending To and Residents [place]  Consultants:     None    Active problems/Assessment:  Fall  Closed head injury  Coagulopathy secondary to anticoagulation  Syncope    Trauma Plan:   CTH  CT c spine  CXR  EKG  Labs  Imaging negative for acute traumatic injuries. Admit to medicine for further evaluation of syncope. Chief Complaint: Dizziness    History of Present Illness   HPI:  Roberto Anne is a 80 y.o. male with a history of A-fib on Eliquis, hypertension, hyperlipidemia, COPD, right carotid artery stenosis, stroke who presents complaining of dizziness that began yesterday morning. The patient describes the dizziness as lightheadedness. The patient denies vertigo. The patient reports that he fell twice last night and thinks he syncopized. The patient states that he struck the back right side of his head and the right side of his forehead. The patient states that he has mild pain at these sites. The patient also reports nausea with 2 episodes of vomiting last night. The patient denies fever, chills, visual disturbances, numbness or weakness in his extremities, chest pain, palpitations, shortness of breath. Review of Systems   Constitutional:  Negative for chills and fever. HENT:  Negative for congestion and sore throat. Eyes:  Negative for visual disturbance. Respiratory:  Positive for cough and shortness of breath. Chronic   Cardiovascular:  Negative for chest pain, palpitations and leg swelling. Gastrointestinal:  Negative for abdominal pain, blood in stool, nausea and vomiting. Genitourinary:  Negative for dysuria and hematuria. Musculoskeletal:  Positive for neck pain.  Negative for back pain and neck stiffness. Skin:  Negative for color change, pallor, rash and wound. Neurological:  Positive for syncope and light-headedness. Negative for seizures, facial asymmetry, speech difficulty, weakness and numbness. All other systems reviewed and are negative.       Historical Information     Past Medical History:   Past Medical History:   Diagnosis Date    PRITI (acute kidney injury) ([address]) 09/05/2021    Anxiety     Back pain     Claustrophobia     COPD (chronic obstructive pulmonary disease) (HCC)     Dysphagia 09/05/2021    Fatty liver     GERD (gastroesophageal reflux disease)     History of transfusion     Hyperlipidemia     Hypertension     Hypokalemia 02/22/2022    Lumbar back pain     Panic attacks     Stenosis of right carotid artery     Stroke [facility])     Wears glasses     Wears partial dentures     upper denture       Past Surgical History:   Past Surgical History:   Procedure Laterality Date    COLONOSCOPY      ERCP      IR CEREBRAL ANGIOGRAPHY  04/06/2021    OTHER SURGICAL HISTORY      fertility    AZ LAPAROSCOPY SURG CHOLECYSTECTOMY N/A 8/26/2022    Procedure: CHOLECYSTECTOMY LAPAROSCOPIC;  Surgeon: Samuel Ziegler DO;  Location: [facility] Main OR;  Service: General    AZ Prieto Stanleyos 3RD+ ORD SLCTV ABDL PEL/LXTR [facility] Right 04/06/2021    Procedure: ARTERIOGRAM, carotid Angio;  Surgeon: Prasanna Balderas MD;  Location: [facility] Main OR;  Service: Vascular    AZ TEAEC W/PATCH GRF CAROTID VERTB [address] Right 04/13/2021    Procedure: RIGHT ENDARTERECTOMY ARTERY CAROTID WITH BOVINE PATCH;  Surgeon: Prasanna Balderas MD;  Location: [facility] MAIN OR;  Service: Vascular       Social History:  Alcohol Use:   Social History     Substance and Sexual Activity   Alcohol Use Yes    Alcohol/week: 7.0 standard drinks of alcohol    Types: 2 Glasses of wine, 5 Cans of beer per week    Comment: per week     Drug Use:   Social History     Substance and Sexual Activity   Drug Use Never    Comment: never     Tobacco Use:   Social History     Tobacco Use   Smoking Status Every Day    Packs/day: 0.50    Years: 47.00    Total pack years: 23.50    Types: Cigarettes    Start date: 1/1/1975   Smokeless Tobacco Never       Immunizations:   Immunization History   Administered Date(s) Administered    COVID-19 PFIZER VACCINE 0.3 ML IM 08/25/2021, 09/21/2021       Family History: non-contributory     Meds/Allergies   all current active meds have been reviewed     Allergies   Allergen Reactions    Penicillins Anaphylaxis       PHYSICAL EXAM      Objective   Vitals:   First set: Temperature: 98.5 °F (36.9 °C) (10/13/23 0908)  Pulse: 100 (10/13/23 0908)  Respirations: 20 (10/13/23 0908)  Blood Pressure: (!) 219/106 (10/13/23 0908)    Primary Survey:   (A) Airway: Intact  (B) Breathing: Bilateral breath sounds  (C) Circulation: Pulses:   pedal 2+ on the left, obtained by Doppler on the right and radial 2+ bilaterally  (D) Disabliity:  GCS Total:  15  (E) Expose:  Completed    Secondary Survey: (Click on Physical Exam tab above)    Physical Exam  Vitals reviewed. Constitutional:       General: He is not in acute distress. Appearance: He is not ill-appearing, toxic-appearing or diaphoretic. HENT:      Head: Normocephalic and atraumatic. Right Ear: Tympanic membrane, ear canal and external ear normal.      Left Ear: Tympanic membrane, ear canal and external ear normal.      Nose: Nose normal.      Mouth/Throat:      Mouth: Mucous membranes are moist.      Pharynx: Oropharynx is clear. Eyes:      General: No scleral icterus. Extraocular Movements: Extraocular movements intact. Conjunctiva/sclera: Conjunctivae normal.      Pupils: Pupils are equal, round, and reactive to light. Cardiovascular:      Rate and Rhythm: Tachycardia present. Rhythm irregular. Pulses: Normal pulses. Heart sounds: Normal heart sounds. No murmur heard. No friction rub. No gallop.    Pulmonary:      Effort: Pulmonary effort is normal. No respiratory distress. Breath sounds: Wheezing present. No rhonchi or rales. Comments: Faint wheezes throughout all lung fields  Chest:      Chest wall: No tenderness. Abdominal:      General: Abdomen is flat. Palpations: Abdomen is soft. Tenderness: There is no abdominal tenderness. There is no guarding or rebound. Musculoskeletal:         General: No swelling, tenderness, deformity or signs of injury. Normal range of motion. Cervical back: Normal range of motion and neck supple. No rigidity. Right lower leg: No edema. Left lower leg: No edema. Skin:     General: Skin is warm and dry. Capillary Refill: Capillary refill takes less than 2 seconds. Coloration: Skin is not jaundiced or pale. Findings: No bruising, erythema, lesion or rash. Neurological:      General: No focal deficit present. Mental Status: He is alert and oriented to person, place, and time. Cranial Nerves: No cranial nerve deficit. Sensory: No sensory deficit. Motor: No weakness. Coordination: Coordination normal.       Invasive Devices       Peripheral Intravenous Line  Duration             Peripheral IV 10/13/23 Dorsal (posterior); Left Forearm <1 day                    Lab Results: Results: I have personally reviewed all pertinent laboratory/tests results  Imaging/EKG Studies: negative for acute findings  Other Studies: None    Code Status: Prior  Advance Directive and Living Will:      Power of :    POLST:

## 2023-10-13 NOTE — CASE MANAGEMENT
Case Management Progress Note    Patient name Nikhil Kohler  Location ED 24/ED 24 MRN [id number]  : 1955 Date 10/13/2023       LOS (days): 0  Geometric Mean LOS (GMLOS) (days):   Days to GMLOS:        OBJECTIVE:        Current admission status: Emergency  Preferred Pharmacy:   [facility] [address] [place] [address], [address] [place] [address]  [address] [place] [address] [address] [facility]  Phone: [phone] Fax: [address] [place], [place] - [address] [facility] [place]ELY[place] [address] [facility] [address] [facility] [address]  Phone: [phone] Fax: [address] [facility] [address] [place], [place] - [address] [address]  Phone: [phone] Fax: [phone]    Primary Care Provider: Fabiola Ny DO    Primary Insurance: [facility] [employer]  Secondary Insurance:     PROGRESS NOTE:    CM responded to trauma alert. Pt came to the ED as a walk-in s/p multiple falls at home.  Pt had + head strike and neck pain

## 2023-10-14 ENCOUNTER — APPOINTMENT (OUTPATIENT)
Dept: NON INVASIVE DIAGNOSTICS | Facility: HOSPITAL | Age: 68
End: 2023-10-14
Payer: COMMERCIAL

## 2023-10-14 ENCOUNTER — HOME HEALTH ADMISSION (OUTPATIENT)
Dept: HOME HEALTH SERVICES | Facility: HOME HEALTHCARE | Age: 68
End: 2023-10-14

## 2023-10-14 VITALS
HEIGHT: 69 IN | DIASTOLIC BLOOD PRESSURE: 68 MMHG | OXYGEN SATURATION: 93 % | BODY MASS INDEX: 22.07 KG/M2 | RESPIRATION RATE: 20 BRPM | TEMPERATURE: 97.3 F | HEART RATE: 66 BPM | WEIGHT: 149 LBS | SYSTOLIC BLOOD PRESSURE: 107 MMHG

## 2023-10-14 PROBLEM — I16.0 HYPERTENSIVE URGENCY: Status: RESOLVED | Noted: 2023-10-13 | Resolved: 2023-10-14

## 2023-10-14 LAB
ANION GAP SERPL CALCULATED.3IONS-SCNC: 10 MMOL/L
AORTIC ROOT: 3.8 CM
APICAL FOUR CHAMBER EJECTION FRACTION: 62 %
ASCENDING AORTA: 3.2 CM
BUN SERPL-MCNC: 31 MG/DL (ref 5–25)
CALCIUM SERPL-MCNC: 9.1 MG/DL (ref 8.4–10.2)
CHLORIDE SERPL-SCNC: 95 MMOL/L (ref 96–108)
CO2 SERPL-SCNC: 31 MMOL/L (ref 21–32)
CREAT SERPL-MCNC: 1.58 MG/DL (ref 0.6–1.3)
DME PARACHUTE DELIVERY DATE REQUESTED: NORMAL
DME PARACHUTE ITEM DESCRIPTION: NORMAL
DME PARACHUTE ORDER STATUS: NORMAL
DME PARACHUTE SUPPLIER NAME: NORMAL
DME PARACHUTE SUPPLIER PHONE: NORMAL
E WAVE DECELERATION TIME: 200 MS
E/A RATIO: 3.79
ERYTHROCYTE [DISTWIDTH] IN BLOOD BY AUTOMATED COUNT: 14.8 % (ref 11.6–15.1)
FRACTIONAL SHORTENING: 29 (ref 28–44)
GFR SERPL CREATININE-BSD FRML MDRD: 44 ML/MIN/1.73SQ M
GLUCOSE SERPL-MCNC: 94 MG/DL (ref 65–140)
HCT VFR BLD AUTO: 47.1 % (ref 36.5–49.3)
HGB BLD-MCNC: 15.8 G/DL (ref 12–17)
INTERVENTRICULAR SEPTUM IN DIASTOLE (PARASTERNAL SHORT AXIS VIEW): 1.4 CM
INTERVENTRICULAR SEPTUM: 1.4 CM (ref 0.6–1.1)
LAAS-AP2: 23.4 CM2
LAAS-AP4: 18 CM2
LEFT ATRIUM SIZE: 3.6 CM
LEFT ATRIUM VOLUME (MOD BIPLANE): 64 ML
LEFT ATRIUM VOLUME INDEX (MOD BIPLANE): 35 ML/M2
LEFT INTERNAL DIMENSION IN SYSTOLE: 2.7 CM (ref 2.1–4)
LEFT VENTRICLE DIASTOLIC VOLUME (MOD BIPLANE): 62 ML
LEFT VENTRICLE SYSTOLIC VOLUME (MOD BIPLANE): 24 ML
LEFT VENTRICULAR INTERNAL DIMENSION IN DIASTOLE: 3.8 CM (ref 3.5–6)
LEFT VENTRICULAR POSTERIOR WALL IN END DIASTOLE: 1 CM
LEFT VENTRICULAR STROKE VOLUME: 38 ML
LV EF: 61 %
LVSV (TEICH): 38 ML
MAGNESIUM SERPL-MCNC: 1.6 MG/DL (ref 1.9–2.7)
MCH RBC QN AUTO: 28.5 PG (ref 26.8–34.3)
MCHC RBC AUTO-ENTMCNC: 33.5 G/DL (ref 31.4–37.4)
MCV RBC AUTO: 85 FL (ref 82–98)
MV E'TISSUE VEL-SEP: 8 CM/S
MV PEAK A VEL: 0.24 M/S
MV PEAK E VEL: 91 CM/S
MV STENOSIS PRESSURE HALF TIME: 58 MS
MV VALVE AREA P 1/2 METHOD: 3.79
PHOSPHATE SERPL-MCNC: 3.3 MG/DL (ref 2.3–4.1)
PLATELET # BLD AUTO: 200 THOUSANDS/UL (ref 149–390)
PMV BLD AUTO: 10.6 FL (ref 8.9–12.7)
POTASSIUM SERPL-SCNC: 3.2 MMOL/L (ref 3.5–5.3)
RA PRESSURE ESTIMATED: 3 MMHG
RBC # BLD AUTO: 5.55 MILLION/UL (ref 3.88–5.62)
RIGHT ATRIUM AREA SYSTOLE A4C: 14.9 CM2
RIGHT VENTRICLE ID DIMENSION: 3.9 CM
RV PSP: 37 MMHG
SL CV LEFT ATRIUM LENGTH A2C: 5.9 CM
SL CV LV EF: 65
SL CV PED ECHO LEFT VENTRICLE DIASTOLIC VOLUME (MOD BIPLANE) 2D: 64 ML
SL CV PED ECHO LEFT VENTRICLE SYSTOLIC VOLUME (MOD BIPLANE) 2D: 26 ML
SODIUM SERPL-SCNC: 136 MMOL/L (ref 135–147)
TR MAX PG: 34 MMHG
TR PEAK VELOCITY: 2.9 M/S
TRICUSPID ANNULAR PLANE SYSTOLIC EXCURSION: 1.4 CM
TRICUSPID VALVE PEAK REGURGITATION VELOCITY: 2.9 M/S
WBC # BLD AUTO: 10.11 THOUSAND/UL (ref 4.31–10.16)

## 2023-10-14 PROCEDURE — 99239 HOSP IP/OBS DSCHRG MGMT >30: CPT | Performed by: NURSE PRACTITIONER

## 2023-10-14 PROCEDURE — 93306 TTE W/DOPPLER COMPLETE: CPT

## 2023-10-14 PROCEDURE — 99232 SBSQ HOSP IP/OBS MODERATE 35: CPT | Performed by: STUDENT IN AN ORGANIZED HEALTH CARE EDUCATION/TRAINING PROGRAM

## 2023-10-14 PROCEDURE — 93306 TTE W/DOPPLER COMPLETE: CPT | Performed by: INTERNAL MEDICINE

## 2023-10-14 PROCEDURE — 97163 PT EVAL HIGH COMPLEX 45 MIN: CPT

## 2023-10-14 PROCEDURE — 84100 ASSAY OF PHOSPHORUS: CPT | Performed by: INTERNAL MEDICINE

## 2023-10-14 PROCEDURE — 80048 BASIC METABOLIC PNL TOTAL CA: CPT | Performed by: INTERNAL MEDICINE

## 2023-10-14 PROCEDURE — 85027 COMPLETE CBC AUTOMATED: CPT | Performed by: INTERNAL MEDICINE

## 2023-10-14 PROCEDURE — 97167 OT EVAL HIGH COMPLEX 60 MIN: CPT

## 2023-10-14 PROCEDURE — 83735 ASSAY OF MAGNESIUM: CPT | Performed by: INTERNAL MEDICINE

## 2023-10-14 RX ORDER — MAGNESIUM SULFATE HEPTAHYDRATE 40 MG/ML
2 INJECTION, SOLUTION INTRAVENOUS ONCE
Status: COMPLETED | OUTPATIENT
Start: 2023-10-14 | End: 2023-10-14

## 2023-10-14 RX ORDER — POTASSIUM CHLORIDE 20 MEQ/1
40 TABLET, EXTENDED RELEASE ORAL ONCE
Status: COMPLETED | OUTPATIENT
Start: 2023-10-14 | End: 2023-10-14

## 2023-10-14 RX ADMIN — MAGNESIUM SULFATE HEPTAHYDRATE 2 G: 40 INJECTION, SOLUTION INTRAVENOUS at 13:51

## 2023-10-14 RX ADMIN — SODIUM CHLORIDE 250 ML: 0.9 INJECTION, SOLUTION INTRAVENOUS at 12:56

## 2023-10-14 RX ADMIN — SERTRALINE 100 MG: 100 TABLET, FILM COATED ORAL at 08:53

## 2023-10-14 RX ADMIN — BUSPIRONE HYDROCHLORIDE 15 MG: 10 TABLET ORAL at 08:53

## 2023-10-14 RX ADMIN — PRAVASTATIN SODIUM 40 MG: 40 TABLET ORAL at 16:57

## 2023-10-14 RX ADMIN — PANTOPRAZOLE SODIUM 20 MG: 20 TABLET, DELAYED RELEASE ORAL at 05:19

## 2023-10-14 RX ADMIN — LISINOPRIL 10 MG: 10 TABLET ORAL at 08:56

## 2023-10-14 RX ADMIN — BACLOFEN 10 MG: 10 TABLET ORAL at 08:53

## 2023-10-14 RX ADMIN — AMLODIPINE BESYLATE 5 MG: 5 TABLET ORAL at 08:56

## 2023-10-14 RX ADMIN — UMECLIDINIUM 1 PUFF: 62.5 AEROSOL, POWDER ORAL at 08:56

## 2023-10-14 RX ADMIN — OLODATEROL RESPIMAT INHALATION SPRAY 2 PUFF: 2.5 SPRAY, METERED RESPIRATORY (INHALATION) at 08:56

## 2023-10-14 RX ADMIN — NICOTINE 1 PATCH: 14 PATCH, EXTENDED RELEASE TRANSDERMAL at 08:53

## 2023-10-14 RX ADMIN — POTASSIUM CHLORIDE 40 MEQ: 1500 TABLET, EXTENDED RELEASE ORAL at 12:56

## 2023-10-14 RX ADMIN — APIXABAN 5 MG: 5 TABLET, FILM COATED ORAL at 08:53

## 2023-10-14 RX ADMIN — CARVEDILOL 25 MG: 25 TABLET, FILM COATED ORAL at 08:56

## 2023-10-14 NOTE — PROGRESS NOTES
[facility]  Progress Note  Name: Ho Martins  MRN: [id number]  Unit/Bed#: MP9 210-01 I Date of Admission: 10/13/2023   Date of Service: 10/14/2023 I Hospital Day: 0    Assessment/Plan   * Syncope and collapse  Assessment & Plan  -s/p fall and presentation as level B trauma, suspected syncopal event  -trauma workup including CTH, CT c spine, FAST, CXR, all negative for acute traumatic injury  -admitted to [facility] for syncope workup and further recs             TRAUMA TERTIARY SURVEY NOTE    VTE Prophylaxis: eliquis    Disposition: per SLIM, currently undergoing workup for syncope    Code status:  Level 1 - Full Code    Consultants: None    Subjective   Transfer from: n/a    Mechanism of Injury:Fall     Chief Complaint: no complaints    HPI/Last 24 hour events: doing well, no acute events overnight, tolerating diet without n/v, voiding, ambulating. Objective   Vitals:   Temp:  [97.3 °F (36.3 °C)-98 °F (36.7 °C)] 97.3 °F (36.3 °C)  HR:  [] 83  Resp:  [16-22] 20  BP: (107-211)/() 107/68    I/O         10/12 0701  10/13 0700 10/13 0701  10/14 0700 10/14 0701  10/15 0700    P. O.  930     Total Intake(mL/kg)  930 (13.7)     Urine (mL/kg/hr)  725     Total Output  725     Net  +205                     Physical Exam:   General: NAD  Skin: Warm, dry, anicteric  HEENT: Normocephalic, atraumatic  CV: RRR, no m/r/g  Pulm: CTA b/l, no inc WOB  Abd: Soft, ND/NT  MSK: Symmetric, no edema, no tenderness, no deformity  Neuro: AOx3, GCS 15     Invasive Devices       Peripheral Intravenous Line  Duration             Peripheral IV 10/13/23 Dorsal (posterior); Left Forearm 1 day                       1. Before the illness or injury that brought you to the Emergency, did you need someone to help you on a regular basis? 0=No   2. Since the illness or injury that brought you to the Emergency, have you needed more help than usual to take care of yourself? 0=No   3.  Have you been hospitalized for one or more nights during the past 6 months (excluding a stay in the Emergency Department)? 1=Yes   4. In general, do you see well? 0=Yes   5. In general, do you have serious problems with your memory? 0=No   6. Do you take more than three different medications everyday? 1=Yes   TOTAL   2     Did you order a geriatric consult if the score was 2 or greater?: yes         Lab Results: Results: I have personally reviewed all pertinent laboratory/tests results, BMP/CMP:   Lab Results   Component Value Date    SODIUM 136 10/14/2023    K 3.2 (L) 10/14/2023    CL 95 (L) 10/14/2023    CO2 31 10/14/2023    BUN 31 (H) 10/14/2023    CREATININE 1.58 (H) 10/14/2023    CALCIUM 9.1 10/14/2023    EGFR 44 10/14/2023   , CBC:   Lab Results   Component Value Date    WBC 10.11 10/14/2023    HGB 15.8 10/14/2023    HCT 47.1 10/14/2023    MCV 85 10/14/2023     10/14/2023    RBC 5.55 10/14/2023    MCH 28.5 10/14/2023    MCHC 33.5 10/14/2023    RDW 14.8 10/14/2023    MPV 10.6 10/14/2023   , Coagulation: No results found for: "PT", "INR", "APTT", and Troponin: No results found for: "TROPONINI"    Imaging Results: I have personally reviewed pertinent reports.     Chest Xray(s): negative for acute findings   FAST exam(s): negative for acute findings   CT Scan(s): negative for acute findings   Additional Xray(s): N/A     Other Studies:

## 2023-10-14 NOTE — ASSESSMENT & PLAN NOTE
Lab Results   Component Value Date    EGFR 44 10/14/2023    EGFR 65 10/13/2023    EGFR 69 07/14/2022    CREATININE 1.58 (H) 10/14/2023    CREATININE 1.15 10/13/2023    CREATININE 1.11 07/14/2022   Renal function is at his baseline  Avoid NSAIDS and nephrotoxins  Monitor renal function periodically while inpatient

## 2023-10-14 NOTE — ASSESSMENT & PLAN NOTE
Suspect this is due to hypertensive encephalopathy  BP severely elevated upon arrival to ED  Now improved since admission  Pt reports incoherent thinking since having   Reports compliance with his medications but did have vomiting day prior to admission   Check orthostatic Bps, echocardiogram, B12  Recently had a neurologic evaluation at [facility] which was unrevealing except for carotid stenosis, no new CVA  Will restart his home medications and monitor BP and gait.   PT/OT evaluation - ok for discharge to home per pt   If gait abnormality is persistent will check another MRI of the brain to rule out new infarction  PT have evaluated pt and report no needs pt did well with ambulation

## 2023-10-14 NOTE — PLAN OF CARE
Problem: OCCUPATIONAL THERAPY ADULT  Goal: Performs self-care activities at highest level of function for planned discharge setting. See evaluation for individualized goals. Description: Treatment Interventions: Cognitive reorientation, Endurance training, Compensatory technique education, Energy conservation, Activityengagement  Equipment Recommended: (S) Bedside commode       See flowsheet documentation for full assessment, interventions and recommendations. Note: Limitation: Decreased endurance, Decreased cognition  Prognosis: Good  Assessment: Pt is a 81 yo male seen for OT eval s/p adm to B on 10/13/23 w/ fall at home w/ head hit dx'd w/ syncope and collapse. Pt  has a past medical history of PRITI (acute kidney injury) ([address]) (09/05/2021), Anxiety, Back pain, Claustrophobia, COPD (chronic obstructive pulmonary disease) ([address]), Dysphagia (09/05/2021), Fatty liver, GERD (gastroesophageal reflux disease), History of transfusion, Hyperlipidemia, Hypertension, Hypokalemia (02/22/2022), Lumbar back pain, Panic attacks, Stenosis of right carotid artery, Stroke [facility]), Wears glasses, and Wears partial dentures. Pt with active OT orders and activity as tolerated orders. Pt is retired and resides alone in mobile home. Pt has supportive sister that visits weekly. Pt was I w/ ADLS and IADLS, drove, & required no use of DME PTA. Pt is currently demonstrating the following occupational deficits: S all ADLS, transfers, and mobility w/o AD. These deficits that are impacting pt's baseline areas of occupation are a result of the following impairments: endurance, activity tolerance, and cognitive impairments. The following Occupational Performance Areas to address include: grooming, bathing/shower, toilet hygiene, dressing, health maintenance, functional mobility, and clothing management.   Based on the aforementioned OT evaluation, functional performance deficits, and assessments, pt has been identified as a high complexity evaluation. Recommend  outpt therapy for COG  upon D/C. The patient's raw score on the AM-PAC Daily Activity Inpatient Short Form is 24. A raw score of greater than or equal to 19 suggests the patient may benefit from discharge to home. Please refer to the recommendation of the Occupational Therapist for safe discharge planning.  Pt to continue to benefit from acute immediate OT services to address the following goals 2-3x/week to  w/in 10-14 days:     OT Discharge Recommendation: Home with outpatient rehabilitation (outpt therapy for cog)

## 2023-10-14 NOTE — NURSING NOTE
IV access removed, all personal belongings taken at discharge, AVS reviewed with patient and all questions answered.

## 2023-10-14 NOTE — UTILIZATION REVIEW
Initial Clinical Review    Admission: Date/Time/Statement:   Admission Orders (From admission, onward)       Ordered        10/13/23 1058  Place in Observation  Once                          Orders Placed This Encounter   Procedures    Place in Observation     Standing Status:   Standing     Number of Occurrences:   1     Order Specific Question:   Level of Care     Answer:   Med Surg [16]     ED Arrival Information       Expected   -    Arrival   10/13/2023 08:59    Acuity   Emergent              Means of arrival   Ambulance    Escorted by   [facility] ([address] [place] [address])    Service   Hospitalist    Admission type   Emergency              Arrival complaint   -             Chief Complaint   Patient presents with    Shortness of Breath     Pt has c/o SOB, dizziness, vomiting, and nausea. Initial Presentation: 80 y.o. male who presented by EMS to [address] [place] [address] ED. Admitted in observation status for evaluation and treatment of syncope. PMHx: anxiety, COPD, GERD, HLD, HTN, stroke. Presented w/ syncope. Notes having dyspepsia and vomiting prior to syncope. Notes head strike on tub. On exam, irregular tachycardic rhythm, gait abnormal. Imaging unremarkable. Plan: check orthostatics, resume home meds, PT/OT evals, Trend labs, replete electrolytes as needed; supportive care.    ED Triage Vitals [10/13/23 0908]   Temperature Pulse Respirations Blood Pressure SpO2   98.5 °F (36.9 °C) 100 20 (!) 219/106 97 %      Temp Source Heart Rate Source Patient Position - Orthostatic VS BP Location FiO2 (%)   Oral Monitor Sitting Right arm --      Pain Score       No Pain          Wt Readings from Last 1 Encounters:   10/14/23 67.6 kg (149 lb)     Additional Vital Signs:   Date/Time Temp Pulse Resp BP MAP (mmHg) SpO2 O2 Device   10/14/23 08:50:57 97.3 °F (36.3 °C) Abnormal  83 -- 107/68 81 88 % Abnormal  --   10/14/23 0800 -- 79 -- 130/79 -- -- --   10/13/23 22:53:30 98 °F (36.7 °C) 79 -- 130/79 96 90 % 10 14   BUN mg/dL 31* 19   CREATININE mg/dL 1.58* 1.15   EGFR ml/min/1.73sq m 44 65   CALCIUM mg/dL 9.1 8.8   MAGNESIUM mg/dL 1.6*  --    PHOSPHORUS mg/dL 3.3  --      Results from last 7 days   Lab Units 10/13/23  0916   AST U/L 26   ALT U/L 15   ALK PHOS U/L 105*   TOTAL PROTEIN g/dL 7.3   ALBUMIN g/dL 4.1   TOTAL BILIRUBIN mg/dL 0.84         Results from last 7 days   Lab Units 10/14/23  0518 10/13/23  0916   GLUCOSE RANDOM mg/dL 94 96             No results found for: "BETA-HYDROXYBUTYRATE"                   Results from last 7 days   Lab Units 10/13/23  1338 10/13/23  1116 10/13/23  0916   HS TNI 0HR ng/L  --   --  21   HS TNI 2HR ng/L  --  22  --    HSTNI D2 ng/L  --  1  --    HS TNI 4HR ng/L 27  --   --    HSTNI D4 ng/L 6  --   --                                                                                                                        ED Treatment:   Medication Administration from 10/13/2023 0859 to 10/13/2023 1130         Date/Time Order Dose Route Action     10/13/2023 0932 EDT ipratropium-albuterol (FOR EMS ONLY) (DUO-NEB) 0.5-2.5 mg/3 mL inhalation solution 3 mL 0 mL Does not apply Given to EMS     10/13/2023 1023 EDT ondansetron (ZOFRAN) injection 4 mg 4 mg Intravenous Given          Past Medical History:   Diagnosis Date    PRITI (acute kidney injury) ([address]) 09/05/2021    Anxiety     Back pain     Claustrophobia     COPD (chronic obstructive pulmonary disease) ([address])     Dysphagia 09/05/2021    Fatty liver     GERD (gastroesophageal reflux disease)     History of transfusion     Hyperlipidemia     Hypertension     Hypokalemia 02/22/2022    Lumbar back pain     Panic attacks     Stenosis of right carotid artery     Stroke [facility])     Wears glasses     Wears partial dentures     upper denture     Present on Admission:   Syncope and collapse   Atrial fibrillation (HCC)   COPD (chronic obstructive pulmonary disease) ([address])   Stage 3a chronic kidney disease ([address])      Admitting Diagnosis: Syncope [R55]  Closed head injury, initial encounter [S09.90XA]  Unspecified multiple injuries, initial encounter [T07. XXXA]  Age/Sex: 80 y.o. male  Admission Orders:  Regular Diet. Telemetry. Orthostatics. SCDs. Scheduled Medications:  amLODIPine, 5 mg, Oral, Daily   And  lisinopril, 10 mg, Oral, Daily  apixaban, 5 mg, Oral, BID  baclofen, 10 mg, Oral, Daily  busPIRone, 15 mg, Oral, BID  carvedilol, 25 mg, Oral, BID With Meals  docusate sodium, 100 mg, Oral, BID  nicotine, 1 patch, Transdermal, Daily  umeclidinium, 1 puff, Inhalation, Daily   And  olodaterol HCl, 2 puff, Inhalation, Daily  pantoprazole, 20 mg, Oral, Early Morning  pravastatin, 40 mg, Oral, Daily With Dinner  sertraline, 100 mg, Oral, Daily      Continuous IV Infusions:     PRN Meds:  acetaminophen, 650 mg, Oral, Q6H PRN  albuterol, 2 puff, Inhalation, PRN  calcium carbonate, 1,000 mg, Oral, Daily PRN  labetalol, 10 mg, Intravenous, Q4H PRN  ondansetron, 4 mg, Intravenous, Q6H PRN  senna, 2 tablet, Oral, Daily PRN        None    Network Utilization Review Department  ATTENTION: Please call with any questions or concerns to [phone] and carefully listen to the prompts so that you are directed to the right person. All voicemails are confidential.   For Discharge needs, contact Care Management DC Support Team at [phone] opt. 2  Send all requests for admission clinical reviews, approved or denied determinations and any other requests to dedicated fax number below belonging to the campus where the patient is receiving treatment.  List of dedicated fax numbers for the Facilities:  [place] DENIALS (Administrative/Medical Necessity) [phone]   DISCHARGE SUPPORT TEAM (NETWORK) [phone] [address] Andrade (Maternity/NICU/Pediatrics) [phone]   [address] [phone]   [address] [phone]   [facility] [address] [facility] [address] [address] [facility] [address] [facility] [address] [place] [address] Nn [phone]

## 2023-10-14 NOTE — OCCUPATIONAL THERAPY NOTE
Occupational Therapy Evaluation      Kevin Skelton    10/14/2023    Principal Problem:    Syncope and collapse  Active Problems:    COPD (chronic obstructive pulmonary disease) (HCC)    PRITI (acute kidney injury) ([address])    Stage 3a chronic kidney disease (HCC)    Hypokalemia    Atrial fibrillation (HCC)      Past Medical History:   Diagnosis Date    PRITI (acute kidney injury) ([address]) 09/05/2021    Anxiety     Back pain     Claustrophobia     COPD (chronic obstructive pulmonary disease) (HCC)     Dysphagia 09/05/2021    Fatty liver     GERD (gastroesophageal reflux disease)     History of transfusion     Hyperlipidemia     Hypertension     Hypokalemia 02/22/2022    Lumbar back pain     Panic attacks     Stenosis of right carotid artery     Stroke [facility])     Wears glasses     Wears partial dentures     upper denture       Past Surgical History:   Procedure Laterality Date    COLONOSCOPY      ERCP      IR CEREBRAL ANGIOGRAPHY  04/06/2021    OTHER SURGICAL HISTORY      fertility    [place] LAPAROSCOPY SURG CHOLECYSTECTOMY N/A 8/26/2022    Procedure: CHOLECYSTECTOMY LAPAROSCOPIC;  Surgeon: Nona Otoole DO;  Location: [facility];  Service: General    [place] Eron Zhaneing 3RD+ ORD SLCTV ABDL PEL/LXTR [facility] Right 04/06/2021    Procedure: ARTERIOGRAM, carotid Angio;  Surgeon: Beena Hunt MD;  Location: [facility];  Service: Vascular    WV TEAEC W/PATCH GRF CAROTID VERTB SUBCLAV NECK INC Right 04/13/2021    Procedure: RIGHT ENDARTERECTOMY ARTERY CAROTID WITH BOVINE PATCH;  Surgeon: Beena Hunt MD;  Location: [facility] MAIN OR;  Service: Vascular        10/14/23 1040   OT Last Visit   OT Visit Date 10/14/23   Note Type   Note type Evaluation   Pain Assessment   Pain Assessment Tool 0-10   Pain Score No Pain   Restrictions/Precautions   Weight Bearing Precautions Per Order No   Other Precautions Multiple lines; Fall Risk;Cognitive   Home Living   Type of Home Mobile home   Home Layout Performs ADLs on one level; One level  (3STE) Bathroom Shower/Tub Walk-in shower   Bathroom Toilet Standard   Bathroom Equipment Grab bars in shower   Bathroom Accessibility Accessible   Home Equipment Cane   Additional Comments Pt reports no use of DME during household mobility and SPC for community mobility PTA   Prior Function   Level of [place] Independent with ADLs; Independent with functional mobility; Independent with IADLS   Lives With Alone   Receives Help From Family   IADLs Independent with meal prep; Independent with medication management; Family/Friend/Other provides transportation   Falls in the last 6 months 1 to 4  (2 falls, per Pt)   Vocational Retired   Lifestyle   Autonomy Pt reports being IND w/ all ADLS and most IADLS; sister A w/ transportation; ambulates w/ SPC for community mobility PTA   Reciprocal Relationships Pt lives alone. Pt reports his sister visits weekly to A w/ transportation and grocery shopping. Service to Others Pt is retired   Intrinsic Gratification Pt reports enjoying being home   ADL   Where Assessed Edge of bed   Eating Assistance [facility] 5  Supervision/Setup    N [facility] 5  Supervision/Setup    N [facility] 5  [address] 5  Supervision/Setup   LB [address] [facility] 5  [address] [facility]  5  [id number] [facility] 5  Supervision/Setup   Bed Mobility   Supine to Sit 7  Independent   Sit to Supine 7  Independent   Additional Comments Pt laying supine in bed upon OT arrival. Pt returned laying supine in bed w/ all needs in reach s/p OT session.    Transfers   Sit to Stand 7  Independent   Stand to Sit 7  Independent   Additional Comments Transfers w/o AD   Functional Mobility   Functional Mobility 7  Independent   Additional Comments Pt demonstrated long distance household mobility in [place] w/o AD PTA   Balance   Static Sitting Good   Dynamic Sitting Fair +   Static Standing Fair +   Dynamic Standing Fair +   Ambulatory Fair +   Activity Tolerance   Activity Tolerance Patient tolerated treatment well   Medical Staff Made Aware PT Briana   Nurse Made Aware RN cleared Pt for OT eval   RUE Assessment   RUE Assessment WFL   LUE Assessment   LUE Assessment WFL   Hand Function   Gross Motor Coordination Functional   Cognition   Overall Cognitive Status Impaired   Arousal/Participation Alert; Cooperative   Attention Within functional limits   Orientation Level Oriented X4   Memory Decreased recall of precautions;Decreased recall of recent events;Decreased short term memory   Following Commands Follows one step commands with increased time or repetition   Comments (S)  Pt is pleasant and cooperative for therapy today. Pt reports having difficutly w/ word finding and STM since having two falls recently, both w/ (+) head hits. Assessment   Limitation Decreased endurance;Decreased cognition   Prognosis Good   Assessment Pt is a 81 yo male seen for OT eval s/p adm to B on 10/13/23 w/ fall at home w/ head hit dx'd w/ syncope and collapse. Pt  has a past medical history of PRITI (acute kidney injury) ([address]) (09/05/2021), Anxiety, Back pain, Claustrophobia, COPD (chronic obstructive pulmonary disease) ([address]), Dysphagia (09/05/2021), Fatty liver, GERD (gastroesophageal reflux disease), History of transfusion, Hyperlipidemia, Hypertension, Hypokalemia (02/22/2022), Lumbar back pain, Panic attacks, Stenosis of right carotid artery, Stroke [facility]), Wears glasses, and Wears partial dentures. Pt with active OT orders and activity as tolerated orders. Pt is retired and resides alone in mobile home. Pt has supportive sister that visits weekly. Pt was I w/ ADLS and IADLS, drove, & required no use of DME PTA. Pt is currently demonstrating the following occupational deficits: S all ADLS, transfers, and mobility w/o AD.  These deficits that are impacting pt's baseline areas of occupation are a result of the following impairments: txment sessions for increase engagement in functional tasks    2) Pt will complete UB/LB dressing/self care w/ mod I using adaptive device and DME as needed    3) Pt will complete bathing w/ Mod I w/ use of AE and DME as needed    4) Pt will complete toileting w/ mod I w/ G hygiene/thoroughness using DME as needed    5) Pt will improve functional transfers to Mod I on/off all surfaces using DME as needed w/ G balance/safety     6) Pt will improve functional mobility during ADL/IADL/leisure tasks to Mod I using DME as needed w/ G balance/safety     7) Pt will participate in simulated IADL management task to increase independence to Mod I w/ G safety and endurance    8) Pt will be attentive 100% of the time during ongoing cognitive assessment w/ G participation to assist w/ safe d/c planning/recommendations    9) Pt will demonstrate G carryover of pt/caregiver education and training as appropriate w/o cues w/ good tolerance to increase safety during functional tasks    10) Pt will demonstrate 100% carryover of energy conservation techniques t/o functional I/ADL/leisure tasks w/o cues s/p skilled education to increase endurance during functional tasks         Navarro Shankar, MS, OTR/L

## 2023-10-14 NOTE — ASSESSMENT & PLAN NOTE
Likely secondary to acute drop in sbp and syncopal event   Bl 0.90 -1.10  This am creatinine 1.58  Will administer 1 x iv fluid bolus of 250ml iv fluids   Diarrhea has improved   Pt is now tolerating his diet food and hydration   Replete electrolytes  Repeat labs in a two days results to Pcp

## 2023-10-14 NOTE — ASSESSMENT & PLAN NOTE
Potassium depletion likely secondary to nausea/vomiting and a few episodes of loose stool   Replete with potassium 3.2/  40meq po kdur prior to dc   Replete magnesium current level 1.6/ with 2 grams iv magnesium

## 2023-10-14 NOTE — ASSESSMENT & PLAN NOTE
Reports compliance with his medications but had vomiting yesterday and presented in hypertensive urgency. Restart amlodipine benazepril,   Patient does not appear to be taking Coreg at home appears that this was discontinued by PCP -patient did receive it here blood pressures had improved but we will discontinue for now since blood pressure is on the lower side.    Monitor Bps  PRN labetalol  May need further medication adjustment if he remains uncontrolled

## 2023-10-14 NOTE — DISCHARGE SUMMARY
[address] [facility] [place]  Discharge- Romy Waggoner 1955, 80 y.o. male MRN: [id number]  Unit/Bed#: 2 210-01 Encounter: [id number]  Primary Care Provider: Kevin Johnston DO   Date and time admitted to hospital: 10/13/2023  8:59 AM    * Syncope and collapse  Assessment & Plan  Suspect this is due to hypertensive encephalopathy  BP severely elevated upon arrival to ED  Now improved since admission  Pt reports incoherent thinking since having   Reports compliance with his medications but did have vomiting day prior to admission   Check orthostatic Bps, echocardiogram, B12  Recently had a neurologic evaluation at [facility] which was unrevealing except for carotid stenosis, no new CVA  Will restart his home medications and monitor BP and gait.   PT/OT evaluation - ok for discharge to home per pt   If gait abnormality is persistent will check another MRI of the brain to rule out new infarction  PT have evaluated pt and report no needs pt did well with ambulation     Hypokalemia  Assessment & Plan  Potassium depletion likely secondary to nausea/vomiting and a few episodes of loose stool   Replete with potassium 3.2/  40meq po kdur prior to dc   Replete magnesium current level 1.6/ with 2 grams iv magnesium     PRITI (acute kidney injury) ([address])  Assessment & Plan  Likely secondary to acute drop in sbp and syncopal event   Bl 0.90 -1.10  This am creatinine 1.58  Will administer 1 x iv fluid bolus of 250ml iv fluids   Diarrhea has improved   Pt is now tolerating his diet food and hydration   Replete electrolytes  Repeat labs in a two days results to Pcp     Atrial fibrillation [facility])  Assessment & Plan  Rates are controlled continue coreg  Eliquis for [facility]    Stage 3a chronic kidney disease [facility])  Assessment & Plan  Lab Results   Component Value Date    EGFR 44 10/14/2023    EGFR 65 10/13/2023    EGFR 69 07/14/2022    CREATININE 1.58 (H) 10/14/2023    CREATININE 1.15 10/13/2023    CREATININE 1.11 07/14/2022 Renal function is at his baseline  Avoid NSAIDS and nephrotoxins  Monitor renal function periodically while inpatient    COPD (chronic obstructive pulmonary disease) ([address])  Assessment & Plan  Respiratory status is at baseline  Continue maintenance inhalers    Hypertensive urgency-resolved as of 10/14/2023  Assessment & Plan  Reports compliance with his medications but had vomiting yesterday and presented in hypertensive urgency. Restart amlodipine benazepril,   Patient does not appear to be taking Coreg at home appears that this was discontinued by PCP -patient did receive it here blood pressures had improved but we will discontinue for now since blood pressure is on the lower side. Monitor Bps  PRN labetalol  May need further medication adjustment if he remains uncontrolled        Medical Problems       Resolved Problems  Date Reviewed: 10/14/2023            Resolved    Hypertensive urgency 10/14/2023     Resolved by  Caden Sousa        Discharging Physician / Practitioner: Caden Sousa  PCP: Apoorva Ratliff DO  Admission Date:   Admission Orders (From admission, onward)       Ordered        10/13/23 1058  Place in Observation  Once                          Discharge Date: 10/14/23    Consultations During Hospital Stay:  None    Procedures Performed:   Troponins x3 equals 21, 22, 27 subsequently  WBC count stable 8.25 and 10.11 at discharge  Chest x-ray 10/13/2023 shows no acute cardiopulmonary disease  CT cervical spine without contrast 10/13/2023 shows no cervical spine fracture or traumatic malalignment  CT head without contrast 10/13/2023 shows no acute intracranial abnormality -however does demonstrate chronic infarctions within the right occipital lobe, right frontal parietal area. Chronic ischemic white matter changes noted. No evidence of acute intracranial hemorrhage no mass effect.   On admission patient's BUN 19 creatinine 1.15 at discharge BUN 31 creatinine 1.58 -however treated with normal saline bolus and electrolyte repletion  Magnesium 1.6 -treated with 2 g IV magnesium infusion  Potassium 3.2 -treated with 40 mEq K-Dur  Patient CBC stable no leukocytosis  Echocardiogram with a EF of 65% noting no evidence of stenosis and aortic mitral , tricuspid or pulmonic valves. There was a trivial pericardial effusion along the right atrial free wall but no internal echoes noted. No echocardiographic evidence of tamponade. Significant Findings / Test Results:   See above    Incidental Findings:   None    Test Results Pending at Discharge (will require follow up): None     Outpatient Tests Requested:  Call to schedule follow-up with PCP within the next week    Complications: None    Reason for Admission: Syncope and collapse    Hospital Course:   Sujata Montiel is a 80 y.o. male patient who originally presented to the hospital on 10/13/2023 due to syncope and collapse. Patient has past medical history of CVA, GERD, hyperlipidemia, hypertension, panic attacks, stroke, stenosis of the right carotid artery. Patient presents with syncope. Reported that he was having some abdominal pain some nausea and vomiting prior to his syncope in the bathroom. While he was in the bathroom he reports hitting his hand on the toilet and hitting his head on the back of the toilet. Patient reports ports that he got up and went to the lounge and lay down on the couch and then the next thing he knew he was on the floor. He had no recollection of how this occurred. He does report feeling rather " fuzzy" in the head. He was brought into the hospital seen by the trauma team imaging with no acute findings and patient was kept for medical observation. Important to note is that patient presented with blood pressures as high as 219/106 reports that he is fully compliant with his medications. Troponins x3 were obtained all of which were negative. Patient did not complain of chest pain.   He denied any urinary symptoms no burning no frequency. He states that he was just at [facility] for his strokes "acting up". Patient underwent CT head with no acute findings however did note chronic infarctions. Patient was initiated on his home medications blood pressure is improved however noting an acute drop suspected to have contributed to acute kidney injury creatinine of 1.5 a day after admission. Patient received low-dose IV fluids for resuscitation. Patient's magnesium noted to be 1.6 received 2 g of IV magnesium prior to discharge. Patient really wanting to return home. Recommendations for patient to receive blood work on the 17th for results to his primary care physician to monitor renal function and electrolytes. Patient reports that he is feeling a lot better he has no longer had any further episodes of nausea vomiting/diarrhea and no further abdominal pain. With that patient is eating. Patient is agreeable to stay for IV fluids and magnesium resuscitation but would like to return home. He reports he will have a family member take him home. I have requested that VNA be set up for the patient to evaluate home medications, blood pressures post discharge. She will call to schedule follow-up with PCP in the next week. Recommend follow-up at soonest available appointment with cardiology. Please see above list of diagnoses and related plan for additional information. Condition at Discharge: good    Discharge Day Visit / Exam:   Subjective:  Patient is doing a lot better. Now eating. He reports all of this began with abdominal pain. He was taking multiple doses of Katie-Fern[place] at home for his abdominal pain. Had couple episodes of vomiting. However since admission patient reports no further episodes of nausea or vomiting he is eating well no dizziness lightheadedness when ambulating to the bathroom physical therapy have seen patient and reported he did well.   Patient reports he had been taking his medications appropriately. Vitals: Blood Pressure: 107/68 (10/14/23 1632)  Pulse: 66 (10/14/23 1632)  Temperature: (!) 97.3 °F (36.3 °C) (10/14/23 1632)  Temp Source: Oral (10/14/23 1632)  Respirations: 20 (10/14/23 1632)  Height: 5' 9" (175.3 cm) (10/14/23 0800)  Weight - Scale: 67.6 kg (149 lb) (10/14/23 0800)  SpO2: 93 % (10/14/23 1632)  Exam:   Physical Exam  Constitutional:       General: He is not in acute distress. Appearance: He is obese. He is not ill-appearing, toxic-appearing or diaphoretic. HENT:      Head: Normocephalic and atraumatic. Right Ear: There is no impacted cerumen. Mouth/Throat:      Pharynx: No oropharyngeal exudate. Eyes:      General:         Right eye: No discharge. Left eye: No discharge. Cardiovascular:      Rate and Rhythm: Normal rate. Heart sounds: No murmur heard. No friction rub. No gallop. Pulmonary:      Effort: No respiratory distress. Breath sounds: No stridor. Wheezing (Right lobe) and rales present. No rhonchi. Chest:      Chest wall: No tenderness. Abdominal:      General: There is no distension. Palpations: There is no mass. Tenderness: There is no abdominal tenderness. There is no guarding or rebound. Hernia: No hernia is present. Comments: No abdominal tenderness with palpation on exam    Musculoskeletal:         General: No swelling, tenderness, deformity or signs of injury. Right lower leg: No edema. Left lower leg: No edema. Skin:     Coloration: Skin is not jaundiced or pale. Findings: No bruising, erythema, lesion or rash. Neurological:      Mental Status: He is alert and oriented to person, place, and time. Cranial Nerves: No cranial nerve deficit. Sensory: No sensory deficit. Motor: No weakness. Coordination: Coordination normal.   Psychiatric:         Behavior: Behavior normal.          Discussion with Family:  Patient.      Discharge instructions/Information to patient and family:   See after visit summary for information provided to patient and family. Provisions for Follow-Up Care:  See after visit summary for information related to follow-up care and any pertinent home health orders. Disposition:   Home with VNA Services (Reminder: Complete face to face encounter)    Planned Readmission: No plan for readmission  Discharge Statement:  I spent 48 minutes discharging the patient. This time was spent on the day of discharge. I had direct contact with the patient on the day of discharge. Greater than 50% of the total time was spent examining patient, answering all patient questions, arranging and discussing plan of care with patient as well as directly providing post-discharge instructions. Additional time then spent on discharge activities. Discharge Medications:  See after visit summary for reconciled discharge medications provided to patient and/or family.       **Please Note: This note may have been constructed using a voice recognition system**

## 2023-10-14 NOTE — ASSESSMENT & PLAN NOTE
-s/p fall and presentation as level B trauma, suspected syncopal event  -trauma workup including CTH, CT c spine, FAST, CXR, all negative for acute traumatic injury  -admitted to [address] for syncope workup and further recs

## 2023-10-14 NOTE — CASE MANAGEMENT
Case Management Discharge Planning Note    Patient name Johanne Dodson  Location 2 210/CW2 499-06 MRN [id number]  : 1955 Date 10/14/2023       Current Admission Date: 10/13/2023  Current Admission Diagnosis:Syncope and collapse   Patient Active Problem List    Diagnosis Date Noted    Smoking 2022    Claustrophobia     Bilateral carotid artery stenosis 2022    Chronic calculous cholecystitis 2022    Delirium tremens ([address]) 2022    Wernicke encephalopathy 2022    Alcohol use disorder, severe, dependence ([address]) 2022    Atrial fibrillation ([address]) 2022    Hallucinations 2022    Memory changes 2022    Seizure ([address]) 2022    Cholelithiasis 2022    Acute Cholecystitis 2022    Hypokalemia 2022    Hypomagnesemia 2022    PAF (paroxysmal atrial fibrillation) ([address]) 2022    S/P carotid endarterectomy 11/10/2021    Ambulatory dysfunction 09/15/2021    Syncope and collapse 09/15/2021    PRITI (acute kidney injury) ([address]) 2021    Pre-diabetes 2021    Stage 3a chronic kidney disease ([address]) 2021    Hx of CVA (cerebral vascular accident) ([address]) 2021    Stenosis of right internal carotid artery     Lumbar back pain     GERD (gastroesophageal reflux disease)     Left arm weakness 2021    Alcohol abuse 2021    HLD (hyperlipidemia) 2021    COPD (chronic obstructive pulmonary disease) ([address]) 2021    Iron deficiency anemia 2021    Cerebrovascular accident (CVA) due to embolism of right middle cerebral artery ([address]) 2021    Symptomatic carotid artery stenosis, right 2021    Benign essential hypertension 02/15/2011      LOS (days): 0  Geometric Mean LOS (GMLOS) (days):   Days to GMLOS:     OBJECTIVE:            Current admission status: Observation   Preferred Pharmacy:   [facility] [address] [place] [address], [place] - [address]  [address] [address]  Phone: [phone] Fax: [address] [place], [place] - [address] [place] [address] [facility] [address]  Phone: [phone] Fax: [address] [facility] [address] [place], [place] - [address]  [address]  [facility] [address]  Phone: [phone] Fax: [phone]    Primary Care Provider: Apoorva Ratliff DO    Primary Insurance: [facility] [employer] REP  Secondary Insurance:     DISCHARGE DETAILS:                                [address] Agency Name[de-identified] Tyler Sosa VNBERNARDINO    DME Referral Provided  Referral made for DME?: Yes  DME referral completed for the following items[de-identified] Bedside Commode  DME Supplier Name[de-identified] [employer]    Other Referral/Resources/Interventions Provided:  Interventions: HHA  Referral Comments: called pt on room phone #[phone] and made him aware of three agencies accepting [place] Bence and [address] Pt chooses [facility]. Made him aware they will call him to schedule a visit within 48 hours of hosp dc. Also made ware of ordered [facility] and DME company will call him to schedule delivery. Pt verbalized understanding and agreeable with plan.          Treatment Team Recommendation: Home with [facility])  Discharge Destination Plan[de-identified] Home with [facility])  Transport at Discharge : Family

## 2023-10-14 NOTE — PHYSICAL THERAPY NOTE
Physical Therapy Evaluation     Patient's Name: Marcin Search    Admitting Diagnosis  Syncope [R55]  Closed head injury, initial encounter [S09.90XA]  Unspecified multiple injuries, initial encounter [T07. XXXA]    Problem List  Patient Active Problem List   Diagnosis    Left arm weakness    Alcohol abuse    HLD (hyperlipidemia)    COPD (chronic obstructive pulmonary disease) (HCC)    Iron deficiency anemia    Cerebrovascular accident (CVA) due to embolism of right middle cerebral artery (HCC)    Symptomatic carotid artery stenosis, right    Lumbar back pain    GERD (gastroesophageal reflux disease)    Stenosis of right internal carotid artery    Hx of CVA (cerebral vascular accident) ([address])    Benign essential hypertension    PRITI (acute kidney injury) ([address])    Pre-diabetes    Stage 3a chronic kidney disease ([address])    Ambulatory dysfunction    Syncope and collapse    S/P carotid endarterectomy    Acute Cholecystitis    Hypomagnesemia    PAF (paroxysmal atrial fibrillation) ([facility])    Cholelithiasis    Seizure ([facility])    Atrial fibrillation ([address])    Hallucinations    Memory changes    Delirium tremens ([address])    Wernicke encephalopathy    Alcohol use disorder, severe, dependence ([address])    Chronic calculous cholecystitis    Bilateral carotid artery stenosis    Claustrophobia    Smoking    Hypertensive urgency       Past Medical History  Past Medical History:   Diagnosis Date    PRITI (acute kidney injury) ([address]) 09/05/2021    Anxiety     Back pain     Claustrophobia     COPD (chronic obstructive pulmonary disease) ([address])     Dysphagia 09/05/2021    Fatty liver     GERD (gastroesophageal reflux disease)     History of transfusion     Hyperlipidemia     Hypertension     Hypokalemia 02/22/2022    Lumbar back pain     Panic attacks     Stenosis of right carotid artery     Stroke [facility])     Wears glasses     Wears partial dentures     upper denture       Past Surgical History  Past Surgical History:   Procedure Laterality Date COLONOSCOPY      ERCP      IR CEREBRAL ANGIOGRAPHY  04/06/2021    OTHER SURGICAL HISTORY      fertility    IA LAPAROSCOPY SURG CHOLECYSTECTOMY N/A 8/26/2022    Procedure: CHOLECYSTECTOMY LAPAROSCOPIC;  Surgeon: Natalie Otoole DO;  Location: [facility] Main OR;  Service: General    IA Stephanie Goods 3RD+ ORD SLCTV ABDL PEL/LXTR [facility] Right 04/06/2021    Procedure: ARTERIOGRAM, carotid Angio;  Surgeon: Muna Colbert MD;  Location: [facility];  Service: Vascular    IA TEAEC W/PATCH GRF CAROTID VERTB [address] Right 04/13/2021    Procedure: RIGHT ENDARTERECTOMY ARTERY CAROTID WITH BOVINE PATCH;  Surgeon: Muna Colbert MD;  Location: [facility] MAIN OR;  Service: Vascular        10/14/23 1038   PT Last Visit   PT Visit Date 10/14/23   Note Type   Note type Evaluation   Pain Assessment   Pain Assessment Tool 0-10   Pain Score No Pain   Restrictions/Precautions   Weight Bearing Precautions Per Order No   Other Precautions Multiple lines; Fall Risk   Home Living   Type of Home Mobile home   Home Layout Performs ADLs on one level  (3 EVELYNE)   Bathroom Shower/Tub Walk-in shower   Bathroom Toilet Standard   Bathroom Equipment Grab bars in [facility]   Additional Comments PTA pt reports using SPC for longer distances. Prior Function   Level of [place] Independent with ADLs; Independent with functional mobility   Lives With Alone   Receives Help From Family   IADLs Independent with meal prep; Independent with medication management; Family/Friend/Other provides transportation   Falls in the last 6 months 1 to 4  (2)   Comments Pt reports his sister visits x1 week and his other sister call him daily.    General   Family/Caregiver Present No   Cognition   Overall Cognitive Status WFL   Arousal/Participation Cooperative   Orientation Level Oriented X4   Following Commands Follows all commands and directions without difficulty   RLE Assessment   RLE Assessment WFL   LLE Assessment   LLE Assessment WFL   Bed Mobility   Supine to Sit 7  Independent   Sit to Supine 7  Independent   Transfers   Sit to Stand 7  Independent   Stand to Sit 7  Independent   Additional Comments no AD   Ambulation/Elevation   Gait pattern WNL; Step through pattern   Gait Assistance 7  Independent   Assistive Device None   Distance 200'   Balance   Static Sitting Good   Dynamic Sitting Good   Static Standing Fair +   Dynamic Standing Fair +   Ambulatory Fair +   Endurance Deficit   Endurance Deficit No   Activity Tolerance   Activity Tolerance Patient tolerated treatment well   Medical Staff Made Aware JAQUAN Guillen   Nurse Made Aware RN cleared   Assessment   Prognosis Good   Problem List Decreased endurance; Impaired balance   Assessment Pt seen for PT evaluation. Pt with active PT eval/treat orders. Pt is a 80 y.o. male who was admitted to [address] [place] [address] statu on 10/14/23. Pt's current dx/ problem list include: syncope and collapse. Comorbidities affecting pt's physical performance at time of assessment are as follows:  has a past medical history of PRITI (acute kidney injury) ([address]) (09/05/2021), Anxiety, Back pain, Claustrophobia, COPD (chronic obstructive pulmonary disease) ([address]), Dysphagia (09/05/2021), Fatty liver, GERD (gastroesophageal reflux disease), History of transfusion, Hyperlipidemia, Hypertension, Hypokalemia (02/22/2022), Lumbar back pain, Panic attacks, Stenosis of right carotid artery, Stroke [facility]), Wears glasses, and Wears partial dentures. Personal factors affecting pt at time of initial examination include: steps to enter environment, limited home support, past experience, inability to perform IADLs, inability to perform ADLs. Due to acute medical issues, ongoing medical workup for primary dx; fall risk, increased assistance needed from caregiver at current time, multiple lines, decline in overall functional mobility status; health management issues.  At baseline, pt resides alone in a mobile with 3 EVELYNE and was independent with ADLs. Currently, upon initial examination, pt demonstrates independence with all functional mobility. No further acute PT needs at this time. Pt would benefit from continued mobilization with restorative and nursing staff. At the end of evaluation, pt was left supine with all needs in reach. The patient's AM-PAC Basic Mobility Inpatient Short Form Raw Score is 24. A Raw score of greater than 16 suggests the patient may benefit from discharge to home. Please also refer to the recommendation of the Physical Therapist for safe discharge planning.    Barriers to Discharge None   Goals   Patient Goals To go home   Plan   Treatment/Interventions Bed mobility;Gait training;Equipment eval/education;Patient/family training;OT;Spoke to nursing   PT Frequency Other (Comment)  (PT EVAL ONLY)   Discharge Recommendation   PT Discharge Recommendation No rehabilitation needs   AM-PAC Basic Mobility Inpatient   Turning in Flat Bed Without Bedrails 4   Lying on Back to Sitting on Edge of Flat Bed Without Bedrails 4   Moving Bed to Chair 4   Standing Up From Chair Using Arms 4   Walk in Room 4   Climb 3-5 Stairs With Railing 4   Basic Mobility Inpatient Raw Score 24   Basic Mobility Standardized Score 57.68   Highest Level Of Mobility   JH-HLM Goal 8: Walk 250 feet or more   JH-HLM Achieved 7: Walk 25 feet or more           Hugo Segovia PT

## 2023-10-14 NOTE — PLAN OF CARE
Problem: PHYSICAL THERAPY ADULT  Goal: Performs mobility at highest level of function for planned discharge setting. See evaluation for individualized goals. Description: Treatment/Interventions: Bed mobility, Gait training, Equipment eval/education, Patient/family training, OT, Spoke to nursing          See flowsheet documentation for full assessment, interventions and recommendations. Outcome: Completed  Note: Prognosis: Good  Problem List: Decreased endurance, Impaired balance  Assessment: Pt seen for PT evaluation. Pt with active PT eval/treat orders. Pt is a 80 y.o. male who was admitted to [facility] observation statu on 10/14/23. Pt's current dx/ problem list include: syncope and collapse. Comorbidities affecting pt's physical performance at time of assessment are as follows:  has a past medical history of PRITI (acute kidney injury) ([address]) (09/05/2021), Anxiety, Back pain, Claustrophobia, COPD (chronic obstructive pulmonary disease) ([address]), Dysphagia (09/05/2021), Fatty liver, GERD (gastroesophageal reflux disease), History of transfusion, Hyperlipidemia, Hypertension, Hypokalemia (02/22/2022), Lumbar back pain, Panic attacks, Stenosis of right carotid artery, Stroke [facility]), Wears glasses, and Wears partial dentures. Personal factors affecting pt at time of initial examination include: steps to enter environment, limited home support, past experience, inability to perform IADLs, inability to perform ADLs. Due to acute medical issues, ongoing medical workup for primary dx; fall risk, increased assistance needed from caregiver at current time, multiple lines, decline in overall functional mobility status; health management issues. At baseline, pt resides alone in a mobile with 3 EVELYNE and was independent with ADLs. Currently, upon initial examination, pt demonstrates independence with all functional mobility. No further acute PT needs at this time.  Pt would benefit from continued mobilization with restorative and nursing staff. At the end of evaluation, pt was left supine with all needs in reach. The patient's AM-PAC Basic Mobility Inpatient Short Form Raw Score is 24. A Raw score of greater than 16 suggests the patient may benefit from discharge to home. Please also refer to the recommendation of the Physical Therapist for safe discharge planning. Barriers to Discharge: None     PT Discharge Recommendation: No rehabilitation needs    See flowsheet documentation for full assessment.

## 2023-10-14 NOTE — CASE MANAGEMENT
Case Management Discharge Planning Note    Patient name Joan Rodriguez  Location 2 210/CW2 990-17 MRN [id number]  : 1955 Date 10/14/2023       Current Admission Date: 10/13/2023  Current Admission Diagnosis:Syncope and collapse   Patient Active Problem List    Diagnosis Date Noted    Smoking 2022    Claustrophobia     Bilateral carotid artery stenosis 2022    Chronic calculous cholecystitis 2022    Delirium tremens ([address]) 2022    Wernicke encephalopathy 2022    Alcohol use disorder, severe, dependence ([address]) 2022    Atrial fibrillation ([address]) 2022    Hallucinations 2022    Memory changes 2022    Seizure ([address]) 2022    Cholelithiasis 2022    Acute Cholecystitis 2022    Hypomagnesemia 2022    PAF (paroxysmal atrial fibrillation) ([address]) 2022    S/P carotid endarterectomy 11/10/2021    Ambulatory dysfunction 09/15/2021    Syncope and collapse 09/15/2021    PRITI (acute kidney injury) ([address]) 2021    Pre-diabetes 2021    Stage 3a chronic kidney disease ([address]) 2021    Hx of CVA (cerebral vascular accident) ([address]) 2021    Stenosis of right internal carotid artery     Lumbar back pain     GERD (gastroesophageal reflux disease)     Left arm weakness 2021    Alcohol abuse 2021    HLD (hyperlipidemia) 2021    COPD (chronic obstructive pulmonary disease) ([address]) 2021    Iron deficiency anemia 2021    Cerebrovascular accident (CVA) due to embolism of right middle cerebral artery ([address]) 2021    Symptomatic carotid artery stenosis, right 2021    Benign essential hypertension 02/15/2011      LOS (days): 0  Geometric Mean LOS (GMLOS) (days):   Days to GMLOS:     OBJECTIVE:            Current admission status: Observation   Preferred Pharmacy:   [facility]/pharmacy #[id number] FRANKIE Davis - [address]  [address] [address]  Phone: [phone] Fax: [phone] [address] [place], [place] - [address] [facility] [address] [address] [address]  Phone: [phone] Fax: [phone]    [address] [place], [place] - [address]  [address]  [address] [address]  Phone: [phone] Fax: [phone]    Primary Care Provider: Nikolas López DO    Primary Insurance: [facility] [employer]  Secondary Insurance:     DISCHARGE DETAILS:             DME Referral Provided  Referral made for DME?: Yes  DME referral completed for the following items[de-identified] Bedside Commode  DME Supplier Name[de-identified] [employer]    Other Referral/Resources/Interventions Provided:  Interventions: DME  Referral Comments: Notified by Joe Taveras that pt needs a BSC. Ordered on [facility] and notified [facility] provider.

## 2023-10-14 NOTE — CASE MANAGEMENT
Case Management Discharge Planning Note    Patient name Sujata Montiel  Location 2 210/CW2 634-84 MRN [id number]  : 1955 Date 10/14/2023       Current Admission Date: 10/13/2023  Current Admission Diagnosis:Syncope and collapse   Patient Active Problem List    Diagnosis Date Noted    Hypertensive urgency 10/13/2023    Smoking 2022    Claustrophobia     Bilateral carotid artery stenosis 2022    Chronic calculous cholecystitis 2022    Delirium tremens ([address]) 2022    Wernicke encephalopathy 2022    Alcohol use disorder, severe, dependence ([address]) 2022    Atrial fibrillation ([address]) 2022    Hallucinations 2022    Memory changes 2022    Seizure ([address]) 2022    Cholelithiasis 2022    Acute Cholecystitis 2022    Hypomagnesemia 2022    PAF (paroxysmal atrial fibrillation) ([address]) 2022    S/P carotid endarterectomy 11/10/2021    Ambulatory dysfunction 09/15/2021    Syncope and collapse 09/15/2021    PRITI (acute kidney injury) ([address]) 2021    Pre-diabetes 2021    Stage 3a chronic kidney disease ([address]) 2021    Hx of CVA (cerebral vascular accident) ([address]) 2021    Stenosis of right internal carotid artery     Lumbar back pain     GERD (gastroesophageal reflux disease)     Left arm weakness 2021    Alcohol abuse 2021    HLD (hyperlipidemia) 2021    COPD (chronic obstructive pulmonary disease) ([address]) 2021    Iron deficiency anemia 2021    Cerebrovascular accident (CVA) due to embolism of right middle cerebral artery ([address]) 2021    Symptomatic carotid artery stenosis, right 2021    Benign essential hypertension 02/15/2011      LOS (days): 0  Geometric Mean LOS (GMLOS) (days):   Days to GMLOS:     OBJECTIVE:            Current admission status: Observation   Preferred Pharmacy:   [facility]/pharmacy #[id number] FRANKIE Torres - [address]  [address]  [place] [address]  Phone: [phone] Fax: [address] [place], [place] - [address] [place] [place] [address] [facility] [address]  Phone: [phone] Fax: [address] [place], [place] - [address]  [address] [place] [address]  Phone: [phone] Fax: [phone]    Primary Care Provider: Gregg Ashraf DO    Primary Insurance: [facility] [employer]  Secondary Insurance:     DISCHARGE DETAILS:               Additional Comments: Met with pt at bedside, provided booklet and OBS notice fro Medicare. Answered all questions regarding observation status and billing. Copy of OBS notice and booklet left at bedside, original signed by patient and sent to medical records. Pt verbalized understanding of OBS notice.

## 2023-10-16 ENCOUNTER — HOME CARE VISIT (OUTPATIENT)
Dept: HOME HEALTH SERVICES | Facility: HOME HEALTHCARE | Age: 68
End: 2023-10-16

## 2023-10-16 NOTE — UTILIZATION REVIEW
NOTIFICATION OF OBSERVATION ADMISSION   AUTHORIZATION REQUEST   SERVICING FACILITY:   [address] [place] [address]  Address: [address] [place] [address], [address] [place] [address] Place [address]  Tax ID: [id number]  NPI: [id number] ATTENDING PROVIDER:  Attending Name and NPI#: Gonzalo Stanley Md [[id number]]  Address: [address] [place] [address]  Phone: [phone]   ADMISSION INFORMATION:  Place of Service: On [facility] Code: 22 CPT Code:   Admitting Diagnosis Code/Description:  Syncope [R55]  Closed head injury, initial encounter [S09.90XA]  Unspecified multiple injuries, initial encounter [T07. XXXA]  Observation Admission Date/Time: [id number]   Discharge Date/Time: 10/14/2023  5:30 PM     UTILIZATION REVIEW CONTACT:  Hyacinth Andino Utilization   Network Utilization Review Department  Phone: [phone]  Fax: [phone]  Email: [email or web address]  Contact for approvals/pending authorizations, clinical reviews, and discharge. PHYSICIAN ADVISORY SERVICES:  Medical Necessity Denial & Ldf[facility]-Bdno Review  Phone: [phone]  Fax: [phone]  Email: [email or web address]     DISCHARGE SUPPORT TEAM:  For Patients Discharge Needs & Updates  Phone: [phone] opt. 2 Fax: [phone]  Email: [email or web address]

## 2023-10-17 ENCOUNTER — HOME CARE VISIT (OUTPATIENT)
Dept: HOME HEALTH SERVICES | Facility: HOME HEALTHCARE | Age: 68
End: 2023-10-17

## 2023-10-18 ENCOUNTER — HOME CARE VISIT (OUTPATIENT)
Dept: HOME HEALTH SERVICES | Facility: HOME HEALTHCARE | Age: 68
End: 2023-10-18

## 2023-10-19 NOTE — CASE COMMUNICATION
Called pt on 10/17 to set up appt for 10/18. SN unable to leave [facility] due to being full. SN arrived at home on 10/18, knocked on door, and called phone number again with no response. Pt's home looked empty from front door and there were several eviction notices on windows and doors. SN left note stated [facility] was there at door.

## 2023-10-20 ENCOUNTER — HOME CARE VISIT (OUTPATIENT)
Dept: HOME HEALTH SERVICES | Facility: HOME HEALTHCARE | Age: 68
End: 2023-10-20

## 2023-10-20 ENCOUNTER — APPOINTMENT (OUTPATIENT)
Dept: LAB | Facility: CLINIC | Age: 68
End: 2023-10-20
Payer: COMMERCIAL

## 2023-10-20 DIAGNOSIS — I16.0 HYPERTENSIVE URGENCY: ICD-10-CM

## 2023-10-20 DIAGNOSIS — R55 SYNCOPE: ICD-10-CM

## 2023-10-20 DIAGNOSIS — N18.31 STAGE 3A CHRONIC KIDNEY DISEASE (HCC): Chronic | ICD-10-CM

## 2023-10-20 LAB
ALBUMIN SERPL BCP-MCNC: 4.3 G/DL (ref 3.5–5)
ALP SERPL-CCNC: 91 U/L (ref 34–104)
ALT SERPL W P-5'-P-CCNC: 14 U/L (ref 7–52)
ANION GAP SERPL CALCULATED.3IONS-SCNC: 11 MMOL/L
AST SERPL W P-5'-P-CCNC: 17 U/L (ref 13–39)
BILIRUB SERPL-MCNC: 0.49 MG/DL (ref 0.2–1)
BUN SERPL-MCNC: 32 MG/DL (ref 5–25)
CALCIUM SERPL-MCNC: 9.6 MG/DL (ref 8.4–10.2)
CHLORIDE SERPL-SCNC: 100 MMOL/L (ref 96–108)
CO2 SERPL-SCNC: 27 MMOL/L (ref 21–32)
CREAT SERPL-MCNC: 1.42 MG/DL (ref 0.6–1.3)
GFR SERPL CREATININE-BSD FRML MDRD: 50 ML/MIN/1.73SQ M
GLUCOSE SERPL-MCNC: 97 MG/DL (ref 65–140)
MAGNESIUM SERPL-MCNC: 1.4 MG/DL (ref 1.9–2.7)
POTASSIUM SERPL-SCNC: 4.1 MMOL/L (ref 3.5–5.3)
PROT SERPL-MCNC: 7.6 G/DL (ref 6.4–8.4)
SODIUM SERPL-SCNC: 138 MMOL/L (ref 135–147)

## 2023-10-20 PROCEDURE — 83735 ASSAY OF MAGNESIUM: CPT

## 2023-10-20 PROCEDURE — 80053 COMPREHEN METABOLIC PANEL: CPT

## 2023-10-20 PROCEDURE — 36415 COLL VENOUS BLD VENIPUNCTURE: CPT

## 2023-10-21 NOTE — OCCUPATIONAL THERAPY NOTE
Mariia called in to speak to on call provider regarding the pt's low blood sugar. On call provider contacted via TC. Occupational Therapy Screen       02/24/22 1240   OT Last Visit   OT Visit Date 02/24/22   Note Type   Note type Screen   Additional Comments OT consult received  Chart reviewed and spoke with RN whom reports pt to be "just a screen" and states pt has been moving around room (I)ly  Contact made with pt and pt reports showered self this AM without staff assist  OT explained role of services and requested pt to alert staff of any changes in status  Pt verbalized understanding  Will remove from caseload at this time       Niyah Joes, OT

## 2023-10-23 NOTE — CASE COMMUNICATION
Assess not admit  upon arrival to patients home he was outside smoking a cigarette and greeted this SN. Patient reports he is not homebound he does not use any assistive devices and does not have any difficulty leaving the home. we did discuss medications and he will go for labs this afternoon. Patient is aware to follow up with his providers and monitor blood pressure at home.  Godwin Mcintosh offers no questions or concerns

## 2023-12-05 ENCOUNTER — HOSPITAL ENCOUNTER (OUTPATIENT)
Dept: NON INVASIVE DIAGNOSTICS | Facility: CLINIC | Age: 68
Discharge: HOME | End: 2023-12-05
Payer: COMMERCIAL

## 2023-12-05 DIAGNOSIS — I65.23 BILATERAL CAROTID ARTERY STENOSIS: ICD-10-CM

## 2023-12-05 PROCEDURE — 93880 EXTRACRANIAL BILAT STUDY: CPT | Performed by: SURGERY

## 2023-12-05 PROCEDURE — 93880 EXTRACRANIAL BILAT STUDY: CPT

## 2024-02-12 ENCOUNTER — OFFICE VISIT (OUTPATIENT)
Dept: VASCULAR SURGERY | Facility: CLINIC | Age: 69
End: 2024-02-12
Payer: COMMERCIAL

## 2024-02-12 VITALS
SYSTOLIC BLOOD PRESSURE: 118 MMHG | BODY MASS INDEX: 21.48 KG/M2 | WEIGHT: 145 LBS | HEART RATE: 92 BPM | OXYGEN SATURATION: 96 % | HEIGHT: 69 IN | DIASTOLIC BLOOD PRESSURE: 70 MMHG

## 2024-02-12 DIAGNOSIS — Z79.4 LONG TERM (CURRENT) USE OF INSULIN (HCC): ICD-10-CM

## 2024-02-12 DIAGNOSIS — N18.31 STAGE 3A CHRONIC KIDNEY DISEASE (HCC): ICD-10-CM

## 2024-02-12 DIAGNOSIS — I65.21 SYMPTOMATIC CAROTID ARTERY STENOSIS, RIGHT: Primary | Chronic | ICD-10-CM

## 2024-02-12 DIAGNOSIS — R09.89 DECREASED PULSES IN FEET: ICD-10-CM

## 2024-02-12 PROCEDURE — 99214 OFFICE O/P EST MOD 30 MIN: CPT | Performed by: NURSE PRACTITIONER

## 2024-02-12 RX ORDER — UMECLIDINIUM BROMIDE AND VILANTEROL TRIFENATATE 62.5; 25 UG/1; UG/1
POWDER RESPIRATORY (INHALATION)
COMMUNITY

## 2024-02-12 RX ORDER — VARENICLINE TARTRATE 1 MG/1
1 TABLET, FILM COATED ORAL 2 TIMES DAILY
COMMUNITY

## 2024-02-12 NOTE — PATIENT INSTRUCTIONS
-Go to the ED with any signs or symptoms of stroke such as numbness/ tingling on one side of your body, blindness in one eye, slurred speech, facial droop.    -Increase physical activity. Try to exercise 3 times a week for 30 min.     -Call the office with any incision site swelling, pain, discharge or fever.    -Complete carotid ultrasound in 6 months if no changes will order repeat in 6 months with return to the office for review in one year.   -Complete lower extremity ultrasound due now. The office will call you if you need to return for review.     -Recommended restart on pravastatin, discuss with PCP.  -Eliquis daily for A-fib as per PCP.

## 2024-02-12 NOTE — ASSESSMENT & PLAN NOTE
68-year-old male with PMHx GERD, COPD, HTN, A-fib, CKD stage 3, alcohol use disorder, hx CVA s/p R CEA 4/13/21  returns to the office for review of his non-invasive study and RFM.      Reviewed carotid ultrasound from 12/5/2023 which demonstrates right ICA less than 50% stenosis, chronically occluded common carotid artery.  Left ICA less than 50% velocities 102/37 and a ratio 1.56    -Denies symptoms of numbness/ tingling/ weakness on one side of the body, facial droop, slurred speech or blindness in one eye.      -Reviewed most recent carotid ultrasound results in detail with pt and her family. Discussed pathophysiology of arterial disease and indication for vascular intervention. No vascular intervention planned at this time. Discussed that we will continue with medical management and non-invasive imaging at this time.      Plan  -Complete carotid ultrasound in 6 months and if no change will order repeat in 6 months with return in one year for review.   -Continue Eliquis daily for A-fib managed by cardiolgy/ PCP.  -Discuss restarting statin therapy with PCP.  -Call 911/ Go to the ER with any S/S of TIA/ CVA.

## 2024-02-12 NOTE — ASSESSMENT & PLAN NOTE
-Pt noted to have b/l DP and PT monophasic doppler signals.   -Reports R hip pain with ambulation of 50 ft. No rest pain, no wounds/ tissue loss.  -Not currently on statin. Long term Eliquis for a-fib.  -Feet are warm and dry.  -R foot >3 sec cap refill.   -Discussed with pt that he has diminished b/l pedal pulses and likely has a component of arterial dz d/t long term smoking. Will order LEAD for evaluation for PAD. No vascular intervention indicated at this time. Complete LEAD and the office will call if you require return for review. Pt verbalized understanding.     Plan  -Complete LEAD, the office will call if you require return for review.  -Recommend statin therapy, unsure why D/C?  -Increase walking  -Daily foot check, wear well fitted shoes.  -Call the office with any new or worsening leg pain, discoloration, wounds/ tissue loss.

## 2024-02-12 NOTE — PROGRESS NOTES
Assessment/Plan:    Bilateral carotid artery stenosis  68-year-old male with PMHx GERD, COPD, HTN, A-fib, CKD stage 3, alcohol use disorder, hx CVA s/p R CEA 4/13/21  returns to the office for review of his non-invasive study and RFM.      Reviewed carotid ultrasound from 12/5/2023 which demonstrates right ICA less than 50% stenosis, chronically occluded common carotid artery.  Left ICA less than 50% velocities 102/37 and a ratio 1.56    -Denies symptoms of numbness/ tingling/ weakness on one side of the body, facial droop, slurred speech or blindness in one eye.      -Reviewed most recent carotid ultrasound results in detail with pt and her family. Discussed pathophysiology of arterial disease and indication for vascular intervention. No vascular intervention planned at this time. Discussed that we will continue with medical management and non-invasive imaging at this time.      Plan  -Complete carotid ultrasound in 6 months and if no change will order repeat in 6 months with return in one year for review.   -Continue Eliquis daily for A-fib managed by cardiolgy/ PCP.  -Discuss restarting statin therapy with PCP.  -Call 911/ Go to the ER with any S/S of TIA/ CVA.    Decreased pulses in feet  -Pt noted to have b/l DP and PT monophasic doppler signals.   -Reports R hip pain with ambulation of 50 ft. No rest pain, no wounds/ tissue loss.  -Not currently on statin. Long term Eliquis for a-fib.  -Feet are warm and dry.  -R foot >3 sec cap refill.   -Discussed with pt that he has diminished b/l pedal pulses and likely has a component of arterial dz d/t long term smoking. Will order LEAD for evaluation for PAD. No vascular intervention indicated at this time. Complete LEAD and the office will call if you require return for review. Pt verbalized understanding.     Plan  -Complete LEAD, the office will call if you require return for review.  -Recommend statin therapy, unsure why D/C?  -Increase walking  -Daily foot  check, wear well fitted shoes.  -Call the office with any new or worsening leg pain, discoloration, wounds/ tissue loss.         Diagnoses and all orders for this visit:    Symptomatic carotid artery stenosis, right  -     VAS carotid complete study; Future    Decreased pulses in feet  -     VAS ARTERIAL DUPLEX- LOWER LIMB BILATERAL; Future    Long term (current) use of insulin (HCC)    Stage 3a chronic kidney disease (HCC)    Other orders  -     varenicline (Chantix) 1 mg tablet; Take 1 mg by mouth 2 (two) times a day  -     Anoro Ellipta 62.5-25 MCG/ACT inhaler; INHALE 1 PUFF BY MOUTH AND INTO THE LUNGS ONCE DAILY AT THE SAME TIME EACH DAY          Subjective:      Patient ID: Abhishek Tom is a 68 y.o. male.    Patient presents for review of CV done 12/5/23. He denies TIA/CVA symptoms. He does report weakness and discomfort of bilateral lower extremities, R>L. He states he can walk 1 to 1.5 blocks grocery aisles before feeling discomfort in right buttock down to his calf. He is a current smoker, 1-3 cigarettes a day. He is taking Eliquis.     68-year-old male with PMHx GERD, COPD, HTN, A-fib, CKD stage 3, alcohol use disorder, hx CVA s/p R CEA 4/13/21  returns to the office for review of his non-invasive study and RFM.    Denies symptoms of numbness/ tingling/ weakness on one side of the body, facial droop, slurred speech or blindness in one eye.     When he was 36 he blew out two disks in his lower lumbar. For insurance reasons his MRI was cancelled.  When walking around the grocery store he notices his R hip and buttocks gets numb and the bone hurts and pain goes down his right leg, then he notices pain in the left leg. Nagging pain and numbness. Denies cramping. Denies rest pain, denies wounds/tissue loss.   Used to drink 6 pack of ETOH every other day  Currently smokes 1-3 cig/ day, he is down from smoking 1-1.5 cig/ day in the past        The following portions of the patient's history were reviewed  "and updated as appropriate: allergies, current medications, past family history, past medical history, past social history, past surgical history, and problem list.    Review of Systems   Constitutional: Negative.    HENT: Negative.     Eyes: Negative.  Negative for visual disturbance.   Respiratory:  Positive for shortness of breath.    Cardiovascular: Negative.  Negative for chest pain and leg swelling.   Gastrointestinal: Negative.    Endocrine: Negative.    Genitourinary: Negative.    Musculoskeletal:  Positive for back pain.   Skin: Negative.    Allergic/Immunologic: Negative.    Neurological:  Positive for light-headedness. Negative for dizziness, facial asymmetry, speech difficulty, weakness and headaches.   Hematological: Negative.    Psychiatric/Behavioral: Negative.           Objective:      /70   Pulse 92   Ht 5' 9\" (1.753 m)   Wt 65.8 kg (145 lb)   SpO2 96%   BMI 21.41 kg/m²          Physical Exam  Vitals and nursing note reviewed.   Constitutional:       Appearance: Normal appearance. He is normal weight.   HENT:      Head: Normocephalic and atraumatic.   Neck:      Vascular: No carotid bruit.   Cardiovascular:      Rate and Rhythm: Rhythm irregular.      Pulses:           Radial pulses are 2+ on the right side and 2+ on the left side.        Femoral pulses are 1+ on the right side and 1+ on the left side.       Dorsalis pedis pulses are detected w/ Doppler on the right side and detected w/ Doppler on the left side.        Posterior tibial pulses are detected w/ Doppler on the right side and 1+ on the left side.      Comments: Monophasic doppler signals  Pulmonary:      Effort: Pulmonary effort is normal. No respiratory distress.      Breath sounds: Rhonchi present.   Abdominal:      General: There is no distension.      Palpations: Abdomen is soft. There is no mass.   Musculoskeletal:         General: No tenderness.      Right lower leg: No edema.      Left lower leg: No edema.   Skin:     " "General: Skin is warm and dry.      Capillary Refill: Capillary refill takes more than 3 seconds.   Neurological:      General: No focal deficit present.      Mental Status: He is alert and oriented to person, place, and time.      Sensory: No sensory deficit.      Gait: Gait normal.   Psychiatric:         Mood and Affect: Mood normal.         Behavior: Behavior normal.         I have reviewed and made appropriate changes to the review of systems input by the medical assistant.    Vitals:    02/12/24 1439   BP: 118/70   Pulse: 92   SpO2: 96%   Weight: 65.8 kg (145 lb)   Height: 5' 9\" (1.753 m)       Patient Active Problem List   Diagnosis    Left arm weakness    Alcohol abuse    HLD (hyperlipidemia)    COPD (chronic obstructive pulmonary disease) ([facility])    Iron deficiency anemia    Cerebrovascular accident (CVA) due to embolism of right middle cerebral artery ([facility])    Symptomatic carotid artery stenosis, right    Lumbar back pain    GERD (gastroesophageal reflux disease)    Stenosis of right internal carotid artery    Hx of CVA (cerebral vascular accident) ([facility])    Benign essential hypertension    PRITI (acute kidney injury) ([facility])    Pre-diabetes    Stage 3a chronic kidney disease ([facility])    Ambulatory dysfunction    Syncope and collapse    S/P carotid endarterectomy    Acute Cholecystitis    Hypokalemia    Hypomagnesemia    PAF (paroxysmal atrial fibrillation) ([facility])    Cholelithiasis    Seizure ([facility])    Atrial fibrillation ([facility])    Hallucinations    Memory changes    Delirium tremens ([facility])    Wernicke encephalopathy    Alcohol use disorder, severe, dependence ([facility])    Chronic calculous cholecystitis    Bilateral carotid artery stenosis    Claustrophobia    Smoking    Long term (current) use of insulin (HCC)    Decreased pulses in feet       Past Surgical History:   Procedure Laterality Date    COLONOSCOPY      ERCP      IR CEREBRAL ANGIOGRAPHY  04/06/2021    OTHER SURGICAL HISTORY      fertility    MN LAPAROSCOPY SURG " CHOLECYSTECTOMY N/A 8/26/2022    Procedure: CHOLECYSTECTOMY LAPAROSCOPIC;  Surgeon: Michael Otoole DO;  Location: [facility];  Service: General    OK SLCTV CATHJ 3RD+ ORD SLCTV ABDL PEL/LXTR BRNCH Right 04/06/2021    Procedure: ARTERIOGRAM, carotid Angio;  Surgeon: Maximiliano Morin MD;  Location: [facility];  Service: Vascular    OK TEAEC W/PATCH GRF CAROTID VERTB SUBCLAV NECK INC Right 04/13/2021    Procedure: RIGHT ENDARTERECTOMY ARTERY CAROTID WITH BOVINE PATCH;  Surgeon: Maximiliano Morin MD;  Location: [facility];  Service: Vascular       Family History   Problem Relation Age of Onset    Asthma Mother        Social History     Socioeconomic History    Marital status: /Civil Union     Spouse name: Not on file    Number of children: Not on file    Years of education: Not on file    Highest education level: Not on file   Occupational History    Not on file   Tobacco Use    Smoking status: Every Day     Current packs/day: 0.50     Average packs/day: 0.5 packs/day for 49.1 years (24.6 ttl pk-yrs)     Types: Cigarettes     Start date: 1/1/1975    Smokeless tobacco: Never   Vaping Use    Vaping status: Never Used   Substance and Sexual Activity    Alcohol use: Yes     Alcohol/week: 7.0 standard drinks of alcohol     Types: 2 Glasses of wine, 5 Cans of beer per week     Comment: per week    Drug use: Never     Comment: never    Sexual activity: Not Currently     Partners: Female     Birth control/protection: Abstinence   Other Topics Concern    Not on file   Social History Narrative    Not on file     Social Determinants of Health     Financial Resource Strain: Low Risk  (10/2/2023)    Received from [facility]    Overall Financial Resource Strain (CARDIA)     Difficulty of Paying Living Expenses: Not hard at all   Food Insecurity: No Food Insecurity (10/2/2023)    Received from [facility]    Hunger Vital Sign     Worried About Running Out of Food in the Last Year: Never  true     Ran Out of Food in the Last Year: Never true   Transportation Needs: No Transportation Needs (10/2/2023)    Received from [facility]    PRAPARE - Transportation     Lack of Transportation (Medical): No     Lack of Transportation (Non-Medical): No   Physical Activity: Not on file   Stress: Not on file   Social Connections: Not on file   Intimate Partner Violence: Not At Risk (10/2/2023)    Received from [facility]    Humiliation, Afraid, Rape, and Kick questionnaire     Fear of Current or Ex-Partner: No     Emotionally Abused: No     Physically Abused: No     Sexually Abused: No   Housing Stability: Low Risk  (10/2/2023)    Received from [facility]    Housing Stability Vital Sign     Unable to Pay for Housing in the Last Year: No     Number of Places Lived in the Last Year: 0     Unstable Housing in the Last Year: No       Allergies   Allergen Reactions    Penicillins Anaphylaxis         Current Outpatient Medications:     albuterol (PROVENTIL HFA,VENTOLIN HFA) 90 mcg/act inhaler, Inhale 2 puffs if needed, Disp: , Rfl:     Anoro Ellipta 62.5-25 MCG/ACT inhaler, INHALE 1 PUFF BY MOUTH AND INTO THE LUNGS ONCE DAILY AT THE SAME TIME EACH DAY, Disp: , Rfl:     apixaban (ELIQUIS) 5 mg, Take 1 tablet (5 mg total) by mouth 2 (two) times a day, Disp: 60 tablet, Rfl: 0    baclofen 10 mg tablet, Take 10 mg by mouth in the morning PRN, Disp: , Rfl:     busPIRone (BUSPAR) 15 mg tablet, 15 mg 2 (two) times a day, Disp: , Rfl:     sertraline (ZOLOFT) 100 mg tablet, Take 100 mg by mouth daily  , Disp: , Rfl:     varenicline (Chantix) 1 mg tablet, Take 1 mg by mouth 2 (two) times a day, Disp: , Rfl:     amLODIPine-benazepril (LOTREL 5-10) 5-10 MG per capsule, Take 1 capsule by mouth daily, Disp: , Rfl:     buPROPion (Wellbutrin SR) 150 mg 12 hr tablet, Take 150 mg by mouth in the morning. Indications: Major Depressive Disorder (Patient not taking: Reported on 2/12/2024),  Disp: , Rfl:     omeprazole (PriLOSEC) 20 mg delayed release capsule, Take 20 mg by mouth daily   (Patient not taking: Reported on 2/12/2024), Disp: , Rfl:     patient supplied medication, Allergy Relief medicine 10 mg daily, Disp: , Rfl:     pravastatin (PRAVACHOL) 40 mg tablet, Take 40 mg by mouth daily (Patient not taking: Reported on 2/12/2024), Disp: , Rfl:     Stiolto Respimat 2.5-2.5 MCG/ACT inhaler, Inhale 2 puffs daily (Patient not taking: Reported on 2/12/2024), Disp: , Rfl:   I have spent a total time of 40 minutes on 02/12/24 in caring for this patient including Diagnostic results, Risks and benefits of tx options, Instructions for management, Patient and family education, Importance of tx compliance, Risk factor reductions, Documenting in the medical record, Reviewing / ordering tests, medicine, procedures  , and Obtaining or reviewing history  .

## 2024-03-21 ENCOUNTER — HOSPITAL ENCOUNTER (OUTPATIENT)
Dept: NON INVASIVE DIAGNOSTICS | Facility: CLINIC | Age: 69
Discharge: HOME | End: 2024-03-21
Payer: COMMERCIAL

## 2024-03-21 DIAGNOSIS — R09.89 DECREASED PULSES IN FEET: ICD-10-CM

## 2024-03-21 PROCEDURE — 93922 UPR/L XTREMITY ART 2 LEVELS: CPT | Performed by: SURGERY

## 2024-03-21 PROCEDURE — 93925 LOWER EXTREMITY STUDY: CPT | Performed by: SURGERY

## 2024-03-21 PROCEDURE — 93923 UPR/LXTR ART STDY 3+ LVLS: CPT

## 2024-03-21 PROCEDURE — 93925 LOWER EXTREMITY STUDY: CPT

## 2024-03-22 ENCOUNTER — APPOINTMENT (EMERGENCY)
Dept: RADIOLOGY | Facility: HOSPITAL | Age: 69
End: 2024-03-22
Payer: COMMERCIAL

## 2024-03-22 ENCOUNTER — TRANSCRIBE ORDERS (OUTPATIENT)
Dept: VASCULAR SURGERY | Facility: CLINIC | Age: 69
End: 2024-03-22

## 2024-03-22 ENCOUNTER — APPOINTMENT (EMERGENCY)
Dept: CT IMAGING | Facility: HOSPITAL | Age: 69
End: 2024-03-22
Payer: COMMERCIAL

## 2024-03-22 ENCOUNTER — HOSPITAL ENCOUNTER (OUTPATIENT)
Facility: HOSPITAL | Age: 69
Setting detail: OBSERVATION
Discharge: HOME HEALTH | End: 2024-03-25
Attending: EMERGENCY MEDICINE | Admitting: STUDENT IN AN ORGANIZED HEALTH CARE EDUCATION/TRAINING PROGRAM
Payer: COMMERCIAL

## 2024-03-22 DIAGNOSIS — I65.23 BILATERAL CAROTID ARTERY STENOSIS: ICD-10-CM

## 2024-03-22 DIAGNOSIS — I48.91 ATRIAL FIBRILLATION WITH RAPID VENTRICULAR RESPONSE (HCC): Primary | ICD-10-CM

## 2024-03-22 DIAGNOSIS — R09.89 DECREASED PULSES IN FEET: Primary | ICD-10-CM

## 2024-03-22 DIAGNOSIS — R41.89 DIFFICULTY UNDERSTANDING WRITTEN LANGUAGE: ICD-10-CM

## 2024-03-22 DIAGNOSIS — R29.90 STROKE-LIKE SYMPTOMS: ICD-10-CM

## 2024-03-22 DIAGNOSIS — R25.1 TREMOR: ICD-10-CM

## 2024-03-22 LAB
2HR DELTA HS TROPONIN: 0 NG/L
ALBUMIN SERPL BCP-MCNC: 4.2 G/DL (ref 3.5–5)
ALP SERPL-CCNC: 85 U/L (ref 34–104)
ALT SERPL W P-5'-P-CCNC: 15 U/L (ref 7–52)
ANION GAP SERPL CALCULATED.3IONS-SCNC: 10 MMOL/L (ref 4–13)
APAP SERPL-MCNC: <2 UG/ML (ref 10–20)
APTT PPP: 44 SECONDS (ref 23–37)
AST SERPL W P-5'-P-CCNC: 15 U/L (ref 13–39)
BASOPHILS # BLD AUTO: 0.04 THOUSANDS/ÂΜL (ref 0–0.1)
BASOPHILS NFR BLD AUTO: 1 % (ref 0–1)
BILIRUB DIRECT SERPL-MCNC: 0.14 MG/DL (ref 0–0.2)
BILIRUB SERPL-MCNC: 0.76 MG/DL (ref 0.2–1)
BNP SERPL-MCNC: 141 PG/ML (ref 0–100)
BUN SERPL-MCNC: 23 MG/DL (ref 5–25)
CALCIUM SERPL-MCNC: 9.3 MG/DL (ref 8.4–10.2)
CARDIAC TROPONIN I PNL SERPL HS: 9 NG/L
CARDIAC TROPONIN I PNL SERPL HS: 9 NG/L
CHLORIDE SERPL-SCNC: 102 MMOL/L (ref 96–108)
CO2 SERPL-SCNC: 26 MMOL/L (ref 21–32)
CREAT SERPL-MCNC: 1.68 MG/DL (ref 0.6–1.3)
EOSINOPHIL # BLD AUTO: 0.06 THOUSAND/ÂΜL (ref 0–0.61)
EOSINOPHIL NFR BLD AUTO: 1 % (ref 0–6)
ERYTHROCYTE [DISTWIDTH] IN BLOOD BY AUTOMATED COUNT: 12.6 % (ref 11.6–15.1)
ETHANOL SERPL-MCNC: <10 MG/DL
GFR SERPL CREATININE-BSD FRML MDRD: 41 ML/MIN/1.73SQ M
GLUCOSE SERPL-MCNC: 90 MG/DL (ref 65–140)
GLUCOSE SERPL-MCNC: 95 MG/DL (ref 65–140)
HCT VFR BLD AUTO: 34.8 % (ref 36.5–49.3)
HGB BLD-MCNC: 11.6 G/DL (ref 12–17)
IMM GRANULOCYTES # BLD AUTO: 0.02 THOUSAND/UL (ref 0–0.2)
IMM GRANULOCYTES NFR BLD AUTO: 0 % (ref 0–2)
INR PPP: 1.3 (ref 0.84–1.19)
LYMPHOCYTES # BLD AUTO: 0.88 THOUSANDS/ÂΜL (ref 0.6–4.47)
LYMPHOCYTES NFR BLD AUTO: 17 % (ref 14–44)
MAGNESIUM SERPL-MCNC: 1.3 MG/DL (ref 1.9–2.7)
MCH RBC QN AUTO: 28 PG (ref 26.8–34.3)
MCHC RBC AUTO-ENTMCNC: 33.3 G/DL (ref 31.4–37.4)
MCV RBC AUTO: 84 FL (ref 82–98)
MONOCYTES # BLD AUTO: 0.44 THOUSAND/ÂΜL (ref 0.17–1.22)
MONOCYTES NFR BLD AUTO: 9 % (ref 4–12)
NEUTROPHILS # BLD AUTO: 3.76 THOUSANDS/ÂΜL (ref 1.85–7.62)
NEUTS SEG NFR BLD AUTO: 72 % (ref 43–75)
NRBC BLD AUTO-RTO: 0 /100 WBCS
PLATELET # BLD AUTO: 108 THOUSANDS/UL (ref 149–390)
PMV BLD AUTO: 10.6 FL (ref 8.9–12.7)
POTASSIUM SERPL-SCNC: 3.6 MMOL/L (ref 3.5–5.3)
PROT SERPL-MCNC: 7 G/DL (ref 6.4–8.4)
PROTHROMBIN TIME: 16.4 SECONDS (ref 11.6–14.5)
RBC # BLD AUTO: 4.14 MILLION/UL (ref 3.88–5.62)
SALICYLATES SERPL-MCNC: <5 MG/DL (ref 3–20)
SODIUM SERPL-SCNC: 138 MMOL/L (ref 135–147)
TSH SERPL DL<=0.05 MIU/L-ACNC: 1.66 UIU/ML (ref 0.45–4.5)
WBC # BLD AUTO: 5.2 THOUSAND/UL (ref 4.31–10.16)

## 2024-03-22 PROCEDURE — 70496 CT ANGIOGRAPHY HEAD: CPT

## 2024-03-22 PROCEDURE — 96366 THER/PROPH/DIAG IV INF ADDON: CPT

## 2024-03-22 PROCEDURE — 99285 EMERGENCY DEPT VISIT HI MDM: CPT

## 2024-03-22 PROCEDURE — 80179 DRUG ASSAY SALICYLATE: CPT | Performed by: EMERGENCY MEDICINE

## 2024-03-22 PROCEDURE — 82948 REAGENT STRIP/BLOOD GLUCOSE: CPT

## 2024-03-22 PROCEDURE — 85610 PROTHROMBIN TIME: CPT | Performed by: EMERGENCY MEDICINE

## 2024-03-22 PROCEDURE — 93005 ELECTROCARDIOGRAM TRACING: CPT

## 2024-03-22 PROCEDURE — 83735 ASSAY OF MAGNESIUM: CPT | Performed by: EMERGENCY MEDICINE

## 2024-03-22 PROCEDURE — 83880 ASSAY OF NATRIURETIC PEPTIDE: CPT | Performed by: EMERGENCY MEDICINE

## 2024-03-22 PROCEDURE — 84484 ASSAY OF TROPONIN QUANT: CPT | Performed by: EMERGENCY MEDICINE

## 2024-03-22 PROCEDURE — 80143 DRUG ASSAY ACETAMINOPHEN: CPT | Performed by: EMERGENCY MEDICINE

## 2024-03-22 PROCEDURE — 96365 THER/PROPH/DIAG IV INF INIT: CPT

## 2024-03-22 PROCEDURE — 96375 TX/PRO/DX INJ NEW DRUG ADDON: CPT

## 2024-03-22 PROCEDURE — 85025 COMPLETE CBC W/AUTO DIFF WBC: CPT | Performed by: EMERGENCY MEDICINE

## 2024-03-22 PROCEDURE — 70498 CT ANGIOGRAPHY NECK: CPT

## 2024-03-22 PROCEDURE — 80048 BASIC METABOLIC PNL TOTAL CA: CPT | Performed by: EMERGENCY MEDICINE

## 2024-03-22 PROCEDURE — 82077 ASSAY SPEC XCP UR&BREATH IA: CPT | Performed by: EMERGENCY MEDICINE

## 2024-03-22 PROCEDURE — 71045 X-RAY EXAM CHEST 1 VIEW: CPT

## 2024-03-22 PROCEDURE — 96361 HYDRATE IV INFUSION ADD-ON: CPT

## 2024-03-22 PROCEDURE — 36415 COLL VENOUS BLD VENIPUNCTURE: CPT | Performed by: EMERGENCY MEDICINE

## 2024-03-22 PROCEDURE — 85730 THROMBOPLASTIN TIME PARTIAL: CPT | Performed by: EMERGENCY MEDICINE

## 2024-03-22 PROCEDURE — 99285 EMERGENCY DEPT VISIT HI MDM: CPT | Performed by: EMERGENCY MEDICINE

## 2024-03-22 PROCEDURE — 84443 ASSAY THYROID STIM HORMONE: CPT | Performed by: EMERGENCY MEDICINE

## 2024-03-22 PROCEDURE — 80076 HEPATIC FUNCTION PANEL: CPT | Performed by: EMERGENCY MEDICINE

## 2024-03-22 RX ORDER — DILTIAZEM HYDROCHLORIDE 5 MG/ML
0.25 INJECTION INTRAVENOUS ONCE
Status: COMPLETED | OUTPATIENT
Start: 2024-03-22 | End: 2024-03-22

## 2024-03-22 RX ORDER — MAGNESIUM SULFATE HEPTAHYDRATE 40 MG/ML
2 INJECTION, SOLUTION INTRAVENOUS ONCE
Status: COMPLETED | OUTPATIENT
Start: 2024-03-22 | End: 2024-03-23

## 2024-03-22 RX ADMIN — DILTIAZEM HYDROCHLORIDE 17.5 MG: 5 INJECTION INTRAVENOUS at 21:18

## 2024-03-22 RX ADMIN — MAGNESIUM SULFATE HEPTAHYDRATE 2 G: 40 INJECTION, SOLUTION INTRAVENOUS at 22:02

## 2024-03-22 RX ADMIN — SODIUM CHLORIDE 500 ML: 0.9 INJECTION, SOLUTION INTRAVENOUS at 21:18

## 2024-03-22 RX ADMIN — IOHEXOL 85 ML: 350 INJECTION, SOLUTION INTRAVENOUS at 22:27

## 2024-03-23 ENCOUNTER — APPOINTMENT (OUTPATIENT)
Dept: NON INVASIVE DIAGNOSTICS | Facility: HOSPITAL | Age: 69
End: 2024-03-23
Payer: COMMERCIAL

## 2024-03-23 ENCOUNTER — APPOINTMENT (OUTPATIENT)
Dept: MRI IMAGING | Facility: HOSPITAL | Age: 69
End: 2024-03-23
Payer: COMMERCIAL

## 2024-03-23 PROBLEM — R29.90 STROKE-LIKE SYMPTOMS: Status: ACTIVE | Noted: 2024-03-23

## 2024-03-23 PROBLEM — R47.01 EXPRESSIVE APHASIA: Status: RESOLVED | Noted: 2024-03-23 | Resolved: 2024-03-23

## 2024-03-23 PROBLEM — R91.1 PULMONARY NODULE: Status: ACTIVE | Noted: 2024-03-23

## 2024-03-23 LAB
ANION GAP SERPL CALCULATED.3IONS-SCNC: 9 MMOL/L (ref 4–13)
AORTIC ROOT: 3.7 CM
APICAL FOUR CHAMBER EJECTION FRACTION: 74 %
ATRIAL RATE: 468 BPM
ATRIAL RATE: 73 BPM
AV LVOT MEAN GRADIENT: 2 MMHG
AV LVOT PEAK GRADIENT: 3 MMHG
BASOPHILS # BLD AUTO: 0.04 THOUSANDS/ÂΜL (ref 0–0.1)
BASOPHILS NFR BLD AUTO: 1 % (ref 0–1)
BSA FOR ECHO PROCEDURE: 1.78 M2
BUN SERPL-MCNC: 21 MG/DL (ref 5–25)
CALCIUM SERPL-MCNC: 8.8 MG/DL (ref 8.4–10.2)
CHLORIDE SERPL-SCNC: 104 MMOL/L (ref 96–108)
CHOLEST SERPL-MCNC: 182 MG/DL
CO2 SERPL-SCNC: 24 MMOL/L (ref 21–32)
CREAT SERPL-MCNC: 1.41 MG/DL (ref 0.6–1.3)
DOP CALC LVOT AREA: 2.83 CM2
DOP CALC LVOT CARDIAC INDEX: 2.75 L/MIN/M2
DOP CALC LVOT CARDIAC OUTPUT: 4.93 L/MIN
DOP CALC LVOT DIAMETER: 1.9 CM
DOP CALC LVOT PEAK VEL VTI: 14.84 CM
DOP CALC LVOT PEAK VEL: 0.91 M/S
DOP CALC LVOT STROKE INDEX: 24.6 ML/M2
DOP CALC LVOT STROKE VOLUME: 42.05
EOSINOPHIL # BLD AUTO: 0.06 THOUSAND/ÂΜL (ref 0–0.61)
EOSINOPHIL NFR BLD AUTO: 1 % (ref 0–6)
ERYTHROCYTE [DISTWIDTH] IN BLOOD BY AUTOMATED COUNT: 12.7 % (ref 11.6–15.1)
EST. AVERAGE GLUCOSE BLD GHB EST-MCNC: 108 MG/DL
GFR SERPL CREATININE-BSD FRML MDRD: 51 ML/MIN/1.73SQ M
GLUCOSE P FAST SERPL-MCNC: 82 MG/DL (ref 65–99)
GLUCOSE SERPL-MCNC: 82 MG/DL (ref 65–140)
HBA1C MFR BLD: 5.4 %
HCT VFR BLD AUTO: 44 % (ref 36.5–49.3)
HDLC SERPL-MCNC: 59 MG/DL
HGB BLD-MCNC: 14.7 G/DL (ref 12–17)
IMM GRANULOCYTES # BLD AUTO: 0.01 THOUSAND/UL (ref 0–0.2)
IMM GRANULOCYTES NFR BLD AUTO: 0 % (ref 0–2)
LAAS-AP2: 22.8 CM2
LAAS-AP4: 21.2 CM2
LDLC SERPL CALC-MCNC: 100 MG/DL (ref 0–100)
LEFT ATRIUM SIZE: 4 CM
LEFT ATRIUM VOLUME (MOD BIPLANE): 73 ML
LEFT ATRIUM VOLUME INDEX (MOD BIPLANE): 40.8 ML/M2
LYMPHOCYTES # BLD AUTO: 1.08 THOUSANDS/ÂΜL (ref 0.6–4.47)
LYMPHOCYTES NFR BLD AUTO: 17 % (ref 14–44)
MAGNESIUM SERPL-MCNC: 2 MG/DL (ref 1.9–2.7)
MCH RBC QN AUTO: 27.6 PG (ref 26.8–34.3)
MCHC RBC AUTO-ENTMCNC: 33.4 G/DL (ref 31.4–37.4)
MCV RBC AUTO: 83 FL (ref 82–98)
MONOCYTES # BLD AUTO: 0.54 THOUSAND/ÂΜL (ref 0.17–1.22)
MONOCYTES NFR BLD AUTO: 9 % (ref 4–12)
NEUTROPHILS # BLD AUTO: 4.47 THOUSANDS/ÂΜL (ref 1.85–7.62)
NEUTS SEG NFR BLD AUTO: 72 % (ref 43–75)
NRBC BLD AUTO-RTO: 0 /100 WBCS
PLATELET # BLD AUTO: 141 THOUSANDS/UL (ref 149–390)
PMV BLD AUTO: 10.8 FL (ref 8.9–12.7)
POTASSIUM SERPL-SCNC: 3.6 MMOL/L (ref 3.5–5.3)
QRS AXIS: 61 DEGREES
QRS AXIS: 71 DEGREES
QRSD INTERVAL: 76 MS
QRSD INTERVAL: 78 MS
QT INTERVAL: 322 MS
QT INTERVAL: 360 MS
QTC INTERVAL: 438 MS
QTC INTERVAL: 464 MS
RA PRESSURE ESTIMATED: 5 MMHG
RBC # BLD AUTO: 5.33 MILLION/UL (ref 3.88–5.62)
RIGHT ATRIAL 2D VOLUME: 52 ML
RIGHT ATRIUM AREA SYSTOLE A4C: 17.9 CM2
RIGHT VENTRICLE ID DIMENSION: 3.1 CM
RV PSP: 39 MMHG
SL CV LEFT ATRIUM LENGTH A2C: 5.8 CM
SL CV LV EF: 65
SODIUM SERPL-SCNC: 137 MMOL/L (ref 135–147)
T WAVE AXIS: -42 DEGREES
T WAVE AXIS: -87 DEGREES
TR MAX PG: 34 MMHG
TR PEAK VELOCITY: 2.9 M/S
TRICUSPID ANNULAR PLANE SYSTOLIC EXCURSION: 1.8 CM
TRICUSPID VALVE PEAK REGURGITATION VELOCITY: 2.92 M/S
TRIGL SERPL-MCNC: 113 MG/DL
TSH SERPL DL<=0.05 MIU/L-ACNC: 1.09 UIU/ML (ref 0.45–4.5)
VENTRICULAR RATE: 125 BPM
VENTRICULAR RATE: 89 BPM
VIT B12 SERPL-MCNC: 309 PG/ML (ref 180–914)
WBC # BLD AUTO: 6.2 THOUSAND/UL (ref 4.31–10.16)

## 2024-03-23 PROCEDURE — 82607 VITAMIN B-12: CPT

## 2024-03-23 PROCEDURE — 96366 THER/PROPH/DIAG IV INF ADDON: CPT

## 2024-03-23 PROCEDURE — 94762 N-INVAS EAR/PLS OXIMTRY CONT: CPT

## 2024-03-23 PROCEDURE — 85025 COMPLETE CBC W/AUTO DIFF WBC: CPT

## 2024-03-23 PROCEDURE — 99205 OFFICE O/P NEW HI 60 MIN: CPT | Performed by: PSYCHIATRY & NEUROLOGY

## 2024-03-23 PROCEDURE — 93306 TTE W/DOPPLER COMPLETE: CPT | Performed by: STUDENT IN AN ORGANIZED HEALTH CARE EDUCATION/TRAINING PROGRAM

## 2024-03-23 PROCEDURE — 70551 MRI BRAIN STEM W/O DYE: CPT

## 2024-03-23 PROCEDURE — 84443 ASSAY THYROID STIM HORMONE: CPT

## 2024-03-23 PROCEDURE — 83735 ASSAY OF MAGNESIUM: CPT

## 2024-03-23 PROCEDURE — 97162 PT EVAL MOD COMPLEX 30 MIN: CPT

## 2024-03-23 PROCEDURE — 97166 OT EVAL MOD COMPLEX 45 MIN: CPT

## 2024-03-23 PROCEDURE — 93306 TTE W/DOPPLER COMPLETE: CPT

## 2024-03-23 PROCEDURE — 80061 LIPID PANEL: CPT

## 2024-03-23 PROCEDURE — 83036 HEMOGLOBIN GLYCOSYLATED A1C: CPT

## 2024-03-23 PROCEDURE — 99223 1ST HOSP IP/OBS HIGH 75: CPT | Performed by: HOSPITALIST

## 2024-03-23 PROCEDURE — 93010 ELECTROCARDIOGRAM REPORT: CPT | Performed by: STUDENT IN AN ORGANIZED HEALTH CARE EDUCATION/TRAINING PROGRAM

## 2024-03-23 PROCEDURE — 80048 BASIC METABOLIC PNL TOTAL CA: CPT

## 2024-03-23 RX ORDER — HEPARIN SODIUM 5000 [USP'U]/ML
5000 INJECTION, SOLUTION INTRAVENOUS; SUBCUTANEOUS EVERY 8 HOURS SCHEDULED
Status: DISCONTINUED | OUTPATIENT
Start: 2024-03-23 | End: 2024-03-23

## 2024-03-23 RX ORDER — LORAZEPAM 1 MG/1
1 TABLET ORAL 3 TIMES DAILY PRN
COMMUNITY
Start: 2024-03-01 | End: 2024-03-25

## 2024-03-23 RX ORDER — PRAVASTATIN SODIUM 40 MG
40 TABLET ORAL DAILY
Status: DISCONTINUED | OUTPATIENT
Start: 2024-03-23 | End: 2024-03-23

## 2024-03-23 RX ORDER — BUPROPION HYDROCHLORIDE 150 MG/1
150 TABLET ORAL DAILY
COMMUNITY

## 2024-03-23 RX ORDER — LORAZEPAM 2 MG/ML
0.5 INJECTION INTRAMUSCULAR ONCE AS NEEDED
Status: COMPLETED | OUTPATIENT
Start: 2024-03-23 | End: 2024-03-23

## 2024-03-23 RX ORDER — SERTRALINE HYDROCHLORIDE 100 MG/1
100 TABLET, FILM COATED ORAL DAILY
Status: DISCONTINUED | OUTPATIENT
Start: 2024-03-23 | End: 2024-03-25 | Stop reason: HOSPADM

## 2024-03-23 RX ORDER — METOPROLOL TARTRATE 1 MG/ML
5 INJECTION, SOLUTION INTRAVENOUS EVERY 6 HOURS PRN
Status: DISCONTINUED | OUTPATIENT
Start: 2024-03-23 | End: 2024-03-25 | Stop reason: HOSPADM

## 2024-03-23 RX ORDER — ASPIRIN 81 MG/1
81 TABLET, CHEWABLE ORAL DAILY
Status: DISCONTINUED | OUTPATIENT
Start: 2024-03-23 | End: 2024-03-25 | Stop reason: HOSPADM

## 2024-03-23 RX ORDER — METOPROLOL TARTRATE 1 MG/ML
5 INJECTION, SOLUTION INTRAVENOUS EVERY 6 HOURS PRN
Status: DISCONTINUED | OUTPATIENT
Start: 2024-03-23 | End: 2024-03-23

## 2024-03-23 RX ORDER — DONEPEZIL HYDROCHLORIDE 5 MG/1
5 TABLET, FILM COATED ORAL EVERY EVENING
Status: DISCONTINUED | OUTPATIENT
Start: 2024-03-23 | End: 2024-03-25 | Stop reason: HOSPADM

## 2024-03-23 RX ORDER — ALBUTEROL SULFATE 90 UG/1
2 AEROSOL, METERED RESPIRATORY (INHALATION) EVERY 6 HOURS PRN
Status: DISCONTINUED | OUTPATIENT
Start: 2024-03-23 | End: 2024-03-25 | Stop reason: HOSPADM

## 2024-03-23 RX ORDER — PANTOPRAZOLE SODIUM 40 MG/1
40 TABLET, DELAYED RELEASE ORAL
Status: DISCONTINUED | OUTPATIENT
Start: 2024-03-23 | End: 2024-03-25 | Stop reason: HOSPADM

## 2024-03-23 RX ORDER — SODIUM CHLORIDE, SODIUM GLUCONATE, SODIUM ACETATE, POTASSIUM CHLORIDE, MAGNESIUM CHLORIDE, SODIUM PHOSPHATE, DIBASIC, AND POTASSIUM PHOSPHATE .53; .5; .37; .037; .03; .012; .00082 G/100ML; G/100ML; G/100ML; G/100ML; G/100ML; G/100ML; G/100ML
75 INJECTION, SOLUTION INTRAVENOUS CONTINUOUS
Status: DISCONTINUED | OUTPATIENT
Start: 2024-03-23 | End: 2024-03-25

## 2024-03-23 RX ORDER — FOLIC ACID 1 MG/1
1 TABLET ORAL DAILY
Status: DISCONTINUED | OUTPATIENT
Start: 2024-03-23 | End: 2024-03-25 | Stop reason: HOSPADM

## 2024-03-23 RX ORDER — BACLOFEN 10 MG/1
10 TABLET ORAL DAILY
Status: DISCONTINUED | OUTPATIENT
Start: 2024-03-23 | End: 2024-03-25 | Stop reason: HOSPADM

## 2024-03-23 RX ORDER — ACETAMINOPHEN 325 MG/1
650 TABLET ORAL EVERY 4 HOURS PRN
Status: DISCONTINUED | OUTPATIENT
Start: 2024-03-23 | End: 2024-03-25 | Stop reason: HOSPADM

## 2024-03-23 RX ORDER — ATORVASTATIN CALCIUM 40 MG/1
40 TABLET, FILM COATED ORAL EVERY EVENING
Status: DISCONTINUED | OUTPATIENT
Start: 2024-03-23 | End: 2024-03-25 | Stop reason: HOSPADM

## 2024-03-23 RX ORDER — DONEPEZIL HYDROCHLORIDE 5 MG/1
5 TABLET, FILM COATED ORAL EVERY EVENING
COMMUNITY
Start: 2024-02-22

## 2024-03-23 RX ORDER — LANOLIN ALCOHOL/MO/W.PET/CERES
100 CREAM (GRAM) TOPICAL DAILY
Status: DISCONTINUED | OUTPATIENT
Start: 2024-03-23 | End: 2024-03-25 | Stop reason: HOSPADM

## 2024-03-23 RX ADMIN — BUSPIRONE HYDROCHLORIDE 15 MG: 10 TABLET ORAL at 21:36

## 2024-03-23 RX ADMIN — APIXABAN 5 MG: 5 TABLET, FILM COATED ORAL at 17:12

## 2024-03-23 RX ADMIN — LORAZEPAM 0.5 MG: 2 INJECTION INTRAMUSCULAR; INTRAVENOUS at 11:03

## 2024-03-23 RX ADMIN — MULTIPLE VITAMINS W/ MINERALS TAB 1 TABLET: TAB ORAL at 08:28

## 2024-03-23 RX ADMIN — SODIUM CHLORIDE, SODIUM GLUCONATE, SODIUM ACETATE, POTASSIUM CHLORIDE, MAGNESIUM CHLORIDE, SODIUM PHOSPHATE, DIBASIC, AND POTASSIUM PHOSPHATE 75 ML/HR: .53; .5; .37; .037; .03; .012; .00082 INJECTION, SOLUTION INTRAVENOUS at 05:31

## 2024-03-23 RX ADMIN — ACETAMINOPHEN 325MG 650 MG: 325 TABLET ORAL at 05:23

## 2024-03-23 RX ADMIN — DONEPEZIL HYDROCHLORIDE 5 MG: 5 TABLET ORAL at 17:12

## 2024-03-23 RX ADMIN — ASPIRIN 81 MG CHEWABLE TABLET 81 MG: 81 TABLET CHEWABLE at 08:28

## 2024-03-23 RX ADMIN — BACLOFEN 10 MG: 10 TABLET ORAL at 08:28

## 2024-03-23 RX ADMIN — ATORVASTATIN CALCIUM 40 MG: 40 TABLET, FILM COATED ORAL at 17:12

## 2024-03-23 RX ADMIN — PANTOPRAZOLE SODIUM 40 MG: 40 TABLET, DELAYED RELEASE ORAL at 05:23

## 2024-03-23 RX ADMIN — APIXABAN 5 MG: 5 TABLET, FILM COATED ORAL at 08:28

## 2024-03-23 RX ADMIN — FOLIC ACID 1 MG: 1 TABLET ORAL at 08:28

## 2024-03-23 RX ADMIN — UMECLIDINIUM BROMIDE AND VILANTEROL TRIFENATATE 1 PUFF: 62.5; 25 POWDER RESPIRATORY (INHALATION) at 08:28

## 2024-03-23 RX ADMIN — BUSPIRONE HYDROCHLORIDE 15 MG: 10 TABLET ORAL at 08:28

## 2024-03-23 RX ADMIN — SERTRALINE 100 MG: 100 TABLET, FILM COATED ORAL at 08:28

## 2024-03-23 RX ADMIN — THIAMINE HCL TAB 100 MG 100 MG: 100 TAB at 08:28

## 2024-03-23 NOTE — PHYSICAL THERAPY NOTE
PHYSICAL THERAPY EVALUATION          Patient Name: Abhishek Tom  Today's Date: 3/23/2024       03/23/24 1008   PT Last Visit   PT Visit Date 03/23/24   Note Type   Note type Evaluation   Pain Assessment   Pain Assessment Tool 0-10   Pain Score No Pain   Restrictions/Precautions   Other Precautions Multiple lines;Telemetry  (masimo)   Home Living   Type of Home Mobile home   Home Layout One level;Stairs to enter with rails   Bathroom Shower/Tub Tub/shower unit   Bathroom Toilet Standard   Bathroom Equipment Grab bars in shower;Shower chair   Home Equipment Cane   Additional Comments 3 EVELYNE   Prior Function   Level of [place] Independent with ADLs;Independent with functional mobility;Independent with IADLS   Lives With Alone   Receives Help From Family  (local sister)   IADLs Independent with driving;Independent with meal prep;Independent with medication management;Family/Friend/Other provides transportation  (has not driven in a few weeks)   Falls in the last 6 months 1 to 4   Vocational Retired   Comments indep without an AD. admits to occ forgetfulness regarding medications   General   Additional Pertinent History pt admitted 3/22/24 for stroke like sx. up and oob orders. PMHx significant for ETOH abuse, COPD, CKD, afib, CVA, seizure, memory changes, anxiety   Cognition   Overall Cognitive Status WFL   Arousal/Participation Cooperative   Orientation Level Oriented X4   Memory Within functional limits   Following Commands Follows all commands and directions without difficulty   Comments defer to OT for formal cognitive assessment   RLE Assessment   RLE Assessment WFL  (4+)   LLE Assessment   LLE Assessment WFL  (4+)   Coordination   Movements are Fluid and Coordinated   (tremulous which pt reports is baseline)   Sensation WFL   Bed Mobility   Supine to Sit 7  Independent   Transfers   Sit to Stand 6  Modified independent   Stand to Sit 6  Modified independent   Ambulation/Elevation    Gait pattern WNL   Gait Assistance 5  Supervision   Additional items Assist x 1  (for lines)   Assistive Device None   Distance >200'   Balance   Static Standing Fair +   Dynamic Standing Fair   Ambulatory Fair   Endurance Deficit   Endurance Deficit No  (vitals 100-110s, 97% at rest)   Activity Tolerance   Activity Tolerance Patient tolerated treatment well   Medical Staff Made Aware Gloria OT   Nurse Made Aware Josh RN   Assessment   Prognosis Good   Assessment Abhishek Tom is a 68 y.o. male admitted to [facility] on 3/22/2024 for Stroke-like symptoms. PT was consulted and pt was seen on 3/23/2024 for mobility assessment and d/c planning. Pt presents w multiple lines. At baseline is indep without an AD. Pt is currently functioning at an indep level for bed mobility, mod I transfers and S for ambulation. Pt demonstrated ability to ambulate unlimited community distances. No acute deficits impacting functional mobility. Pt reports feeling at baseline and does not demonstrate need for continued IPPT services. The patient's AM-PAC Basic Mobility Inpatient Short Form Raw Score is 24. A Raw score of greater than 16 suggests the patient may benefit from discharge to home.   Barriers to Discharge None   Plan   PT Frequency   (d/c PT)   Discharge Recommendation   Rehab Resource Intensity Level, PT No post-acute rehabilitation needs   AM-PAC Basic Mobility Inpatient   Turning in Flat Bed Without Bedrails 4   Lying on Back to Sitting on Edge of Flat Bed Without Bedrails 4   Moving Bed to Chair 4   Standing Up From Chair Using Arms 4   Walk in Room 4   Climb 3-5 Stairs With Railing 4   Basic Mobility Inpatient Raw Score 24   Basic Mobility Standardized Score 57.68   [facility] Highest Level Of Mobility   -HLM Goal 8: Walk 250 feet or more   -HLM Achieved 8: Walk 250 feet ot more   End of Consult   Patient Position at End of Consult Seated edge of bed;Other (comment)  (OT present)   History: co - morbidities including age,  current experience including fall risk, multiple lines  Exam: impairments in systems including multiple body structures involved; musculoskeletal (strength), neuromuscular (balance, gait, transfers, sensation), cardiopulmonary (vitals), cognition; am-pac  Clinical: stable/unpredictable  Complexity: moderate      Tiny Tierney, PT

## 2024-03-23 NOTE — OCCUPATIONAL THERAPY NOTE
Occupational Therapy Evaluation     Patient Name: Abhishek Tom  Today's Date: 3/23/2024  Problem List  Principal Problem:    Stroke-like symptoms  Active Problems:    Alcohol abuse    HLD (hyperlipidemia)    COPD (chronic obstructive pulmonary disease) (HCC)    Benign essential hypertension    Stage 3a chronic kidney disease (HCC)    Atrial fibrillation with rapid ventricular response (HCC)    Pulmonary nodule    Past Medical History  Past Medical History:   Diagnosis Date    PRITI (acute kidney injury) (HCC) 09/05/2021    Anxiety     Back pain     Claustrophobia     COPD (chronic obstructive pulmonary disease) (HCC)     Dysphagia 09/05/2021    Fatty liver     GERD (gastroesophageal reflux disease)     History of transfusion     Hyperlipidemia     Hypertension     Hypokalemia 02/22/2022    Lumbar back pain     Panic attacks     Stenosis of right carotid artery     Stroke (HCC)     Wears glasses     Wears partial dentures     upper denture     Past Surgical History  Past Surgical History:   Procedure Laterality Date    COLONOSCOPY      ERCP      IR CEREBRAL ANGIOGRAPHY  04/06/2021    OTHER SURGICAL HISTORY      fertility    TN LAPAROSCOPY SURG CHOLECYSTECTOMY N/A 8/26/2022    Procedure: CHOLECYSTECTOMY LAPAROSCOPIC;  Surgeon: Michael Otoole DO;  Location: [facility];  Service: General    TN SLCTV CATHJ 3RD+ ORD SLCTV ABDL PEL/LXTR BRNCH Right 04/06/2021    Procedure: ARTERIOGRAM, carotid Angio;  Surgeon: Maximiliano Morin MD;  Location: [facility];  Service: Vascular    TN TEAEC W/PATCH GRF CAROTID VERTB SUBCLAV NECK INC Right 04/13/2021    Procedure: RIGHT ENDARTERECTOMY ARTERY CAROTID WITH BOVINE PATCH;  Surgeon: Maximiliano Morin MD;  Location: [facility];  Service: Vascular           03/23/24 1015   OT Last Visit   OT Visit Date 03/23/24   Note Type   Note type Evaluation   Pain Assessment   Pain Assessment Tool 0-10   Pain Score No Pain   Restrictions/Precautions   Weight Bearing Precautions Per Order No    Other Precautions Multiple lines;Telemetry;Bed Alarm;Chair Alarm   Home Living   Type of Home Mobile home   Home Layout One level;Stairs to enter with rails   Bathroom Shower/Tub Tub/shower unit   Bathroom Toilet Standard   Bathroom Equipment Grab bars in shower;Shower chair   Bathroom Accessibility Accessible   Home Equipment Cane   Additional Comments Pt lives alone in a one level mobile home with 3 EVELYNE.   Prior Function   Level of [place] Independent with ADLs;Independent with functional mobility;Independent with IADLS   Lives With Alone   Receives Help From Family  (sister)   IADLs Independent with meal prep;Independent with medication management;Family/Friend/Other provides transportation  (Pt reports increased difficulty managing medication 2* memory difficulties (ie: will forget if he took his meds or not.). Educated pt on use of medication organizer to assist w/ medication management. Pt has not driven x3-4 wks)   Falls in the last 6 months 1 to 4   Vocational Retired   Comments At baseline, pt was I w/ ADLs, IADLs, and functional transfers/mobility w/o use of AD. (-) , sister assists w/ transportation. (+) fall PTA.   Lifestyle   Autonomy At baseline, pt was I w/ ADLs, IADLs, and functional transfers/mobility w/o use of AD. (-) , sister assists w/ transportation. (+) fall PTA.   Reciprocal Relationships Sister   Service to Others Retired   Intrinsic Gratification Watching TV   ADL   Where Assessed Edge of bed   Eating Assistance 7  Independent   Grooming Assistance 7  Independent   UB Bathing Assistance 6  Modified Independent   LB Bathing Assistance 5  Supervision/Setup   UB Dressing Assistance 6  Modified independent   LB Dressing Assistance 5  Supervision/Setup   Toileting Assistance  5  Supervision/Setup   Functional Assistance 5  Supervision/Setup   Bed Mobility   Supine to Sit 7  Independent   Sit to Supine 7  Independent   Additional Comments Pt lying supine with bed alarm  activated at end of session. Call bell and phone within reach. All needs met and pt reports no further questions for OT at this time.   Transfers   Sit to Stand 6  Modified independent   Additional items Bedrails;Increased time required   Stand to Sit 6  Modified independent   Additional items Increased time required   Functional Mobility   Functional Mobility 5  Supervision   Additional Comments Assist x1 w/o use of AD; Assist for IV line management   Balance   Static Sitting Good   Dynamic Sitting Fair +   Static Standing Fair +   Dynamic Standing Fair   Ambulatory Fair   Activity Tolerance   Activity Tolerance Patient tolerated treatment well   Medical Staff Made Aware Tiny, PT   Nurse Made Aware yes; ÁNGEL Moreno   RUE Assessment   RUE Assessment WFL  (4/5 throughout)   LUE Assessment   LUE Assessment WFL  (4/5 throughout)   Hand Function   Gross Motor Coordination Functional   Fine Motor Coordination Functional   Sensation   Light Touch No apparent deficits   Proprioception   Proprioception No apparent deficits   Vision-Basic Assessment   Current Vision Wears glasses all the time   Vision - Complex Assessment   Acuity Able to read clock/calendar on wall without difficulty;Able to read employee name badge without difficulty   Psychosocial   Psychosocial (WDL) WDL   Perception   Inattention/Neglect Appears intact   Cognition   Overall Cognitive Status WFL   Arousal/Participation Alert;Cooperative   Attention Within functional limits   Orientation Level Oriented X4   Memory Other (Comment)  (Pt reports memory deficits- ie: forgetting if he takes medications, leaving the stove on.)   Following Commands Follows all commands and directions without difficulty   Comments Administered Eric Cognitive Level Screen (ACLS).  The patient scored 4.6/6.0 indicating that they may live alone with daily assistance to monitor personal safety and provide a daily allowance.  Bill and money management assistance required.    Cognition Assessment Tools ACLS   Score 4.6   Assessment   Prognosis Good   Assessment: Pt is a 68 y.o. male seen for OT evaluation s/p adm [facility] on [facility]/22/[facility]24 w/ Stroke-like symptoms . Comorbidities affecting pt’s functional performance include a significant PMH of multiple past CVA, carotid artery stenosis, HLD, COPD, and HTN. Pt with active OT orders and activity orders for Up and OOB as tolerated. Pt lives alone in a one level mobile home with 3 EVELYNE. At baseline, pt was I w/ ADLs, IADLs, and functional transfers/mobility w/o use of AD. (-) , sister assists w/ transportation. (+) fall PTA. Upon evaluation, pt currently functioning at a Supervision-Mod I level for ADLs, Independent, for bed mobility, Mod I for transfers, and Supervision for functional mobility 2* the following deficits impacting occupational performance: decreased balance, impaired memory, and impaired problem solving. Pt with the following personal factors of: EVELY[place] home environment, limited home support, difficulty performing IADLs, and fall risk . Despite above mentioned deficits and personal factors, pt is functioning near baseline level of performance. Limited ADL deficits. No further acute OT needs identified at this time. Recommend continued mobilization with hospital staff and restorative program while in the hospital to increase pt’s endurance and strength upon D/C. The patient's raw score on the AM-PAC Daily Activity Inpatient Short Form is 21. A raw score of greater than or equal to 19 suggests the patient may benefit from discharge to home. Please refer to the recommendation of the Occupational Therapist for safe discharge planning. D/C pt from OT caseload at this time.    OT Frequency Eval only  (D/C OT)   Discharge Recommendation   Rehab Resource Intensity Level, OT No post-acute rehabilitation needs   AM-PAC Daily Activity Inpatient   Lower Body Dressing 3   Bathing 3   Toileting 3   Upper Body Dressing 4    Grooming 4   Eating 4   Daily Activity Raw Score 21   Daily Activity Standardized Score (Calc for Raw Score >=11) 44.27   AM-PAC Applied Cognition Inpatient   Following a Speech/Presentation 4   Understanding Ordinary Conversation 4   Taking Medications 3   Remembering Where Things Are Placed or Put Away 3   Remembering List of 4-5 Errands 3   Taking Care of Complicated Tasks 3   Applied Cognition Raw Score 20   Applied Cognition Standardized Score 41.76       Gloria Perez, OTR/L      4.6    Administered Eric Cognitive Level Screen (ACLS).  The patient scored 4.6/6.0 indicating that they may live alone with daily assistance to monitor personal safety and provide a daily allowance.  Bill and money management assistance required.    Behavior:  Scans environment to retrieve supplies.  Notices others.  Comments on others nonconformity.  Initiates change in routine.  Learns by demonstration, not by reading.  Can avoid obvious hazards.  Often presents as impulsive.  Insight into disability is fair/poor.  Processing speed is delayed.  Do not expect to be aware of the needs of others.  May need reminders to keep appointments. May make michael statements without concern for embarrassing others.   Grooming:  Varies typical procedure on his own.  May be willing to try new products or tools.  May be impulsive.  Dressing:  Searches for desired clothing items in drawers or closets.  Changes 1 item in an outfit to create a new “outfit”.  May coordinate shoes with outfit.  Monitor appropriateness of outfit.  Bathing:  May alter bathing routine to account for exercise, hot weather or special event.  Washes small hidden spaces.  May try new products.  May follow suggestions for hanging up towels.  May be unable to coordinate bath schedule with others.  Remove unseen hazards.  Walking/exercising:   Varies routes taken in familiar neighborhood.  Scan visible environment for landmarks.  Follows verbal explanation for hazards.  May  attempt to alter amount, duration and speed of exercise.  May get lost especially in unfamiliar environments.  Eating:  May alter rate of eating on request but does not sustain.  May be willing to alter food selections or accept diet without resistance.  Looks across the table to converse with others.  Watch handling of hot foods/liquids and heavy objects.  Monitor compliance with dietary restrictions.  Toileting:  Scans visible environment for needed supplies like toilet paper but may not find unless it is at eye level.  May take much longer than average to complete toileting.  Medications:   May attempt to open new containers but may not be successful.  May stop medications because they feel “well”.  May alter amounts of pills without realizing the hazard.  May refuse to take medications based on erroneous beliefs about effects.  Use of adaptive equipment:  May be able to make spontaneous adjustments in positions or strength for better effect.  Scans environment for equipment.  May understand verbal explanation of safety hazard.  Housekeeping:  Scans environment for needed supplies, dirt and clutter when cleaning.  May organize closets, cupboards or put away objects. May require reminders to do household chores.   Food preparation:  May follow a fixed diet and go hungry if usual food items are not available.  Does not check inventory and may run out of essentials frequently.  Does not consider nutritional needs.  Scans counters, shelves and grocery stores for needed items.  Check stove after use.  Spending money:  May be willing to vary usual routine to manage money.  May attempt his or her usual routine without anticipating problems.  May overspend if given access to bank or credit cards.  Shopping:  May vary usual shopping routine by going to a new store or buying different brands.  Searches shelves in stores, moves items to look underneath or behind.  May overspend.  Laundry:  May scan immediate environment for  needed supplies.  May choose to vary from usual routine.  Check iron after use.  Check for overloading of washing machine.  Check sorting criteria.  Traveling:  May vary familiar routes without awareness of consequences.  May impulsively take a trip to a new location and get lost.  Does not anticipate problems or travel needs.   Telephone:  May be able to alter rate of speech for a short phone call.  May find a number in an address or phone book.  May not be able to locate items in yellow pages.  Driving:  Should NOT operate a motor vehicle.

## 2024-03-23 NOTE — ASSESSMENT & PLAN NOTE
Pt noted to be in afib rvr in the ED rate 125  Patient previously maintained on 25 mg coreg bid, but this was discontinued his last hospitalization 10/2023 after having low blood pressures  Continue to monitor on telemetry  If patient continues to have elevated HR in telemetry, would consider reinitiating coreg  Metoprolol available prn

## 2024-03-23 NOTE — ASSESSMENT & PLAN NOTE
Lab Results   Component Value Date    EGFR 41 03/22/2024    EGFR 50 10/20/2023    EGFR 44 10/14/2023    CREATININE 1.68 (H) 03/22/2024    CREATININE 1.42 (H) 10/20/2023    CREATININE 1.58 (H) 10/14/2023   Approximately baseline

## 2024-03-23 NOTE — ASSESSMENT & PLAN NOTE
"\"Nonspecific 3 mm left upper lobe nodule noted, no routine follow-up imaging recommended per current Fleischner Society guidelines. Nonspecific soft tissue thickening in the bilateral perihilar regions partially visualized.\"  Outpatient follow-up  "

## 2024-03-23 NOTE — CONSULTS
[facility]  Neurology consult - Name: Abhishek Tom 68 y.o. male   I MRN: [id number] Unit/Bed#: E4 -01 I   Date of Admission: 3/22/2024  Date of Service: 3/23/2024 I Hospital Day: 0    Inpatient consult to Neurology  Consult performed by: SVEN Baires  Consult ordered by: Eliseo Flor PA-C      Reason for Consult / Principal Problem: Transient period of aphasia  Hx and PE limited by: Patient is a poor historian overall.  Review of previous medical records was completed.   Family, was not present at the bedside for history and examination    Assessment/Plan   * Transient period of expressive aphasia without stroke-resolved as of 3/23/2024  Assessment & Plan  Neurology is asked to see this right-handed dominant gentleman who presented to the ED last evening after he realized that the notes and messages he was trying to type on his tablet were gibberish.  He has a past neurologic history of multiple prior strokes, (early 2021, and later in 2022) and later in 2021 he was seen to have A-fib after an endoscopy.  He is supposed to have been maintained on Eliquis however he reports freely that he forgets things including his meds and occasion as he only lives with a cat.  He is also a vasculopath having been a chronic longtime smoker he is already had a R carotid endarterectomy. Patient reports about 90 minutes prior to arrival he was typing on his iPad when he began having trouble and did not recognize the words he was typing. This resolved spontaneously after about 15 minutes. He reports tremors throughout the episode, however as his exam is noted to below he has a generalized tremor consistent with alcohol abuse and at 1 point he reported his tremors were right-sided only.  He reports to this examiner that he can sometimes feel when his stroke symptoms are acting up again but he reports that this time it felt a little different.  His MRI does not demonstrate any new acute  ischemia in his prior areas of stroke, see below in detail are noted.  We suspect that he most likely had an episode of transient aphasia or a TIA.  He does not measure his blood pressure at home he also does not check his heart rate.  Although it is possible he had a small seizure we feel that is less likely.  We have reinforced with this gentleman the importance of consistent med administration particularly with his Eliquis.  He has significant vascular disease and we feel it would be appropriate to re-start this gentleman on low-dose aspirin as well at this time.  This patient needs to be seen in our office again by either our general or vascular neurology teams as he has not been seen in the office in 3 years.       This patient has not been seen in our office in 3 years despite him having 2 hospital admissions in 2022 for strokelike symptoms.  He needs to call our office and see any of our general or vascular neurology teams at any of our locations.  His meds now include his Eliquis as well as low-dose aspirin.          HPI: Abhishek Tom is a right handed  68 y.o. male who is known to both the inpatient neurology and outpatient services.  This examiner saw this patient approximately 3 years ago at one of our other facilities when he was admitted with having a new stroke.  At that time there was no A-fib seen even though his infarct appeared embolic.  A few months later he had A-fib demonstrated during an endoscopic procedure.  He has had multiple admissions and stroke workup including CTA is which, as seen below demonstrate significant vascular disease and has had a right carotid and follows with vascular surgery as well.  Since the discovery of his A-fib more consistently since 2022 he is to have been on Eliquis which she feels is for his strokes and not A-fib.  He does report that he has trouble remembering things by his report related to his long history of drinking which he reports he has not been using  alcohol or tobacco for the last month.    He reports he was using his tablet yesterday typing emails and he reports that what he was typing was gibberish.  He reports he did not recognize the words at all.  He reports that he was able to see that what he was writing was garbage but he could not correct it.  Because of this he called EMS and presented to our facility.  He had a CTA head yesterday and his MRI was done already and there is no acute ischemia appreciated on it and this was discussed with the patient.    He reports he has been able to tell in the past when he is having stroke symptoms again and he reports that he takes it easy then.      ROS: 12 system cued query: He reports that he feels well now he is at baseline.  He reports he has not had a drink or it cigarette in a month and that he feels pretty well.  He has no complaints of chest pain palpitations or shortness of breath.  He denies any weakness or visual distortions no headaches.  No new numbness or tingling.  He is unable to report any provocative events to yesterday's period of aphasia.  He denies any prodromal to this examiner.      Historical Information     Past Medical History:   Diagnosis Date    PRITI (acute kidney injury) (Formerly [facility]) 09/05/2021    Anxiety     Back pain     Claustrophobia     COPD (chronic obstructive pulmonary disease) (Formerly [facility])     Dysphagia 09/05/2021    Fatty liver     GERD (gastroesophageal reflux disease)     History of transfusion     Hyperlipidemia     Hypertension     Hypokalemia 02/22/2022    Lumbar back pain     Panic attacks     Stenosis of right carotid artery     Stroke (HCC)     Wears glasses     Wears partial dentures     upper denture     Past Surgical History:   Procedure Laterality Date    COLONOSCOPY      ERCP      IR CEREBRAL ANGIOGRAPHY  04/06/2021    OTHER SURGICAL HISTORY      fertility    CO LAPAROSCOPY SURG CHOLECYSTECTOMY N/A 8/26/2022    Procedure: CHOLECYSTECTOMY LAPAROSCOPIC;  Surgeon: Michael Funes  "Xiang, DO;  Location: [facility];  Service: General    NM SLCTV CATHJ 3RD+ ORD SLCTV ABDL PEL/LXTR BRNCH Right 04/06/2021    Procedure: ARTERIOGRAM, carotid Angio;  Surgeon: Maximiliano Morin MD;  Location: [facility];  Service: Vascular    NM TEAEC W/PATCH GRF CAROTID VERTB SUBCLAV NECK INC Right 04/13/2021    Procedure: RIGHT ENDARTERECTOMY ARTERY CAROTID WITH BOVINE PATCH;  Surgeon: Maximiliano Morin MD;  Location: [facility];  Service: Vascular       Social History : He reports he is currently living alone except for his cat right now.  As noted previously he reports a long history of tobacco and alcohol use since the age of 16.        Family History:   Family History   Problem Relation Age of Onset    Asthma Mother          Allergies   Allergen Reactions    Penicillins Anaphylaxis     Meds:all current active meds have been reviewed and he reports he sometimes forgets.    Scheduled Meds:  Current Facility-Administered Medications   Medication Dose Route Frequency    acetaminophen  650 mg Oral Q4H PRN    albuterol  2 puff Inhalation Q6H PRN    apixaban  5 mg Oral BID    aspirin  81 mg Oral Daily    atorvastatin  40 mg Oral QPM    baclofen  10 mg Oral Daily    busPIRone  15 mg Oral BID    donepezil  5 mg Oral QPM    folic acid  1 mg Oral Daily    metoprolol  5 mg Intravenous Q6H PRN    multi-electrolyte  75 mL/hr Intravenous Continuous    multivitamin-minerals  1 tablet Oral Daily    nicotine  1 patch Transdermal Daily    pantoprazole  40 mg Oral Early Morning    sertraline  100 mg Oral Daily    thiamine  100 mg Oral Daily    umeclidinium-vilanterol  1 puff Inhalation Daily     PRN Meds:.  acetaminophen    albuterol    metoprolol      Physical Exam:   Objective   Vitals:Blood pressure 154/79, pulse 90, temperature 97.9 °F (36.6 °C), temperature source Temporal, resp. rate 20, height 5' 9\" (1.753 m), weight 64 kg (141 lb), SpO2 97%.,Body mass index is 20.82 kg/m².      Patient was examined in bed he was ambulating " in the room and we ambulated him in the sawyer.  General: alert, he is a thin gentleman, appears older than stated age and cooperative  Head: Normocephalic, without obvious abnormality, atraumatic  Oral exam: lips, mucosa, and tongue moist;   Neck: no carotid bruit,   Lungs: clear to auscultation ant. bilaterally  Heart: regular rate and rhythm, S1, S2 normal, no murmur appreciated,   Abdomen: soft, +BS    Extremities: atraumatic, no cyanosis or edema    Neurologic:   Mental status: Alert, oriented, thought content appropriate, his conversation is paragraph length without any word finding difficulties or word retrieval or paraphasic errors.  There is no aphasia no dysarthria.  He was able to read and follow 2 part commands.  He was able to reverse order the months of the year with 2 errors.  CN Exam: ROSA, EOM's I, VF grossly full on my exam although he reports that at other times he thinks he sees a shadow and he indicates the right lateral field which he blames on his glasses.  His visual acuity on my exam today was good for fairly small print without his glasses. Gaze conjugate No sensory or motor lateralizations (No PP on face), Hearing I B, CNIX-XII I B  Motor: full power, age appropriate x 4 limbs  Sensory: intact  X 4 limbs, 4 mod inc lt, temp, vib, PP tested symmetrical  Cerebellar: Mild right pointing with a slight drift from modified Romberg position, no ataxia w maneuvers, Fine fine tremor more so in the right than in the left with maneuvers.  He did also have controlled sway with a standing Romberg and a narrow stance.  DTR's: Age appropriate, WNL; Plantars: Mute bilaterally  Gait: He ambulates independently and freely.       Lab Results: I have personally reviewed pertinent reports.  , CBC:   Results from last 7 days   Lab Units 03/23/24  0644 03/22/24  2113   WBC Thousand/uL 6.20 5.20   RBC Million/uL 5.33 4.14   HEMOGLOBIN g/dL 14.7 11.6*   HEMATOCRIT % 44.0 34.8*   MCV fL 83 84   PLATELETS  Thousands/uL 141* 108*   , BMP/CMP:   Results from last 7 days   Lab Units 03/23/24 0644 03/22/24 2113   SODIUM mmol/L 137 138   POTASSIUM mmol/L 3.6 3.6   CHLORIDE mmol/L 104 102   CO2 mmol/L 24 26   BUN mg/dL 21 23   CREATININE mg/dL 1.41* 1.68*   CALCIUM mg/dL 8.8 9.3   AST U/L  --  15   ALT U/L  --  15   ALK PHOS U/L  --  85   EGFR ml/min/1.73sq m 51 41   , Vitamin B12:   Results from last 7 days   Lab Units 03/23/24 0644   VITAMIN B 12 pg/mL 309   , HgBA1C:   Results from last 7 days   Lab Units 03/23/24 0644   HEMOGLOBIN A1C % 5.4   , TSH:   Results from last 7 days   Lab Units 03/23/24 0644 03/22/24 2113   TSH 3RD GENERATON uIU/mL 1.090 1.655   , Coagulation:   Results from last 7 days   Lab Units 03/22/24 2113   INR  1.30*   , Lipid Profile:   Results from last 7 days   Lab Units 03/23/24 0644   HDL mg/dL 59   LDL CALC mg/dL 100   TRIGLYCERIDES mg/dL 113        Imaging Studies: I have personally reviewed pertinent films in PACS and reviewed his current MRI.  There is no acute ischemia appreciated he has a significant chronic burden of small vessel disease bilaterally as well as in the brainstem.    EEG, Echo, Pathology, and Other Studies: His echocardiogram just done today notes a 60% ejection fraction normal right atria size, and mild dilation on the left with mild regurgitation stenosis.    Counseling / Coordination of Care  Total time spent today 50-60 total minutes. Greater than 50% of total time was spent with the patient and / or family counseling and / or coordination of care. A description of the counseling / coordination of care: All of the above was discussed with the patient in detail today.  He had several questions I believe they were all answered to his satisfaction at this time.  He reports he will try and do a better job with med compliance.      Dictation voice to text software has been used in the creation of this document. Please consider this in light of any contextual or  grammatical errors.

## 2024-03-23 NOTE — ASSESSMENT & PLAN NOTE
"Patient with PMHx of multiple past CVA, carotid artery stenosis, HLD, COPD, and HTN presented to the ED after developing stroke-like symptoms earlier in the day.   Pt states that he suddenly developed a left-sided frontal headache with lightheadedness. He then was attempting to type a message to his girlfriend and was unable to type the words he wanted to say instead typing incomprehensible speech.   Pt also reports that while walking to the ambulance, he had difficulty ambulating secondary to right-sided tremoring. He denies any right-sided weakness or numbness.  He reports that this episode felt similar to his prior strokes, but not as severe. He states that during those strokes he has blurred vision, but not today. He also denies any facial droop or speech difficulty.  CTA head and neck demonstrating no acute infarcts, multiple old infarcts, and arthrosclerotic disease. See official report for more details\"  MRI brain  Neuro checks  Monitor on tele  ECHO pending   Hgba1c, flp, tsh, and b12 pending  Neuro consult pending  "

## 2024-03-23 NOTE — PLAN OF CARE
Problem: PAIN - ADULT  Goal: Verbalizes/displays adequate comfort level or baseline comfort level  Description: Interventions:  - Encourage patient to monitor pain and request assistance  - Assess pain using appropriate pain scale  - Administer analgesics based on type and severity of pain and evaluate response  - Implement non-pharmacological measures as appropriate and evaluate response  - Consider cultural and social influences on pain and pain management  - Notify physician/advanced practitioner if interventions unsuccessful or patient reports new pain  Outcome: Progressing     Problem: SAFETY ADULT  Goal: Patient will remain free of falls  Description: INTERVENTIONS:  - Educate patient/family on patient safety including physical limitations  - Instruct patient to call for assistance with activity   - Consult OT/PT to assist with strengthening/mobility   - Keep Call bell within reach  - Keep bed low and locked with side rails adjusted as appropriate  - Keep care items and personal belongings within reach  - Initiate and maintain comfort rounds  - Make Fall Risk Sign visible to staff  - Offer Toileting every 2 Hours, in advance of need  - Initiate/Maintain bed alarm  - Obtain necessary fall risk management equipment: call bell  - Apply yellow socks and bracelet for high fall risk patients  - Consider moving patient to room near nurses station  Outcome: Progressing  Goal: Maintain or return to baseline ADL function  Description: INTERVENTIONS:  -  Assess patient's ability to carry out ADLs; assess patient's baseline for ADL function and identify physical deficits which impact ability to perform ADLs (bathing, care of mouth/teeth, toileting, grooming, dressing, etc.)  - Assess/evaluate cause of self-care deficits   - Assess range of motion  - Assess patient's mobility; develop plan if impaired  - Assess patient's need for assistive devices and provide as appropriate  - Encourage maximum independence but intervene  and supervise when necessary  - Involve family in performance of ADLs  - Assess for home care needs following discharge   - Consider OT consult to assist with ADL evaluation and planning for discharge  - Provide patient education as appropriate  Outcome: Progressing  Goal: Maintains/Returns to pre admission functional level  Description: INTERVENTIONS:  - Perform AM-PAC 6 Click Basic Mobility/ Daily Activity assessment daily.  - Set and communicate daily mobility goal to care team and patient/family/caregiver.   - Collaborate with rehabilitation services on mobility goals if consulted  - Perform Range of Motion 3 times a day.  - Reposition patient every 2 hours.  - Dangle patient 3 times a day  - Stand patient 3 times a day  - Ambulate patient 3 times a day  - Out of bed to chair 3 times a day   - Out of bed for meals 3  Problem: DISCHARGE PLANNING  Goal: Discharge to home or other facility with appropriate resources  Description: INTERVENTIONS:  - Identify barriers to discharge w/patient and caregiver  - Arrange for needed discharge resources and transportation as appropriate  - Identify discharge learning needs (meds, wound care, etc.)  - Arrange for interpretive services to assist at discharge as needed  - Refer to Case Management Department for coordinating discharge planning if the patient needs post-hospital services based on physician/advanced practitioner order or complex needs related to functional status, cognitive ability, or social support system  Outcome: Progressing     Problem: DISCHARGE PLANNING  Goal: Discharge to home or other facility with appropriate resources  Description: INTERVENTIONS:  - Identify barriers to discharge w/patient and caregiver  - Arrange for needed discharge resources and transportation as appropriate  - Identify discharge learning needs (meds, wound care, etc.)  - Arrange for interpretive services to assist at discharge as needed  - Refer to Case Management Department for  coordinating discharge planning if the patient needs post-hospital services based on physician/advanced practitioner order or complex needs related to functional status, cognitive ability, or social support system  Outcome: Progressing     Problem: Knowledge Deficit  Goal: Patient/family/caregiver demonstrates understanding of disease process, treatment plan, medications, and discharge instructions  Description: Complete learning assessment and assess knowledge base.  Interventions:  - Provide teaching at level of understanding  - Provide teaching via preferred learning methods  Outcome: Progressing     Problem: CARDIOVASCULAR - ADULT  Goal: Maintains optimal cardiac output and hemodynamic stability  Description: INTERVENTIONS:  - Monitor I/O, vital signs and rhythm  - Monitor for S/S and trends of decreased cardiac output  - Administer and titrate ordered vasoactive medications to optimize hemodynamic stability  - Assess quality of pulses, skin color and temperature  - Assess for signs of decreased coronary artery perfusion  - Instruct patient to report change in severity of symptoms  Outcome: Progressing  Goal: Absence of cardiac dysrhythmias or at baseline rhythm  Description: INTERVENTIONS:  - Continuous cardiac monitoring, vital signs, obtain 12 lead EKG if ordered  - Administer antiarrhythmic and heart rate control medications as ordered  - Monitor electrolytes and administer replacement therapy as ordered  Outcome: Progressing     Problem: GASTROINTESTINAL - ADULT  Goal: Minimal or absence of nausea and/or vomiting  Description: INTERVENTIONS:  - Administer IV fluids if ordered to ensure adequate hydration  - Maintain NPO status until nausea and vomiting are resolved  - Nasogastric tube if ordered  - Administer ordered antiemetic medications as needed  - Provide nonpharmacologic comfort measures as appropriate  - Advance diet as tolerated, if ordered  - Consider nutrition services referral to assist patient  with adequate nutrition and appropriate food choices  Outcome: Progressing  Goal: Maintains or returns to baseline bowel function  Description: INTERVENTIONS:  - Assess bowel function  - Encourage oral fluids to ensure adequate hydration  - Administer IV fluids if ordered to ensure adequate hydration  - Administer ordered medications as needed  - Encourage mobilization and activity  - Consider nutritional services referral to assist patient with adequate nutrition and appropriate food choices  Outcome: Progressing     Problem: HEMATOLOGIC - ADULT  Goal: Maintains hematologic stability  Description: INTERVENTIONS  - Assess for signs and symptoms of bleeding or hemorrhage  - Monitor labs  - Administer supportive blood products/factors as ordered and appropriate  Outcome: Progressing     Problem: MUSCULOSKELETAL - ADULT  Goal: Maintain proper alignment of affected body part  Description: INTERVENTIONS:  - Support, maintain and protect limb and body alignment  - Provide patient/ family with appropriate education  Outcome: Progressing    times a day  - Out of bed for toileting  - Record patient progress and toleration of activity level   Outcome: Progressing

## 2024-03-23 NOTE — ED PROVIDER NOTES
"History  Chief Complaint   Patient presents with    Tremors     Pt brought in by EMS. Per EMS pt stated was typing on the computer and noticed what he was typing \"doesn't make any sense\" and stated they noted bilateral hand tremors . Pt complains of frequent lightheadedness.      HPI  ED Course as of 03/23/24 0142   Fri Mar 22, 2024   2135 HPI:   Patient is a 68 y.o. male with PMHx CVA, R carotid endarterectomy on eliquis, newly diagnosed afib, who presents to the ED via EMS for evaluation of generalized weakness and confusion. Patient reports about 90 minutes prior to arrival he was typing on his iPad when he began having trouble and did not recognize the words he was typing. This resolved spontaneously after about 15 minutes. He reports tremors throughout the episode. States he has frequent episodes of lightheadedness that have increased since being diagnosed with afib. He denies any unilateral extremity weakness, focal deficits, headache, chest pain, dyspnea, abdominal pain, n/v/d, or any other complaints or concerns at this time.   2140 ROS:   All other systems reviewed and negative unless otherwise stated in HPI above.    PHYSICAL EXAM:  General: NAD, awake, alert. Speaking normally in full sentences.   Head: Normocephalic, atraumatic.  Eyes: EOM-I. No diplopia. PERRL.  ENT: Atraumatic external nose and ears. No stridor. Normal phonation.   Neck: Symmetric, trachea midline. No JVD.   CV: RRR. No murmurs or gallops. Peripheral pulses +2 throughout.   Lungs: Unlabored. No retractions. No tachypnea. CTA, lungs sounds equal bilateral.   Abd: Flat, nondistended. +BS, soft, nontender.   MSK: FROM, no deformity/injury.  Skin: Warm, dry, intact.   Neuro: AAOx3, CN II-XII grossly intact. Strength 5/5 and sensation intact to bilateral upper and lower extremities. No focal deficits, no ataxia (normal FNF), no facial droop or slurred speech. Able to spell and repeat phrases without difficulty.  Psychiatric/Behavioral: " Appropriate mood and affect.   2145 ASSESSMENT: Patient is a 68 y.o. male who presents with confusion, difficulty typing, prior history of CVA.   DDX includes but not limited to: TIA, afib RVR, doubt acute CVA, electrolyte abnormality, ACS, CHF, thyroid abnormality.   PLAN: Labs, EKG, CXR, CTA head/neck. Treated with cardizem 0.25mg/kg.   2319 XR chest 1 view portable  No acute pna or ptx   2320 ECG 12 lead  EKG performed at 2107 as read by me: afib w/ RVR at 125 bpm, normal axis, no acute EVELYNE/STD, similar to prior on 13 October 2023 2321 WBC: 5.20   2321 Hemoglobin(!): 11.6   2321 Platelet Count(!): 108   2321 POC Glucose: 95   2321 Sodium: 138   2321 Potassium: 3.6   2321 Chloride: 102   2321 Carbon Dioxide: 26   2321 Creatinine(!): 1.68   2321 GFR, Calculated: 41   2321 POCT INR(!): 1.30   2321 BNP(!): 141   2321 hs TnI 0hr: 9   2321 TSH 3RD GENERATON: 1.655   2321 Coma panel(!)  Negative   Sat Mar 23, 2024   0013 hs TnI 2hr: 9  Delta 0   0019 Patient reevaluated, reports improvement in symptoms. Denies any new or worsening complaints or concerns at this time. Awaiting CTA head/neck results and final disposition, likely admission.   0141 Discussed results and plan with patient. Advised on need for admission at this time.  All questions answered. Patient expressed verbal understanding and is agreeable with plan for admission. Case discussed with CHRISTINA who will admit patient to Obs Tele.       Prior to Admission Medications   Prescriptions Last Dose Informant Patient Reported? Taking?   Anoro Ellipta 62.5-25 MCG/ACT inhaler   Yes No   Sig: INHALE 1 PUFF BY MOUTH AND INTO THE LUNGS ONCE DAILY AT THE SAME TIME EACH DAY   Stiolto Respimat 2.5-2.5 MCG/ACT inhaler  Self Yes No   Sig: Inhale 2 puffs daily   Patient not taking: Reported on 2/12/2024   albuterol (PROVENTIL HFA,VENTOLIN HFA) 90 mcg/act inhaler   Yes No   Sig: Inhale 2 puffs if needed   amLODIPine-benazepril (LOTREL 5-10) 5-10 MG per capsule   Yes No   Sig:  Take 1 capsule by mouth daily   apixaban (ELIQUIS) 5 mg  Self No No   Sig: Take 1 tablet (5 mg total) by mouth 2 (two) times a day   baclofen 10 mg tablet   Yes No   Sig: Take 10 mg by mouth in the morning PRN   buPROPion (Wellbutrin SR) 150 mg 12 hr tablet   Yes No   Sig: Take 150 mg by mouth in the morning. Indications: Major Depressive Disorder   Patient not taking: Reported on 2/12/2024   busPIRone (BUSPAR) 15 mg tablet  Self Yes No   Sig: 15 mg 2 (two) times a day   omeprazole (PriLOSEC) 20 mg delayed release capsule  Self Yes No   Sig: Take 20 mg by mouth daily     Patient not taking: Reported on 2/12/2024   patient supplied medication   Yes No   Sig: Allergy Relief medicine 10 mg daily   pravastatin (PRAVACHOL) 40 mg tablet   Yes No   Sig: Take 40 mg by mouth daily   Patient not taking: Reported on 2/12/2024   sertraline (ZOLOFT) 100 mg tablet  Self Yes No   Sig: Take 100 mg by mouth daily     varenicline (Chantix) 1 mg tablet   Yes No   Sig: Take 1 mg by mouth 2 (two) times a day      Facility-Administered Medications: None       Past Medical History:   Diagnosis Date    PRITI (acute kidney injury) ([facility]) 09/05/2021    Anxiety     Back pain     Claustrophobia     COPD (chronic obstructive pulmonary disease) ([facility])     Dysphagia 09/05/2021    Fatty liver     GERD (gastroesophageal reflux disease)     History of transfusion     Hyperlipidemia     Hypertension     Hypokalemia 02/22/2022    Lumbar back pain     Panic attacks     Stenosis of right carotid artery     Stroke ([facility])     Wears glasses     Wears partial dentures     upper denture       Past Surgical History:   Procedure Laterality Date    COLONOSCOPY      ERCP      IR CEREBRAL ANGIOGRAPHY  04/06/2021    OTHER SURGICAL HISTORY      fertility    TN LAPAROSCOPY SURG CHOLECYSTECTOMY N/A 8/26/2022    Procedure: CHOLECYSTECTOMY LAPAROSCOPIC;  Surgeon: Michael Otoole DO;  Location: [facility];  Service: General    TN SLCTV CATHJ 3RD+ ORD SLCTV ABDL  PEL/LXTR BRNCH Right 04/06/2021    Procedure: ARTERIOGRAM, carotid Angio;  Surgeon: Maximiliano Morin MD;  Location: [facility];  Service: Vascular    CA TEAEC W/PATCH GRF CAROTID VERTB SUBCLAV NECK INC Right 04/13/2021    Procedure: RIGHT ENDARTERECTOMY ARTERY CAROTID WITH BOVINE PATCH;  Surgeon: Maximiliano Morin MD;  Location: [facility];  Service: Vascular       Family History   Problem Relation Age of Onset    Asthma Mother      I have reviewed and agree with the history as documented.    E-Cigarette/Vaping    E-Cigarette Use Never User      E-Cigarette/Vaping Substances    Nicotine No     THC No     CBD No     Flavoring No     Other No     Unknown No      Social History     Tobacco Use    Smoking status: Every Day     Current packs/day: 0.50     Average packs/day: 0.5 packs/day for 49.2 years (24.6 ttl pk-yrs)     Types: Cigarettes     Start date: 1/1/1975    Smokeless tobacco: Never   Vaping Use    Vaping status: Never Used   Substance Use Topics    Alcohol use: Yes     Alcohol/week: 2.0 standard drinks of alcohol     Types: 2 Cans of beer per week     Comment: every other day    Drug use: Never     Comment: never        Review of Systems    Physical Exam  ED Triage Vitals [03/22/24 2108]   Temperature Pulse Respirations Blood Pressure SpO2   (!) 97.3 °F (36.3 °C) (!) 122 18 (!) 193/102 95 %      Temp Source Heart Rate Source Patient Position - Orthostatic VS BP Location FiO2 (%)   Oral Monitor Lying Right arm --      Pain Score       --             Orthostatic Vital Signs  Vitals:    03/22/24 2115 03/22/24 2130 03/22/24 2300 03/23/24 0030   BP: (!) 169/103 115/58 155/74 (!) 176/84   Pulse: (!) 121 83 95 87   Patient Position - Orthostatic VS: Lying Lying Lying Lying       Physical Exam    ED Medications  Medications   sodium chloride 0.9 % bolus 500 mL (0 mL Intravenous Stopped 3/22/24 2238)   diltiazem (CARDIZEM) injection 17.5 mg (17.5 mg Intravenous Given 3/22/24 2118)   magnesium sulfate 2 g/50 mL IVPB  (premix) 2 g (0 g Intravenous Stopped 3/23/24 0039)   iohexol (OMNIPAQUE) 350 MG/ML injection (MULTI-DOSE) 85 mL (85 mL Intravenous Given 3/22/24 2227)       Diagnostic Studies  Results Reviewed       Procedure Component Value Units Date/Time    HS Troponin I 2hr [[id number]]  (Normal) Collected: 03/22/24 2327    Lab Status: Final result Specimen: Blood from Arm, Left Updated: 03/22/24 2359     hs TnI 2hr 9 ng/L      Delta 2hr hsTnI 0 ng/L     Salicylate level [[id number]]  (Normal) Collected: 03/22/24 2120    Lab Status: Final result Specimen: Blood from Arm, Right Updated: 03/22/24 2202     Salicylate Lvl <5 mg/dL     Acetaminophen level-If concentration is detectable, please discuss with medical  on call. [[id number]]  (Abnormal) Collected: 03/22/24 2120    Lab Status: Final result Specimen: Blood from Arm, Right Updated: 03/22/24 2202     Acetaminophen Level <2 ug/mL     TSH, 3rd generation with Free T4 reflex [[id number]]  (Normal) Collected: 03/22/24 2113    Lab Status: Final result Specimen: Blood from Arm, Right Updated: 03/22/24 2158     TSH 3RD GENERATON 1.655 uIU/mL     B-Type Natriuretic Peptide(BNP) [[id number]]  (Abnormal) Collected: 03/22/24 2113    Lab Status: Final result Specimen: Blood from Arm, Right Updated: 03/22/24 2150      pg/mL     Ethanol [[id number]]  (Normal) Collected: 03/22/24 2120    Lab Status: Final result Specimen: Blood from Arm, Right Updated: 03/22/24 2149     Ethanol Lvl <10 mg/dL     HS Troponin 0hr (reflex protocol) [[id number]]  (Normal) Collected: 03/22/24 2113    Lab Status: Final result Specimen: Blood from Arm, Right Updated: 03/22/24 2149     hs TnI 0hr 9 ng/L     Hepatic function panel [[id number]]  (Normal) Collected: 03/22/24 2113    Lab Status: Final result Specimen: Blood from Arm, Right Updated: 03/22/24 2147     Total Bilirubin 0.76 mg/dL      Bilirubin, Direct 0.14 mg/dL      Alkaline Phosphatase 85 U/L      AST 15 U/L      ALT 15 U/L      Total  Protein 7.0 g/dL      Albumin 4.2 g/dL     Basic metabolic panel [[id number]]  (Abnormal) Collected: 03/22/24 2113    Lab Status: Final result Specimen: Blood from Arm, Right Updated: 03/22/24 2147     Sodium 138 mmol/L      Potassium 3.6 mmol/L      Chloride 102 mmol/L      CO2 26 mmol/L      ANION GAP 10 mmol/L      BUN 23 mg/dL      Creatinine 1.68 mg/dL      Glucose 90 mg/dL      Calcium 9.3 mg/dL      eGFR 41 ml/min/1.73sq m     Narrative:      National Kid[employer] Disease Foundation guidelines for Chronic Kidney Disease (CKD):     Stage 1 with normal or high GFR (GFR > 90 mL/min/1.73 square meters)    Stage 2 Mild CKD (GFR = 60-89 mL/min/1.73 square meters)    Stage 3A Moderate CKD (GFR = 45-59 mL/min/1.73 square meters)    Stage 3B Moderate CKD (GFR = 30-44 mL/min/1.73 square meters)    Stage 4 Severe CKD (GFR = 15-29 mL/min/1.73 square meters)    Stage 5 End Stage CKD (GFR <15 mL/min/1.73 square meters)  Note: GFR calculation is accurate only with a steady state creatinine    Magnesium [[id number]]  (Abnormal) Collected: 03/22/24 2113    Lab Status: Final result Specimen: Blood from Arm, Right Updated: 03/22/24 2147     Magnesium 1.3 mg/dL     Protime-INR [[id number]]  (Abnormal) Collected: 03/22/24 2113    Lab Status: Final result Specimen: Blood from Arm, Right Updated: 03/22/24 2139     Protime 16.4 seconds      INR 1.30    APTT [[id number]]  (Abnormal) Collected: 03/22/24 2113    Lab Status: Final result Specimen: Blood from Arm, Right Updated: 03/22/24 2139     PTT 44 seconds     CBC and differential [[id number]]  (Abnormal) Collected: 03/22/24 2113    Lab Status: Final result Specimen: Blood from Arm, Right Updated: 03/22/24 2125     WBC 5.20 Thousand/uL      RBC 4.14 Million/uL      Hemoglobin 11.6 g/dL      Hematocrit 34.8 %      MCV 84 fL      MCH 28.0 pg      MCHC 33.3 g/dL      RDW 12.6 %      MPV 10.6 fL      Platelets 108 Thousands/uL      nRBC 0 /100 WBCs      Neutrophils Relative 72 %      Immature  Grans % 0 %      Lymphocytes Relative 17 %      Monocytes Relative 9 %      Eosinophils Relative 1 %      Basophils Relative 1 %      Neutrophils Absolute 3.76 Thousands/µL      Absolute Immature Grans 0.02 Thousand/uL      Absolute Lymphocytes 0.88 Thousands/µL      Absolute Monocytes 0.44 Thousand/µL      Eosinophils Absolute 0.06 Thousand/µL      Basophils Absolute 0.04 Thousands/µL     Fingerstick Glucose (POCT) [[id number]]  (Normal) Collected: 03/22/24 2111    Lab Status: Final result Specimen: Blood Updated: 03/22/24 2112     POC Glucose 95 mg/dl                    CTA head and neck with and without contrast   Final Result by Minh Galeano MD (03/23 0119)      1.  Stable moderate microangiopathic change and encephalomalacia in the right frontal lobe and medial occipital lobe likely sequelae of old infarcts. Old lacunar infarct in the right basal ganglia region is also seen. Difficult to exclude acute infarct    in this patient on CT given these findings. If there is clinical concern for acute ischemia, recommend more sensitive MRI brain for better evaluation in this patient.  No acute intracranial hemorrhage or depressed calvarial fracture.   2.  Extensive calcific atherosclerosis of the aortic arch, proximal branch vessels, and cervical carotid/vertebral arteries.   3.  Moderate (50 to 70%) luminal narrowing of the brachiocephalic artery due to calcified plaque.   4.  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  Short segment of severe luminal narrowing of the proximal left internal carotid artery due to mixed plaque is seen. Moderate to severe luminal narrowing of the distal left    vertebral artery V4 segment due to calcified plaque is also seen. Patent right vertebral artery without occlusion or significant stenosis.   5.  Complete occlusion of the right mid to distal common carotid artery with reconstitution at the carotid bifurcation region. Moderate (50 to 70%) luminal  narrowing of the proximal to mid right cervical internal carotid artery. Short segment of severe    (>90%) luminal narrowing due to calcific atherosclerosis of the right internal carotid artery in the carotid canal region. Additional short segment of severe stenosis involving the right cavernous internal carotid artery.   6.  Moderate (50 to 70%) luminal narrowing of the proximal to mid left common carotid artery due to calcific atherosclerosis.  Mild luminal narrowing of the proximal left internal carotid artery and left carotid canal/cavernous segments due to mild    calcific wall plaque.   7.  Age-indeterminate occlusion involving a right M2 branch in the anterior sylvian fissure region noted.  Otherwise no intracranial large vessel arterial occlusion or significant flow-limiting stenosis.  Fetal origin of the right posterior cerebral    artery.   8.  Nonspecific 3 mm left upper lobe nodule noted, no routine follow-up imaging recommended per current Fleischner Society guidelines. Nonspecific soft tissue thickening in the bilateral perihilar regions partially visualized.      Workstation performed: [id number]         XR chest 1 view portable    (Results Pending)         Procedures  Procedures      ED Course  ED Course as of 03/23/24 0142   Fri Mar 22, 2024   2135 HPI:   Patient is a 68 y.o. male with PMHx CVA, R carotid endarterectomy on eliquis, newly diagnosed afib, who presents to the ED via EMS for evaluation of generalized weakness and confusion. Patient reports about 90 minutes prior to arrival he was typing on his iPad when he began having trouble and did not recognize the words he was typing. This resolved spontaneously after about 15 minutes. He reports tremors throughout the episode. States he has frequent episodes of lightheadedness that have increased since being diagnosed with afib. He denies any unilateral extremity weakness, focal deficits, headache, chest pain, dyspnea, abdominal pain, n/v/d, or any  other complaints or concerns at this time.   2140 ROS:   All other systems reviewed and negative unless otherwise stated in HPI above.    PHYSICAL EXAM:  General: NAD, awake, alert. Speaking normally in full sentences.   Head: Normocephalic, atraumatic.  Eyes: EOM-I. No diplopia. PERRL.  ENT: Atraumatic external nose and ears. No stridor. Normal phonation.   Neck: Symmetric, trachea midline. No JVD.   CV: RRR. No murmurs or gallops. Peripheral pulses +2 throughout.   Lungs: Unlabored. No retractions. No tachypnea. CTA, lungs sounds equal bilateral.   Abd: Flat, nondistended. +BS, soft, nontender.   MSK: FROM, no deformity/injury.  Skin: Warm, dry, intact.   Neuro: AAOx3, CN II-XII grossly intact. Strength 5/5 and sensation intact to bilateral upper and lower extremities. No focal deficits, no ataxia (normal FNF), no facial droop or slurred speech. Able to spell and repeat phrases without difficulty.  Psychiatric/Behavioral: Appropriate mood and affect.   2145 ASSESSMENT: Patient is a 68 y.o. male who presents with confusion, difficulty typing, prior history of CVA.   DDX includes but not limited to: TIA, afib RVR, doubt acute CVA, electrolyte abnormality, ACS, CHF, thyroid abnormality.   PLAN: Labs, EKG, CXR, CTA head/neck. Treated with cardizem 0.25mg/kg.   2319 XR chest 1 view portable  No acute pna or ptx   2320 ECG 12 lead  EKG performed at 2107 as read by me: afib w/ RVR at 125 bpm, normal axis, no acute EVELYNE/STD, similar to prior on 13 October 2023 2321 WBC: 5.20   2321 Hemoglobin(!): 11.6   2321 Platelet Count(!): 108   2321 POC Glucose: 95   2321 Sodium: 138   2321 Potassium: 3.6   2321 Chloride: 102   2321 Carbon Dioxide: 26   2321 Creatinine(!): 1.68   2321 GFR, Calculated: 41   2321 POCT INR(!): 1.30   2321 BNP(!): 141   2321 hs TnI 0hr: 9   2321 TSH 3RD GENERATON: 1.655   2321 Coma panel(!)  Negative   Sat Mar 23, 2024   0013 hs TnI 2hr: 9  Delta 0   0019 Patient reevaluated, reports improvement in  symptoms. Denies any new or worsening complaints or concerns at this time. Awaiting CTA head/neck results and final disposition, likely admission.   0141 Discussed results and plan with patient. Advised on need for admission at this time.  All questions answered. Patient expressed verbal understanding and is agreeable with plan for admission. Case discussed with CHRISTINA who will admit patient to Obs Tele.                                       Medical Decision Making  See ED course for additional details.    Problems Addressed:  Atrial fibrillation with rapid ventricular response (HCC): acute illness or injury  Difficulty understanding written language: acute illness or injury  Tremor: acute illness or injury    Amount and/or Complexity of Data Reviewed  Independent Historian: EMS  External Data Reviewed: notes.     Details: Diagnosed with afib in October 2023, per cardiology note patient is supposed to be taking coreg  Labs: ordered. Decision-making details documented in ED Course.  Radiology: ordered and independent interpretation performed. Decision-making details documented in ED Course.  ECG/medicine tests:  Decision-making details documented in ED Course.    Risk  OTC drugs.  Prescription drug management.  Decision regarding hospitalization.          Disposition  Final diagnoses:   Atrial fibrillation with rapid ventricular response (HCC)   Tremor   Difficulty understanding written language     Time reflects when diagnosis was documented in both MDM as applicable and the Disposition within this note       Time User Action Codes Description Comment    3/23/2024  1:29 AM Kya Urbina Add [I48.91] Atrial fibrillation with rapid ventricular response (HCC)     3/23/2024  1:30 AM Kya Urbina Add [R25.1] Tremor     3/23/2024  1:32 AM Kya Urbina Add [R41.89] Difficulty understanding written language           ED Disposition       ED Disposition   Admit    Condition   Stable    Date/Time   Sat Mar 23,  2024  1:28 AM    Comment   Case was discussed with SLIM.               Follow-up Information    None         Patient's Medications   Discharge Prescriptions    No medications on file     No discharge procedures on file.    PDMP Review         Value Time User    PDMP Reviewed  Yes 5/14/2022 11:14 PM Rom Cuello MD             ED Provider  Attending physically available and evaluated Abhishek Tom. I managed the patient along with the ED Attending.    Electronically Signed by           Kya Rendon DO  03/23/24 0145

## 2024-03-23 NOTE — ASSESSMENT & PLAN NOTE
Neurology is asked to see this right-handed dominant gentleman who presented to the ED last evening after he realized that the notes and messages he was trying to type on his tablet were gibberish.  He has a past neurologic history of multiple prior strokes, (early 2021, and later in 2022) and later in 2021 he was seen to have A-fib after an endoscopy.  He is supposed to have been maintained on Eliquis however he reports freely that he forgets things including his meds and occasion as he only lives with a cat.  He is also a vasculopath having been a chronic longtime smoker he is already had a R carotid endarterectomy. Patient reports about 90 minutes prior to arrival he was typing on his iPad when he began having trouble and did not recognize the words he was typing. This resolved spontaneously after about 15 minutes. He reports tremors throughout the episode, however as his exam is noted to below he has a generalized tremor consistent with alcohol abuse and at 1 point he reported his tremors were right-sided only.  He reports to this examiner that he can sometimes feel when his stroke symptoms are acting up again but he reports that this time it felt a little different.  His MRI does not demonstrate any new acute ischemia in his prior areas of stroke, see below in detail are noted.  We suspect that he most likely had an episode of transient aphasia or a TIA.  He does not measure his blood pressure at home he also does not check his heart rate.  Although it is possible he had a small seizure we feel that is less likely.  We have reinforced with this gentleman the importance of consistent med administration particularly with his Eliquis.  He has significant vascular disease and we feel it would be appropriate to re-start this gentleman on low-dose aspirin as well at this time.  This patient needs to be seen in our office again by either our general or vascular neurology teams as he has not been seen in the office in  3 years.

## 2024-03-23 NOTE — UTILIZATION REVIEW
"Initial Clinical Review    Admission: Date/Time/Statement:   Admission Orders (From admission, onward)       Ordered        03/23/24 0141  Place in Observation  Once                          Orders Placed This Encounter   Procedures    Place in Observation     Standing Status:   Standing     Number of Occurrences:   1     Order Specific Question:   Level of Care     Answer:   Med Surg [16]     ED Arrival Information       Expected   -    Arrival   3/22/2024 21:04    Acuity   Urgent              Means of arrival   Ambulance    Escorted by   [facility] ([facility])    Service   Hospitalist    Admission type   Emergency              Arrival complaint   medical problem             Chief Complaint   Patient presents with    Tremors     Pt brought in by EMS. Per EMS pt stated was typing on the computer and noticed what he was typing \"doesn't make any sense\" and stated they noted bilateral hand tremors . Pt complains of frequent lightheadedness.        Initial Presentation: 68 y.o. male who presented by EMS to [facility] ED. Admitted in observation status for evaluation and treatment of stroke-like symptoms, afib w/ RVR. PMHx: COPD, GERD, HLD, HTN, psychiatric disorder, stroke. Presented w/ stroke-like symptoms of L-sided frontal headache and lightheadedness, R-sided tremors. Notes was trying to send a text message but was typing [place] despite knowing the words he wanted to say. On ED arrival, noted to be in afib w/ RVR, but asymptomatic. Pt notes he has been having memory difficulties, not recalling if he took his medications and leaving the stove on. On exam, b/l tremors. EKG afib rate 120. Imaging unremarkable for acute infarct. Plan: MRI brain, neuro checks, telemetry, echo, check HgbA1c, TSH, B12, hold amlodipine and benazepril; permissive HTN, continue other PTA meds, CIWA monitoring, Trend labs, replete electrolytes as needed. Neurology consulted.    3/23: Pt chronically on Eliquis, but notes " being forgetful and missing doses of meds sometimes as well as taking double doses of meds. MRI brain. No current symptoms. Plan: continue current meds, Trend labs, replete electrolytes as needed.    Neurology: Pt without new acute ischemia in prior areas of stroke. Likely had transient episode of aphasia or TIA. Reinforced importance of medication compliance particularly w/ Eliquis. Has significant vascular disease; resume low dose ASA. Needs close outpatient follow-up.     ED Triage Vitals   Temperature Pulse Respirations Blood Pressure SpO2   03/22/24 2108 03/22/24 2108 03/22/24 2108 03/22/24 2108 03/22/24 2108   (!) 97.3 °F (36.3 °C) (!) 122 18 (!) 193/102 95 %      Temp Source Heart Rate Source Patient Position - Orthostatic VS BP Location FiO2 (%)   03/22/24 2108 03/22/24 2108 03/22/24 2108 03/22/24 2108 --   Oral Monitor Lying Right arm       Pain Score       03/23/24 0250       No Pain          Wt Readings from Last 1 Encounters:   03/23/24 64 kg (141 lb)     Additional Vital Signs:   Date/Time Temp Pulse Resp BP MAP (mmHg) SpO2 O2 Device   03/24/24 1502 97.1 °F (36.2 °C) Abnormal  75 18 147/88 110 99 % None (Room air)   03/24/24 1058 -- 76 20 149/80 -- 96 % None (Room air)   03/24/24 0718 97.7 °F (36.5 °C) 71 18 123/87 101 97 % None (Room air)   03/24/24 0317 97.9 °F (36.6 °C) 54 Abnormal  18 168/81 111 98 % --   03/24/24 0002 97.8 °F (36.6 °C) 77 18 174/86 Abnormal  123 97 % None (Room air)   03/23/24 1928 97.5 °F (36.4 °C) 81 18 142/76 103 97 % None (Room air)   03/23/24 1545 97.9 °F (36.6 °C) 90 20 154/79 -- 97 % None (Room air)   03/23/24 1524 97.9 °F (36.6 °C) 90 20 154/79 -- 97 % None (Room air)   03/23/24 1345 98.3 °F (36.8 °C) 97 18 160/80 -- 95 % None (Room air)   03/23/24 1145 -- 93 20 156/90 -- 95 % None (Room air)   03/23/24 0945 97.5 °F (36.4 °C) 96 20 152/85 -- 95 % None (Room air)   03/23/24 0845 -- 96 20 152/85 -- 95 % None (Room air)   03/23/24 0745 -- 88 22 168/79 100 96 % None (Room air)      Date/Time Temp Pulse Resp BP MAP (mmHg) SpO2 O2 Device   03/23/24 0800 -- -- -- 168/79 110 96 % None (Room air)   03/23/24 0722 -- -- -- -- -- 97 % None (Room air)   03/23/24 0645 98 °F (36.7 °C) 75 20 172/85 Abnormal  1128 97 % None (Room air)   03/23/24 0545 -- 91 20 167/91 121 95 % None (Room air)   03/23/24 0445 98 °F (36.7 °C) 108 Abnormal  25 Abnormal  139/87 109 94 % None (Room air)   03/23/24 0443 -- -- -- -- -- 95 % None (Room air)   03/23/24 0250 -- -- -- -- -- -- None (Room air)   03/23/24 0225 97.9 °F (36.6 °C) 67 25 Abnormal  190/91 Abnormal  130 96 % None (Room air)   03/23/24 0030 -- 87 21 176/84 Abnormal  121 93 % None (Room air)   03/22/24 2300 -- 95 22 155/74 105 97 % None (Room air)   03/22/24 2130 -- 83 18 115/58 81 96 % None (Room air)   03/22/24 2115 -- 121 Abnormal  16 169/103 Abnormal  130 95 % None (Room air)     Pertinent Labs/Diagnostic Test Results:   3/22 EKG 1  Atrial fibrillation with rapid ventricular response  ST & T wave abnormality, consider inferior ischemia    3/22 EKG 2  Atrial fibrillation  Nonspecific T wave abnormality    3/23 Echo    Left Ventricle: Left ventricular cavity size is normal. Wall thickness is mildly increased. The left ventricular ejection fraction is 65%. Systolic function is normal. Wall motion is normal.    Right Ventricle: Right ventricular cavity size is normal. Systolic function is normal.    Left Atrium: The atrium is mildly dilated.    Mitral Valve: There is mild regurgitation.    Tricuspid Valve: There is mild regurgitation. The right ventricular systolic pressure is mildly elevated. The estimated right ventricular systolic pressure is 39.00 mmHg.    Prior TTE study available for comparison. Prior study date: 10/14/2023. No significant changes noted compared to the prior study.      MRI brain wo contrast   Final Result by Jordana Acosta MD (03/23 1146)      No acute intracranial pathology.      Chronic and nonemergent findings above.          Workstation performed: [id number]         CTA head and neck with and without contrast   Final Result by Minh Galeano MD (03/23 0119)      1.  Stable moderate microangiopathic change and encephalomalacia in the right frontal lobe and medial occipital lobe likely sequelae of old infarcts. Old lacunar infarct in the right basal ganglia region is also seen. Difficult to exclude acute infarct    in this patient on CT given these findings. If there is clinical concern for acute ischemia, recommend more sensitive MRI brain for better evaluation in this patient.  No acute intracranial hemorrhage or depressed calvarial fracture.   2.  Extensive calcific atherosclerosis of the aortic arch, proximal branch vessels, and cervical carotid/vertebral arteries.   3.  Moderate (50 to 70%) luminal narrowing of the brachiocephalic artery due to calcified plaque.   4.  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  Short segment of severe luminal narrowing of the proximal left internal carotid artery due to mixed plaque is seen. Moderate to severe luminal narrowing of the distal left    vertebral artery V4 segment due to calcified plaque is also seen. Patent right vertebral artery without occlusion or significant stenosis.   5.  Complete occlusion of the right mid to distal common carotid artery with reconstitution at the carotid bifurcation region. Moderate (50 to 70%) luminal narrowing of the proximal to mid right cervical internal carotid artery. Short segment of severe    (>90%) luminal narrowing due to calcific atherosclerosis of the right internal carotid artery in the carotid canal region. Additional short segment of severe stenosis involving the right cavernous internal carotid artery.   6.  Moderate (50 to 70%) luminal narrowing of the proximal to mid left common carotid artery due to calcific atherosclerosis.  Mild luminal narrowing of the proximal left internal carotid artery and left carotid  canal/cavernous segments due to mild    calcific wall plaque.   7.  Age-indeterminate occlusion involving a right M2 branch in the anterior sylvian fissure region noted.  Otherwise no intracranial large vessel arterial occlusion or significant flow-limiting stenosis.  Fetal origin of the right posterior cerebral    artery.   8.  Nonspecific 3 mm left upper lobe nodule noted, no routine follow-up imaging recommended per current Fleischner Society guidelines. Nonspecific soft tissue thickening in the bilateral perihilar regions partially visualized.      Workstation performed: [id number]         XR chest 1 view portable   Final Result by Ailyn Barkley MD (03/23 0824)      No acute cardiopulmonary disease.            Workstation performed: [id number]               Results from last 7 days   Lab Units 03/23/24 0644 03/22/24 2113   WBC Thousand/uL 6.20 5.20   HEMOGLOBIN g/dL 14.7 11.6*   HEMATOCRIT % 44.0 34.8*   PLATELETS Thousands/uL 141* 108*   NEUTROS ABS Thousands/µL 4.47 3.76         Results from last 7 days   Lab Units 03/23/24 0644 03/22/24 2113   SODIUM mmol/L 137 138   POTASSIUM mmol/L 3.6 3.6   CHLORIDE mmol/L 104 102   CO2 mmol/L 24 26   ANION GAP mmol/L 9 10   BUN mg/dL 21 23   CREATININE mg/dL 1.41* 1.68*   EGFR ml/min/1.73sq m 51 41   CALCIUM mg/dL 8.8 9.3   MAGNESIUM mg/dL 2.0 1.3*     Results from last 7 days   Lab Units 03/22/24 2113   AST U/L 15   ALT U/L 15   ALK PHOS U/L 85   TOTAL PROTEIN g/dL 7.0   ALBUMIN g/dL 4.2   TOTAL BILIRUBIN mg/dL 0.76   BILIRUBIN DIRECT mg/dL 0.14     Results from last 7 days   Lab Units 03/22/24 2111   POC GLUCOSE mg/dl 95     Results from last 7 days   Lab Units 03/23/24 0644 03/22/24 2113   GLUCOSE RANDOM mg/dL 82 90         Results from last 7 days   Lab Units 03/23/24 0644   HEMOGLOBIN A1C % 5.4   EAG mg/dl 108      Results from last 7 days   Lab Units 03/22/24 2327 03/22/24 2113   HS TNI 0HR ng/L  --  9   HS TNI 2HR ng/L 9  --    HSTNI D2 ng/L 0  --           Results from last 7 days   Lab Units 03/22/24 2113   PROTIME seconds 16.4*   INR  1.30*   PTT seconds 44*     Results from last 7 days   Lab Units 03/23/24  0644 03/22/24 2113   TSH 3RD GENERATON uIU/mL 1.090 1.655      Results from last 7 days   Lab Units 03/22/24 2113   BNP pg/mL 141*     Results from last 7 days   Lab Units 03/22/24 2120   ETHANOL LVL mg/dL <10   ACETAMINOPHEN LVL ug/mL <2*   SALICYLATE LVL mg/dL <5         ED Treatment:   Medication Administration from 03/22/2024 2104 to 03/23/2024 0227         Date/Time Order Dose Route Action     03/22/2024 2118 EDT sodium chloride 0.9 % bolus 500 mL 500 mL Intravenous New Bag     03/22/2024 2118 EDT diltiazem (CARDIZEM) injection 17.5 mg 17.5 mg Intravenous Given     03/22/2024 2202 EDT magnesium sulfate 2 g/50 mL IVPB (premix) 2 g 2 g Intravenous New Bag     03/22/2024 2227 EDT iohexol (OMNIPAQUE) 350 MG/ML injection (MULTI-DOSE) 85 mL 85 mL Intravenous Given          Past Medical History:   Diagnosis Date    PRITI (acute kidney injury) ([facility]) 09/05/2021    Anxiety     Back pain     Claustrophobia     COPD (chronic obstructive pulmonary disease) ([facility])     Dysphagia 09/05/2021    Fatty liver     GERD (gastroesophageal reflux disease)     History of transfusion     Hyperlipidemia     Hypertension     Hypokalemia 02/22/2022    Lumbar back pain     Panic attacks     Stenosis of right carotid artery     Stroke ([facility])     Wears glasses     Wears partial dentures     upper denture     Present on Admission:   Atrial fibrillation with rapid ventricular response ([facility])   Alcohol abuse   HLD (hyperlipidemia)   COPD (chronic obstructive pulmonary disease) ([facility])   Stage 3a chronic kidney disease ([facility])   Benign essential hypertension      Admitting Diagnosis: Tremor [R25.1]  Atrial fibrillation with rapid ventricular response ([facility]) [I48.91]  Known medical problems [Z78.9]  Difficulty understanding written language [R41.89]  Age/Sex: 68 y.o. male  Admission  Orders:  Regular Diet.  Up & OOB as tolerated.  CIWA monitoring.  Incentive Spirometry.  Baseline NIH stroke scale on admission, reassess Q24H x 2 D.  Nursing dysphagia assessment prior to staring diet.  Neuro checks, q1h x4h, q2h x8h, q4h x72h.    SCDs.    Scheduled Medications:  apixaban, 5 mg, Oral, BID  aspirin, 81 mg, Oral, Daily  atorvastatin, 40 mg, Oral, QPM  baclofen, 10 mg, Oral, Daily  busPIRone, 15 mg, Oral, BID  donepezil, 5 mg, Oral, QPM  folic acid, 1 mg, Oral, Daily  multivitamin-minerals, 1 tablet, Oral, Daily  nicotine, 1 patch, Transdermal, Daily  pantoprazole, 40 mg, Oral, Early Morning  sertraline, 100 mg, Oral, Daily  thiamine, 100 mg, Oral, Daily  umeclidinium-vilanterol, 1 puff, Inhalation, Daily    Continuous IV Infusions:  multi-electrolyte, 75 mL/hr, Intravenous, Continuous    PRN Meds:  acetaminophen, 650 mg, Oral, Q4H PRN; 3/23 x1  albuterol, 2 puff, Inhalation, Q6H PRN  LORazepam, 0.5 mg, Intravenous; 3/23 x1  metoprolol, 5 mg, Intravenous, Q6H PRN        IP CONSULT TO NEUROLOGY  IP CONSULT TO CASE MANAGEMENT  IP CONSULT TO NUTRITION SERVICES  IP CONSULT TO CASE MANAGEMENT    Network Utilization Review Department  ATTENTION: Please call with any questions or concerns to [phone] and carefully listen to the prompts so that you are directed to the right person. All voicemails are confidential.   For Discharge needs, contact Care Management DC Support Team at [phone] opt. 2  Send all requests for admission clinical reviews, approved or denied determinations and any other requests to dedicated fax number below belonging to the campus where the patient is receiving treatment. List of dedicated fax numbers for the Facilities:  FACILITY NAME UR FAX NUMBER   ADMISSION DENIALS (Administrative/Medical Necessity) [phone]   DISCHARGE SUPPORT TEAM (NETWORK) [phone]   PARENT CHILD HEALTH (Maternity/NICU/Pediatrics) [phone]   [facility] [phone]    [facility] [phone]   [facility] [phone]   [facility] [phone]   [facility] [phone]   [facility] [phone]   [facility] [phone]   [facility] [phone]   [facility] [phone]   [facility] [phone]   [facility] [phone]   [facility] [phone]

## 2024-03-23 NOTE — H&P
"On license of [facility]  H&P  Name: Abhishek Tom 68 y.o. male I MRN: [id number]  Unit/Bed#: E4 -01 I Date of Admission: 3/22/2024   Date of Service: 3/23/2024 I Hospital Day: 0      Assessment/Plan   * Stroke-like symptoms  Assessment & Plan  Patient with PMHx of multiple past CVA, carotid artery stenosis, HLD, COPD, and HTN presented to the ED after developing stroke-like symptoms earlier in the day.   Pt states that he suddenly developed a left-sided frontal headache with lightheadedness. He then was attempting to type a message to his girlfriend and was unable to type the words he wanted to say instead typing incomprehensible speech.   Pt also reports that while walking to the ambulance, he had difficulty ambulating secondary to right-sided tremoring. He denies any right-sided weakness or numbness.  He reports that this episode felt similar to his prior strokes, but not as severe. He states that during those strokes he has blurred vision, but not today. He also denies any facial droop or speech difficulty.  CTA head and neck demonstrating no acute infarcts, multiple old infarcts, and arthrosclerotic disease. See official report for more details\"  MRI brain  Neuro checks  Monitor on tele  ECHO pending   Hgba1c, flp, tsh, and b12 pending  Neuro consult pending    Atrial fibrillation with rapid ventricular response (HCC)  Assessment & Plan  Pt noted to be in afib rvr in the ED rate 125  Patient previously maintained on 25 mg coreg bid, but this was discontinued his last hospitalization 10/2023 after having low blood pressures  Continue to monitor on telemetry  If patient continues to have elevated HR in telemetry, would consider reinitiating coreg  Metoprolol available prn    Pulmonary nodule  Assessment & Plan  \"Nonspecific 3 mm left upper lobe nodule noted, no routine follow-up imaging recommended per current Fleischner Society guidelines. Nonspecific soft tissue thickening in the bilateral " "perihilar regions partially visualized.\"  Outpatient follow-up    Stage 3a chronic kidney disease (HCC)  Assessment & Plan  Lab Results   Component Value Date    EGFR 41 03/22/2024    EGFR 50 10/20/2023    EGFR 44 10/14/2023    CREATININE 1.68 (H) 03/22/2024    CREATININE 1.42 (H) 10/20/2023    CREATININE 1.58 (H) 10/14/2023   Approximately baseline    Benign essential hypertension  Assessment & Plan  Hold amlodipine and benazepril to allow for permissive HTN    COPD (chronic obstructive pulmonary disease) (HCC)  Assessment & Plan  No acute exacerbation  Continue home inhaler regimen    HLD (hyperlipidemia)  Assessment & Plan  Continue statin therapy    Alcohol abuse  Assessment & Plan  Patient reports that he has not had anything to drink x 2 months. He reports previously he was drinking 6 cases of beer a month.  [facility] protocol  Folate, B12 supplementation  Encourage continued cessation         VTE Pharmacologic Prophylaxis: VTE Score: 7 High Risk (Score >/= 5) - Pharmacological DVT Prophylaxis Ordered: heparin. Sequential Compression Devices Ordered.  Code Status: Level 1 - Full Code   Discussion with family: Patient declined call to .     Anticipated Length of Stay: Patient will be admitted on an observation basis with an anticipated length of stay of less than 2 midnights secondary to stroke-like symptoms.    Total Time Spent on Date of Encounter in care of patient: 75 minutes This time was spent on one or more of the following: performing physical exam; counseling and coordination of care; obtaining or reviewing history; documenting in the medical record; reviewing/ordering tests, medications or procedures; communicating with other healthcare professionals and discussing with patient's family/caregivers.    Chief Complaint: \"I was typing, but none of the words made sense\"    History of Present Illness:  Abhishek Tom is a 68 y.o. male who presents with stroke-like symptoms.  Patient reports that he " was at work yesterday when he suddenly developed a left-sided frontal headache and lightheadedness.  He then attempted to take a message to his girlfriend and noted that the words he wanted type were not coming out correctly.  He reports that he was able to think of the words he wanted to say however what he was typing incomprehensible gibberish.  Patient then called EMS and he reports that while walking to the ambulance he had difficulty.  He reports right-sided tremors.  He denies any right-sided weakness or numbness.  Patient reports that this episode felt similar to his past strokes but not quite as severe.  He reports that with his past strokes he normally also had blurred vision however he did not have blurred vision this time.  He also denies any facial droop or difficulty speaking.  Patient was noted to be in A-fib RVR in the ED however he denies any chest pain, palpitations, pedal edema or shortness of breath.  Patient states that he is not sure when his Coreg was discontinued but on chart review it was discontinued in October following borderline blood pressures.  Patient reports that he has not noted his heart rate has been elevated since that time.  Patient reports that he has been having memory difficulties and does not know if he takes his medication frequently and often will leave the stove on.  Patient is concerned about his memory issues and requesting case management to help possibly have a home health aide.    Review of Systems:  Review of Systems   Constitutional:  Negative for appetite change, chills, diaphoresis, fatigue and fever.   HENT:  Negative for congestion, rhinorrhea and sore throat.    Eyes:  Negative for photophobia and visual disturbance.   Respiratory:  Negative for cough, shortness of breath and wheezing.    Cardiovascular:  Negative for chest pain, palpitations and leg swelling.   Gastrointestinal:  Negative for abdominal distention, abdominal pain, blood in stool, constipation,  diarrhea, nausea and vomiting.   Genitourinary:  Negative for dysuria and hematuria.   Musculoskeletal:  Positive for gait problem. Negative for arthralgias, back pain, myalgias and neck stiffness.   Skin:  Negative for color change and rash.   Neurological:  Positive for tremors (right-sided), speech difficulty (difficulty typing words), light-headedness and headaches (left frontal). Negative for dizziness, seizures, syncope, facial asymmetry, weakness and numbness.   Psychiatric/Behavioral:  Negative for agitation, behavioral problems and confusion. The patient is not nervous/anxious.    All other systems reviewed and are negative.      Past Medical and Surgical History:   Past Medical History:   Diagnosis Date    PRITI (acute kidney injury) ([facility]) 09/05/2021    Anxiety     Back pain     Claustrophobia     COPD (chronic obstructive pulmonary disease) ([facility])     Dysphagia 09/05/2021    Fatty liver     GERD (gastroesophageal reflux disease)     History of transfusion     Hyperlipidemia     Hypertension     Hypokalemia 02/22/2022    Lumbar back pain     Panic attacks     Stenosis of right carotid artery     Stroke ([facility])     Wears glasses     Wears partial dentures     upper denture       Past Surgical History:   Procedure Laterality Date    COLONOSCOPY      ERCP      IR CEREBRAL ANGIOGRAPHY  04/06/2021    OTHER SURGICAL HISTORY      fertility    LA LAPAROSCOPY SURG CHOLECYSTECTOMY N/A 8/26/2022    Procedure: CHOLECYSTECTOMY LAPAROSCOPIC;  Surgeon: Michael Otoole DO;  Location: [facility];  Service: General    LA SLCTV CATHJ 3RD+ ORD SLCTV ABDL PEL/LXTR BRNCH Right 04/06/2021    Procedure: ARTERIOGRAM, carotid Angio;  Surgeon: Maximiliano Morin MD;  Location: [facility];  Service: Vascular    LA TEAEC W/PATCH GRF CAROTID VERTB SUBCLAV NECK INC Right 04/13/2021    Procedure: RIGHT ENDARTERECTOMY ARTERY CAROTID WITH BOVINE PATCH;  Surgeon: Maximiliano Morin MD;  Location: [facility];  Service: Vascular        Meds/Allergies:  Prior to Admission medications    Medication Sig Start Date End Date Taking? Authorizing Provider   albuterol (PROVENTIL HFA,VENTOLIN HFA) 90 mcg/act inhaler Inhale 2 puffs if needed 10/8/21  Yes Historical Provider, MD   amLODIPine-benazepril (LOTREL 5-10) 5-10 MG per capsule Take 1 capsule by mouth daily   Yes Historical Provider, MD   Anojessica Ellipta 62.5-25 MCG/ACT inhaler INHALE 1 PUFF BY MOUTH AND INTO THE LUNGS ONCE DAILY AT THE SAME TIME EACH DAY   Yes Historical Provider, MD   apixaban (ELIQUIS) 5 mg Take 1 tablet (5 mg total) by mouth 2 (two) times a day 9/17/21  Yes Chai Cannon MD   baclofen 10 mg tablet Take 10 mg by mouth in the morning PRN   Yes Historical Provider, MD   busPIRone (BUSPAR) 15 mg tablet 15 mg 2 (two) times a day 4/27/22  Yes Historical Provider, MD   donepezil (ARICEPT) 5 mg tablet Take 5 mg by mouth every evening 2/22/24  Yes Historical Provider, MD   LORazepam (ATIVAN) 1 mg tablet Take 1 mg by mouth 3 (three) times a day as needed 3/1/24  Yes Historical Provider, MD   omeprazole (PriLOSEC) 20 mg delayed release capsule Take 20 mg by mouth daily 10/4/21  Yes Historical Provider, MD   patient supplied medication Allergy Relief medicine 10 mg daily   Yes Historical Provider, MD   pravastatin (PRAVACHOL) 40 mg tablet Take 40 mg by mouth daily   Yes Historical Provider, MD   sertraline (ZOLOFT) 100 mg tablet Take 100 mg by mouth daily   1/19/11  Yes Historical Provider, MD   varenicline (Chantix) 1 mg tablet Take 1 mg by mouth 2 (two) times a day   Yes Historical Provider, MD   buPROPion (Wellbutrin SR) 150 mg 12 hr tablet Take 150 mg by mouth in the morning. Indications: Major Depressive Disorder  Patient not taking: Reported on 2/12/2024    Historical Provider, MD   buPROPion (WELLBUTRIN XL) 150 mg 24 hr tablet Take 150 mg by mouth daily    Historical Provider, MD   Stiolto Respimat 2.5-2.5 MCG/ACT inhaler Inhale 2 puffs daily  Patient not taking: Reported on  "2/12/2024 2/24/22   Historical Provider, MD RESTREPO have reviewed home medications using recent Epic encounter.    Allergies:   Allergies   Allergen Reactions    Penicillins Anaphylaxis       Social History:  Marital Status: /Civil Union   Patient Pre-hospital Living Situation: Home  Patient Pre-hospital Level of Mobility: walks  Patient Pre-hospital Diet Restrictions: none  Substance Use History:   Social History     Substance and Sexual Activity   Alcohol Use Yes    Alcohol/week: 2.0 standard drinks of alcohol    Types: 2 Cans of beer per week    Comment: every other day     Social History     Tobacco Use   Smoking Status Every Day    Current packs/day: 0.50    Average packs/day: 0.5 packs/day for 49.2 years (24.6 ttl pk-yrs)    Types: Cigarettes    Start date: 1/1/1975   Smokeless Tobacco Never     Social History     Substance and Sexual Activity   Drug Use Never    Comment: never       Family History:  Family History   Problem Relation Age of Onset    Asthma Mother        Physical Exam:     Vitals:   Blood Pressure: (!) 172/85 (03/23/24 0645)  Pulse: 75 (03/23/24 0645)  Temperature: 98 °F (36.7 °C) (03/23/24 0645)  Temp Source: Temporal (03/23/24 0645)  Respirations: 20 (03/23/24 0645)  Height: 5' 9\" (175.3 cm) (03/23/24 0225)  Weight - Scale: 64.4 kg (141 lb 15.6 oz) (03/23/24 0225)  SpO2: 97 % (03/23/24 0722)    Physical Exam  Vitals and nursing note reviewed.   Constitutional:       General: He is not in acute distress.     Appearance: He is well-developed.   HENT:      Head: Normocephalic and atraumatic.      Nose: Nose normal. No congestion.      Mouth/Throat:      Mouth: Mucous membranes are moist.      Pharynx: Oropharynx is clear.   Eyes:      Conjunctiva/sclera: Conjunctivae normal.   Cardiovascular:      Rate and Rhythm: Normal rate and regular rhythm.      Heart sounds: Normal heart sounds. No murmur heard.     No friction rub. No gallop.   Pulmonary:      Effort: Pulmonary effort is normal. No " respiratory distress.      Breath sounds: Normal breath sounds. No wheezing, rhonchi or rales.   Abdominal:      General: Bowel sounds are normal. There is no distension.      Palpations: Abdomen is soft.      Tenderness: There is no abdominal tenderness.   Musculoskeletal:      Cervical back: Neck supple.      Right lower leg: No edema.      Left lower leg: No edema.   Skin:     General: Skin is warm and dry.      Capillary Refill: Capillary refill takes less than 2 seconds.   Neurological:      General: No focal deficit present.      Mental Status: He is alert and oriented to person, place, and time. Mental status is at baseline.      Cranial Nerves: No cranial nerve deficit.      Sensory: Sensation is intact.      Motor: Tremor (bilateral) present. No weakness.      Coordination: Romberg sign negative.   Psychiatric:         Mood and Affect: Mood normal.         Behavior: Behavior normal.          Additional Data:     Lab Results:  Results from last 7 days   Lab Units 03/22/24 2113   WBC Thousand/uL 5.20   HEMOGLOBIN g/dL 11.6*   HEMATOCRIT % 34.8*   PLATELETS Thousands/uL 108*   NEUTROS PCT % 72   LYMPHS PCT % 17   MONOS PCT % 9   EOS PCT % 1     Results from last 7 days   Lab Units 03/22/24 2113   SODIUM mmol/L 138   POTASSIUM mmol/L 3.6   CHLORIDE mmol/L 102   CO2 mmol/L 26   BUN mg/dL 23   CREATININE mg/dL 1.68*   ANION GAP mmol/L 10   CALCIUM mg/dL 9.3   ALBUMIN g/dL 4.2   TOTAL BILIRUBIN mg/dL 0.76   ALK PHOS U/L 85   ALT U/L 15   AST U/L 15   GLUCOSE RANDOM mg/dL 90     Results from last 7 days   Lab Units 03/22/24 2113   INR  1.30*     Results from last 7 days   Lab Units 03/22/24  2111   POC GLUCOSE mg/dl 95               Lines/Drains:  Invasive Devices       Peripheral Intravenous Line  Duration             Peripheral IV 03/22/24 Distal;Left;Upper;Ventral (anterior) Arm <1 day    Peripheral IV 03/22/24 Right;Ventral (anterior) Forearm <1 day                        Imaging: Reviewed radiology reports  from this admission including: CTA head and neck and Personally reviewed the following imaging: chest xray  CTA head and neck with and without contrast   Final Result by Minh Galeano MD (03/23 0119)      1.  Stable moderate microangiopathic change and encephalomalacia in the right frontal lobe and medial occipital lobe likely sequelae of old infarcts. Old lacunar infarct in the right basal ganglia region is also seen. Difficult to exclude acute infarct    in this patient on CT given these findings. If there is clinical concern for acute ischemia, recommend more sensitive MRI brain for better evaluation in this patient.  No acute intracranial hemorrhage or depressed calvarial fracture.   2.  Extensive calcific atherosclerosis of the aortic arch, proximal branch vessels, and cervical carotid/vertebral arteries.   3.  Moderate (50 to 70%) luminal narrowing of the brachiocephalic artery due to calcified plaque.   4.  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  Short segment of severe luminal narrowing of the proximal left internal carotid artery due to mixed plaque is seen. Moderate to severe luminal narrowing of the distal left    vertebral artery V4 segment due to calcified plaque is also seen. Patent right vertebral artery without occlusion or significant stenosis.   5.  Complete occlusion of the right mid to distal common carotid artery with reconstitution at the carotid bifurcation region. Moderate (50 to 70%) luminal narrowing of the proximal to mid right cervical internal carotid artery. Short segment of severe    (>90%) luminal narrowing due to calcific atherosclerosis of the right internal carotid artery in the carotid canal region. Additional short segment of severe stenosis involving the right cavernous internal carotid artery.   6.  Moderate (50 to 70%) luminal narrowing of the proximal to mid left common carotid artery due to calcific atherosclerosis.  Mild luminal  narrowing of the proximal left internal carotid artery and left carotid canal/cavernous segments due to mild    calcific wall plaque.   7.  Age-indeterminate occlusion involving a right M2 branch in the anterior sylvian fissure region noted.  Otherwise no intracranial large vessel arterial occlusion or significant flow-limiting stenosis.  Fetal origin of the right posterior cerebral    artery.   8.  Nonspecific 3 mm left upper lobe nodule noted, no routine follow-up imaging recommended per current Fleischner Society guidelines. Nonspecific soft tissue thickening in the bilateral perihilar regions partially visualized.      Workstation performed: [id number]         XR chest 1 view portable    (Results Pending)   MRI Inpatient Order    (Results Pending)       EKG and Other Studies Reviewed on Admission:   EKG: Atrial fibrillation. .    ** Please Note: This note has been constructed using a voice recognition system. **

## 2024-03-23 NOTE — ASSESSMENT & PLAN NOTE
Patient reports that he has not had anything to drink x 2 months. He reports previously he was drinking 6 cases of beer a month.  [facility] protocol  Folate, B12 supplementation  Encourage continued cessation

## 2024-03-24 PROCEDURE — 99232 SBSQ HOSP IP/OBS MODERATE 35: CPT | Performed by: HOSPITALIST

## 2024-03-24 RX ADMIN — UMECLIDINIUM BROMIDE AND VILANTEROL TRIFENATATE 1 PUFF: 62.5; 25 POWDER RESPIRATORY (INHALATION) at 09:17

## 2024-03-24 RX ADMIN — PANTOPRAZOLE SODIUM 40 MG: 40 TABLET, DELAYED RELEASE ORAL at 05:24

## 2024-03-24 RX ADMIN — DONEPEZIL HYDROCHLORIDE 5 MG: 5 TABLET ORAL at 17:11

## 2024-03-24 RX ADMIN — FOLIC ACID 1 MG: 1 TABLET ORAL at 09:13

## 2024-03-24 RX ADMIN — SERTRALINE 100 MG: 100 TABLET, FILM COATED ORAL at 09:13

## 2024-03-24 RX ADMIN — BUSPIRONE HYDROCHLORIDE 15 MG: 10 TABLET ORAL at 09:13

## 2024-03-24 RX ADMIN — THIAMINE HCL TAB 100 MG 100 MG: 100 TAB at 09:13

## 2024-03-24 RX ADMIN — APIXABAN 5 MG: 5 TABLET, FILM COATED ORAL at 17:11

## 2024-03-24 RX ADMIN — BACLOFEN 10 MG: 10 TABLET ORAL at 09:12

## 2024-03-24 RX ADMIN — ATORVASTATIN CALCIUM 40 MG: 40 TABLET, FILM COATED ORAL at 17:11

## 2024-03-24 RX ADMIN — MULTIPLE VITAMINS W/ MINERALS TAB 1 TABLET: TAB ORAL at 09:12

## 2024-03-24 RX ADMIN — BUSPIRONE HYDROCHLORIDE 15 MG: 10 TABLET ORAL at 20:03

## 2024-03-24 RX ADMIN — APIXABAN 5 MG: 5 TABLET, FILM COATED ORAL at 09:13

## 2024-03-24 RX ADMIN — ASPIRIN 81 MG CHEWABLE TABLET 81 MG: 81 TABLET CHEWABLE at 09:12

## 2024-03-24 NOTE — SPEECH THERAPY NOTE
"Speech Language/Pathology  Consult received.  Records reviewed.  Pt admitted c symptoms concerning for CVA/TIA.  Pt passed RN Dysphagia Assessment.  Communication deficits and swallowing difficulties denied. Pt stated \" I'm originally from [place], [place] are [place] out there\"  NIH score 0.  MRI results no acute intracranial pathology  .  No additional inpatient Speech Pathology evaluation appears indicated at this time.  Please re-consult if additional concerns arise. Thank you.   "

## 2024-03-24 NOTE — PROGRESS NOTES
[facility]  Progress Note  Name: Abhishek Tom I  MRN: [id number]  Unit/Bed#: E4 -01 I Date of Admission: 3/22/2024   Date of Service: 3/24/2024 I Hospital Day: 0    Assessment/Plan   * Transient period of expressive aphasia without stroke-resolved as of 3/23/2024  Assessment & Plan  Patient with PMHx of multiple past CVA, carotid artery stenosis, HLD, COPD, and HTN presented to the ED after developing stroke-like symptoms earlier in the day.   Pt states that he suddenly developed a left-sided frontal headache with lightheadedness. He then was attempting to type a message to his girlfriend and was unable to type the words he wanted to say instead typing incomprehensible speech.   Pt also reports that while walking to the ambulance, he had difficulty ambulating secondary to right-sided tremoring. He denies any right-sided weakness or numbness.  He reports that this episode felt similar to his prior strokes, but not as severe. He states that during those strokes he has blurred vision, but not today. He also denies any facial droop or speech difficulty.    CT head negative for stroke    MRI brain negative for stroke.    Symptoms have resolved.  Likely this was a TIA with some non compliance with apixaban.    Patient says sometimes he is forgetful and doesn't remember if he took his Eliquis.    He is asking if we could get him a home health aid to assist.  I will have case mgt discuss with him.    Atrial fibrillation with rapid ventricular response (HCC)  Assessment & Plan  Pt noted to be in afib rvr in the ED rate 125  Patient previously maintained on 25 mg coreg bid, but this was discontinued his last hospitalization 10/2023 after having low blood pressures      Patient's heart rate is currently controlled.    He is on Eliquis    We stressed compliance with this to prevent future strokes    I also stressed the importance of not over taking it if he forgets and doubles the doses that  could be dangerous as well with treating    Alcohol abuse  Assessment & Plan  Patient reports that he has not had anything to drink x 2 months. He reports previously he was drinking 6 cases of beer a month.  [facility] protocol  Folate, B12 supplementation  Encourage continued cessation  No signs of withdrawal.               Subjective:   Doing well  No longer having aphasia.    No sided weakness.    Objective:     Vitals:   Temp (24hrs), Av.6 °F (36.4 °C), Min:97.1 °F (36.2 °C), Max:97.9 °F (36.6 °C)    Temp:  [97.1 °F (36.2 °C)-97.9 °F (36.6 °C)] 97.1 °F (36.2 °C)  HR:  [54-81] 75  Resp:  [18-20] 18  BP: (123-174)/(76-88) 147/88  SpO2:  [96 %-99 %] 99 %  Body mass index is 20.82 kg/m².     Input and Output Summary (last 24 hours):       Intake/Output Summary (Last 24 hours) at 3/24/2024 1616  Last data filed at 3/24/2024 1300  Gross per 24 hour   Intake 480 ml   Output 1300 ml   Net -820 ml       Physical Exam:     Physical Exam  .       Additional Data:     Labs:    Results from last 7 days   Lab Units 24  0644   WBC Thousand/uL 6.20   HEMOGLOBIN g/dL 14.7   HEMATOCRIT % 44.0   PLATELETS Thousands/uL 141*   NEUTROS PCT % 72   LYMPHS PCT % 17   MONOS PCT % 9   EOS PCT % 1     Results from last 7 days   Lab Units 24  0644 24  2113   POTASSIUM mmol/L 3.6 3.6   CHLORIDE mmol/L 104 102   CO2 mmol/L 24 26   BUN mg/dL 21 23   CREATININE mg/dL 1.41* 1.68*   CALCIUM mg/dL 8.8 9.3   ALK PHOS U/L  --  85   ALT U/L  --  15   AST U/L  --  15     Results from last 7 days   Lab Units 24  2113   INR  1.30*     Results from last 7 days   Lab Units 24  2111   POC GLUCOSE mg/dl 95     Results from last 7 days   Lab Units 24  0644   HEMOGLOBIN A1C % 5.4           * I Have Reviewed All Lab Data     Recent Cultures (last 7 days):             Last 24 Hours Medication List:   Current Facility-Administered Medications   Medication Dose Route Frequency Provider Last Rate    acetaminophen  650 mg Oral Q4H  PRN Eliseo Flor, PA-C      albuterol  2 puff Inhalation Q6H PRN Eliseo Flor, PA-C      apixaban  5 mg Oral BID Eliseo Flor, PA-C      aspirin  81 mg Oral Daily Eliseo Flor, PA-C      atorvastatin  40 mg Oral QPM Eliseo Flor, PA-C      baclofen  10 mg Oral Daily Eliseo Flor, PA-C      busPIRone  15 mg Oral BID Eliseo Flor, PA-C      donepezil  5 mg Oral QPM Eliseo Flor, PA-C      folic acid  1 mg Oral Daily Eliseo Flor, PA-C      metoprolol  5 mg Intravenous Q6H PRN Eliseo Flor, PA-C      multi-electrolyte  75 mL/hr Intravenous Continuous Eliseo Flor, PA-C Stopped (03/23/24 1343)    multivitamin-minerals  1 tablet Oral Daily Eliseo Flor, PA-C      nicotine  1 patch Transdermal Daily Eliseo Flor, PA-C      pantoprazole  40 mg Oral Early Morning Eliseo Flor, PA-C      sertraline  100 mg Oral Daily Eliseo Flor, PA-C      thiamine  100 mg Oral Daily Eliseo Flor, PA-C      umeclidinium-vilanterol  1 puff Inhalation Daily Eliseo Flor, PA-C           VTE Pharmacologic Prophylaxis:   Pharmacologic: Apixaban (Eliquis)      Current Length of Stay: 0 day(s)    Current Patient Status: Observation       Discharge Plan: case mgt eval for help at home.   Can likely go home tomorrow.    Code Status: Level 1 - Full Code           Today, Patient Was Seen By: Harinder Gooden DO    ** Please Note: Dictation voice to text software may have been used in the creation of this document. **

## 2024-03-24 NOTE — ASSESSMENT & PLAN NOTE
Pt noted to be in afib rvr in the ED rate 125  Patient previously maintained on 25 mg coreg bid, but this was discontinued his last hospitalization 10/2023 after having low blood pressures      Patient's heart rate is currently controlled.    He is on Eliquis    We stressed compliance with this to prevent future strokes    I also stressed the importance of not over taking it if he forgets and doubles the doses that could be dangerous as well with treating

## 2024-03-24 NOTE — ASSESSMENT & PLAN NOTE
Patient with PMHx of multiple past CVA, carotid artery stenosis, HLD, COPD, and HTN presented to the ED after developing stroke-like symptoms earlier in the day.   Pt states that he suddenly developed a left-sided frontal headache with lightheadedness. He then was attempting to type a message to his girlfriend and was unable to type the words he wanted to say instead typing incomprehensible speech.   Pt also reports that while walking to the ambulance, he had difficulty ambulating secondary to right-sided tremoring. He denies any right-sided weakness or numbness.  He reports that this episode felt similar to his prior strokes, but not as severe. He states that during those strokes he has blurred vision, but not today. He also denies any facial droop or speech difficulty.    CT head negative for stroke    MRI brain negative for stroke.    Symptoms have resolved.  Likely this was a TIA with some non compliance with apixaban.    Patient says sometimes he is forgetful and doesn't remember if he took his Eliquis.    He is asking if we could get him a home health aid to assist.  I will have case mgt discuss with him.

## 2024-03-24 NOTE — ASSESSMENT & PLAN NOTE
Patient reports that he has not had anything to drink x 2 months. He reports previously he was drinking 6 cases of beer a month.  [facility] protocol  Folate, B12 supplementation  Encourage continued cessation  No signs of withdrawal.

## 2024-03-24 NOTE — ED ATTENDING ATTESTATION
3/22/2024  I, Jose M Escalante MD, saw and evaluated the patient. I have discussed the patient with the resident/non-physician practitioner and agree with the resident's/non-physician practitioner's findings, Plan of Care, and MDM as documented in the resident's/non-physician practitioner's note, except where noted. All available labs and Radiology studies were reviewed.  I was present for key portions of any procedure(s) performed by the resident/non-physician practitioner and I was immediately available to provide assistance.       At this point I agree with the current assessment done in the Emergency Department.  I have conducted an independent evaluation of this patient a history and physical is as follows:    67 YO male presents with tremors and difficulty with coordination. Patient states this began tonight while he was trying to type, states what he was typing was making no sense. He denies weakness or numbness, no similar in the past. He notes this did seem to resolve after ~15 minutes though he does continue to have some tremors. Pt denies CP/SOB/F/C/N/V/D/C, no dysuria, burning on urination or blood in urine.    Gen: Pt is in NAD  HEENT: Head is atraumatic, EOM's intact, neck has FROM  Chest: CTAB, non-tender  Heart: RRR  Abdomen: Soft, NT/ND  Musculoskeletal: FROM in all extremities  Skin: No rash, no ecchymosis  Neuro: Awake, alert, oriented x4; Cranial nerves II-XII intact, mild tremors.  Psych: Normal affect    MDM -  Patient with no signs of cerebellar dysfunction currently, does have some tremors. Possible TIA, though this seems to have resolved and patient does take a DOAC so would not be a candidate for TNK. Will order ECG and troponin to rule out acute MI CBC for leukocytosis and anemia, metabolic panel for electrolyte abnormalities and dehydration, CT head.     ED Course         Critical Care Time  Procedures

## 2024-03-25 ENCOUNTER — HOME HEALTH ADMISSION (OUTPATIENT)
Dept: HOME HEALTH SERVICES | Facility: HOME HEALTHCARE | Age: 69
End: 2024-03-25
Payer: COMMERCIAL

## 2024-03-25 VITALS
TEMPERATURE: 96.9 F | SYSTOLIC BLOOD PRESSURE: 176 MMHG | RESPIRATION RATE: 18 BRPM | WEIGHT: 141 LBS | OXYGEN SATURATION: 98 % | BODY MASS INDEX: 20.88 KG/M2 | HEIGHT: 69 IN | HEART RATE: 70 BPM | DIASTOLIC BLOOD PRESSURE: 74 MMHG

## 2024-03-25 LAB
ANION GAP SERPL CALCULATED.3IONS-SCNC: 9 MMOL/L (ref 4–13)
BASOPHILS # BLD AUTO: 0.03 THOUSANDS/ÂΜL (ref 0–0.1)
BASOPHILS NFR BLD AUTO: 1 % (ref 0–1)
BUN SERPL-MCNC: 21 MG/DL (ref 5–25)
CALCIUM SERPL-MCNC: 9.4 MG/DL (ref 8.4–10.2)
CHLORIDE SERPL-SCNC: 105 MMOL/L (ref 96–108)
CO2 SERPL-SCNC: 29 MMOL/L (ref 21–32)
CREAT SERPL-MCNC: 1.31 MG/DL (ref 0.6–1.3)
EOSINOPHIL # BLD AUTO: 0.08 THOUSAND/ÂΜL (ref 0–0.61)
EOSINOPHIL NFR BLD AUTO: 1 % (ref 0–6)
ERYTHROCYTE [DISTWIDTH] IN BLOOD BY AUTOMATED COUNT: 12.8 % (ref 11.6–15.1)
GFR SERPL CREATININE-BSD FRML MDRD: 55 ML/MIN/1.73SQ M
GLUCOSE P FAST SERPL-MCNC: 87 MG/DL (ref 65–99)
GLUCOSE SERPL-MCNC: 87 MG/DL (ref 65–140)
HCT VFR BLD AUTO: 48.8 % (ref 36.5–49.3)
HGB BLD-MCNC: 15.8 G/DL (ref 12–17)
IMM GRANULOCYTES # BLD AUTO: 0.03 THOUSAND/UL (ref 0–0.2)
IMM GRANULOCYTES NFR BLD AUTO: 1 % (ref 0–2)
LYMPHOCYTES # BLD AUTO: 1.34 THOUSANDS/ÂΜL (ref 0.6–4.47)
LYMPHOCYTES NFR BLD AUTO: 21 % (ref 14–44)
MAGNESIUM SERPL-MCNC: 1.7 MG/DL (ref 1.9–2.7)
MCH RBC QN AUTO: 27.2 PG (ref 26.8–34.3)
MCHC RBC AUTO-ENTMCNC: 32.4 G/DL (ref 31.4–37.4)
MCV RBC AUTO: 84 FL (ref 82–98)
MONOCYTES # BLD AUTO: 0.57 THOUSAND/ÂΜL (ref 0.17–1.22)
MONOCYTES NFR BLD AUTO: 9 % (ref 4–12)
NEUTROPHILS # BLD AUTO: 4.35 THOUSANDS/ÂΜL (ref 1.85–7.62)
NEUTS SEG NFR BLD AUTO: 67 % (ref 43–75)
NRBC BLD AUTO-RTO: 0 /100 WBCS
PLATELET # BLD AUTO: 145 THOUSANDS/UL (ref 149–390)
PMV BLD AUTO: 10.9 FL (ref 8.9–12.7)
POTASSIUM SERPL-SCNC: 4.1 MMOL/L (ref 3.5–5.3)
RBC # BLD AUTO: 5.8 MILLION/UL (ref 3.88–5.62)
SODIUM SERPL-SCNC: 143 MMOL/L (ref 135–147)
WBC # BLD AUTO: 6.4 THOUSAND/UL (ref 4.31–10.16)

## 2024-03-25 PROCEDURE — 80048 BASIC METABOLIC PNL TOTAL CA: CPT | Performed by: HOSPITALIST

## 2024-03-25 PROCEDURE — 99239 HOSP IP/OBS DSCHRG MGMT >30: CPT | Performed by: INTERNAL MEDICINE

## 2024-03-25 PROCEDURE — 83735 ASSAY OF MAGNESIUM: CPT | Performed by: HOSPITALIST

## 2024-03-25 PROCEDURE — G0009 ADMIN PNEUMOCOCCAL VACCINE: HCPCS | Performed by: INTERNAL MEDICINE

## 2024-03-25 PROCEDURE — 85025 COMPLETE CBC W/AUTO DIFF WBC: CPT | Performed by: HOSPITALIST

## 2024-03-25 PROCEDURE — 90677 PCV20 VACCINE IM: CPT | Performed by: INTERNAL MEDICINE

## 2024-03-25 RX ORDER — ASPIRIN 81 MG/1
81 TABLET, CHEWABLE ORAL DAILY
Start: 2024-03-26

## 2024-03-25 RX ADMIN — THIAMINE HCL TAB 100 MG 100 MG: 100 TAB at 08:25

## 2024-03-25 RX ADMIN — BUSPIRONE HYDROCHLORIDE 15 MG: 10 TABLET ORAL at 08:26

## 2024-03-25 RX ADMIN — FOLIC ACID 1 MG: 1 TABLET ORAL at 08:26

## 2024-03-25 RX ADMIN — UMECLIDINIUM BROMIDE AND VILANTEROL TRIFENATATE 1 PUFF: 62.5; 25 POWDER RESPIRATORY (INHALATION) at 08:27

## 2024-03-25 RX ADMIN — BACLOFEN 10 MG: 10 TABLET ORAL at 08:25

## 2024-03-25 RX ADMIN — SERTRALINE 100 MG: 100 TABLET, FILM COATED ORAL at 08:25

## 2024-03-25 RX ADMIN — APIXABAN 5 MG: 5 TABLET, FILM COATED ORAL at 08:24

## 2024-03-25 RX ADMIN — PANTOPRAZOLE SODIUM 40 MG: 40 TABLET, DELAYED RELEASE ORAL at 05:53

## 2024-03-25 RX ADMIN — PNEUMOCOCCAL 20-VALENT CONJUGATE VACCINE 0.5 ML
2.2; 2.2; 2.2; 2.2; 2.2; 2.2; 2.2; 2.2; 2.2; 2.2; 2.2; 2.2; 2.2; 2.2; 2.2; 2.2; 4.4; 2.2; 2.2; 2.2 INJECTION, SUSPENSION INTRAMUSCULAR at 13:00

## 2024-03-25 RX ADMIN — MULTIPLE VITAMINS W/ MINERALS TAB 1 TABLET: TAB ORAL at 08:26

## 2024-03-25 RX ADMIN — ACETAMINOPHEN 325MG 650 MG: 325 TABLET ORAL at 02:04

## 2024-03-25 RX ADMIN — ASPIRIN 81 MG CHEWABLE TABLET 81 MG: 81 TABLET CHEWABLE at 08:25

## 2024-03-25 NOTE — NURSING NOTE
Patient discharged, stable at this time.  AVS reviewed, IVs removed.  No further questions at this time.  Walked out with family member.  No belongings left in room, no papers left in chart.

## 2024-03-25 NOTE — PLAN OF CARE
Problem: PAIN - ADULT  Goal: Verbalizes/displays adequate comfort level or baseline comfort level  Description: Interventions:  - Encourage patient to monitor pain and request assistance  - Assess pain using appropriate pain scale  - Administer analgesics based on type and severity of pain and evaluate response  - Implement non-pharmacological measures as appropriate and evaluate response  - Consider cultural and social influences on pain and pain management  - Notify physician/advanced practitioner if interventions unsuccessful or patient reports new pain  Outcome: Progressing     Problem: SAFETY ADULT  Goal: Patient will remain free of falls  Description: INTERVENTIONS:  - Educate patient/family on patient safety including physical limitations  - Instruct patient to call for assistance with activity   - Consult OT/PT to assist with strengthening/mobility   - Keep Call bell within reach  - Keep bed low and locked with side rails adjusted as appropriate  - Keep care items and personal belongings within reach  - Initiate and maintain comfort rounds  - Make Fall Risk Sign visible to staff  - Offer Toileting every 2 Hours, in advance of need  - Initiate/Maintain bed alarm  - Obtain necessary fall risk management equipment: call bell  - Apply yellow socks and bracelet for high fall risk patients  - Consider moving patient to room near nurses station  Outcome: Progressing  Goal: Maintain or return to baseline ADL function  Description: INTERVENTIONS:  -  Assess patient's ability to carry out ADLs; assess patient's baseline for ADL function and identify physical deficits which impact ability to perform ADLs (bathing, care of mouth/teeth, toileting, grooming, dressing, etc.)  - Assess/evaluate cause of self-care deficits   - Assess range of motion  - Assess patient's mobility; develop plan if impaired  - Assess patient's need for assistive devices and provide as appropriate  - Encourage maximum independence but intervene  and supervise when necessary  - Involve family in performance of ADLs  - Assess for home care needs following discharge   - Consider OT consult to assist with ADL evaluation and planning for discharge  - Provide patient education as appropriate  Outcome: Progressing  Goal: Maintains/Returns to pre admission functional level  Description: INTERVENTIONS:  - Perform AM-PAC 6 Click Basic Mobility/ Daily Activity assessment daily.  - Set and communicate daily mobility goal to care team and patient/family/caregiver.   - Collaborate with rehabilitation services on mobility goals if consulted  - Perform Range of Motion 3 times a day.  - Reposition patient every 2 hours.  - Dangle patient 3 times a day  - Stand patient 3 times a day  - Ambulate patient 3 times a day  - Out of bed to chair 3 times a day   - Out of bed for meals 3  Problem: DISCHARGE PLANNING  Goal: Discharge to home or other facility with appropriate resources  Description: INTERVENTIONS:  - Identify barriers to discharge w/patient and caregiver  - Arrange for needed discharge resources and transportation as appropriate  - Identify discharge learning needs (meds, wound care, etc.)  - Arrange for interpretive services to assist at discharge as needed  - Refer to Case Management Department for coordinating discharge planning if the patient needs post-hospital services based on physician/advanced practitioner order or complex needs related to functional status, cognitive ability, or social support system  Outcome: Progressing     Problem: Knowledge Deficit  Goal: Patient/family/caregiver demonstrates understanding of disease process, treatment plan, medications, and discharge instructions  Description: Complete learning assessment and assess knowledge base.  Interventions:  - Provide teaching at level of understanding  - Provide teaching via preferred learning methods  Outcome: Progressing     Problem: CARDIOVASCULAR - ADULT  Goal: Maintains optimal cardiac  output and hemodynamic stability  Description: INTERVENTIONS:  - Monitor I/O, vital signs and rhythm  - Monitor for S/S and trends of decreased cardiac output  - Administer and titrate ordered vasoactive medications to optimize hemodynamic stability  - Assess quality of pulses, skin color and temperature  - Assess for signs of decreased coronary artery perfusion  - Instruct patient to report change in severity of symptoms  Outcome: Progressing  Goal: Absence of cardiac dysrhythmias or at baseline rhythm  Description: INTERVENTIONS:  - Continuous cardiac monitoring, vital signs, obtain 12 lead EKG if ordered  - Administer antiarrhythmic and heart rate control medications as ordered  - Monitor electrolytes and administer replacement therapy as ordered  Outcome: Progressing     Problem: GASTROINTESTINAL - ADULT  Goal: Minimal or absence of nausea and/or vomiting  Description: INTERVENTIONS:  - Administer IV fluids if ordered to ensure adequate hydration  - Maintain NPO status until nausea and vomiting are resolved  - Nasogastric tube if ordered  - Administer ordered antiemetic medications as needed  - Provide nonpharmacologic comfort measures as appropriate  - Advance diet as tolerated, if ordered  - Consider nutrition services referral to assist patient with adequate nutrition and appropriate food choices  Outcome: Progressing  Goal: Maintains or returns to baseline bowel function  Description: INTERVENTIONS:  - Assess bowel function  - Encourage oral fluids to ensure adequate hydration  - Administer IV fluids if ordered to ensure adequate hydration  - Administer ordered medications as needed  - Encourage mobilization and activity  - Consider nutritional services referral to assist patient with adequate nutrition and appropriate food choices  Outcome: Progressing     Problem: HEMATOLOGIC - ADULT  Goal: Maintains hematologic stability  Description: INTERVENTIONS  - Assess for signs and symptoms of bleeding or  hemorrhage  - Monitor labs  - Administer supportive blood products/factors as ordered and appropriate  Outcome: Progressing     Problem: MUSCULOSKELETAL - ADULT  Goal: Maintain proper alignment of affected body part  Description: INTERVENTIONS:  - Support, maintain and protect limb and body alignment  - Provide patient/ family with appropriate education  Outcome: Progressing    times a day  - Out of bed for toileting  - Record patient progress and toleration of activity level   Outcome: Progressing

## 2024-03-25 NOTE — RESTORATIVE TECHNICIAN NOTE
Restorative Technician Note      Patient Name: Abhishek Tom     Restorative Tech Visit Date: 03/25/24  Note Type: Mobility  Patient Position Upon Consult: Supine  Activity Performed: Ambulated  Patient Position at End of Consult: Supine; All needs within reach

## 2024-03-25 NOTE — DISCHARGE SUMMARY
Discharge Summary - Abhishek Tom, 1955, [id number]        Admission Date: 3/22/2024  Discharge Date: 3/25/2024    Discharge Diagnosis:   1.  TIA, presumed embolic  2.  Atrial fibrillation with uncontrolled ventricular response  3.  History of alcohol abuse, now abstinent  4.  Medication noncompliance.  5.  Small left upper lobe pulmonary nodule  6.  Chronic kidney disease stage IIIa  7.  Hypertension  8.  COPD  9.  Hyperlipidemia    Consulting Physicians:  Dr. Lopez, neurology.    Procedures Performed:   None    HPI: The patient is a 68-year-old man who developed left frontal headache and lightheadedness.  He tried to send a message to his girlfriend but noted that the words that he was typing were not coming out correctly.  He said that he could think of the words that he wanted to say but his typing was incomprehensible.  EMS was summoned.  He had some difficulty walking.  Right-sided tremors were reported.  The patient has a past history of stroke and said the symptoms were somewhat similar.  He has a history of atrial fibrillation and has been on Eliquis but has not always remember to take this.  He was admitted for further care.    Hospital Course: The patient was admitted to the hospital and monitored carefully on telemetry.  He was noted to have atrial fibrillation with a rapid ventricular response.  His ventricular rate was controlled with beta-blockers.    The patient underwent further evaluation for stroke.  He had a CT and MRI neither of which showed any acute intracranial pathology.  He was evaluated by neurology.  It was thought most likely that the patient had a TIA related to his perfect compliance with Eliquis.  This was resumed and he had no further issues.    The patient has a history of alcohol abuse but has been abstinent for the past several months.  He was encouraged to maintain sobriety.    The patient was noted to have a 3 mm nodule in the left upper lobe of his lung.  Outpatient  follow-up for this will be needed.    The patient has a history of hypertension, chronic kidney disease stage III, COPD, and hyperlipidemia.  These remained stable during his hospitalization.    At the time of discharge the patient was feeling well.  Vital signs were stable.  Lungs were clear.  Cardiac exam revealed a regular rhythm.  The abdomen was soft and nontender.  There is no edema.  The patient was alert and oriented.  No focal sign was noted.    Disposition: The patient was discharged to home on March 25.  Home health assistance was arranged.  He was asked to follow a regular diet.  His activity will be as tolerated.  He was asked to arrange primary care follow-up within 1 week.    Discharge instructions/Information to patient and family:   See after visit summary for information provided to patient and family.      Provisions for Follow-Up Care:  See after visit summary for information related to follow-up care and any pertinent home health orders.      Planned Readmission: No    Discharge Statement   I spent 35 minutes discharging the patient. This time was spent on the day of discharge. I had direct contact with the patient on the day of discharge.     Discharge Medications:  See after visit summary for reconciled discharge medications provided to patient and family.

## 2024-03-25 NOTE — PLAN OF CARE
Problem: PAIN - ADULT  Goal: Verbalizes/displays adequate comfort level or baseline comfort level  Description: Interventions:  - Encourage patient to monitor pain and request assistance  - Assess pain using appropriate pain scale  - Administer analgesics based on type and severity of pain and evaluate response  - Implement non-pharmacological measures as appropriate and evaluate response  - Consider cultural and social influences on pain and pain management  - Notify physician/advanced practitioner if interventions unsuccessful or patient reports new pain  Outcome: Completed     Problem: SAFETY ADULT  Goal: Patient will remain free of falls  Description: INTERVENTIONS:  - Educate patient/family on patient safety including physical limitations  - Instruct patient to call for assistance with activity   - Consult OT/PT to assist with strengthening/mobility   - Keep Call bell within reach  - Keep bed low and locked with side rails adjusted as appropriate  - Keep care items and personal belongings within reach  - Initiate and maintain comfort rounds  - Make Fall Risk Sign visible to staff  - Offer Toileting every  Hours, in advance of need  - Initiate/Maintain alarm  - Obtain necessary fall risk management equipment:   - Apply yellow socks and bracelet for high fall risk patients  - Consider moving patient to room near nurses station  Outcome: Completed  Goal: Maintain or return to baseline ADL function  Description: INTERVENTIONS:  -  Assess patient's ability to carry out ADLs; assess patient's baseline for ADL function and identify physical deficits which impact ability to perform ADLs (bathing, care of mouth/teeth, toileting, grooming, dressing, etc.)  - Assess/evaluate cause of self-care deficits   - Assess range of motion  - Assess patient's mobility; develop plan if impaired  - Assess patient's need for assistive devices and provide as appropriate  - Encourage maximum independence but intervene and supervise  when necessary  - Involve family in performance of ADLs  - Assess for home care needs following discharge   - Consider OT consult to assist with ADL evaluation and planning for discharge  - Provide patient education as appropriate  Outcome: Completed  Goal: Maintains/Returns to pre admission functional level  Description: INTERVENTIONS:  - Perform AM-PAC 6 Click Basic Mobility/ Daily Activity assessment daily.  - Set and communicate daily mobility goal to care team and patient/family/caregiver.   - Collaborate with rehabilitation services on mobility goals if consulted  - Perform Range of Motion  times a day.  - Reposition patient every  hours.  - Dangle patient  times a day  - Stand patient  times a day  - Ambulate patient  times a day  - Out of bed to chair  times a day   - Out of bed for meals times a day  - Out of bed for toileting  - Record patient progress and toleration of activity level   Outcome: Completed     Problem: DISCHARGE PLANNING  Goal: Discharge to home or other facility with appropriate resources  Description: INTERVENTIONS:  - Identify barriers to discharge w/patient and caregiver  - Arrange for needed discharge resources and transportation as appropriate  - Identify discharge learning needs (meds, wound care, etc.)  - Arrange for interpretive services to assist at discharge as needed  - Refer to Case Management Department for coordinating discharge planning if the patient needs post-hospital services based on physician/advanced practitioner order or complex needs related to functional status, cognitive ability, or social support system  Outcome: Completed     Problem: Knowledge Deficit  Goal: Patient/family/caregiver demonstrates understanding of disease process, treatment plan, medications, and discharge instructions  Description: Complete learning assessment and assess knowledge base.  Interventions:  - Provide teaching at level of understanding  - Provide teaching via preferred learning  methods  Outcome: Completed     Problem: CARDIOVASCULAR - ADULT  Goal: Maintains optimal cardiac output and hemodynamic stability  Description: INTERVENTIONS:  - Monitor I/O, vital signs and rhythm  - Monitor for S/S and trends of decreased cardiac output  - Administer and titrate ordered vasoactive medications to optimize hemodynamic stability  - Assess quality of pulses, skin color and temperature  - Assess for signs of decreased coronary artery perfusion  - Instruct patient to report change in severity of symptoms  Outcome: Completed  Goal: Absence of cardiac dysrhythmias or at baseline rhythm  Description: INTERVENTIONS:  - Continuous cardiac monitoring, vital signs, obtain 12 lead EKG if ordered  - Administer antiarrhythmic and heart rate control medications as ordered  - Monitor electrolytes and administer replacement therapy as ordered  Outcome: Completed     Problem: GASTROINTESTINAL - ADULT  Goal: Minimal or absence of nausea and/or vomiting  Description: INTERVENTIONS:  - Administer IV fluids if ordered to ensure adequate hydration  - Maintain NPO status until nausea and vomiting are resolved  - Nasogastric tube if ordered  - Administer ordered antiemetic medications as needed  - Provide nonpharmacologic comfort measures as appropriate  - Advance diet as tolerated, if ordered  - Consider nutrition services referral to assist patient with adequate nutrition and appropriate food choices  Outcome: Completed  Goal: Maintains or returns to baseline bowel function  Description: INTERVENTIONS:  - Assess bowel function  - Encourage oral fluids to ensure adequate hydration  - Administer IV fluids if ordered to ensure adequate hydration  - Administer ordered medications as needed  - Encourage mobilization and activity  - Consider nutritional services referral to assist patient with adequate nutrition and appropriate food choices  Outcome: Completed     Problem: HEMATOLOGIC - ADULT  Goal: Maintains hematologic  stability  Description: INTERVENTIONS  - Assess for signs and symptoms of bleeding or hemorrhage  - Monitor labs  - Administer supportive blood products/factors as ordered and appropriate  Outcome: Completed     Problem: MUSCULOSKELETAL - ADULT  Goal: Maintain proper alignment of affected body part  Description: INTERVENTIONS:  - Support, maintain and protect limb and body alignment  - Provide patient/ family with appropriate education  Outcome: Completed

## 2024-03-25 NOTE — CASE MANAGEMENT
Case Management Assessment & Discharge Planning Note    Patient name Abhishek Tom  Location [facility] 4 /E4 -* MRN [id number]  : 1955 Date 3/25/2024       Current Admission Date: 3/22/2024  Current Admission Diagnosis:Atrial fibrillation with rapid ventricular response ([facility])   Patient Active Problem List    Diagnosis Date Noted    Pulmonary nodule 2024    Long term (current) use of insulin ([facility]) 2024    Decreased pulses in feet 2024    Smoking 2022    Claustrophobia     Bilateral carotid artery stenosis 2022    Chronic calculous cholecystitis 2022    Delirium tremens ([facility]) 2022    Wernicke encephalopathy 2022    Alcohol use disorder, severe, dependence ([facility]) 2022    Atrial fibrillation with rapid ventricular response ([facility]) 2022    Hallucinations 2022    Memory changes 2022    Seizure ([facility]) 2022    Cholelithiasis 2022    Acute Cholecystitis 2022    Hypokalemia 2022    Hypomagnesemia 2022    PAF (paroxysmal atrial fibrillation) ([facility]) 2022    S/P carotid endarterectomy 11/10/2021    Ambulatory dysfunction 09/15/2021    Syncope and collapse 09/15/2021    PRITI (acute kidney injury) ([facility]) 2021    Pre-diabetes 2021    Stage 3a chronic kidney disease ([facility]) 2021    Hx of CVA (cerebral vascular accident) ([facility]) 2021    Stenosis of right internal carotid artery     Lumbar back pain     GERD (gastroesophageal reflux disease)     Left arm weakness 2021    Alcohol abuse 2021    HLD (hyperlipidemia) 2021    COPD (chronic obstructive pulmonary disease) ([facility]) 2021    Iron deficiency anemia 2021    Cerebrovascular accident (CVA) due to embolism of right middle cerebral artery ([facility]) 2021    Symptomatic carotid artery stenosis, right 2021    Benign essential hypertension 02/15/2011      LOS (days): 0  Geometric Mean LOS (GMLOS) (days):   Days to  GMLOS:     OBJECTIVE:              Current admission status: Observation       Preferred Pharmacy:   [id number] - [place] - [address]  [address] [place] [address]  Phone: [phone] Fax: [phone]    [facility] [place] - [address]  [address]  [place] [address]  Phone: [phone] Fax: [phone]    [id number] - [place] - [address]  [address] [place] [address] [place] [address]  Phone: [phone] Fax: [phone]    [id number]  [place] - [address]  [address]  [place] [address]  Phone: [phone] Fax: [phone]    Primary Care Provider: Laith Balderas DO    Primary Insurance: [facility]  Secondary Insurance:     ASSESSMENT:  Active Health Care Proxies       Ana Tom Health Care Agent - Sister   Primary Phone: [phone] (Mobile)                 Advance Directives  Does patient have a Health Care POA?: No  Was patient offered paperwork?: Yes  Does patient have Advance Directives?: No  Was patient offered paperwork?: Yes  Primary Contact: Ana Tom (Sister)  [phone] (Mobile)         Readmission Root Cause  30 Day Readmission: No    Patient Information  Admitted from:: Home  Mental Status: Alert  During Assessment patient was accompanied by: Not accompanied during assessment  Assessment information provided by:: Patient  Primary Caregiver: Self  Support Systems: Self, Other (Comment) (Patient's sister, Ana)  County of Residence: [place]  What city do you live in?: [place]  Home entry access options. Select all that apply.: No steps to enter home  Type of Current Residence: Trailer Home (Mobile Home)  Living Arrangements: Lives Alone    Activities of Daily Living Prior to Admission  Functional Status: Independent  Completes ADLs independently?: Yes  Ambulates independently?: Yes  Does patient use assisted devices?:  Yes  Assisted Devices (DME) used: Straight Cane  Does patient currently own DME?: Yes  What DME does the patient currently own?: Straight Cane  Does patient have a history of Outpatient Therapy (PT/OT)?: No  Does the patient have a history of Short-Term Rehab?: No  Does patient have a history of HHC?: No  Does patient currently have HHC?: No      Patient Information Continued  Income Source: SSI/SSD  Does patient receive dialysis treatments?: No  Does patient have a history of substance abuse?: No  Does patient have a history of Mental Health Diagnosis?: Yes (Anxiety)  Is patient receiving treatment for mental health?: Yes  Has patient received inpatient treatment related to mental health in the last 2 years?: No    Means of Transportation  Means of Transport to Appts:: Drives Self      Social Determinants of Health (SDOH)      Flowsheet Row Most Recent Value   Housing Stability    In the last 12 months, was there a time when you were not able to pay the mortgage or rent on time? N   In the last 12 months, how many places have you lived? 1   In the last 12 months, was there a time when you did not have a steady place to sleep or slept in a shelter (including now)? N   Transportation Needs    In the past 12 months, has lack of transportation kept you from medical appointments or from getting medications? no   Food Insecurity    Within the past 12 months, you worried that your food would run out before you got the money to buy more. Never true   Within the past 12 months, the food you bought just didn't last and you didn't have money to get more. Never true   Utilities    In the past 12 months has the electric, gas, oil, or water company threatened to shut off services in your home? No            DISCHARGE DETAILS:    Discharge planning discussed with:: Patient  Freedom of Choice: Yes  Comments - Freedom of Choice: Patient chose HHC ([facility])  CM contacted family/caregiver?: No- see comments (Patient IPTA; patient alert  and oriented)  Were Treatment Team discharge recommendations reviewed with patient/caregiver?: Yes  Did patient/caregiver verbalize understanding of patient care needs?: Yes  Were patient/caregiver advised of the risks associated with not following Treatment Team discharge recommendations?: Yes    Contacts  Patient Contacts: Ana Tom  Relationship to Patient:: Family  Contact Method: Phone  Phone Number: [phone]  Reason/Outcome: Discharge Planning, Referral    Requested Home Health Care         Is the patient interested in HHC at discharge?: Yes  Home Health Discipline requested:: Nursing, Medical Social Work  Home Health Agency Name:: [facility] Follow-Up Provider:: SHANNEN  Home Health Services Needed:: Other (comment) (Medication management, BP checks-- Medical SOWK: HHA application)  Homebound Criteria Met:: Requires the Assistance of Another Person for Safe Ambulation or to Leave the Home  Supporting Clincal Findings:: Limited Endurance, Fatigues Easliy in Short Distances    Other Referral/Resources/Interventions Provided:  Interventions: HHC  Programs:: COPD    Treatment Team Recommendation: Home with Home Health Care  Discharge Destination Plan:: Home with Home Health Care  Transport at Discharge : Family    IMM Given (Date):: 03/25/24        Additional Comments: CM met with patient to complete assessment; patient is alert and oriented. Patient denies having a POA or LW; CM offered paperwork- patient accepted. CM provided at bedside. Patient stated his sister-Ana assists as needed. Patient denies use of dialysis or oxygen. Patient stated he has a cane for DME and utilizes for long distances. Patient lives in a mobile home, alone. Patient confirmed he utilizes Social Security. CM asked patient's SDOH, patient stated he has difficulty with obtaining food resources. Patient stated he is set with Meals on Wheels program and has gotten his EBT food stamp program reinstated. Patient  confirmed he is diagnosed with anxiety and struggles with panic attack; patient unsure if he takes medication for anxiety at this time. Patient confirmed he follows with his PCP. Patient denies D+A.  Patient stated he changed his insurance and is in the process of applying for HHAs waiver program. Patient confirms he has a car and drives himself. Patient denies history of HHC or STR. CM discussed and provided choice list HHC ([facility], [facility]) patient agreeable to [facility].          Karen Harrington,

## 2024-03-26 ENCOUNTER — HOME CARE VISIT (OUTPATIENT)
Dept: HOME HEALTH SERVICES | Facility: HOME HEALTHCARE | Age: 69
End: 2024-03-26
Payer: COMMERCIAL

## 2024-03-26 VITALS
SYSTOLIC BLOOD PRESSURE: 148 MMHG | RESPIRATION RATE: 20 BRPM | OXYGEN SATURATION: 99 % | TEMPERATURE: 96.6 F | DIASTOLIC BLOOD PRESSURE: 78 MMHG | HEART RATE: 60 BPM

## 2024-03-26 PROCEDURE — G0299 HHS/HOSPICE OF RN EA 15 MIN: HCPCS

## 2024-03-26 PROCEDURE — 400013 VN SOC

## 2024-03-29 ENCOUNTER — HOME CARE VISIT (OUTPATIENT)
Dept: HOME HEALTH SERVICES | Facility: HOME HEALTHCARE | Age: 69
End: 2024-03-29
Payer: COMMERCIAL

## 2024-03-29 VITALS
RESPIRATION RATE: 19 BRPM | SYSTOLIC BLOOD PRESSURE: 150 MMHG | HEART RATE: 75 BPM | DIASTOLIC BLOOD PRESSURE: 68 MMHG | OXYGEN SATURATION: 100 % | TEMPERATURE: 97.1 F

## 2024-03-29 PROCEDURE — G0299 HHS/HOSPICE OF RN EA 15 MIN: HCPCS

## 2024-04-02 ENCOUNTER — HOME CARE VISIT (OUTPATIENT)
Dept: HOME HEALTH SERVICES | Facility: HOME HEALTHCARE | Age: 69
End: 2024-04-02
Payer: COMMERCIAL

## 2024-04-02 VITALS
OXYGEN SATURATION: 98 % | TEMPERATURE: 97.1 F | RESPIRATION RATE: 18 BRPM | HEART RATE: 82 BPM | SYSTOLIC BLOOD PRESSURE: 162 MMHG | DIASTOLIC BLOOD PRESSURE: 88 MMHG

## 2024-04-02 PROCEDURE — G0299 HHS/HOSPICE OF RN EA 15 MIN: HCPCS

## 2024-04-02 PROCEDURE — G0180 MD CERTIFICATION HHA PATIENT: HCPCS | Performed by: HOSPITALIST

## 2024-04-03 ENCOUNTER — HOME CARE VISIT (OUTPATIENT)
Dept: HOME HEALTH SERVICES | Facility: HOME HEALTHCARE | Age: 69
End: 2024-04-03
Payer: COMMERCIAL

## 2024-04-03 PROCEDURE — G0155 HHCP-SVS OF CSW,EA 15 MIN: HCPCS

## 2024-04-05 ENCOUNTER — HOME CARE VISIT (OUTPATIENT)
Dept: HOME HEALTH SERVICES | Facility: HOME HEALTHCARE | Age: 69
End: 2024-04-05
Payer: COMMERCIAL

## 2024-04-05 NOTE — CASE COMMUNICATION
TC to pt evening of 4.4.24 to schedule visit for today. No answer on the pt's phone and voicemail box full and unable to leave message. SN arrived to pt's home for visit today and knocked at the door several times and called pt while outside his door. No answer on telephone or at the door. Again voicemail box full. SN was able to hear the television on and movement inside the home. Sn waited in her car for approximately 10 minutes after cantu but no word from pt. TC to Dr. Balderas and spoke with Yandy to report missed visit today and pt was only seen by nursing once this week instead of twice. Next schedueled visit planned for 4.8.24

## 2024-04-08 ENCOUNTER — HOME CARE VISIT (OUTPATIENT)
Dept: HOME HEALTH SERVICES | Facility: HOME HEALTHCARE | Age: 69
End: 2024-04-08
Payer: COMMERCIAL

## 2024-04-08 PROCEDURE — G0299 HHS/HOSPICE OF RN EA 15 MIN: HCPCS

## 2024-04-09 VITALS
TEMPERATURE: 97.1 F | HEART RATE: 88 BPM | OXYGEN SATURATION: 99 % | RESPIRATION RATE: 20 BRPM | DIASTOLIC BLOOD PRESSURE: 78 MMHG | SYSTOLIC BLOOD PRESSURE: 152 MMHG

## 2024-04-11 ENCOUNTER — HOME CARE VISIT (OUTPATIENT)
Dept: HOME HEALTH SERVICES | Facility: HOME HEALTHCARE | Age: 69
End: 2024-04-11
Payer: COMMERCIAL

## 2024-04-11 ENCOUNTER — TELEPHONE (OUTPATIENT)
Dept: HOME HEALTH SERVICES | Facility: HOME HEALTHCARE | Age: 69
End: 2024-04-11

## 2024-04-16 ENCOUNTER — HOME CARE VISIT (OUTPATIENT)
Dept: HOME HEALTH SERVICES | Facility: HOME HEALTHCARE | Age: 69
End: 2024-04-16
Payer: COMMERCIAL

## 2024-04-16 PROCEDURE — G0299 HHS/HOSPICE OF RN EA 15 MIN: HCPCS

## 2024-04-17 VITALS
RESPIRATION RATE: 20 BRPM | TEMPERATURE: 97.6 F | HEART RATE: 88 BPM | OXYGEN SATURATION: 98 % | DIASTOLIC BLOOD PRESSURE: 88 MMHG | SYSTOLIC BLOOD PRESSURE: 150 MMHG

## 2024-04-18 ENCOUNTER — HOME CARE VISIT (OUTPATIENT)
Dept: HOME HEALTH SERVICES | Facility: HOME HEALTHCARE | Age: 69
End: 2024-04-18
Payer: COMMERCIAL

## 2024-04-18 VITALS — RESPIRATION RATE: 16 BRPM

## 2024-04-18 PROCEDURE — G0299 HHS/HOSPICE OF RN EA 15 MIN: HCPCS

## 2024-04-19 ENCOUNTER — TELEPHONE (OUTPATIENT)
Dept: NEUROLOGY | Facility: CLINIC | Age: 69
End: 2024-04-19

## 2024-04-19 NOTE — TELEPHONE ENCOUNTER
Hello,     Can you please advise which Speciality/Team and if patient can be seen by an Resident, AP and or Attending  only?    Thank you for your time,     Candy              HFU/ JAZLYN / Transient period of expressive aphasia without stroke     DC- 3/25/2024- HOME    This patient has not been seen in our office in 3 years despite him having 2 hospital admissions in 2022 for strokelike symptoms.  He needs to call our office and see any of our general or vascular neurology teams at any of our locations.  His meds now include his Eliquis as well as low-dose aspirin.

## 2024-05-24 ENCOUNTER — TELEPHONE (OUTPATIENT)
Age: 69
End: 2024-05-24

## 2024-05-24 NOTE — TELEPHONE ENCOUNTER
Received call from pt's sister to schedule OV. Sister stated she would inform the pt of this OV and either have him call back to schedule or she would call back to schedule. Sister stated the pt has been having issues with his phone. Please confirm best CB# and schedule the pt for RR DM 3/21/24. L/s 2/12/24 JAM

## 2024-05-24 NOTE — TELEPHONE ENCOUNTER
There is a result note for study performed on 3/21/24 PT is due for an OV with Tammy after carotid scheduled for 6/7/24.   no

## 2024-06-11 PROBLEM — N17.9 AKI (ACUTE KIDNEY INJURY) (HCC): Status: RESOLVED | Noted: 2021-09-05 | Resolved: 2024-06-11

## 2024-06-24 ENCOUNTER — APPOINTMENT (INPATIENT)
Dept: NON INVASIVE DIAGNOSTICS | Facility: HOSPITAL | Age: 69
DRG: 068 | End: 2024-06-24
Payer: COMMERCIAL

## 2024-06-24 ENCOUNTER — APPOINTMENT (EMERGENCY)
Dept: CT IMAGING | Facility: HOSPITAL | Age: 69
DRG: 068 | End: 2024-06-24
Payer: COMMERCIAL

## 2024-06-24 ENCOUNTER — APPOINTMENT (INPATIENT)
Dept: MRI IMAGING | Facility: HOSPITAL | Age: 69
DRG: 068 | End: 2024-06-24
Payer: COMMERCIAL

## 2024-06-24 ENCOUNTER — HOSPITAL ENCOUNTER (INPATIENT)
Facility: HOSPITAL | Age: 69
LOS: 1 days | Discharge: HOME | DRG: 068 | End: 2024-06-25
Attending: EMERGENCY MEDICINE | Admitting: STUDENT IN AN ORGANIZED HEALTH CARE EDUCATION/TRAINING PROGRAM
Payer: COMMERCIAL

## 2024-06-24 ENCOUNTER — APPOINTMENT (INPATIENT)
Dept: CT IMAGING | Facility: HOSPITAL | Age: 69
DRG: 068 | End: 2024-06-24
Payer: COMMERCIAL

## 2024-06-24 DIAGNOSIS — R29.90 STROKE-LIKE EPISODE: Primary | ICD-10-CM

## 2024-06-24 DIAGNOSIS — I48.91 ATRIAL FIBRILLATION WITH RVR (HCC): ICD-10-CM

## 2024-06-24 PROBLEM — R47.01 EXPRESSIVE APHASIA: Status: ACTIVE | Noted: 2024-06-24

## 2024-06-24 PROBLEM — N18.9 ACUTE-ON-CHRONIC KIDNEY INJURY  (HCC): Status: ACTIVE | Noted: 2021-09-05

## 2024-06-24 LAB
2HR DELTA HS TROPONIN: -5 NG/L
ALBUMIN SERPL BCG-MCNC: 4.4 G/DL (ref 3.5–5)
ALP SERPL-CCNC: 78 U/L (ref 34–104)
ALT SERPL W P-5'-P-CCNC: 21 U/L (ref 7–52)
ANION GAP SERPL CALCULATED.3IONS-SCNC: 18 MMOL/L (ref 4–13)
AORTIC ROOT: 3.44 CM
APTT PPP: 42 SECONDS (ref 23–37)
AST SERPL W P-5'-P-CCNC: 46 U/L (ref 13–39)
ATRIAL RATE: 125 BPM
ATRIAL RATE: 227 BPM
BACTERIA UR QL AUTO: ABNORMAL /HPF
BASOPHILS # BLD AUTO: 0.04 THOUSANDS/ÂΜL (ref 0–0.1)
BASOPHILS NFR BLD AUTO: 0 % (ref 0–1)
BILIRUB SERPL-MCNC: 0.64 MG/DL (ref 0.2–1)
BILIRUB UR QL STRIP: NEGATIVE
BSA FOR ECHO PROCEDURE: 1.79 M2
BUN SERPL-MCNC: 36 MG/DL (ref 5–25)
CALCIUM SERPL-MCNC: 10 MG/DL (ref 8.4–10.2)
CARDIAC TROPONIN I PNL SERPL HS: 31 NG/L
CARDIAC TROPONIN I PNL SERPL HS: 36 NG/L
CHLORIDE SERPL-SCNC: 100 MMOL/L (ref 96–108)
CLARITY UR: CLEAR
CO2 SERPL-SCNC: 23 MMOL/L (ref 21–32)
COLOR UR: ABNORMAL
CREAT SERPL-MCNC: 2.17 MG/DL (ref 0.6–1.3)
EOSINOPHIL # BLD AUTO: 0.02 THOUSAND/ÂΜL (ref 0–0.61)
EOSINOPHIL NFR BLD AUTO: 0 % (ref 0–6)
ERYTHROCYTE [DISTWIDTH] IN BLOOD BY AUTOMATED COUNT: 14.6 % (ref 11.6–15.1)
FLUAV RNA RESP QL NAA+PROBE: NEGATIVE
FLUBV RNA RESP QL NAA+PROBE: NEGATIVE
GFR SERPL CREATININE-BSD FRML MDRD: 30 ML/MIN/1.73SQ M
GLUCOSE SERPL-MCNC: 157 MG/DL (ref 65–140)
GLUCOSE SERPL-MCNC: 175 MG/DL (ref 65–140)
GLUCOSE UR STRIP-MCNC: NEGATIVE MG/DL
HCT VFR BLD AUTO: 53.8 % (ref 36.5–49.3)
HGB BLD-MCNC: 17.8 G/DL (ref 12–17)
HGB UR QL STRIP.AUTO: ABNORMAL
IMM GRANULOCYTES # BLD AUTO: 0.06 THOUSAND/UL (ref 0–0.2)
IMM GRANULOCYTES NFR BLD AUTO: 1 % (ref 0–2)
INR PPP: 1.2 (ref 0.84–1.19)
KETONES UR STRIP-MCNC: NEGATIVE MG/DL
LEUKOCYTE ESTERASE UR QL STRIP: NEGATIVE
LIPASE SERPL-CCNC: 27 U/L (ref 11–82)
LYMPHOCYTES # BLD AUTO: 1.8 THOUSANDS/ÂΜL (ref 0.6–4.47)
LYMPHOCYTES NFR BLD AUTO: 16 % (ref 14–44)
MCH RBC QN AUTO: 27.3 PG (ref 26.8–34.3)
MCHC RBC AUTO-ENTMCNC: 33.1 G/DL (ref 31.4–37.4)
MCV RBC AUTO: 82 FL (ref 82–98)
MONOCYTES # BLD AUTO: 1.14 THOUSAND/ÂΜL (ref 0.17–1.22)
MONOCYTES NFR BLD AUTO: 10 % (ref 4–12)
NEUTROPHILS # BLD AUTO: 8.24 THOUSANDS/ÂΜL (ref 1.85–7.62)
NEUTS SEG NFR BLD AUTO: 73 % (ref 43–75)
NITRITE UR QL STRIP: NEGATIVE
NON-SQ EPI CELLS URNS QL MICRO: ABNORMAL /HPF
NRBC BLD AUTO-RTO: 0 /100 WBCS
PH UR STRIP.AUTO: 6 [PH]
PLATELET # BLD AUTO: 209 THOUSANDS/UL (ref 149–390)
PMV BLD AUTO: 11.3 FL (ref 8.9–12.7)
POTASSIUM SERPL-SCNC: 4.3 MMOL/L (ref 3.5–5.3)
PROT SERPL-MCNC: 7.6 G/DL (ref 6.4–8.4)
PROT UR STRIP-MCNC: ABNORMAL MG/DL
PROTHROMBIN TIME: 15.4 SECONDS (ref 11.6–14.5)
QRS AXIS: 46 DEGREES
QRS AXIS: 68 DEGREES
QRSD INTERVAL: 76 MS
QRSD INTERVAL: 82 MS
QT INTERVAL: 326 MS
QT INTERVAL: 356 MS
QTC INTERVAL: 492 MS
QTC INTERVAL: 515 MS
RBC # BLD AUTO: 6.53 MILLION/UL (ref 3.88–5.62)
RBC #/AREA URNS AUTO: ABNORMAL /HPF
RSV RNA RESP QL NAA+PROBE: NEGATIVE
SARS-COV-2 RNA RESP QL NAA+PROBE: NEGATIVE
SL CV LV EF: 60
SODIUM SERPL-SCNC: 141 MMOL/L (ref 135–147)
SP GR UR STRIP.AUTO: >=1.05 (ref 1–1.03)
T WAVE AXIS: 264 DEGREES
T WAVE AXIS: 9 DEGREES
UROBILINOGEN UR STRIP-ACNC: <2 MG/DL
VENTRICULAR RATE: 115 BPM
VENTRICULAR RATE: 150 BPM
WBC # BLD AUTO: 11.3 THOUSAND/UL (ref 4.31–10.16)
WBC #/AREA URNS AUTO: ABNORMAL /HPF

## 2024-06-24 PROCEDURE — 99223 1ST HOSP IP/OBS HIGH 75: CPT | Performed by: STUDENT IN AN ORGANIZED HEALTH CARE EDUCATION/TRAINING PROGRAM

## 2024-06-24 PROCEDURE — 97163 PT EVAL HIGH COMPLEX 45 MIN: CPT

## 2024-06-24 PROCEDURE — 0241U HB NFCT DS VIR RESP RNA 4 TRGT: CPT | Performed by: EMERGENCY MEDICINE

## 2024-06-24 PROCEDURE — 93306 TTE W/DOPPLER COMPLETE: CPT

## 2024-06-24 PROCEDURE — 70450 CT HEAD/BRAIN W/O DYE: CPT

## 2024-06-24 PROCEDURE — 80053 COMPREHEN METABOLIC PANEL: CPT

## 2024-06-24 PROCEDURE — 93005 ELECTROCARDIOGRAM TRACING: CPT

## 2024-06-24 PROCEDURE — 85025 COMPLETE CBC W/AUTO DIFF WBC: CPT

## 2024-06-24 PROCEDURE — 82948 REAGENT STRIP/BLOOD GLUCOSE: CPT

## 2024-06-24 PROCEDURE — 70498 CT ANGIOGRAPHY NECK: CPT

## 2024-06-24 PROCEDURE — 85730 THROMBOPLASTIN TIME PARTIAL: CPT | Performed by: EMERGENCY MEDICINE

## 2024-06-24 PROCEDURE — 84484 ASSAY OF TROPONIN QUANT: CPT | Performed by: EMERGENCY MEDICINE

## 2024-06-24 PROCEDURE — 36415 COLL VENOUS BLD VENIPUNCTURE: CPT

## 2024-06-24 PROCEDURE — 93010 ELECTROCARDIOGRAM REPORT: CPT | Performed by: STUDENT IN AN ORGANIZED HEALTH CARE EDUCATION/TRAINING PROGRAM

## 2024-06-24 PROCEDURE — 99291 CRITICAL CARE FIRST HOUR: CPT | Performed by: EMERGENCY MEDICINE

## 2024-06-24 PROCEDURE — 81001 URINALYSIS AUTO W/SCOPE: CPT | Performed by: STUDENT IN AN ORGANIZED HEALTH CARE EDUCATION/TRAINING PROGRAM

## 2024-06-24 PROCEDURE — 96361 HYDRATE IV INFUSION ADD-ON: CPT

## 2024-06-24 PROCEDURE — 97166 OT EVAL MOD COMPLEX 45 MIN: CPT

## 2024-06-24 PROCEDURE — 99222 1ST HOSP IP/OBS MODERATE 55: CPT | Performed by: PSYCHIATRY & NEUROLOGY

## 2024-06-24 PROCEDURE — 70551 MRI BRAIN STEM W/O DYE: CPT

## 2024-06-24 PROCEDURE — 70496 CT ANGIOGRAPHY HEAD: CPT

## 2024-06-24 PROCEDURE — 96374 THER/PROPH/DIAG INJ IV PUSH: CPT

## 2024-06-24 PROCEDURE — 92610 EVALUATE SWALLOWING FUNCTION: CPT

## 2024-06-24 PROCEDURE — 85610 PROTHROMBIN TIME: CPT | Performed by: EMERGENCY MEDICINE

## 2024-06-24 PROCEDURE — 99285 EMERGENCY DEPT VISIT HI MDM: CPT

## 2024-06-24 PROCEDURE — 97530 THERAPEUTIC ACTIVITIES: CPT

## 2024-06-24 PROCEDURE — 83690 ASSAY OF LIPASE: CPT

## 2024-06-24 RX ORDER — DONEPEZIL HYDROCHLORIDE 5 MG/1
5 TABLET, FILM COATED ORAL EVERY EVENING
Status: DISCONTINUED | OUTPATIENT
Start: 2024-06-24 | End: 2024-06-25 | Stop reason: HOSPADM

## 2024-06-24 RX ORDER — METOPROLOL TARTRATE 1 MG/ML
5 INJECTION, SOLUTION INTRAVENOUS ONCE
Status: COMPLETED | OUTPATIENT
Start: 2024-06-24 | End: 2024-06-24

## 2024-06-24 RX ORDER — ACETAMINOPHEN 325 MG/1
650 TABLET ORAL EVERY 6 HOURS PRN
Status: DISCONTINUED | OUTPATIENT
Start: 2024-06-24 | End: 2024-06-25 | Stop reason: HOSPADM

## 2024-06-24 RX ORDER — BUPROPION HYDROCHLORIDE 150 MG/1
150 TABLET ORAL DAILY
Status: DISCONTINUED | OUTPATIENT
Start: 2024-06-24 | End: 2024-06-25 | Stop reason: HOSPADM

## 2024-06-24 RX ORDER — CLOPIDOGREL BISULFATE 75 MG/1
75 TABLET ORAL DAILY
Status: DISCONTINUED | OUTPATIENT
Start: 2024-06-25 | End: 2024-06-25 | Stop reason: HOSPADM

## 2024-06-24 RX ORDER — ASPIRIN 325 MG
325 TABLET ORAL DAILY
Status: DISCONTINUED | OUTPATIENT
Start: 2024-06-24 | End: 2024-06-24

## 2024-06-24 RX ORDER — ATORVASTATIN CALCIUM 40 MG/1
40 TABLET, FILM COATED ORAL EVERY EVENING
Status: DISCONTINUED | OUTPATIENT
Start: 2024-06-24 | End: 2024-06-25 | Stop reason: HOSPADM

## 2024-06-24 RX ORDER — PANTOPRAZOLE SODIUM 20 MG/1
20 TABLET, DELAYED RELEASE ORAL
Status: DISCONTINUED | OUTPATIENT
Start: 2024-06-24 | End: 2024-06-25 | Stop reason: HOSPADM

## 2024-06-24 RX ORDER — SERTRALINE HYDROCHLORIDE 100 MG/1
100 TABLET, FILM COATED ORAL DAILY
Status: DISCONTINUED | OUTPATIENT
Start: 2024-06-24 | End: 2024-06-25 | Stop reason: HOSPADM

## 2024-06-24 RX ORDER — HEPARIN SODIUM 5000 [USP'U]/ML
5000 INJECTION, SOLUTION INTRAVENOUS; SUBCUTANEOUS EVERY 8 HOURS SCHEDULED
Status: DISCONTINUED | OUTPATIENT
Start: 2024-06-24 | End: 2024-06-24

## 2024-06-24 RX ORDER — LORAZEPAM 2 MG/ML
1 INJECTION INTRAMUSCULAR ONCE
Status: COMPLETED | OUTPATIENT
Start: 2024-06-24 | End: 2024-06-24

## 2024-06-24 RX ORDER — VERAPAMIL HYDROCHLORIDE 2.5 MG/ML
1 INJECTION INTRAVENOUS ONCE
Status: COMPLETED | OUTPATIENT
Start: 2024-06-24 | End: 2024-06-24

## 2024-06-24 RX ORDER — ALBUTEROL SULFATE 90 UG/1
2 AEROSOL, METERED RESPIRATORY (INHALATION) EVERY 4 HOURS PRN
Status: DISCONTINUED | OUTPATIENT
Start: 2024-06-24 | End: 2024-06-25 | Stop reason: HOSPADM

## 2024-06-24 RX ORDER — LABETALOL HYDROCHLORIDE 5 MG/ML
10 INJECTION, SOLUTION INTRAVENOUS EVERY 6 HOURS PRN
Status: DISCONTINUED | OUTPATIENT
Start: 2024-06-24 | End: 2024-06-25 | Stop reason: HOSPADM

## 2024-06-24 RX ORDER — SODIUM CHLORIDE, SODIUM GLUCONATE, SODIUM ACETATE, POTASSIUM CHLORIDE, MAGNESIUM CHLORIDE, SODIUM PHOSPHATE, DIBASIC, AND POTASSIUM PHOSPHATE .53; .5; .37; .037; .03; .012; .00082 G/100ML; G/100ML; G/100ML; G/100ML; G/100ML; G/100ML; G/100ML
100 INJECTION, SOLUTION INTRAVENOUS CONTINUOUS
Status: DISCONTINUED | OUTPATIENT
Start: 2024-06-24 | End: 2024-06-25

## 2024-06-24 RX ORDER — CLOPIDOGREL BISULFATE 75 MG/1
300 TABLET ORAL ONCE
Status: COMPLETED | OUTPATIENT
Start: 2024-06-24 | End: 2024-06-24

## 2024-06-24 RX ADMIN — SODIUM CHLORIDE 1000 ML: 0.9 INJECTION, SOLUTION INTRAVENOUS at 03:00

## 2024-06-24 RX ADMIN — UMECLIDINIUM BROMIDE AND VILANTEROL TRIFENATATE 1 PUFF: 62.5; 25 POWDER RESPIRATORY (INHALATION) at 08:23

## 2024-06-24 RX ADMIN — DONEPEZIL HYDROCHLORIDE 5 MG: 5 TABLET ORAL at 16:39

## 2024-06-24 RX ADMIN — APIXABAN 5 MG: 5 TABLET, FILM COATED ORAL at 16:39

## 2024-06-24 RX ADMIN — ASPIRIN 325 MG ORAL TABLET 325 MG: 325 PILL ORAL at 04:29

## 2024-06-24 RX ADMIN — CLOPIDOGREL BISULFATE 300 MG: 75 TABLET ORAL at 04:29

## 2024-06-24 RX ADMIN — PANTOPRAZOLE SODIUM 20 MG: 20 TABLET, DELAYED RELEASE ORAL at 06:15

## 2024-06-24 RX ADMIN — ACETAMINOPHEN 650 MG: 325 TABLET, FILM COATED ORAL at 21:00

## 2024-06-24 RX ADMIN — BUSPIRONE HYDROCHLORIDE 15 MG: 10 TABLET ORAL at 08:21

## 2024-06-24 RX ADMIN — BUSPIRONE HYDROCHLORIDE 15 MG: 10 TABLET ORAL at 16:39

## 2024-06-24 RX ADMIN — LORAZEPAM 1 MG: 2 INJECTION INTRAMUSCULAR; INTRAVENOUS at 17:31

## 2024-06-24 RX ADMIN — ATORVASTATIN CALCIUM 40 MG: 40 TABLET, FILM COATED ORAL at 16:39

## 2024-06-24 RX ADMIN — BUPROPION HYDROCHLORIDE 150 MG: 150 TABLET, EXTENDED RELEASE ORAL at 08:22

## 2024-06-24 RX ADMIN — SERTRALINE 100 MG: 100 TABLET, FILM COATED ORAL at 08:22

## 2024-06-24 RX ADMIN — METOROPROLOL TARTRATE 5 MG: 5 INJECTION, SOLUTION INTRAVENOUS at 03:02

## 2024-06-24 RX ADMIN — IOHEXOL 85 ML: 350 INJECTION, SOLUTION INTRAVENOUS at 02:59

## 2024-06-24 RX ADMIN — SODIUM CHLORIDE, SODIUM GLUCONATE, SODIUM ACETATE, POTASSIUM CHLORIDE, MAGNESIUM CHLORIDE, SODIUM PHOSPHATE, DIBASIC, AND POTASSIUM PHOSPHATE 100 ML/HR: .53; .5; .37; .037; .03; .012; .00082 INJECTION, SOLUTION INTRAVENOUS at 16:41

## 2024-06-24 RX ADMIN — SODIUM CHLORIDE, SODIUM GLUCONATE, SODIUM ACETATE, POTASSIUM CHLORIDE, MAGNESIUM CHLORIDE, SODIUM PHOSPHATE, DIBASIC, AND POTASSIUM PHOSPHATE 100 ML/HR: .53; .5; .37; .037; .03; .012; .00082 INJECTION, SOLUTION INTRAVENOUS at 05:51

## 2024-06-24 NOTE — ASSESSMENT & PLAN NOTE
S/p right CEA in 2021   Ongoing close outpt follow up with vascular surgery   Continue eliquis, plavix, statin

## 2024-06-24 NOTE — ASSESSMENT & PLAN NOTE
Initially presented with dizziness/lightheadedness.  In the ED patient was found to be in A-fib with RVR.  Patient then developed expressive aphasia around 2:45 AM for which stroke alert was called.  CT head with no acute abnormality  CTA no new LVO  Not a TNK candidate given use of Eliquis, not a thrombectomy candidate given no new LVO  Neurology recommends stroke pathway  SBP goal 160-120 for permissive hypertension  Loaded with aspirin and Plavix  Plan for repeat CT head in 6 hours if MRI is not available.  If no new large stroke, will plan to resume Eliquis and continue Plavix  Start atorvastatin 40 mg daily  Lipid panel and A1c ordered  MRI brain  TTE  PT/OT/ST

## 2024-06-24 NOTE — PLAN OF CARE
Problem: Potential for Falls  Goal: Patient will remain free of falls  Description: INTERVENTIONS:  - Educate patient/family on patient safety including physical limitations  - Instruct patient to call for assistance with activity   - Consult OT/PT to assist with strengthening/mobility   - Keep Call bell within reach  - Keep bed low and locked with side rails adjusted as appropriate  - Keep care items and personal belongings within reach  - Initiate and maintain comfort rounds  - Make Fall Risk Sign visible to staff  - Offer Toileting every 2 Hours, in advance of need  - Initiate/Maintain bed alarm  - Obtain necessary fall risk management equipment: alarm   - Apply yellow socks and bracelet for high fall risk patients  - Consider moving patient to room near nurses station  Outcome: Progressing     Problem: PAIN - ADULT  Goal: Verbalizes/displays adequate comfort level or baseline comfort level  Description: Interventions:  - Encourage patient to monitor pain and request assistance  - Assess pain using appropriate pain scale  - Administer analgesics based on type and severity of pain and evaluate response  - Implement non-pharmacological measures as appropriate and evaluate response  - Consider cultural and social influences on pain and pain management  - Notify physician/advanced practitioner if interventions unsuccessful or patient reports new pain  Outcome: Progressing     Problem: DISCHARGE PLANNING  Goal: Discharge to home or other facility with appropriate resources  Description: INTERVENTIONS:  - Identify barriers to discharge w/patient and caregiver  - Arrange for needed discharge resources and transportation as appropriate  - Identify discharge learning needs (meds, wound care, etc.)  - Arrange for interpretive services to assist at discharge as needed  - Refer to Case Management Department for coordinating discharge planning if the patient needs post-hospital services based on physician/advanced  practitioner order or complex needs related to functional status, cognitive ability, or social support system  Outcome: Progressing     Problem: Knowledge Deficit  Goal: Patient/family/caregiver demonstrates understanding of disease process, treatment plan, medications, and discharge instructions  Description: Complete learning assessment and assess knowledge base.  Interventions:  - Provide teaching at level of understanding  - Provide teaching via preferred learning methods  Outcome: Progressing     Problem: CARDIOVASCULAR - ADULT  Goal: Maintains optimal cardiac output and hemodynamic stability  Description: INTERVENTIONS:  - Monitor I/O, vital signs and rhythm  - Monitor for S/S and trends of decreased cardiac output  - Administer and titrate ordered vasoactive medications to optimize hemodynamic stability  - Assess quality of pulses, skin color and temperature  - Assess for signs of decreased coronary artery perfusion  - Instruct patient to report change in severity of symptoms  Outcome: Progressing  Goal: Absence of cardiac dysrhythmias or at baseline rhythm  Description: INTERVENTIONS:  - Continuous cardiac monitoring, vital signs, obtain 12 lead EKG if ordered  - Administer antiarrhythmic and heart rate control medications as ordered  - Monitor electrolytes and administer replacement therapy as ordered  Outcome: Progressing     Problem: METABOLIC, FLUID AND ELECTROLYTES - ADULT  Goal: Fluid balance maintained  Description: INTERVENTIONS:  - Monitor labs   - Monitor I/O and WT  - Instruct patient on fluid and nutrition as appropriate  - Assess for signs & symptoms of volume excess or deficit  Outcome: Progressing     Problem: Neurological Deficit  Goal: Neurological status is stable or improving  Description: Interventions:  - Monitor and assess patient's level of consciousness, motor function, sensory function, and level of assistance needed for ADLs.   - Monitor and report changes from baseline. Collaborate  with interdisciplinary team to initiate plan and implement interventions as ordered.   - Provide and maintain a safe environment.  - Consider seizure precautions.  - Consider fall precautions.  - Consider aspiration precautions.  - Consider bleeding precautions.  Outcome: Progressing     Problem: Communication Impairment  Goal: Ability to express needs and understand communication  Description: Assess patient's communication skills and ability to understand information.  Patient will demonstrate use of effective communication techniques, alternative methods of communication and understanding even if not able to speak.     - Encourage communication and provide alternate methods of communication as needed.  - Collaborate with case management/ for discharge needs.  - Include patient/family/caregiver in decisions related to communication.  Outcome: Progressing

## 2024-06-24 NOTE — H&P
[facility]  H&P  Name: Abhishek Tom 68 y.o. male I MRN: [id number]  Unit/Bed#: ED-27 I Date of Admission: 6/24/2024   Date of Service: 6/24/2024 I Hospital Day: 0      Assessment & Plan   * Stroke-like symptoms  Assessment & Plan  Initially presented with dizziness/lightheadedness.  In the ED patient was found to be in A-fib with RVR.  Patient then developed expressive aphasia around 2:45 AM for which stroke alert was called.  CT head with no acute abnormality  CTA no new LVO  Not a TNK candidate given use of Eliquis, not a thrombectomy candidate given no new LVO  Neurology recommends stroke pathway  SBP goal 160-120 for permissive hypertension  Loaded with aspirin and Plavix  Plan for repeat CT head in 6 hours if MRI is not available.  If no new large stroke, will plan to resume Eliquis and continue Plavix  Start atorvastatin 40 mg daily  Lipid panel and A1c ordered  MRI brain  TTE  PT/OT/ST    Atrial fibrillation with rapid ventricular response (HCC)  Assessment & Plan  Presents with atrial fibrillation with RVR on admission  Received IV Lopressor in the ED with improved heart rates  Eliquis currently held, resume if repeat CT head/MRI around 9 AM shows no signs of bleeding    Acute-on-chronic kidney injury  (HCC)  Assessment & Plan  Lab Results   Component Value Date    EGFR 30 06/24/2024    EGFR 55 03/25/2024    EGFR 51 03/23/2024    CREATININE 2.17 (H) 06/24/2024    CREATININE 1.31 (H) 03/25/2024    CREATININE 1.41 (H) 03/23/2024     Baseline creatinine between 1.1-1.4  Presented with creatinine of 2.17  Possibly prerenal etiology  Holding Lotrel  IVF  Follow-up UA  Monitor with BMP    Benign essential hypertension  Assessment & Plan  Permissive hypertension given stroke pathway  Hold Lotrel    COPD (chronic obstructive pulmonary disease) (HCC)  Assessment & Plan  No acute exacerbation  Continue home inhalers    Alcohol abuse  Assessment & Plan  History of alcohol abuse  Reports no  drink in the last several months  Supportive care and monitor for any withdrawal symptoms         VTE Pharmacologic Prophylaxis:   Moderate Risk (Score 3-4) - Pharmacological DVT Prophylaxis Contraindicated. Sequential Compression Devices Ordered.  Maintained on Eliquis prior to arrival.  Code Status: Level 1 - Full Code   Discussion with family: Patient declined call to .     Anticipated Length of Stay: Patient will be admitted on an inpatient basis with an anticipated length of stay of greater than 2 midnights secondary to strokelike symptoms, awaiting MRI for evaluation.    Total Time Spent on Date of Encounter in care of patient: 65 mins. This time was spent on one or more of the following: performing physical exam; counseling and coordination of care; obtaining or reviewing history; documenting in the medical record; reviewing/ordering tests, medications or procedures; communicating with other healthcare professionals and discussing with patient's family/caregivers.    Chief Complaint: Dizziness    History of Present Illness:  Abhishek Tom is a 68 y.o. male with a PMH of prior strokes, atrial fibrillation on Eliquis, CKD, hypertension, COPD, prior alcohol use history who presents with dizziness when standing for the past week.  He fell onto the couch today prompting him to call EMS.  EMS noted that patient was in rapid A-fib with RVR.  Upon presentation to the hospital, patient was oriented x 4 and able to communicate without any neurological deficits.  Upon initial evaluation with nursing staff and resident patient was without any neurological deficits however upon attending evaluation, patient was noted to have severe expressive aphasia for which stroke alert was called.  Vital signs significant for -160s on presentation, blood pressure 200s systolic.  Labs significant for WBC 1.3, creatinine 2.17 elevated from baseline, troponin 36, COVID/flu/RSV negative.  CT head unremarkable.  CTA  without any new LVO.  Neurology recommends admission for stroke pathway.    Patient unable to recall what medications he takes and states that he takes everything that is prescribed and discharge.    Review of Systems:  Review of Systems   Constitutional:  Negative for chills and fever.   HENT:  Negative for congestion and trouble swallowing.    Eyes:  Negative for pain and visual disturbance.   Respiratory:  Negative for cough and shortness of breath.    Cardiovascular:  Negative for chest pain and leg swelling.   Gastrointestinal:  Negative for abdominal pain, constipation, diarrhea, nausea and vomiting.   Genitourinary:  Negative for difficulty urinating and dysuria.   Musculoskeletal:  Negative for back pain and gait problem.   Neurological:  Positive for dizziness and speech difficulty. Negative for weakness.   Psychiatric/Behavioral:  Negative for agitation and confusion.        Past Medical and Surgical History:   Past Medical History:   Diagnosis Date    A-fib ([facility])     PRITI (acute kidney injury) ([facility]) 09/05/2021    Anxiety     Back pain     Claustrophobia     COPD (chronic obstructive pulmonary disease) ([facility])     Dysphagia 09/05/2021    Fatty liver     GERD (gastroesophageal reflux disease)     History of transfusion     Hyperlipidemia     Hypertension     Hypokalemia 02/22/2022    Lumbar back pain     Panic attacks     Stenosis of right carotid artery     Stroke ([facility])     Wears glasses     Wears partial dentures     upper denture       Past Surgical History:   Procedure Laterality Date    COLONOSCOPY      ERCP      IR CEREBRAL ANGIOGRAPHY  04/06/2021    OTHER SURGICAL HISTORY      fertility    HI LAPAROSCOPY SURG CHOLECYSTECTOMY N/A 8/26/2022    Procedure: CHOLECYSTECTOMY LAPAROSCOPIC;  Surgeon: Michael Otoole DO;  Location: [facility];  Service: General    HI SLCTV CATHJ 3RD+ ORD SLCTV ABDL PEL/LXTR BRNCH Right 04/06/2021    Procedure: ARTERIOGRAM, carotid Angio;  Surgeon: Maximiliano Morin MD;   Location: [facility];  Service: Vascular    KS TEAEC W/PATCH GRF CAROTID VERTB SUBCLAV NECK INC Right 04/13/2021    Procedure: RIGHT ENDARTERECTOMY ARTERY CAROTID WITH BOVINE PATCH;  Surgeon: Maximiliano Morin MD;  Location: [facility];  Service: Vascular       Meds/Allergies:  Prior to Admission medications    Medication Sig Start Date End Date Taking? Authorizing Provider   apixaban (ELIQUIS) 5 mg Take 1 tablet (5 mg total) by mouth 2 (two) times a day 9/17/21  Yes Chai Cannon MD   aspirin 81 mg chewable tablet Chew 1 tablet (81 mg total) daily 3/26/24  Yes Tray Ramirez MD   acetaminophen (TYLENOL) 500 mg tablet Take 500 mg by mouth daily as needed for headaches, mild pain or moderate pain. Indications: Pain 1/1/24   Historical Provider, MD   albuterol (PROVENTIL HFA,VENTOLIN HFA) 90 mcg/act inhaler Inhale 2 puffs if needed 10/8/21   Historical Provider, MD   amLODIPine-benazepril (LOTREL 5-10) 5-10 MG per capsule Take 1 capsule by mouth daily    Historical Provider, MD   Anoro Ellipta 62.5-25 MCG/ACT inhaler INHALE 1 PUFF BY MOUTH AND INTO THE LUNGS ONCE DAILY AT THE SAME TIME EACH DAY    Historical Provider, MD   baclofen 10 mg tablet Take 10 mg by mouth in the morning PRN    Historical Provider, MD   buPROPion (WELLBUTRIN XL) 150 mg 24 hr tablet Take 150 mg by mouth daily    Historical Provider, MD   busPIRone (BUSPAR) 15 mg tablet 15 mg 2 (two) times a day 4/27/22   Historical Provider, MD   donepezil (ARICEPT) 5 mg tablet Take 5 mg by mouth every evening 2/22/24   Historical Provider, MD   omeprazole (PriLOSEC) 20 mg delayed release capsule Take 20 mg by mouth daily 10/4/21   Historical Provider, MD   pravastatin (PRAVACHOL) 40 mg tablet Take 40 mg by mouth daily    Historical Provider, MD   sertraline (ZOLOFT) 100 mg tablet Take 100 mg by mouth daily   1/19/11   Historical Provider, MD   varenicline (Chantix) 1 mg tablet Take 1 mg by mouth 2 (two) times a day  Patient not taking: Reported on 6/24/2024     Historical Provider, MD RESTREPO have reviewed home medications using recent Epic encounter.    Allergies:   Allergies   Allergen Reactions    Penicillins Anaphylaxis       Social History:  Marital Status: /Civil Union   Substance Use History:   Social History     Substance and Sexual Activity   Alcohol Use Yes    Alcohol/week: 2.0 standard drinks of alcohol    Types: 2 Cans of beer per week    Comment: every other day     Social History     Tobacco Use   Smoking Status Every Day    Current packs/day: 0.50    Average packs/day: 0.5 packs/day for 49.5 years (24.7 ttl pk-yrs)    Types: Cigarettes    Start date: 1/1/1975   Smokeless Tobacco Never     Social History     Substance and Sexual Activity   Drug Use Never    Comment: never       Family History:  Family History   Problem Relation Age of Onset    Asthma Mother        Physical Exam:     Vitals:   Blood Pressure: (!) 179/97 (06/24/24 0345)  Pulse: 104 (06/24/24 0345)  Temperature: 98.2 °F (36.8 °C) (06/24/24 0222)  Temp Source: Oral (06/24/24 0222)  Respirations: 20 (06/24/24 0345)  Weight - Scale: 65 kg (143 lb 4.8 oz) (06/24/24 0222)  SpO2: 98 % (06/24/24 0345)    Physical Exam  Vitals reviewed.   Constitutional:       General: He is not in acute distress.     Appearance: Normal appearance. He is not ill-appearing.   HENT:      Head: Normocephalic and atraumatic.   Cardiovascular:      Rate and Rhythm: Tachycardia present. Rhythm irregular.      Heart sounds: Normal heart sounds.   Pulmonary:      Effort: Pulmonary effort is normal. No respiratory distress.      Breath sounds: No wheezing or rales.   Abdominal:      General: Bowel sounds are normal.      Palpations: Abdomen is soft.      Tenderness: There is no abdominal tenderness.   Musculoskeletal:      Right lower leg: No edema.      Left lower leg: No edema.   Neurological:      Mental Status: He is alert.      Cranial Nerves: No cranial nerve deficit.      Sensory: No sensory deficit.      Motor: No  weakness.      Comments: Able to state his name, hospital name, year but states that he is at [facility] when asked what city.  Expressive aphasia noted on exam.          Additional Data:     Lab Results:  Results from last 7 days   Lab Units 06/24/24  0242   WBC Thousand/uL 11.30*   HEMOGLOBIN g/dL 17.8*   HEMATOCRIT % 53.8*   PLATELETS Thousands/uL 209   SEGS PCT % 73   LYMPHO PCT % 16   MONO PCT % 10   EOS PCT % 0     Results from last 7 days   Lab Units 06/24/24  0242   SODIUM mmol/L 141   POTASSIUM mmol/L 4.3   CHLORIDE mmol/L 100   CO2 mmol/L 23   BUN mg/dL 36*   CREATININE mg/dL 2.17*   ANION GAP mmol/L 18*   CALCIUM mg/dL 10.0   ALBUMIN g/dL 4.4   TOTAL BILIRUBIN mg/dL 0.64   ALK PHOS U/L 78   ALT U/L 21   AST U/L 46*   GLUCOSE RANDOM mg/dL 157*     Results from last 7 days   Lab Units 06/24/24  0244   INR  1.20*     Results from last 7 days   Lab Units 06/24/24  0240   POC GLUCOSE mg/dl 175*     Lab Results   Component Value Date    HGBA1C 5.4 03/23/2024    HGBA1C 5.7 (H) 10/01/2023    HGBA1C 6.0 (H) 09/02/2021           Lines/Drains:  Invasive Devices       Peripheral Intravenous Line  Duration             Peripheral IV 06/24/24 Left Antecubital <1 day    Peripheral IV 06/24/24 Proximal;Right;Ventral (anterior) Forearm <1 day                        Imaging: Reviewed radiology reports from this admission including: CT head  CTA stroke alert (head/neck)   Final Result by Minh Galeano MD (06/24 7745)      1.  Extensive calcific atherosclerosis of the aortic arch, proximal branch vessels, and cervical carotid/vertebral arteries again seen.   2.  Moderate (50 to 70%) luminal narrowing of the brachiocephalic artery due to calcified plaque.   4.  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  Short segment of severe luminal narrowing of the proximal left internal carotid artery due to mixed plaque is again seen without interval change. Moderate to severe luminal    narrowing of the  distal left vertebral artery V4 segment due to calcified plaque is also seen. Patent right vertebral artery without occlusion or significant stenosis.   5.  Complete occlusion of the right mid to distal common carotid artery with reconstitution at the carotid bifurcation region. Moderate (50 to 70%) luminal narrowing of the proximal to mid right cervical internal carotid artery. Segments of severe (>90%)    luminal narrowing due to calcified wall plaque in the right internal carotid artery in the carotid canal region. Extensive calcific atherosclerosis with severe luminal narrowing of the right internal carotid artery cavernous and supraclinoid segments    which appear markedly worse compared to the prior study. There is near complete occlusion but a tiny amount of string-like flow seen within these segments.   6.  Moderate (50 to 70%) luminal narrowing of the proximal to mid left common carotid artery due to calcific atherosclerosis.  Mild luminal narrowing of the proximal left internal carotid artery and left carotid canal/cavernous segments due to mild   calcific wall plaque.   7. Chronic occlusion involving a right M2 branch in the anterior sylvian fissure region noted.  Left M1 segment and proximal middle cerebral artery branches demonstrate normal enhancement. Right M1 segment is grossly patent but demonstrate slightly    decreased caliber/enhancement compared to the left which is new compared to the previous study.   8. There is fetal origin of the right posterior cerebral artery again seen. Asymmetrically diminished flow and caliber of the right posterior cerebral artery compared to the left noted, new since the prior study. This is likely related to interval    progression of near occlusive right intracranial ICA disease and fetal origin of the right PCA.   9. No enhancing brain mass/fluid collection or vascular malformation. No enhancing soft tissue neck mass/fluid collection or cervical lymphadenopathy.                Findings were directly discussed with Benjamin Fontaine  at 3:21 a.m.                           Workstation performed: [id number]         CT stroke alert brain   Final Result by Minh Galeano MD (06/24 0329)      No acute intracranial hemorrhage. Sequelae multiple old right-sided infarcts and senescent changes as described above, nonsignificantly changed compared to the prior study. Cannot exclude acute infarct in this patient on CT given these findings.  If    there is clinical concern for acute ischemia, recommend more sensitive MRI brain for better evaluation in this patient.      Findings were directly discussed with Benjamin Fontaine  at approximately 3:21 a.m.      Workstation performed: [id number]         MRI Inpatient Order    (Results Pending)       EKG and Other Studies Reviewed on Admission:   EKG: Atrial fibrillation. .    ** Please Note: This note has been constructed using a voice recognition system. **

## 2024-06-24 NOTE — CONSULTS
Consultation - Neurology   Abhishek Tom 68 y.o. male MRN: [id number]  Unit/Bed#: E4 -01 Encounter: [id number]      Assessment & Plan   * Expressive aphasia  Assessment & Plan  68-year-old male with history of prior CVA, atrial fibrillation on anticoagulation, and significant multifocal cervical and intracranial atherosclerotic disease with notable stenosis/occlusions.    Presented initially overnight on 6/24 with dizziness/lightheadedness when standing.  Was brought to ED overnight, found to be in rapid A-fib on presentation.  While in the ED overnight, was with acute expressive aphasia, thus stroke alert was activated.  Undergoing cerebrovascular workup given such. Does still have mild expressive aphasia during conversation/neuro exam this morning.     Neuroimaging:  -CT head: No acute intracranial pathology, chronic right-sided infarcts noted.  -CTA head/neck: Significant multifocal disease:   -Aortic arch extensive calcified athero   -Right common carotid occlusion (chronic)   -Moderate to severe right cervical and intracranial ICA stenosis   -Moderate left common carotid stenosis   -Chronic right M2 occlusion   -Moderate to severe luminal narrowing of left vertebral artery     Plan:  -Acute ischemic stroke pathway ongoing:  -Repeat CT head at around 9 AM this morning (unless MRI will be performed around that time)  -MRI brain without contrast when able  -2D echocardiogram pending  -Loaded with aspirin/Plavix overnight; awaiting repeat neuroimaging this morning  -If no large stroke burden on neuroimaging, can resume Eliquis and Plavix for secondary stroke prevention  -Continue Lipitor 40 mg daily  -Check hemoglobin A1c and lipid panel  -Permissive hypertension, treat SBP greater than 220  -Neurochecks  -Telemetry monitoring  -Provide stroke education  -Therapy evaluations (PT/OT/ST)      Will further discuss plan of care with attending neurologist.     Recommendations for outpatient neurological follow up  "have yet to be determined.    History of Present Illness     Reason for Consult / Principal Problem: Expressive aphasia, history of CVA/significant cerebrovascular athero    HPI: Abhishek Tom is a 68 y.o. male with history as mentioned above in assessment who neurology was asked to evaluate in regards to the above.    History obtained via discussion with patient (who is slightly suboptimal historian due to ongoing memory issues) and chart review.    Patient states he lives alone and is independent with all ADLs.  Of recently however he has had a friend come and stay with him due to troubles with memory and cognition.  Yesterday in particular he was lying down and watching TV when he stood up and felt quite lightheaded and as a result fell onto the couch.  Patient's friend ultimately called 911 and eventually the hospital; EMS brought patient to the ED.    On initial presentation he was fully oriented and conversing without word finding difficulty; however later on during his course in the ED he developed expressive aphasia thus stroke alert was activated.  Please see Dr. Fontaine's quick note for stroke alert details.  Was deemed not a candidate for any acute intervention, admitted for stroke workup.    In reviewing notes, carries history of right PCA CVA in September 2020, right MCA CVA in February 2022.  Was found to have new onset A-fib in September 2021 following an endoscopy.  Recent CTA head/neck was performed in March 2024 with which noted the above significant multifocal atherosclerotic disease.    Patient endorses a history of \"5 strokes\"; states he is compliant with Eliquis twice daily as well as baby aspirin daily.  States he previously smoked but quit 1 month ago, also quit alcohol use 1 month ago as well.  Denies any other illicit drug use.  Has history of hypertension but does not self check his blood pressure at home.    Notes the expressive aphasia that began last night is unchanged as of this " morning.    Consult to Neurology  Consult performed by: Israel Hunter PA-C  Consult ordered by: George Johns MD          Review of Systems   HENT:  Negative for hearing loss, tinnitus and trouble swallowing.    Eyes:  Negative for photophobia and visual disturbance.   Gastrointestinal: Negative.    Neurological:  Positive for speech difficulty. Negative for dizziness, tremors, seizures, syncope, facial asymmetry (chronic), weakness, light-headedness, numbness and headaches.   Psychiatric/Behavioral:  Positive for confusion and decreased concentration.        Historical Information   Past Medical History:   Diagnosis Date    A-fib (HCC)     PRITI (acute kidney injury) (HCC) 09/05/2021    Anxiety     Back pain     Claustrophobia     COPD (chronic obstructive pulmonary disease) (HCC)     Dysphagia 09/05/2021    Fatty liver     GERD (gastroesophageal reflux disease)     History of transfusion     Hyperlipidemia     Hypertension     Hypokalemia 02/22/2022    Lumbar back pain     Panic attacks     Stenosis of right carotid artery     Stroke (HCC)     Wears glasses     Wears partial dentures     upper denture     Past Surgical History:   Procedure Laterality Date    COLONOSCOPY      ERCP      IR CEREBRAL ANGIOGRAPHY  04/06/2021    OTHER SURGICAL HISTORY      fertility    NC LAPAROSCOPY SURG CHOLECYSTECTOMY N/A 8/26/2022    Procedure: CHOLECYSTECTOMY LAPAROSCOPIC;  Surgeon: Michael Otoole DO;  Location: [facility];  Service: General    NC SLCTV CATHJ 3RD+ ORD SLCTV ABDL PEL/LXTR BRNCH Right 04/06/2021    Procedure: ARTERIOGRAM, carotid Angio;  Surgeon: Maximiliano Morin MD;  Location: [facility];  Service: Vascular    NC TEAEC W/PATCH GRF CAROTID VERTB SUBCLAV NECK INC Right 04/13/2021    Procedure: RIGHT ENDARTERECTOMY ARTERY CAROTID WITH BOVINE PATCH;  Surgeon: Maximiliano Morin MD;  Location: [facility];  Service: Vascular     Social History   Social History     Substance and Sexual Activity   Alcohol Use Not  Currently     Social History     Substance and Sexual Activity   Drug Use Never    Comment: never     E-Cigarette/Vaping    E-Cigarette Use Never User      E-Cigarette/Vaping Substances    Nicotine No     THC No     CBD No     Flavoring No     Other No     Unknown No      Social History     Tobacco Use   Smoking Status Former    Current packs/day: 0.50    Average packs/day: 0.5 packs/day for 49.5 years (24.7 ttl pk-yrs)    Types: Cigarettes    Start date: 1/1/1975   Smokeless Tobacco Never     Family History:   Family History   Problem Relation Age of Onset    Asthma Mother        Review of previous medical records was completed.    Meds/Allergies   current meds:   Current Facility-Administered Medications   Medication Dose Route Frequency    albuterol (PROVENTIL HFA,VENTOLIN HFA) inhaler 2 puff  2 puff Inhalation Q4H PRN    aspirin tablet 325 mg  325 mg Oral Daily    atorvastatin (LIPITOR) tablet 40 mg  40 mg Oral QPM    buPROPion (WELLBUTRIN XL) 24 hr tablet 150 mg  150 mg Oral Daily    busPIRone (BUSPAR) tablet 15 mg  15 mg Oral BID    donepezil (ARICEPT) tablet 5 mg  5 mg Oral QPM    labetalol (NORMODYNE) injection 10 mg  10 mg Intravenous Q6H PRN    multi-electrolyte (PLASMALYTE-A/ISOLYTE-S PH 7.4) IV solution  100 mL/hr Intravenous Continuous    pantoprazole (PROTONIX) EC tablet 20 mg  20 mg Oral Early Morning    sertraline (ZOLOFT) tablet 100 mg  100 mg Oral Daily    umeclidinium-vilanterol 62.5-25 mcg/actuation inhaler 1 puff  1 puff Inhalation Daily    and PTA meds:   Prior to Admission Medications   Prescriptions Last Dose Informant Patient Reported? Taking?   Anoro Ellipta 62.5-25 MCG/ACT inhaler Unknown  Yes No   Sig: INHALE 1 PUFF BY MOUTH AND INTO THE LUNGS ONCE DAILY AT THE SAME TIME EACH DAY   acetaminophen (TYLENOL) 500 mg tablet   Yes No   Sig: Take 500 mg by mouth daily as needed for headaches, mild pain or moderate pain. Indications: Pain   albuterol (PROVENTIL HFA,VENTOLIN HFA) 90 mcg/act  "inhaler Unknown  Yes No   Sig: Inhale 2 puffs if needed   amLODIPine-benazepril (LOTREL 5-10) 5-10 MG per capsule Unknown  Yes No   Sig: Take 1 capsule by mouth daily   apixaban (ELIQUIS) 5 mg 6/23/2024 Self No Yes   Sig: Take 1 tablet (5 mg total) by mouth 2 (two) times a day   aspirin 81 mg chewable tablet 6/23/2024  No Yes   Sig: Chew 1 tablet (81 mg total) daily   baclofen 10 mg tablet Unknown  Yes No   Sig: Take 10 mg by mouth in the morning PRN   buPROPion (WELLBUTRIN XL) 150 mg 24 hr tablet Unknown  Yes No   Sig: Take 150 mg by mouth daily   busPIRone (BUSPAR) 15 mg tablet Unknown Self Yes No   Sig: 15 mg 2 (two) times a day   donepezil (ARICEPT) 5 mg tablet Unknown  Yes No   Sig: Take 5 mg by mouth every evening   omeprazole (PriLOSEC) 20 mg delayed release capsule Unknown Self Yes No   Sig: Take 20 mg by mouth daily   pravastatin (PRAVACHOL) 40 mg tablet Unknown  Yes No   Sig: Take 40 mg by mouth daily   sertraline (ZOLOFT) 100 mg tablet Unknown Self Yes No   Sig: Take 100 mg by mouth daily     varenicline (Chantix) 1 mg tablet Not Taking  Yes No   Sig: Take 1 mg by mouth 2 (two) times a day   Patient not taking: Reported on 6/24/2024      Facility-Administered Medications: None       Allergies   Allergen Reactions    Penicillins Anaphylaxis       Objective   Vitals:Blood pressure (!) 175/95, pulse (!) 112, temperature 97.9 °F (36.6 °C), temperature source Temporal, resp. rate 20, height 5' 9\" (1.753 m), weight 65 kg (143 lb 4.8 oz), SpO2 98%.,Body mass index is 21.16 kg/m².    Intake/Output Summary (Last 24 hours) at 6/24/2024 0721  Last data filed at 6/24/2024 0430  Gross per 24 hour   Intake 1000 ml   Output --   Net 1000 ml       Invasive Devices:   Invasive Devices       Peripheral Intravenous Line  Duration             Peripheral IV 06/24/24 Left Antecubital <1 day    Peripheral IV 06/24/24 Proximal;Right;Ventral (anterior) Forearm <1 day                    Physical Exam  Constitutional:       " Appearance: Normal appearance.   HENT:      Head: Normocephalic and atraumatic.   Eyes:      Extraocular Movements: Extraocular movements intact and EOM normal.      Conjunctiva/sclera: Conjunctivae normal.      Pupils: Pupils are equal, round, and reactive to light.   Cardiovascular:      Rate and Rhythm: Normal rate.   Musculoskeletal:         General: Normal range of motion.      Cervical back: Normal range of motion and neck supple.   Skin:     General: Skin is warm and dry.   Neurological:      Mental Status: He is alert.      Motor: Motor strength is normal.      Neurologic Exam     Mental Status   Awake and alert, oriented to self, location, current month cannot state the current year.  Cannot state the current date and believes it is early June.  Conversationally he has some paraphasic errors and mild expressive aphasia.    He can however repeat phrases and name objects without difficulty.  No obvious dysarthria.    He follows cross body/multistep command correctly.    He is visibly frustrated when he does experience a bout of paraphasic error during conversation this morning.      Cranial Nerves     CN II   Visual fields full to confrontation.     CN III, IV, VI   Pupils are equal, round, and reactive to light.  Extraocular motions are normal.     CN V   Facial sensation intact.     CN VII   Left facial weakness: central    CN VIII   CN VIII normal.     CN IX, X   CN X normal.     CN XI   CN XI normal.     CN XII   CN XII normal.   Chronic left facial asymmetry/weakness from prior strokes.     Motor Exam   Muscle bulk: normal  Overall muscle tone: normal  Right arm pronator drift: absent  Left arm pronator drift: absent    Strength   Strength 5/5 throughout.     Sensory Exam   Light touch normal.   Vibration normal.     Temperature cool throughout the distal extremities.  No hemisensory neglect nor extinction with double simultaneous tactile stimuli.     Gait, Coordination, and Reflexes Tremulous in the  right upper extremity with finger-nose, but performs both finger-nose and heel-to-shin bilaterally without ataxia nor clumsiness.    Brisk patellar DTRs, no ankle clonus, downgoing toes.       Lab Results: CBC:   Results from last 7 days   Lab Units 06/24/24  0242   WBC Thousand/uL 11.30*   RBC Million/uL 6.53*   HEMOGLOBIN g/dL 17.8*   HEMATOCRIT % 53.8*   MCV fL 82   PLATELETS Thousands/uL 209   , BMP/CMP:   Results from last 7 days   Lab Units 06/24/24  0242   SODIUM mmol/L 141   POTASSIUM mmol/L 4.3   CHLORIDE mmol/L 100   CO2 mmol/L 23   BUN mg/dL 36*   CREATININE mg/dL 2.17*   CALCIUM mg/dL 10.0   AST U/L 46*   ALT U/L 21   ALK PHOS U/L 78   EGFR ml/min/1.73sq m 30   , Vitamin B12:   , HgBA1C:   , TSH:   , Coagulation:   Results from last 7 days   Lab Units 06/24/24  0244   INR  1.20*   , Lipid Profile:     Imaging Studies: I have personally reviewed pertinent films in PACS  CTA stroke alert (head/neck)   Final Result by Minh Galeano MD (06/24 0352)      1.  Extensive calcific atherosclerosis of the aortic arch, proximal branch vessels, and cervical carotid/vertebral arteries again seen.   2.  Moderate (50 to 70%) luminal narrowing of the brachiocephalic artery due to calcified plaque.   4.  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  Short segment of severe luminal narrowing of the proximal left internal carotid artery due to mixed plaque is again seen without interval change. Moderate to severe luminal    narrowing of the distal left vertebral artery V4 segment due to calcified plaque is also seen. Patent right vertebral artery without occlusion or significant stenosis.   5.  Complete occlusion of the right mid to distal common carotid artery with reconstitution at the carotid bifurcation region. Moderate (50 to 70%) luminal narrowing of the proximal to mid right cervical internal carotid artery. Segments of severe (>90%)    luminal narrowing due to calcified wall plaque in  the right internal carotid artery in the carotid canal region. Extensive calcific atherosclerosis with severe luminal narrowing of the right internal carotid artery cavernous and supraclinoid segments    which appear markedly worse compared to the prior study. There is near complete occlusion but a tiny amount of string-like flow seen within these segments.   6.  Moderate (50 to 70%) luminal narrowing of the proximal to mid left common carotid artery due to calcific atherosclerosis.  Mild luminal narrowing of the proximal left internal carotid artery and left carotid canal/cavernous segments due to mild   calcific wall plaque.   7. Chronic occlusion involving a right M2 branch in the anterior sylvian fissure region noted.  Left M1 segment and proximal middle cerebral artery branches demonstrate normal enhancement. Right M1 segment is grossly patent but demonstrate slightly    decreased caliber/enhancement compared to the left which is new compared to the previous study.   8. There is fetal origin of the right posterior cerebral artery again seen. Asymmetrically diminished flow and caliber of the right posterior cerebral artery compared to the left noted, new since the prior study. This is likely related to interval    progression of near occlusive right intracranial ICA disease and fetal origin of the right PCA.   9. No enhancing brain mass/fluid collection or vascular malformation. No enhancing soft tissue neck mass/fluid collection or cervical lymphadenopathy.               Findings were directly discussed with Benjamin Fontaine  at 3:21 a.m.                           Workstation performed: [id number]         CT stroke alert brain   Final Result by Minh Galeano MD (06/24 0329)      No acute intracranial hemorrhage. Sequelae multiple old right-sided infarcts and senescent changes as described above, nonsignificantly changed compared to the prior study. Cannot exclude acute infarct in this patient on CT given these  findings.  If    there is clinical concern for acute ischemia, recommend more sensitive MRI brain for better evaluation in this patient.      Findings were directly discussed with Benjamin Fontaine  at approximately 3:21 a.m.      Workstation performed: [id number]         MRI Inpatient Order    (Results Pending)       EKG, Pathology, and Other Studies: I have personally reviewed pertinent reports.      VTE Prophylaxis: Sequential compression device (Venodyne)  and Reason for no pharmacologic prophylaxis awaiting repeat CT head this AM    Code Status: Level 1 - Full Code    Please see attending's attestation for total time spent/billing.  Discussed plan of care with patient and primary team: Awaiting repeat CT head this AM, MRI brain/stroke pathway, AP/AC regimen TBD for stroke prevention, neuro checks, therapies.     Please note dictation software was used in the formulation of this note.  Please keep that in mind in light of any grammatical errors.

## 2024-06-24 NOTE — ASSESSMENT & PLAN NOTE
Follows with vascular surgery outpatient last seen Feb 2024 with significant vascular disease, hx of Right CEA in 2021   Was started on asa 81 mg daily during hospitalization in March per neurology recs   Given he is on Eliquis and bleeding risk plan to discharge on Eliquis and plavix for atherosclerotic disease   Continue statin   Close outpt follow up with vascular surgery   Due for repeat carotid US in August    80.3

## 2024-06-24 NOTE — ASSESSMENT & PLAN NOTE
Lab Results   Component Value Date    EGFR 30 06/24/2024    EGFR 55 03/25/2024    EGFR 51 03/23/2024    CREATININE 2.17 (H) 06/24/2024    CREATININE 1.31 (H) 03/25/2024    CREATININE 1.41 (H) 03/23/2024     Baseline creatinine between 1.1-1.4  Presented with creatinine of 2.17  Possibly prerenal etiology  Holding Lotrel  IVF  Follow-up UA  Monitor with BMP

## 2024-06-24 NOTE — QUICK NOTE
Post admit follow up     Patient admitted on stroke pathway with expressive aphasia.   Loaded with ASA and plavix at 4:30 am   Holding Eliquis until repeat brain imaging with CT head (vs MRI brain if completed first) and will potentially resume AC if no new large stroke. He has significant vascular disease last seen by vascular surgery in Feb. Continue statin.   Follow up repeat brain imaging, echo, A1c and lipid panel.   He has chronic left facial droop from prior strokes. Answers questions appropriable. Upper and lower extremity symmetric strength. No vision changes. He does report lightheadedness when standing. Will check orthostatics. Allowing for permissive HTN. Holding lotrel per home regimen.   Telemetry reviewed in rate controlled Afib HR in 90's   Cr elevated on arrival, repeat labs in AM   Check I/o and retention protocol   UA reviewed - microscopic hematuria noted will need repeat UA outpt with PCP in 4 weeks     Discussed with neurology.  Declined call to family.

## 2024-06-24 NOTE — ASSESSMENT & PLAN NOTE
"68-year-old male with prior stroke, atrial fibrillation on Eliquis, and significant multifocal cervical and intracranial atherosclerotic disease with notable stenosis/occlusions, who presented to [facility] initially on 6/24/24 with lightheadedness and dizziness while standing. Pt was found to be in rapid Afib in the ED. While in the ED overnight, pt developed acute expressive aphasia. A stroke alert was initiated. BP at time of alert 159/77, during alert BP went as high as 205/100. NIHSS 2 per ED provider. CTH revealed no acute intracranial abnormality. CTA H/N wwo contrast revealed no IR target. Pt was not a thrombolytic candidate due to bleeding risk (on Eliquis).     Workup   -CT head wo contrast 6/24/24:  \"No acute intracranial hemorrhage. Sequelae multiple old right-sided infarcts and senescent changes as described above, nonsignificantly changed compared to the prior study. Cannot exclude acute infarct in this patient on CT given these findings.  If  there is clinical concern for acute ischemia, recommend more sensitive MRI brain for better evaluation in this patient.\"  - CTA H/N wwo contrast 6/25/24:  \"1.  Extensive calcific atherosclerosis of the aortic arch, proximal branch vessels, and cervical carotid/vertebral arteries again seen. 2.  Moderate (50 to 70%) luminal narrowing of the brachiocephalic artery due to calcified plaque. 4.  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  Short segment of severe luminal narrowing of the proximal left internal carotid artery due to mixed plaque is again seen without interval change. Moderate to severe luminal  narrowing of the distal left vertebral artery V4 segment due to calcified plaque is also seen. Patent right vertebral artery without occlusion or significant stenosis. 5.  Complete occlusion of the right mid to distal common carotid artery with reconstitution at the carotid bifurcation region. Moderate (50 to 70%) luminal narrowing of the " "proximal to mid right cervical internal carotid artery. Segments of severe (>90%) luminal narrowing due to calcified wall plaque in the right internal carotid artery in the carotid canal region. Extensive calcific atherosclerosis with severe luminal narrowing of the right internal carotid artery cavernous and supraclinoid segments which appear markedly worse compared to the prior study. There is near complete occlusion but a tiny amount of string-like flow seen within these segments. 6.  Moderate (50 to 70%) luminal narrowing of the proximal to mid left common carotid artery due to calcific atherosclerosis.  Mild luminal narrowing of the proximal left internal carotid artery and left carotid canal/cavernous segments due to mild calcific wall plaque. 7. Chronic occlusion involving a right M2 branch in the anterior sylvian fissure region noted.  Left M1 segment and proximal middle cerebral artery branches demonstrate normal enhancement. Right M1 segment is grossly patent but demonstrate slightly  decreased caliber/enhancement compared to the left which is new compared to the previous study. 8. There is fetal origin of the right posterior cerebral artery again seen. Asymmetrically diminished flow and caliber of the right posterior cerebral artery compared to the left noted, new since the prior study. This is likely related to interval progression of near occlusive right intracranial ICA disease and fetal origin of the right PCA. 9. No enhancing brain mass/fluid collection or vascular malformation. No enhancing soft tissue neck mass/fluid collection or cervical lymphadenopathy.\"  - CTH wo contrast 6/24/24 (repeat):  \"No acute intracranial abnormality. Chronic right frontal and occipital lobe infarcts with encephalomalacia. Chronic microangiopathic ischemic changes.\"  - MRI brain wo contrast 6/24/24:  \"No evidence of acute infarct, intracranial hemorrhage or mass. \"  - Echo: EF 60%, left atrium moderately dilated  - " Labs:   - hemoglobin A1c pending   - Lipid Panel: total cholesterol 176, LDL 81      Plan:  -MRI brain without contrast completed, see above  -2D echocardiogram completed, see above  -continue with Eliquis 5 mg BID   - continue with Plavix 75 mg daily   -Continue Lipitor 40 mg daily  -Goal normotension   -Neurochecks  -Telemetry monitoring  -Provide stroke education  -Therapy evaluations (PT/OT/ST)  - Medical management and supportive care per primary team. Correction of any metabolic or infectious disturbances.

## 2024-06-24 NOTE — OCCUPATIONAL THERAPY NOTE
Occupational Therapy Evaluation     Patient Name: Abhishek Tom  Today's Date: 6/24/2024  Problem List  Principal Problem:    Expressive aphasia  Active Problems:    Alcohol abuse    COPD (chronic obstructive pulmonary disease) (HCC)    Hx of CVA (cerebral vascular accident) (HCC)    Benign essential hypertension    Acute-on-chronic kidney injury  (HCC)    Stage 3a chronic kidney disease (HCC)    S/P carotid endarterectomy    PAF (paroxysmal atrial fibrillation) (HCC)    Atrial fibrillation with rapid ventricular response (HCC)    Bilateral carotid artery stenosis    Past Medical History  Past Medical History:   Diagnosis Date    A-fib (HCC)     PRITI (acute kidney injury) (HCC) 09/05/2021    Anxiety     Back pain     Claustrophobia     COPD (chronic obstructive pulmonary disease) (HCC)     Dysphagia 09/05/2021    Fatty liver     GERD (gastroesophageal reflux disease)     History of transfusion     Hyperlipidemia     Hypertension     Hypokalemia 02/22/2022    Lumbar back pain     Panic attacks     Stenosis of right carotid artery     Stroke (HCC)     Wears glasses     Wears partial dentures     upper denture     Past Surgical History  Past Surgical History:   Procedure Laterality Date    COLONOSCOPY      ERCP      IR CEREBRAL ANGIOGRAPHY  04/06/2021    OTHER SURGICAL HISTORY      fertility    MA LAPAROSCOPY SURG CHOLECYSTECTOMY N/A 8/26/2022    Procedure: CHOLECYSTECTOMY LAPAROSCOPIC;  Surgeon: Michael Otoole DO;  Location: [facility];  Service: General    MA SLCTV CATHJ 3RD+ ORD SLCTV ABDL PEL/LXTR BRNCH Right 04/06/2021    Procedure: ARTERIOGRAM, carotid Angio;  Surgeon: Maximiliano Morin MD;  Location: [facility];  Service: Vascular    MA TEAEC W/PATCH GRF CAROTID VERTB SUBCLAV NECK INC Right 04/13/2021    Procedure: RIGHT ENDARTERECTOMY ARTERY CAROTID WITH BOVINE PATCH;  Surgeon: Maximiliano Morin MD;  Location: [facility];  Service: Vascular         06/24/24 0820   OT Last Visit   OT Visit Date 06/24/24    Note Type   Note type Evaluation   Additional Comments greeted in supine and agreeable to skilled OT eval.   Pain Assessment   Pain Assessment Tool 0-10   Pain Score No Pain   Restrictions/Precautions   Weight Bearing Precautions Per Order No   Other Precautions Impulsive;Chair Alarm;Bed Alarm;Multiple lines;Telemetry;Fall Risk   Home Living   Type of Home Mobile home   Home Layout One level;Stairs to enter with rails  (3-4 EVELYNE)   Bathroom Shower/Tub Tub/shower unit   Bathroom Toilet Standard   Bathroom Equipment Grab bars in shower;Shower chair   Home Equipment Cane   Prior Function   Level of [place] Independent with ADLs;Independent with functional mobility;Independent with IADLS   Lives With Alone   Receives Help From Family  (brother and sister)   IADLs Independent with driving;Independent with meal prep;Independent with medication management   Falls in the last 6 months 1 to 4   Vocational Retired   Comments Prior to admission, pt lives alone in a single level mobile home with 3 EVELYNE with rails. Has a tub shower with grab bars and chair. Standard toilet. Was I with ADLs, IADLs and mobility with no device. Owns a cane. Drives. 1 fall.   ADL   Where Assessed Edge of bed   Eating Assistance 7  Independent   Grooming Assistance 7  Independent   UB Bathing Assistance 6  Modified Independent   LB Bathing Assistance 5  Supervision/Setup   UB Dressing Assistance 6  Modified independent   LB Dressing Assistance 5  Supervision/Setup   Toileting Assistance  5  Supervision/Setup   Bed Mobility   Supine to Sit 6  Modified independent   Sit to Supine 6  Modified independent   Additional Comments impulsive.   Transfers   Sit to Stand 5  Supervision   Additional items Impulsive;Verbal cues   Stand to Sit 5  Supervision   Additional items Impulsive;Verbal cues   Functional Mobility   Functional Mobility 5  Supervision  (SUP- CGA due to impulsivity)   Additional Comments household distances   Additional items Rolling  walker   Balance   Static Sitting Normal   Dynamic Sitting Good   Static Standing Fair +   Dynamic Standing Fair   Ambulatory Fair   Activity Tolerance   Activity Tolerance Patient tolerated treatment well   Medical Staff Made Aware PT   Nurse Made Aware ÁNGEL MONZON Assessment   RUE Assessment WFL   LUE Assessment   LUE Assessment WFL   Hand Function   Gross Motor Coordination Functional   Fine Motor Coordination Functional   Psychosocial   Psychosocial (WDL) WDL   Cognition   Overall Cognitive Status WFL   Arousal/Participation Cooperative   Attention Within functional limits   Orientation Level Oriented X4   Memory Within functional limits   Following Commands Follows all commands and directions without difficulty   Comments redirection required.   Assessment   Limitation Decreased ADL status;Decreased UE strength;Decreased Safe judgement during ADL;Decreased endurance;Decreased sensation   Prognosis Good   Assessment Abhishek Tom is a 68 y.o. male seen for OT evaluation s/p admit to [facility] on 6/24/2024 w/ Expressive aphasia.  Comorbidities affecting pt's functional performance at time of assessment include:  prior strokes, atrial fibrillation on Eliquis, CKD, hypertension, COPD, prior alcohol use  . Pt with active OT orders and activity orders for Up as tolerated. Personal factors affecting pt at time of IE include:EVELYNE home environment, limited home support, difficulty performing ADLs, difficulty performing IADLs, and difficulty performing transfers/mobility. Prior to admission, pt lives alone in a single level mobile home with 3 EVELYNE with rails. Has a tub shower with grab bars and chair. Standard toilet. Was I with ADLs, IADLs and mobility with no device. Owns a cane. Drives. 1 fall. Upon evaluation: Pt currently requires Adarsh for UB ADLs, supervision for LB ADLs, supervision for toileting, Adarsh for bed mobility, supervision for functional transfers, and CGA mobility 2* the following deficits impacting  occupational performance:weakness, decreased strength , decreased balance, and decreased activity tolerance.  Pt to benefit from continued skilled OT tx while in the hospital to address deficits as defined above and maximize level of functional independence w ADL's and functional mobility. Occupational Performance areas to address include: grooming, bathing/shower, toilet hygiene, dressing, health maintenance, functional mobility, and clothing management. From OT standpoint, recommendation at time of d/c would be level 3 resources. OT to follow pt on caseload 2-3x/wk.   Goals   STG Time Frame 3-5   Short Term Goal #1 Pt will improve activity tolerance to G for min 30 min txment sessions for increase engagement in functional tasks   Short Term Goal #2 Pt will complete LB dressing/self care w/ mod I using adaptive device and DME as needed   Short Term Goal  Pt will complete toileting w/ mod I w/ G hygiene/thoroughness using DME as needed   LTG Time Frame 10-14   Long Term Goal #1 Pt will improve functional transfers to Mod I on/off all surfaces using DME as needed w/ G balance/safety   Long Term Goal #2 Pt will improve functional mobility during ADL/IADL/leisure tasks to Mod I using DME as needed w/ G balance/safety   Plan   Treatment Interventions ADL retraining;Functional transfer training;UE strengthening/ROM;Endurance training;Patient/family training;Equipment evaluation/education;Neuromuscular reeducation;Compensatory technique education;Energy conservation;Activityengagement   Goal Expiration Date 07/08/24   OT Treatment Day 0   OT Frequency 2-3x/wk   Discharge Recommendation   Rehab Resource Intensity Level, OT III (Minimum Resource Intensity)   AM-PAC Daily Activity Inpatient   Lower Body Dressing 3   Bathing 4   Toileting 3   Upper Body Dressing 4   Grooming 4   Eating 4   Daily Activity Raw Score 22   Daily Activity Standardized Score (Calc for Raw Score >=11) 47.1   AM-PAC Applied Cognition Inpatient    Following a Speech/Presentation 4   Understanding Ordinary Conversation 4   Taking Medications 3   Remembering Where Things Are Placed or Put Away 3   Remembering List of 4-5 Errands 3   Taking Care of Complicated Tasks 3   Applied Cognition Raw Score 20   Applied Cognition Standardized Score 41.76   [place], OT
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
## 2024-06-24 NOTE — ED NOTES
This RN answered patient's call bell. Upon arrival to room patient stated that he was very diaphoretic and that he felt like his heart was racing. Questions answered about plan of care for patient. Patient verbalized that he understood. Noted to have clear speech at this time. As this RN left room Dr. Johns was entering to speak with patient. Provider then approached nurses station and asked if patient was oriented and making sense while in room. Upon re entry to room patient noted to have expressive aphasia. Stroke alert was called.      Dora Segal RN  06/24/24 [id number]

## 2024-06-24 NOTE — ASSESSMENT & PLAN NOTE
Lab Results   Component Value Date    EGFR 56 06/25/2024    EGFR 30 06/24/2024    EGFR 55 03/25/2024    CREATININE 1.30 06/25/2024    CREATININE 2.17 (H) 06/24/2024    CREATININE 1.31 (H) 03/25/2024     Baseline creatinine between 1.1-1.4  Presented with creatinine of 2.17  Possibly prerenal etiology  Held lotrel   Renal function returned to baseline overnight   UA noting micropsic hematuria will need repeat UA in 4 weeks to document resolution   Follow up with renal outpatient
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
## 2024-06-24 NOTE — CASE MANAGEMENT
Case Management Assessment & Discharge Planning Note    Patient name Abhishek Tom  Location [facility] /E4 -* MRN [id number]  : 1955 Date 2024       Current Admission Date: 2024  Current Admission Diagnosis:Expressive aphasia   Patient Active Problem List    Diagnosis Date Noted Date Diagnosed    Expressive aphasia 2024     Pulmonary nodule 2024     Long term (current) use of insulin ([facility]) 2024     Decreased pulses in feet 2024     Smoking 2022     Claustrophobia      Bilateral carotid artery stenosis 2022     Chronic calculous cholecystitis 2022     Delirium tremens ([facility]) 2022     Wernicke encephalopathy 2022     Alcohol use disorder, severe, dependence ([facility]) 2022     Atrial fibrillation with rapid ventricular response ([facility]) 2022     Hallucinations 2022     Memory changes 2022     Seizure ([facility]) 2022     Cholelithiasis 2022     Acute Cholecystitis 2022     Hypokalemia 2022     Hypomagnesemia 2022     PAF (paroxysmal atrial fibrillation) ([facility]) 2022     S/P carotid endarterectomy 11/10/2021     Ambulatory dysfunction 09/15/2021     Syncope and collapse 09/15/2021     Acute-on-chronic kidney injury  ([facility]) 2021     Pre-diabetes 2021     Stage 3a chronic kidney disease ([facility]) 2021     Hx of CVA (cerebral vascular accident) ([facility]) 2021     Stenosis of right internal carotid artery      Lumbar back pain      GERD (gastroesophageal reflux disease)      Left arm weakness 2021     Alcohol abuse 2021     HLD (hyperlipidemia) 2021     COPD (chronic obstructive pulmonary disease) ([facility]) 2021     Iron deficiency anemia 2021     Cerebrovascular accident (CVA) due to embolism of right middle cerebral artery ([facility]) 2021     Symptomatic carotid artery stenosis, right 2021     Benign essential hypertension 02/15/2011       LOS  (days): 0  Geometric Mean LOS (GMLOS) (days):   Days to GMLOS:     OBJECTIVE:    Risk of Unplanned Readmission Score: 17.87         Current admission status: Inpatient  Referral Reason: Stroke    Preferred Pharmacy:   [id number] - [place] - [address]  [address] [facility] [place] [address]  Phone: [phone] Fax: [phone]    [facility] [place] - [place] - [address]  [address]  [place] [address]  Phone: [phone] Fax: [phone]    [facility] [place] [id number] - [place] - [address] [place] [address]  Phone: [phone] Fax: [phone]    [id number]  [place], [place] - [address] [facility]  [address] [place] [address]  [place] [address]  Phone: [phone] Fax: [phone]    Primary Care Provider: Laith Balderas DO    Primary Insurance: [facility] REP  Secondary Insurance:     ASSESSMENT:  Active Health Care Proxies       Ana Tom Health Care Agent - Sister   Primary Phone: [phone] (Mobile)                 Advance Directives  Does patient have a Health Care POA?: Yes  Does patient currently have a Health Care decision maker?: Yes, please see Health Care Proxy section  Does patient have Advance Directives?: Yes  Advance Directives: Power of  for health care  Primary Contact: sister Morrow [phone]         Readmission Root Cause  30 Day Readmission: No    Patient Information  Admitted from:: Home  Mental Status: Alert  During Assessment patient was accompanied by: Not accompanied during assessment  Assessment information provided by:: Patient  Primary Caregiver: Self  Support Systems: Self, Friend  County of Residence: [place]  What city do you live in?: [place]  Home entry access options. Select all that apply.: Stairs  Number of steps to enter home.: 3  Do the steps have railings?: Yes  Type of Current Residence: Trailer Home  Living Arrangements: Lives Alone  Is  patient a ?: Yes  Is patient active with [facility] ([place] [facility])?: No    Activities of Daily Living Prior to Admission  Functional Status: Independent  Completes ADLs independently?: Yes  Ambulates independently?: Yes  Does patient use assisted devices?: No  Does patient currently own DME?: No  Does patient have a history of Outpatient Therapy (PT/OT)?: No  Does the patient have a history of Short-Term Rehab?: No  Does patient have a history of HHC?: No  Does patient currently have HHC?: No         Patient Information Continued  Income Source: Pension/skilled nursing  Does patient have prescription coverage?: Yes  Does patient receive dialysis treatments?: No  Does patient have a history of substance abuse?: No  Does patient have a history of Mental Health Diagnosis?: No         Means of Transportation  Means of Transport to Appts:: Drives Self      Social Determinants of Health (SDOH)      Flowsheet Row Most Recent Value   Housing Stability    In the last 12 months, was there a time when you were not able to pay the mortgage or rent on time? N   In the past 12 months, how many times have you moved where you were living? 0   At any time in the past 12 months, were you homeless or living in a shelter (including now)? N   Transportation Needs    In the past 12 months, has lack of transportation kept you from medical appointments or from getting medications? no   In the past 12 months, has lack of transportation kept you from meetings, work, or from getting things needed for daily living? No   Food Insecurity    Within the past 12 months, you worried that your food would run out before you got the money to buy more. Never true   Within the past 12 months, the food you bought just didn't last and you didn't have money to get more. Never true   Utilities    In the past 12 months has the electric, gas, oil, or water company threatened to shut off services in your home? No            DISCHARGE DETAILS:    Discharge planning  discussed with:: Patient  Freedom of Choice: Yes     CM contacted family/caregiver?: No- see comments (Declined)  Were Treatment Team discharge recommendations reviewed with patient/caregiver?: Yes  Did patient/caregiver verbalize understanding of patient care needs?: Yes  Were patient/caregiver advised of the risks associated with not following Treatment Team discharge recommendations?: Yes         Requested Home Health Care         Is the patient interested in HHC at discharge?: No    DME Referral Provided  Referral made for DME?: No         Would you like to participate in our [facility] service program?  : No - Declined    Treatment Team Recommendation: Home  Discharge Destination Plan:: Home  Transport at Discharge : Family              CM met with patient at bedside to introduce self and role with DC planning.  Patient reports living alone in a trailer home with 3 steps to enter.  Patient reports being independent PTA, he drives.  Patient does not utilize or own DME at home.  Patient reports a friend will be able to transport him home at time of DC.  Patient does not identify any CM needs at this time, CM department remains available.

## 2024-06-24 NOTE — ASSESSMENT & PLAN NOTE
Presents with atrial fibrillation with RVR on admission  Received IV Lopressor in the ED with improved heart rates  Eliquis currently held, resume if repeat CT head/MRI around 9 AM shows no signs of bleeding

## 2024-06-24 NOTE — ED ATTENDING ATTESTATION
6/24/2024  I, George Johns MD, saw and evaluated the patient. I have discussed the patient with the resident/non-physician practitioner and agree with the resident's/non-physician practitioner's findings, Plan of Care, and MDM as documented in the resident's/non-physician practitioner's note, except where noted. All available labs and Radiology studies were reviewed.  I was present for key portions of any procedure(s) performed by the resident/non-physician practitioner and I was immediately available to provide assistance.       At this point I agree with the current assessment done in the Emergency Department.  I have conducted an independent evaluation of this patient a history and physical is as follows:      Final Diagnosis:  1. Stroke-like episode    2. Atrial fibrillation with RVR ([facility])      Chief Complaint   Patient presents with    Dizziness     States was feeling dizzy when standing and fell onto couch. States recent diagnosis of Afib and started eliquis. Denies chest pain.       68-year-old male who presents with lightheadedness.  Per EMS patient was sitting on the couch and when he stood up, started to feel very lightheaded and fell on the couch.  He called EMS.  Patient found to be in rapid A-fib and brought to the hospital.  On presentation, patient was alert and oriented x 4 and able to communicate without any neurologic deficits.  Interviewed by resident and nursing staff without any neurologic deficits.  However, upon my interview, patient noted to have severe expressive aphasia.  Stroke alert called.      PMH:  Past Medical History:   Diagnosis Date    A-fib ([facility])     PRITI (acute kidney injury) ([facility]) 09/05/2021    Anxiety     Back pain     Claustrophobia     COPD (chronic obstructive pulmonary disease) ([facility])     Dysphagia 09/05/2021    Fatty liver     GERD (gastroesophageal reflux disease)     History of transfusion     Hyperlipidemia     Hypertension     Hypokalemia 02/22/2022    Lumbar back  pain     Panic attacks     Stenosis of right carotid artery     Stroke (HCC)     Wears glasses     Wears partial dentures     upper denture       PSH:  Past Surgical History:   Procedure Laterality Date    COLONOSCOPY      ERCP      IR CEREBRAL ANGIOGRAPHY  04/06/2021    OTHER SURGICAL HISTORY      fertility    LA LAPAROSCOPY SURG CHOLECYSTECTOMY N/A 8/26/2022    Procedure: CHOLECYSTECTOMY LAPAROSCOPIC;  Surgeon: Michael Otoole DO;  Location: [facility];  Service: General    LA SLCTV CATHJ 3RD+ ORD SLCTV ABDL PEL/LXTR BRNCH Right 04/06/2021    Procedure: ARTERIOGRAM, carotid Angio;  Surgeon: Maximiliano Morin MD;  Location: [facility];  Service: Vascular    LA TEAEC W/PATCH GRF CAROTID VERTB SUBCLAV NECK INC Right 04/13/2021    Procedure: RIGHT ENDARTERECTOMY ARTERY CAROTID WITH BOVINE PATCH;  Surgeon: Maximiliano Morin MD;  Location: [facility];  Service: Vascular         PE:   Vitals:    06/24/24 0320 06/24/24 0325 06/24/24 0330 06/24/24 0345   BP:   137/94 (!) 179/97   BP Location:   Left arm Left arm   Pulse: (!) 108 (!) 106 (!) 108 104   Resp: (!) 28 (!) 26 22 20   Temp:       TempSrc:       SpO2: 95% 95% 97% 98%   Weight:           Constitutional: Vital signs are normal. He appears well-developed. He is cooperative. No distress. Diaphoretic.  HENT:   Mouth/Throat: Uvula is midline, oropharynx is clear and moist and mucous membranes are normal.   Eyes: Pupils are equal, round, and reactive to light. Conjunctivae and EOM are normal.   Neck: Trachea normal. No thyroid mass and no thyromegaly present.   Cardiovascular: Tachycardic, irregularly irregular rhythm, normal heart sounds.   No murmur heard.  Pulmonary/Chest: Effort normal and breath sounds normal.   Abdominal: Soft. Normal appearance and bowel sounds are normal. There is no tenderness. There is no rebound, no guarding.   Neurological: He is alert.  Severe expressive aphasia.  Follows all commands.  Extraocular movements are normal.  Normal  finger-nose and heel-to-shin.  Strength 5 out of 5 throughout.  No drift in bilateral upper or lower extremities.  NIH 2.  Skin: Skin is warm, dry and intact.         A:  -68-year-old male presents with lightheadedness, rapid A-fib, found to have strokelike symptoms.    P:  -Stroke alert called.  -Follow-up with neurology recommendations.  Patient not a TNK candidate due to Eliquis.  -CBC to eval for evidence of marked leukocytosis or anemia.  -CMP to evaluate electrolytes and renal function.  -EKG to evaluate for arrhythmia.  -CTA head and neck to evaluate for ischemic stroke.  -Likely permissive hypertension.  Will perform rate control for his A-fib.      - 13 point ROS was performed and all are normal unless stated in the history above.   - Nursing note reviewed. Vitals reviewed.   - Orders placed by myself and/or advanced practitioner / resident.    - Previous chart was reviewed  - No language barrier.   - History obtained from patient.   - There are limitations to the history obtained. Reasons ROS could not be obtained: AMS  - Critical care time: 45 minutes.   - Patient does not need initiation of IV thrombolytics:  NIHSS Score = 2; Bleeding risk 2/2 eliquis.    ED Course as of 06/24/24 0419   Mon Jun 24, 2024   0316 CTA stroke alert (head/neck)  No acute intracranial abnormality as interpreted by myself.     Medications   albuterol (PROVENTIL HFA,VENTOLIN HFA) inhaler 2 puff (has no administration in time range)   umeclidinium-vilanterol 62.5-25 mcg/actuation inhaler 1 puff (has no administration in time range)   buPROPion (WELLBUTRIN XL) 24 hr tablet 150 mg (has no administration in time range)   busPIRone (BUSPAR) tablet 15 mg (has no administration in time range)   donepezil (ARICEPT) tablet 5 mg (has no administration in time range)   pantoprazole (PROTONIX) EC tablet 20 mg (has no administration in time range)   sertraline (ZOLOFT) tablet 100 mg (has no administration in time range)   atorvastatin  (LIPITOR) tablet 40 mg (has no administration in time range)   aspirin tablet 325 mg (has no administration in time range)   clopidogrel (PLAVIX) tablet 300 mg (has no administration in time range)   heparin (porcine) subcutaneous injection 5,000 Units (has no administration in time range)   multi-electrolyte (PLASMALYTE-A/ISOLYTE-S PH 7.4) IV solution (has no administration in time range)   verapamil (FOR EMS ONLY) (ISOPTIN) injection 5 mg (0 mg Does not apply Given to EMS 6/24/24 0232)   sodium chloride 0.9 % bolus 1,000 mL (1,000 mL Intravenous New Bag 6/24/24 0300)   metoprolol (LOPRESSOR) injection 5 mg (5 mg Intravenous Given 6/24/24 0302)   iohexol (OMNIPAQUE) 350 MG/ML injection (MULTI-DOSE) 85 mL (85 mL Intravenous Given 6/24/24 0259)     CTA stroke alert (head/neck)   Final Result      1.  Extensive calcific atherosclerosis of the aortic arch, proximal branch vessels, and cervical carotid/vertebral arteries again seen.   2.  Moderate (50 to 70%) luminal narrowing of the brachiocephalic artery due to calcified plaque.   4.  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  Short segment of severe luminal narrowing of the proximal left internal carotid artery due to mixed plaque is again seen without interval change. Moderate to severe luminal    narrowing of the distal left vertebral artery V4 segment due to calcified plaque is also seen. Patent right vertebral artery without occlusion or significant stenosis.   5.  Complete occlusion of the right mid to distal common carotid artery with reconstitution at the carotid bifurcation region. Moderate (50 to 70%) luminal narrowing of the proximal to mid right cervical internal carotid artery. Segments of severe (>90%)    luminal narrowing due to calcified wall plaque in the right internal carotid artery in the carotid canal region. Extensive calcific atherosclerosis with severe luminal narrowing of the right internal carotid artery  cavernous and supraclinoid segments    which appear markedly worse compared to the prior study. There is near complete occlusion but a tiny amount of string-like flow seen within these segments.   6.  Moderate (50 to 70%) luminal narrowing of the proximal to mid left common carotid artery due to calcific atherosclerosis.  Mild luminal narrowing of the proximal left internal carotid artery and left carotid canal/cavernous segments due to mild   calcific wall plaque.   7. Chronic occlusion involving a right M2 branch in the anterior sylvian fissure region noted.  Left M1 segment and proximal middle cerebral artery branches demonstrate normal enhancement. Right M1 segment is grossly patent but demonstrate slightly    decreased caliber/enhancement compared to the left which is new compared to the previous study.   8. There is fetal origin of the right posterior cerebral artery again seen. Asymmetrically diminished flow and caliber of the right posterior cerebral artery compared to the left noted, new since the prior study. This is likely related to interval    progression of near occlusive right intracranial ICA disease and fetal origin of the right PCA.   9. No enhancing brain mass/fluid collection or vascular malformation. No enhancing soft tissue neck mass/fluid collection or cervical lymphadenopathy.               Findings were directly discussed with Benjamin Fontaine  at 3:21 a.m.                           Workstation performed: [id number]         CT stroke alert brain   Final Result      No acute intracranial hemorrhage. Sequelae multiple old right-sided infarcts and senescent changes as described above, nonsignificantly changed compared to the prior study. Cannot exclude acute infarct in this patient on CT given these findings.  If    there is clinical concern for acute ischemia, recommend more sensitive MRI brain for better evaluation in this patient.      Findings were directly discussed with Benjamin Fontaine  at approximately  3:21 a.m.      Workstation performed: [id number]         MRI Inpatient Order    (Results Pending)     Orders Placed This Encounter   Procedures    Critical Care    FLU/RSV/COVID - if FLU/RSV clinically relevant    CT stroke alert brain    CTA stroke alert (head/neck)    MRI Inpatient Order    CBC and differential    Comprehensive metabolic panel    Lipase    Protime-INR    APTT    HS Troponin 0hr (reflex protocol)    HS Troponin I 2hr    HS Troponin I 4hr    Lipid Panel with Direct LDL reflex    Hemoglobin A1c w/EAG Estimation    Basic metabolic panel    Urinalysis with microscopic    Diet Cardiovascular; Cardiac    Continuous cardiac monitoring    Continuous pulse oximetry    Neuro checks    Weigh patient and record    Insert peripheral IV    Nursing dysphagia Screen    Nasal cannula oxygen    Fingerstick Glucose (POCT)    24 Hour Telemetry Monitoring    Vital Signs q 1h x 4 hours    Vital Signs q 2 h x 8 hours    Vital Signs q 4h x 72 hours    Vital Signs q 8h if stable    Notify physician    Up and OOB as tolerated    I/O    Neuro checks    Provide Stroke Education to Patients    Nursing dysphagia screen prior to starting diet    Baseline NIH stroke scale on Admission    Reassess NIH stroke scale every 24 hours for 2 days    NIH stroke scale at Discharge    Apply SCD or Foot pumps    Intake and Output    Daily weights    Bladder Scan    Urinary retention protocol    Medication hold parameters, if not noted on medication order    Level 1-Full Code: all life saving measures are indicated    Consult to Neurology    Inpatient consult to Case Management    Inpatient Consult to Nutrition Services    Contact and airborne isolation status    OT eval and treat    PT eval and treat    SPEECH Language eval and treatment    ECG 12 lead    Echo complete w/ contrast if indicated    INPATIENT ADMISSION     Labs Reviewed   CBC AND DIFFERENTIAL - Abnormal       Result Value Ref Range Status    WBC 11.30 (*) 4.31 - 10.16  Thousand/uL Final    RBC 6.53 (*) 3.88 - 5.62 Million/uL Final    Hemoglobin 17.8 (*) 12.0 - 17.0 g/dL Final    Hematocrit 53.8 (*) 36.5 - 49.3 % Final    MCV 82  82 - 98 fL Final    MCH 27.3  26.8 - 34.3 pg Final    MCHC 33.1  31.4 - 37.4 g/dL Final    RDW 14.6  11.6 - 15.1 % Final    MPV 11.3  8.9 - 12.7 fL Final    Platelets 209  149 - 390 Thousands/uL Final    nRBC 0  /100 WBCs Final    Segmented % 73  43 - 75 % Final    Immature Grans % 1  0 - 2 % Final    Lymphocytes % 16  14 - 44 % Final    Monocytes % 10  4 - 12 % Final    Eosinophils Relative 0  0 - 6 % Final    Basophils Relative 0  0 - 1 % Final    Absolute Neutrophils 8.24 (*) 1.85 - 7.62 Thousands/µL Final    Absolute Immature Grans 0.06  0.00 - 0.20 Thousand/uL Final    Absolute Lymphocytes 1.80  0.60 - 4.47 Thousands/µL Final    Absolute Monocytes 1.14  0.17 - 1.22 Thousand/µL Final    Eosinophils Absolute 0.02  0.00 - 0.61 Thousand/µL Final    Basophils Absolute 0.04  0.00 - 0.10 Thousands/µL Final   COMPREHENSIVE METABOLIC PANEL - Abnormal    Sodium 141  135 - 147 mmol/L Final    Potassium 4.3  3.5 - 5.3 mmol/L Final    Comment: Slightly Hemolyzed:Results may be affected.    Chloride 100  96 - 108 mmol/L Final    CO2 23  21 - 32 mmol/L Final    ANION GAP 18 (*) 4 - 13 mmol/L Final    BUN 36 (*) 5 - 25 mg/dL Final    Creatinine 2.17 (*) 0.60 - 1.30 mg/dL Final    Comment: Standardized to IDMS reference method    Glucose 157 (*) 65 - 140 mg/dL Final    Comment: If the patient is fasting, the ADA then defines impaired fasting glucose as > 100 mg/dL and diabetes as > or equal to 123 mg/dL.    Calcium 10.0  8.4 - 10.2 mg/dL Final    AST 46 (*) 13 - 39 U/L Final    Comment: Slightly Hemolyzed:Results may be affected.    ALT 21  7 - 52 U/L Final    Comment: Specimen collection should occur prior to Sulfasalazine administration due to the potential for falsely depressed results.     Alkaline Phosphatase 78  34 - 104 U/L Final    Total Protein 7.6  6.4 - 8.4  g/dL Final    Albumin 4.4  3.5 - 5.0 g/dL Final    Total Bilirubin 0.64  0.20 - 1.00 mg/dL Final    Comment: Use of this assay is not recommended for patients undergoing treatment with eltrombopag due to the potential for falsely elevated results.  N-acetyl-p-benzoquinone imine (metabolite of Acetaminophen) will generate erroneously low results in samples for patients that have taken an overdose of Acetaminophen.    eGFR 30  ml/min/1.73sq m Final    Narrative:     National Kidney Disease Foundation guidelines for Chronic Kidney Disease (CKD):     Stage 1 with normal or high GFR (GFR > 90 mL/min/1.73 square meters)    Stage 2 Mild CKD (GFR = 60-89 mL/min/1.73 square meters)    Stage 3A Moderate CKD (GFR = 45-59 mL/min/1.73 square meters)    Stage 3B Moderate CKD (GFR = 30-44 mL/min/1.73 square meters)    Stage 4 Severe CKD (GFR = 15-29 mL/min/1.73 square meters)    Stage 5 End Stage CKD (GFR <15 mL/min/1.73 square meters)  Note: GFR calculation is accurate only with a steady state creatinine   PROTIME-INR - Abnormal    Protime 15.4 (*) 11.6 - 14.5 seconds Final    INR 1.20 (*) 0.84 - 1.19 Final   APTT - Abnormal    PTT 42 (*) 23 - 37 seconds Final    Comment: Therapeutic Heparin Range =  60-90 seconds   POCT GLUCOSE - Abnormal    POC Glucose 175 (*) 65 - 140 mg/dl Final   COVID19, INFLUENZA A/B, RSV PCR, [facility] - Normal    SARS-CoV-2 Negative  Negative Final    Comment:      INFLUENZA A PCR Negative  Negative Final    Comment:      INFLUENZA B PCR Negative  Negative Final    Comment:      RSV PCR Negative  Negative Final    Comment:      Narrative:     FOR PEDIATRIC PATIENTS - copy/paste COVID Guidelines URL to browser: [email or web address]    SARS-CoV-2 assay is a Nucleic Acid Amplification assay intended for the  qualitative detection of nucleic acid from SARS-CoV-2 in nasopharyngeal  swabs. Results are for the presumptive identification of SARS-CoV-2  "RNA.    Positive results are indicative of infection with SARS-CoV-2, the virus  causing COVID-19, but do not rule out bacterial infection or co-infection  with other viruses. Laboratories within the [place] and its  territories are required to report all positive results to the appropriate  public health authorities. Negative results do not preclude SARS-CoV-2  infection and should not be used as the sole basis for treatment or other  patient management decisions. Negative results must be combined with  clinical observations, patient history, and epidemiological information.  This test has not been FDA cleared or approved.    This test has been authorized by FDA under an Emergency Use Authorization  (EUA). This test is only authorized for the duration of time the  declaration that circumstances exist justifying the authorization of the  emergency use of an in vitro diagnostic tests for detection of SARS-CoV-2  virus and/or diagnosis of COVID-19 infection under section 564(b)(1) of  the Act, 21 U.S.C. 360bbb-3(b)(1), unless the authorization is terminated  or revoked sooner. The test has been validated but independent review by FDA  and CLIA is pending.    Test performed using [employer]: This RT-PCR assay targets N2,  a region unique to SARS-CoV-2. A conserved region in the E-gene was chosen  for pan-Sarbecovirus detection which includes SARS-CoV-2.    According to CMS-2020-01-R, this platform meets the definition of high-throughput technology.   LIPASE - Normal    Lipase 27  11 - 82 u/L Final   HS TROPONIN I 0HR - Normal    hs TnI 0hr 36  \"Refer to ACS Flowchart\"- see link ng/L Final    Comment:                                              Initial (time 0) result  If >=50 ng/L, Myocardial injury suggested ;  Type of myocardial injury and treatment strategy  to be determined.  If 5-49 ng/L, a delta result at 2 hours and or 4 hours will be needed to further evaluate.  If <4 ng/L, and chest pain has been " >3 hours since onset, patient may qualify for discharge based on the HEART score in the ED.  If <5 ng/L and <3hours since onset of chest pain, a delta result at 2 hours will be needed to further evaluate.    HS Troponin 99th Percentile URL of a Health Population=12 ng/L with a 95% Confidence Interval of 8-18 ng/L.    Second Troponin (time 2 hours)  If calculated delta >= 20 ng/L,  Myocardial injury suggested ; Type of myocardial injury and treatment strategy to be determined.  If 5-49 ng/L and the calculated delta is 5-19 ng/L, consult medical service for evaluation.  Continue evaluation for ischemia on ecg and other possible etiology and repeat hs troponin at 4 hours.  If delta is <5 ng/L at 2 hours, consider discharge based on risk stratification via the HEART score (if in ED), or DON risk score in IP/Observation.    HS Troponin 99th Percentile URL of a Health Population=12 ng/L with a 95% Confidence Interval of 8-18 ng/L.   HS TROPONIN I 2HR   LIPID PANEL WITH DIRECT LDL REFLEX   HEMOGLOBIN A1C   BASIC METABOLIC PANEL   URINALYSIS WITH MICROSCOPIC   HS TROPONIN I 4HR     Time reflects when diagnosis was documented in both MDM as applicable and the Disposition within this note       Time User Action Codes Description Comment    6/24/2024  2:42 AM George Johns Add [R29.90] Stroke-like episode     6/24/2024  3:53 AM Yesi Smith Add [I48.91] Atrial fibrillation with RVR (HCC)           ED Disposition       ED Disposition   Admit    Condition   Stable    Date/Time   Mon Jun 24, 2024  3:53 AM    Comment   Case was discussed with CHRISTINA and the patient's admission status was agreed to be Admission Status: inpatient status to the service of Dr. Cadena .               Follow-up Information    None       Patient's Medications   Discharge Prescriptions    No medications on file     No discharge procedures on file.  Prior to Admission Medications   Prescriptions Last Dose Informant Patient Reported? Taking?   Anoro  "Ellipta 62.5-25 MCG/ACT inhaler Unknown  Yes No   Sig: INHALE 1 PUFF BY MOUTH AND INTO THE LUNGS ONCE DAILY AT THE SAME TIME EACH DAY   acetaminophen (TYLENOL) 500 mg tablet   Yes No   Sig: Take 500 mg by mouth daily as needed for headaches, mild pain or moderate pain. Indications: Pain   albuterol (PROVENTIL HFA,VENTOLIN HFA) 90 mcg/act inhaler Unknown  Yes No   Sig: Inhale 2 puffs if needed   amLODIPine-benazepril (LOTREL 5-10) 5-10 MG per capsule Unknown  Yes No   Sig: Take 1 capsule by mouth daily   apixaban (ELIQUIS) 5 mg 6/23/2024 Self No Yes   Sig: Take 1 tablet (5 mg total) by mouth 2 (two) times a day   aspirin 81 mg chewable tablet 6/23/2024  No Yes   Sig: Chew 1 tablet (81 mg total) daily   baclofen 10 mg tablet Unknown  Yes No   Sig: Take 10 mg by mouth in the morning PRN   buPROPion (WELLBUTRIN XL) 150 mg 24 hr tablet Unknown  Yes No   Sig: Take 150 mg by mouth daily   busPIRone (BUSPAR) 15 mg tablet Unknown Self Yes No   Sig: 15 mg 2 (two) times a day   donepezil (ARICEPT) 5 mg tablet Unknown  Yes No   Sig: Take 5 mg by mouth every evening   omeprazole (PriLOSEC) 20 mg delayed release capsule Unknown Self Yes No   Sig: Take 20 mg by mouth daily   pravastatin (PRAVACHOL) 40 mg tablet Unknown  Yes No   Sig: Take 40 mg by mouth daily   sertraline (ZOLOFT) 100 mg tablet Unknown Self Yes No   Sig: Take 100 mg by mouth daily     varenicline (Chantix) 1 mg tablet Not Taking  Yes No   Sig: Take 1 mg by mouth 2 (two) times a day   Patient not taking: Reported on 6/24/2024      Facility-Administered Medications: None       Portions of the record may have been created with voice recognition software. Occasional wrong word or \"sound a like\" substitutions may have occurred due to the inherent limitations of voice recognition software. Read the chart carefully and recognize, using context, where substitutions have occurred.      ED Course  ED Course as of 06/24/24 0419   Mon Jun 24, 2024   0316 CTA stroke alert " (head/neck)  No acute intracranial abnormality as interpreted by myself.         Critical Care Time  CriticalCare Time    Date/Time: 6/24/2024 2:59 AM    Performed by: George Johns MD  Authorized by: George Johns MD    Critical care provider statement:     Critical care time (minutes):  45    Critical care time was exclusive of:  Separately billable procedures and treating other patients    Critical care was necessary to treat or prevent imminent or life-threatening deterioration of the following conditions:  CNS failure or compromise    Critical care was time spent personally by me on the following activities:  Obtaining history from patient or surrogate, development of treatment plan with patient or surrogate, discussions with consultants, evaluation of patient's response to treatment, examination of patient, review of old charts, re-evaluation of patient's condition, ordering and review of radiographic studies and ordering and review of laboratory studies    I assumed direction of critical care for this patient from another provider in my specialty: no

## 2024-06-24 NOTE — CERTIFIED RECOVERY SPECIALIST
I met with Abhishek to offer support and resources for his alcohol use. He claims he stopped drinking a month ago because he wasn't feeling well. He said he doesn't go to any fellowship groups and is doing well. He doesn't need any resources but was grateful for the support.

## 2024-06-24 NOTE — PLAN OF CARE
Problem: OCCUPATIONAL THERAPY ADULT  Goal: Performs self-care activities at highest level of function for planned discharge setting.  See evaluation for individualized goals.  Description: Treatment Interventions: ADL retraining, Functional transfer training, UE strengthening/ROM, Endurance training, Patient/family training, Equipment evaluation/education, Neuromuscular reeducation, Compensatory technique education, Energy conservation, Activityengagement          See flowsheet documentation for full assessment, interventions and recommendations.   Outcome: Progressing  Note: Limitation: Decreased ADL status, Decreased UE strength, Decreased Safe judgement during ADL, Decreased endurance, Decreased sensation  Prognosis: Good  Assessment: Abhishek Tom is a 68 y.o. male seen for OT evaluation s/p admit to [facility] on 6/24/2024 w/ Expressive aphasia.  Comorbidities affecting pt's functional performance at time of assessment include:  prior strokes, atrial fibrillation on Eliquis, CKD, hypertension, COPD, prior alcohol use  . Pt with active OT orders and activity orders for Up as tolerated. Personal factors affecting pt at time of IE include:EVELYNE home environment, limited home support, difficulty performing ADLs, difficulty performing IADLs, and difficulty performing transfers/mobility. Prior to admission, pt lives alone in a single level mobile home with 3 EVELYNE with rails. Has a tub shower with grab bars and chair. Standard toilet. Was I with ADLs, IADLs and mobility with no device. Owns a cane. Drives. 1 fall. Upon evaluation: Pt currently requires Adarsh for UB ADLs, supervision for LB ADLs, supervision for toileting, Adarsh for bed mobility, supervision for functional transfers, and CGA mobility 2* the following deficits impacting occupational performance:weakness, decreased strength , decreased balance, and decreased activity tolerance.  Pt to benefit from continued skilled OT tx while in the hospital to address deficits  as defined above and maximize level of functional independence w ADL's and functional mobility. Occupational Performance areas to address include: grooming, bathing/shower, toilet hygiene, dressing, health maintenance, functional mobility, and clothing management. From OT standpoint, recommendation at time of d/c would be level 3 resources. OT to follow pt on caseload 2-3x/wk.     Rehab Resource Intensity Level, OT: III (Minimum Resource Intensity)
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
## 2024-06-24 NOTE — QUICK NOTE
Patient not seen, recommendation based on information provided by ED physician over the phone    Stroke alert called:  0250 on 6/24  Neurology response immediate  LKW: 0245 on 6/24  NIHSS 2  for expressive aphasia but neuro exam otherwise intact  per ED exam by Dr. Johns     CTH showed no acute abnormality. CTA: no new LVO.    IVtPA: TNK Decision: no, on AC  Thrombectomy: no, no new LVO    A/P   67 yo hx of Afib on eliquis, who came after felt lightheaded. In ED, he was found to have Afib with RVR. Reported he then developed expressive aphasia around 245AM.     BP (!) 200/93 (BP Location: Right arm)   Pulse (!) 161   Temp 98.2 °F (36.8 °C) (Oral)   Resp 20   Wt 65 kg (143 lb 4.8 oz)   SpO2 96%   BMI 21.16 kg/m²     Recommend admit under stroke protocol  Permissive HTN with -220  Please load with ASA and plavix  Will need to hold Eliquis and repeat CTH in 6 hours. If no new large stroke, please continue eliquis and plavix  Routine EEG  MRI brain without contrast routine    Discussed with ED physician Dr. Johns at time of alert    Other comorbidity or condition, will defer to the primary team or ED physician

## 2024-06-24 NOTE — ASSESSMENT & PLAN NOTE
Hx of CVA and was admitted for possible TIA in March   Has chronic left sided facial droop per patient   See plan above

## 2024-06-24 NOTE — ASSESSMENT & PLAN NOTE
History of alcohol abuse  Reports no drink in the last several months  Supportive care and monitor for any withdrawal symptoms

## 2024-06-24 NOTE — SPEECH THERAPY NOTE
"Speech Language/Pathology  Speech/Language Pathology  Assessment    Patient Name: Abhishek Tom  Today's Date: 6/24/2024     Problem List  Principal Problem:    Stroke-like symptoms  Active Problems:    Alcohol abuse    COPD (chronic obstructive pulmonary disease) (HCC)    Benign essential hypertension    Acute-on-chronic kidney injury  (HCC)    Atrial fibrillation with rapid ventricular response (HCC)    Past Medical History  Past Medical History:   Diagnosis Date    A-fib (HCC)     PRITI (acute kidney injury) (HCC) 09/05/2021    Anxiety     Back pain     Claustrophobia     COPD (chronic obstructive pulmonary disease) (HCC)     Dysphagia 09/05/2021    Fatty liver     GERD (gastroesophageal reflux disease)     History of transfusion     Hyperlipidemia     Hypertension     Hypokalemia 02/22/2022    Lumbar back pain     Panic attacks     Stenosis of right carotid artery     Stroke (HCC)     Wears glasses     Wears partial dentures     upper denture     Past Surgical History  Past Surgical History:   Procedure Laterality Date    COLONOSCOPY      ERCP      IR CEREBRAL ANGIOGRAPHY  04/06/2021    OTHER SURGICAL HISTORY      fertility    CA LAPAROSCOPY SURG CHOLECYSTECTOMY N/A 8/26/2022    Procedure: CHOLECYSTECTOMY LAPAROSCOPIC;  Surgeon: Michael Otoole DO;  Location: [facility];  Service: General    CA SLCTV CATHJ 3RD+ ORD SLCTV ABDL PEL/LXTR BRNCH Right 04/06/2021    Procedure: ARTERIOGRAM, carotid Angio;  Surgeon: Maximiliano Morin MD;  Location: [facility];  Service: Vascular    CA TEAEC W/PATCH GRF CAROTID VERTB SUBCLAV NECK INC Right 04/13/2021    Procedure: RIGHT ENDARTERECTOMY ARTERY CAROTID WITH BOVINE PATCH;  Surgeon: Maximiliano Morin MD;  Location: [facility];  Service: Vascular          Bedside Swallow Evaluation:    Summary:  Pt presented w/ clear and fluent speech this am. \"I was having trouble communicating\". Proceeded to communicate at length about his girlfriend and his previus strokes.  No " paraphasias or dysarthria. (Spoke w/ PT. Speech clear and fluent during their session also) Noted mild left labial droop/weakness that he stated is not new. No lingual deviation. He was holding his breakfast plate on his lap and feeding self an omelette and [place] toast. Able to cut the toast w/ his fork. Mastication was WNL despite being edentulous. Bolus control and transfer WNL. Swallows prompt. No cough or wet vocal quality. Also had coffee.   BP was elevated while I was in the room, though not as high as it was on admit. . Unsure if speech may be fluctuating w/ variations in BP vs other. Oral/pharyngeal stages WNL.    Recommendations:  Diet:regular  Liquid:thin  Meds:as tolerated  Supervision: none  Positioning:Upright  Pt to take PO/Meds only when fully alert and upright.   Oral care  Aspiration precautions  Reflux precautions  Eval only, No f/u tx indicated. Will check MRI. No speech needs at this time.     Consider consult w/:  Rehab  Neurology  Nutrition    Goal(s):  Pt will tolerate least restrictive diet w/out s/s aspiration or oral/pharyngeal difficulties.     H&P/Admit info/ pertinent provider notes: (PMH noted above)  Chief Complaint: Dizziness  History of Present Illness:  Abhishek Tom is a 68 y.o. male with a PMH of prior strokes, atrial fibrillation on Eliquis, CKD, hypertension, COPD, prior alcohol use history who presents with dizziness when standing for the past week.  He fell onto the couch today prompting him to call EMS.  EMS noted that patient was in rapid A-fib with RVR.  Upon presentation to the hospital, patient was oriented x 4 and able to communicate without any neurological deficits.  Upon initial evaluation with nursing staff and resident patient was without any neurological deficits however upon attending evaluation, patient was noted to have severe expressive aphasia for which stroke alert was called.  Vital signs significant for -160s on presentation, blood pressure 200s  systolic.  Labs significant for WBC 1.3, creatinine 2.17 elevated from baseline, troponin 36, COVID/flu/RSV negative.  CT head unremarkable.  CTA without any new LVO.  Neurology recommends admission for stroke pathway.  Patient unable to recall what medications he takes and states that he takes everything that is prescribed and discharge.      Special Studies:  MRI pending  CTA stroke alert: 6/24  1.  Extensive calcific atherosclerosis of the aortic arch, proximal branch vessels, and cervical carotid/vertebral arteries again seen.  2.  Moderate (50 to 70%) luminal narrowing of the brachiocephalic artery due to calcified plaque.  4.  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  Short segment of severe luminal narrowing of the proximal left internal carotid artery due to mixed plaque is again seen without interval change. Moderate to severe luminal   narrowing of the distal left vertebral artery V4 segment due to calcified plaque is also seen. Patent right vertebral artery without occlusion or significant stenosis.  5.  Complete occlusion of the right mid to distal common carotid artery with reconstitution at the carotid bifurcation region. Moderate (50 to 70%) luminal narrowing of the proximal to mid right cervical internal carotid artery. Segments of severe (>90%)   luminal narrowing due to calcified wall plaque in the right internal carotid artery in the carotid canal region. Extensive calcific atherosclerosis with severe luminal narrowing of the right internal carotid artery cavernous and supraclinoid segments   which appear markedly worse compared to the prior study. There is near complete occlusion but a tiny amount of string-like flow seen within these segments.  6.  Moderate (50 to 70%) luminal narrowing of the proximal to mid left common carotid artery due to calcific atherosclerosis.  Mild luminal narrowing of the proximal left internal carotid artery and left carotid  canal/cavernous segments due to mild  calcific wall plaque.  7. Chronic occlusion involving a right M2 branch in the anterior sylvian fissure region noted.  Left M1 segment and proximal middle cerebral artery branches demonstrate normal enhancement. Right M1 segment is grossly patent but demonstrate slightly   decreased caliber/enhancement compared to the left which is new compared to the previous study.  8. There is fetal origin of the right posterior cerebral artery again seen. Asymmetrically diminished flow and caliber of the right posterior cerebral artery compared to the left noted, new since the prior study. This is likely related to interval   progression of near occlusive right intracranial ICA disease and fetal origin of the right PCA.  9. No enhancing brain mass/fluid collection or vascular malformation. No enhancing soft tissue neck mass/fluid collection or cervical lymphadenopathy.  CT stroke alert: 6/24  No acute intracranial hemorrhage. Sequelae multiple old right-sided infarcts and senescent changes as described above, nonsignificantly changed compared to the prior study. Cannot exclude acute infarct in this patient on CT given these findings.  If   there is clinical concern for acute ischemia, recommend more sensitive MRI brain for better evaluation in this patient.    Procalcitonin:    WBC:   11.3  6/24     Previous MBS:  none  EGD 9/16/21:  IMPRESSION:  3cm hiatal hernia  Normal esophagus, stomach and duodenum  No source of melena   RECOMMENDATION:  If agreeable to colonoscopy, can plan to do this tomorrow     Patient's goal: none stated    Did the pt report pain? no  If yes, was nursing notified/was it addressed? N/a    Reason for consult:  R/o aspiration  Determine safest and least restrictive diet  New neuro event  Stroke protocol  H/o neurological disease  respiratory compromise    Precautions:  Fall    Food Allergies:  none   Current Diet:  regular   Premorbid diet:  regular   O2 requirement:   none   Social/Prior living  Lives alone   Voice/Speech:  Wnl this session   Follows commands:  yes   Cognitive status:  alert     Oral [facility] exam:  Dentition:edentulous  Lips (VII):weak on left/chronic  Tongue (XII):+  Mandible (V):+  Secretion management:+    Esophageal stage:  No s/s reported  H/o GERD  H/o hiatal hernia    Results d/w:  Pt, nursing

## 2024-06-24 NOTE — PLAN OF CARE
Problem: Potential for Falls  Goal: Patient will remain free of falls  Description: INTERVENTIONS:  - Educate patient/family on patient safety including physical limitations  - Instruct patient to call for assistance with activity   - Consult OT/PT to assist with strengthening/mobility   - Keep Call bell within reach  - Keep bed low and locked with side rails adjusted as appropriate  - Keep care items and personal belongings within reach  - Initiate and maintain comfort rounds  - Make Fall Risk Sign visible to staff  - Offer Toileting every  Hours, in advance of need  - Initiate/Maintain alarm  - Obtain necessary fall risk management equipment:  - Apply yellow socks and bracelet for high fall risk patients  - Consider moving patient to room near nurses station  Outcome: Progressing     Problem: PAIN - ADULT  Goal: Verbalizes/displays adequate comfort level or baseline comfort level  Description: Interventions:  - Encourage patient to monitor pain and request assistance  - Assess pain using appropriate pain scale  - Administer analgesics based on type and severity of pain and evaluate response  - Implement non-pharmacological measures as appropriate and evaluate response  - Consider cultural and social influences on pain and pain management  - Notify physician/advanced practitioner if interventions unsuccessful or patient reports new pain  Outcome: Progressing     Problem: DISCHARGE PLANNING  Goal: Discharge to home or other facility with appropriate resources  Description: INTERVENTIONS:  - Identify barriers to discharge w/patient and caregiver  - Arrange for needed discharge resources and transportation as appropriate  - Identify discharge learning needs (meds, wound care, etc.)  - Arrange for interpretive services to assist at discharge as needed  - Refer to Case Management Department for coordinating discharge planning if the patient needs post-hospital services based on physician/advanced practitioner order or  complex needs related to functional status, cognitive ability, or social support system  Outcome: Progressing     Problem: Knowledge Deficit  Goal: Patient/family/caregiver demonstrates understanding of disease process, treatment plan, medications, and discharge instructions  Description: Complete learning assessment and assess knowledge base.  Interventions:  - Provide teaching at level of understanding  - Provide teaching via preferred learning methods  Outcome: Progressing     Problem: CARDIOVASCULAR - ADULT  Goal: Maintains optimal cardiac output and hemodynamic stability  Description: INTERVENTIONS:  - Monitor I/O, vital signs and rhythm  - Monitor for S/S and trends of decreased cardiac output  - Administer and titrate ordered vasoactive medications to optimize hemodynamic stability  - Assess quality of pulses, skin color and temperature  - Assess for signs of decreased coronary artery perfusion  - Instruct patient to report change in severity of symptoms  Outcome: Progressing  Goal: Absence of cardiac dysrhythmias or at baseline rhythm  Description: INTERVENTIONS:  - Continuous cardiac monitoring, vital signs, obtain 12 lead EKG if ordered  - Administer antiarrhythmic and heart rate control medications as ordered  - Monitor electrolytes and administer replacement therapy as ordered  Outcome: Progressing     Problem: METABOLIC, FLUID AND ELECTROLYTES - ADULT  Goal: Fluid balance maintained  Description: INTERVENTIONS:  - Monitor labs   - Monitor I/O and WT  - Instruct patient on fluid and nutrition as appropriate  - Assess for signs & symptoms of volume excess or deficit  Outcome: Progressing     Problem: Neurological Deficit  Goal: Neurological status is stable or improving  Description: Interventions:  - Monitor and assess patient's level of consciousness, motor function, sensory function, and level of assistance needed for ADLs.   - Monitor and report changes from baseline. Collaborate with  interdisciplinary team to initiate plan and implement interventions as ordered.   - Provide and maintain a safe environment.  - Consider seizure precautions.  - Consider fall precautions.  - Consider aspiration precautions.  - Consider bleeding precautions.  Outcome: Progressing     Problem: Communication Impairment  Goal: Ability to express needs and understand communication  Description: Assess patient's communication skills and ability to understand information.  Patient will demonstrate use of effective communication techniques, alternative methods of communication and understanding even if not able to speak.     - Encourage communication and provide alternate methods of communication as needed.  - Collaborate with case management/ for discharge needs.  - Include patient/family/caregiver in decisions related to communication.  Outcome: Progressing

## 2024-06-24 NOTE — ASSESSMENT & PLAN NOTE
Presented with atrial fibrillation with RVR on admission, HR stable now  Follow up with cardiology outpatient   Resumed Eliquis

## 2024-06-24 NOTE — ASSESSMENT & PLAN NOTE
Continuity of care record request faxed to [facility] at [phone].   Permissive hypertension given stroke pathway  Hold Lotrel

## 2024-06-25 VITALS
SYSTOLIC BLOOD PRESSURE: 164 MMHG | OXYGEN SATURATION: 98 % | TEMPERATURE: 97.8 F | DIASTOLIC BLOOD PRESSURE: 86 MMHG | HEART RATE: 127 BPM | RESPIRATION RATE: 17 BRPM | BODY MASS INDEX: 21.75 KG/M2 | HEIGHT: 69 IN | WEIGHT: 146.83 LBS

## 2024-06-25 LAB
ANION GAP SERPL CALCULATED.3IONS-SCNC: 8 MMOL/L (ref 4–13)
BASOPHILS # BLD AUTO: 0.03 THOUSANDS/ÂΜL (ref 0–0.1)
BASOPHILS NFR BLD AUTO: 0 % (ref 0–1)
BUN SERPL-MCNC: 26 MG/DL (ref 5–25)
CALCIUM SERPL-MCNC: 8.7 MG/DL (ref 8.4–10.2)
CHLORIDE SERPL-SCNC: 102 MMOL/L (ref 96–108)
CHOLEST SERPL-MCNC: 176 MG/DL
CO2 SERPL-SCNC: 30 MMOL/L (ref 21–32)
CREAT SERPL-MCNC: 1.3 MG/DL (ref 0.6–1.3)
EOSINOPHIL # BLD AUTO: 0.1 THOUSAND/ÂΜL (ref 0–0.61)
EOSINOPHIL NFR BLD AUTO: 1 % (ref 0–6)
ERYTHROCYTE [DISTWIDTH] IN BLOOD BY AUTOMATED COUNT: 14 % (ref 11.6–15.1)
EST. AVERAGE GLUCOSE BLD GHB EST-MCNC: 108 MG/DL
GFR SERPL CREATININE-BSD FRML MDRD: 56 ML/MIN/1.73SQ M
GLUCOSE SERPL-MCNC: 84 MG/DL (ref 65–140)
HBA1C MFR BLD: 5.4 %
HCT VFR BLD AUTO: 44.3 % (ref 36.5–49.3)
HDLC SERPL-MCNC: 54 MG/DL
HGB BLD-MCNC: 14.5 G/DL (ref 12–17)
IMM GRANULOCYTES # BLD AUTO: 0.04 THOUSAND/UL (ref 0–0.2)
IMM GRANULOCYTES NFR BLD AUTO: 1 % (ref 0–2)
LDLC SERPL CALC-MCNC: 81 MG/DL (ref 0–100)
LYMPHOCYTES # BLD AUTO: 1.11 THOUSANDS/ÂΜL (ref 0.6–4.47)
LYMPHOCYTES NFR BLD AUTO: 15 % (ref 14–44)
MCH RBC QN AUTO: 27.4 PG (ref 26.8–34.3)
MCHC RBC AUTO-ENTMCNC: 32.7 G/DL (ref 31.4–37.4)
MCV RBC AUTO: 84 FL (ref 82–98)
MONOCYTES # BLD AUTO: 0.51 THOUSAND/ÂΜL (ref 0.17–1.22)
MONOCYTES NFR BLD AUTO: 7 % (ref 4–12)
NEUTROPHILS # BLD AUTO: 5.7 THOUSANDS/ÂΜL (ref 1.85–7.62)
NEUTS SEG NFR BLD AUTO: 76 % (ref 43–75)
NRBC BLD AUTO-RTO: 0 /100 WBCS
PLATELET # BLD AUTO: 114 THOUSANDS/UL (ref 149–390)
PMV BLD AUTO: 10.4 FL (ref 8.9–12.7)
POTASSIUM SERPL-SCNC: 3.2 MMOL/L (ref 3.5–5.3)
RBC # BLD AUTO: 5.29 MILLION/UL (ref 3.88–5.62)
SODIUM SERPL-SCNC: 140 MMOL/L (ref 135–147)
TRIGL SERPL-MCNC: 207 MG/DL
WBC # BLD AUTO: 7.49 THOUSAND/UL (ref 4.31–10.16)

## 2024-06-25 PROCEDURE — 99239 HOSP IP/OBS DSCHRG MGMT >30: CPT | Performed by: PHYSICIAN ASSISTANT

## 2024-06-25 PROCEDURE — 83036 HEMOGLOBIN GLYCOSYLATED A1C: CPT | Performed by: INTERNAL MEDICINE

## 2024-06-25 PROCEDURE — 85025 COMPLETE CBC W/AUTO DIFF WBC: CPT | Performed by: INTERNAL MEDICINE

## 2024-06-25 PROCEDURE — 99233 SBSQ HOSP IP/OBS HIGH 50: CPT | Performed by: PSYCHIATRY & NEUROLOGY

## 2024-06-25 PROCEDURE — 80048 BASIC METABOLIC PNL TOTAL CA: CPT | Performed by: INTERNAL MEDICINE

## 2024-06-25 PROCEDURE — 80061 LIPID PANEL: CPT | Performed by: INTERNAL MEDICINE

## 2024-06-25 RX ORDER — CARVEDILOL 12.5 MG/1
12.5 TABLET ORAL 2 TIMES DAILY WITH MEALS
Qty: 60 TABLET | Refills: 0 | Status: SHIPPED | OUTPATIENT
Start: 2024-06-25

## 2024-06-25 RX ORDER — POTASSIUM CHLORIDE 20 MEQ/1
20 TABLET, EXTENDED RELEASE ORAL ONCE
Status: COMPLETED | OUTPATIENT
Start: 2024-06-25 | End: 2024-06-25

## 2024-06-25 RX ORDER — CLOPIDOGREL BISULFATE 75 MG/1
75 TABLET ORAL DAILY
Qty: 30 TABLET | Refills: 0 | Status: SHIPPED | OUTPATIENT
Start: 2024-06-26 | End: 2024-06-25

## 2024-06-25 RX ORDER — CARVEDILOL 12.5 MG/1
12.5 TABLET ORAL 2 TIMES DAILY WITH MEALS
Status: DISCONTINUED | OUTPATIENT
Start: 2024-06-25 | End: 2024-06-25 | Stop reason: HOSPADM

## 2024-06-25 RX ORDER — ATORVASTATIN CALCIUM 40 MG/1
40 TABLET, FILM COATED ORAL EVERY EVENING
Qty: 30 TABLET | Refills: 0 | Status: SHIPPED | OUTPATIENT
Start: 2024-06-25

## 2024-06-25 RX ORDER — AMLODIPINE BESYLATE 5 MG/1
5 TABLET ORAL DAILY
Status: DISCONTINUED | OUTPATIENT
Start: 2024-06-25 | End: 2024-06-25 | Stop reason: HOSPADM

## 2024-06-25 RX ORDER — LISINOPRIL 10 MG/1
10 TABLET ORAL DAILY
Status: DISCONTINUED | OUTPATIENT
Start: 2024-06-25 | End: 2024-06-25 | Stop reason: HOSPADM

## 2024-06-25 RX ORDER — ATORVASTATIN CALCIUM 40 MG/1
40 TABLET, FILM COATED ORAL EVERY EVENING
Qty: 30 TABLET | Refills: 0 | Status: SHIPPED | OUTPATIENT
Start: 2024-06-25 | End: 2024-06-25

## 2024-06-25 RX ORDER — CLOPIDOGREL BISULFATE 75 MG/1
75 TABLET ORAL DAILY
Qty: 30 TABLET | Refills: 0 | Status: SHIPPED | OUTPATIENT
Start: 2024-06-26

## 2024-06-25 RX ADMIN — CARVEDILOL 12.5 MG: 12.5 TABLET, FILM COATED ORAL at 12:33

## 2024-06-25 RX ADMIN — AMLODIPINE BESYLATE 5 MG: 5 TABLET ORAL at 08:28

## 2024-06-25 RX ADMIN — LISINOPRIL 10 MG: 10 TABLET ORAL at 08:28

## 2024-06-25 RX ADMIN — BUPROPION HYDROCHLORIDE 150 MG: 150 TABLET, EXTENDED RELEASE ORAL at 08:28

## 2024-06-25 RX ADMIN — POTASSIUM CHLORIDE 20 MEQ: 1500 TABLET, EXTENDED RELEASE ORAL at 08:28

## 2024-06-25 RX ADMIN — UMECLIDINIUM BROMIDE AND VILANTEROL TRIFENATATE 1 PUFF: 62.5; 25 POWDER RESPIRATORY (INHALATION) at 08:32

## 2024-06-25 RX ADMIN — SODIUM CHLORIDE, SODIUM GLUCONATE, SODIUM ACETATE, POTASSIUM CHLORIDE, MAGNESIUM CHLORIDE, SODIUM PHOSPHATE, DIBASIC, AND POTASSIUM PHOSPHATE 100 ML/HR: .53; .5; .37; .037; .03; .012; .00082 INJECTION, SOLUTION INTRAVENOUS at 03:48

## 2024-06-25 RX ADMIN — BUSPIRONE HYDROCHLORIDE 15 MG: 10 TABLET ORAL at 08:28

## 2024-06-25 RX ADMIN — PANTOPRAZOLE SODIUM 20 MG: 20 TABLET, DELAYED RELEASE ORAL at 06:10

## 2024-06-25 RX ADMIN — CLOPIDOGREL BISULFATE 75 MG: 75 TABLET ORAL at 08:28

## 2024-06-25 RX ADMIN — SERTRALINE 100 MG: 100 TABLET, FILM COATED ORAL at 08:28

## 2024-06-25 RX ADMIN — APIXABAN 5 MG: 5 TABLET, FILM COATED ORAL at 08:28

## 2024-06-25 NOTE — DISCHARGE SUMMARY
[facility]  Discharge- Abhishek Tom 1955, 68 y.o. male MRN: [id number]  Unit/Bed#: E4 -01 Encounter: [id number]  Primary Care Provider: Laith Balderas DO   Date and time admitted to hospital: 6/24/2024  2:16 AM    Bilateral carotid artery stenosis  Assessment & Plan  Follows with vascular surgery outpatient last seen Feb 2024 with significant vascular disease, hx of Right CEA in 2021   Was started on asa 81 mg daily during hospitalization in March per neurology recs   Given he is on Eliquis and bleeding risk plan to discharge on Eliquis and plavix for atherosclerotic disease   Continue statin   Close outpt follow up with vascular surgery   Due for repeat carotid US in August     Atrial fibrillation with rapid ventricular response (HCC)  Assessment & Plan  Presented with atrial fibrillation with RVR on admission, HR stable now  Follow up with cardiology outpatient   Resumed Eliquis       PAF (paroxysmal atrial fibrillation) (HCC)  Assessment & Plan  I reviewed chart extensively with some confusion in his meds, he is supposed to be on carvedilol, starts last time he was admitted ot was not on his discharge - resume coreg 12.5 mg bid for rate control   I asked patient to call cardiology office to be seen in 1-2 weeks for close outpt follow up   Continue Eliquis 5 mg bid for stroke prevention     S/P carotid endarterectomy  Assessment & Plan  S/p right CEA in 2021   Ongoing close outpt follow up with vascular surgery   Continue eliquis, plavix, statin     Stage 3a chronic kidney disease (HCC)  Assessment & Plan  Lab Results   Component Value Date    EGFR 56 06/25/2024    EGFR 30 06/24/2024    EGFR 55 03/25/2024    CREATININE 1.30 06/25/2024    CREATININE 2.17 (H) 06/24/2024    CREATININE 1.31 (H) 03/25/2024   Returned to baseline today     Acute-on-chronic kidney injury  (HCC)  Assessment & Plan  Lab Results   Component Value Date    EGFR 56 06/25/2024    EGFR 30 06/24/2024    EGFR  55 03/25/2024    CREATININE 1.30 06/25/2024    CREATININE 2.17 (H) 06/24/2024    CREATININE 1.31 (H) 03/25/2024     Baseline creatinine between 1.1-1.4  Presented with creatinine of 2.17  Possibly prerenal etiology  Held lotrel   Renal function returned to baseline overnight   UA noting micropsic hematuria will need repeat UA in 4 weeks to document resolution   Follow up with renal outpatient       Benign essential hypertension  Assessment & Plan  Orthostatics negative   Resume lotrel- was on hold for permissive hypertension       Hx of CVA (cerebral vascular accident) ([facility])  Assessment & Plan  Hx of CVA and was admitted for possible TIA in March   Has chronic left sided facial droop per patient   See plan above     COPD (chronic obstructive pulmonary disease) ([facility])  Assessment & Plan  No acute exacerbation  Continue home inhalers    Alcohol abuse  Assessment & Plan  History of alcohol abuse, reports he stopped drinking   Met with CRS  while here   Continue strict alcohol avoidance     * Expressive aphasia  Assessment & Plan  Initially presented with dizziness/lightheadedness.  In the ED patient was found to be in A-fib with RVR.  Patient then developed expressive aphasia around 2:45 AM for which stroke alert was called.   CT head with no acute abnormality  MRI brain no acute infarct   Echo: EF 60%, mild MR and TR   CTA no new LVO, signficant multifocal disease  Continue Eliquis and plavix given advanced intracranial atherosclerotic changes   Continue high intensity statin   Outpatient follow up with cardiology, vascular surgery and neurology       Medical Problems       Resolved Problems  Date Reviewed: 6/24/2024   None       Discharging Physician / Practitioner: Jennifer Bermudez PA-C  PCP: Laith Balderas DO  Admission Date:   Admission Orders (From admission, onward)       Ordered        06/24/24 0354  INPATIENT ADMISSION  Once                          Discharge Date: 06/25/24    Consultations During Hospital  Stay:  Neurology     Procedures Performed:   Echo    Interpretation Summary  Show Result Comparison     Left Ventricle: Left ventricular cavity size is normal. Wall thickness is mildly increased. The left ventricular ejection fraction is 60%. Systolic function is normal. Wall motion is normal.    Right Ventricle: Systolic function is mildly reduced.    Left Atrium: The atrium is moderately dilated.    Mitral Valve: There is mild regurgitation.    Tricuspid Valve: There is mild regurgitation.     Findings    Left Ventricle Left ventricular cavity size is normal. Wall thickness is mildly increased. The left ventricular ejection fraction is 60%. Systolic function is normal. Wall motion is normal. Unable to assess diastolic function due to atrial fibrillation.   Right Ventricle Right ventricular cavity size is normal. Systolic function is mildly reduced.   Left Atrium The atrium is moderately dilated.   Right Atrium The atrium is normal in size.   Aortic Valve The aortic valve is trileaflet. The leaflets are not thickened. The leaflets are not calcified. The leaflets exhibit normal mobility. There is no evidence of regurgitation. The aortic valve has no significant stenosis.   Mitral Valve Mitral valve structure is normal.  There is mild regurgitation. There is no evidence of stenosis.   Tricuspid Valve Tricuspid valve structure is normal. There is mild regurgitation. There is no evidence of stenosis.   Pulmonic Valve Pulmonic valve structure is normal. There is no evidence of regurgitation. There is no evidence of stenosis.   Ascending Aorta The aortic root is normal in size.   IVC/SVC The inferior vena cava is normal in size. Respirophasic changes were normal.   Pericardium There is no pericardial effusion. The pericardium is normal in appearance.         Significant Findings / Test Results:     MRI brain   FINDINGS:     BRAIN PARENCHYMA: Encephalomalacia and gliosis in the right frontal lobe, operculum and anterior  insula consistent with a chronic infarct. Small, chronic infarct in the right occipital lobe. Punctate, chronic infarct in the right cerebellar hemisphere.     No restricted diffusion.     Periventricular and subcortical T2/FLAIR hyperintense foci consistent with microangiopathic disease. White matter lesions also present in the midbrain and bruce     No intracranial hemorrhage, mass or mass effect.     VENTRICLES: No hydrocephalus or extra-axial collection.     SELLA AND PITUITARY GLAND:  Normal.     ORBITS:  Normal.     PARANASAL SINUSES: Clear.     VASCULATURE:  Evaluation of the major intracranial vasculature demonstrates appropriate flow voids.     CALVARIUM AND SKULL BASE: Minimal mastoid fluid bilaterally.     EXTRACRANIAL SOFT TISSUES:  Normal.     IMPRESSION:  No evidence of acute infarct, intracranial hemorrhage or mass.  CTA head/neck   FINDINGS:     CERVICAL VASCULATURE  AORTIC ARCH AND GREAT VESSELS:  Extensive scattered calcific atherosclerosis of the aortic arch and proximal thoracic great vessels. Moderate (50 to 70%) luminal narrowing of the brachiocephalic artery due to calcified plaque.     RIGHT VERTEBRAL ARTERY CERVICAL SEGMENT:  Normal origin. The vessel is normal in caliber throughout the neck.     LEFT VERTEBRAL ARTERY CERVICAL SEGMENT:  Aberrant origin directly off the aortic arch. Short segment of severe luminal narrowing of the proximal left internal carotid artery due to mixed plaque is seen. Moderate to severe luminal narrowing of the distal   left vertebral artery V4 segment due to calcified plaque is also seen.     RIGHT EXTRACRANIAL CAROTID SEGMENT:  Complete occlusion of the right common carotid artery with reconstitution at the carotid bifurcation region is again seen without interval change. Moderate (50 to 70%) luminal narrowing of the proximal to mid right   cervical internal carotid artery. Short segment of severe (>90%) luminal narrowing due to calcific atherosclerosis of the  right internal carotid artery in the carotid canal region. Additional short segment of severe (greater than 90%) stenosis involving   the right cavernous internal carotid artery. The degree of disease involving the right distal cervical and intracranial internal carotid artery is worse compared to the prior study.     LEFT EXTRACRANIAL CAROTID SEGMENT:  Normal caliber common carotid artery. Moderate (50 to 70%) luminal narrowing of the proximal to mid left common carotid artery due to calcific atherosclerosis predominantly along the anterior wall. Normal bifurcation   and cervical internal carotid artery.  No evidence of acute dissection. Mild to moderate luminal narrowing of the proximal left internal carotid artery and left carotid canal/cavernous segments due to mild calcific wall plaque.     NASCET criteria was used to determine the degree of internal carotid artery diameter stenosis.        INTRACRANIAL VASCULATURE  INTERNAL CAROTID ARTERIES: Calcific atherosclerosis with mild luminal narrowing of the left intracranial internal carotid artery noted. Extensive calcific atherosclerosis with severe luminal narrowing of the right internal carotid artery cavernous and   supraclinoid segments which appear markedly worse compared to the prior study. There is near complete occlusion but a tiny amount of string-like flow is seen within the segments.     ANTERIOR CIRCULATION:  Symmetric A1 segments and anterior cerebral arteries with normal enhancement.  Normal anterior communicating artery.     MIDDLE CEREBRAL ARTERY CIRCULATION:  Left M1 segment and proximal middle cerebral artery branches demonstrate normal enhancement. Right M1 segment is grossly patent but demonstrate slightly decreased caliber/enhancement compared to the left which is new   compared to the previous study. Stable chronic occlusion involving a right M2 branch in the anterior sylvian fissure region again noted. Otherwise no new additional large vessel  occlusion or significant flow-limiting stenosis identified.     DISTAL VERTEBRAL ARTERIES:  Normal distal vertebral arteries.  Posterior inferior cerebellar artery origins are normal. Normal vertebral basilar junction.     BASILAR ARTERY:  Basilar artery is normal in caliber.  Normal superior cerebellar arteries.     POSTERIOR CEREBRAL ARTERIES: The left posterior cerebral artery arises from the basilar tip. There is fetal origin of the right posterior cerebral artery again seen. Asymmetrically diminished flow and caliber of the right posterior cerebral artery   compared to the left noted, new since the prior study. This is likely related to interval progression of near occlusive right intracranial ICA disease and fetal origin of the right PCA.     VENOUS STRUCTURES: Major intracranial dural venous sinuses appear grossly patent..        NON VASCULAR ANATOMY  BONY STRUCTURES:  No acute osseous fracture or destructive bone lesions.  Old/chronic depressed fracture deformity involving anterior wall of the right maxillary sinus and orbital floor region is again seen.     SOFT TISSUES OF THE NECK: No enhancing soft tissue neck mass or fluid collection. No cervical lymphadenopathy by size criteria. The visualized parotid, submandibular, and thyroid glands appear symmetric and unremarkable.     THORACIC INLET: Stable 3 mm left upper lobe lung nodule. Mild biapical pleural-parenchymal scarring. No pneumothorax, airspace consolidation, or significant pleural effusions.        IMPRESSION:     1.  Extensive calcific atherosclerosis of the aortic arch, proximal branch vessels, and cervical carotid/vertebral arteries again seen.  2.  Moderate (50 to 70%) luminal narrowing of the brachiocephalic artery due to calcified plaque.  4.  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  Short segment of severe luminal narrowing of the proximal left internal carotid artery due to mixed plaque is again seen  without interval change. Moderate to severe luminal   narrowing of the distal left vertebral artery V4 segment due to calcified plaque is also seen. Patent right vertebral artery without occlusion or significant stenosis.  5.  Complete occlusion of the right mid to distal common carotid artery with reconstitution at the carotid bifurcation region. Moderate (50 to 70%) luminal narrowing of the proximal to mid right cervical internal carotid artery. Segments of severe (>90%)   luminal narrowing due to calcified wall plaque in the right internal carotid artery in the carotid canal region. Extensive calcific atherosclerosis with severe luminal narrowing of the right internal carotid artery cavernous and supraclinoid segments   which appear markedly worse compared to the prior study. There is near complete occlusion but a tiny amount of string-like flow seen within these segments.  6.  Moderate (50 to 70%) luminal narrowing of the proximal to mid left common carotid artery due to calcific atherosclerosis.  Mild luminal narrowing of the proximal left internal carotid artery and left carotid canal/cavernous segments due to mild  calcific wall plaque.  7. Chronic occlusion involving a right M2 branch in the anterior sylvian fissure region noted.  Left M1 segment and proximal middle cerebral artery branches demonstrate normal enhancement. Right M1 segment is grossly patent but demonstrate slightly   decreased caliber/enhancement compared to the left which is new compared to the previous study.  8. There is fetal origin of the right posterior cerebral artery again seen. Asymmetrically diminished flow and caliber of the right posterior cerebral artery compared to the left noted, new since the prior study. This is likely related to interval   progression of near occlusive right intracranial ICA disease and fetal origin of the right PCA.  9. No enhancing brain mass/fluid collection or vascular malformation. No enhancing soft  tissue neck mass/fluid collection or cervical lymphadenopathy.     CT head   FINDINGS:     PARENCHYMA: Decreased attenuation is noted in periventricular and subcortical white matter demonstrating an appearance that is statistically most likely to represent moderate microangiopathic change; this appearance is similar when compared to most   recent prior examination.    Encephalomalacia in the right frontal lobe and right medial occipital lobe likely from old infarcts are again seen. Sequela of old lacunar infarct in the right basal ganglia also noted. Difficult to exclude acute infarct in   this  patient on CT given these findings. If there is clinical concern for acute ischemia, recommend more sensitive MRI brain for better evaluation in this patient.  No intracranial mass, fluid collection, mass effect or midline shift.  No acute   parenchymal or extra-axial hemorrhage. Overall findings are not significantly changed compared to the prior exam.     No CT signs of acute infarction.  No intracranial mass, mass effect or midline shift.  No acute parenchymal hemorrhage.     VENTRICLES AND EXTRA-AXIAL SPACES:  Ventricles and extra-axial CSF spaces are prominent commensurate with the degree of volume loss.  No hydrocephalus.  No acute extra-axial hemorrhage.     VISUALIZED ORBITS: Normal visualized orbits.     PARANASAL SINUSES:  Normal visualized paranasal sinuses.     CALVARIUM AND EXTRACRANIAL SOFT TISSUES:   Bony calvarium and temporomandibular joints are intact. Scalp soft tissues are grossly unremarkable..     IMPRESSION:     No acute intracranial hemorrhage. Sequelae multiple old right-sided infarcts and senescent changes as described above, nonsignificantly changed compared to the prior study. Cannot exclude acute infarct in this patient on CT given these findings.  If   there is clinical concern for acute ischemia, recommend more sensitive MRI brain for better evaluation in this patient.    Incidental Findings:    none    Test Results Pending at Discharge (will require follow up):   none     Outpatient Tests Requested:  PCP   Neurology   Cardioloyg in 1-2 weeks advised   Vascular surgery     Complications:      Reason for Admission: stroke like symptoms     Hospital Course:   Abhishek Tom is a 68 y.o. male patient who originally presented to the hospital on 6/24/2024 due to lightheadedness dizziness in the ED was found to have A-fib with RVR with known atrial fibrillation, patient then developed expressive aphasia around 2:45 AM for which stroke alert was called.  He was not a TNK candidate given use of Eliquis.  He was seen in consultation with neurology.  As noted above, his CT angiogram head and neck completed and consistent with extensive calcified atheroma in the aortic arch, right common carotid artery occlusion, moderate to severe right cervical and intracranial stenosis, moderate left common carotid stenosis, chronic right M2 occlusion and moderate to severe luminal narrowing of the left vertebral artery.  Patient has been taking aspirin and Eliquis at home as advised by outpatient vascular surgery.  MRI brain consistent with chronic right frontal and right occipital encephalomalacia with no new acute infarct.  Given significant intracranial atherosclerotic changes he will continue on Eliquis 5 mg twice daily and Plavix 75 mg in addition to the Eliquis.  He needs close follow-up with neurology in the stroke clinic and vascular surgery outpatient.  He also needs close follow-up with cardiology for history of PAF.  Of note, he is supposed to be on carvedilol so I resumed this for rate control of his atrial fibrillation.  I spoke with neurology this morning and he was cleared for discharge from neurology standpoint.      Please see above list of diagnoses and related plan for additional information.     Condition at Discharge: stable    Discharge Day Visit / Exam:   Subjective:  doing well today no acute complaints or  "new events. No chest pain, palpaitoins, no SOB. No lightheadednss, dizziness. No new focal deficits.     Vitals: Blood Pressure: 164/86 (06/25/24 0811)  Pulse: ,manually HR 90 and on telemetry 92 (06/25/24 0811)  Temperature: 97.8 °F (36.6 °C) (06/25/24 0811)  Temp Source: Temporal (06/25/24 0811)  Respirations: 17 (06/25/24 0811)  Height: 5' 9\" (175.3 cm) (06/24/24 0500)  Weight - Scale: 66.6 kg (146 lb 13.2 oz) (06/25/24 0542)  SpO2: 98 % (06/25/24 0811)  Exam:   Physical Exam  Vitals and nursing note reviewed.   Constitutional:       General: He is not in acute distress.  Cardiovascular:      Rate and Rhythm: Normal rate. Rhythm irregularly irregular.   Pulmonary:      Effort: Pulmonary effort is normal. No respiratory distress.      Breath sounds: Normal breath sounds.   Abdominal:      General: Bowel sounds are normal.      Palpations: Abdomen is soft.   Musculoskeletal:      Right lower leg: No edema.      Left lower leg: No edema.   Skin:     General: Skin is warm.   Neurological:      General: No focal deficit present.      Mental Status: He is alert.      Comments: Chronic left facial droop   Psychiatric:         Mood and Affect: Mood normal.          Discussion with Family: Patient declined call to .     Discharge instructions/Information to patient and family:   See after visit summary for information provided to patient and family.      Provisions for Follow-Up Care:  See after visit summary for information related to follow-up care and any pertinent home health orders.      Mobility at time of Discharge:   Basic Mobility Inpatient Raw Score: 20  JH-HLM Goal: 6: Walk 10 steps or more  JH-HLM Achieved: 6: Walk 10 steps or more  HLM Goal achieved. Continue to encourage appropriate mobility.     Disposition:   Home    Planned Readmission: none, high risk for readmission      Discharge Statement:  I spent 45 minutes discharging the patient. This time was spent on the day of discharge. I had " direct contact with the patient on the day of discharge. Greater than 50% of the total time was spent examining patient, answering all patient questions, arranging and discussing plan of care with patient as well as directly providing post-discharge instructions.  Additional time then spent on discharge activities.    Discharge Medications:  See after visit summary for reconciled discharge medications provided to patient and/or family.      **Please Note: This note may have been constructed using a voice recognition system**

## 2024-06-25 NOTE — ASSESSMENT & PLAN NOTE
History of alcohol abuse, reports he stopped drinking   Met with CRS  while here   Continue strict alcohol avoidance

## 2024-06-25 NOTE — ED PROVIDER NOTES
History  Chief Complaint   Patient presents with    Dizziness     States was feeling dizzy when standing and fell onto couch. States recent diagnosis of Afib and started eliquis. Denies chest pain.     Patient is a 68-year-old male with pertinent past medical history of atrial fibrillation who presents for evaluation of dizziness.  Patient states that he was watching TV on his couch this evening and stood up.  Afterwards, he felt lightheaded as if he were going to pass out and fell backwards onto the couch.  Denies any head strike or LOC.  Patient contacted EMS.  Was found to be in atrial fibrillation on cardiac monitor.  Patient states that he feels very weak in addition to persistent lightheadedness.  Denying any chest pain, shortness of breath, nausea, vomiting, fever, chills, headache, visual changes, numbness, weakness, or tingling of extremities.    Prior to Admission Medications   Prescriptions Last Dose Informant Patient Reported? Taking?   Anoro Ellipta 62.5-25 MCG/ACT inhaler Unknown  Yes No   Sig: INHALE 1 PUFF BY MOUTH AND INTO THE LUNGS ONCE DAILY AT THE SAME TIME EACH DAY   acetaminophen (TYLENOL) 500 mg tablet   Yes No   Sig: Take 500 mg by mouth daily as needed for headaches, mild pain or moderate pain. Indications: Pain   albuterol (PROVENTIL HFA,VENTOLIN HFA) 90 mcg/act inhaler Unknown  Yes No   Sig: Inhale 2 puffs if needed   amLODIPine-benazepril (LOTREL 5-10) 5-10 MG per capsule Unknown  Yes No   Sig: Take 1 capsule by mouth daily   apixaban (ELIQUIS) 5 mg 6/23/2024 Self No Yes   Sig: Take 1 tablet (5 mg total) by mouth 2 (two) times a day   aspirin 81 mg chewable tablet 6/23/2024  No Yes   Sig: Chew 1 tablet (81 mg total) daily   baclofen 10 mg tablet Unknown  Yes No   Sig: Take 10 mg by mouth in the morning PRN   buPROPion (WELLBUTRIN XL) 150 mg 24 hr tablet Unknown  Yes No   Sig: Take 150 mg by mouth daily   busPIRone (BUSPAR) 15 mg tablet Unknown Self Yes No   Sig: 15 mg 2 (two) times a day    donepezil (ARICEPT) 5 mg tablet Unknown  Yes No   Sig: Take 5 mg by mouth every evening   omeprazole (PriLOSEC) 20 mg delayed release capsule Unknown Self Yes No   Sig: Take 20 mg by mouth daily   pravastatin (PRAVACHOL) 40 mg tablet Unknown  Yes No   Sig: Take 40 mg by mouth daily   sertraline (ZOLOFT) 100 mg tablet Unknown Self Yes No   Sig: Take 100 mg by mouth daily     varenicline (Chantix) 1 mg tablet Not Taking  Yes No   Sig: Take 1 mg by mouth 2 (two) times a day   Patient not taking: Reported on 6/24/2024      Facility-Administered Medications: None       Past Medical History:   Diagnosis Date    A-fib (HCC)     PRITI (acute kidney injury) (HCC) 09/05/2021    Anxiety     Back pain     Claustrophobia     COPD (chronic obstructive pulmonary disease) (Formerly [facility])     Dysphagia 09/05/2021    Fatty liver     GERD (gastroesophageal reflux disease)     History of transfusion     Hyperlipidemia     Hypertension     Hypokalemia 02/22/2022    Lumbar back pain     Panic attacks     Stenosis of right carotid artery     Stroke (HCC)     Wears glasses     Wears partial dentures     upper denture       Past Surgical History:   Procedure Laterality Date    COLONOSCOPY      ERCP      IR CEREBRAL ANGIOGRAPHY  04/06/2021    OTHER SURGICAL HISTORY      fertility    ND LAPAROSCOPY SURG CHOLECYSTECTOMY N/A 8/26/2022    Procedure: CHOLECYSTECTOMY LAPAROSCOPIC;  Surgeon: Michael Otoole DO;  Location: [facility];  Service: General    ND SLCTV CATHJ 3RD+ ORD SLCTV ABDL PEL/LXTR BRNCH Right 04/06/2021    Procedure: ARTERIOGRAM, carotid Angio;  Surgeon: Maximiliano Morin MD;  Location: [facility];  Service: Vascular    ND TEAEC W/PATCH GRF CAROTID VERTB SUBCLAV NECK INC Right 04/13/2021    Procedure: RIGHT ENDARTERECTOMY ARTERY CAROTID WITH BOVINE PATCH;  Surgeon: Maximiliano Morin MD;  Location: [facility];  Service: Vascular       Family History   Problem Relation Age of Onset    Asthma Mother      I have reviewed and agree with the  history as documented.    E-Cigarette/Vaping    E-Cigarette Use Never User      E-Cigarette/Vaping Substances    Nicotine No     THC No     CBD No     Flavoring No     Other No     Unknown No      Social History     Tobacco Use    Smoking status: Former     Current packs/day: 0.50     Average packs/day: 0.5 packs/day for 49.5 years (24.7 ttl pk-yrs)     Types: Cigarettes     Start date: 1/1/1975    Smokeless tobacco: Never   Vaping Use    Vaping status: Never Used   Substance Use Topics    Alcohol use: Not Currently    Drug use: Never     Comment: never        Review of Systems   All other systems reviewed and are negative.      Physical Exam  ED Triage Vitals [06/24/24 0222]   Temperature Pulse Respirations Blood Pressure SpO2   98.2 °F (36.8 °C) (!) 161 20 (!) 200/93 96 %      Temp Source Heart Rate Source Patient Position - Orthostatic VS BP Location FiO2 (%)   Oral Monitor Lying Right arm --      Pain Score       No Pain             Orthostatic Vital Signs  Vitals:    06/24/24 1902 06/25/24 0028 06/25/24 0225 06/25/24 0243   BP: 156/87 167/95 (!) 181/106 (!) 194/92   Pulse: (!) 111 103 90    Patient Position - Orthostatic VS: Lying Lying Lying        Physical Exam  Constitutional:       General: He is not in acute distress.     Appearance: He is not ill-appearing, toxic-appearing or diaphoretic.   HENT:      Head: Normocephalic and atraumatic.      Mouth/Throat:      Mouth: Mucous membranes are moist.      Pharynx: Oropharynx is clear.   Eyes:      Extraocular Movements: Extraocular movements intact.      Conjunctiva/sclera: Conjunctivae normal.   Cardiovascular:      Rate and Rhythm: Tachycardia present. Rhythm irregular.      Heart sounds: Normal heart sounds.   Pulmonary:      Effort: Pulmonary effort is normal.      Breath sounds: Normal breath sounds.   Abdominal:      General: Abdomen is flat.      Palpations: Abdomen is soft.   Musculoskeletal:         General: Normal range of motion.      Cervical back:  Normal range of motion and neck supple.      Right lower leg: No edema.      Left lower leg: No edema.   Skin:     General: Skin is warm and dry.   Neurological:      General: No focal deficit present.      Mental Status: He is alert and oriented to person, place, and time.   Psychiatric:         Mood and Affect: Mood normal.         Behavior: Behavior normal.         ED Medications  Medications   albuterol (PROVENTIL HFA,VENTOLIN HFA) inhaler 2 puff (has no administration in time range)   umeclidinium-vilanterol 62.5-25 mcg/actuation inhaler 1 puff (1 puff Inhalation Given 6/24/24 0823)   buPROPion (WELLBUTRIN XL) 24 hr tablet 150 mg (150 mg Oral Given 6/24/24 0822)   busPIRone (BUSPAR) tablet 15 mg (15 mg Oral Given 6/24/24 1639)   donepezil (ARICEPT) tablet 5 mg (5 mg Oral Given 6/24/24 1639)   pantoprazole (PROTONIX) EC tablet 20 mg (20 mg Oral Given 6/25/24 0610)   sertraline (ZOLOFT) tablet 100 mg (100 mg Oral Given 6/24/24 0822)   atorvastatin (LIPITOR) tablet 40 mg (40 mg Oral Given 6/24/24 1639)   multi-electrolyte (PLASMALYTE-A/ISOLYTE-S PH 7.4) IV solution (100 mL/hr Intravenous New Bag 6/25/24 0348)   labetalol (NORMODYNE) injection 10 mg (has no administration in time range)   apixaban (ELIQUIS) tablet 5 mg (5 mg Oral Given 6/24/24 1639)   clopidogrel (PLAVIX) tablet 75 mg (has no administration in time range)   acetaminophen (TYLENOL) tablet 650 mg (650 mg Oral Given 6/24/24 2100)   verapamil (FOR EMS ONLY) (ISOPTIN) injection 5 mg (0 mg Does not apply Given to EMS 6/24/24 0232)   sodium chloride 0.9 % bolus 1,000 mL (0 mL Intravenous Stopped 6/24/24 0430)   metoprolol (LOPRESSOR) injection 5 mg (5 mg Intravenous Given 6/24/24 0302)   iohexol (OMNIPAQUE) 350 MG/ML injection (MULTI-DOSE) 85 mL (85 mL Intravenous Given 6/24/24 0259)   clopidogrel (PLAVIX) tablet 300 mg (300 mg Oral Given 6/24/24 6634)   LORazepam (ATIVAN) injection 1 mg (1 mg Intravenous Given 6/24/24 9687)       Diagnostic  Studies  Results Reviewed       Procedure Component Value Units Date/Time    HS Troponin I 2hr [[id number]]  (Normal) Collected: 06/24/24 0434    Lab Status: Final result Specimen: Blood from Arm, Left Updated: 06/24/24 0500     hs TnI 2hr 31 ng/L      Delta 2hr hsTnI -5 ng/L     Urinalysis with microscopic [[id number]]  (Abnormal) Collected: 06/24/24 0434    Lab Status: Final result Specimen: Urine, Clean Catch Updated: 06/24/24 0449     Color, UA Light Yellow     Clarity, UA Clear     Specific Gravity, UA >=1.050     pH, UA 6.0     Leukocytes, UA Negative     Nitrite, UA Negative     Protein,  (2+) mg/dl      Glucose, UA Negative mg/dl      Ketones, UA Negative mg/dl      Urobilinogen, UA <2.0 mg/dl      Bilirubin, UA Negative     Occult Blood, UA Moderate     RBC, UA 2-4 /hpf      WBC, UA 1-2 /hpf      Epithelial Cells Occasional /hpf      Bacteria, UA Occasional /hpf     FLU/RSV/COVID - if FLU/RSV clinically relevant [[id number]]  (Normal) Collected: 06/24/24 0305    Lab Status: Final result Specimen: Nares from Nose Updated: 06/24/24 0345     SARS-CoV-2 Negative     INFLUENZA A PCR Negative     INFLUENZA B PCR Negative     RSV PCR Negative    Narrative:      FOR PEDIATRIC PATIENTS - copy/paste COVID Guidelines URL to browser: [email or web address]    SARS-CoV-2 assay is a Nucleic Acid Amplification assay intended for the  qualitative detection of nucleic acid from SARS-CoV-2 in nasopharyngeal  swabs. Results are for the presumptive identification of SARS-CoV-2 RNA.    Positive results are indicative of infection with SARS-CoV-2, the virus  causing COVID-19, but do not rule out bacterial infection or co-infection  with other viruses. Laboratories within the [place] and its  territories are required to report all positive results to the appropriate  public health authorities. Negative results do not preclude SARS-CoV-2  infection and should not be used  as the sole basis for treatment or other  patient management decisions. Negative results must be combined with  clinical observations, patient history, and epidemiological information.  This test has not been FDA cleared or approved.    This test has been authorized by FDA under an Emergency Use Authorization  (EUA). This test is only authorized for the duration of time the  declaration that circumstances exist justifying the authorization of the  emergency use of an in vitro diagnostic tests for detection of SARS-CoV-2  virus and/or diagnosis of COVID-19 infection under section 564(b)(1) of  the Act, 21 U.S.C. 360bbb-3(b)(1), unless the authorization is terminated  or revoked sooner. The test has been validated but independent review by FDA  and CLIA is pending.    Test performed using Layer[employer] TVpert: This RT-PCR assay targets N2,  a region unique to SARS-CoV-2. A conserved region in the E-gene was chosen  for pan-Sarbecovirus detection which includes SARS-CoV-2.    According to CMS-2020-01-R, this platform meets the definition of high-throughput technology.    Protime-INR [[id number]]  (Abnormal) Collected: 06/24/24 0244    Lab Status: Final result Specimen: Blood from Arm, Right Updated: 06/24/24 0319     Protime 15.4 seconds      INR 1.20    APTT [[id number]]  (Abnormal) Collected: 06/24/24 0244    Lab Status: Final result Specimen: Blood from Arm, Right Updated: 06/24/24 0319     PTT 42 seconds     HS Troponin 0hr (reflex protocol) [[id number]]  (Normal) Collected: 06/24/24 0244    Lab Status: Final result Specimen: Blood from Arm, Right Updated: 06/24/24 0311     hs TnI 0hr 36 ng/L     Comprehensive metabolic panel [[id number]]  (Abnormal) Collected: 06/24/24 0242    Lab Status: Final result Specimen: Blood from Arm, Right Updated: 06/24/24 0307     Sodium 141 mmol/L      Potassium 4.3 mmol/L      Chloride 100 mmol/L      CO2 23 mmol/L      ANION GAP 18 mmol/L      BUN 36 mg/dL      Creatinine 2.17 mg/dL       Glucose 157 mg/dL      Calcium 10.0 mg/dL      AST 46 U/L      ALT 21 U/L      Alkaline Phosphatase 78 U/L      Total Protein 7.6 g/dL      Albumin 4.4 g/dL      Total Bilirubin 0.64 mg/dL      eGFR 30 ml/min/1.73sq m     Narrative:      National Kidney Disease Foundation guidelines for Chronic Kidney Disease (CKD):     Stage 1 with normal or high GFR (GFR > 90 mL/min/1.73 square meters)    Stage 2 Mild CKD (GFR = 60-89 mL/min/1.73 square meters)    Stage 3A Moderate CKD (GFR = 45-59 mL/min/1.73 square meters)    Stage 3B Moderate CKD (GFR = 30-44 mL/min/1.73 square meters)    Stage 4 Severe CKD (GFR = 15-29 mL/min/1.73 square meters)    Stage 5 End Stage CKD (GFR <15 mL/min/1.73 square meters)  Note: GFR calculation is accurate only with a steady state creatinine    Lipase [[id number]]  (Normal) Collected: 06/24/24 0242    Lab Status: Final result Specimen: Blood from Arm, Right Updated: 06/24/24 0307     Lipase 27 u/L     Fingerstick Glucose (POCT) [[id number]]  (Abnormal) Collected: 06/24/24 0240    Lab Status: Final result Specimen: Blood Updated: 06/24/24 0248     POC Glucose 175 mg/dl     CBC and differential [[id number]]  (Abnormal) Collected: 06/24/24 0242    Lab Status: Final result Specimen: Blood from Arm, Right Updated: 06/24/24 0247     WBC 11.30 Thousand/uL      RBC 6.53 Million/uL      Hemoglobin 17.8 g/dL      Hematocrit 53.8 %      MCV 82 fL      MCH 27.3 pg      MCHC 33.1 g/dL      RDW 14.6 %      MPV 11.3 fL      Platelets 209 Thousands/uL      nRBC 0 /100 WBCs      Segmented % 73 %      Immature Grans % 1 %      Lymphocytes % 16 %      Monocytes % 10 %      Eosinophils Relative 0 %      Basophils Relative 0 %      Absolute Neutrophils 8.24 Thousands/µL      Absolute Immature Grans 0.06 Thousand/uL      Absolute Lymphocytes 1.80 Thousands/µL      Absolute Monocytes 1.14 Thousand/µL      Eosinophils Absolute 0.02 Thousand/µL      Basophils Absolute 0.04 Thousands/µL                    MRI brain wo  contrast   Final Result by Tyler Johnson MD (06/24 2306)   No evidence of acute infarct, intracranial hemorrhage or mass.      Workstation performed: [id number]         CT head wo contrast   Final Result by Javier Jauregui MD (06/24 1212)      No acute intracranial abnormality.      Chronic right frontal and occipital lobe infarcts with encephalomalacia. Chronic microangiopathic ischemic changes.                  Workstation performed: [id number]         CTA stroke alert (head/neck)   Final Result by Minh Galeano MD (06/24 0352)      1.  Extensive calcific atherosclerosis of the aortic arch, proximal branch vessels, and cervical carotid/vertebral arteries again seen.   2.  Moderate (50 to 70%) luminal narrowing of the brachiocephalic artery due to calcified plaque.   4.  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  Short segment of severe luminal narrowing of the proximal left internal carotid artery due to mixed plaque is again seen without interval change. Moderate to severe luminal    narrowing of the distal left vertebral artery V4 segment due to calcified plaque is also seen. Patent right vertebral artery without occlusion or significant stenosis.   5.  Complete occlusion of the right mid to distal common carotid artery with reconstitution at the carotid bifurcation region. Moderate (50 to 70%) luminal narrowing of the proximal to mid right cervical internal carotid artery. Segments of severe (>90%)    luminal narrowing due to calcified wall plaque in the right internal carotid artery in the carotid canal region. Extensive calcific atherosclerosis with severe luminal narrowing of the right internal carotid artery cavernous and supraclinoid segments    which appear markedly worse compared to the prior study. There is near complete occlusion but a tiny amount of string-like flow seen within these segments.   6.  Moderate (50 to 70%) luminal narrowing of the proximal to  mid left common carotid artery due to calcific atherosclerosis.  Mild luminal narrowing of the proximal left internal carotid artery and left carotid canal/cavernous segments due to mild   calcific wall plaque.   7. Chronic occlusion involving a right M2 branch in the anterior sylvian fissure region noted.  Left M1 segment and proximal middle cerebral artery branches demonstrate normal enhancement. Right M1 segment is grossly patent but demonstrate slightly    decreased caliber/enhancement compared to the left which is new compared to the previous study.   8. There is fetal origin of the right posterior cerebral artery again seen. Asymmetrically diminished flow and caliber of the right posterior cerebral artery compared to the left noted, new since the prior study. This is likely related to interval    progression of near occlusive right intracranial ICA disease and fetal origin of the right PCA.   9. No enhancing brain mass/fluid collection or vascular malformation. No enhancing soft tissue neck mass/fluid collection or cervical lymphadenopathy.               Findings were directly discussed with Benjamin Fontaine  at 3:21 a.m.                           Workstation performed: [id number]         CT stroke alert brain   Final Result by Minh Galeano MD (06/24 0329)      No acute intracranial hemorrhage. Sequelae multiple old right-sided infarcts and senescent changes as described above, nonsignificantly changed compared to the prior study. Cannot exclude acute infarct in this patient on CT given these findings.  If    there is clinical concern for acute ischemia, recommend more sensitive MRI brain for better evaluation in this patient.      Findings were directly discussed with Benjamin Fontaine  at approximately 3:21 a.m.      Workstation performed: [id number]               Procedures  ECG 12 Lead Documentation Only    Date/Time: 6/25/2024 7:02 AM    Performed by: Yesi Smith MD  Authorized by: Yesi Smith MD     Indications / Diagnosis:  Tachycardia  Patient location:  ED  Previous ECG:     Previous ECG:  Compared to current    Similarity:  Changes noted  Interpretation:     Interpretation: abnormal    Rate:     ECG rate:  150    ECG rate assessment: tachycardic    Rhythm:     Rhythm: atrial fibrillation    Ectopy:     Ectopy: none    QRS:     QRS axis:  Normal    QRS intervals:  Normal  Conduction:     Conduction: normal    Other findings:     Other findings: LVH          ED Course                  Stroke Assessment       Row Name 06/24/24 0243             NIH Stroke Scale    Interval Baseline      Level of Consciousness (1a.) 0      LOC Questions (1b.) 0      LOC Commands (1c.) 0      Best Gaze (2.) 0      Visual (3.) 0      Facial Palsy (4.) 0      Motor Arm, Left (5a.) 0      Motor Arm, Right (5b.) 0      Motor Leg, Left (6a.) 0      Motor Leg, Right (6b.) 0      Limb Ataxia (7.) 0      Sensory (8.) 0      Best Language (9.) 2      Dysarthria (10.) 0      Extinction and Inattention (11.) (Formerly Neglect) 0      Total 2                    Flowsheet Row Most Recent Value   Thrombolytic Decision Options    Thrombolytic Decision Patient not a candidate.   Patient is not a candidate options Bleeding risk.  [On eliquis]                    SBIRT 20yo+      Flowsheet Row Most Recent Value   Initial Alcohol Screen: US AUDIT-C     1. How often do you have a drink containing alcohol? 0 Filed at: 06/24/2024 0225   2. How many drinks containing alcohol do you have on a typical day you are drinking?  0 Filed at: 06/24/2024 0225   3b. FEMALE Any Age, or MALE 65+: How often do you have 4 or more drinks on one occassion? 0 Filed at: 06/24/2024 0225   Audit-C Score 0 Filed at: 06/24/2024 0225   JAQUELIN: How many times in the past year have you...    Used an illegal drug or used a prescription medication for non-medical reasons? Never Filed at: 06/24/2024 0225                  Medical Decision Making  Abhishek Tom is a 68 y.o. who  presents with complaints of lightheadedness     Vital signs are tachycardic, otherwise HD stable, afebrile    Plan: After my initial evaluation, nursing staff reported that patient developed expressive aphasia.  NIH stroke scale performed at bedside with score of 2 for expressive aphasia.  Stroke alert was called  Stroke workup as above.  CT head not showing acute hemorrhagic stroke  Discussed with neurology-admit to stroke pathway.  Loading dose of aspirin and Plavix given.  Recommend repeat imaging in 6 hours    Disposition: Discussed with Slim.  Admit inpatient.        Amount and/or Complexity of Data Reviewed  Labs: ordered.  Radiology: ordered.    Risk  Prescription drug management.  Decision regarding hospitalization.          Disposition  Final diagnoses:   Stroke-like episode   Atrial fibrillation with RVR (HCC)     Time reflects when diagnosis was documented in both MDM as applicable and the Disposition within this note       Time User Action Codes Description Comment    6/24/2024  2:42 AM George Johns Add [R29.90] Stroke-like episode     6/24/2024  3:53 AM Yesi Smith Add [I48.91] Atrial fibrillation with RVR (HCC)           ED Disposition       ED Disposition   Admit    Condition   Stable    Date/Time   Mon Jun 24, 2024 0353    Comment   Case was discussed with CHRISTINA and the patient's admission status was agreed to be Admission Status: inpatient status to the service of Dr. Cadena .               Follow-up Information    None         Current Discharge Medication List        CONTINUE these medications which have NOT CHANGED    Details   apixaban (ELIQUIS) 5 mg Take 1 tablet (5 mg total) by mouth 2 (two) times a day  Qty: 60 tablet, Refills: 0    Associated Diagnoses: Cerebrovascular accident (CVA) due to occlusion of right posterior cerebral artery (HCC)      aspirin 81 mg chewable tablet Chew 1 tablet (81 mg total) daily    Associated Diagnoses: Bilateral carotid artery stenosis       acetaminophen (TYLENOL) 500 mg tablet Take 500 mg by mouth daily as needed for headaches, mild pain or moderate pain. Indications: Pain      albuterol (PROVENTIL HFA,VENTOLIN HFA) 90 mcg/act inhaler Inhale 2 puffs if needed      amLODIPine-benazepril (LOTREL 5-10) 5-10 MG per capsule Take 1 capsule by mouth daily      Anoro Ellipta 62.5-25 MCG/ACT inhaler INHALE 1 PUFF BY MOUTH AND INTO THE LUNGS ONCE DAILY AT THE SAME TIME EACH DAY      baclofen 10 mg tablet Take 10 mg by mouth in the morning PRN      buPROPion (WELLBUTRIN XL) 150 mg 24 hr tablet Take 150 mg by mouth daily      busPIRone (BUSPAR) 15 mg tablet 15 mg 2 (two) times a day      donepezil (ARICEPT) 5 mg tablet Take 5 mg by mouth every evening      omeprazole (PriLOSEC) 20 mg delayed release capsule Take 20 mg by mouth daily      pravastatin (PRAVACHOL) 40 mg tablet Take 40 mg by mouth daily      sertraline (ZOLOFT) 100 mg tablet Take 100 mg by mouth daily        varenicline (Chantix) 1 mg tablet Take 1 mg by mouth 2 (two) times a day           No discharge procedures on file.    PDMP Review         Value Time User    PDMP Reviewed  Yes 3/23/2024  4:25 AM Eliseo Flor PA-C             ED Provider  Attending physically available and evaluated Abhishek Tom. I managed the patient along with the ED Attending.    Electronically Signed by           Yesi Smith MD  06/25/24 0705

## 2024-06-25 NOTE — UTILIZATION REVIEW
Initial Clinical Review    Admission: Date/Time/Statement:   Admission Orders (From admission, onward)       Ordered        06/24/24 0354  INPATIENT ADMISSION  Once                          Orders Placed This Encounter   Procedures    INPATIENT ADMISSION     Standing Status:   Standing     Number of Occurrences:   1     Order Specific Question:   Level of Care     Answer:   Med Surg [16]     Order Specific Question:   Estimated length of stay     Answer:   More than 2 Midnights     Order Specific Question:   Certification     Answer:   I certify that inpatient services are medically necessary for this patient for a duration of greater than two midnights. See H&P and MD Progress Notes for additional information about the patient's course of treatment.     ED Arrival Information       Expected   -    Arrival   6/24/2024 02:16    Acuity   Emergent              Means of arrival   Ambulance    Escorted by   [facility] ([facility])    Service   Hospitalist    Admission type   Emergency              Arrival complaint   -             Chief Complaint   Patient presents with    Dizziness     States was feeling dizzy when standing and fell onto couch. States recent diagnosis of Afib and started eliquis. Denies chest pain.       Initial Presentation: 68 y.o. male with a PMH of prior strokes, Afib on Eliquis, CKD, HTN, COPD, h/o alc use presented to the ED from home via/ EMS w/ dizziness when standing for the past week. He fell onto the couch today prompting him to call EMS. EMS noted that patient was in rapid A-fib with RVR.   In the ED, , /93. WBC 1.3, creatinine 2.17, baseline 1.1-1.4. troponin 36. CT head unremarkable. CTA without any new LVO. Not a TNK candidate given use of Eliquis, not a thrombectomy candidate given no new LVO.   EKG: Afib w/ RVR.  On exam, severe expressive aphasia noted.  Stroke alert was called. Given 1L IVF bolus, IV Lopressor, po  mg, Plavix 300 mg.    Admit as Inpatient  for evaluation and treatment of stroke like symptoms, afib w/ RVR, PRITI.  Plan: neurology consulted. SBP goal 160-120 for permissive hypertension. Loaded with aspirin and Plavix. Plan for repeat CT head in 6 hours if MRI is not available.  If no new large stroke, will plan to resume Eliquis and continue Plavix. Start atorvastatin 40 mg daily. Lipid panel and A1c ordered. MRI brain. TTE. PT/OT/ST. Holding Lotrel. IVF. Follow-up UA. Monitor with BMP     Neurology Consult:CT angiogram head and neck completed and consistent with extensive calcified atheroma in aortic arch, right common carotid artery occlusion, moderate to severe right cervical and intracranial echo stenosis, moderate left common carotid stenosis, chronic right M2 occlusion, moderate to severe luminal narrowing of left vertebral artery.   Brain MRI consistent with chronic right frontal and right occipital encephalomalacia which likely contribute to the patient cognitive dysfunction outpatient relation by cognitive therapy might be considered.  On ASA and Eliquis at home.   Plan: Stroke pathway has been initiated, brain MRI has been pending 2D echo pending. continue Plavix 75 mg in addition to Eliquis. orthostatic vital signs. Mon on tele. Cont statin.       Date: 06/25   Day 2:   IM Notes: Pt doing well today, denies dizziness. Mo new focal deficits. MRI brain no acute infarct. Echo: EF 60%, mild MR and TR. HR stable now. Renal function back to baseline ovn. Orthostatics neg. Cont Eliquis and plavix. Cont high intensity statin. F/u OP cardiology, vasc surg, neurology and nephrology.      ED Triage Vitals [06/24/24 0222]   Temperature Pulse Respirations Blood Pressure SpO2 Pain Score   98.2 °F (36.8 °C) (!) 161 20 (!) 200/93 96 % No Pain     Weight (last 2 days)       Date/Time Weight    06/25/24 0542 66.6 (146.83)    06/24/24 0500 65 (143.3)    06/24/24 0222 65 (143.3)            Vital Signs (last 3 days)       Date/Time Temp Pulse Resp BP MAP (mmHg)  SpO2 O2 Device Patient Position - Orthostatic VS Ge Coma Scale Score Pain    06/25/24 0811 97.8 °F (36.6 °C) 127 17 164/86 115 98 % None (Room air) Sitting -- --    06/25/24 0300 -- -- -- -- -- -- -- -- 15 --    06/25/24 0243 -- -- -- 194/92 130 -- -- -- -- --    06/25/24 0225 98.2 °F (36.8 °C) 90 18 181/106 135 98 % None (Room air) Lying -- --    06/25/24 0028 97.8 °F (36.6 °C) 103 18 167/95 124 98 % None (Room air) Lying -- --    06/24/24 2300 -- -- -- -- -- -- -- -- 15 --    06/24/24 2100 -- -- -- -- -- -- -- -- -- 4    06/24/24 1925 -- -- -- -- -- -- -- -- 15 --    06/24/24 1902 97.3 °F (36.3 °C) 111 21 156/87 112 97 % None (Room air) Lying -- --    06/24/24 1559 97.7 °F (36.5 °C) 86 18 176/98 128 96 % None (Room air) Lying -- --    06/24/24 1300 -- 105 18 138/82 104 96 % None (Room air) Lying -- --    06/24/24 1202 -- 96 18 149/93 116 95 % None (Room air) Standing - Orthostatic VS -- --    06/24/24 1201 -- 98 18 147/86 109 94 % None (Room air) Lying - Orthostatic VS -- --    06/24/24 1200 -- 112 18 164/91 119 96 % None (Room air) Sitting - Orthostatic VS -- --    06/24/24 1050 -- -- -- -- -- -- -- -- -- No Pain    06/24/24 1000 98 °F (36.7 °C) 90 18 180/88 -- 98 % None (Room air) Lying -- --    06/24/24 0820 -- -- -- -- -- -- -- -- -- No Pain    06/24/24 0800 97.4 °F (36.3 °C) 104 18 170/90 -- 98 % None (Room air) Lying 15 No Pain    06/24/24 0745 97.8 °F (36.6 °C) 113 18 139/93 111 97 % None (Room air) Lying -- --    06/24/24 0700 97.8 °F (36.6 °C) 113 18 139/93 -- 97 % None (Room air) -- 15 --    06/24/24 0600 97.9 °F (36.6 °C) 112 20 175/95 -- 98 % None (Room air) Lying 15 --    06/24/24 0502 -- -- -- -- -- -- -- -- -- No Pain    06/24/24 0500 99.1 °F (37.3 °C) 107 20 182/105 -- 99 % None (Room air) Lying 15 --    06/24/24 0430 -- 110 20 186/103 171 97 % None (Room air) Sitting -- --    06/24/24 0415 -- 113 31 226/148 -- 96 % None (Room air) Sitting 15 --    06/24/24 0345 -- 104 20 179/97 118 98 % None  (Room air) Sitting -- --    06/24/24 0330 -- 108 22 137/94 111 97 % None (Room air) Sitting -- --    06/24/24 0325 -- 106 26 -- -- 95 % -- -- -- --    06/24/24 0320 -- 108 28 -- -- 95 % -- -- -- --    06/24/24 0316 -- 105 20 160/96 -- 97 % None (Room air) Sitting 15 --    06/24/24 0315 -- 113 20 160/96 -- 92 % None (Room air) Sitting -- --    06/24/24 0310 -- 114 34 -- -- 96 % -- -- -- --    06/24/24 0307 -- 115 -- -- -- -- -- -- -- --    06/24/24 0305 -- 114 22 144/96 -- 93 % -- -- -- --    06/24/24 0304 -- -- -- 169/88 -- -- -- -- -- --    06/24/24 0300 -- 146 20 205/100 139 97 % None (Room air) Lying 15 --    06/24/24 0255 -- 143 21 -- -- -- -- -- -- --    06/24/24 0250 -- 147 15 159/77 -- -- -- -- -- --    06/24/24 0245 -- 143 23 205/90 -- -- -- -- 15 --    06/24/24 0222 98.2 °F (36.8 °C) 161 20 200/93 -- 96 % None (Room air) Lying -- No Pain              Pertinent Labs/Diagnostic Test Results:   Radiology:  MRI brain wo contrast   Final Interpretation by Tyler Johnson MD (06/24 2306)   No evidence of acute infarct, intracranial hemorrhage or mass.      Workstation performed: [id number]         CT head wo contrast   Final Interpretation by Javier Jauregui MD (06/24 1212)      No acute intracranial abnormality.      Chronic right frontal and occipital lobe infarcts with encephalomalacia. Chronic microangiopathic ischemic changes.                  Workstation performed: [id number]         CTA stroke alert (head/neck)   Final Interpretation by Minh Galeano MD (06/24 7434)      1.  Extensive calcific atherosclerosis of the aortic arch, proximal branch vessels, and cervical carotid/vertebral arteries again seen.   2.  Moderate (50 to 70%) luminal narrowing of the brachiocephalic artery due to calcified plaque.   4.  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  Short segment of severe luminal narrowing of the proximal left internal carotid artery due to mixed plaque is again  seen without interval change. Moderate to severe luminal    narrowing of the distal left vertebral artery V4 segment due to calcified plaque is also seen. Patent right vertebral artery without occlusion or significant stenosis.   5.  Complete occlusion of the right mid to distal common carotid artery with reconstitution at the carotid bifurcation region. Moderate (50 to 70%) luminal narrowing of the proximal to mid right cervical internal carotid artery. Segments of severe (>90%)    luminal narrowing due to calcified wall plaque in the right internal carotid artery in the carotid canal region. Extensive calcific atherosclerosis with severe luminal narrowing of the right internal carotid artery cavernous and supraclinoid segments    which appear markedly worse compared to the prior study. There is near complete occlusion but a tiny amount of string-like flow seen within these segments.   6.  Moderate (50 to 70%) luminal narrowing of the proximal to mid left common carotid artery due to calcific atherosclerosis.  Mild luminal narrowing of the proximal left internal carotid artery and left carotid canal/cavernous segments due to mild   calcific wall plaque.   7. Chronic occlusion involving a right M2 branch in the anterior sylvian fissure region noted.  Left M1 segment and proximal middle cerebral artery branches demonstrate normal enhancement. Right M1 segment is grossly patent but demonstrate slightly    decreased caliber/enhancement compared to the left which is new compared to the previous study.   8. There is fetal origin of the right posterior cerebral artery again seen. Asymmetrically diminished flow and caliber of the right posterior cerebral artery compared to the left noted, new since the prior study. This is likely related to interval    progression of near occlusive right intracranial ICA disease and fetal origin of the right PCA.   9. No enhancing brain mass/fluid collection or vascular malformation. No  enhancing soft tissue neck mass/fluid collection or cervical lymphadenopathy.               Findings were directly discussed with Benjamin Fontaine  at 3:21 a.m.                           Workstation performed: [id number]         CT stroke alert brain   Final Interpretation by Minh Galeano MD (06/24 0329)      No acute intracranial hemorrhage. Sequelae multiple old right-sided infarcts and senescent changes as described above, nonsignificantly changed compared to the prior study. Cannot exclude acute infarct in this patient on CT given these findings.  If    there is clinical concern for acute ischemia, recommend more sensitive MRI brain for better evaluation in this patient.      Findings were directly discussed with Benjamin Fontaine  at approximately 3:21 a.m.      Workstation performed: [id number]           Cardiology:  Echo complete w/ contrast if indicated   Final Result by Lauro Zapata DO (06/24 1704)        Left Ventricle: Left ventricular cavity size is normal. Wall thickness    is mildly increased. The left ventricular ejection fraction is 60%.    Systolic function is normal. Wall motion is normal.     Right Ventricle: Systolic function is mildly reduced.     Left Atrium: The atrium is moderately dilated.     Mitral Valve: There is mild regurgitation.     Tricuspid Valve: There is mild regurgitation.         ECG 12 lead   Final Result by Israel Kong MD (06/24 0807)   Atrial fibrillation with rapid ventricular response   Abnormal ECG   When compared with ECG of 24-JUN-2024 02:19, (unconfirmed)   ST no longer depressed in Inferior leads   ST no longer depressed in Anterolateral leads   T wave inversion less evident in Inferior leads   Confirmed by Israel Kong ([id number]) on 6/24/2024 8:07:16 AM      ECG 12 lead   Final Result by Israel Kong MD (06/24 0807)   Atrial fibrillation with rapid ventricular response   Minimal voltage criteria for LVH, may be normal variant   Marked ST abnormality,  "possible inferior subendocardial injury   Abnormal ECG   When compared with ECG of 22-MAR-2024 23:27,   Vent. rate has increased BY  61 BPM   ST now depressed in Inferior leads   ST now depressed in Anterior leads   Inverted T waves have replaced nonspecific T wave abnormality in Inferior    leads   Confirmed by Israel Kong ([id number]) on 6/24/2024 8:07:48 AM        GI:  No orders to display       Results from last 7 days   Lab Units 06/24/24  0305   SARS-COV-2  Negative     Results from last 7 days   Lab Units 06/25/24  0256 06/24/24  0242   WBC Thousand/uL 7.49 11.30*   HEMOGLOBIN g/dL 14.5 17.8*   HEMATOCRIT % 44.3 53.8*   PLATELETS Thousands/uL 114* 209   TOTAL NEUT ABS Thousands/µL 5.70 8.24*         Results from last 7 days   Lab Units 06/25/24  0256 06/24/24  0242   SODIUM mmol/L 140 141   POTASSIUM mmol/L 3.2* 4.3   CHLORIDE mmol/L 102 100   CO2 mmol/L 30 23   ANION GAP mmol/L 8 18*   BUN mg/dL 26* 36*   CREATININE mg/dL 1.30 2.17*   EGFR ml/min/1.73sq m 56 30   CALCIUM mg/dL 8.7 10.0     Results from last 7 days   Lab Units 06/24/24  0242   AST U/L 46*   ALT U/L 21   ALK PHOS U/L 78   TOTAL PROTEIN g/dL 7.6   ALBUMIN g/dL 4.4   TOTAL BILIRUBIN mg/dL 0.64     Results from last 7 days   Lab Units 06/24/24  0240   POC GLUCOSE mg/dl 175*     Results from last 7 days   Lab Units 06/25/24  0256 06/24/24  0242   GLUCOSE RANDOM mg/dL 84 157*             No results found for: \"BETA-HYDROXYBUTYRATE\"                   Results from last 7 days   Lab Units 06/24/24  0434 06/24/24  0244   HS TNI 0HR ng/L  --  36   HS TNI 2HR ng/L 31  --    HSTNI D2 ng/L -5  --          Results from last 7 days   Lab Units 06/24/24  0244   PROTIME seconds 15.4*   INR  1.20*   PTT seconds 42*                                             Results from last 7 days   Lab Units 06/24/24  0242   LIPASE u/L 27                 Results from last 7 days   Lab Units 06/24/24  0434   CLARITY UA  Clear   COLOR UA  Light Yellow   SPEC GRAV UA  >=1.050* "   PH UA  6.0   GLUCOSE UA mg/dl Negative   KETONES UA mg/dl Negative   BLOOD UA  Moderate*   PROTEIN UA mg/dl 200 (2+)*   NITRITE UA  Negative   BILIRUBIN UA  Negative   UROBILINOGEN UA (BE) mg/dl <2.0   LEUKOCYTES UA  Negative   WBC UA /hpf 1-2   RBC UA /hpf 2-4*   BACTERIA UA /hpf Occasional   EPITHELIAL CELLS WET PREP /hpf Occasional     Results from last 7 days   Lab Units 06/24/24  0305   INFLUENZA A PCR  Negative   INFLUENZA B PCR  Negative   RSV PCR  Negative                                               ED Treatment-Medication Administration from 06/24/2024 0216 to 06/24/2024 0501         Date/Time Order Dose Route Action     06/24/2024 0232 verapamil (FOR EMS ONLY) (ISOPTIN) injection 5 mg 0 mg Does not apply Given to EMS     06/24/2024 0300 sodium chloride 0.9 % bolus 1,000 mL 1,000 mL Intravenous New Bag     06/24/2024 0302 metoprolol (LOPRESSOR) injection 5 mg 5 mg Intravenous Given     06/24/2024 0259 iohexol (OMNIPAQUE) 350 MG/ML injection (MULTI-DOSE) 85 mL 85 mL Intravenous Given     06/24/2024 0429 aspirin tablet 325 mg 325 mg Oral Given     06/24/2024 0429 clopidogrel (PLAVIX) tablet 300 mg 300 mg Oral Given            Past Medical History:   Diagnosis Date    A-fib (HCC)     PRITI (acute kidney injury) ([facility]) 09/05/2021    Anxiety     Back pain     Claustrophobia     COPD (chronic obstructive pulmonary disease) ([facility])     Dysphagia 09/05/2021    Fatty liver     GERD (gastroesophageal reflux disease)     History of transfusion     Hyperlipidemia     Hypertension     Hypokalemia 02/22/2022    Lumbar back pain     Panic attacks     Stenosis of right carotid artery     Stroke (HCC)     Wears glasses     Wears partial dentures     upper denture     Present on Admission:   COPD (chronic obstructive pulmonary disease) (HCC)   Benign essential hypertension   Atrial fibrillation with rapid ventricular response (HCC)   Alcohol abuse   Expressive aphasia   Acute-on-chronic kidney injury  (HCC)   Stage 3a chronic  kidney disease (HCC)   PAF (paroxysmal atrial fibrillation) ([facility])   Hx of CVA (cerebral vascular accident) ([facility])   Bilateral carotid artery stenosis      Admitting Diagnosis: Dizziness [R42]  Atrial fibrillation with RVR (HCC) [I48.91]  Stroke-like episode [R29.90]  Age/Sex: 68 y.o. male  Admission Orders:  SCD  PT/OT  Baseline NIH stroke scale on admission, reassess Q24H x 2 D, Nursing dysphagia screen prior to staring diet, neuro checks.       Scheduled Medications:  amLODIPine, 5 mg, Oral, Daily  apixaban, 5 mg, Oral, BID  atorvastatin, 40 mg, Oral, QPM  buPROPion, 150 mg, Oral, Daily  busPIRone, 15 mg, Oral, BID  clopidogrel, 75 mg, Oral, Daily  donepezil, 5 mg, Oral, QPM  lisinopril, 10 mg, Oral, Daily  pantoprazole, 20 mg, Oral, Early Morning  sertraline, 100 mg, Oral, Daily  umeclidinium-vilanterol, 1 puff, Inhalation, Daily      Continuous IV Infusions:  multi-electrolyte (PLASMALYTE-A/ISOLYTE-S PH 7.4) IV solution  Rate: 100 mL/hr Dose: 100 mL/hr  Freq: Continuous Route: IV  Indications of Use: IV Hydration  Last Dose: Stopped (06/25/24 0849)  Start: 06/24/24 0415 End: 06/25/24 0726     PRN Meds:  acetaminophen, 650 mg, Oral, Q6H PRN 06/24 x 1  albuterol, 2 puff, Inhalation, Q4H PRN  labetalol, 10 mg, Intravenous, Q6H PRN        IP CONSULT TO NEUROLOGY  IP CONSULT TO CASE MANAGEMENT  IP CONSULT TO NUTRITION SERVICES    Network Utilization Review Department  ATTENTION: Please call with any questions or concerns to [phone] and carefully listen to the prompts so that you are directed to the right person. All voicemails are confidential.   For Discharge needs, contact Care Management DC Support Team at [phone] opt. [phone]  Send all requests for admission clinical reviews, approved or denied determinations and any other requests to dedicated fax number below belonging to the campus where the patient is receiving treatment. List of dedicated fax numbers for the Facilities:  FACILITY NAME UR FAX NUMBER    ADMISSION DENIALS (Administrative/Medical Necessity) [phone]   DISCHARGE SUPPORT TEAM (NETWORK) [phone]   PARENT CHILD HEALTH (Maternity/NICU/Pediatrics) [phone]   [facility] [phone]   [facility] [phone]   [facility] [phone]   [facility] [phone]   [facility] [phone]   [facility] [phone]   [facility] [phone]   [facility] [phone]   [facility] [phone]   [facility] [phone]   [facility] [phone]   [facility] [phone]

## 2024-06-25 NOTE — CASE MANAGEMENT
Case Management Discharge Planning Note    Patient name Abhishek Tom  Location [facility] /E4 -* MRN [id number]  : 1955 Date 2024       Current Admission Date: 2024  Current Admission Diagnosis:Expressive aphasia   Patient Active Problem List    Diagnosis Date Noted Date Diagnosed    Expressive aphasia 2024     Pulmonary nodule 2024     Long term (current) use of insulin ([facility]) 2024     Decreased pulses in feet 2024     Smoking 2022     Claustrophobia      Bilateral carotid artery stenosis 2022     Chronic calculous cholecystitis 2022     Delirium tremens ([facility]) 2022     Wernicke encephalopathy 2022     Alcohol use disorder, severe, dependence ([facility]) 2022     Atrial fibrillation with rapid ventricular response ([facility]) 2022     Hallucinations 2022     Memory changes 2022     Seizure ([facility]) 2022     Cholelithiasis 2022     Acute Cholecystitis 2022     Hypokalemia 2022     Hypomagnesemia 2022     PAF (paroxysmal atrial fibrillation) ([facility]) 2022     S/P carotid endarterectomy 11/10/2021     Ambulatory dysfunction 09/15/2021     Syncope and collapse 09/15/2021     Acute-on-chronic kidney injury  ([facility]) 2021     Pre-diabetes 2021     Stage 3a chronic kidney disease ([facility]) 2021     Hx of CVA (cerebral vascular accident) ([facility]) 2021     Stenosis of right internal carotid artery      Lumbar back pain      GERD (gastroesophageal reflux disease)      Left arm weakness 2021     Alcohol abuse 2021     HLD (hyperlipidemia) 2021     COPD (chronic obstructive pulmonary disease) ([facility]) 2021     Iron deficiency anemia 2021     Cerebrovascular accident (CVA) due to embolism of right middle cerebral artery ([facility]) 2021     Symptomatic carotid artery stenosis, right 2021     Benign essential hypertension 02/15/2011       LOS (days):  1  Geometric Mean LOS (GMLOS) (days): 3.1  Days to GMLOS:1.8     OBJECTIVE:  Risk of Unplanned Readmission Score: 16.96         Current admission status: Inpatient   Preferred Pharmacy:   [id number] - [place] - [address]  [address] [address]  Phone: [phone] Fax: [phone]    [facility] [place] - [place] - [address]  [address]  [place] [address]  Phone: [phone] Fax: [phone]    [facility] [place] [id number] - [place] - [address] [place] [address]  Phone: [phone] Fax: [phone]    [facility] [place] - [address] [facility]  [address] [place] [address]  [place] [address]  Phone: [phone] Fax: [phone]    Primary Care Provider: Laith Balderas DO    [facility]  Secondary Insurance:     DISCHARGE DETAILS:    Discharge planning discussed with:: Patient  Freedom of Choice: Yes  Comments - Freedom of Choice: Home with OP  CM contacted family/caregiver?: No- see comments (Declined)  Were Treatment Team discharge recommendations reviewed with patient/caregiver?: Yes  Did patient/caregiver verbalize understanding of patient care needs?: Yes  Were patient/caregiver advised of the risks associated with not following Treatment Team discharge recommendations?: Yes         Requested Home Health Care         Is the patient interested in HHC at discharge?: No    DME Referral Provided  Referral made for DME?: No    Other Referral/Resources/Interventions Provided:  Interventions: Outpatient PT  Referral Comments: Provided list of OP PT providers    Would you like to participate in our [facility] service program?  : No - Declined    Treatment Team Recommendation: Home  Discharge Destination Plan:: Home  Transport at Discharge : Family                             IMM Given (Date):: 06/25/24  IMM Given to:: Patient        IMM reviewed  with patient, patient agrees with discharge determination.      CM met with patient to review therapy recommendation of HHC vs. OP PT.  Patient reports he would prefer OP physical therapy, CM provided him with a list of outpatient providers.  Patient reports he is also giong to do yoga.  Patient does not identify any additional CM needs at this time, CM department remains available.

## 2024-06-25 NOTE — PLAN OF CARE
Problem: Potential for Falls  Goal: Patient will remain free of falls  Description: INTERVENTIONS:  - Educate patient/family on patient safety including physical limitations  - Instruct patient to call for assistance with activity   - Consult OT/PT to assist with strengthening/mobility   - Keep Call bell within reach  - Keep bed low and locked with side rails adjusted as appropriate  - Keep care items and personal belongings within reach  - Initiate and maintain comfort rounds  - Make Fall Risk Sign visible to staff  - Offer Toileting every 2 Hours, in advance of need  - Initiate/Maintain bed alarm  - Obtain necessary fall risk management equipment: alarm   - Apply yellow socks and bracelet for high fall risk patients  - Consider moving patient to room near nurses station  6/25/2024 1313 by David Washburn RN  Outcome: Adequate for Discharge  6/25/2024 1226 by David Washburn RN  Outcome: Progressing     Problem: PAIN - ADULT  Goal: Verbalizes/displays adequate comfort level or baseline comfort level  Description: Interventions:  - Encourage patient to monitor pain and request assistance  - Assess pain using appropriate pain scale  - Administer analgesics based on type and severity of pain and evaluate response  - Implement non-pharmacological measures as appropriate and evaluate response  - Consider cultural and social influences on pain and pain management  - Notify physician/advanced practitioner if interventions unsuccessful or patient reports new pain  6/25/2024 1313 by David Washburn RN  Outcome: Adequate for Discharge  6/25/2024 1226 by David Washburn RN  Outcome: Progressing     Problem: DISCHARGE PLANNING  Goal: Discharge to home or other facility with appropriate resources  Description: INTERVENTIONS:  - Identify barriers to discharge w/patient and caregiver  - Arrange for needed discharge resources and transportation as appropriate  - Identify discharge learning needs (meds, wound care, etc.)  -  Arrange for interpretive services to assist at discharge as needed  - Refer to Case Management Department for coordinating discharge planning if the patient needs post-hospital services based on physician/advanced practitioner order or complex needs related to functional status, cognitive ability, or social support system  6/25/2024 1313 by David Washburn RN  Outcome: Adequate for Discharge  6/25/2024 1226 by David Washburn RN  Outcome: Progressing     Problem: Knowledge Deficit  Goal: Patient/family/caregiver demonstrates understanding of disease process, treatment plan, medications, and discharge instructions  Description: Complete learning assessment and assess knowledge base.  Interventions:  - Provide teaching at level of understanding  - Provide teaching via preferred learning methods  6/25/2024 1313 by David Washburn RN  Outcome: Adequate for Discharge  6/25/2024 1226 by David Washburn RN  Outcome: Progressing     Problem: CARDIOVASCULAR - ADULT  Goal: Maintains optimal cardiac output and hemodynamic stability  Description: INTERVENTIONS:  - Monitor I/O, vital signs and rhythm  - Monitor for S/S and trends of decreased cardiac output  - Administer and titrate ordered vasoactive medications to optimize hemodynamic stability  - Assess quality of pulses, skin color and temperature  - Assess for signs of decreased coronary artery perfusion  - Instruct patient to report change in severity of symptoms  6/25/2024 1313 by David Washburn RN  Outcome: Adequate for Discharge  6/25/2024 1226 by David Washburn RN  Outcome: Progressing  Goal: Absence of cardiac dysrhythmias or at baseline rhythm  Description: INTERVENTIONS:  - Continuous cardiac monitoring, vital signs, obtain 12 lead EKG if ordered  - Administer antiarrhythmic and heart rate control medications as ordered  - Monitor electrolytes and administer replacement therapy as ordered  6/25/2024 1313 by David Washburn RN  Outcome: Adequate for  Discharge  6/25/2024 1226 by David Washburn RN  Outcome: Progressing     Problem: METABOLIC, FLUID AND ELECTROLYTES - ADULT  Goal: Fluid balance maintained  Description: INTERVENTIONS:  - Monitor labs   - Monitor I/O and WT  - Instruct patient on fluid and nutrition as appropriate  - Assess for signs & symptoms of volume excess or deficit  6/25/2024 1313 by David Washburn RN  Outcome: Adequate for Discharge  6/25/2024 1226 by David Washburn RN  Outcome: Progressing     Problem: Neurological Deficit  Goal: Neurological status is stable or improving  Description: Interventions:  - Monitor and assess patient's level of consciousness, motor function, sensory function, and level of assistance needed for ADLs.   - Monitor and report changes from baseline. Collaborate with interdisciplinary team to initiate plan and implement interventions as ordered.   - Provide and maintain a safe environment.  - Consider seizure precautions.  - Consider fall precautions.  - Consider aspiration precautions.  - Consider bleeding precautions.  6/25/2024 1313 by David Washburn RN  Outcome: Adequate for Discharge  6/25/2024 1226 by David Washburn RN  Outcome: Progressing     Problem: Communication Impairment  Goal: Ability to express needs and understand communication  Description: Assess patient's communication skills and ability to understand information.  Patient will demonstrate use of effective communication techniques, alternative methods of communication and understanding even if not able to speak.     - Encourage communication and provide alternate methods of communication as needed.  - Collaborate with case management/ for discharge needs.  - Include patient/family/caregiver in decisions related to communication.  6/25/2024 1313 by David Washburn RN  Outcome: Adequate for Discharge  6/25/2024 1226 by David Washburn RN  Outcome: Progressing

## 2024-06-25 NOTE — PROGRESS NOTES
"Progress Note - Neurology   Abhishek Tom 68 y.o. male [id number]  Unit/Bed#: [facility] 4 /E4 -*    Assessment/Plan:      * Expressive aphasia  Assessment & Plan  68-year-old male with prior stroke, atrial fibrillation on Eliquis, and significant multifocal cervical and intracranial atherosclerotic disease with notable stenosis/occlusions, who presented to [facility] initially on 6/24/24 with lightheadedness and dizziness while standing. Pt was found to be in rapid Afib in the ED. While in the ED overnight, pt developed acute expressive aphasia. A stroke alert was initiated. BP at time of alert 159/77, during alert BP went as high as 205/100. NIHSS 2 per ED provider. CTH revealed no acute intracranial abnormality. CTA H/N wwo contrast revealed no IR target. Pt was not a thrombolytic candidate due to bleeding risk (on Eliquis).     Workup   -CT head wo contrast 6/24/24:  \"No acute intracranial hemorrhage. Sequelae multiple old right-sided infarcts and senescent changes as described above, nonsignificantly changed compared to the prior study. Cannot exclude acute infarct in this patient on CT given these findings.  If  there is clinical concern for acute ischemia, recommend more sensitive MRI brain for better evaluation in this patient.\"  - CTA H/N wwo contrast 6/25/24:  \"1.  Extensive calcific atherosclerosis of the aortic arch, proximal branch vessels, and cervical carotid/vertebral arteries again seen. 2.  Moderate (50 to 70%) luminal narrowing of the brachiocephalic artery due to calcified plaque. 4.  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  Short segment of severe luminal narrowing of the proximal left internal carotid artery due to mixed plaque is again seen without interval change. Moderate to severe luminal  narrowing of the distal left vertebral artery V4 segment due to calcified plaque is also seen. Patent right vertebral artery without occlusion or significant stenosis. 5.  " "Complete occlusion of the right mid to distal common carotid artery with reconstitution at the carotid bifurcation region. Moderate (50 to 70%) luminal narrowing of the proximal to mid right cervical internal carotid artery. Segments of severe (>90%) luminal narrowing due to calcified wall plaque in the right internal carotid artery in the carotid canal region. Extensive calcific atherosclerosis with severe luminal narrowing of the right internal carotid artery cavernous and supraclinoid segments which appear markedly worse compared to the prior study. There is near complete occlusion but a tiny amount of string-like flow seen within these segments. 6.  Moderate (50 to 70%) luminal narrowing of the proximal to mid left common carotid artery due to calcific atherosclerosis.  Mild luminal narrowing of the proximal left internal carotid artery and left carotid canal/cavernous segments due to mild calcific wall plaque. 7. Chronic occlusion involving a right M2 branch in the anterior sylvian fissure region noted.  Left M1 segment and proximal middle cerebral artery branches demonstrate normal enhancement. Right M1 segment is grossly patent but demonstrate slightly  decreased caliber/enhancement compared to the left which is new compared to the previous study. 8. There is fetal origin of the right posterior cerebral artery again seen. Asymmetrically diminished flow and caliber of the right posterior cerebral artery compared to the left noted, new since the prior study. This is likely related to interval progression of near occlusive right intracranial ICA disease and fetal origin of the right PCA. 9. No enhancing brain mass/fluid collection or vascular malformation. No enhancing soft tissue neck mass/fluid collection or cervical lymphadenopathy.\"  - CTH wo contrast 6/24/24 (repeat):  \"No acute intracranial abnormality. Chronic right frontal and occipital lobe infarcts with encephalomalacia. Chronic microangiopathic " "ischemic changes.\"  - MRI brain wo contrast 6/24/24:  \"No evidence of acute infarct, intracranial hemorrhage or mass. \"  - Echo: EF 60%, left atrium moderately dilated  - Labs:   - hemoglobin A1c pending   - Lipid Panel: total cholesterol 176, LDL 81      Plan:  -MRI brain without contrast completed, see above  -2D echocardiogram completed, see above  -continue with Eliquis 5 mg BID   - continue with Plavix 75 mg daily   -Continue Lipitor 40 mg daily  -Goal normotension   -Neurochecks  -Telemetry monitoring  -Provide stroke education  -Therapy evaluations (PT/OT/ST)  - Medical management and supportive care per primary team. Correction of any metabolic or infectious disturbances.               {Neurology Follow Up:45325}          Subjective:   ***        Past Medical History:   Diagnosis Date    A-fib (HCC)     PRITI (acute kidney injury) (HCC) 09/05/2021    Anxiety     Back pain     Claustrophobia     COPD (chronic obstructive pulmonary disease) (HCC)     Dysphagia 09/05/2021    Fatty liver     GERD (gastroesophageal reflux disease)     History of transfusion     Hyperlipidemia     Hypertension     Hypokalemia 02/22/2022    Lumbar back pain     Panic attacks     Stenosis of right carotid artery     Stroke (HCC)     Wears glasses     Wears partial dentures     upper denture     Past Surgical History:   Procedure Laterality Date    COLONOSCOPY      ERCP      IR CEREBRAL ANGIOGRAPHY  04/06/2021    OTHER SURGICAL HISTORY      fertility    ND LAPAROSCOPY SURG CHOLECYSTECTOMY N/A 8/26/2022    Procedure: CHOLECYSTECTOMY LAPAROSCOPIC;  Surgeon: Michael Otoole DO;  Location: [facility];  Service: General    ND SLCTV CATHJ 3RD+ ORD SLCTV ABDL PEL/LXTR BRNCH Right 04/06/2021    Procedure: ARTERIOGRAM, carotid Angio;  Surgeon: Maximiliano Morin MD;  Location: [facility];  Service: Vascular    ND TEAEC W/PATCH GRF CAROTID VERTB SUBCLAV NECK INC Right 04/13/2021    Procedure: RIGHT ENDARTERECTOMY ARTERY CAROTID WITH BOVINE " PATCH;  Surgeon: Maximiliaon Morin MD;  Location: [facility];  Service: Vascular     Family History   Problem Relation Age of Onset    Asthma Mother      Social History     Socioeconomic History    Marital status: /Civil Union     Spouse name: None    Number of children: None    Years of education: None    Highest education level: None   Occupational History    None   Tobacco Use    Smoking status: Former     Current packs/day: 0.50     Average packs/day: 0.5 packs/day for 49.5 years (24.7 ttl pk-yrs)     Types: Cigarettes     Start date: 1/1/1975    Smokeless tobacco: Never   Vaping Use    Vaping status: Never Used   Substance and Sexual Activity    Alcohol use: Not Currently    Drug use: Never     Comment: never    Sexual activity: Not Currently     Partners: Female     Birth control/protection: Abstinence   Other Topics Concern    None   Social History Narrative    None     Social Determinants of Health     Financial Resource Strain: Low Risk  (10/2/2023)    [facility]    Overall Financial Resource Strain (CARDIA)     Difficulty of Paying Living Expenses: Not hard at all   Food Insecurity: No Food Insecurity (6/24/2024)    Hunger Vital Sign     Worried About Running Out of Food in the Last Year: Never true     Ran Out of Food in the Last Year: Never true   Transportation Needs: No Transportation Needs (6/24/2024)    PRAPARE - Transportation     Lack of Transportation (Medical): No     Lack of Transportation (Non-Medical): No   Physical Activity: Not on file   Stress: Not on file   Social Connections: Not on file   Intimate Partner Violence: Not At Risk (10/2/2023)    [facility]    Humiliation, Afraid, Rape, and Kick questionnaire     Fear of Current or Ex-Partner: No     Emotionally Abused: No     Physically Abused: No     Sexually Abused: No   Housing Stability: Low Risk  (6/24/2024)  "   Housing Stability Vital Sign     Unable to Pay for Housing in the Last Year: No     Number of Times Moved in the Last Year: 0     Homeless in the Last Year: No     E-Cigarette/Vaping    E-Cigarette Use Never User      E-Cigarette/Vaping Substances    Nicotine No     THC No     CBD No     Flavoring No     Other No     Unknown No          Medications:  All current active meds have been reviewed and current meds:  Scheduled Meds:  Current Facility-Administered Medications   Medication Dose Route Frequency Provider Last Rate    acetaminophen  650 mg Oral Q6H PRN Eugenio Salazar PA-C      albuterol  2 puff Inhalation Q4H PRN Xiomara Cadena, DO      amLODIPine  5 mg Oral Daily Jennifer Bermudez PA-C      apixaban  5 mg Oral BID Israel Hunter PA-C      atorvastatin  40 mg Oral QPM Xiomara Cadena, DO      buPROPion  150 mg Oral Daily Xiomara Cadena, DO      busPIRone  15 mg Oral BID Xiomara Cadena, DO      clopidogrel  75 mg Oral Daily Israel Hunter PA-C      donepezil  5 mg Oral QPM Xiomara Cadena, DO      labetalol  10 mg Intravenous Q6H PRN Xiomara Cadena, DO      lisinopril  10 mg Oral Daily Jennifer Bermudez PA-C      pantoprazole  20 mg Oral Early Morning Xiomara Cadena, DO      potassium chloride  20 mEq Oral Once Jennifer Bermudez PA-C      sertraline  100 mg Oral Daily Xiomara Cadena, DO      umeclidinium-vilanterol  1 puff Inhalation Daily Xiomara Cadena, DO       Continuous Infusions:   PRN Meds:.  acetaminophen    albuterol    labetalol       ROS:   Review of Systems          Vitals:   BP (P) 164/86 (BP Location: Left arm)   Pulse (!) (P) 127   Temp (P) 97.8 °F (36.6 °C) (Temporal)   Resp (P) 17   Ht 5' 9\" (1.753 m)   Wt 66.6 kg (146 lb 13.2 oz)   SpO2 (P) 98%   BMI 21.68 kg/m²     Physical Exam:   Physical Exam  Neurologic Exam        Labs: I have personally reviewed pertinent reports.   Recent Results (from the past 24 hour(s))   Echo complete w/ contrast if " indicated    Collection Time: 06/24/24  3:37 PM   Result Value Ref Range    BSA 1.79 m2    LV EF 60     Ao root 3.44 cm   Lipid Panel with Direct LDL reflex    Collection Time: 06/25/24  2:56 AM   Result Value Ref Range    Cholesterol 176 See Comment mg/dL    Triglycerides 207 (H) See Comment mg/dL    HDL, Direct 54 >=40 mg/dL    LDL Calculated 81 0 - 100 mg/dL   Basic metabolic panel    Collection Time: 06/25/24  2:56 AM   Result Value Ref Range    Sodium 140 135 - 147 mmol/L    Potassium 3.2 (L) 3.5 - 5.3 mmol/L    Chloride 102 96 - 108 mmol/L    CO2 30 21 - 32 mmol/L    ANION GAP 8 4 - 13 mmol/L    BUN 26 (H) 5 - 25 mg/dL    Creatinine 1.30 0.60 - 1.30 mg/dL    Glucose 84 65 - 140 mg/dL    Calcium 8.7 8.4 - 10.2 mg/dL    eGFR 56 ml/min/1.73sq m   CBC and differential    Collection Time: 06/25/24  2:56 AM   Result Value Ref Range    WBC 7.49 4.31 - 10.16 Thousand/uL    RBC 5.29 3.88 - 5.62 Million/uL    Hemoglobin 14.5 12.0 - 17.0 g/dL    Hematocrit 44.3 36.5 - 49.3 %    MCV 84 82 - 98 fL    MCH 27.4 26.8 - 34.3 pg    MCHC 32.7 31.4 - 37.4 g/dL    RDW 14.0 11.6 - 15.1 %    MPV 10.4 8.9 - 12.7 fL    Platelets 114 (L) 149 - 390 Thousands/uL    nRBC 0 /100 WBCs    Segmented % 76 (H) 43 - 75 %    Immature Grans % 1 0 - 2 %    Lymphocytes % 15 14 - 44 %    Monocytes % 7 4 - 12 %    Eosinophils Relative 1 0 - 6 %    Basophils Relative 0 0 - 1 %    Absolute Neutrophils 5.70 1.85 - 7.62 Thousands/µL    Absolute Immature Grans 0.04 0.00 - 0.20 Thousand/uL    Absolute Lymphocytes 1.11 0.60 - 4.47 Thousands/µL    Absolute Monocytes 0.51 0.17 - 1.22 Thousand/µL    Eosinophils Absolute 0.10 0.00 - 0.61 Thousand/µL    Basophils Absolute 0.03 0.00 - 0.10 Thousands/µL       Imaging: I have personally reviewed pertinent imaging in PACS, including ***,  and I have personally reviewed PACS reports.     EKG, Pathology, and Other Studies: I have personally reviewed pertinent reports.       VTE Prophylaxis: {sl ip VTE  prophylaxis:38135}        This note was completed in part utilizing Dragon Software.  Grammatical errors, random word insertions, spelling mistakes, and incomplete sentences may be an occasional consequence of this system secondary to software limitations, ambient noise, and hardware issues.  If you have any questions or concerns about the content, text, or information contained within the body of this dictation, please contact the provider for clarification.

## 2024-06-25 NOTE — ASSESSMENT & PLAN NOTE
Initially presented with dizziness/lightheadedness.  In the ED patient was found to be in A-fib with RVR.  Patient then developed expressive aphasia around 2:45 AM for which stroke alert was called.   CT head with no acute abnormality  MRI brain no acute infarct   Echo: EF 60%, mild MR and TR   CTA no new LVO, signficant multifocal disease  Continue Eliquis and plavix given advanced intracranial atherosclerotic changes   Continue high intensity statin   Outpatient follow up with cardiology, vascular surgery and neurology

## 2024-06-25 NOTE — NURSING NOTE
Patient discharged to home. IV removed. All belongings were taken. AVS reviewed with the patient. No further questions at this time.

## 2024-06-25 NOTE — PLAN OF CARE
Problem: Potential for Falls  Goal: Patient will remain free of falls  Description: INTERVENTIONS:  - Educate patient/family on patient safety including physical limitations  - Instruct patient to call for assistance with activity   - Consult OT/PT to assist with strengthening/mobility   - Keep Call bell within reach  - Keep bed low and locked with side rails adjusted as appropriate  - Keep care items and personal belongings within reach  - Initiate and maintain comfort rounds  - Make Fall Risk Sign visible to staff  - Offer Toileting every 2 Hours, in advance of need  - Initiate/Maintain bed/chair alarm  - Obtain necessary fall risk management equipment: bed/chair alarm  - Apply yellow socks and bracelet for high fall risk patients  - Consider moving patient to room near nurses station  Outcome: Progressing     Problem: PAIN - ADULT  Goal: Verbalizes/displays adequate comfort level or baseline comfort level  Description: Interventions:  - Encourage patient to monitor pain and request assistance  - Assess pain using appropriate pain scale  - Administer analgesics based on type and severity of pain and evaluate response  - Implement non-pharmacological measures as appropriate and evaluate response  - Consider cultural and social influences on pain and pain management  - Notify physician/advanced practitioner if interventions unsuccessful or patient reports new pain  Outcome: Progressing     Problem: DISCHARGE PLANNING  Goal: Discharge to home or other facility with appropriate resources  Description: INTERVENTIONS:  - Identify barriers to discharge w/patient and caregiver  - Arrange for needed discharge resources and transportation as appropriate  - Identify discharge learning needs (meds, wound care, etc.)  - Arrange for interpretive services to assist at discharge as needed  - Refer to Case Management Department for coordinating discharge planning if the patient needs post-hospital services based on  physician/advanced practitioner order or complex needs related to functional status, cognitive ability, or social support system  Outcome: Progressing     Problem: Knowledge Deficit  Goal: Patient/family/caregiver demonstrates understanding of disease process, treatment plan, medications, and discharge instructions  Description: Complete learning assessment and assess knowledge base.  Interventions:  - Provide teaching at level of understanding  - Provide teaching via preferred learning methods  Outcome: Progressing     Problem: CARDIOVASCULAR - ADULT  Goal: Maintains optimal cardiac output and hemodynamic stability  Description: INTERVENTIONS:  - Monitor I/O, vital signs and rhythm  - Monitor for S/S and trends of decreased cardiac output  - Administer and titrate ordered vasoactive medications to optimize hemodynamic stability  - Assess quality of pulses, skin color and temperature  - Assess for signs of decreased coronary artery perfusion  - Instruct patient to report change in severity of symptoms  Outcome: Progressing  Goal: Absence of cardiac dysrhythmias or at baseline rhythm  Description: INTERVENTIONS:  - Continuous cardiac monitoring, vital signs, obtain 12 lead EKG if ordered  - Administer antiarrhythmic and heart rate control medications as ordered  - Monitor electrolytes and administer replacement therapy as ordered  Outcome: Progressing     Problem: METABOLIC, FLUID AND ELECTROLYTES - ADULT  Goal: Fluid balance maintained  Description: INTERVENTIONS:  - Monitor labs   - Monitor I/O and WT  - Instruct patient on fluid and nutrition as appropriate  - Assess for signs & symptoms of volume excess or deficit  Outcome: Progressing     Problem: Neurological Deficit  Goal: Neurological status is stable or improving  Description: Interventions:  - Monitor and assess patient's level of consciousness, motor function, sensory function, and level of assistance needed for ADLs.   - Monitor and report changes from  baseline. Collaborate with interdisciplinary team to initiate plan and implement interventions as ordered.   - Provide and maintain a safe environment.  - Consider seizure precautions.  - Consider fall precautions.  - Consider aspiration precautions.  - Consider bleeding precautions.  Outcome: Progressing     Problem: Communication Impairment  Goal: Ability to express needs and understand communication  Description: Assess patient's communication skills and ability to understand information.  Patient will demonstrate use of effective communication techniques, alternative methods of communication and understanding even if not able to speak.     - Encourage communication and provide alternate methods of communication as needed.  - Collaborate with case management/ for discharge needs.  - Include patient/family/caregiver in decisions related to communication.  Outcome: Progressing

## 2024-06-25 NOTE — UTILIZATION REVIEW
NOTIFICATION OF ADMISSION DISCHARGE   This is a Notification of Discharge from [facility]. Please be advised that this patient has been discharge from our facility. Below you will find the admission and discharge date and time including the patient’s disposition.   UTILIZATION REVIEW CONTACT:  Gerri Lowe  Utilization   Network Utilization Review Department  Phone: [phone] x carefully listen to the prompts. All voicemails are confidential.  Email: [email or web address]     ADMISSION INFORMATION  PRESENTATION DATE: 6/24/2024  2:16 AM  OBERVATION ADMISSION DATE:   INPATIENT ADMISSION DATE: 6/24/24  3:54 AM   DISCHARGE DATE: 6/25/2024  1:14 PM   DISPOSITION:Home/Self Care    Network Utilization Review Department  ATTENTION: Please call with any questions or concerns to [phone] and carefully listen to the prompts so that you are directed to the right person. All voicemails are confidential.   For Discharge needs, contact Care Management DC Support Team at [phone] opt. 2  Send all requests for admission clinical reviews, approved or denied determinations and any other requests to dedicated fax number below belonging to the campus where the patient is receiving treatment. List of dedicated fax numbers for the Facilities:  FACILITY NAME UR FAX NUMBER   ADMISSION DENIALS (Administrative/Medical Necessity) [phone]   DISCHARGE SUPPORT TEAM ([facility]) [phone]   PARENT CHILD HEALTH (Maternity/NICU/Pediatrics) [phone]   [facility] [phone]   [facility] [phone]   [facility] [phone]   [facility] [phone]   [facility] [phone]   [facility] [phone]   [facility] [phone]   [facility] [phone]   [facility]  [facility] [phone]   [facility] [phone]   [facility] [phone]   [facility] [phone]

## 2024-06-25 NOTE — ASSESSMENT & PLAN NOTE
Lab Results   Component Value Date    EGFR 56 06/25/2024    EGFR 30 06/24/2024    EGFR 55 03/25/2024    CREATININE 1.30 06/25/2024    CREATININE 2.17 (H) 06/24/2024    CREATININE 1.31 (H) 03/25/2024   Returned to baseline today

## 2024-06-25 NOTE — CERTIFIED RECOVERY SPECIALIST
I met with Ahbishek who seemed in positive spirits. He is not missing alcohol or smoking, and happy about it. He said he is getting  in 3 months after he moves to [place] so he is looking forward to that. He is not interested in resources or information regarding staying sober.

## 2024-06-26 NOTE — PROGRESS NOTES
"Progress Note - Neurology   Abhishek Tom 68 y.o. male [id number]  Unit/Bed#: East 4 /E4 -*    Assessment/Plan:  Abhishek Tom is a 68 y.o. male    * Expressive aphasia  Assessment & Plan  68-year-old male with prior stroke, atrial fibrillation on Eliquis, and significant multifocal cervical and intracranial atherosclerotic disease with notable stenosis/occlusions, who presented to [facility] initially on 6/24/24 with lightheadedness and dizziness while standing. Pt was found to be in rapid Afib in the ED. While in the ED overnight, pt developed acute expressive aphasia. A stroke alert was initiated. BP at time of alert 159/77, during alert BP went as high as 205/100. NIHSS 2 per ED provider. CTH revealed no acute intracranial abnormality. CTA H/N wwo contrast revealed no IR target. Pt was not a thrombolytic candidate due to bleeding risk (on Eliquis).     Workup   -CT head wo contrast 6/24/24:  \"No acute intracranial hemorrhage. Sequelae multiple old right-sided infarcts and senescent changes as described above, nonsignificantly changed compared to the prior study. Cannot exclude acute infarct in this patient on CT given these findings.  If  there is clinical concern for acute ischemia, recommend more sensitive MRI brain for better evaluation in this patient.\"  - CTA H/N wwo contrast 6/25/24:  \"1.  Extensive calcific atherosclerosis of the aortic arch, proximal branch vessels, and cervical carotid/vertebral arteries again seen. 2.  Moderate (50 to 70%) luminal narrowing of the brachiocephalic artery due to calcified plaque. 4.  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  Short segment of severe luminal narrowing of the proximal left internal carotid artery due to mixed plaque is again seen without interval change. Moderate to severe luminal  narrowing of the distal left vertebral artery V4 segment due to calcified plaque is also seen. Patent right vertebral artery without " "occlusion or significant stenosis. 5.  Complete occlusion of the right mid to distal common carotid artery with reconstitution at the carotid bifurcation region. Moderate (50 to 70%) luminal narrowing of the proximal to mid right cervical internal carotid artery. Segments of severe (>90%) luminal narrowing due to calcified wall plaque in the right internal carotid artery in the carotid canal region. Extensive calcific atherosclerosis with severe luminal narrowing of the right internal carotid artery cavernous and supraclinoid segments which appear markedly worse compared to the prior study. There is near complete occlusion but a tiny amount of string-like flow seen within these segments. 6.  Moderate (50 to 70%) luminal narrowing of the proximal to mid left common carotid artery due to calcific atherosclerosis.  Mild luminal narrowing of the proximal left internal carotid artery and left carotid canal/cavernous segments due to mild calcific wall plaque. 7. Chronic occlusion involving a right M2 branch in the anterior sylvian fissure region noted.  Left M1 segment and proximal middle cerebral artery branches demonstrate normal enhancement. Right M1 segment is grossly patent but demonstrate slightly  decreased caliber/enhancement compared to the left which is new compared to the previous study. 8. There is fetal origin of the right posterior cerebral artery again seen. Asymmetrically diminished flow and caliber of the right posterior cerebral artery compared to the left noted, new since the prior study. This is likely related to interval progression of near occlusive right intracranial ICA disease and fetal origin of the right PCA. 9. No enhancing brain mass/fluid collection or vascular malformation. No enhancing soft tissue neck mass/fluid collection or cervical lymphadenopathy.\"  - CTH wo contrast 6/24/24 (repeat):  \"No acute intracranial abnormality. Chronic right frontal and occipital lobe infarcts with " "encephalomalacia. Chronic microangiopathic ischemic changes.\"  - MRI brain wo contrast 6/24/24:  \"No evidence of acute infarct, intracranial hemorrhage or mass. \"  - Echo: EF 60%, left atrium moderately dilated  - Labs:   - hemoglobin A1c pending   - Lipid Panel: total cholesterol 176, LDL 81      Plan:  -MRI brain without contrast completed, see above  -2D echocardiogram completed, see above  -continue with Eliquis 5 mg BID   - continue with Plavix 75 mg daily   -Continue Lipitor 40 mg daily  -Goal normotension   -Neurochecks  -Telemetry monitoring  -Provide stroke education  -Therapy evaluations (PT/OT/ST)  - Medical management and supportive care per primary team. Correction of any metabolic or infectious disturbances.               Abhishek Tom will need follow up in in 6 weeks with neurovascular team .  He will not require outpatient neurological testing.       Subjective:   Mr. Tom describes no significant word finding difficulties or challenges expressing himself.  Patient reports no lightheadedness or dizziness at this point.  Patient has made wonderful recovery considering initial clinical presentation.  Patient has hypertensive urgency and patient was diagnosed with A-fib which likely contributed to probably TIA rather than acute ischemic stroke.         Past Medical History:   Diagnosis Date    A-fib (HCC)     PRITI (acute kidney injury) ([facility]) 09/05/2021    Anxiety     Back pain     Claustrophobia     COPD (chronic obstructive pulmonary disease) ([facility])     Dysphagia 09/05/2021    Fatty liver     GERD (gastroesophageal reflux disease)     History of transfusion     Hyperlipidemia     Hypertension     Hypokalemia 02/22/2022    Lumbar back pain     Panic attacks     Stenosis of right carotid artery     Stroke (HCC)     Wears glasses     Wears partial dentures     upper denture     Past Surgical History:   Procedure Laterality Date    COLONOSCOPY      ERCP      IR CEREBRAL ANGIOGRAPHY  04/06/2021    OTHER " SURGICAL HISTORY      fertility    KS LAPAROSCOPY SURG CHOLECYSTECTOMY N/A 8/26/2022    Procedure: CHOLECYSTECTOMY LAPAROSCOPIC;  Surgeon: Michael Otoole DO;  Location: [facility];  Service: General    KS SLCTV CATHJ 3RD+ ORD SLCTV ABDL PEL/LXTR BRNCH Right 04/06/2021    Procedure: ARTERIOGRAM, carotid Angio;  Surgeon: Maximiliano Morin MD;  Location: [facility];  Service: Vascular    KS TEAEC W/PATCH GRF CAROTID VERTB SUBCLAV NECK INC Right 04/13/2021    Procedure: RIGHT ENDARTERECTOMY ARTERY CAROTID WITH BOVINE PATCH;  Surgeon: Maximiliano Morin MD;  Location: [facility];  Service: Vascular     Family History   Problem Relation Age of Onset    Asthma Mother      Social History     Socioeconomic History    Marital status: /Civil Union     Spouse name: None    Number of children: None    Years of education: None    Highest education level: None   Occupational History    None   Tobacco Use    Smoking status: Former     Current packs/day: 0.50     Average packs/day: 0.5 packs/day for 49.5 years (24.7 ttl pk-yrs)     Types: Cigarettes     Start date: 1/1/1975    Smokeless tobacco: Never   Vaping Use    Vaping status: Never Used   Substance and Sexual Activity    Alcohol use: Not Currently    Drug use: Never     Comment: never    Sexual activity: Not Currently     Partners: Female     Birth control/protection: Abstinence   Other Topics Concern    None   Social History Narrative    None     Social Determinants of Health     Financial Resource Strain: Low Risk  (10/2/2023)    [facility]    Overall Financial Resource Strain (CARDIA)     Difficulty of Paying Living Expenses: Not hard at all   Food Insecurity: No Food Insecurity (6/24/2024)    Hunger Vital Sign     Worried About Running Out of Food in the Last Year: Never true     Ran Out of Food in the Last Year: Never true   Transportation Needs: No Transportation Needs (6/24/2024)    PRAPARE -  "Transportation     Lack of Transportation (Medical): No     Lack of Transportation (Non-Medical): No   Physical Activity: Not on file   Stress: Not on file   Social Connections: Not on file   Intimate Partner Violence: Not At Risk (10/2/2023)    [facility]    Humiliation, Afraid, Rape, and Kick questionnaire     Fear of Current or Ex-Partner: No     Emotionally Abused: No     Physically Abused: No     Sexually Abused: No   Housing Stability: Low Risk  (6/24/2024)    Housing Stability Vital Sign     Unable to Pay for Housing in the Last Year: No     Number of Times Moved in the Last Year: 0     Homeless in the Last Year: No     E-Cigarette/Vaping    E-Cigarette Use Never User      E-Cigarette/Vaping Substances    Nicotine No     THC No     CBD No     Flavoring No     Other No     Unknown No          Medications:  All current active meds have been reviewed and current meds:  Scheduled Meds:  Continuous Infusions:No current facility-administered medications for this encounter.    PRN Meds:.       ROS:   Review of Systems  12 points of review of system was reviewed with the patient and was unremarkable with exception: see HPI        Vitals:   /86 (BP Location: Left arm)   Pulse (!) 127   Temp 97.8 °F (36.6 °C) (Temporal)   Resp 17   Ht 5' 9\" (1.753 m)   Wt 66.6 kg (146 lb 13.2 oz)   SpO2 98%   BMI 21.68 kg/m²     Physical Exam:   Physical Exam  Neurologic Exam    CONSTITUTIONAL: NAD, pleasant. NECK: supple, no lymphadenopathy, no thyromegaly, no JVD. CARDIOVASCULAR: RRR, normal S1S2, no murmurs, no rubs. RESP: clear to auscultation bilaterally, no wheezes/rhonchi/rales. ABDOMEN: soft, non tender, non distended. SKIN: no rash or skin lesions. EXTREMITIES: no edema, pulses 2+bilaterally. PSYCH: appropriate mood and affect  NEUROLOGIC COMPREHENSIVE EXAM: Patient is oriented to person, place and time, NAD; appropriate affect. CN II, III, IV, V, VI, " VII,VIII,IX,X,XI-XII intact with EOMI, PERRLA, OKN intact, VF grossly intact, fundi poorly visualized secondary to pupillary constriction; symmetric face noted. Motor: 5/5 UE/LE bilateral symmetric; Sensory: intact to light touch and pinprick bilaterally; normal vibration sensation feet bilaterally; Coordination within normal limits on FTN and ABRAHAM testing; DTR: 2/4 through, no Babinski, no clonus. Tandem gait is intact. Romberg: absent.    Labs: I have personally reviewed pertinent reports.   No results found for this or any previous visit (from the past 24 hour(s)).    Imaging: I have personally reviewed pertinent imaging in PACS, including MRI brain, CTA H/N, CT head,  and I have personally reviewed PACS reports.     EKG, Pathology, and Other Studies: I have personally reviewed pertinent reports.       VTE Prophylaxis: Heparin      Counseling / Coordination of Care  I have spent a total time of 15 minutes on 06/26/24 in caring for this patient including Diagnostic results, Importance of tx compliance, Impressions, Counseling / Coordination of care, Documenting in the medical record, Reviewing / ordering tests, medicine, procedures  , and Obtaining or reviewing history  .

## 2024-07-15 ENCOUNTER — TELEPHONE (OUTPATIENT)
Age: 69
End: 2024-07-15

## 2024-07-15 NOTE — TELEPHONE ENCOUNTER
HFU/ SL ALL / Expressive aphasia     DC- HOME- 6/25/2024     Abhishek BHAGAT Freddyrussel will need follow up in in 6 weeks with neurovascular team .  He will not require outpatient neurological testing.

## 2024-07-17 NOTE — TELEPHONE ENCOUNTER
Hello,     Can you please advise which Speciality/Team and if patient can be seen by an Resident, AP and or Attending  only?    Thank you for your time,     Candy      HFU/ ANTONETTE ALL / Expressive aphasia      DC- HOME- 6/25/2024      Abhishek BHAGAT Freddyrussel will need follow up in in 6 weeks with neurovascular team .  He will not require outpatient neurological testing.      1ST ATTEMPT,     Called pt no answer, VOICEMAIL FULL     Thank you,     Candy

## 2024-07-26 ENCOUNTER — TELEPHONE (OUTPATIENT)
Dept: VASCULAR SURGERY | Facility: CLINIC | Age: 69
End: 2024-07-26

## 2024-07-26 NOTE — TELEPHONE ENCOUNTER
I called home phone number, mobile's mailbox is full and lmom to reschedule their appt. Patient did not get their CV done, u/s needs to be rescheduled and appt needs to be rescheduled. I left Central Scheduling's number as well. Patient needs to see VS, got CTA stroke alert head/neck 6/24/24.    Called pt and lmom to reschedule CV and OV with VS 2x on 7/26/24

## 2024-08-02 ENCOUNTER — TELEPHONE (OUTPATIENT)
Age: 69
End: 2024-08-02

## 2024-08-02 NOTE — TELEPHONE ENCOUNTER
Dr. Shirley from [facility] called to confirm medications. Went through medication list and dosages.

## 2024-08-02 NOTE — TELEPHONE ENCOUNTER
Patient's sister Irwin calling because patient was found unresponsive and taken to [facility]. Irwin states the hospital called her asking medical history and medication questions that she does not have the answers to. Patient is currently on full life support. Irwin is not on most recent medical communication form and I was unable to provide specific information regarding medications over the phone. Attempted to call the phone numbers Irwin provided to me for the hospital but was unable to reach anyone. LVM on the phone numbers provided for the hospital and called Irwin back to inform her. I ensured she has our number and she states she will tell the hospital to call us directly if more information is needed.

## 2024-09-17 ENCOUNTER — HOSPITAL ENCOUNTER (OUTPATIENT)
Dept: NON INVASIVE DIAGNOSTICS | Facility: HOSPITAL | Age: 69
Discharge: HOME | End: 2024-09-17
Payer: COMMERCIAL

## 2024-09-17 DIAGNOSIS — I65.21 SYMPTOMATIC CAROTID ARTERY STENOSIS, RIGHT: Chronic | ICD-10-CM

## 2024-09-17 PROCEDURE — 93880 EXTRACRANIAL BILAT STUDY: CPT | Performed by: SURGERY

## 2024-09-17 PROCEDURE — 93880 EXTRACRANIAL BILAT STUDY: CPT

## 2024-09-25 ENCOUNTER — TELEPHONE (OUTPATIENT)
Age: 69
End: 2024-09-25

## 2024-09-25 NOTE — TELEPHONE ENCOUNTER
Caller: Ana    Doctor: Richy    Reason for call: Ana will not be able to bring patient to appointment on 10/10 and needs to reschedule. I was able to get her in contact with Manjula to better assist her with finding a sooner appointment.    Call back#: [phone]     No

## 2024-09-30 NOTE — PROGRESS NOTES
Assessment/Plan:      There are no diagnoses linked to this encounter.      Subjective:     Patient ID: Abhishek Tom is a 69 y.o. male.    Pt had CV 09/17/2024.   Pt states he experienced facial drooping on the left side.   Pt has not been experiencing any other symptoms.   HPI    Review of Systems      Objective:     Physical Exam

## 2024-10-01 ENCOUNTER — OFFICE VISIT (OUTPATIENT)
Dept: VASCULAR SURGERY | Facility: CLINIC | Age: 69
End: 2024-10-01
Payer: COMMERCIAL

## 2024-10-01 VITALS
HEART RATE: 94 BPM | OXYGEN SATURATION: 96 % | DIASTOLIC BLOOD PRESSURE: 68 MMHG | BODY MASS INDEX: 23.03 KG/M2 | WEIGHT: 155.5 LBS | HEIGHT: 69 IN | SYSTOLIC BLOOD PRESSURE: 142 MMHG

## 2024-10-01 DIAGNOSIS — I65.21 THROMBOSIS OF RIGHT COMMON CAROTID ARTERY: ICD-10-CM

## 2024-10-01 DIAGNOSIS — R40.4 TRANSIENT ALTERATION OF AWARENESS: Primary | ICD-10-CM

## 2024-10-01 PROCEDURE — 99214 OFFICE O/P EST MOD 30 MIN: CPT | Performed by: STUDENT IN AN ORGANIZED HEALTH CARE EDUCATION/TRAINING PROGRAM

## 2024-10-01 NOTE — PROGRESS NOTES
Vascular Surgery New Patient Visit  Date: 10/01/24      Assessment:  Abhishek Tom is a 69 y.o. male with recent episode of aphasia in the emergency department on 6/24/2024.  He has a history of right carotid endarterectomy for symptomatic disease and 2021.  At that time, his CCA was noted to be patent.  However, on imaging obtained in the emergency department during this episode, his right CCA is completely occluded up to the level of the bifurcation.  The R ECA and ICA are patent, but there is significant intracranial R ICA disease.  The left CCA has circumferential calcification of the bulb, but the ICA and ECA have no clinically significant stenosis.     I discussed that in rare circumstances, occlusion of the CCA with a patent ECA and ICA could lead to embolism and stroke/TIA.  However, while his episode of aphasia could represent a TIA, he did not have any associated localizing symptoms or stroke identified on MRI.  Furthermore he had other potential sources including A-fib with RVR at time of presentation and multiple medications causing altered mental status.  I also explained that operative intervention would be nonstandard and come with increased risk of complications (nerve injury/stroke) especially in a reoperative field.    Because of the increased risk of intervention and multiple other potential sources for his episode of aphasia, I discussed with him that I do not think surgical intervention is merited at this time.    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 male with PMH of  "remote right MCA stroke due to symptomatic carotid stenosis and is s/p R CEA on 4/13/2021, A-fib (on Eliquis), COPD, CKD (Cr ~1.4).  He was referred to evaluate for carotid involvement after recent workup for stroke in the emergency department.  He initially presented on 6/24/2024 with tachycardia and was found to be in A-fib with RVR.  Symptoms on presentation were dizziness/lightheadedness which occurred at home after standing from a seated position.  At some point after presentation in the emergency department an episode of word finding difficulty/aphasia was observed.  Stroke workup was initiated.  MRI was negative for acute stroke.    Abhishek and his sister are present today in clinic and they also added that leading up to this event in June of this year, he was on multiple medications that they felt like were contributing to intermittent episodes of altered mental status at home.  They report since his recent hospitalization, several of those medications were either changed or discontinued and his mental status has improved significantly.  He has not had any further episodes of aphasia or any localizing symptoms.    On review of CTA obtained during his stroke workup, R CCA is completely occluded up to the bifurcation. The R extracranial ICA is widely patent but there is significant disease in the intracranial segment. Focal stenosis at the origin of the R ECA but it is also otherwise patent. L CCA has circumferential calcification distally but is patent. L ICA has minimal disease and is patent. L ECA also patent. Additionally, patient has a bovine arch, L vert originating from arch proximal to L subclavian, L vert appears occluded just proximal to the basilar artery, R vert is dominant.    Objective:    ROS:  All systems were reviewed and are negative except those mentioned in HPI and below.      Vitals: /68 (BP Location: Right arm, Patient Position: Sitting, Cuff Size: Standard)   Pulse 94   Ht 5' 9\" " (1.753 m)   Wt 70.5 kg (155 lb 8 oz)   SpO2 96%   BMI 22.96 kg/m²      General: male appears stated age, no apparent distress, alert and oriented   HEENT: normocephalic, atraumatic   Cardiovascular: hemodynamically stable   Chest/Lungs: no increased work of breathing, chest rise equal bilaterally   Abdomen: Soft, ND, NT, no pulsatile masses   Extremities: Bilateral radial/femoral pulses.   Skin: warm and dry   Neuro: no gross deficits      Medications:  Current Outpatient Medications   Medication Sig Dispense Refill    acetaminophen (TYLENOL) 500 mg tablet Take 500 mg by mouth daily as needed for headaches, mild pain or moderate pain. Indications: Pain      albuterol (PROVENTIL HFA,VENTOLIN HFA) 90 mcg/act inhaler Inhale 2 puffs if needed      amLODIPine-benazepril (LOTREL 5-10) 5-10 MG per capsule Take 1 capsule by mouth daily      Anoro Ellipta 62.5-25 MCG/ACT inhaler INHALE 1 PUFF BY MOUTH AND INTO THE LUNGS ONCE DAILY AT THE SAME TIME EACH DAY      apixaban (ELIQUIS) 5 mg Take 1 tablet (5 mg total) by mouth 2 (two) times a day 60 tablet 0    atorvastatin (LIPITOR) 40 mg tablet Take 1 tablet (40 mg total) by mouth every evening 30 tablet 0    baclofen 10 mg tablet Take 10 mg by mouth in the morning PRN      buPROPion (WELLBUTRIN XL) 150 mg 24 hr tablet Take 150 mg by mouth daily      busPIRone (BUSPAR) 15 mg tablet 15 mg 2 (two) times a day      carvedilol (COREG) 12.5 mg tablet Take 1 tablet (12.5 mg total) by mouth 2 (two) times a day with meals 60 tablet 0    clopidogrel (PLAVIX) 75 mg tablet Take 1 tablet (75 mg total) by mouth daily Do not start before June 26, 2024. 30 tablet 0    donepezil (ARICEPT) 5 mg tablet Take 5 mg by mouth every evening      omeprazole (PriLOSEC) 20 mg delayed release capsule Take 20 mg by mouth daily      pravastatin (PRAVACHOL) 40 mg tablet Take 40 mg by mouth daily      sertraline (ZOLOFT) 100 mg tablet Take 100 mg by mouth daily         No current facility-administered  medications for this visit.       Allergies:  Allergies   Allergen Reactions    Penicillins Anaphylaxis        PMH:  Past Medical History:   Diagnosis Date    A-fib (HCC)     PRITI (acute kidney injury) (HCC) 09/05/2021    Anxiety     Back pain     Claustrophobia     COPD (chronic obstructive pulmonary disease) (HCC)     Dysphagia 09/05/2021    Fatty liver     GERD (gastroesophageal reflux disease)     History of transfusion     Hyperlipidemia     Hypertension     Hypokalemia 02/22/2022    Lumbar back pain     Panic attacks     Stenosis of right carotid artery     Stroke (HCC)     Wears glasses     Wears partial dentures     upper denture        PSH:  Past Surgical History:   Procedure Laterality Date    COLONOSCOPY      ERCP      IR CEREBRAL ANGIOGRAPHY  04/06/2021    OTHER SURGICAL HISTORY      fertility    DE LAPAROSCOPY SURG CHOLECYSTECTOMY N/A 8/26/2022    Procedure: CHOLECYSTECTOMY LAPAROSCOPIC;  Surgeon: Michael Otoole DO;  Location: [facility];  Service: General    DE SLCTV CATHJ 3RD+ ORD SLCTV ABDL PEL/LXTR BRNCH Right 04/06/2021    Procedure: ARTERIOGRAM, carotid Angio;  Surgeon: Maximiliano Morin MD;  Location: [facility];  Service: Vascular    DE TEAEC W/PATCH GRF CAROTID VERTB SUBCLAV NECK INC Right 04/13/2021    Procedure: RIGHT ENDARTERECTOMY ARTERY CAROTID WITH BOVINE PATCH;  Surgeon: Maximiliano Morin MD;  Location: [facility];  Service: Vascular        FHx:  Family History   Problem Relation Age of Onset    Asthma Mother         SHx:  Social History     Socioeconomic History    Marital status: /Civil Union     Spouse name: Not on file    Number of children: Not on file    Years of education: Not on file    Highest education level: Not on file   Occupational History    Not on file   Tobacco Use    Smoking status: Former     Current packs/day: 0.50     Average packs/day: 0.5 packs/day for 49.7 years (24.9 ttl pk-yrs)     Types: Cigarettes     Start date: 1/1/1975    Smokeless tobacco: Never    Vaping Use    Vaping status: Never Used   Substance and Sexual Activity    Alcohol use: Not Currently    Drug use: Never     Comment: never    Sexual activity: Not Currently     Partners: Female     Birth control/protection: Abstinence   Other Topics Concern    Not on file   Social History Narrative    Not on file     Social Determinants of Health     Financial Resource Strain: Patient Unable To Answer (8/3/2024)    Received from [facility]    Overall Financial Resource Strain (CARDIA)     Difficulty of Paying Living Expenses: Patient unable to answer   Food Insecurity: Patient Unable To Answer (8/3/2024)    Received from [facility]    Hunger Vital Sign     Worried About Running Out of Food in the Last Year: Patient unable to answer     Ran Out of Food in the Last Year: Patient unable to answer   Transportation Needs: Patient Unable To Answer (8/3/2024)    Received from [facility]    PRAPARE - Transportation     Lack of Transportation (Medical): Patient unable to answer     Lack of Transportation (Non-Medical): Patient unable to answer   Physical Activity: Not on file   Stress: Not on file   Social Connections: Not on file   Intimate Partner Violence: Patient Unable To Answer (8/3/2024)    Received from [facility]    Humiliation, Afraid, Rape, and Kick questionnaire     Fear of Current or Ex-Partner: Patient unable to answer     Emotionally Abused: Patient unable to answer     Physically Abused: Patient unable to answer     Sexually Abused: Patient unable to answer   Housing Stability: Unknown (8/3/2024)    Received from [facility]    Housing Stability Vital Sign     Unable to Pay for Housing in the Last Year: No     Number of Times Moved in the Last Year: 1     Homeless in the Last Year: Patient unable to answer

## 2025-02-13 ENCOUNTER — HOSPITAL ENCOUNTER (INPATIENT)
Facility: HOSPITAL | Age: 70
LOS: 2 days | Discharge: HOME HEALTH | DRG: 066 | End: 2025-02-16
Attending: EMERGENCY MEDICINE | Admitting: INTERNAL MEDICINE
Payer: MEDICARE

## 2025-02-13 ENCOUNTER — APPOINTMENT (EMERGENCY)
Dept: CT IMAGING | Facility: HOSPITAL | Age: 70
DRG: 066 | End: 2025-02-13
Payer: MEDICARE

## 2025-02-13 DIAGNOSIS — R29.90 STROKE-LIKE EPISODE: ICD-10-CM

## 2025-02-13 DIAGNOSIS — I63.531 CEREBROVASCULAR ACCIDENT (CVA) DUE TO OCCLUSION OF RIGHT POSTERIOR CEREBRAL ARTERY (HCC): ICD-10-CM

## 2025-02-13 DIAGNOSIS — G45.9 TIA (TRANSIENT ISCHEMIC ATTACK): ICD-10-CM

## 2025-02-13 DIAGNOSIS — J44.9 CHRONIC OBSTRUCTIVE PULMONARY DISEASE, UNSPECIFIED COPD TYPE (HCC): ICD-10-CM

## 2025-02-13 DIAGNOSIS — F17.200 SMOKING: Primary | ICD-10-CM

## 2025-02-13 LAB
2HR DELTA HS TROPONIN: 0 NG/L
ANION GAP SERPL CALCULATED.3IONS-SCNC: 10 MMOL/L (ref 4–13)
APTT PPP: 36 SECONDS (ref 23–34)
ATRIAL RATE: 241 BPM
ATRIAL RATE: 98 BPM
BUN SERPL-MCNC: 19 MG/DL (ref 5–25)
CALCIUM SERPL-MCNC: 8.8 MG/DL (ref 8.4–10.2)
CARDIAC TROPONIN I PNL SERPL HS: 7 NG/L (ref ?–50)
CARDIAC TROPONIN I PNL SERPL HS: 7 NG/L (ref ?–50)
CHLORIDE SERPL-SCNC: 105 MMOL/L (ref 96–108)
CO2 SERPL-SCNC: 24 MMOL/L (ref 21–32)
CREAT SERPL-MCNC: 1.56 MG/DL (ref 0.6–1.3)
ERYTHROCYTE [DISTWIDTH] IN BLOOD BY AUTOMATED COUNT: 13.8 % (ref 11.6–15.1)
GFR SERPL CREATININE-BSD FRML MDRD: 44 ML/MIN/1.73SQ M
GLUCOSE SERPL-MCNC: 102 MG/DL (ref 65–140)
GLUCOSE SERPL-MCNC: 97 MG/DL (ref 65–140)
HCT VFR BLD AUTO: 43.5 % (ref 36.5–49.3)
HGB BLD-MCNC: 13.9 G/DL (ref 12–17)
INR PPP: 1.16 (ref 0.85–1.19)
MCH RBC QN AUTO: 26.2 PG (ref 26.8–34.3)
MCHC RBC AUTO-ENTMCNC: 32 G/DL (ref 31.4–37.4)
MCV RBC AUTO: 82 FL (ref 82–98)
PLATELET # BLD AUTO: 271 THOUSANDS/UL (ref 149–390)
PMV BLD AUTO: 9.7 FL (ref 8.9–12.7)
POTASSIUM SERPL-SCNC: 3.8 MMOL/L (ref 3.5–5.3)
PROTHROMBIN TIME: 15 SECONDS (ref 12.3–15)
QRS AXIS: 56 DEGREES
QRS AXIS: 69 DEGREES
QRSD INTERVAL: 80 MS
QRSD INTERVAL: 82 MS
QT INTERVAL: 314 MS
QT INTERVAL: 376 MS
QTC INTERVAL: 405 MS
QTC INTERVAL: 441 MS
RBC # BLD AUTO: 5.3 MILLION/UL (ref 3.88–5.62)
SODIUM SERPL-SCNC: 139 MMOL/L (ref 135–147)
T WAVE AXIS: 58 DEGREES
T WAVE AXIS: 69 DEGREES
VENTRICULAR RATE: 100 BPM
VENTRICULAR RATE: 83 BPM
WBC # BLD AUTO: 8.74 THOUSAND/UL (ref 4.31–10.16)

## 2025-02-13 PROCEDURE — 84484 ASSAY OF TROPONIN QUANT: CPT

## 2025-02-13 PROCEDURE — 70496 CT ANGIOGRAPHY HEAD: CPT

## 2025-02-13 PROCEDURE — 85610 PROTHROMBIN TIME: CPT

## 2025-02-13 PROCEDURE — 70498 CT ANGIOGRAPHY NECK: CPT

## 2025-02-13 PROCEDURE — 93005 ELECTROCARDIOGRAM TRACING: CPT

## 2025-02-13 PROCEDURE — 80048 BASIC METABOLIC PNL TOTAL CA: CPT

## 2025-02-13 PROCEDURE — 36415 COLL VENOUS BLD VENIPUNCTURE: CPT

## 2025-02-13 PROCEDURE — 99285 EMERGENCY DEPT VISIT HI MDM: CPT | Performed by: EMERGENCY MEDICINE

## 2025-02-13 PROCEDURE — 93010 ELECTROCARDIOGRAM REPORT: CPT

## 2025-02-13 PROCEDURE — 85730 THROMBOPLASTIN TIME PARTIAL: CPT

## 2025-02-13 PROCEDURE — 85027 COMPLETE CBC AUTOMATED: CPT

## 2025-02-13 PROCEDURE — 99285 EMERGENCY DEPT VISIT HI MDM: CPT

## 2025-02-13 PROCEDURE — 82948 REAGENT STRIP/BLOOD GLUCOSE: CPT

## 2025-02-13 RX ADMIN — IOHEXOL 85 ML: 350 INJECTION, SOLUTION INTRAVENOUS at 20:52

## 2025-02-13 NOTE — Clinical Note
Case was discussed with CHRISTINA and the patient's admission status was agreed to be Admission Status: inpatient/tele status to the service of Dr. ELA Phillips

## 2025-02-14 ENCOUNTER — APPOINTMENT (INPATIENT)
Dept: MRI IMAGING | Facility: HOSPITAL | Age: 70
DRG: 066 | End: 2025-02-14
Payer: MEDICARE

## 2025-02-14 ENCOUNTER — TELEPHONE (OUTPATIENT)
Age: 70
End: 2025-02-14

## 2025-02-14 ENCOUNTER — APPOINTMENT (OUTPATIENT)
Dept: NON INVASIVE DIAGNOSTICS | Facility: HOSPITAL | Age: 70
DRG: 066 | End: 2025-02-14
Payer: MEDICARE

## 2025-02-14 PROBLEM — F03.90 DEMENTIA (HCC): Status: ACTIVE | Noted: 2025-02-14

## 2025-02-14 PROBLEM — H53.8 BLURRED VISION: Status: ACTIVE | Noted: 2025-02-14

## 2025-02-14 PROBLEM — F32.A DEPRESSION: Status: ACTIVE | Noted: 2025-02-14

## 2025-02-14 PROBLEM — G45.9 TIA (TRANSIENT ISCHEMIC ATTACK): Status: ACTIVE | Noted: 2025-02-14

## 2025-02-14 LAB
4HR DELTA HS TROPONIN: 0 NG/L
ATRIAL RATE: 340 BPM
ATRIAL RATE: 441 BPM
CARDIAC TROPONIN I PNL SERPL HS: 7 NG/L (ref ?–50)
CHOLEST SERPL-MCNC: 211 MG/DL (ref ?–200)
EST. AVERAGE GLUCOSE BLD GHB EST-MCNC: 120 MG/DL
HBA1C MFR BLD: 5.8 %
HDLC SERPL-MCNC: 50 MG/DL
LDLC SERPL CALC-MCNC: 109 MG/DL (ref 0–100)
MAGNESIUM SERPL-MCNC: 1.2 MG/DL (ref 1.9–2.7)
PHOSPHATE SERPL-MCNC: 3.6 MG/DL (ref 2.3–4.1)
QRS AXIS: 70 DEGREES
QRS AXIS: 70 DEGREES
QRSD INTERVAL: 82 MS
QRSD INTERVAL: 82 MS
QT INTERVAL: 396 MS
QT INTERVAL: 398 MS
QTC INTERVAL: 435 MS
QTC INTERVAL: 442 MS
T WAVE AXIS: 64 DEGREES
T WAVE AXIS: 79 DEGREES
TRIGL SERPL-MCNC: 258 MG/DL (ref ?–150)
VENTRICULAR RATE: 72 BPM
VENTRICULAR RATE: 75 BPM

## 2025-02-14 PROCEDURE — G0008 ADMIN INFLUENZA VIRUS VAC: HCPCS | Performed by: HOSPITALIST

## 2025-02-14 PROCEDURE — 93005 ELECTROCARDIOGRAM TRACING: CPT

## 2025-02-14 PROCEDURE — 36415 COLL VENOUS BLD VENIPUNCTURE: CPT

## 2025-02-14 PROCEDURE — 99205 OFFICE O/P NEW HI 60 MIN: CPT | Performed by: PSYCHIATRY & NEUROLOGY

## 2025-02-14 PROCEDURE — 83735 ASSAY OF MAGNESIUM: CPT | Performed by: HOSPITALIST

## 2025-02-14 PROCEDURE — 93010 ELECTROCARDIOGRAM REPORT: CPT

## 2025-02-14 PROCEDURE — 99223 1ST HOSP IP/OBS HIGH 75: CPT | Performed by: HOSPITALIST

## 2025-02-14 PROCEDURE — 80061 LIPID PANEL: CPT | Performed by: INTERNAL MEDICINE

## 2025-02-14 PROCEDURE — 83036 HEMOGLOBIN GLYCOSYLATED A1C: CPT | Performed by: INTERNAL MEDICINE

## 2025-02-14 PROCEDURE — 90662 IIV NO PRSV INCREASED AG IM: CPT | Performed by: HOSPITALIST

## 2025-02-14 PROCEDURE — 70551 MRI BRAIN STEM W/O DYE: CPT

## 2025-02-14 PROCEDURE — 84100 ASSAY OF PHOSPHORUS: CPT | Performed by: HOSPITALIST

## 2025-02-14 PROCEDURE — 84484 ASSAY OF TROPONIN QUANT: CPT

## 2025-02-14 PROCEDURE — 93306 TTE W/DOPPLER COMPLETE: CPT

## 2025-02-14 RX ORDER — CARVEDILOL 12.5 MG/1
12.5 TABLET ORAL 2 TIMES DAILY WITH MEALS
Status: DISCONTINUED | OUTPATIENT
Start: 2025-02-14 | End: 2025-02-16 | Stop reason: HOSPADM

## 2025-02-14 RX ORDER — AMLODIPINE BESYLATE 5 MG/1
5 TABLET ORAL DAILY
Status: DISCONTINUED | OUTPATIENT
Start: 2025-02-14 | End: 2025-02-16 | Stop reason: HOSPADM

## 2025-02-14 RX ORDER — DONEPEZIL HYDROCHLORIDE 5 MG/1
5 TABLET, FILM COATED ORAL EVERY EVENING
Status: DISCONTINUED | OUTPATIENT
Start: 2025-02-14 | End: 2025-02-16 | Stop reason: HOSPADM

## 2025-02-14 RX ORDER — ATORVASTATIN CALCIUM 40 MG/1
40 TABLET, FILM COATED ORAL EVERY EVENING
Status: DISCONTINUED | OUTPATIENT
Start: 2025-02-14 | End: 2025-02-16 | Stop reason: HOSPADM

## 2025-02-14 RX ORDER — ACETAMINOPHEN 325 MG/1
650 TABLET ORAL EVERY 4 HOURS PRN
Status: DISCONTINUED | OUTPATIENT
Start: 2025-02-14 | End: 2025-02-16 | Stop reason: HOSPADM

## 2025-02-14 RX ORDER — POLYETHYLENE GLYCOL 3350 17 G/17G
17 POWDER, FOR SOLUTION ORAL DAILY PRN
Status: DISCONTINUED | OUTPATIENT
Start: 2025-02-14 | End: 2025-02-16 | Stop reason: HOSPADM

## 2025-02-14 RX ORDER — PRAVASTATIN SODIUM 40 MG
40 TABLET ORAL
Status: DISCONTINUED | OUTPATIENT
Start: 2025-02-14 | End: 2025-02-14 | Stop reason: SDUPTHER

## 2025-02-14 RX ORDER — NICOTINE 21 MG/24HR
1 PATCH, TRANSDERMAL 24 HOURS TRANSDERMAL DAILY
Status: DISCONTINUED | OUTPATIENT
Start: 2025-02-14 | End: 2025-02-16 | Stop reason: HOSPADM

## 2025-02-14 RX ORDER — ALBUTEROL SULFATE 90 UG/1
2 INHALANT RESPIRATORY (INHALATION) EVERY 4 HOURS PRN
Status: DISCONTINUED | OUTPATIENT
Start: 2025-02-14 | End: 2025-02-16 | Stop reason: HOSPADM

## 2025-02-14 RX ORDER — LORAZEPAM 0.5 MG/1
0.5 TABLET ORAL ONCE
Status: COMPLETED | OUTPATIENT
Start: 2025-02-14 | End: 2025-02-14

## 2025-02-14 RX ORDER — SODIUM CHLORIDE 9 MG/ML
50 INJECTION, SOLUTION INTRAVENOUS CONTINUOUS
Status: DISPENSED | OUTPATIENT
Start: 2025-02-14 | End: 2025-02-14

## 2025-02-14 RX ORDER — ONDANSETRON 2 MG/ML
4 INJECTION INTRAMUSCULAR; INTRAVENOUS EVERY 6 HOURS PRN
Status: DISCONTINUED | OUTPATIENT
Start: 2025-02-14 | End: 2025-02-16 | Stop reason: HOSPADM

## 2025-02-14 RX ORDER — CLOPIDOGREL BISULFATE 75 MG/1
75 TABLET ORAL DAILY
Status: DISCONTINUED | OUTPATIENT
Start: 2025-02-14 | End: 2025-02-15

## 2025-02-14 RX ORDER — BACLOFEN 10 MG/1
10 TABLET ORAL DAILY
Status: DISCONTINUED | OUTPATIENT
Start: 2025-02-14 | End: 2025-02-16 | Stop reason: HOSPADM

## 2025-02-14 RX ORDER — PANTOPRAZOLE SODIUM 20 MG/1
20 TABLET, DELAYED RELEASE ORAL
Status: DISCONTINUED | OUTPATIENT
Start: 2025-02-14 | End: 2025-02-16 | Stop reason: HOSPADM

## 2025-02-14 RX ORDER — SERTRALINE HYDROCHLORIDE 100 MG/1
100 TABLET, FILM COATED ORAL DAILY
Status: DISCONTINUED | OUTPATIENT
Start: 2025-02-14 | End: 2025-02-16 | Stop reason: HOSPADM

## 2025-02-14 RX ORDER — BUPROPION HYDROCHLORIDE 150 MG/1
150 TABLET ORAL DAILY
Status: DISCONTINUED | OUTPATIENT
Start: 2025-02-14 | End: 2025-02-16 | Stop reason: HOSPADM

## 2025-02-14 RX ORDER — LISINOPRIL 10 MG/1
10 TABLET ORAL DAILY
Status: DISCONTINUED | OUTPATIENT
Start: 2025-02-14 | End: 2025-02-16 | Stop reason: HOSPADM

## 2025-02-14 RX ADMIN — Medication 1 PATCH: at 09:08

## 2025-02-14 RX ADMIN — BUSPIRONE HYDROCHLORIDE 15 MG: 10 TABLET ORAL at 09:07

## 2025-02-14 RX ADMIN — AMLODIPINE BESYLATE 5 MG: 5 TABLET ORAL at 09:07

## 2025-02-14 RX ADMIN — SERTRALINE HYDROCHLORIDE 100 MG: 100 TABLET ORAL at 09:06

## 2025-02-14 RX ADMIN — INFLUENZA A VIRUS A/VICTORIA/4897/2022 IVR-238 (H1N1) ANTIGEN (FORMALDEHYDE INACTIVATED), INFLUENZA A VIRUS A/CALIFORNIA/122/2022 SAN-022 (H3N2) ANTIGEN (FORMALDEHYDE INACTIVATED), AND INFLUENZA B VIRUS B/MICHIGAN/01/2021 ANTIGEN (FORMALDEHYDE INACTIVATED) 0.5 ML: 60; 60; 60 INJECTION, SUSPENSION INTRAMUSCULAR at 07:15

## 2025-02-14 RX ADMIN — BUPROPION HYDROCHLORIDE 150 MG: 150 TABLET, EXTENDED RELEASE ORAL at 09:07

## 2025-02-14 RX ADMIN — ACETAMINOPHEN 650 MG: 325 TABLET, FILM COATED ORAL at 09:06

## 2025-02-14 RX ADMIN — UMECLIDINIUM BROMIDE AND VILANTEROL TRIFENATATE 1 PUFF: 62.5; 25 POWDER RESPIRATORY (INHALATION) at 09:06

## 2025-02-14 RX ADMIN — BUSPIRONE HYDROCHLORIDE 15 MG: 10 TABLET ORAL at 17:11

## 2025-02-14 RX ADMIN — APIXABAN 5 MG: 5 TABLET, FILM COATED ORAL at 09:07

## 2025-02-14 RX ADMIN — LISINOPRIL 10 MG: 10 TABLET ORAL at 09:07

## 2025-02-14 RX ADMIN — SODIUM CHLORIDE 50 ML/HR: 0.9 INJECTION, SOLUTION INTRAVENOUS at 09:14

## 2025-02-14 RX ADMIN — DONEPEZIL HYDROCHLORIDE 5 MG: 5 TABLET ORAL at 17:11

## 2025-02-14 RX ADMIN — CARVEDILOL 12.5 MG: 12.5 TABLET, FILM COATED ORAL at 09:07

## 2025-02-14 RX ADMIN — CLOPIDOGREL BISULFATE 75 MG: 75 TABLET ORAL at 09:06

## 2025-02-14 RX ADMIN — PANTOPRAZOLE SODIUM 20 MG: 20 TABLET, DELAYED RELEASE ORAL at 09:07

## 2025-02-14 RX ADMIN — CARVEDILOL 12.5 MG: 12.5 TABLET, FILM COATED ORAL at 17:11

## 2025-02-14 RX ADMIN — APIXABAN 5 MG: 5 TABLET, FILM COATED ORAL at 17:11

## 2025-02-14 RX ADMIN — LORAZEPAM 0.5 MG: 0.5 TABLET ORAL at 17:11

## 2025-02-14 RX ADMIN — ATORVASTATIN CALCIUM 40 MG: 40 TABLET, FILM COATED ORAL at 17:11

## 2025-02-14 NOTE — QUICK NOTE
Notified of stroke alert at 8:43 PM. Response immediate.     Pt is a 68 yo M PMHx A-fib on Eliquis, history of 7 previous strokes, GERD, dementia, depression, HLD and HTN who presents for evaluation of L eye blurry vision. LKW 8 PM. Pt's symptoms resolved by time of evaluation in the ED. NIHSS 0 at the time of their evaluation. Pt's symptoms lasted about 1 hr in total. Over the last week, he did have a pause in eliquis use 1 week ago for an appendectomy. However, he resumed eliquis use 4 days ago.     CT head without contrast was done and showed no acute intracranial process.  CTA head and neck obtained in ED showed moderate to severe right V4 segment vertebral artery noncalcific stenosis, which has progressed from prior. 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 Numerous additional areas of cervical and intracranial vascular stenoses and occlusion which are grossly stable from prior study as detailed above. He was not a candidate for TNK given active eliquis use. Recommended admission stroke pathway for TIA/stroke w/u, continuing home eliquis/plavix, MRI brain for further evaluation in the AM.

## 2025-02-14 NOTE — ASSESSMENT & PLAN NOTE
" 69 y.o.  male with R carotid endarterectomy, Afib on Eliquis, significant multifocal cervical and intracranial atherosclerotic disease, and prior stroke, who presented to [facility] On 2/13/25 with L eye blurred vision. A stroke alert was initated in ED. LKW per ED 2000 PM 2/13. NIHSS per ED 0. CTH without any acute intractanial process. CTA H/N wwo contrast without IR target. Pt reportedly back to baseline in ED. Pt not a thrombolytic candidate due to Eliquis use PTA.     Workup  - CTH wo contrast 2/13/25:   \"No acute intracranial abnormality. \"  - CTA H/N wwo contrast 2/13/25:   \"Moderate to severe right V4 segment vertebral artery noncalcific stenosis, progressed from prior. 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 Numerous additional areas of cervical and intracranial vascular stenoses and occlusion which are grossly stable from prior study as detailed above.\"  - Labs  - lipid panel pending   - hemoglobin A1c pending    Plan  - Stroke pathway  MRI brain wo contrast pending   Echo pending   Continue home Eliquis 5 mg BID  Continue home Plavix 75 mg qd   Recommend P2Y12 level as outpatient   Atorvastatin 40 mg  May normalize BP as long as pt remains asymptomatic   Euglycemic, normothermic goal  Continue telemetry  PT/OT/ST  Stroke education  Frequent neuro checks. Continue to monitor and notify neurology with any changes.  STAT CT head for any acute change in neuro exam  - Medical management and supportive care per primary team. Correction of any metabolic or infectious disturbances.         "

## 2025-02-14 NOTE — PLAN OF CARE

## 2025-02-14 NOTE — CONSULTS
"Consultation - Neurology   Name: Abhishek Tom 69 y.o. male I MRN: [id number]  Unit/Bed#: [place] [address] -02 I Date of Admission: 2/13/2025   Date of Service: 2/14/2025 I Hospital Day: 0   Inpatient consult to Neurology  Consult performed by: Cielo Watters PA-C  Consult ordered by: Joel Jessica MD      Consult to Neurology  Consult performed by: Cielo Watters PA-C  Consult ordered by: Triage Protocol Emergency, MD        Physician Requesting Evaluation: Kev Connor MD   Reason for Evaluation / Principal Problem: Blurred vision     Assessment & Plan  Blurred vision   69 y.o.  male with R carotid endarterectomy, Afib on Eliquis, significant multifocal cervical and intracranial atherosclerotic disease, and prior stroke, who presented to [facility] On 2/13/25 with L eye blurred vision. A stroke alert was initated in ED. LKW per ED 2000 PM 2/13. NIHSS per ED 0. CTH without any acute intractanial process. CTA H/N wwo contrast without IR target. Pt reportedly back to baseline in ED. Pt not a thrombolytic candidate due to Eliquis use PTA.     Workup  - CTH wo contrast 2/13/25:   \"No acute intracranial abnormality. \"  - CTA H/N wwo contrast 2/13/25:   \"Moderate to severe right V4 segment vertebral artery noncalcific stenosis, progressed from prior. 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 Numerous additional areas of cervical and intracranial vascular stenoses and occlusion which are grossly stable from prior study as detailed above.\"  - Labs  - lipid panel pending   - hemoglobin A1c pending    Plan  - Stroke pathway  MRI brain wo contrast pending   Echo pending   Continue home Eliquis 5 mg BID  Continue home Plavix 75 mg qd   Recommend P2Y12 level as outpatient   Atorvastatin 40 mg  May normalize BP as long as pt remains asymptomatic   Euglycemic, normothermic goal  Continue telemetry  PT/OT/ST  Stroke education  Frequent neuro checks. Continue to monitor and notify neurology with any " changes.  STAT CT head for any acute change in neuro exam  - Medical management and supportive care per primary team. Correction of any metabolic or infectious disturbances.         HLD (hyperlipidemia)  - atorvastatin 40 mg daily   HTN (hypertension)  - goal normotension as long as pt remains asymptomatic  - management per primary team  A-fib (HCC)  - on Eliquis 5 mg BID PTA   Case and plan discussed with attending neurologist.  Please see attending attestation for any further recommendations and/or changes to plan.      Abhishek Tom will need follow-up in in 4 weeks with neurovascular team for Other in 60 minute appointment. They will not require outpatient neurological testing.  Message sent to outpatient team       History of Present Illness   Abhishek Tom is a 69 y.o.  male with R carotid endarterectomy, Afib on Eliquis, significant multifocal cervical and intracranial atherosclerotic disease, and prior stroke, who presented to [facility] On 2/13/25 with L eye blurred vision. A stroke alert was initated in ED. LKW per ED 2000 PM 2/13. NIHSS per ED 0. CTH without any acute intractanial process. CTA H/N wwo contrast without IR target. Pt reportedly back to baseline in ED. Pt not a thrombolytic candidate due to Eliquis use PTA.     Pt reports yesterday, he had 1 hour of blurred vision in his L eye. Pt denies double vision. Pt denies loss of vision. Pt reports vision in R eye was normal during the entire event (when closed L eye, vision normal). Pt reports by the time he was in the ambulance, his vision was completely back to baseline. Pt reports no similar events in the past. Pt reports no return of blurred vision since being at the hospital. Pt denies eye pain.     Pt reports he was without his home Eliquis and home Plavix for a few days about a week ago due to being in [place] and not having his medication with him. Pt reports since then, he has been compliant with both Eliquis and Plavix.     Previous  Neurologic History:  Patient evaluated by [facility] neurology while inpatient in June 2024.  Patient evaluated at that time for expressive aphasia.  MRI brain during hospitalization revealed no acute infarct, intracranial hemorrhage or mass.  However, patient with extensive cervical and intracranial atherosclerotic disease noted on CTA head and neck.  Patient recommended to continue Eliquis 5 mg twice daily and Plavix 75 mg daily.  Of note, patient evaluated by vascular surgery as an outpatient in October 2024 due to significant atherosclerotic disease in cervical and intracranial vessels.  At that time, vascular team recommended no surgical intervention but did advise patient to have repeat CTA to reassess carotid anatomy/disease stability in 6 months.    Review of Systems   Eyes:  Negative for visual disturbance.   Neurological:  Negative for weakness and headaches.        Medical History Review: I have reviewed the patient's PMH, PSH, Social History, Family History, Meds, and Allergies   Historical Information   Past Medical History:   Diagnosis Date    A-fib (HCC)     PRITI (acute kidney injury) (HCC) 09/05/2021    Anxiety     Back pain     Claustrophobia     COPD (chronic obstructive pulmonary disease) (HCC)     Dysphagia 09/05/2021    Fatty liver     GERD (gastroesophageal reflux disease)     History of transfusion     Hyperlipidemia     Hypertension     Hypokalemia 02/22/2022    Lumbar back pain     Panic attacks     Stenosis of right carotid artery     Stroke (HCC)     Wears glasses     Wears partial dentures     upper denture     Past Surgical History:   Procedure Laterality Date    APPENDECTOMY      COLONOSCOPY      ERCP      IR CEREBRAL ANGIOGRAPHY  04/06/2021    OTHER SURGICAL HISTORY      fertility    MS LAPAROSCOPY SURG CHOLECYSTECTOMY N/A 08/26/2022    Procedure: CHOLECYSTECTOMY LAPAROSCOPIC;  Surgeon: Michael Otoole DO;  Location: [facility];  Service: General    MS SLCTV CATHJ 3RD+ ORD SLCTV ABDL  PEL/LXTR BRNCH Right 04/06/2021    Procedure: ARTERIOGRAM, carotid Angio;  Surgeon: Maximiliano Morin MD;  Location: [facility];  Service: Vascular    NC TEAEC W/PATCH GRF CAROTID VERTB SUBCLAV NECK INC Right 04/13/2021    Procedure: RIGHT ENDARTERECTOMY ARTERY CAROTID WITH BOVINE PATCH;  Surgeon: Maximiliano Morin MD;  Location: [facility];  Service: Vascular     Social History     Tobacco Use    Smoking status: Every Day     Current packs/day: 0.50     Average packs/day: 0.5 packs/day for 50.1 years (25.1 ttl pk-yrs)     Types: Cigarettes     Start date: 1/1/1975    Smokeless tobacco: Never   Vaping Use    Vaping status: Never Used   Substance and Sexual Activity    Alcohol use: Not Currently    Drug use: Never     Comment: never    Sexual activity: Not Currently     Partners: Female     Birth control/protection: Abstinence     E-Cigarette/Vaping    E-Cigarette Use Never User      E-Cigarette/Vaping Substances    Nicotine No     THC No     CBD No     Flavoring No     Other No     Unknown No      Family History   Problem Relation Age of Onset    Asthma Mother      Social History     Tobacco Use    Smoking status: Every Day     Current packs/day: 0.50     Average packs/day: 0.5 packs/day for 50.1 years (25.1 ttl pk-yrs)     Types: Cigarettes     Start date: 1/1/1975    Smokeless tobacco: Never   Vaping Use    Vaping status: Never Used   Substance and Sexual Activity    Alcohol use: Not Currently    Drug use: Never     Comment: never    Sexual activity: Not Currently     Partners: Female     Birth control/protection: Abstinence       Current Facility-Administered Medications:     acetaminophen (TYLENOL) tablet 650 mg, Q4H PRN    albuterol (PROVENTIL HFA,VENTOLIN HFA) inhaler 2 puff, Q4H PRN    amLODIPine (NORVASC) tablet 5 mg, Daily **AND** lisinopril (ZESTRIL) tablet 10 mg, Daily    apixaban (ELIQUIS) tablet 5 mg, BID    atorvastatin (LIPITOR) tablet 40 mg, QPM    baclofen tablet 10 mg, Daily    buPROPion (WELLBUTRIN  XL) 24 hr tablet 150 mg, Daily    busPIRone (BUSPAR) tablet 15 mg, BID    carvedilol (COREG) tablet 12.5 mg, BID With Meals    clopidogrel (PLAVIX) tablet 75 mg, Daily    donepezil (ARICEPT) tablet 5 mg, QPM    nicotine (NICODERM CQ) 14 mg/24hr TD 24 hr patch 1 patch, Daily    ondansetron (ZOFRAN) injection 4 mg, Q6H PRN    pantoprazole (PROTONIX) EC tablet 20 mg, Early Morning    polyethylene glycol (MIRALAX) packet 17 g, Daily PRN    pravastatin (PRAVACHOL) tablet 40 mg, Daily With Dinner    sertraline (ZOLOFT) tablet 100 mg, Daily    sodium chloride 0.9 % infusion, Continuous    umeclidinium-vilanterol 62.5-25 mcg/actuation inhaler 1 puff, Daily  Prior to Admission Medications   Prescriptions Last Dose Informant Patient Reported? Taking?   Anoro Ellipta 62.5-25 MCG/ACT inhaler  Self Yes No   Sig: INHALE 1 PUFF BY MOUTH AND INTO THE LUNGS ONCE DAILY AT THE SAME TIME EACH DAY   acetaminophen (TYLENOL) 500 mg tablet  Self Yes No   Sig: Take 500 mg by mouth daily as needed for headaches, mild pain or moderate pain. Indications: Pain   albuterol (PROVENTIL HFA,VENTOLIN HFA) 90 mcg/act inhaler  Self Yes No   Sig: Inhale 2 puffs if needed   amLODIPine-benazepril (LOTREL 5-10) 5-10 MG per capsule  Self Yes No   Sig: Take 1 capsule by mouth daily   apixaban (ELIQUIS) 5 mg  Self No No   Sig: Take 1 tablet (5 mg total) by mouth 2 (two) times a day   atorvastatin (LIPITOR) 40 mg tablet  Self No No   Sig: Take 1 tablet (40 mg total) by mouth every evening   baclofen 10 mg tablet  Self Yes No   Sig: Take 10 mg by mouth in the morning PRN   buPROPion (WELLBUTRIN XL) 150 mg 24 hr tablet  Self Yes No   Sig: Take 150 mg by mouth daily   busPIRone (BUSPAR) 15 mg tablet  Self Yes No   Sig: 15 mg 2 (two) times a day   carvedilol (COREG) 12.5 mg tablet  Self No No   Sig: Take 1 tablet (12.5 mg total) by mouth 2 (two) times a day with meals   clopidogrel (PLAVIX) 75 mg tablet  Self No No   Sig: Take 1 tablet (75 mg total) by mouth  daily Do not start before June 26, 2024.   donepezil (ARICEPT) 5 mg tablet  Self Yes No   Sig: Take 5 mg by mouth every evening   omeprazole (PriLOSEC) 20 mg delayed release capsule  Self Yes No   Sig: Take 20 mg by mouth daily   pravastatin (PRAVACHOL) 40 mg tablet  Self Yes No   Sig: Take 40 mg by mouth daily   sertraline (ZOLOFT) 100 mg tablet  Self Yes No   Sig: Take 100 mg by mouth daily        Facility-Administered Medications: None     Penicillins    Objective :  Temp:  [97.6 °F (36.4 °C)-98.4 °F (36.9 °C)] 98.4 °F (36.9 °C)  HR:  [] 93  BP: ()/() 145/92  Resp:  [13-27] 16  SpO2:  [94 %-99 %] 97 %  O2 Device: None (Room air)    Physical Exam  Vitals and nursing note reviewed.   HENT:      Head: Normocephalic and atraumatic.   Eyes:      Extraocular Movements: Extraocular movements intact.   Cardiovascular:      Rate and Rhythm: Normal rate.   Pulmonary:      Effort: Pulmonary effort is normal.   Neurological:      Mental Status: He is alert.      Cranial Nerves: No dysarthria.     Neurological Exam  Mental Status  Alert. no dysarthria present. Able to name objects and repeat. Follows one-step commands. Language: Speech fluent. Thought content appropriate . Attention and concentration: Appropriately attends to provider .  - oriented to self  - oriented to year  - not oriented to month   - oriented to place .    Cranial Nerves  CN II: Right visual acuity: Counts fingers. Left visual acuity: Counts fingers. Right normal visual field. Left normal visual field.  CN III, IV, VI: Extraocular movements intact bilaterally.  CN V:  Right: Facial sensation is normal.  Left: Facial sensation is normal on the left.  CN VII:  Right: There is no facial weakness.  Left: There is central facial weakness.  CN XII: Tongue midline without atrophy or fasciculations.    - hearing grossly intact .    Motor                                               Right                     Left   Shoulder abduction              "  5                          5  Elbow flexion                         5                          5  Elbow extension                    5                          5  Finger flexion                         5                          5  Hip flexion                              5                          5  Plantarflexion                         5                          5  Dorsiflexion                            5                          5    Sensory  Light touch is normal in upper and lower extremities.     - extinction absent .    Coordination  Right: Finger-to-nose normal.Left: Finger-to-nose normal.        Lab Results: I have reviewed the following results:I have personally reviewed pertinent reports.  , CBC:   Results from last 7 days   Lab Units 02/13/25 2044   WBC Thousand/uL 8.74   RBC Million/uL 5.30   HEMOGLOBIN g/dL 13.9   HEMATOCRIT % 43.5   MCV fL 82   PLATELETS Thousands/uL 271   , BMP/CMP:   Results from last 7 days   Lab Units 02/13/25 2044   SODIUM mmol/L 139   POTASSIUM mmol/L 3.8   CHLORIDE mmol/L 105   CO2 mmol/L 24   BUN mg/dL 19   CREATININE mg/dL 1.56*   CALCIUM mg/dL 8.8   EGFR ml/min/1.73sq m 44   , Vitamin B12:   , HgBA1C:   , TSH:   , Coagulation:   Results from last 7 days   Lab Units 02/13/25 2044   INR  1.16   , Lipid Profile:   , Ammonia:   , Urinalysis:       Invalid input(s): \"URIBILINOGEN\", Drug Screen:   , Medication Drug Levels:       Invalid input(s): \"CARBAMAZEPINE\", \"OXCARBAZEPINE\"  Recent Labs     02/13/25 2044   WBC 8.74   HGB 13.9   HCT 43.5      SODIUM 139   K 3.8      CO2 24   BUN 19   CREATININE 1.56*   GLUC 97     Imaging Results Review: I personally reviewed the following image studies in PACS and associated radiology reports: CT head. My interpretation of the radiology images/reports is: no acute intracranial abnormality, multifocal significant cervical and atherosclerotic disease.      VTE Prophylaxis: Eliquis     This note was completed in part " utilizing Dragon Software.  Grammatical errors, random word insertions, spelling mistakes, and incomplete sentences may be an occasional consequence of this system secondary to software limitations, ambient noise, and hardware issues.  If you have any questions or concerns about the content, text, or information contained within the body of this dictation, please contact the provider for clarification.

## 2025-02-14 NOTE — CASE MANAGEMENT
Case Management Assessment & Discharge Planning Note    Patient name Abhishek Tom  Location [facility] M* MRN [id number]  : 1955 Date 2025       Current Admission Date: 2025  Current Admission Diagnosis:Blurred vision   Patient Active Problem List    Diagnosis Date Noted Date Diagnosed    Blurred vision 2025     Dementia ([facility]) 2025     Depression 2025     Chronic occlusion of right common carotid artery 10/01/2024     Expressive aphasia 2024     Pulmonary nodule 2024     Long term (current) use of insulin ([facility]) 2024     Decreased pulses in feet 2024     Smoking 2022     Claustrophobia      Bilateral carotid artery stenosis 2022     Chronic calculous cholecystitis 2022     Delirium tremens ([facility]) 2022     Wernicke encephalopathy 2022     Alcohol use disorder, severe, dependence ([facility]) 2022     A-fib ([facility]) 2022     Hallucinations 2022     Memory changes 2022     Seizure ([facility]) 2022     Cholelithiasis 2022     Acute Cholecystitis 2022     Hypokalemia 2022     Hypomagnesemia 2022     PAF (paroxysmal atrial fibrillation) ([facility]) 2022     S/P carotid endarterectomy 11/10/2021     Ambulatory dysfunction 09/15/2021     Syncope and collapse 09/15/2021     Acute-on-chronic kidney injury  ([facility]) 2021     Pre-diabetes 2021     Stage 3a chronic kidney disease ([facility]) 2021     Hx of CVA (cerebral vascular accident) ([facility]) 2021     Stenosis of right internal carotid artery      Lumbar back pain      GERD (gastroesophageal reflux disease)      Left arm weakness 2021     Alcohol abuse 2021     HLD (hyperlipidemia) 2021     COPD (chronic obstructive pulmonary disease) ([facility]) 2021     Iron deficiency anemia 2021     Cerebrovascular accident (CVA) due to embolism of right middle cerebral artery ([facility]) 2021     Symptomatic  carotid artery stenosis, right 03/29/2021     HTN (hypertension) 02/15/2011       LOS (days): 0  Geometric Mean LOS (GMLOS) (days):   Days to GMLOS:     OBJECTIVE:              Current admission status: Inpatient       Preferred Pharmacy:   [id number] - [place] - [address]  [address] [place] [address]  Phone: [phone] Fax: [phone]    [facility] [place] - [address]  [address]  [place] [address]  Phone: [phone] Fax: [phone]    [facility] [place] [id number] - [place] - [address] [place] [address]  Phone: [phone] Fax: [phone]    [id number]  [place], [place] - [address]  [address]  [place] [address]  Phone: [phone] Fax: [phone]    Primary Care Provider: Laith Balderas DO    Primary Insurance: MEDICARE  Secondary Insurance:     ASSESSMENT:  Active Health Care Proxies       Ana Tom Health Care Agent - Sister   Primary Phone: [phone] (Mobile)                 Advance Directives  Does patient have a Health Care POA?: No  Does patient have Advance Directives?: No  Primary Contact: Ana Tom (Sister)  [phone] (Mobile)         Readmission Root Cause  30 Day Readmission: No    Patient Information  Admitted from:: Home  Mental Status: Alert  During Assessment patient was accompanied by: Not accompanied during assessment  Assessment information provided by:: Patient  Primary Caregiver: Self  Support Systems: Self, Family members  County of Residence: [place]  [place] [facility] do you live in?: [place]  Type of Current Residence: [facility]  Living Arrangements: Lives w/ Family members (Lives with nephew, Maximiliano)  Is patient a ?: Yes  Is patient active with [facility] ( [facility])?: No    Activities of Daily Living Prior to Admission  Functional Status: Independent  Completes ADLs independently?: Yes  Ambulates  independently?: Yes  Does patient use assisted devices?: No  Does patient currently own DME?: No  Does patient have a history of Outpatient Therapy (PT/OT)?: No  Does the patient have a history of Short-Term Rehab?: No  Does patient have a history of HHC?: No  Does patient currently have HHC?: No    Patient Information Continued  Income Source: SSI/SSD  Does patient have prescription coverage?: No  Does patient receive dialysis treatments?: No  Does patient have a history of substance abuse?: Yes  Historical substance use preference: Alcohol/ETOH  Is patient currently in treatment for substance abuse?: No. Patient declined treatment information. (Confirmed no prior inpatient treatment)  Does patient have a history of Mental Health Diagnosis?: No      Means of Transportation  Means of Transport to Appts:: Public Transportation - [employer] (Utilizes insurance medical transport with insurance)      DISCHARGE DETAILS:    Discharge planning discussed with:: Patient        CM contacted family/caregiver?: No- see comments (Patient declined need for CM to contact family members.)  Were Treatment Team discharge recommendations reviewed with patient/caregiver?: Yes  Did patient/caregiver verbalize understanding of patient care needs?: Yes       Contacts  Patient Contacts: Ana Tom  Relationship to Patient:: Family  Contact Method: Phone  Phone Number: [phone]  Reason/Outcome: Discharge Planning, Referral    Requested Home Health Care         Is the patient interested in HHC at discharge?: No    DME Referral Provided  Referral made for DME?: No    Treatment Team Recommendation: Home  Discharge Destination Plan:: Home        Additional Comments: CM met with patient to complete assessment; patient alert. Patient reports nephew resides with patient, patient denied CM need to contact nephew. Patient denies use of DME or oxygen. Patient reports prior ETOH use; denies formal use. Patient confirmed has medical transportation with  insurance or utilizes ride share. CM provided contact information for patient's loss of EBT card. Patient denies further CM needs at this time. CM to follow for further discharge planning needs.    CM provided patient with IMM, patient understood and agreeable. Patient reported understood right to appeal. CM placed in scan bin.

## 2025-02-14 NOTE — TELEPHONE ENCOUNTER
STILL ADMITTED:2/13/2025 - present (1 day)  [facility]          HFU/ SL ALL/ Blurred vision     DC-     ----- Message from Cielo Watters PA-C sent at 2/14/2025  1:31 PM EST -----  Abhishek Tom will need follow-up in in 4 weeks with neurovascular team for Other= ATTENDING  in 60 minute appointment. They will not require outpatient neurological testing.

## 2025-02-14 NOTE — ASSESSMENT & PLAN NOTE
Left eye blurry vision which lasted for about an hour before resolution  Endorsed having missed his Eliquis for a week secondary to recent appendectomy which he had in [place] last week  Said he was told to stop his Eliquis in order to have his surgery and he did.  Resumed his Eliquis about 4 days ago.  He also endorsed resolution of the left eye blurry vision while he was in the ED  Resumed Eliquis.  Neuroconsult.  Neuro checks.  Echo ordered as well as fasting lipid, mag, Phos, MRI brain.  Telemonitor.

## 2025-02-14 NOTE — PLAN OF CARE
Problem: Potential for Falls  Goal: Patient will remain free of falls  Description: INTERVENTIONS:  - Educate patient/family on patient safety including physical limitations  - Instruct patient to call for assistance with activity   - Consult OT/PT to assist with strengthening/mobility   - Keep Call bell within reach  - Keep bed low and locked with side rails adjusted as appropriate  - Keep care items and personal belongings within reach  - Initiate and maintain comfort rounds  - Make Fall Risk Sign visible to staff  - Obtain necessary fall risk management equipment  - Apply yellow socks and bracelet for high fall risk patients  - Consider moving patient to room near nurses station  Outcome: Progressing     Problem: PAIN - ADULT  Goal: Verbalizes/displays adequate comfort level or baseline comfort level  Description: Interventions:  - Encourage patient to monitor pain and request assistance  - Assess pain using appropriate pain scale  - Administer analgesics based on type and severity of pain and evaluate response  - Implement non-pharmacological measures as appropriate and evaluate response  - Consider cultural and social influences on pain and pain management  - Notify physician/advanced practitioner if interventions unsuccessful or patient reports new pain  Outcome: Progressing     Problem: INFECTION - ADULT  Goal: Absence or prevention of progression during hospitalization  Description: INTERVENTIONS:  - Assess and monitor for signs and symptoms of infection  - Monitor lab/diagnostic results  - Monitor all insertion sites, i.e. indwelling lines, tubes, and drains  - Monitor endotracheal if appropriate and nasal secretions for changes in amount and color  - [place] appropriate cooling/warming therapies per order  - Administer medications as ordered  - Instruct and encourage patient and family to use good hand hygiene technique  - Identify and instruct in appropriate isolation precautions for identified  infection/condition  Outcome: Progressing  Goal: Absence of fever/infection during neutropenic period  Description: INTERVENTIONS:  - Monitor WBC    Outcome: Progressing     Problem: SAFETY ADULT  Goal: Patient will remain free of falls  Description: INTERVENTIONS:  - Educate patient/family on patient safety including physical limitations  - Instruct patient to call for assistance with activity   - Consult OT/PT to assist with strengthening/mobility   - Keep Call bell within reach  - Keep bed low and locked with side rails adjusted as appropriate  - Keep care items and personal belongings within reach  - Initiate and maintain comfort rounds  - Make Fall Risk Sign visible to staff  - Obtain necessary fall risk management equipment  - Apply yellow socks and bracelet for high fall risk patients  - Consider moving patient to room near nurses station  Outcome: Progressing  Goal: Maintain or return to baseline ADL function  Description: INTERVENTIONS:  -  Assess patient's ability to carry out ADLs; assess patient's baseline for ADL function and identify physical deficits which impact ability to perform ADLs (bathing, care of mouth/teeth, toileting, grooming, dressing, etc.)  - Assess/evaluate cause of self-care deficits   - Assess range of motion  - Assess patient's mobility; develop plan if impaired  - Assess patient's need for assistive devices and provide as appropriate  - Encourage maximum independence but intervene and supervise when necessary  - Involve family in performance of ADLs  - Assess for home care needs following discharge   - Consider OT consult to assist with ADL evaluation and planning for discharge  - Provide patient education as appropriate  Outcome: Progressing  Goal: Maintains/Returns to pre admission functional level  Description: INTERVENTIONS:  - Perform AM-PAC 6 Click Basic Mobility/ Daily Activity assessment daily.  - Set and communicate daily mobility goal to care team and  patient/family/caregiver.   - Collaborate with rehabilitation services on mobility goals if consulted  - Perform Range of Motion 3 times a day.  - Reposition patient every 2 hours.  - Dangle patient 3 times a day  - Stand patient 3 times a day  - Ambulate patient 3 times a day  - Out of bed to chair 3 times a day   - Out of bed for meals 3 times a day  - Out of bed for toileting  - Record patient progress and toleration of activity level   Outcome: Progressing     Problem: DISCHARGE PLANNING  Goal: Discharge to home or other facility with appropriate resources  Description: INTERVENTIONS:  - Identify barriers to discharge w/patient and caregiver  - Arrange for needed discharge resources and transportation as appropriate  - Identify discharge learning needs (meds, wound care, etc.)  - Arrange for interpretive services to assist at discharge as needed  - Refer to Case Management Department for coordinating discharge planning if the patient needs post-hospital services based on physician/advanced practitioner order or complex needs related to functional status, cognitive ability, or social support system  Outcome: Progressing     Problem: Knowledge Deficit  Goal: Patient/family/caregiver demonstrates understanding of disease process, treatment plan, medications, and discharge instructions  Description: Complete learning assessment and assess knowledge base.  Interventions:  - Provide teaching at level of understanding  - Provide teaching via preferred learning methods  Outcome: Progressing     Problem: CARDIOVASCULAR - ADULT  Goal: Maintains optimal cardiac output and hemodynamic stability  Description: INTERVENTIONS:  - Monitor I/O, vital signs and rhythm  - Monitor for S/S and trends of decreased cardiac output  - Administer and titrate ordered vasoactive medications to optimize hemodynamic stability  - Assess quality of pulses, skin color and temperature  - Assess for signs of decreased coronary artery perfusion  -  Instruct patient to report change in severity of symptoms  Outcome: Progressing  Goal: Absence of cardiac dysrhythmias or at baseline rhythm  Description: INTERVENTIONS:  - Continuous cardiac monitoring, vital signs, obtain 12 lead EKG if ordered  - Administer antiarrhythmic and heart rate control medications as ordered  - Monitor electrolytes and administer replacement therapy as ordered  Outcome: Progressing     Problem: METABOLIC, FLUID AND ELECTROLYTES - ADULT  Goal: Electrolytes maintained within normal limits  Description: INTERVENTIONS:  - Monitor labs and assess patient for signs and symptoms of electrolyte imbalances  - Administer electrolyte replacement as ordered  - Monitor response to electrolyte replacements, including repeat lab results as appropriate  - Instruct patient on fluid and nutrition as appropriate  Outcome: Progressing  Goal: Fluid balance maintained  Description: INTERVENTIONS:  - Monitor labs   - Monitor I/O and WT  - Instruct patient on fluid and nutrition as appropriate  - Assess for signs & symptoms of volume excess or deficit  Outcome: Progressing

## 2025-02-14 NOTE — H&P
H&P - Hospitalist   Name: Abhishek Tom 69 y.o. male I MRN: [id number]  Unit/Bed#: [place] [address] -02 I Date of Admission: 2/13/2025   Date of Service: 2/14/2025 I Hospital Day: 0     Assessment & Plan  TIA (transient ischemic attack)  Left eye blurry vision which lasted for about an hour before resolution  Endorsed having missed his Eliquis for a week secondary to recent appendectomy which he had in [place] last week  Said he was told to stop his Eliquis in order to have his surgery and he did.  Resumed his Eliquis about 4 days ago.  He also endorsed resolution of the left eye blurry vision while he was in the ED  Resumed Eliquis.  Neuroconsult.  Neuro checks.  Echo ordered as well as fasting lipid, mag, Phos, MRI brain.  Telemonitor.    HTN (hypertension)  Continue Coreg  HLD (hyperlipidemia)  Statin and Plavix  GERD (gastroesophageal reflux disease)  On omeprazole  Dementia (HCC)  On Aricept  Depression  Continue Zoloft  A-fib (HCC)  Rate controlled on Coreg.  Anticoagulated on Eliquis  Telemonitor      VTE Pharmacologic Prophylaxis:   Moderate Risk (Score 3-4) - Pharmacological DVT Prophylaxis Contraindicated. Sequential Compression Devices Ordered.  Code Status: Level 1 - Full Code  Discussion with family:  AM team to update family.     Anticipated Length of Stay: Patient will be admitted on an observation basis with an anticipated length of stay of less than 2 midnights secondary to TIA.    History of Present Illness   Chief Complaint:  Left blurry vision    69-year-old male with a history of A-fib on Eliquis, acute CVA x 7, GERD, dementia, depression, HLD and HTN.  Came with a complaint of left blurry vision which was started around 8 PM.  Lasted for about half an hour to an hour and cleared on its own.  Denies any type of medication, trauma or any new type of food prior to symptom onset.  Said he was seen in the [place] last week during which she had an appendectomy done.  Prior to the procedure, he  was told to stop his Eliquis for about a week which she did.  He resumed again 4 days ago before the symptoms occurred.  At the time of this exam, patient's symptoms had resolved.  In the ED, his vital initially was 180/82 which improved to 168/78 otherwise stable vital signs.  Labs show normal CBC and BMP except for creatinine of 1.56.  Baseline creatinine of 1.3-1.6.  EKG shows A-fib at rate of 83 bpm and QTc of 441 ms.  CT head without contrast was done and shows no acute intracranial process.  CTA head and neck obtained in ED showed moderate to severe right V4 segment vertebral artery noncalcific stenosis, which has progressed from prior. 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 Numerous additional areas of cervical and intracranial vascular stenoses and occlusion which are grossly stable from prior study as detailed above. Hospitalist team was called to admit for further management       Review of Systems   Constitutional: Negative.    Cardiovascular: Negative.    Gastrointestinal: Negative.    Musculoskeletal: Negative.    Neurological:         Left blurry vision which has resolved   Psychiatric/Behavioral: Negative.         Historical Information   Past Medical History:   Diagnosis Date    A-fib (HCC)     PRITI (acute kidney injury) ([facility]) 09/05/2021    Anxiety     Back pain     Claustrophobia     COPD (chronic obstructive pulmonary disease) ([facility])     Dysphagia 09/05/2021    Fatty liver     GERD (gastroesophageal reflux disease)     History of transfusion     Hyperlipidemia     Hypertension     Hypokalemia 02/22/2022    Lumbar back pain     Panic attacks     Stenosis of right carotid artery     Stroke ([facility])     Wears glasses     Wears partial dentures     upper denture     Past Surgical History:   Procedure Laterality Date    APPENDECTOMY      COLONOSCOPY      ERCP      IR CEREBRAL ANGIOGRAPHY  04/06/2021    OTHER SURGICAL HISTORY      fertility    [place] LAPAROSCOPY SURG CHOLECYSTECTOMY  N/A 08/26/2022    Procedure: CHOLECYSTECTOMY LAPAROSCOPIC;  Surgeon: Michael Otoole DO;  Location: [facility];  Service: General    CT SLCTV CATHJ 3RD+ ORD SLCTV ABDL PEL/LXTR BRNCH Right 04/06/2021    Procedure: ARTERIOGRAM, carotid Angio;  Surgeon: Maximiliano Morin MD;  Location: [facility];  Service: Vascular    CT TEAEC W/PATCH GRF CAROTID VERTB SUBCLAV NECK INC Right 04/13/2021    Procedure: RIGHT ENDARTERECTOMY ARTERY CAROTID WITH BOVINE PATCH;  Surgeon: Maximiliano Morin MD;  Location: [facility];  Service: Vascular     Social History     Tobacco Use    Smoking status: Every Day     Current packs/day: 0.50     Average packs/day: 0.5 packs/day for 50.1 years (25.1 ttl pk-yrs)     Types: Cigarettes     Start date: 1/1/1975    Smokeless tobacco: Never   Vaping Use    Vaping status: Never Used   Substance and Sexual Activity    Alcohol use: Not Currently    Drug use: Never     Comment: never    Sexual activity: Not Currently     Partners: Female     Birth control/protection: Abstinence     E-Cigarette/Vaping    E-Cigarette Use Never User      E-Cigarette/Vaping Substances    Nicotine No     THC No     CBD No     Flavoring No     Other No     Unknown No        Social History:  Marital Status: /Civil Union   Occupation: Retired  Patient Pre-hospital Living Situation: Home  Patient Pre-hospital Level of Mobility: unable to be assessed at time of evaluation  Patient Pre-hospital Diet Restrictions: Cardiac    Meds/Allergies   I have reviewed home medications using recent Epic encounter.  Prior to Admission medications    Medication Sig Start Date End Date Taking? Authorizing Provider   acetaminophen (TYLENOL) 500 mg tablet Take 500 mg by mouth daily as needed for headaches, mild pain or moderate pain. Indications: Pain 1/1/24   Historical Provider, MD   albuterol (PROVENTIL HFA,VENTOLIN HFA) 90 mcg/act inhaler Inhale 2 puffs if needed 10/8/21   Historical Provider, MD   amLODIPine-benazepril (LOTREL 5-10)  5-10 MG per capsule Take 1 capsule by mouth daily    Historical Provider, MD Oh Ellipta 62.5-25 MCG/ACT inhaler INHALE 1 PUFF BY MOUTH AND INTO THE LUNGS ONCE DAILY AT THE SAME TIME EACH DAY    Historical Provider, MD   apixaban (ELIQUIS) 5 mg Take 1 tablet (5 mg total) by mouth 2 (two) times a day 9/17/21   Chai Cannon MD   atorvastatin (LIPITOR) 40 mg tablet Take 1 tablet (40 mg total) by mouth every evening 6/25/24   Jennifer Bermudez PA-C   baclofen 10 mg tablet Take 10 mg by mouth in the morning PRN    Historical Provider, MD   buPROPion (WELLBUTRIN XL) 150 mg 24 hr tablet Take 150 mg by mouth daily    Historical Provider, MD   busPIRone (BUSPAR) 15 mg tablet 15 mg 2 (two) times a day 4/27/22   Historical Provider, MD   carvedilol (COREG) 12.5 mg tablet Take 1 tablet (12.5 mg total) by mouth 2 (two) times a day with meals 6/25/24   Jennifer Bermudez PA-C   clopidogrel (PLAVIX) 75 mg tablet Take 1 tablet (75 mg total) by mouth daily Do not start before June 26, 2024. 6/26/24   Jennifer Bermudez PA-C   donepezil (ARICEPT) 5 mg tablet Take 5 mg by mouth every evening 2/22/24   Historical Provider, MD   omeprazole (PriLOSEC) 20 mg delayed release capsule Take 20 mg by mouth daily 10/4/21   Historical Provider, MD   pravastatin (PRAVACHOL) 40 mg tablet Take 40 mg by mouth daily    Historical Provider, MD   sertraline (ZOLOFT) 100 mg tablet Take 100 mg by mouth daily   1/19/11   Historical Provider, MD     Allergies   Allergen Reactions    Penicillins Anaphylaxis       Objective :  Temp:  [97.6 °F (36.4 °C)-97.7 °F (36.5 °C)] 97.6 °F (36.4 °C)  HR:  [] 76  BP: ()/() 146/91  Resp:  [13-27] 18  SpO2:  [94 %-99 %] 98 %  O2 Device: None (Room air)    Physical Exam  Vitals and nursing note reviewed.   HENT:      Head: Normocephalic and atraumatic.      Mouth/Throat:      Mouth: Mucous membranes are dry.   Eyes:      General: No scleral icterus.     Extraocular Movements: Extraocular movements intact.       Conjunctiva/sclera: Conjunctivae normal.      Pupils: Pupils are equal, round, and reactive to light.   Cardiovascular:      Rate and Rhythm: Normal rate and regular rhythm.      Pulses: Normal pulses.   Pulmonary:      Effort: Pulmonary effort is normal.      Breath sounds: Normal breath sounds.   Abdominal:      General: Bowel sounds are normal.      Palpations: Abdomen is soft.   Skin:     General: Skin is warm.      Capillary Refill: Capillary refill takes less than 2 seconds.   Neurological:      General: No focal deficit present.      Mental Status: He is alert and oriented to person, place, and time.      Comments: Chronic right facial droop from previous strokes   Psychiatric:         Mood and Affect: Mood normal.          Lines/Drains:        Lab Results: I have reviewed the following results:  Results from last 7 days   Lab Units 02/13/25 2044   WBC Thousand/uL 8.74   HEMOGLOBIN g/dL 13.9   HEMATOCRIT % 43.5   PLATELETS Thousands/uL 271     Results from last 7 days   Lab Units 02/13/25  2044   SODIUM mmol/L 139   POTASSIUM mmol/L 3.8   CHLORIDE mmol/L 105   CO2 mmol/L 24   BUN mg/dL 19   CREATININE mg/dL 1.56*   ANION GAP mmol/L 10   CALCIUM mg/dL 8.8   GLUCOSE RANDOM mg/dL 97     Results from last 7 days   Lab Units 02/13/25  2044   INR  1.16     Results from last 7 days   Lab Units 02/13/25 2044   POC GLUCOSE mg/dl 102     Lab Results   Component Value Date    HGBA1C 5.4 06/25/2024    HGBA1C 5.4 03/23/2024    HGBA1C 5.7 (H) 10/01/2023           Imaging Results Review: I reviewed radiology reports from this admission including: CT head.  Other Study Results Review: EKG was reviewed.     Administrative Statements   I have spent a total time of 55 minutes in caring for this patient on the day of the visit/encounter including Impressions, Counseling / Coordination of care, Documenting in the medical record, Reviewing / ordering tests, medicine, procedures  , and Obtaining or reviewing history  .    **  Please Note: This note has been constructed using a voice recognition system. **

## 2025-02-15 LAB
ANION GAP SERPL CALCULATED.3IONS-SCNC: 9 MMOL/L (ref 4–13)
AORTIC ROOT: 3.4 CM
ASCENDING AORTA: 3.1 CM
BSA FOR ECHO PROCEDURE: 1.93 M2
BUN SERPL-MCNC: 23 MG/DL (ref 5–25)
CALCIUM SERPL-MCNC: 8.5 MG/DL (ref 8.4–10.2)
CHLORIDE SERPL-SCNC: 106 MMOL/L (ref 96–108)
CO2 SERPL-SCNC: 24 MMOL/L (ref 21–32)
CREAT SERPL-MCNC: 1.31 MG/DL (ref 0.6–1.3)
ERYTHROCYTE [DISTWIDTH] IN BLOOD BY AUTOMATED COUNT: 14 % (ref 11.6–15.1)
FRACTIONAL SHORTENING: 28 (ref 28–44)
GFR SERPL CREATININE-BSD FRML MDRD: 55 ML/MIN/1.73SQ M
GLUCOSE SERPL-MCNC: 102 MG/DL (ref 65–140)
HCT VFR BLD AUTO: 41.9 % (ref 36.5–49.3)
HGB BLD-MCNC: 12.7 G/DL (ref 12–17)
INTERVENTRICULAR SEPTUM IN DIASTOLE (PARASTERNAL SHORT AXIS VIEW): 1 CM
INTERVENTRICULAR SEPTUM: 1 CM (ref 0.6–1.1)
LAAS-AP2: 24.4 CM2
LAAS-AP4: 22.7 CM2
LEFT ATRIUM SIZE: 4.7 CM
LEFT ATRIUM VOLUME (MOD BIPLANE): 75 ML
LEFT ATRIUM VOLUME INDEX (MOD BIPLANE): 38.9 ML/M2
LEFT INTERNAL DIMENSION IN SYSTOLE: 2.9 CM (ref 2.1–4)
LEFT VENTRICULAR INTERNAL DIMENSION IN DIASTOLE: 4 CM (ref 3.5–6)
LEFT VENTRICULAR POSTERIOR WALL IN END DIASTOLE: 1.1 CM
LEFT VENTRICULAR STROKE VOLUME: 35 ML
LV EF US.2D.A4C+ESTIMATED: 56 %
LVSV (TEICH): 35 ML
MAGNESIUM SERPL-MCNC: 1.1 MG/DL (ref 1.9–2.7)
MCH RBC QN AUTO: 26.6 PG (ref 26.8–34.3)
MCHC RBC AUTO-ENTMCNC: 30.3 G/DL (ref 31.4–37.4)
MCV RBC AUTO: 88 FL (ref 82–98)
MV E'TISSUE VEL-SEP: 8 CM/S
PHOSPHATE SERPL-MCNC: 3.2 MG/DL (ref 2.3–4.1)
PLATELET # BLD AUTO: 229 THOUSANDS/UL (ref 149–390)
PMV BLD AUTO: 10.2 FL (ref 8.9–12.7)
POTASSIUM SERPL-SCNC: 3.7 MMOL/L (ref 3.5–5.3)
RBC # BLD AUTO: 4.77 MILLION/UL (ref 3.88–5.62)
RIGHT ATRIUM AREA SYSTOLE A4C: 18.5 CM2
RIGHT VENTRICLE ID DIMENSION: 3.3 CM
SL CV LEFT ATRIUM LENGTH A2C: 6 CM
SL CV PED ECHO LEFT VENTRICLE DIASTOLIC VOLUME (MOD BIPLANE) 2D: 68 ML
SL CV PED ECHO LEFT VENTRICLE SYSTOLIC VOLUME (MOD BIPLANE) 2D: 33 ML
SODIUM SERPL-SCNC: 139 MMOL/L (ref 135–147)
TRICUSPID ANNULAR PLANE SYSTOLIC EXCURSION: 1.4 CM
WBC # BLD AUTO: 9.87 THOUSAND/UL (ref 4.31–10.16)

## 2025-02-15 PROCEDURE — 99233 SBSQ HOSP IP/OBS HIGH 50: CPT | Performed by: PSYCHIATRY & NEUROLOGY

## 2025-02-15 PROCEDURE — 80048 BASIC METABOLIC PNL TOTAL CA: CPT | Performed by: HOSPITALIST

## 2025-02-15 PROCEDURE — 99232 SBSQ HOSP IP/OBS MODERATE 35: CPT | Performed by: STUDENT IN AN ORGANIZED HEALTH CARE EDUCATION/TRAINING PROGRAM

## 2025-02-15 PROCEDURE — 83735 ASSAY OF MAGNESIUM: CPT | Performed by: HOSPITALIST

## 2025-02-15 PROCEDURE — 97163 PT EVAL HIGH COMPLEX 45 MIN: CPT

## 2025-02-15 PROCEDURE — 85027 COMPLETE CBC AUTOMATED: CPT | Performed by: HOSPITALIST

## 2025-02-15 PROCEDURE — 97166 OT EVAL MOD COMPLEX 45 MIN: CPT

## 2025-02-15 PROCEDURE — 97116 GAIT TRAINING THERAPY: CPT

## 2025-02-15 PROCEDURE — 93306 TTE W/DOPPLER COMPLETE: CPT | Performed by: INTERNAL MEDICINE

## 2025-02-15 PROCEDURE — 84100 ASSAY OF PHOSPHORUS: CPT | Performed by: HOSPITALIST

## 2025-02-15 RX ORDER — MAGNESIUM SULFATE HEPTAHYDRATE 40 MG/ML
4 INJECTION, SOLUTION INTRAVENOUS ONCE
Status: COMPLETED | OUTPATIENT
Start: 2025-02-15 | End: 2025-02-15

## 2025-02-15 RX ORDER — SIMETHICONE 80 MG
80 TABLET,CHEWABLE ORAL EVERY 6 HOURS PRN
Status: DISCONTINUED | OUTPATIENT
Start: 2025-02-15 | End: 2025-02-16 | Stop reason: HOSPADM

## 2025-02-15 RX ADMIN — MAGNESIUM SULFATE HEPTAHYDRATE 4 G: 40 INJECTION, SOLUTION INTRAVENOUS at 09:53

## 2025-02-15 RX ADMIN — APIXABAN 5 MG: 5 TABLET, FILM COATED ORAL at 18:19

## 2025-02-15 RX ADMIN — AMLODIPINE BESYLATE 5 MG: 5 TABLET ORAL at 09:54

## 2025-02-15 RX ADMIN — LISINOPRIL 10 MG: 10 TABLET ORAL at 09:54

## 2025-02-15 RX ADMIN — BACLOFEN 10 MG: 10 TABLET ORAL at 09:54

## 2025-02-15 RX ADMIN — CARVEDILOL 12.5 MG: 12.5 TABLET, FILM COATED ORAL at 18:19

## 2025-02-15 RX ADMIN — TICAGRELOR 90 MG: 90 TABLET ORAL at 12:51

## 2025-02-15 RX ADMIN — UMECLIDINIUM BROMIDE AND VILANTEROL TRIFENATATE 1 PUFF: 62.5; 25 POWDER RESPIRATORY (INHALATION) at 09:56

## 2025-02-15 RX ADMIN — DONEPEZIL HYDROCHLORIDE 5 MG: 5 TABLET ORAL at 18:19

## 2025-02-15 RX ADMIN — APIXABAN 5 MG: 5 TABLET, FILM COATED ORAL at 09:54

## 2025-02-15 RX ADMIN — ATORVASTATIN CALCIUM 40 MG: 40 TABLET, FILM COATED ORAL at 18:19

## 2025-02-15 RX ADMIN — TICAGRELOR 90 MG: 90 TABLET ORAL at 22:28

## 2025-02-15 RX ADMIN — SERTRALINE HYDROCHLORIDE 100 MG: 100 TABLET ORAL at 09:54

## 2025-02-15 RX ADMIN — BUSPIRONE HYDROCHLORIDE 15 MG: 10 TABLET ORAL at 18:19

## 2025-02-15 RX ADMIN — CLOPIDOGREL BISULFATE 75 MG: 75 TABLET ORAL at 09:54

## 2025-02-15 RX ADMIN — BUPROPION HYDROCHLORIDE 150 MG: 150 TABLET, EXTENDED RELEASE ORAL at 09:54

## 2025-02-15 RX ADMIN — CARVEDILOL 12.5 MG: 12.5 TABLET, FILM COATED ORAL at 09:56

## 2025-02-15 RX ADMIN — BUSPIRONE HYDROCHLORIDE 15 MG: 10 TABLET ORAL at 09:54

## 2025-02-15 RX ADMIN — PANTOPRAZOLE SODIUM 20 MG: 20 TABLET, DELAYED RELEASE ORAL at 05:12

## 2025-02-15 RX ADMIN — ACETAMINOPHEN 650 MG: 325 TABLET, FILM COATED ORAL at 05:12

## 2025-02-15 RX ADMIN — Medication 1 PATCH: at 09:54

## 2025-02-15 NOTE — ASSESSMENT & PLAN NOTE
On omeprazole  Will add simethicone as patient complaining of flatulence.  Last BM was this morning.

## 2025-02-15 NOTE — OCCUPATIONAL THERAPY NOTE
Occupational Therapy Evaluation     Patient Name: Abhishek Tom  Today's Date: 2/15/2025  Problem List  Principal Problem:    CVA (cerebral vascular accident) (HCC)  Active Problems:    HLD (hyperlipidemia)    GERD (gastroesophageal reflux disease)    HTN (hypertension)    Hypomagnesemia    A-fib (HCC)    Blurred vision    Dementia (HCC)    Depression    Past Medical History  Past Medical History:   Diagnosis Date    A-fib (HCC)     PRITI (acute kidney injury) (HCC) 09/05/2021    Anxiety     Back pain     Claustrophobia     COPD (chronic obstructive pulmonary disease) (HCC)     Dysphagia 09/05/2021    Fatty liver     GERD (gastroesophageal reflux disease)     History of transfusion     Hyperlipidemia     Hypertension     Hypokalemia 02/22/2022    Lumbar back pain     Panic attacks     Stenosis of right carotid artery     Stroke (HCC)     Wears glasses     Wears partial dentures     upper denture     Past Surgical History  Past Surgical History:   Procedure Laterality Date    APPENDECTOMY      COLONOSCOPY      ERCP      IR CEREBRAL ANGIOGRAPHY  04/06/2021    OTHER SURGICAL HISTORY      fertility    ME LAPAROSCOPY SURG CHOLECYSTECTOMY N/A 08/26/2022    Procedure: CHOLECYSTECTOMY LAPAROSCOPIC;  Surgeon: Michael Otoole DO;  Location: [facility];  Service: General    ME SLCTV CATHJ 3RD+ ORD SLCTV ABDL PEL/LXTR BRNCH Right 04/06/2021    Procedure: ARTERIOGRAM, carotid Angio;  Surgeon: Maximiliano Morin MD;  Location: [facility];  Service: Vascular    ME TEAEC W/PATCH GRF CAROTID VERTB SUBCLAV NECK INC Right 04/13/2021    Procedure: RIGHT ENDARTERECTOMY ARTERY CAROTID WITH BOVINE PATCH;  Surgeon: Maximiliano Morin MD;  Location: [facility];  Service: Vascular           02/15/25 0935   Note Type   Note type Evaluation   Pain Assessment   Pain Assessment Tool 0-10   Pain Score 10 - Worst Possible Pain   Pain Location/Orientation Location: Abdomen  (states recent appendectomy(1 wk ago))   Pain Rating: FLACC (Rest) -  "Face 0   Pain Rating: FLACC (Rest) - Legs 0   Pain Rating: FLACC (Rest) - Activity 0   Pain Rating: FLACC (Rest) - Cry 0   Pain Rating: FLACC (Rest) - Consolability 0   Score: FLACC (Rest) 0   Restrictions/Precautions   Weight Bearing Precautions Per Order No   Other Precautions Chair Alarm;Bed Alarm;Fall Risk   Home Living   Type of Home Mobile home  (3-4 brandan with railing)   Home Layout One level   Bathroom Shower/Tub Tub/shower unit   Bathroom Toilet Standard   Bathroom Equipment Shower chair   Home Equipment Cane   Prior Function   Level of [place] Independent with functional mobility;Independent with ADLs;Needs assistance with IADLS   Lives With Family  (nephew)   Falls in the last 6 months 0   Comments PTA pt states independence with his ADLs, transfers, ambulation--w/o device; neg , neg falls, +home alone   Lifestyle   Autonomy PTA pt states independence with his ADLs, transfers, ambulation--w/o device; neg , neg falls, +home alone   Reciprocal Relationships supportive nephew   Service to Others served in the [employer]   Intrinsic Gratification watching TV   Subjective   Subjective \"It was my vision that was effected, but now it's back to normal.\"   ADL   Where Assessed Edge of bed   Eating Assistance 6  Modified independent   Grooming Assistance 6  Modified Independent   UB Bathing Assistance 6  Modified Independent   LB Bathing Assistance 6  Modified Independent   UB Dressing Assistance 6  Modified independent   LB Dressing Assistance 6  Modified independent   Toileting Assistance  6  Modified independent   Bed Mobility   Rolling R 6  Modified independent   Rolling L 6  Modified independent   Supine to Sit 6  Modified independent   Transfers   Sit to Stand 5  Supervision   Additional items Verbal cues   Stand to Sit 5  Supervision   Additional items Verbal cues   Functional Mobility   Functional Mobility 5  Supervision   Additional items   (w/o device)   Balance   Static Sitting " "Normal   Dynamic Sitting Good   Static Standing Fair +   Dynamic Standing Fair   Activity Tolerance   Activity Tolerance Patient tolerated treatment well   Medical Staff Made Aware nsg, P.T.   RUE Assessment   RUE Assessment WFL   RUE Strength   RUE Overall Strength Within Functional Limits - able to perform ADL tasks with strength  (4+/5 throughout)   LUE Assessment   LUE Assessment WFL   LUE Strength   LUE Overall Strength Within Functional Limits - able to perform ADL tasks with strength  (4+/5 throughout)   Hand Function   Gross Motor Coordination Functional   Fine Motor Coordination Functional   Sensation   Light Touch No apparent deficits   Proprioception   Proprioception No apparent deficits   Vision-Basic Assessment   Current Vision   (glasses)   Vision - Complex Assessment   Acuity Able to read clock/calendar on wall without difficulty   Psychosocial   Psychosocial (WDL) WDL   Perception   Inattention/Neglect Appears intact  (intact with visual field test, but with ambulation, noted \"bumping\" into wall during walk)   Cognition   Overall Cognitive Status WFL   Arousal/Participation Alert   Attention Within functional limits   Orientation Level Oriented X4   Memory Within functional limits   Following Commands Follows all commands and directions without difficulty   Comments hx dementia   Assessment   Limitation Decreased endurance;Decreased high-level ADLs   Prognosis Good   Assessment Pt is a 68y/o male admitted to the hospital 2* c/o L eye blurry vision; MRI(brain)=small acute infarcts in right posterior occipital lobe.Tiny subacute infarct in right cerebellum. Multifocal chronic infarcts with moderate-to-severe chronic microangiopathy. Pt with PMH A-fib on Eliquis, acute CVA x 7, GERD, dementia, depression, HLD, R ECA, and HTN. PTA pt states independence with his ADLs, transfers, ambulation--w/o device; neg , neg falls, +home alone. During initial eval, pt demonstrated slight deficits with his " "functional balance, functional mobility, and activity tolerance. Pt was able to demonstrate good ADL status and states being close to his functional baseline. Pt would benefit from a restorative program on the unit to improve his overall endurance, balance, and mobility. Acute OT tx not indicated at this time 2* pt's limited deviation from his functional baseline. The patient's raw score on the AM-PAC Daily Activity Inpatient Short Form is 24. A raw score of greater than or equal to 19 suggests the patient may benefit from discharge to home. Please refer to the recommendation of the Occupational Therapist for safe discharge planning.   Goals   Patient Goals \"to go home\"   Plan   OT Frequency Eval only   Discharge Recommendation   Rehab Resource Intensity Level, OT III (Minimum Resource Intensity)  (OPPT ?)   AM-PAC Daily Activity Inpatient   Lower Body Dressing 4   Bathing 4   Toileting 4   Upper Body Dressing 4   Grooming 4   Eating 4   Daily Activity Raw Score 24   Daily Activity Standardized Score (Calc for Raw Score >=11) 57.54   AM-PAC Applied Cognition Inpatient   Following a Speech/Presentation 4   Understanding Ordinary Conversation 4   Taking Medications 4   Remembering Where Things Are Placed or Put Away 4   Remembering List of 4-5 Errands 3   Taking Care of Complicated Tasks 3   Applied Cognition Raw Score 22   Applied Cognition Standardized Score 47.83   Elliot Little       "

## 2025-02-15 NOTE — PROGRESS NOTES
Progress Note - Neurology   Name: Abhishek Tom 69 y.o. male I MRN: [id number]  Unit/Bed#: [place] [facility] [address] -02 I Date of Admission: 2/13/2025   Date of Service: 2/15/2025 I Hospital Day: 1     Assessment & Plan  CVA (cerebral vascular accident) (HCC)  69 y.o. male with R carotid endarterectomy, Afib on Eliquis, significant multifocal cervical and intracranial atherosclerotic disease, prior CVA    Presented on 2/13 as stroke alert with L eye blurred vision. NIHSS 0 per ED. See imaging below. Not a TNK/endovascular candidate    MRI brain overnight with acute/subacute CVA (R occipital and R cerebellum); still clinically with monocular L upper quadrantanopsia (L eye only).     Neuroimaging/Workup:  -CT head: no acute intracranial pathology  -CTA head/neck: significant abnormalities (mostly stable findings compared to CTA from June 2024)   -Moderate stenosis of L common carotid, L vertebral origins   -Occluded R common carotid, reconstitutes   -Severe L common carotid stenosis  -Moderate to severe R V4 segment stenosis, severe L V4 segment  -R ICA occlusion  -Moderate L ICA stenosis  -Moderate basilar stenosis  -Fetal R PCA  -MRI brain: acute R occipital CVA, subacute R cerebellar CVA, chronic infarcts and advanced microvascular disease noted  -2D echo: EF 55%, normal systolic function, moderate L atrial and mild R atrial dilation  -Lipid panel with   -Hemoglobin A1C 5.8    Plan  -Stroke pathway ongoing:   -Continue home Eliquis 5 mg BID  -Will switch plavix to ticagrelor 90 mg BID  -counseled on need for medication compliance moving forward  -Atorvastatin 40 mg  -Follows with vascular surgery for management of significant cervical/intracranial vascular disease; should continue to follow up (to see if any role for future intervention)  -Should also follow up with ophthalmology (query BRAO causing vision change in L eye)  -May normalize BP as long as pt remains asymptomatic   -Euglycemic, normothermic goal  -Continue  "telemetry monitoring  -Therapy evaluations (PT/OT/ST)  -Stroke education  -Smoking cessation encouraged  -Frequent neuro checks. Continue to monitor and notify neurology with any changes.  -STAT CT head for any acute change in neuro exam  A-fib (HCC)  -On Eliquis 5 mg BID PTA   -Telemetry monitoring  HLD (hyperlipidemia)  -Continue statin  -See lipid panel above  HTN (hypertension)  -Goal normotension as long as pt remains asymptomatic  -Management per primary team    No further inpatient neurologic workup; ok from neurology standpoint for discharge. Discussed plan of care with attending neurologist.     Abhishek Tom will need follow-up in in 6 weeks with neurovascular team for Other in 60 minute appointment. They will not require outpatient neurological testing.     Subjective   Patient new to examiner; reviewed neurology consult note yesterday and MRI brain overnight; this morning patient subjectively doing fine. Denies any new/interim symptoms. Again describes his vision distubrance leading to admission (\"film coming over\" Left eye), lasted approx. One hour before resolving. Had a headache during said vision change. No speech changes, no focal motor/sensory symptoms to extremities.     States he recently spent 2 weeks in [place] (for the first week, was without his home medications, but had them shipped down to [place] and has been taking his medications for the past 2 weeks (returned home this past Monday, 2/10). States he religiously takes his Eliquis, but may miss a dose of his other medications here and there. Quit smoking few months ago, but recently re-started    Review of Systems    Objective :  Temp:  [97.2 °F (36.2 °C)-98 °F (36.7 °C)] 98 °F (36.7 °C)  HR:  [68-75] 72  BP: (107-132)/(60-81) 132/81  Resp:  [16] 16  SpO2:  [93 %-98 %] 93 %  O2 Device: None (Room air)    Physical Exam  Constitutional:       Appearance: Normal appearance.   HENT:      Head: Normocephalic.   Eyes:      Extraocular " Movements: Extraocular movements intact.   Cardiovascular:      Rate and Rhythm: Normal rate.   Pulmonary:      Effort: Pulmonary effort is normal.   Abdominal:      General: There is no distension.   Musculoskeletal:         General: Normal range of motion.      Cervical back: Normal range of motion and neck supple.   Skin:     General: Skin is warm and dry.   Neurological:      Motor: Motor strength is normal.  Psychiatric:         Speech: Speech normal.   Neurological Exam  Mental Status  Awake, alert and oriented to person, place and time. Oriented to person, place and time. Speech is normal. Language is fluent with no aphasia.    Cranial Nerves  CN III, IV, VI: Extraocular movements intact bilaterally.  CN V: Facial sensation is normal.  CN VII: Full and symmetric facial movement.  CN VIII: Hearing is normal.  CN IX, X: Palate elevates symmetrically  CN XI: Shoulder shrug strength is normal.  CN XII: Tongue midline without atrophy or fasciculations.  L upper quadrantanopsia (with L eye testing only). .    Motor  Normal muscle bulk throughout. Normal muscle tone. Strength is 5/5 throughout all four extremities.    Sensory  Light touch is normal in upper and lower extremities.     Coordination  Right: Finger-to-nose normal. Heel-to-shin normal.Left: Finger-to-nose normal. Heel-to-shin normal.    Gait    Seen ambulating to/from the bathroom (no apparent truncal instability/lateral sway).        Lab Results: I have reviewed the following results:  Imaging Results Review: I personally reviewed the following image studies in PACS and associated radiology reports  MRI brain wo contrast   Final Result by Abram Frias MD (02/14 2004)      Small acute infarcts in right posterior occipital lobe.      Tiny subacute infarct in right cerebellum.      Multifocal chronic infarcts with moderate-to-severe chronic microangiopathy, as detailed above.      Absent flow void in right ICA distal cervical and intracranial  segments due to combination of slow flow and occlusion which was best seen on recent CTA stroke alert head and neck dated 2/13/2024.      The study was marked in EPIC for immediate notification.      Workstation performed: [id number]         CTA stroke alert (head/neck)   Final Result by Edwardo Garcia DO (02/13 2138)   Moderate to severe right V4 segment vertebral artery noncalcific stenosis, progressed from prior.   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   Numerous additional areas of cervical and intracranial vascular stenoses and occlusion which are grossly stable from prior study as detailed above.   Findings were directly discussed with Dr. Joanne De Dios at 9:36 PM on 2/13/2025.      Workstation performed: [id number]         CT stroke alert brain   Final Result by Edwardo Garcia DO (02/13 2138)      No acute intracranial abnormality.   Findings were directly discussed with Dr. Joanne De Dios at 9:36 PM on 2/13/2025.      Workstation performed: [id number]             Other Study Results Review: No additional pertinent studies reviewed.    VTE Pharmacologic Prophylaxis: Sequential compression device (Venodyne)  and Eliquis    Please see attending's attestation for total time spent/billing. Discussed plan of care with patient and primary team: discussed MRI brain results, likely stroke etiology, AP/AC/statin plan moving forward, OP stroke/vascular neurology and ongoing vascular surgery follow up.    Please note dictation software was used in the formulation of this note. Please keep that in mind in light of any grammatical errors.

## 2025-02-15 NOTE — ED PROVIDER NOTES
Time reflects when diagnosis was documented in both MDM as applicable and the Disposition within this note       Time User Action Codes Description Comment    2/13/2025  8:42 PM Santos Leiva Add [F17.200] Smoking     2/13/2025  8:43 PM Santos Leiva Add [J44.9] Chronic obstructive pulmonary disease, unspecified COPD type (HCC)     2/13/2025 11:46 PM Tricia Osman Add [G45.9] TIA (transient ischemic attack)           ED Disposition       ED Disposition   Admit    Condition   Stable    Date/Time   u Feb 13, 2025 11:46 PM    Comment   Case was discussed with CHRISTINA and the patient's admission status was agreed to be Admission Status: obs./tele status to the service of Dr. ELA Phillips               Assessment & Plan       Medical Decision Making  NIH stroke scale is 0.  Patient was a stroke alert initially I spoke with neurology who is reviewed his case and will see the patient in consult.  Patient is on blood thinners so is not a thrombolytic candidate.  Also his symptoms completely resolved.  He is admitted for stroke pathway and further workup/management    Risk  Prescription drug management.  Decision regarding hospitalization.             Medications   iohexol (OMNIPAQUE) 350 MG/ML injection (MULTI-DOSE) 85 mL (85 mL Intravenous Given 2/13/25 2052)       ED Risk Strat Scores                            SBIRT 20yo+      Flowsheet Row Most Recent Value   Initial Alcohol Screen: US AUDIT-C     1. How often do you have a drink containing alcohol? 0 Filed at: 02/13/2025 2321   2. How many drinks containing alcohol do you have on a typical day you are drinking?  0 Filed at: 02/13/2025 2321   3b. FEMALE Any Age, or MALE 65+: How often do you have 4 or more drinks on one occassion? 0 Filed at: 02/13/2025 2321   Audit-C Score 0 Filed at: 02/13/2025 2321   JAQUELIN: How many times in the past year have you...    Used an illegal drug or used a prescription medication for non-medical reasons? Never Filed at: 02/13/2025  2321                            History of Present Illness       Chief Complaint   Patient presents with    Blurred Vision     Pt brought by EMS. Pt states blurry vision and eye twitch of the left eye. Has hx of 7 strokes. Blurry vision subsided PTA.        Past Medical History:   Diagnosis Date    A-fib (HCC)     PRITI (acute kidney injury) (HCC) 09/05/2021    Anxiety     Back pain     Claustrophobia     COPD (chronic obstructive pulmonary disease) (HCC)     Dysphagia 09/05/2021    Fatty liver     GERD (gastroesophageal reflux disease)     History of transfusion     Hyperlipidemia     Hypertension     Hypokalemia 02/22/2022    Lumbar back pain     Panic attacks     Stenosis of right carotid artery     Stroke (HCC)     Wears glasses     Wears partial dentures     upper denture      Past Surgical History:   Procedure Laterality Date    APPENDECTOMY      COLONOSCOPY      ERCP      IR CEREBRAL ANGIOGRAPHY  04/06/2021    OTHER SURGICAL HISTORY      fertility    NM LAPAROSCOPY SURG CHOLECYSTECTOMY N/A 08/26/2022    Procedure: CHOLECYSTECTOMY LAPAROSCOPIC;  Surgeon: Michael Otoole DO;  Location: [facility];  Service: General    NM SLCTV CATHJ 3RD+ ORD SLCTV ABDL PEL/LXTR BRNCH Right 04/06/2021    Procedure: ARTERIOGRAM, carotid Angio;  Surgeon: Maximiliano Morin MD;  Location: [facility];  Service: Vascular    NM TEAEC W/PATCH GRF CAROTID VERTB SUBCLAV NECK INC Right 04/13/2021    Procedure: RIGHT ENDARTERECTOMY ARTERY CAROTID WITH BOVINE PATCH;  Surgeon: Maximiliano Morin MD;  Location: [facility];  Service: Vascular      Family History   Problem Relation Age of Onset    Asthma Mother       Social History     Tobacco Use    Smoking status: Every Day     Current packs/day: 0.50     Average packs/day: 0.5 packs/day for 50.1 years (25.1 ttl pk-yrs)     Types: Cigarettes     Start date: 1/1/1975    Smokeless tobacco: Never   Vaping Use    Vaping status: Never Used   Substance Use Topics    Alcohol use: Not Currently     Drug use: Never     Comment: never      E-Cigarette/Vaping    E-Cigarette Use Never User       E-Cigarette/Vaping Substances    Nicotine No     THC No     CBD No     Flavoring No     Other No     Unknown No       I have reviewed and agree with the history as documented.     69-year-old male with a history of 7 prior strokes on Eliquis presents with blurry vision in the left eye that started around 8 PM and is now completely resolved.  He has no weakness on either side.  No chest pain, no shortness of breath, no fevers, no headaches now        Review of Systems   Constitutional:  Negative for appetite change, fatigue and fever.   HENT:  Negative for rhinorrhea and sore throat.    Eyes:  Negative for pain.   Respiratory:  Negative for cough, shortness of breath and wheezing.    Cardiovascular:  Negative for chest pain and leg swelling.   Gastrointestinal:  Negative for abdominal pain, diarrhea and vomiting.   Genitourinary:  Negative for dysuria and flank pain.   Musculoskeletal:  Negative for back pain and neck pain.   Skin:  Negative for rash.   Neurological:  Negative for syncope, speech difficulty, weakness, numbness and headaches.   Psychiatric/Behavioral:          Mood normal           Objective       ED Triage Vitals   Temperature Pulse Blood Pressure Respirations SpO2 Patient Position - Orthostatic VS   02/13/25 2037 02/13/25 2037 02/13/25 2037 02/13/25 2037 02/13/25 2037 02/13/25 2037   97.7 °F (36.5 °C) 95 95/74 18 99 % Sitting      Temp Source Heart Rate Source BP Location FiO2 (%) Pain Score    02/13/25 2037 02/13/25 2037 02/13/25 2037 -- 02/13/25 2115    Oral Monitor Right arm  4      Vitals      Date and Time Temp Pulse SpO2 Resp BP Pain Score FACES Pain Rating User   02/14/25 1544 97.5 °F (36.4 °C) -- -- 16 127/69 -- -- DII   02/14/25 1354 -- 75 -- -- 107/60 -- -- LS   02/14/25 1101 97.2 °F (36.2 °C) -- -- 16 107/60 -- -- DII   02/14/25 0906 -- -- -- -- -- 8 -- HP   02/14/25 0900 -- -- -- -- -- 8 -- HP    02/14/25 0739 98.4 °F (36.9 °C) 93 97 % 16 145/92 -- -- DII   02/14/25 0200 -- -- -- -- -- No Pain -- MS   02/14/25 0156 97.6 °F (36.4 °C) 76 98 % 18 146/91 -- -- DII   02/14/25 0115 -- 87 97 % 16 165/104 -- -- JT   02/14/25 0030 -- 84 95 % 22 169/90 -- -- JT   02/13/25 2345 -- 81 98 % 16 176/79 -- -- JT   02/13/25 2245 -- 94 98 % 16 168/78 -- -- JT   02/13/25 2150 -- 97 98 % 27 -- -- -- YC   02/13/25 2145 -- 100 98 % 20 169/79 -- -- YC   02/13/25 2140 -- 99 98 % 20 -- -- -- YC   02/13/25 2135 -- 93 97 % 17 -- -- -- YC   02/13/25 2130 -- 94 95 % 19 171/76 4 -- YC   02/13/25 2125 -- 95 95 % 20 -- -- -- YC   02/13/25 2120 -- 94 96 % 14 -- -- -- YC   02/13/25 2115 -- 84 98 % 13 163/73 4 -- YC   02/13/25 2115 -- 91 95 % 14 163/73 -- -- YC   02/13/25 2110 -- 100 94 % 19 -- -- -- YC   02/13/25 2105 -- 100 95 % 16 -- -- -- YC   02/13/25 2100 -- 107 98 % 21 177/79 -- -- YC   02/13/25 2057 -- -- -- -- 159/73 -- -- YC   02/13/25 2055 -- 107 98 % 17 -- -- -- YC   02/13/25 2050 -- 99 95 % 18 -- -- -- YC   02/13/25 2049 -- -- -- -- 180/82 -- -- YC   02/13/25 2045 -- 101 95 % 22 -- -- -- YC   02/13/25 2037 97.7 °F (36.5 °C) 95 99 % 18 95/74 -- -- AB            Physical Exam  Vitals and nursing note reviewed.   Constitutional:       Appearance: He is well-developed.   HENT:      Head: Normocephalic and atraumatic.      Right Ear: External ear normal.      Left Ear: External ear normal.   Eyes:      General: No scleral icterus.     Extraocular Movements: Extraocular movements intact.   Cardiovascular:      Rate and Rhythm: Normal rate and regular rhythm.   Pulmonary:      Effort: Pulmonary effort is normal. No respiratory distress.      Breath sounds: Normal breath sounds.   Abdominal:      Palpations: Abdomen is soft.      Tenderness: There is no abdominal tenderness.   Musculoskeletal:         General: No deformity or signs of injury. Normal range of motion.      Cervical back: Normal range of motion and neck supple.   Skin:      General: Skin is warm and dry.      Coloration: Skin is not jaundiced or pale.   Neurological:      General: No focal deficit present.      Mental Status: He is alert and oriented to person, place, and time.      Cranial Nerves: No cranial nerve deficit.      Sensory: No sensory deficit.      Motor: No weakness.   Psychiatric:         Mood and Affect: Mood normal.         Behavior: Behavior normal.         Results Reviewed       Procedure Component Value Units Date/Time    HS Troponin I 4hr [[id number]]  (Normal) Collected: 02/14/25 0105    Lab Status: Final result Specimen: Blood from Arm, Right Updated: 02/14/25 0141     hs TnI 4hr 7 ng/L      Delta 4hr hsTnI 0 ng/L     HS Troponin I 2hr [[id number]]  (Normal) Collected: 02/13/25 2247    Lab Status: Final result Specimen: Blood from Arm, Left Updated: 02/13/25 2324     hs TnI 2hr 7 ng/L      Delta 2hr hsTnI 0 ng/L     HS Troponin 0hr (reflex protocol) [[id number]]  (Normal) Collected: 02/13/25 2044    Lab Status: Final result Specimen: Blood from Arm, Left Updated: 02/13/25 2129     hs TnI 0hr 7 ng/L     Basic metabolic panel [[id number]]  (Abnormal) Collected: 02/13/25 2044    Lab Status: Final result Specimen: Blood from Arm, Left Updated: 02/13/25 2124     Sodium 139 mmol/L      Potassium 3.8 mmol/L      Chloride 105 mmol/L      CO2 24 mmol/L      ANION GAP 10 mmol/L      BUN 19 mg/dL      Creatinine 1.56 mg/dL      Glucose 97 mg/dL      Calcium 8.8 mg/dL      eGFR 44 ml/min/1.73sq m     Narrative:      National Kid[employer] guidelines for Chronic Kidney Disease (CKD):     Stage 1 with normal or high GFR (GFR > 90 mL/min/1.73 square meters)    Stage 2 Mild CKD (GFR = 60-89 mL/min/1.73 square meters)    Stage 3A Moderate CKD (GFR = 45-59 mL/min/1.73 square meters)    Stage 3B Moderate CKD (GFR = 30-44 mL/min/1.73 square meters)    Stage 4 Severe CKD (GFR = 15-29 mL/min/1.73 square meters)    Stage 5 End Stage CKD (GFR <15 mL/min/1.73 square  meters)  Note: GFR calculation is accurate only with a steady state creatinine    Protime-INR [[id number]]  (Normal) Collected: 02/13/25 2044    Lab Status: Final result Specimen: Blood from Arm, Left Updated: 02/13/25 2121     Protime 15.0 seconds      INR 1.16    Narrative:      INR Therapeutic Range    Indication                                             INR Range      Atrial Fibrillation                                               2.0-3.0  Hypercoagulable State                                    2.0.2.3  Left Ventricular Asist Device                            2.0-3.0  Mechanical Heart Valve                                  -    Aortic(with afib, MI, embolism, HF, LA enlargement,    and/or coagulopathy)                                     2.0-3.0 (2.5-3.5)     Mitral                                                             2.5-3.5  Prosthetic/Bioprosthetic Heart Valve               2.0-3.0  Venous thromboembolism (VTE: VT, PE        2.0-3.0    APTT [[id number]]  (Abnormal) Collected: 02/13/25 2044    Lab Status: Final result Specimen: Blood from Arm, Left Updated: 02/13/25 2121     PTT 36 seconds     CBC and Platelet [[id number]]  (Abnormal) Collected: 02/13/25 2044    Lab Status: Final result Specimen: Blood from Arm, Left Updated: 02/13/25 2051     WBC 8.74 Thousand/uL      RBC 5.30 Million/uL      Hemoglobin 13.9 g/dL      Hematocrit 43.5 %      MCV 82 fL      MCH 26.2 pg      MCHC 32.0 g/dL      RDW 13.8 %      Platelets 271 Thousands/uL      MPV 9.7 fL     Fingerstick Glucose (POCT) [[id number]]  (Normal) Collected: 02/13/25 2044    Lab Status: Final result Specimen: Blood Updated: 02/13/25 2045     POC Glucose 102 mg/dl             CTA stroke alert (head/neck)   Final Interpretation by Edwardo Garcia DO (02/13 2138)   Moderate to severe right V4 segment vertebral artery noncalcific stenosis, progressed from prior.   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   Numerous  additional areas of cervical and intracranial vascular stenoses and occlusion which are grossly stable from prior study as detailed above.   Findings were directly discussed with Dr. Joanne De Dios at 9:36 PM on 2/13/2025.      Workstation performed: [id number]         CT stroke alert brain   Final Interpretation by Edwardo Garcia DO (02/13 2138)      No acute intracranial abnormality.   Findings were directly discussed with Dr. Joanne De Dios at 9:36 PM on 2/13/2025.      Workstation performed: [id number]         MRI brain wo contrast    (Results Pending)       Procedures    ED Medication and Procedure Management   Prior to Admission Medications   Prescriptions Last Dose Informant Patient Reported? Taking?   Anoro Ellipta 62.5-25 MCG/ACT inhaler  Self Yes No   Sig: INHALE 1 PUFF BY MOUTH AND INTO THE LUNGS ONCE DAILY AT THE SAME TIME EACH DAY   acetaminophen (TYLENOL) 500 mg tablet  Self Yes No   Sig: Take 500 mg by mouth daily as needed for headaches, mild pain or moderate pain. Indications: Pain   albuterol (PROVENTIL HFA,VENTOLIN HFA) 90 mcg/act inhaler  Self Yes No   Sig: Inhale 2 puffs if needed   amLODIPine-benazepril (LOTREL 5-10) 5-10 MG per capsule  Self Yes No   Sig: Take 1 capsule by mouth daily   apixaban (ELIQUIS) 5 mg  Self No No   Sig: Take 1 tablet (5 mg total) by mouth 2 (two) times a day   atorvastatin (LIPITOR) 40 mg tablet  Self No No   Sig: Take 1 tablet (40 mg total) by mouth every evening   baclofen 10 mg tablet  Self Yes No   Sig: Take 10 mg by mouth in the morning PRN   buPROPion (WELLBUTRIN XL) 150 mg 24 hr tablet  Self Yes No   Sig: Take 150 mg by mouth daily   busPIRone (BUSPAR) 15 mg tablet  Self Yes No   Sig: 15 mg 2 (two) times a day   carvedilol (COREG) 12.5 mg tablet  Self No No   Sig: Take 1 tablet (12.5 mg total) by mouth 2 (two) times a day with meals   clopidogrel (PLAVIX) 75 mg tablet  Self No No   Sig: Take 1 tablet (75 mg total) by mouth daily Do not start before June 26, 2024.    donepezil (ARICEPT) 5 mg tablet  Self Yes No   Sig: Take 5 mg by mouth every evening   omeprazole (PriLOSEC) 20 mg delayed release capsule  Self Yes No   Sig: Take 20 mg by mouth daily   pravastatin (PRAVACHOL) 40 mg tablet  Self Yes No   Sig: Take 40 mg by mouth daily   sertraline (ZOLOFT) 100 mg tablet  Self Yes No   Sig: Take 100 mg by mouth daily        Facility-Administered Medications: None     Current Discharge Medication List        CONTINUE these medications which have NOT CHANGED    Details   acetaminophen (TYLENOL) 500 mg tablet Take 500 mg by mouth daily as needed for headaches, mild pain or moderate pain. Indications: Pain      albuterol (PROVENTIL HFA,VENTOLIN HFA) 90 mcg/act inhaler Inhale 2 puffs if needed      amLODIPine-benazepril (LOTREL 5-10) 5-10 MG per capsule Take 1 capsule by mouth daily      Anoro Ellipta 62.5-25 MCG/ACT inhaler INHALE 1 PUFF BY MOUTH AND INTO THE LUNGS ONCE DAILY AT THE SAME TIME EACH DAY      apixaban (ELIQUIS) 5 mg Take 1 tablet (5 mg total) by mouth 2 (two) times a day  Qty: 60 tablet, Refills: 0    Associated Diagnoses: Cerebrovascular accident (CVA) due to occlusion of right posterior cerebral artery (HCC)      atorvastatin (LIPITOR) 40 mg tablet Take 1 tablet (40 mg total) by mouth every evening  Qty: 30 tablet, Refills: 0    Associated Diagnoses: Stroke-like episode      baclofen 10 mg tablet Take 10 mg by mouth in the morning PRN      buPROPion (WELLBUTRIN XL) 150 mg 24 hr tablet Take 150 mg by mouth daily      busPIRone (BUSPAR) 15 mg tablet 15 mg 2 (two) times a day      carvedilol (COREG) 12.5 mg tablet Take 1 tablet (12.5 mg total) by mouth 2 (two) times a day with meals  Qty: 60 tablet, Refills: 0    Associated Diagnoses: Atrial fibrillation with RVR (HCC)      clopidogrel (PLAVIX) 75 mg tablet Take 1 tablet (75 mg total) by mouth daily Do not start before June 26, 2024.  Qty: 30 tablet, Refills: 0    Associated Diagnoses: Stroke-like episode      donepezil  (ARICEPT) 5 mg tablet Take 5 mg by mouth every evening      omeprazole (PriLOSEC) 20 mg delayed release capsule Take 20 mg by mouth daily      pravastatin (PRAVACHOL) 40 mg tablet Take 40 mg by mouth daily      sertraline (ZOLOFT) 100 mg tablet Take 100 mg by mouth daily             No discharge procedures on file.  ED SEPSIS DOCUMENTATION   Time reflects when diagnosis was documented in both MDM as applicable and the Disposition within this note       Time User Action Codes Description Comment    2/13/2025  8:42 PM Santos Leiva Add [F17.200] Smoking     2/13/2025  8:43 PM Santos Leiva Add [J44.9] Chronic obstructive pulmonary disease, unspecified COPD type (HCC)     2/13/2025 11:46 PM Tricia Osman [G45.9] TIA (transient ischemic attack)                  Tricia Carlson MD  02/14/25 1904

## 2025-02-15 NOTE — PLAN OF CARE
Problem: Potential for Falls  Goal: Patient will remain free of falls  Description: INTERVENTIONS:  - Educate patient/family on patient safety including physical limitations  - Instruct patient to call for assistance with activity   - Consult OT/PT to assist with strengthening/mobility   - Keep Call bell within reach  - Keep bed low and locked with side rails adjusted as appropriate  - Keep care items and personal belongings within reach  - Initiate and maintain comfort rounds  - Make Fall Risk Sign visible to staff  - Offer Toileting every 2 Hours, in advance of need  - Initiate/Maintain alarm  - Obtain necessary fall risk management equipment  - Apply yellow socks and bracelet for high fall risk patients  - Consider moving patient to room near nurses station  Outcome: Progressing     Problem: PAIN - ADULT  Goal: Verbalizes/displays adequate comfort level or baseline comfort level  Description: Interventions:  - Encourage patient to monitor pain and request assistance  - Assess pain using appropriate pain scale  - Administer analgesics based on type and severity of pain and evaluate response  - Implement non-pharmacological measures as appropriate and evaluate response  - Consider cultural and social influences on pain and pain management  - Notify physician/advanced practitioner if interventions unsuccessful or patient reports new pain  Outcome: Progressing     Problem: INFECTION - ADULT  Goal: Absence or prevention of progression during hospitalization  Description: INTERVENTIONS:  - Assess and monitor for signs and symptoms of infection  - Monitor lab/diagnostic results  - Monitor all insertion sites, i.e. indwelling lines, tubes, and drains  - Monitor endotracheal if appropriate and nasal secretions for changes in amount and color  - [place] appropriate cooling/warming therapies per order  - Administer medications as ordered  - Instruct and encourage patient and family to use good hand hygiene technique  -  Identify and instruct in appropriate isolation precautions for identified infection/condition  Outcome: Progressing  Goal: Absence of fever/infection during neutropenic period  Description: INTERVENTIONS:  - Monitor WBC    Outcome: Progressing     Problem: SAFETY ADULT  Goal: Patient will remain free of falls  Description: INTERVENTIONS:  - Educate patient/family on patient safety including physical limitations  - Instruct patient to call for assistance with activity   - Consult OT/PT to assist with strengthening/mobility   - Keep Call bell within reach  - Keep bed low and locked with side rails adjusted as appropriate  - Keep care items and personal belongings within reach  - Initiate and maintain comfort rounds  - Make Fall Risk Sign visible to staff  - Offer Toileting every 2 Hours, in advance of need  - Initiate/Maintain alarm  - Obtain necessary fall risk management equipment  - Apply yellow socks and bracelet for high fall risk patients  - Consider moving patient to room near nurses station  Outcome: Progressing  Goal: Maintain or return to baseline ADL function  Description: INTERVENTIONS:  -  Assess patient's ability to carry out ADLs; assess patient's baseline for ADL function and identify physical deficits which impact ability to perform ADLs (bathing, care of mouth/teeth, toileting, grooming, dressing, etc.)  - Assess/evaluate cause of self-care deficits   - Assess range of motion  - Assess patient's mobility; develop plan if impaired  - Assess patient's need for assistive devices and provide as appropriate  - Encourage maximum independence but intervene and supervise when necessary  - Involve family in performance of ADLs  - Assess for home care needs following discharge   - Consider OT consult to assist with ADL evaluation and planning for discharge  - Provide patient education as appropriate  Outcome: Progressing  Goal: Maintains/Returns to pre admission functional level  Description: INTERVENTIONS:  -  Perform AM-PAC 6 Click Basic Mobility/ Daily Activity assessment daily.  - Set and communicate daily mobility goal to care team and patient/family/caregiver.   - Collaborate with rehabilitation services on mobility goals if consulted  - Perform Range of Motion 3 times a day.  - Reposition patient every 2 hours.  - Dangle patient 3 times a day  - Stand patient 3 times a day  - Ambulate patient 3 times a day  - Out of bed to chair 3 times a day   - Out of bed for meals 3 times a day  - Out of bed for toileting  - Record patient progress and toleration of activity level   Outcome: Progressing     Problem: DISCHARGE PLANNING  Goal: Discharge to home or other facility with appropriate resources  Description: INTERVENTIONS:  - Identify barriers to discharge w/patient and caregiver  - Arrange for needed discharge resources and transportation as appropriate  - Identify discharge learning needs (meds, wound care, etc.)  - Arrange for interpretive services to assist at discharge as needed  - Refer to Case Management Department for coordinating discharge planning if the patient needs post-hospital services based on physician/advanced practitioner order or complex needs related to functional status, cognitive ability, or social support system  Outcome: Progressing     Problem: Knowledge Deficit  Goal: Patient/family/caregiver demonstrates understanding of disease process, treatment plan, medications, and discharge instructions  Description: Complete learning assessment and assess knowledge base.  Interventions:  - Provide teaching at level of understanding  - Provide teaching via preferred learning methods  Outcome: Progressing     Problem: CARDIOVASCULAR - ADULT  Goal: Maintains optimal cardiac output and hemodynamic stability  Description: INTERVENTIONS:  - Monitor I/O, vital signs and rhythm  - Monitor for S/S and trends of decreased cardiac output  - Administer and titrate ordered vasoactive medications to optimize  hemodynamic stability  - Assess quality of pulses, skin color and temperature  - Assess for signs of decreased coronary artery perfusion  - Instruct patient to report change in severity of symptoms  Outcome: Progressing  Goal: Absence of cardiac dysrhythmias or at baseline rhythm  Description: INTERVENTIONS:  - Continuous cardiac monitoring, vital signs, obtain 12 lead EKG if ordered  - Administer antiarrhythmic and heart rate control medications as ordered  - Monitor electrolytes and administer replacement therapy as ordered  Outcome: Progressing     Problem: METABOLIC, FLUID AND ELECTROLYTES - ADULT  Goal: Electrolytes maintained within normal limits  Description: INTERVENTIONS:  - Monitor labs and assess patient for signs and symptoms of electrolyte imbalances  - Administer electrolyte replacement as ordered  - Monitor response to electrolyte replacements, including repeat lab results as appropriate  - Instruct patient on fluid and nutrition as appropriate  Outcome: Progressing  Goal: Fluid balance maintained  Description: INTERVENTIONS:  - Monitor labs   - Monitor I/O and WT  - Instruct patient on fluid and nutrition as appropriate  - Assess for signs & symptoms of volume excess or deficit  Outcome: Progressing

## 2025-02-15 NOTE — ASSESSMENT & PLAN NOTE
Patient presented with blurry vision which lasted that hour before resolution.  CT head unremarkable, CTA head and neck significant for moderate stenosis of left common carotid, left vertebral origins, occluded right common carotid, severe left common carotid stenosis.  MRI brain remarkable for acute right occipital CVA, subacute right cerebellar CVA and chronic infarcts with advanced microvascular disease.  Echocardiogram with normal left atrial size, EF 56%, mild mitral valve regurgitation, mild tricuspid and trace pulmonic valve regurgitation.  Patient is admitted under stroke pathway, neurology recommendations appreciated.  Continue Eliquis 5 mg twice daily, continue Plavix 75 daily.  Continue atorvastatin 40 mg.  Patient will follow-up with vascular surgery and [facility].  Blood pressure goal now normotensive per recommendations.  Continue to monitor on telemetry.  Repeat CT scan to be ordered if any changes in mental status or any neuroexam noted.

## 2025-02-15 NOTE — PROGRESS NOTES
Progress Note - Hospitalist   Name: Abhishek Tom 69 y.o. male I MRN: [id number]  Unit/Bed#: [place] [address] -02 I Date of Admission: 2/13/2025   Date of Service: 2/15/2025 I Hospital Day: 1     Assessment & Plan  Blurred vision  Left eye blurry vision which lasted for about an hour before resolution  Endorsed having missed his Eliquis for a week secondary to recent appendectomy which he had in [place] last week  Said he was told to stop his Eliquis in order to have his surgery and he did.  Resumed his Eliquis about 4 days ago.  He also endorsed resolution of the left eye blurry vision while he was in the ED  Resumed Eliquis.  Neuroconsult.  Neuro checks.  Echo ordered as well as fasting lipid, mag, Phos, MRI brain.  Telemonitor.    HTN (hypertension)  Continue Coreg  HLD (hyperlipidemia)  Statin and Plavix  GERD (gastroesophageal reflux disease)  On omeprazole  Will add simethicone as patient complaining of flatulence.  Last BM was this morning.  Dementia (HCC)  On Aricept  Depression  Continue Zoloft  A-fib (HCC)  Rate controlled on Coreg.  Anticoagulated on Eliquis  Telemonitor  CVA (cerebral vascular accident) (HCC)  Patient presented with blurry vision which lasted that hour before resolution.  CT head unremarkable, CTA head and neck significant for moderate stenosis of left common carotid, left vertebral origins, occluded right common carotid, severe left common carotid stenosis.  MRI brain remarkable for acute right occipital CVA, subacute right cerebellar CVA and chronic infarcts with advanced microvascular disease.  Echocardiogram with normal left atrial size, EF 56%, mild mitral valve regurgitation, mild tricuspid and trace pulmonic valve regurgitation.  Patient is admitted under stroke pathway, neurology recommendations appreciated.  Continue Eliquis 5 mg twice daily, continue Plavix 75 daily.  Continue atorvastatin 40 mg.  Patient will follow-up with vascular surgery and [facility].  Blood  pressure goal now normotensive per recommendations.  Continue to monitor on telemetry.  Repeat CT scan to be ordered if any changes in mental status or any neuroexam noted.  Hypomagnesemia  Magnesium 1.1, repleted with magnesium sulfate 4 mg.  Follow mag in AM.    VTE Pharmacologic Prophylaxis:   Moderate Risk (Score 3-4) - Pharmacological DVT Prophylaxis Ordered: apixaban (Eliquis).    Mobility:   Basic Mobility Inpatient Raw Score: 24  JH-HLM Goal: 8: Walk 250 feet or more  JH-HLM Achieved: 6: Walk 10 steps or more  JH-HLM Goal NOT achieved. Continue with multidisciplinary rounding and encourage appropriate mobility to improve upon JH-HLM goals.    Patient Centered Rounds: I performed bedside rounds with nursing staff today.   Discussions with Specialists or Other Care Team Provider: Neurology    Education and Discussions with Family / Patient: Patient declined call to .  As he has been in touch with his sister.    Current Length of Stay: 1 day(s)  Current Patient Status: Inpatient   Certification Statement: The patient will continue to require additional inpatient hospital stay due to pending PT/OT eval, clearance from neurology team and discharge arrangements.  Discharge Plan: Anticipate discharge in 24-48 hrs to discharge location to be determined pending rehab evaluations.    Code Status: Level 1 - Full Code    Subjective   Patient seen at bedside, reports feeling flatulence, had bowel movement this morning.  He denies any headache, dizziness or changes in vision.  I saw him working with physical therapy in hallway this morning and later at the bedside.    Objective :  Temp:  [97.3 °F (36.3 °C)-98 °F (36.7 °C)] 97.7 °F (36.5 °C)  HR:  [68-75] 73  BP: (107-133)/(60-82) 111/65  Resp:  [15-17] 15  SpO2:  [93 %-98 %] 94 %  O2 Device: None (Room air)    Body mass index is 25.1 kg/m².     Input and Output Summary (last 24 hours):     Intake/Output Summary (Last 24 hours) at 2/15/2025 1143  Last data  filed at 2/15/2025 1028  Gross per 24 hour   Intake 480 ml   Output --   Net 480 ml       Physical Exam  Constitutional:       Appearance: Normal appearance.   HENT:      Head: Normocephalic and atraumatic.   Eyes:      Conjunctiva/sclera: Conjunctivae normal.      Pupils: Pupils are equal, round, and reactive to light.   Cardiovascular:      Rate and Rhythm: Normal rate and regular rhythm.      Pulses: Normal pulses.      Heart sounds: Normal heart sounds.   Pulmonary:      Effort: Pulmonary effort is normal.      Breath sounds: Normal breath sounds.   Abdominal:      General: Bowel sounds are normal.      Palpations: Abdomen is soft.   Skin:     General: Skin is warm and dry.   Neurological:      General: No focal deficit present.      Mental Status: He is alert and oriented to person, place, and time.           Lines/Drains:        Telemetry:  Telemetry Orders (From admission, onward)               24 Hour Telemetry Monitoring  Continuous x 24 Hours (Telem)        Expiring   Question:  Reason for 24 Hour Telemetry  Answer:  TIA/Suspected CVA/ Confirmed CVA                     Telemetry Reviewed: Normal Sinus Rhythm  Indication for Continued Telemetry Use: Acute CVA               Lab Results: I have reviewed the following results:   Results from last 7 days   Lab Units 02/15/25  0520   WBC Thousand/uL 9.87   HEMOGLOBIN g/dL 12.7   HEMATOCRIT % 41.9   PLATELETS Thousands/uL 229     Results from last 7 days   Lab Units 02/15/25  0520   SODIUM mmol/L 139   POTASSIUM mmol/L 3.7   CHLORIDE mmol/L 106   CO2 mmol/L 24   BUN mg/dL 23   CREATININE mg/dL 1.31*   ANION GAP mmol/L 9   CALCIUM mg/dL 8.5   GLUCOSE RANDOM mg/dL 102     Results from last 7 days   Lab Units 02/13/25  2044   INR  1.16     Results from last 7 days   Lab Units 02/13/25  2044   POC GLUCOSE mg/dl 102     Results from last 7 days   Lab Units 02/14/25  0917   HEMOGLOBIN A1C % 5.8*           Recent Cultures (last 7 days):         Imaging Results Review: I  reviewed radiology reports from this admission including: CT head and MRI brain.  Other Study Results Review: EKG was reviewed.     Last 24 Hours Medication List:     Current Facility-Administered Medications:     acetaminophen (TYLENOL) tablet 650 mg, Q4H PRN    albuterol (PROVENTIL HFA,VENTOLIN HFA) inhaler 2 puff, Q4H PRN    amLODIPine (NORVASC) tablet 5 mg, Daily **AND** lisinopril (ZESTRIL) tablet 10 mg, Daily    apixaban (ELIQUIS) tablet 5 mg, BID    atorvastatin (LIPITOR) tablet 40 mg, QPM    baclofen tablet 10 mg, Daily    buPROPion (WELLBUTRIN XL) 24 hr tablet 150 mg, Daily    busPIRone (BUSPAR) tablet 15 mg, BID    carvedilol (COREG) tablet 12.5 mg, BID With Meals    clopidogrel (PLAVIX) tablet 75 mg, Daily    donepezil (ARICEPT) tablet 5 mg, QPM    magnesium sulfate 4 g/100 mL IVPB (premix) 4 g, Once, Last Rate: 4 g (02/15/25 0953)    nicotine (NICODERM CQ) 14 mg/24hr TD 24 hr patch 1 patch, Daily    ondansetron (ZOFRAN) injection 4 mg, Q6H PRN    pantoprazole (PROTONIX) EC tablet 20 mg, Early Morning    polyethylene glycol (MIRALAX) packet 17 g, Daily PRN    sertraline (ZOLOFT) tablet 100 mg, Daily    simethicone (MYLICON) chewable tablet 80 mg, Q6H PRN    umeclidinium-vilanterol 62.5-25 mcg/actuation inhaler 1 puff, Daily    Administrative Statements   Today, Patient Was Seen By: Zeenat Coulter MD  I have spent a total time of 43 minutes in caring for this patient on the day of the visit/encounter including Diagnostic results, Risks and benefits of tx options, Patient and family education, Risk factor reductions, Counseling / Coordination of care, Documenting in the medical record, Reviewing / ordering tests, medicine, procedures  , and Obtaining or reviewing history  .    **Please Note: This note may have been constructed using a voice recognition system.**

## 2025-02-15 NOTE — ASSESSMENT & PLAN NOTE
69 y.o. male with R carotid endarterectomy, Afib on Eliquis, significant multifocal cervical and intracranial atherosclerotic disease, prior CVA    Presented on 2/13 as stroke alert with L eye blurred vision. NIHSS 0 per ED. See imaging below. Not a TNK/endovascular candidate    MRI brain overnight with acute/subacute CVA (R occipital and R cerebellum); still clinically with monocular L upper quadrantanopsia (L eye only).     Neuroimaging/Workup:  -CT head: no acute intracranial pathology  -CTA head/neck: significant abnormalities (mostly stable findings compared to CTA from June 2024)   -Moderate stenosis of L common carotid, L vertebral origins   -Occluded R common carotid, reconstitutes   -Severe L common carotid stenosis  -Moderate to severe R V4 segment stenosis, severe L V4 segment  -R ICA occlusion  -Moderate L ICA stenosis  -Moderate basilar stenosis  -Fetal R PCA  -MRI brain: acute R occipital CVA, subacute R cerebellar CVA, chronic infarcts and advanced microvascular disease noted  -2D echo: EF 55%, normal systolic function, moderate L atrial and mild R atrial dilation  -Lipid panel with   -Hemoglobin A1C 5.8    Plan  -Stroke pathway ongoing:   -Continue home Eliquis 5 mg BID  -Will switch plavix to ticagrelor 90 mg BID  -counseled on need for medication compliance moving forward  -Atorvastatin 40 mg  -Follows with vascular surgery for management of significant cervical/intracranial vascular disease; should continue to follow up (to see if any role for future intervention)  -Should also follow up with ophthalmology (query BRAO causing vision change in L eye)  -May normalize BP as long as pt remains asymptomatic   -Euglycemic, normothermic goal  -Continue telemetry monitoring  -Therapy evaluations (PT/OT/ST)  -Stroke education  -Smoking cessation encouraged  -Frequent neuro checks. Continue to monitor and notify neurology with any changes.  -STAT CT head for any acute change in neuro exam

## 2025-02-15 NOTE — PLAN OF CARE
Problem: PHYSICAL THERAPY ADULT  Goal: Performs mobility at highest level of function for planned discharge setting.  See evaluation for individualized goals.  Description: Treatment/Interventions: Functional transfer training, LE strengthening/ROM, Elevations, Therapeutic exercise, Endurance training, Patient/family training, Equipment eval/education, Bed mobility, Gait training, Continued evaluation, Spoke to nursing, OT  Equipment Recommended: Cane (pt owns SPC at home for use)       See flowsheet documentation for full assessment, interventions and recommendations.  Note: Prognosis: Fair  Problem List: Decreased strength, Decreased endurance, Impaired balance, Decreased mobility, Impaired judgement, Decreased safety awareness, Impaired hearing, Decreased skin integrity  Assessment: Pt is 69 y.o. male seen for PT evaluation s/p admit to [facility] on 2/13/2025 w/ CVA (cerebral vascular accident) (HCC). PT consulted to assess pt's functional mobility and d/c needs. Order placed for PT eval and tx, w/  PT  order. Comorbidities affecting pt's physical performance at time of assessment include: TIA, (+)MRI head results for acute and subacute infaracttion, COPD, reports of blurred vision and (+)smoking. PTA, pt was independent w/ all functional mobility w/ SPC occasionally, ambulates community distances and elevations, ambulates unrestricted distances and all terrain, ambulates household distances, has 4 EVELYNE, retired, and alone during the day while nephew works . Personal factors affecting pt at time of IE include: ambulating w/ assistive device, stairs to enter home, inability to navigate community distances, inability to navigate level surfaces w/o external assistance, unable to perform dynamic tasks in community, limited home support, hearing impairments, impulsivity, tobacco use, and inability to perform IADLs. Please find objective findings from PT assessment regarding body systems outlined above with  impairments and limitations including weakness, impaired balance, decreased endurance, gait deviations, decreased activity tolerance, decreased functional mobility tolerance, decreased safety awareness, impaired judgement, fall risk, SOB upon exertion, and decreased skin integrity. The following objective measures performed on IE also reveal limitations: AM-PAC 6-Clicks: 18/24. Pt's clinical presentation is currently unstable/unpredictable seen in pt's presentation of multiple lines, impulsivity at times, dec activity tolerance, ataxic and unsteady gait and movement pattern,dec ability to avoid L sided obstacles during turns and in hospital hallway,dec safety awareness. Pt to benefit from continued PT tx to address deficits as defined above and maximize level of functional independent mobility and consistency. From PT/mobility standpoint, recommendation at time of d/c would be home with outpatient rehabilitation pending progress in order to facilitate return to PLOF. Level 3 for OPPT for high level balance assessment and visual acuity  Barriers to Discharge: Decreased caregiver support, Inaccessible home environment ((+)EVELYNE and alone during the day while family works)     Rehab Resource Intensity Level, PT: III (Minimum Resource Intensity) (high level balance and vision care in OPPT)    See flowsheet documentation for full assessment.

## 2025-02-15 NOTE — PLAN OF CARE
Problem: Potential for Falls  Goal: Patient will remain free of falls  Description: INTERVENTIONS:  - Educate patient/family on patient safety including physical limitations  - Instruct patient to call for assistance with activity   - Consult OT/PT to assist with strengthening/mobility   - Keep Call bell within reach  - Keep bed low and locked with side rails adjusted as appropriate  - Keep care items and personal belongings within reach  - Initiate and maintain comfort rounds  - Make Fall Risk Sign visible to staff  - Offer Toileting every 2 Hours, in advance of need  - Initiate/Maintain bed alarm  - Obtain necessary fall risk management equipment.  - Apply yellow socks and bracelet for high fall risk patients  - Consider moving patient to room near nurses station  Outcome: Progressing     Problem: PAIN - ADULT  Goal: Verbalizes/displays adequate comfort level or baseline comfort level  Description: Interventions:  - Encourage patient to monitor pain and request assistance  - Assess pain using appropriate pain scale  - Administer analgesics based on type and severity of pain and evaluate response  - Implement non-pharmacological measures as appropriate and evaluate response  - Consider cultural and social influences on pain and pain management  - Notify physician/advanced practitioner if interventions unsuccessful or patient reports new pain  Outcome: Progressing     Problem: INFECTION - ADULT  Goal: Absence or prevention of progression during hospitalization  Description: INTERVENTIONS:  - Assess and monitor for signs and symptoms of infection  - Monitor lab/diagnostic results  - Monitor all insertion sites, i.e. indwelling lines, tubes, and drains  - Monitor endotracheal if appropriate and nasal secretions for changes in amount and color  - Clio appropriate cooling/warming therapies per order  - Administer medications as ordered  - Instruct and encourage patient and family to use good hand hygiene  technique  - Identify and instruct in appropriate isolation precautions for identified infection/condition  Outcome: Progressing  Goal: Absence of fever/infection during neutropenic period  Description: INTERVENTIONS:  - Monitor WBC    Outcome: Progressing     Problem: SAFETY ADULT  Goal: Patient will remain free of falls  Description: INTERVENTIONS:  - Educate patient/family on patient safety including physical limitations  - Instruct patient to call for assistance with activity   - Consult OT/PT to assist with strengthening/mobility   - Keep Call bell within reach  - Keep bed low and locked with side rails adjusted as appropriate  - Keep care items and personal belongings within reach  - Initiate and maintain comfort rounds  - Make Fall Risk Sign visible to staff  - Offer Toileting every 2 Hours, in advance of need  - Initiate/Maintain bed alarm  - Obtain necessary fall risk management equipment.  - Apply yellow socks and bracelet for high fall risk patients  - Consider moving patient to room near nurses station  Outcome: Progressing  Goal: Maintain or return to baseline ADL function  Description: INTERVENTIONS:  -  Assess patient's ability to carry out ADLs; assess patient's baseline for ADL function and identify physical deficits which impact ability to perform ADLs (bathing, care of mouth/teeth, toileting, grooming, dressing, etc.)  - Assess/evaluate cause of self-care deficits   - Assess range of motion  - Assess patient's mobility; develop plan if impaired  - Assess patient's need for assistive devices and provide as appropriate  - Encourage maximum independence but intervene and supervise when necessary  - Involve family in performance of ADLs  - Assess for home care needs following discharge   - Consider OT consult to assist with ADL evaluation and planning for discharge  - Provide patient education as appropriate  Outcome: Progressing  Goal: Maintains/Returns to pre admission functional level  Description:  INTERVENTIONS:  - Perform AM-PAC 6 Click Basic Mobility/ Daily Activity assessment daily.  - Set and communicate daily mobility goal to care team and patient/family/caregiver.   - Collaborate with rehabilitation services on mobility goals if consulted  - Perform Range of Motion 2 times a day.  - Reposition patient every 2 hours.  - Dangle patient 2 times a day  - Stand patient 2 times a day  - Ambulate patient 2 times a day  - Out of bed to chair 2 times a day   - Out of bed for meals 2 times a day  - Out of bed for toileting  - Record patient progress and toleration of activity level   Outcome: Progressing     Problem: DISCHARGE PLANNING  Goal: Discharge to home or other facility with appropriate resources  Description: INTERVENTIONS:  - Identify barriers to discharge w/patient and caregiver  - Arrange for needed discharge resources and transportation as appropriate  - Identify discharge learning needs (meds, wound care, etc.)  - Arrange for interpretive services to assist at discharge as needed  - Refer to Case Management Department for coordinating discharge planning if the patient needs post-hospital services based on physician/advanced practitioner order or complex needs related to functional status, cognitive ability, or social support system  Outcome: Progressing     Problem: Knowledge Deficit  Goal: Patient/family/caregiver demonstrates understanding of disease process, treatment plan, medications, and discharge instructions  Description: Complete learning assessment and assess knowledge base.  Interventions:  - Provide teaching at level of understanding  - Provide teaching via preferred learning methods  Outcome: Progressing     Problem: CARDIOVASCULAR - ADULT  Goal: Maintains optimal cardiac output and hemodynamic stability  Description: INTERVENTIONS:  - Monitor I/O, vital signs and rhythm  - Monitor for S/S and trends of decreased cardiac output  - Administer and titrate ordered vasoactive medications to  optimize hemodynamic stability  - Assess quality of pulses, skin color and temperature  - Assess for signs of decreased coronary artery perfusion  - Instruct patient to report change in severity of symptoms  Outcome: Progressing  Goal: Absence of cardiac dysrhythmias or at baseline rhythm  Description: INTERVENTIONS:  - Continuous cardiac monitoring, vital signs, obtain 12 lead EKG if ordered  - Administer antiarrhythmic and heart rate control medications as ordered  - Monitor electrolytes and administer replacement therapy as ordered  Outcome: Progressing     Problem: METABOLIC, FLUID AND ELECTROLYTES - ADULT  Goal: Electrolytes maintained within normal limits  Description: INTERVENTIONS:  - Monitor labs and assess patient for signs and symptoms of electrolyte imbalances  - Administer electrolyte replacement as ordered  - Monitor response to electrolyte replacements, including repeat lab results as appropriate  - Instruct patient on fluid and nutrition as appropriate  Outcome: Progressing  Goal: Fluid balance maintained  Description: INTERVENTIONS:  - Monitor labs   - Monitor I/O and WT  - Instruct patient on fluid and nutrition as appropriate  - Assess for signs & symptoms of volume excess or deficit  Outcome: Progressing

## 2025-02-15 NOTE — PHYSICAL THERAPY NOTE
Physical Therapy Evaluation:    2 forms of pt ID verified:name,birthdate and pt ID zaid    Patient's Name: Abhishek Tom    Admitting Diagnosis  TIA (transient ischemic attack) [G45.9]  Blurred vision [H53.8]  Smoking [F17.200]  Chronic obstructive pulmonary disease, unspecified COPD type ([facility]) [J44.9]    Problem List  Patient Active Problem List   Diagnosis    Left arm weakness    Alcohol abuse    HLD (hyperlipidemia)    COPD (chronic obstructive pulmonary disease) (HCC)    Iron deficiency anemia    Cerebrovascular accident (CVA) due to embolism of right middle cerebral artery (HCC)    Symptomatic carotid artery stenosis, right    Lumbar back pain    GERD (gastroesophageal reflux disease)    Stenosis of right internal carotid artery    CVA (cerebral vascular accident) (HCC)    HTN (hypertension)    Acute-on-chronic kidney injury  (HCC)    Pre-diabetes    Stage 3a chronic kidney disease ([facility])    Ambulatory dysfunction    Syncope and collapse    S/P carotid endarterectomy    Acute Cholecystitis    Hypokalemia    Hypomagnesemia    PAF (paroxysmal atrial fibrillation) ([facility])    Cholelithiasis    Seizure ([facility])    A-fib ([facility])    Hallucinations    Memory changes    Delirium tremens (HCC)    Wernicke encephalopathy    Alcohol use disorder, severe, dependence (HCC)    Chronic calculous cholecystitis    Bilateral carotid artery stenosis    Claustrophobia    Smoking    Long term (current) use of insulin (HCC)    Decreased pulses in feet    Pulmonary nodule    Expressive aphasia    Chronic occlusion of right common carotid artery    Blurred vision    Dementia (HCC)    Depression       Past Medical History  Past Medical History:   Diagnosis Date    A-fib (HCC)     PRITI (acute kidney injury) ([facility]) 09/05/2021    Anxiety     Back pain     Claustrophobia     COPD (chronic obstructive pulmonary disease) ([facility])     Dysphagia 09/05/2021    Fatty liver     GERD (gastroesophageal reflux disease)     History of transfusion      Hyperlipidemia     Hypertension     Hypokalemia 02/22/2022    Lumbar back pain     Panic attacks     Stenosis of right carotid artery     Stroke (HCC)     Wears glasses     Wears partial dentures     upper denture       Past Surgical History  Past Surgical History:   Procedure Laterality Date    APPENDECTOMY      COLONOSCOPY      ERCP      IR CEREBRAL ANGIOGRAPHY  04/06/2021    OTHER SURGICAL HISTORY      fertility    MI LAPAROSCOPY SURG CHOLECYSTECTOMY N/A 08/26/2022    Procedure: CHOLECYSTECTOMY LAPAROSCOPIC;  Surgeon: Michael Otoole DO;  Location: [facility];  Service: General    MI SLCTV CATHJ 3RD+ ORD SLCTV ABDL PEL/LXTR BRNCH Right 04/06/2021    Procedure: ARTERIOGRAM, carotid Angio;  Surgeon: Maximiliano Morin MD;  Location: [facility];  Service: Vascular    MI TEAEC W/PATCH GRF CAROTID VERTB SUBCLAV NECK INC Right 04/13/2021    Procedure: RIGHT ENDARTERECTOMY ARTERY CAROTID WITH BOVINE PATCH;  Surgeon: Maximiliano Morin MD;  Location: [facility];  Service: Vascular        02/15/25 0930   PT Last Visit   PT Visit Date 02/15/25   Note Type   Note type Evaluation  (plus additional PT tx session following PT IE)   Pain Assessment   Pain Assessment Tool 0-10   Pain Score No Pain   Restrictions/Precautions   Other Precautions Impulsive;Chair Alarm;Bed Alarm;Multiple lines;Fall Risk;Hard of hearing  (observable L eye droop and partially closed L eyelid)   Home Living   Type of Home Mobile home   Home Layout One level;Stairs to enter with rails;Performs ADLs on one level;Able to live on main level with bedroom/bathroom  (4 EVELYNE iwth available HR)   Home Equipment Cane  (occasional use of SPC for household and community mobility per pt PTA)   Additional Comments pt lives with nephew in mobile home and (+)EVELYNE, reports being alone during the day while nephew works,(-)drive, reports no recent falls,use of DME occasionally during mobility and activity   Prior Function   Level of [place] Independent with  "ADLs;Independent with functional mobility;Needs assistance with IADLS   Lives With Family  (nephew as of recently)   Receives Help From Family  (as needed from nephew, pt reports being alone during the day while nephew works)   IADLs Independent with meal prep;Independent with medication management;Family/Friend/Other provides transportation   Falls in the last 6 months 0   Vocational Retired   General   Additional Pertinent History TIA, blurred vision, COPD disease, (+)smoking   Family/Caregiver Present No   Cognition   Overall Cognitive Status WFL   Arousal/Participation Cooperative   Orientation Level Oriented X4   Comments MRI (+)  Small acute infarcts in right posterior occipital lobe.     Tiny subacute infarct in right cerebellum.     Multifocal chronic infarcts with moderate-to-severe chronic microangiopathy, as detailed above.     Absent flow void in right ICA distal cervical and intracranial segments due to combination of slow flow and occlusion which was best seen on recent CTA stroke alert head and neck dated 2/13/2024.   Subjective   Subjective pt supine in bed sleeping upon arrival;pt willing and agreeable to work with PT and to participate in therapy intervention; \"I can give it a try\".   RLE Assessment   RLE Assessment   (at least 4/5 grossly throughout)   LLE Assessment   LLE Assessment   (at least 4/5 grossly throughout)   Vision-Basic Assessment   Current Vision Wears glasses only for reading  (partial closure of L eyelid, drooping L eyelid)   Coordination   Movements are Fluid and Coordinated 0   Coordination and Movement Description forward flexed posture, ataxic and unsteady gait at times especially during initiate gait steps,narrow MACKENZIE   Sensation WFL   Light Touch   RLE Light Touch Grossly intact   LLE Light Touch Grossly intact   Bed Mobility   Supine to Sit 5  Supervision   Additional items Assist x 1;Bedrails;Verbal cues;Impulsive   Transfers   Sit to Stand 5  Supervision   Additional items " Assist x 1;Bedrails;Impulsive;Verbal cues   Stand to Sit 5  Supervision   Additional items Assist x 1;Armrests;Verbal cues   Ambulation/Elevation   Gait pattern Narrow MACKENZIE;Forward Flexion;Inconsistent josé miguel;Foward flexed;Short stride;Ataxia   Gait Assistance 5  Supervision   Additional items Assist x 1;Verbal cues   Assistive Device None   Distance 80 feet without use of DME; recommend use and/or trial of SPC during ambulation during future PT tx sessions. Pt owns SPC occasionally at home PTA   Balance   Static Sitting Fair  (chair alarm intact prior to leaving pts room)   Dynamic Sitting Fair   Static Standing Fair   Dynamic Standing Fair   Ambulatory Fair   Endurance Deficit   Endurance Deficit Yes   Endurance Deficit Description fatigues easily, dec activity tolerance, observable SOB during and following mobility and activity   Activity Tolerance   Activity Tolerance Patient limited by fatigue  (fair)   Medical Staff Made Aware OTR Elliot   Nurse Made Aware yes   Assessment   Prognosis Fair   Problem List Decreased strength;Decreased endurance;Impaired balance;Decreased mobility;Impaired judgement;Decreased safety awareness;Impaired hearing;Decreased skin integrity   Assessment Pt is 69 y.o. male seen for PT evaluation s/p admit [facility]/13/2025 w/ CVA (cerebral vascular accident) (HCC). PT consulted to assess pt's functional mobility and d/c needs. Order placed for PT eval and tx, w/  PT  order. Comorbidities affecting pt's physical performance at time of assessment include: TIA, (+)MRI head results for acute and subacute infaracttion, COPD, reports of blurred vision and (+)smoking. PTA, pt was independent w/ all functional mobility w/ SPC occasionally, ambulates community distances and elevations, ambulates unrestricted distances and all terrain, ambulates household distances, has 4 EVELYNE, retired, and alone during the day while nephew works . Personal factors affecting pt at time of IE  include: ambulating w/ assistive device, stairs to enter home, inability to navigate community distances, inability to navigate level surfaces w/o external assistance, unable to perform dynamic tasks in community, limited home support, hearing impairments, impulsivity, tobacco use, and inability to perform IADLs. Please find objective findings from PT assessment regarding body systems outlined above with impairments and limitations including weakness, impaired balance, decreased endurance, gait deviations, decreased activity tolerance, decreased functional mobility tolerance, decreased safety awareness, impaired judgement, fall risk, SOB upon exertion, and decreased skin integrity. The following objective measures performed on IE also reveal limitations: AM-PAC 6-Clicks: 18/24. Pt's clinical presentation is currently unstable/unpredictable seen in pt's presentation of multiple lines, impulsivity at times, dec activity tolerance, ataxic and unsteady gait and movement pattern,dec ability to avoid L sided obstacles during turns and in hospital hallway,dec safety awareness. Pt to benefit from continued PT tx to address deficits as defined above and maximize level of functional independent mobility and consistency. From PT/mobility standpoint, recommendation at time of d/c would be home with outpatient rehabilitation pending progress in order to facilitate return to PLOF. Level 3 for OPPT for high level balance assessment and visual acuity   Barriers to Discharge Decreased caregiver support;Inaccessible home environment  ((+)EVELYNE and alone during the day while family works)   Goals   Patient Goals to get home and be normal living   STG Expiration Date 02/25/25   Short Term Goal #1 in 7-10 days: (1) Pt will be able to ambulate greater than 150 with use of LRAD on various surfaces needing S to mod (I) level of A and no LOB in order to A pt to return to PLOF, (2) activity tolerance:45 mins/45mins, (3) pt will be able to  "perform sit to stand transfers needing mod (I) level of A to and from various surfaces consistently in order to return to PLOF, (4) pt will be able to perform BM needing mod (I) level of A to A pt to return to PLOF, (5) (I) with BLE therapeutic ex HEP in various positions to A pt to inc balance,strength,mobility,endurance  (6) inc balance 1/2 grade in order to dec fall risk, (7) pt will be able to go up and down 4 steps needing S to mod (I) level of A and use of HR in order to navigate EVELYNE as able and as needed prior to D/C, (8) cont to provide pt and pt family education for safe D/C planning, (9) inc BLE strength 1/2 to 1 full grade in order to A pt to inc balance,strength,mobility,endurance   PT Treatment Day 1  (additional PT tx session following PT IE)   Plan   Treatment/Interventions Functional transfer training;LE strengthening/ROM;Elevations;Therapeutic exercise;Endurance training;Patient/family training;Equipment eval/education;Bed mobility;Gait training;Continued evaluation;Spoke to nursing;OT   PT Frequency 3-5x/wk   Discharge Recommendation   Rehab Resource Intensity Level, PT III (Minimum Resource Intensity)  (high level balance and vision care in OPPT)   Equipment Recommended Cane  (pt owns SPC at home for use)   AM-PAC Basic Mobility Inpatient   Turning in Flat Bed Without Bedrails 3   Lying on Back to Sitting on Edge of Flat Bed Without Bedrails 3   Moving Bed to Chair 3   Standing Up From Chair Using Arms 3   Walk in Room 3   Climb 3-5 Stairs With Railing 3   Basic Mobility Inpatient Raw Score 18   Basic Mobility Standardized Score 41.05   [facility] Highest Level Of Mobility   -HLM Goal 6: Walk 10 steps or more   -HLM Achieved 7: Walk 25 feet or more     Time In:0930  Time Out:0947  Total Time: 17 mins      S:  Pt willing and agreeable to perform stair training assessment following PT IE. \"I can try, but I only have like 4 steps to get into my home\".  O:  Pt able to perform 7 steps with use of " L HR needing min to S level of A. Education and instruction for pt place full sole of feet onto each step to A with balance, strength , pushoff and coordination. Education and instruction for pt to perform stair training nonreciprical gait pattern, with pt preferring to perform ST with reciprical gait pattern. Education for pt to perform reciprical gait pattern for ascending and nonreciprical gait pattern for descending steps due to coordination and instability issues when descending steps. Pt cont to need inc practice during stair training at this time. Pt able to ambulate an additional 100 feet without use of DME needing S level of A. Pt has unsteady and ataxic gait during upright mobility and would benefit from trial of SPC during gait, especially for longer distances. Pt performed DGI during PT tx session scoring 9/24 with pt at high risk for falling.  A:  Pt would cont to benefit from skilled inpt PT services. Recommend cont practice and assessment of stair training with education and instruction for pt to perform reciprical gait pattern ascending steps and nonreciprical gait pattern descending steps. Recommend trial use of SPC during ambulation and gait. Pt cont to be unsteady and have ataxic gait pattern during ST and GT at this time, impulsive at times with dec safety awareness.  P:  cont skilled inpt PT services 3-5xs per week for mobility, education, strength, endurance and balance    Rosalba Tolbert PT         @Rosalba Tolbert PT, DPT@

## 2025-02-16 VITALS
TEMPERATURE: 97.4 F | SYSTOLIC BLOOD PRESSURE: 133 MMHG | OXYGEN SATURATION: 97 % | DIASTOLIC BLOOD PRESSURE: 61 MMHG | RESPIRATION RATE: 15 BRPM | HEIGHT: 69 IN | WEIGHT: 170 LBS | BODY MASS INDEX: 25.18 KG/M2 | HEART RATE: 78 BPM

## 2025-02-16 LAB
ALBUMIN SERPL BCG-MCNC: 3.7 G/DL (ref 3.5–5)
ALP SERPL-CCNC: 76 U/L (ref 34–104)
ALT SERPL W P-5'-P-CCNC: 9 U/L (ref 7–52)
ANION GAP SERPL CALCULATED.3IONS-SCNC: 8 MMOL/L (ref 4–13)
AST SERPL W P-5'-P-CCNC: 12 U/L (ref 13–39)
BILIRUB SERPL-MCNC: 0.64 MG/DL (ref 0.2–1)
BUN SERPL-MCNC: 24 MG/DL (ref 5–25)
CALCIUM SERPL-MCNC: 8.7 MG/DL (ref 8.4–10.2)
CHLORIDE SERPL-SCNC: 108 MMOL/L (ref 96–108)
CO2 SERPL-SCNC: 23 MMOL/L (ref 21–32)
CREAT SERPL-MCNC: 1.36 MG/DL (ref 0.6–1.3)
ERYTHROCYTE [DISTWIDTH] IN BLOOD BY AUTOMATED COUNT: 13.7 % (ref 11.6–15.1)
GFR SERPL CREATININE-BSD FRML MDRD: 53 ML/MIN/1.73SQ M
GLUCOSE SERPL-MCNC: 92 MG/DL (ref 65–140)
HCT VFR BLD AUTO: 37.9 % (ref 36.5–49.3)
HGB BLD-MCNC: 12.2 G/DL (ref 12–17)
MAGNESIUM SERPL-MCNC: 2.1 MG/DL (ref 1.9–2.7)
MCH RBC QN AUTO: 26.6 PG (ref 26.8–34.3)
MCHC RBC AUTO-ENTMCNC: 32.2 G/DL (ref 31.4–37.4)
MCV RBC AUTO: 83 FL (ref 82–98)
PLATELET # BLD AUTO: 222 THOUSANDS/UL (ref 149–390)
PMV BLD AUTO: 10.4 FL (ref 8.9–12.7)
POTASSIUM SERPL-SCNC: 3.8 MMOL/L (ref 3.5–5.3)
PROT SERPL-MCNC: 6.6 G/DL (ref 6.4–8.4)
RBC # BLD AUTO: 4.58 MILLION/UL (ref 3.88–5.62)
SODIUM SERPL-SCNC: 139 MMOL/L (ref 135–147)
WBC # BLD AUTO: 7.82 THOUSAND/UL (ref 4.31–10.16)

## 2025-02-16 PROCEDURE — 99238 HOSP IP/OBS DSCHRG MGMT 30/<: CPT | Performed by: FAMILY MEDICINE

## 2025-02-16 PROCEDURE — 85027 COMPLETE CBC AUTOMATED: CPT | Performed by: STUDENT IN AN ORGANIZED HEALTH CARE EDUCATION/TRAINING PROGRAM

## 2025-02-16 PROCEDURE — 83735 ASSAY OF MAGNESIUM: CPT | Performed by: STUDENT IN AN ORGANIZED HEALTH CARE EDUCATION/TRAINING PROGRAM

## 2025-02-16 PROCEDURE — 80053 COMPREHEN METABOLIC PANEL: CPT | Performed by: STUDENT IN AN ORGANIZED HEALTH CARE EDUCATION/TRAINING PROGRAM

## 2025-02-16 RX ORDER — ATORVASTATIN CALCIUM 40 MG/1
40 TABLET, FILM COATED ORAL EVERY EVENING
Qty: 30 TABLET | Refills: 0 | Status: SHIPPED | OUTPATIENT
Start: 2025-02-16

## 2025-02-16 RX ORDER — ATORVASTATIN CALCIUM 40 MG/1
40 TABLET, FILM COATED ORAL EVERY EVENING
Qty: 30 TABLET | Refills: 0 | Status: SHIPPED | OUTPATIENT
Start: 2025-02-16 | End: 2025-02-16

## 2025-02-16 RX ADMIN — BACLOFEN 10 MG: 10 TABLET ORAL at 09:26

## 2025-02-16 RX ADMIN — CARVEDILOL 12.5 MG: 12.5 TABLET, FILM COATED ORAL at 09:26

## 2025-02-16 RX ADMIN — Medication 1 PATCH: at 09:26

## 2025-02-16 RX ADMIN — APIXABAN 5 MG: 5 TABLET, FILM COATED ORAL at 09:26

## 2025-02-16 RX ADMIN — BUPROPION HYDROCHLORIDE 150 MG: 150 TABLET, EXTENDED RELEASE ORAL at 09:26

## 2025-02-16 RX ADMIN — SIMETHICONE 80 MG: 80 TABLET, CHEWABLE ORAL at 09:29

## 2025-02-16 RX ADMIN — AMLODIPINE BESYLATE 5 MG: 5 TABLET ORAL at 09:26

## 2025-02-16 RX ADMIN — BUSPIRONE HYDROCHLORIDE 15 MG: 10 TABLET ORAL at 09:26

## 2025-02-16 RX ADMIN — UMECLIDINIUM BROMIDE AND VILANTEROL TRIFENATATE 1 PUFF: 62.5; 25 POWDER RESPIRATORY (INHALATION) at 09:29

## 2025-02-16 RX ADMIN — PANTOPRAZOLE SODIUM 20 MG: 20 TABLET, DELAYED RELEASE ORAL at 05:00

## 2025-02-16 RX ADMIN — LISINOPRIL 10 MG: 10 TABLET ORAL at 09:26

## 2025-02-16 RX ADMIN — SERTRALINE HYDROCHLORIDE 100 MG: 100 TABLET ORAL at 09:26

## 2025-02-16 RX ADMIN — TICAGRELOR 90 MG: 90 TABLET ORAL at 09:26

## 2025-02-16 RX ADMIN — ACETAMINOPHEN 650 MG: 325 TABLET, FILM COATED ORAL at 00:55

## 2025-02-16 NOTE — CASE MANAGEMENT
Case Management Discharge Planning Note    Patient name Abhishek Tom  Location [facility] M* MRN [id number]  : 1955 Date 2025       Current Admission Date: 2025  Current Admission Diagnosis:CVA (cerebral vascular accident) ([facility])   Patient Active Problem List    Diagnosis Date Noted Date Diagnosed    Blurred vision 2025     Dementia ([facility]) 2025     Depression 2025     Chronic occlusion of right common carotid artery 10/01/2024     Expressive aphasia 2024     Pulmonary nodule 2024     Long term (current) use of insulin ([facility]) 2024     Decreased pulses in feet 2024     Smoking 2022     Claustrophobia      Bilateral carotid artery stenosis 2022     Chronic calculous cholecystitis 2022     Delirium tremens ([facility]) 2022     Wernicke encephalopathy 2022     Alcohol use disorder, severe, dependence ([facility]) 2022     A-fib ([facility]) 2022     Hallucinations 2022     Memory changes 2022     Seizure ([facility]) 2022     Cholelithiasis 2022     Acute Cholecystitis 2022     Hypokalemia 2022     Hypomagnesemia 2022     PAF (paroxysmal atrial fibrillation) ([facility]) 2022     S/P carotid endarterectomy 11/10/2021     Ambulatory dysfunction 09/15/2021     Syncope and collapse 09/15/2021     Acute-on-chronic kidney injury  ([facility]) 2021     Pre-diabetes 2021     Stage 3a chronic kidney disease ([facility]) 2021     CVA (cerebral vascular accident) ([facility]) 2021     Stenosis of right internal carotid artery      Lumbar back pain      GERD (gastroesophageal reflux disease)      Left arm weakness 2021     Alcohol abuse 2021     HLD (hyperlipidemia) 2021     COPD (chronic obstructive pulmonary disease) ([facility]) 2021     Iron deficiency anemia 2021     Cerebrovascular accident (CVA) due to embolism of right middle cerebral artery ([facility]) 2021      Symptomatic carotid artery stenosis, right 03/29/2021     HTN (hypertension) 02/15/2011       LOS (days): 2  Geometric Mean LOS (GMLOS) (days):   Days to GMLOS:     OBJECTIVE:  Risk of Unplanned Readmission Score: 17.62         Current admission status: Inpatient   Preferred Pharmacy:   [id number] [place] - [address]  [address] [facility] [place] [address]  Phone: [phone] Fax: [phone]    [facility] [place] - [address]  [address]  [place] [address]  Phone: [phone] Fax: [phone]    [id number] - [place] - [address] [place] [address]  Phone: [phone] Fax: [phone]    [facility] [place] [place] - [address] [facility]  [address] [place] [address]  [place] [address]  Phone: [phone] Fax: [phone]    Primary Care Provider: Laith Balderas DO    Primary Insurance: MEDICARE  Secondary Insurance:     DISCHARGE DETAILS:    Discharge planning discussed with:: pt  Freedom of Choice: Yes  Comments - Freedom of Choice: Therapy recommending HHPT/OT with pt agreeable to same.  Discussed available agencies with him choosing [facility]- reserved in [facility]  CM contacted family/caregiver?: No- see comments  Were Treatment Team discharge recommendations reviewed with patient/caregiver?: Yes  Did patient/caregiver verbalize understanding of patient care needs?: Yes            Requested Home Health Care         Is the patient interested in HHC at discharge?: Yes  Home Health Discipline requested:: Physical Therapy, Occupational Therapy  Home Health Agency Name:: [facility]  A External Referral Reason (only applicable if external HHA name selected):  (not able to accept)  Home Health Follow-Up Provider:: PCP  Home Health Services Needed:: Evaluate Functional Status and Safety, Gait/ADL Training, Strengthening/Theraputic Exercises to Improve Function  Homebound  Criteria Met:: Uses an Assist Device (i.e. cane, walker, etc), Requires the Assistance of Another Person for Safe Ambulation or to Leave the Home  Supporting Clincal Findings:: Limited Endurance    DME Referral Provided  Referral made for DME?: No              Treatment Team Recommendation: Home with Home Health Care  Discharge Destination Plan:: Home with Home Health Care  Transport at Discharge : Auto with designated , Family        Transported by (Company and Unit #): Family

## 2025-02-16 NOTE — ASSESSMENT & PLAN NOTE
Patient presented with blurry vision which lasted that hour before resolution.  CT head unremarkable, CTA head and neck significant for moderate stenosis of left common carotid, left vertebral origins, occluded right common carotid, severe left common carotid stenosis.  MRI brain remarkable for acute right occipital CVA, subacute right cerebellar CVA and chronic infarcts with advanced microvascular disease.  Echocardiogram with normal left atrial size, EF 56%, mild mitral valve regurgitation, mild tricuspid and trace pulmonic valve regurgitation.  Patient is admitted under stroke pathway, neurology recommendations appreciated.  Continue Eliquis 5 mg twice daily, continue brilinta daily.  Continue atorvastatin 40 mg.  Patient will follow-up with vascular surgery and [facility].  Blood pressure goal now normotensive per recommendations.  Continue to monitor on telemetry.  Repeat CT scan to be ordered if any changes in mental status or any neuroexam noted.

## 2025-02-16 NOTE — PLAN OF CARE
Problem: Potential for Falls  Goal: Patient will remain free of falls  Description: INTERVENTIONS:  - Educate patient/family on patient safety including physical limitations  - Instruct patient to call for assistance with activity   - Consult OT/PT to assist with strengthening/mobility   - Keep Call bell within reach  - Keep bed low and locked with side rails adjusted as appropriate  - Keep care items and personal belongings within reach  - Initiate and maintain comfort rounds  - Make Fall Risk Sign visible to staff  - Offer Toileting every 2 Hours, in advance of need  - Initiate/Maintain bed alarm  - Obtain necessary fall risk management equipment.  - Apply yellow socks and bracelet for high fall risk patients  - Consider moving patient to room near nurses station  Outcome: Progressing     Problem: PAIN - ADULT  Goal: Verbalizes/displays adequate comfort level or baseline comfort level  Description: Interventions:  - Encourage patient to monitor pain and request assistance  - Assess pain using appropriate pain scale  - Administer analgesics based on type and severity of pain and evaluate response  - Implement non-pharmacological measures as appropriate and evaluate response  - Consider cultural and social influences on pain and pain management  - Notify physician/advanced practitioner if interventions unsuccessful or patient reports new pain  Outcome: Progressing     Problem: INFECTION - ADULT  Goal: Absence or prevention of progression during hospitalization  Description: INTERVENTIONS:  - Assess and monitor for signs and symptoms of infection  - Monitor lab/diagnostic results  - Monitor all insertion sites, i.e. indwelling lines, tubes, and drains  - Monitor endotracheal if appropriate and nasal secretions for changes in amount and color  - [place] appropriate cooling/warming therapies per order  - Administer medications as ordered  - Instruct and encourage patient and family to use good hand hygiene  technique  - Identify and instruct in appropriate isolation precautions for identified infection/condition  Outcome: Progressing  Goal: Absence of fever/infection during neutropenic period  Description: INTERVENTIONS:  - Monitor WBC    Outcome: Progressing     Problem: SAFETY ADULT  Goal: Patient will remain free of falls  Description: INTERVENTIONS:  - Educate patient/family on patient safety including physical limitations  - Instruct patient to call for assistance with activity   - Consult OT/PT to assist with strengthening/mobility   - Keep Call bell within reach  - Keep bed low and locked with side rails adjusted as appropriate  - Keep care items and personal belongings within reach  - Initiate and maintain comfort rounds  - Make Fall Risk Sign visible to staff  - Offer Toileting every 2 Hours, in advance of need  - Initiate/Maintain bed alarm  - Obtain necessary fall risk management equipment.  - Apply yellow socks and bracelet for high fall risk patients  - Consider moving patient to room near nurses station  Outcome: Progressing  Goal: Maintain or return to baseline ADL function  Description: INTERVENTIONS:  -  Assess patient's ability to carry out ADLs; assess patient's baseline for ADL function and identify physical deficits which impact ability to perform ADLs (bathing, care of mouth/teeth, toileting, grooming, dressing, etc.)  - Assess/evaluate cause of self-care deficits   - Assess range of motion  - Assess patient's mobility; develop plan if impaired  - Assess patient's need for assistive devices and provide as appropriate  - Encourage maximum independence but intervene and supervise when necessary  - Involve family in performance of ADLs  - Assess for home care needs following discharge   - Consider OT consult to assist with ADL evaluation and planning for discharge  - Provide patient education as appropriate  Outcome: Progressing  Goal: Maintains/Returns to pre admission functional level  Description:  INTERVENTIONS:  - Perform AM-PAC 6 Click Basic Mobility/ Daily Activity assessment daily.  - Set and communicate daily mobility goal to care team and patient/family/caregiver.   - Collaborate with rehabilitation services on mobility goals if consulted  - Perform Range of Motion 2 times a day.  - Reposition patient every 2 hours.  - Dangle patient 2 times a day  - Stand patient 2 times a day  - Ambulate patient 2 times a day  - Out of bed to chair 2 times a day   - Out of bed for meals 2 times a day  - Out of bed for toileting  - Record patient progress and toleration of activity level   Outcome: Progressing     Problem: DISCHARGE PLANNING  Goal: Discharge to home or other facility with appropriate resources  Description: INTERVENTIONS:  - Identify barriers to discharge w/patient and caregiver  - Arrange for needed discharge resources and transportation as appropriate  - Identify discharge learning needs (meds, wound care, etc.)  - Arrange for interpretive services to assist at discharge as needed  - Refer to Case Management Department for coordinating discharge planning if the patient needs post-hospital services based on physician/advanced practitioner order or complex needs related to functional status, cognitive ability, or social support system  Outcome: Progressing     Problem: Knowledge Deficit  Goal: Patient/family/caregiver demonstrates understanding of disease process, treatment plan, medications, and discharge instructions  Description: Complete learning assessment and assess knowledge base.  Interventions:  - Provide teaching at level of understanding  - Provide teaching via preferred learning methods  Outcome: Progressing     Problem: CARDIOVASCULAR - ADULT  Goal: Maintains optimal cardiac output and hemodynamic stability  Description: INTERVENTIONS:  - Monitor I/O, vital signs and rhythm  - Monitor for S/S and trends of decreased cardiac output  - Administer and titrate ordered vasoactive medications to  optimize hemodynamic stability  - Assess quality of pulses, skin color and temperature  - Assess for signs of decreased coronary artery perfusion  - Instruct patient to report change in severity of symptoms  Outcome: Progressing  Goal: Absence of cardiac dysrhythmias or at baseline rhythm  Description: INTERVENTIONS:  - Continuous cardiac monitoring, vital signs, obtain 12 lead EKG if ordered  - Administer antiarrhythmic and heart rate control medications as ordered  - Monitor electrolytes and administer replacement therapy as ordered  Outcome: Progressing     Problem: METABOLIC, FLUID AND ELECTROLYTES - ADULT  Goal: Electrolytes maintained within normal limits  Description: INTERVENTIONS:  - Monitor labs and assess patient for signs and symptoms of electrolyte imbalances  - Administer electrolyte replacement as ordered  - Monitor response to electrolyte replacements, including repeat lab results as appropriate  - Instruct patient on fluid and nutrition as appropriate  Outcome: Progressing  Goal: Fluid balance maintained  Description: INTERVENTIONS:  - Monitor labs   - Monitor I/O and WT  - Instruct patient on fluid and nutrition as appropriate  - Assess for signs & symptoms of volume excess or deficit  Outcome: Progressing

## 2025-02-16 NOTE — NURSING NOTE
Reviewed discharge instructions with pt at bedside. Pt verbalized understanding of all discharge instructions. Discharge instructions given to pt at time of discharge. IV removed, tele and masimo d/c. All questions addressed, no further questions or concerns at this time. Pt discharged to home via nephew. All belongings sent home with pt.

## 2025-02-16 NOTE — DISCHARGE SUMMARY
Discharge Summary - Hospitalist   Name: Abhishek Tom 69 y.o. male I MRN: [id number]  Unit/Bed#: [place] [address] -02 I Date of Admission: 2/13/2025   Date of Service: 2/16/2025 I Hospital Day: 2     Assessment & Plan  Blurred vision  Left eye blurry vision which lasted for about an hour before resolution  Endorsed having missed his Eliquis for a week secondary to recent appendectomy which he had in [place] last week  Said he was told to stop his Eliquis in order to have his surgery and he did.  Resumed his Eliquis about 4 days ago.  He also endorsed resolution of the left eye blurry vision while he was in the ED  Resumed Eliquis.  Neuroconsult.  Neuro checks.  Echo ordered as well as fasting lipid, mag, Phos, MRI brain.  Telemonitor.     HTN (hypertension)  Continue Coreg    HLD (hyperlipidemia)  Statin and brilinta now, plavix DC.  GERD (gastroesophageal reflux disease)  On omeprazole  Will add simethicone as patient complaining of flatulence.  Last BM was this morning.   Dementia (HCC)  On Aricept   Depression  Continue Zoloft   A-fib (HCC)  Rate controlled on Coreg.  Anticoagulated on Eliquis  Telemonitor   CVA (cerebral vascular accident) (HCC)  Patient presented with blurry vision which lasted that hour before resolution.  CT head unremarkable, CTA head and neck significant for moderate stenosis of left common carotid, left vertebral origins, occluded right common carotid, severe left common carotid stenosis.  MRI brain remarkable for acute right occipital CVA, subacute right cerebellar CVA and chronic infarcts with advanced microvascular disease.  Echocardiogram with normal left atrial size, EF 56%, mild mitral valve regurgitation, mild tricuspid and trace pulmonic valve regurgitation.  Patient is admitted under stroke pathway, neurology recommendations appreciated.  Continue Eliquis 5 mg twice daily, continue brilinta daily.  Continue atorvastatin 40 mg.  Patient will follow-up with vascular surgery and  [facility].  Blood pressure goal now normotensive per recommendations.  Continue to monitor on telemetry.  Repeat CT scan to be ordered if any changes in mental status or any neuroexam noted.  Hypomagnesemia  follow     Medical Problems       Resolved Problems  Date Reviewed: 2/16/2025   None       Discharging Physician / Practitioner: Shobha Cadena DO  PCP: Laith Balderas DO  Admission Date:   Admission Orders (From admission, onward)       Ordered        02/14/25 1619  INPATIENT ADMISSION  Once            02/13/25 2348  Place in Observation  Once                          Discharge Date: 02/16/25    Consultations During Hospital Stay:  neuro    Procedures Performed:       Significant Findings / Test Results:   MRI brain wo contrast   Final Result by Abram Frias MD (02/14 2004)      Small acute infarcts in right posterior occipital lobe.      Tiny subacute infarct in right cerebellum.      Multifocal chronic infarcts with moderate-to-severe chronic microangiopathy, as detailed above.      Absent flow void in right ICA distal cervical and intracranial segments due to combination of slow flow and occlusion which was best seen on recent CTA stroke alert head and neck dated 2/13/2024.      The study was marked in EPIC for immediate notification.      Workstation performed: [id number]         CTA stroke alert (head/neck)   Final Result by Edwardo Garcia DO (02/13 2138)   Moderate to severe right V4 segment vertebral artery noncalcific stenosis, progressed from prior.   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   Numerous additional areas of cervical and intracranial vascular stenoses and occlusion which are grossly stable from prior study as detailed above.   Findings were directly discussed with Dr. Joanne De Dios at 9:36 PM on 2/13/2025.      Workstation performed: [id number]         CT stroke alert brain   Final Result by Edwardo Garcia DO (02/13 2138)      No acute intracranial  abnormality.   Findings were directly discussed with Dr. Joanne De Dios at 9:36 PM on 2/13/2025.      Workstation performed: [id number]               Incidental Findings:   none       Test Results Pending at Discharge (will require follow up):   none     Outpatient Tests Requested:  none    Complications:  none    Reason for Admission: stroke    Hospital Course:   Abhishek Tom is a 69 y.o. male patient who originally presented to the hospital on 2/13/2025 due to left blurry vision which was started around 8 PM.  Lasted for about half an hour to an hour and cleared on its own.  Denies any type of medication, trauma or any new type of food prior to symptom onset.  Said he was seen in the [place] last week during which she had an appendectomy done.  Prior to the procedure, he was told to stop his Eliquis for about a week which she did.  He resumed again 4 days ago before the symptoms occurred.  At the time of this exam, patient's symptoms had resolved.  In the ED, his vital initially was 180/82 which improved to 168/78 otherwise stable vital signs.  Labs show normal CBC and BMP except for creatinine of 1.56.  Baseline creatinine of 1.3-1.6.  EKG shows A-fib at rate of 83 bpm and QTc of 441 ms.  CT head without contrast was done and shows no acute intracranial process.  CTA head and neck obtained in ED showed moderate to severe right V4 segment vertebral artery noncalcific stenosis, which has progressed from prior. 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 Numerous additional areas of cervical and intracranial vascular stenoses and occlusion which are grossly stable from prior study as detailed above. Hospitalist team was called to admit for further management           Please see above list of diagnoses and related plan for additional information.     Condition at Discharge: stable    Discharge Day Visit / Exam:   Subjective: Patient was seen and examined at bedside this morning.  Patient  "tolerating meals well, no residual symptoms after stroke at this time.  Walking easily and was able to sit on chair without issue.  Labs stable at this point.  Vitals: Blood Pressure: 133/61 (02/16/25 1110)  Pulse: 78 (02/16/25 1110)  Temperature: (!) 97.4 °F (36.3 °C) (02/16/25 1110)  Temp Source: Oral (02/16/25 0801)  Respirations: 15 (02/16/25 1110)  Height: 5' 9\" (175.3 cm) (02/14/25 1354)  Weight - Scale: 77.1 kg (170 lb) (02/14/25 1354)  SpO2: 97 % (02/16/25 1110)  Physical Exam  Constitutional:       Appearance: Normal appearance. He is not ill-appearing.   HENT:      Head: Normocephalic and atraumatic.      Nose: No congestion or rhinorrhea.      Mouth/Throat:      Pharynx: No oropharyngeal exudate or posterior oropharyngeal erythema.   Eyes:      Extraocular Movements: Extraocular movements intact.      Conjunctiva/sclera: Conjunctivae normal.      Pupils: Pupils are equal, round, and reactive to light.   Cardiovascular:      Rate and Rhythm: Normal rate and regular rhythm.      Pulses: Normal pulses.      Heart sounds: Normal heart sounds. No murmur heard.  Pulmonary:      Effort: Pulmonary effort is normal.      Breath sounds: Normal breath sounds.   Abdominal:      General: Bowel sounds are normal.      Palpations: Abdomen is soft.   Skin:     General: Skin is warm and dry.   Neurological:      General: No focal deficit present.      Mental Status: He is alert and oriented to person, place, and time.      Motor: No weakness.      Gait: Gait normal.   Psychiatric:         Mood and Affect: Mood normal.         Behavior: Behavior normal.         Thought Content: Thought content normal.         Judgment: Judgment normal.          Discussion with Family: Patient declined call to .     Discharge instructions/Information to patient and family:   See after visit summary for information provided to patient and family.      Provisions for Follow-Up Care:  See after visit summary for information " related to follow-up care and any pertinent home health orders.      Mobility at time of Discharge:   Basic Mobility Inpatient Raw Score: 18  JH-HLM Goal: 6: Walk 10 steps or more  JH-HLM Achieved: 7: Walk 25 feet or more  HLM Goal achieved. Continue to encourage appropriate mobility.     Disposition:   Home w/ home health    Planned Readmission: no    Discharge Medications:  See after visit summary for reconciled discharge medications provided to patient and/or family.      Administrative Statements   Discharge Statement:  I have spent a total time of 35 minutes in caring for this patient on the day of the visit/encounter. >30 minutes of time was spent on: Diagnostic results, Prognosis, Risks and benefits of tx options, Instructions for management, Patient and family education, Importance of tx compliance, Risk factor reductions, Impressions, Counseling / Coordination of care, Documenting in the medical record, and Reviewing / ordering tests, medicine, procedures  .    **Please Note: This note may have been constructed using a voice recognition system**

## 2025-02-16 NOTE — PLAN OF CARE
Problem: Potential for Falls  Goal: Patient will remain free of falls  Description: INTERVENTIONS:  - Educate patient/family on patient safety including physical limitations  - Instruct patient to call for assistance with activity   - Consult OT/PT to assist with strengthening/mobility   - Keep Call bell within reach  - Keep bed low and locked with side rails adjusted as appropriate  - Keep care items and personal belongings within reach  - Initiate and maintain comfort rounds  - Make Fall Risk Sign visible to staff  - Offer Toileting every 2 Hours, in advance of need  - Initiate/Maintain alarm  - Obtain necessary fall risk management equipment  - Apply yellow socks and bracelet for high fall risk patients  - Consider moving patient to room near nurses station  Outcome: Progressing     Problem: PAIN - ADULT  Goal: Verbalizes/displays adequate comfort level or baseline comfort level  Description: Interventions:  - Encourage patient to monitor pain and request assistance  - Assess pain using appropriate pain scale  - Administer analgesics based on type and severity of pain and evaluate response  - Implement non-pharmacological measures as appropriate and evaluate response  - Consider cultural and social influences on pain and pain management  - Notify physician/advanced practitioner if interventions unsuccessful or patient reports new pain  Outcome: Progressing     Problem: INFECTION - ADULT  Goal: Absence or prevention of progression during hospitalization  Description: INTERVENTIONS:  - Assess and monitor for signs and symptoms of infection  - Monitor lab/diagnostic results  - Monitor all insertion sites, i.e. indwelling lines, tubes, and drains  - Monitor endotracheal if appropriate and nasal secretions for changes in amount and color  - [place] appropriate cooling/warming therapies per order  - Administer medications as ordered  - Instruct and encourage patient and family to use good hand hygiene technique  -  Identify and instruct in appropriate isolation precautions for identified infection/condition  Outcome: Progressing  Goal: Absence of fever/infection during neutropenic period  Description: INTERVENTIONS:  - Monitor WBC    Outcome: Progressing     Problem: SAFETY ADULT  Goal: Patient will remain free of falls  Description: INTERVENTIONS:  - Educate patient/family on patient safety including physical limitations  - Instruct patient to call for assistance with activity   - Consult OT/PT to assist with strengthening/mobility   - Keep Call bell within reach  - Keep bed low and locked with side rails adjusted as appropriate  - Keep care items and personal belongings within reach  - Initiate and maintain comfort rounds  - Make Fall Risk Sign visible to staff  - Offer Toileting every 2 Hours, in advance of need  - Initiate/Maintain alarm  - Obtain necessary fall risk management equipment  - Apply yellow socks and bracelet for high fall risk patients  - Consider moving patient to room near nurses station  Outcome: Progressing  Goal: Maintain or return to baseline ADL function  Description: INTERVENTIONS:  -  Assess patient's ability to carry out ADLs; assess patient's baseline for ADL function and identify physical deficits which impact ability to perform ADLs (bathing, care of mouth/teeth, toileting, grooming, dressing, etc.)  - Assess/evaluate cause of self-care deficits   - Assess range of motion  - Assess patient's mobility; develop plan if impaired  - Assess patient's need for assistive devices and provide as appropriate  - Encourage maximum independence but intervene and supervise when necessary  - Involve family in performance of ADLs  - Assess for home care needs following discharge   - Consider OT consult to assist with ADL evaluation and planning for discharge  - Provide patient education as appropriate  Outcome: Progressing  Goal: Maintains/Returns to pre admission functional level  Description: INTERVENTIONS:  -  Perform AM-PAC 6 Click Basic Mobility/ Daily Activity assessment daily.  - Set and communicate daily mobility goal to care team and patient/family/caregiver.   - Collaborate with rehabilitation services on mobility goals if consulted  - Perform Range of Motion 3 times a day.  - Reposition patient every 2 hours.  - Dangle patient 3 times a day  - Stand patient 3 times a day  - Ambulate patient 3 times a day  - Out of bed to chair 3 times a day   - Out of bed for meals 3 times a day  - Out of bed for toileting  - Record patient progress and toleration of activity level   Outcome: Progressing     Problem: DISCHARGE PLANNING  Goal: Discharge to home or other facility with appropriate resources  Description: INTERVENTIONS:  - Identify barriers to discharge w/patient and caregiver  - Arrange for needed discharge resources and transportation as appropriate  - Identify discharge learning needs (meds, wound care, etc.)  - Arrange for interpretive services to assist at discharge as needed  - Refer to Case Management Department for coordinating discharge planning if the patient needs post-hospital services based on physician/advanced practitioner order or complex needs related to functional status, cognitive ability, or social support system  Outcome: Progressing     Problem: Knowledge Deficit  Goal: Patient/family/caregiver demonstrates understanding of disease process, treatment plan, medications, and discharge instructions  Description: Complete learning assessment and assess knowledge base.  Interventions:  - Provide teaching at level of understanding  - Provide teaching via preferred learning methods  Outcome: Progressing     Problem: CARDIOVASCULAR - ADULT  Goal: Maintains optimal cardiac output and hemodynamic stability  Description: INTERVENTIONS:  - Monitor I/O, vital signs and rhythm  - Monitor for S/S and trends of decreased cardiac output  - Administer and titrate ordered vasoactive medications to optimize  hemodynamic stability  - Assess quality of pulses, skin color and temperature  - Assess for signs of decreased coronary artery perfusion  - Instruct patient to report change in severity of symptoms  Outcome: Progressing  Goal: Absence of cardiac dysrhythmias or at baseline rhythm  Description: INTERVENTIONS:  - Continuous cardiac monitoring, vital signs, obtain 12 lead EKG if ordered  - Administer antiarrhythmic and heart rate control medications as ordered  - Monitor electrolytes and administer replacement therapy as ordered  Outcome: Progressing     Problem: METABOLIC, FLUID AND ELECTROLYTES - ADULT  Goal: Electrolytes maintained within normal limits  Description: INTERVENTIONS:  - Monitor labs and assess patient for signs and symptoms of electrolyte imbalances  - Administer electrolyte replacement as ordered  - Monitor response to electrolyte replacements, including repeat lab results as appropriate  - Instruct patient on fluid and nutrition as appropriate  Outcome: Progressing  Goal: Fluid balance maintained  Description: INTERVENTIONS:  - Monitor labs   - Monitor I/O and WT  - Instruct patient on fluid and nutrition as appropriate  - Assess for signs & symptoms of volume excess or deficit  Outcome: Progressing

## 2025-02-16 NOTE — PROGRESS NOTES
Patient:  NINO YU    MRN:  [id number]    Josephin Request ID:  [id number]    Level of care reserved:  [facility]    Partner Reserved:  [facility] [place] [id number] [phone]    Clinical needs requested:    Geography searched:  [phone]    Start of Service:    Request sent:  4:37pm EST on 2/15/2025 by Joyce Miller    Partner reserved:  9:36am EST on 2/16/2025 by Joyce Miller    Choice list shared:  9:36am EST on 2/16/2025 by Joyce Miller

## 2025-02-16 NOTE — PLAN OF CARE
Problem: Potential for Falls  Goal: Patient will remain free of falls  Description: INTERVENTIONS:  - Educate patient/family on patient safety including physical limitations  - Instruct patient to call for assistance with activity   - Consult OT/PT to assist with strengthening/mobility   - Keep Call bell within reach  - Keep bed low and locked with side rails adjusted as appropriate  - Keep care items and personal belongings within reach  - Initiate and maintain comfort rounds  - Make Fall Risk Sign visible to staff  - Offer Toileting every 2 Hours, in advance of need  - Initiate/Maintain alarm  - Obtain necessary fall risk management equipment  - Apply yellow socks and bracelet for high fall risk patients  - Consider moving patient to room near nurses station  2/16/2025 1146 by Daysi Funez RN  Outcome: Adequate for Discharge  2/16/2025 1146 by Daysi Funez RN  Outcome: Progressing  2/16/2025 0953 by Daysi Funez RN  Outcome: Progressing     Problem: PAIN - ADULT  Goal: Verbalizes/displays adequate comfort level or baseline comfort level  Description: Interventions:  - Encourage patient to monitor pain and request assistance  - Assess pain using appropriate pain scale  - Administer analgesics based on type and severity of pain and evaluate response  - Implement non-pharmacological measures as appropriate and evaluate response  - Consider cultural and social influences on pain and pain management  - Notify physician/advanced practitioner if interventions unsuccessful or patient reports new pain  2/16/2025 1146 by Daysi Funez RN  Outcome: Adequate for Discharge  2/16/2025 1146 by Daysi Funez RN  Outcome: Progressing  2/16/2025 0953 by Daysi Funez RN  Outcome: Progressing     Problem: INFECTION - ADULT  Goal: Absence or prevention of progression during hospitalization  Description: INTERVENTIONS:  - Assess and monitor for signs and symptoms of infection  - Monitor lab/diagnostic results  - Monitor all insertion  sites, i.e. indwelling lines, tubes, and drains  - Monitor endotracheal if appropriate and nasal secretions for changes in amount and color  - Jenner appropriate cooling/warming therapies per order  - Administer medications as ordered  - Instruct and encourage patient and family to use good hand hygiene technique  - Identify and instruct in appropriate isolation precautions for identified infection/condition  2/16/2025 1146 by Daysi Funez RN  Outcome: Adequate for Discharge  2/16/2025 1146 by Daysi Funez RN  Outcome: Progressing  2/16/2025 0953 by Daysi Funez RN  Outcome: Progressing  Goal: Absence of fever/infection during neutropenic period  Description: INTERVENTIONS:  - Monitor WBC    2/16/2025 1146 by Daysi Funez RN  Outcome: Adequate for Discharge  2/16/2025 1146 by Daysi Funez RN  Outcome: Progressing  2/16/2025 0953 by Daysi Funez RN  Outcome: Progressing     Problem: SAFETY ADULT  Goal: Patient will remain free of falls  Description: INTERVENTIONS:  - Educate patient/family on patient safety including physical limitations  - Instruct patient to call for assistance with activity   - Consult OT/PT to assist with strengthening/mobility   - Keep Call bell within reach  - Keep bed low and locked with side rails adjusted as appropriate  - Keep care items and personal belongings within reach  - Initiate and maintain comfort rounds  - Make Fall Risk Sign visible to staff  - Offer Toileting every 2 Hours, in advance of need  - Initiate/Maintain alarm  - Obtain necessary fall risk management equipment  - Apply yellow socks and bracelet for high fall risk patients  - Consider moving patient to room near nurses station  2/16/2025 1146 by Daysi Funez RN  Outcome: Adequate for Discharge  2/16/2025 1146 by Daysi Funez RN  Outcome: Progressing  2/16/2025 0953 by Daysi Funez RN  Outcome: Progressing  Goal: Maintain or return to baseline ADL function  Description: INTERVENTIONS:  -  Assess patient's ability to  carry out ADLs; assess patient's baseline for ADL function and identify physical deficits which impact ability to perform ADLs (bathing, care of mouth/teeth, toileting, grooming, dressing, etc.)  - Assess/evaluate cause of self-care deficits   - Assess range of motion  - Assess patient's mobility; develop plan if impaired  - Assess patient's need for assistive devices and provide as appropriate  - Encourage maximum independence but intervene and supervise when necessary  - Involve family in performance of ADLs  - Assess for home care needs following discharge   - Consider OT consult to assist with ADL evaluation and planning for discharge  - Provide patient education as appropriate  2/16/2025 1146 by Daysi Funez RN  Outcome: Adequate for Discharge  2/16/2025 1146 by Daysi Funez RN  Outcome: Progressing  2/16/2025 0953 by Daysi Funez RN  Outcome: Progressing  Goal: Maintains/Returns to pre admission functional level  Description: INTERVENTIONS:  - Perform AM-PAC 6 Click Basic Mobility/ Daily Activity assessment daily.  - Set and communicate daily mobility goal to care team and patient/family/caregiver.   - Collaborate with rehabilitation services on mobility goals if consulted  - Perform Range of Motion 3 times a day.  - Reposition patient every 2 hours.  - Dangle patient 3 times a day  - Stand patient 3 times a day  - Ambulate patient 3 times a day  - Out of bed to chair 3 times a day   - Out of bed for meals 3 times a day  - Out of bed for toileting  - Record patient progress and toleration of activity level   2/16/2025 1146 by Daysi Funez RN  Outcome: Adequate for Discharge  2/16/2025 1146 by Daysi Funez RN  Outcome: Progressing  2/16/2025 0953 by Daysi Funez RN  Outcome: Progressing     Problem: DISCHARGE PLANNING  Goal: Discharge to home or other facility with appropriate resources  Description: INTERVENTIONS:  - Identify barriers to discharge w/patient and caregiver  - Arrange for needed discharge  resources and transportation as appropriate  - Identify discharge learning needs (meds, wound care, etc.)  - Arrange for interpretive services to assist at discharge as needed  - Refer to Case Management Department for coordinating discharge planning if the patient needs post-hospital services based on physician/advanced practitioner order or complex needs related to functional status, cognitive ability, or social support system  2/16/2025 1146 by Daysi Funez RN  Outcome: Adequate for Discharge  2/16/2025 1146 by Daysi Funez RN  Outcome: Progressing  2/16/2025 0953 by Daysi Funez RN  Outcome: Progressing     Problem: Knowledge Deficit  Goal: Patient/family/caregiver demonstrates understanding of disease process, treatment plan, medications, and discharge instructions  Description: Complete learning assessment and assess knowledge base.  Interventions:  - Provide teaching at level of understanding  - Provide teaching via preferred learning methods  2/16/2025 1146 by Daysi Funez RN  Outcome: Adequate for Discharge  2/16/2025 1146 by Daysi Funez RN  Outcome: Progressing  2/16/2025 0953 by Daysi Funez RN  Outcome: Progressing     Problem: CARDIOVASCULAR - ADULT  Goal: Maintains optimal cardiac output and hemodynamic stability  Description: INTERVENTIONS:  - Monitor I/O, vital signs and rhythm  - Monitor for S/S and trends of decreased cardiac output  - Administer and titrate ordered vasoactive medications to optimize hemodynamic stability  - Assess quality of pulses, skin color and temperature  - Assess for signs of decreased coronary artery perfusion  - Instruct patient to report change in severity of symptoms  2/16/2025 1146 by Daysi Funez RN  Outcome: Adequate for Discharge  2/16/2025 1146 by Daysi Funez RN  Outcome: Progressing  2/16/2025 0953 by Daysi Funez RN  Outcome: Progressing  Goal: Absence of cardiac dysrhythmias or at baseline rhythm  Description: INTERVENTIONS:  - Continuous cardiac monitoring,  vital signs, obtain 12 lead EKG if ordered  - Administer antiarrhythmic and heart rate control medications as ordered  - Monitor electrolytes and administer replacement therapy as ordered  2/16/2025 1146 by Daysi Funez RN  Outcome: Adequate for Discharge  2/16/2025 1146 by Daysi Funez RN  Outcome: Progressing  2/16/2025 0953 by Daysi Funez RN  Outcome: Progressing     Problem: METABOLIC, FLUID AND ELECTROLYTES - ADULT  Goal: Electrolytes maintained within normal limits  Description: INTERVENTIONS:  - Monitor labs and assess patient for signs and symptoms of electrolyte imbalances  - Administer electrolyte replacement as ordered  - Monitor response to electrolyte replacements, including repeat lab results as appropriate  - Instruct patient on fluid and nutrition as appropriate  2/16/2025 1146 by Daysi Funez RN  Outcome: Adequate for Discharge  2/16/2025 1146 by Daysi Funez RN  Outcome: Progressing  2/16/2025 0953 by Daysi Funez RN  Outcome: Progressing  Goal: Fluid balance maintained  Description: INTERVENTIONS:  - Monitor labs   - Monitor I/O and WT  - Instruct patient on fluid and nutrition as appropriate  - Assess for signs & symptoms of volume excess or deficit  2/16/2025 1146 by Daysi Funez RN  Outcome: Adequate for Discharge  2/16/2025 1146 by Daysi Funez RN  Outcome: Progressing  2/16/2025 0953 by Daysi Funez RN  Outcome: Progressing     Problem: PHYSICAL THERAPY ADULT  Goal: Performs mobility at highest level of function for planned discharge setting.  See evaluation for individualized goals.  Description: Treatment/Interventions: Functional transfer training, LE strengthening/ROM, Elevations, Therapeutic exercise, Endurance training, Patient/family training, Equipment eval/education, Bed mobility, Gait training, Continued evaluation, Spoke to nursing, OT  Equipment Recommended: Cane (pt owns SPC at home for use)       See flowsheet documentation for full assessment, interventions and  recommendations.  Outcome: Adequate for Discharge

## 2025-02-17 NOTE — TELEPHONE ENCOUNTER
1ST ATTEMPT,     Called pt NUMBER NOT IN SERVICE,     LETTER MAILED...       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 They will not require outpatient neurological testing

## 2025-02-21 ENCOUNTER — TELEPHONE (OUTPATIENT)
Dept: NEUROLOGY | Facility: CLINIC | Age: 70
End: 2025-02-21

## 2025-02-21 NOTE — TELEPHONE ENCOUNTER
Post CVA Discharge Follow Up  Hospitalization: 2/13/25-2/16/25    Called patient twice to obtain an update. There was no answer. Received a dial tone stating the subscriber is no longer in service.

## 2025-02-26 NOTE — TELEPHONE ENCOUNTER
Post CVA Discharge Follow Up  Hospitalization: 2/13/25-2/16/25     Called patient twice to obtain an update. There was no answer. Received a dial tone stating the subscriber is no longer in service.     Mailed unable to reach letter.

## 2025-05-01 ENCOUNTER — TELEPHONE (OUTPATIENT)
Age: 70
End: 2025-05-01

## 2025-05-01 NOTE — TELEPHONE ENCOUNTER
Amy from [facility] had called regarding pts medicine that Dr. Cadena had subscribed as a hospitalist. Called office and she hung up. ZACHARY

## 2025-05-26 ENCOUNTER — APPOINTMENT (EMERGENCY)
Dept: RADIOLOGY | Facility: HOSPITAL | Age: 70
End: 2025-05-26
Payer: MEDICARE

## 2025-05-26 ENCOUNTER — HOSPITAL ENCOUNTER (EMERGENCY)
Facility: HOSPITAL | Age: 70
Discharge: HOME | End: 2025-05-26
Attending: EMERGENCY MEDICINE | Admitting: EMERGENCY MEDICINE
Payer: MEDICARE

## 2025-05-26 VITALS
TEMPERATURE: 97.3 F | HEART RATE: 82 BPM | RESPIRATION RATE: 18 BRPM | OXYGEN SATURATION: 96 % | DIASTOLIC BLOOD PRESSURE: 75 MMHG | SYSTOLIC BLOOD PRESSURE: 120 MMHG

## 2025-05-26 DIAGNOSIS — R51.9 HEADACHE: Primary | ICD-10-CM

## 2025-05-26 PROCEDURE — 70450 CT HEAD/BRAIN W/O DYE: CPT

## 2025-05-26 PROCEDURE — 93005 ELECTROCARDIOGRAM TRACING: CPT

## 2025-05-26 PROCEDURE — 99284 EMERGENCY DEPT VISIT MOD MDM: CPT | Performed by: EMERGENCY MEDICINE

## 2025-05-26 PROCEDURE — 96375 TX/PRO/DX INJ NEW DRUG ADDON: CPT

## 2025-05-26 PROCEDURE — 96365 THER/PROPH/DIAG IV INF INIT: CPT

## 2025-05-26 PROCEDURE — 99284 EMERGENCY DEPT VISIT MOD MDM: CPT

## 2025-05-26 RX ORDER — MAGNESIUM SULFATE HEPTAHYDRATE 40 MG/ML
2 INJECTION, SOLUTION INTRAVENOUS ONCE
Status: COMPLETED | OUTPATIENT
Start: 2025-05-26 | End: 2025-05-26

## 2025-05-26 RX ORDER — METOCLOPRAMIDE HYDROCHLORIDE 5 MG/ML
10 INJECTION INTRAMUSCULAR; INTRAVENOUS ONCE
Status: COMPLETED | OUTPATIENT
Start: 2025-05-26 | End: 2025-05-26

## 2025-05-26 RX ORDER — DEXAMETHASONE SODIUM PHOSPHATE 10 MG/ML
10 INJECTION, SOLUTION INTRAMUSCULAR; INTRAVENOUS ONCE
Status: COMPLETED | OUTPATIENT
Start: 2025-05-26 | End: 2025-05-26

## 2025-05-26 RX ADMIN — DEXAMETHASONE SODIUM PHOSPHATE 10 MG: 10 INJECTION, SOLUTION INTRAMUSCULAR; INTRAVENOUS at 17:58

## 2025-05-26 RX ADMIN — METOCLOPRAMIDE 10 MG: 5 INJECTION, SOLUTION INTRAMUSCULAR; INTRAVENOUS at 17:58

## 2025-05-26 RX ADMIN — MAGNESIUM SULFATE HEPTAHYDRATE 2 G: 40 INJECTION, SOLUTION INTRAVENOUS at 18:02

## 2025-05-26 RX ADMIN — SODIUM CHLORIDE 1000 ML: 0.9 INJECTION, SOLUTION INTRAVENOUS at 17:58

## 2025-05-26 NOTE — ED ATTENDING ATTESTATION
5/26/2025  I, Elliot Hurd DO, saw and evaluated the patient. I have discussed the patient with the resident/non-physician practitioner and agree with the resident's/non-physician practitioner's findings, Plan of Care, and MDM as documented in the resident's/non-physician practitioner's note, except where noted. All available labs and Radiology studies were reviewed.  I was present for key portions of any procedure(s) performed by the resident/non-physician practitioner and I was immediately available to provide assistance.       At this point I agree with the current assessment done in the Emergency Department.  I have conducted an independent evaluation of this patient a history and physical is as follows:    Patient is a 69-year-old male with a history of atrial fibrillation on Eliquis, previous stroke leaving him with a very slight droop at the left corner of his mouth, hyperlipidemia, COPD, GERD, CKD, prior carotid enterectomy, brought in by EMS.  The patient says that about 3 days ago he had the gradual onset of a right-sided headache which started while watching TV, since then has been waxing and waning, felt like he had a little of pressure on the right side.  No photophobia, no phonophobia, no falls, no difficulty speaking, thinking, or concentrating.  His nephew has been living with him since his last stroke about 7 months ago, and the nephew has no headaches.  EMS indicates the patient remained stable during transport.  Patient says right now his headache is almost gone.  There is no one else sick with similar headaches at home, no recent head or neck infections, no recent head or neck surgeries.    General:  Patient is well-appearing  Head:  Atraumatic  Eyes:  Conjunctiva pink, Extraocular muscle intact, PERRL  ENT:  Mucous membranes are moist  Neck:  Supple  Cardiac:  S1-S2, without murmurs  Lungs:  Clear to auscultation bilaterally  Abdomen:  Soft, nontender, normal bowel sounds, no CVA  tenderness, no tympany, no rigidity, no guarding  Extremities:  Normal range of motion  Neurologic:  Awake, fluent speech, normal comprehension. AAOx3. Cranial nerves 2-12 are intact, strength is 5/5 in the bilateral upper & lower extremities, no slurred speech, very slight facial droop in the corner of the left mouth,, no deficit on finger-to-nose testing, no pronator drift.  Sensation to light touch is equal and symmetric throughout the whole body  Skin:  Pink warm and dry, no rash  Psychiatric:  Alert, pleasant, cooperative          ED Course  ED Course as of 05/26/25 1814   Mon May 26, 2025   1813 ECG interpreted by me, atrial fibrillation, rate of 95, no rapid ventricular spots, no acute ischemic or acute infarctive changes, no acute change from February 14, 2025       CT head without contrast   Final Result      Stable senescent changes and sequelae of multiple old right-sided infarcts again noted. No acute intracranial hemorrhage or depressed calvarial fracture.                  Workstation performed: [id number]           Reassessment, patient was feeling comfortable, asymptomatic.  At this point the cause of the patient's headache is unclear but unlikely to represent an acute emergency.  Based on his symptom description I do not believe this represents an acute subarachnoid hemorrhage.  He has a supple neck, doubt meningitis.  No sign of acute intracranial hemorrhage from his Eliquis.  Nephew has no symptoms, doubt carbon monoxide or environmental headache.Supportive care, importance of follow-up and return precautions were discussed with the patient, who expressed understanding.      IMPRESSIONS:  Acute headache, chronic atrial fibrillation, chronic anticoagulation    MEDICAL DECISION MAKING CODING    COLLECTION AND INTERPRETATION OF DATA  I reviewed prior external notes, including February 14, 2025 ECG    I ordered each unique test  Tests reviewed personally by me:  ECG: See my ED course  Imaging: I  independently interpreted the head CT and found no acute hemorrhage or acute pathology.            Critical Care Time  Procedures

## 2025-05-26 NOTE — ED PROVIDER NOTES
"Time reflects when diagnosis was documented in both MDM as applicable and the Disposition within this note       Time User Action Codes Description Comment    5/26/2025  8:45 PM Constanza Santoyo Add [R51.9] Headache           ED Disposition       ED Disposition   Discharge    Condition   Stable    Date/Time   Mon May 26, 2025 10:36 PM    Comment   Abhishek Tom discharge to home/self care.                   Assessment & Plan       Medical Decision Making  Amount and/or Complexity of Data Reviewed  Radiology: ordered.    Risk  Prescription drug management.        Patient is a 69 y.o. 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 A-fib on Eliquis who presents to the ED with CC intermittent headache associated with R sided facial numbness, eye pain, and ear pain. States he has had these symptoms off and on for a week. They initially were improving with Tylenol, but this time it did not. Today he was having a headache that was 7/10 with the R parietal area being \"sensitive to touch\". It did not improve with Tylenol so he decided to come to the ER for evaluation.  Does not currently have a headache; says that it started going away when he was in the ambulance. Denies any numbness, weakness, or tingling currently. Says sometimes with this headache he will feel like he has a harder time balancing. Exacerbating factor is moving his head a certain way and says it can cause a twinge in his neck. No recent falls or trauma to the head.     Normal neuro exam. No decreased sensation R scalp compared to L.     Vital signs significant for /90; repeat in room 140/82. Says it's usually -160. Exam as listed below.    Differential diagnosis includes but is not limited to migraine headache, tension headache, occipital neuralgia.  Given patient's description of symptoms, do not believe consistent with CVA.  Will do CT however to rule out " spontaneous intracranial hemorrhage given patient is on Eliquis.     Plan migraine cocktail with IV fluids, Reglan, Decadron, Tylenol.  CT head without contrast.    View ED course above for further discussion on patient workup.    All labs reviewed and utilized in the medical decision making process  All radiology studies independently viewed by me and interpreted by the radiologist.  I reviewed all testing with the patient.     Upon re-evaluation - patient awaiting CTH results. Care of patient signed out to colleague at this time. Did review CT imaging myself and do not seen any emergent findings. Anticipate discharge home.         Medications   sodium chloride 0.9 % bolus 1,000 mL (0 mL Intravenous Stopped 5/26/25 1913)   magnesium sulfate 2 g/50 mL IVPB (premix) 2 g (0 g Intravenous Stopped 5/26/25 1913)   dexamethasone (PF) (DECADRON) injection 10 mg (10 mg Intravenous Given 5/26/25 1758)   metoclopramide (REGLAN) injection 10 mg (10 mg Intravenous Given 5/26/25 1758)       ED Risk Strat Scores                    No data recorded        SBIRT 22yo+      Flowsheet Row Most Recent Value   Initial Alcohol Screen: US AUDIT-C     1. How often do you have a drink containing alcohol? 0 Filed at: 05/26/2025 1744   2. How many drinks containing alcohol do you have on a typical day you are drinking?  0 Filed at: 05/26/2025 1744   3b. FEMALE Any Age, or MALE 65+: How often do you have 4 or more drinks on one occassion? 0 Filed at: 05/26/2025 1744   Audit-C Score 0 Filed at: 05/26/2025 1744   JAQUELIN: How many times in the past year have you...    Used an illegal drug or used a prescription medication for non-medical reasons? Never Filed at: 05/26/2025 1744                            History of Present Illness       Chief Complaint   Patient presents with    Headache     Pt c/o R sided HA x 3 days. Pt has had multiple CVAs in the past, most recently 7 mos ago        Past Medical History[1]   Past Surgical History[2]   Family  History[3]   Social History[4]   E-Cigarette/Vaping    E-Cigarette Use Never User       E-Cigarette/Vaping Substances    Nicotine No     THC No     CBD No     Flavoring No     Other No     Unknown No       I have reviewed and agree with the history as documented.     69 y.o. male CC R sided headache intermittent over the last week not currently present         Review of Systems   Constitutional:  Negative for activity change, appetite change, chills and fever.   HENT:  Negative for congestion and rhinorrhea.    Respiratory:  Negative for apnea, cough and shortness of breath.    Gastrointestinal:  Negative for abdominal distention, nausea and vomiting.   Skin:  Negative for color change and rash.   Neurological:  Positive for facial asymmetry (chronic L facial droop) and headaches. Negative for dizziness, syncope, weakness, light-headedness and numbness.   Psychiatric/Behavioral:  Negative for confusion.            Objective       ED Triage Vitals   Temperature Pulse Blood Pressure Respirations SpO2 Patient Position - Orthostatic VS   05/26/25 1736 05/26/25 1730 05/26/25 1730 05/26/25 1730 05/26/25 1730 05/26/25 1736   (!) 97.3 °F (36.3 °C) 90 (!) 190/91 19 98 % Lying      Temp Source Heart Rate Source BP Location FiO2 (%) Pain Score    05/26/25 1736 05/26/25 1736 05/26/25 1736 -- 05/26/25 1736    Oral Monitor Right arm  6      Vitals      Date and Time Temp Pulse SpO2 Resp BP Pain Score FACES Pain Rating User   05/26/25 1900 -- 82 96 % 18 120/75 -- -- MI   05/26/25 1830 -- 78 95 % 18 123/71 -- -- JT   05/26/25 1750 -- 94 95 % -- 140/82 -- -- NV   05/26/25 1745 -- 96 98 % -- -- -- -- NV   05/26/25 1736 97.3 °F (36.3 °C) 102 99 % 18 190/91 6 -- KV   05/26/25 1730 -- 90 98 % 19 190/91 -- -- JT            Physical Exam  Constitutional:       Appearance: Normal appearance.   HENT:      Head: Normocephalic and atraumatic.      Nose: Nose normal.     Eyes:      Pupils: Pupils are equal, round, and reactive to light.        Cardiovascular:      Rate and Rhythm: Normal rate.   Pulmonary:      Effort: Pulmonary effort is normal.   Abdominal:      General: Abdomen is flat.      Palpations: Abdomen is soft.     Skin:     General: Skin is warm and dry.      Capillary Refill: Capillary refill takes less than 2 seconds.     Neurological:      General: No focal deficit present.      Mental Status: He is alert and oriented to person, place, and time.      Cranial Nerves: No cranial nerve deficit.      Sensory: No sensory deficit.      Motor: No weakness.      Coordination: Coordination normal.         Results Reviewed       None            CT head without contrast   Final Interpretation by Minh Galeano MD (05/26 2228)      Stable senescent changes and sequelae of multiple old right-sided infarcts again noted. No acute intracranial hemorrhage or depressed calvarial fracture.                  Workstation performed: [id number]             Procedures    ED Medication and Procedure Management   Prior to Admission Medications   Prescriptions Last Dose Informant Patient Reported? Taking?   Anoro Ellipta 62.5-25 MCG/ACT inhaler  Self Yes No   Sig: INHALE 1 PUFF BY MOUTH AND INTO THE LUNGS ONCE DAILY AT THE SAME TIME EACH DAY   acetaminophen (TYLENOL) 500 mg tablet  Self Yes No   Sig: Take 500 mg by mouth daily as needed for headaches, mild pain or moderate pain. Indications: Pain   albuterol (PROVENTIL HFA,VENTOLIN HFA) 90 mcg/act inhaler  Self Yes No   Sig: Inhale 2 puffs if needed   amLODIPine-benazepril (LOTREL 5-10) 5-10 MG per capsule  Self Yes No   Sig: Take 1 capsule by mouth daily   apixaban (ELIQUIS) 5 mg  Self No No   Sig: Take 1 tablet (5 mg total) by mouth 2 (two) times a day   atorvastatin (LIPITOR) 40 mg tablet   No No   Sig: Take 1 tablet (40 mg total) by mouth every evening   baclofen 10 mg tablet  Self Yes No   Sig: Take 10 mg by mouth in the morning PRN   buPROPion (WELLBUTRIN XL) 150 mg 24 hr tablet  Self Yes No   Sig: Take  150 mg by mouth daily   busPIRone (BUSPAR) 15 mg tablet  Self Yes No   Sig: 15 mg 2 (two) times a day   carvedilol (COREG) 12.5 mg tablet  Self No No   Sig: Take 1 tablet (12.5 mg total) by mouth 2 (two) times a day with meals   donepezil (ARICEPT) 5 mg tablet  Self Yes No   Sig: Take 5 mg by mouth every evening   omeprazole (PriLOSEC) 20 mg delayed release capsule  Self Yes No   Sig: Take 20 mg by mouth daily   sertraline (ZOLOFT) 100 mg tablet  Self Yes No   Sig: Take 100 mg by mouth daily     ticagrelor (BRILINTA) 90 MG   No No   Sig: Take 1 tablet (90 mg total) by mouth every 12 (twelve) hours      Facility-Administered Medications: None     Discharge Medication List as of 5/26/2025 10:36 PM        CONTINUE these medications which have NOT CHANGED    Details   acetaminophen (TYLENOL) 500 mg tablet Take 500 mg by mouth daily as needed for headaches, mild pain or moderate pain. Indications: Pain, Starting Mon 1/1/2024, Historical Med      albuterol (PROVENTIL HFA,VENTOLIN HFA) 90 mcg/act inhaler Inhale 2 puffs if needed, Starting Fri 10/8/2021, Historical Med      amLODIPine-benazepril (LOTREL 5-10) 5-10 MG per capsule Take 1 capsule by mouth daily, Historical Med      Anoro Ellipta 62.5-25 MCG/ACT inhaler INHALE 1 PUFF BY MOUTH AND INTO THE LUNGS ONCE DAILY AT THE SAME TIME EACH DAY, Historical Med      apixaban (ELIQUIS) 5 mg Take 1 tablet (5 mg total) by mouth 2 (two) times a day, Starting Fri 9/17/2021, Normal      atorvastatin (LIPITOR) 40 mg tablet Take 1 tablet (40 mg total) by mouth every evening, Starting Sun 2/16/2025, Normal      baclofen 10 mg tablet Take 10 mg by mouth in the morning PRN, Historical Med      buPROPion (WELLBUTRIN XL) 150 mg 24 hr tablet Take 150 mg by mouth daily, Historical Med      busPIRone (BUSPAR) 15 mg tablet 15 mg 2 (two) times a day, Starting Wed 4/27/2022, Historical Med      carvedilol (COREG) 12.5 mg tablet Take 1 tablet (12.5 mg total) by mouth 2 (two) times a day with  meals, Starting Tue 6/25/2024, Normal      donepezil (ARICEPT) 5 mg tablet Take 5 mg by mouth every evening, Starting Thu 2/22/2024, Historical Med      omeprazole (PriLOSEC) 20 mg delayed release capsule Take 20 mg by mouth daily, Starting Mon 10/4/2021, Historical Med      sertraline (ZOLOFT) 100 mg tablet Take 100 mg by mouth daily  , Starting Wed 1/19/2011, Historical Med      ticagrelor (BRILINTA) 90 MG Take 1 tablet (90 mg total) by mouth every 12 (twelve) hours, Starting Sun 2/16/2025, Normal           No discharge procedures on file.  ED SEPSIS DOCUMENTATION   Time reflects when diagnosis was documented in both MDM as applicable and the Disposition within this note       Time User Action Codes Description Comment    5/26/2025  8:45 PM Constanza Santoyo Add [R51.9] Headache                      [1]   Past Medical History:  Diagnosis Date    A-fib (HCC)     PRITI (acute kidney injury) (HCC) 09/05/2021    Anxiety     Back pain     Claustrophobia     COPD (chronic obstructive pulmonary disease) (HCC)     Dysphagia 09/05/2021    Fatty liver     GERD (gastroesophageal reflux disease)     History of transfusion     Hyperlipidemia     Hypertension     Hypokalemia 02/22/2022    Lumbar back pain     Panic attacks     Stenosis of right carotid artery     Stroke (HCC)     Wears glasses     Wears partial dentures     upper denture   [2]   Past Surgical History:  Procedure Laterality Date    APPENDECTOMY      COLONOSCOPY      ERCP      IR CEREBRAL ANGIOGRAPHY  04/06/2021    OTHER SURGICAL HISTORY      fertility    MO LAPAROSCOPY SURG CHOLECYSTECTOMY N/A 08/26/2022    Procedure: CHOLECYSTECTOMY LAPAROSCOPIC;  Surgeon: Michael Otoole DO;  Location: [facility];  Service: General    MO SLCTV CATHJ 3RD+ ORD SLCTV ABDL PEL/LXTR BRNCH Right 04/06/2021    Procedure: ARTERIOGRAM, carotid Angio;  Surgeon: Maximiliano Morin MD;  Location: [facility];  Service: Vascular    MO TEAEC W/PATCH GRF CAROTID VERTB SUBCLAV NECK INC Right  04/13/2021    Procedure: RIGHT ENDARTERECTOMY ARTERY CAROTID WITH BOVINE PATCH;  Surgeon: Maximiliano Morin MD;  Location: [facility];  Service: Vascular   [3]   Family History  Problem Relation Name Age of Onset    Asthma Mother     [4]   Social History  Tobacco Use    Smoking status: Every Day     Current packs/day: 0.50     Average packs/day: 0.5 packs/day for 50.4 years (25.2 ttl pk-yrs)     Types: Cigarettes     Start date: 1/1/1975    Smokeless tobacco: Never   Vaping Use    Vaping status: Never Used   Substance Use Topics    Alcohol use: Not Currently    Drug use: Never     Comment: never        Constanza Santoyo MD  05/27/25 2498

## 2025-05-27 LAB
ATRIAL RATE: 375 BPM
P AXIS: 0 DEGREES
QRS AXIS: 60 DEGREES
QRSD INTERVAL: 82 MS
QT INTERVAL: 326 MS
QTC INTERVAL: 409 MS
T WAVE AXIS: 71 DEGREES
VENTRICULAR RATE: 95 BPM

## 2025-05-27 PROCEDURE — 93010 ELECTROCARDIOGRAM REPORT: CPT | Performed by: INTERNAL MEDICINE

## 2025-05-27 NOTE — DISCHARGE INSTRUCTIONS
You were seen today for a headache. Your symptoms sound like a migraine. Please take tylenol for this at home as needed. If you have worsening headaches with any numbness, weakness, or tingling, return to the ER immediately. Your CT of you head today was normal.

## (undated) DEVICE — GUIDEWIRE VASC .035 260CM 15CM TAP ST

## (undated) DEVICE — GELFOAM 100

## (undated) DEVICE — CATH STRAIGHT RED RUBBER 20 FR

## (undated) DEVICE — HEMOCLIP CARTRIDGE MED

## (undated) DEVICE — BETHL CAROTID ENDARTERECTOMY: Brand: CARDINAL HEALTH

## (undated) DEVICE — SYRINGE 20ML LL

## (undated) DEVICE — SUT VICRYL 0 UR-6 27 IN J603H

## (undated) DEVICE — SNAP KOVER: Brand: UNBRANDED

## (undated) DEVICE — PACK PBDS LAP CHOLE RF

## (undated) DEVICE — SUT PROLENE 6-0 BV130 30 IN 8709H

## (undated) DEVICE — BAG DECANTER

## (undated) DEVICE — ADHESIVE SKIN HIGH VISCOSITY EXOFIN 1ML

## (undated) DEVICE — NEEDLE 25G X 1 1/2

## (undated) DEVICE — MONITORING SPINAL IMPULSE CASE FEE

## (undated) DEVICE — SUT MONOCRYL 3-0 SH 27 IN Y416H

## (undated) DEVICE — PETRI DISH STERILE

## (undated) DEVICE — TISSUE RETRIEVAL SYSTEM: Brand: INZII RETRIEVAL SYSTEM

## (undated) DEVICE — INTENDED FOR TISSUE SEPARATION, AND OTHER PROCEDURES THAT REQUIRE A SHARP SURGICAL BLADE TO PUNCTURE OR CUT.: Brand: BARD-PARKER SAFETY BLADES SIZE 11, STERILE

## (undated) DEVICE — SUT POLYESTER TAPE D-G 8618-00

## (undated) DEVICE — RADIFOCUS GLIDEWIRE: Brand: GLIDEWIRE

## (undated) DEVICE — IV SET 15 DROP STERILE 0/Y GRAVITY

## (undated) DEVICE — PINNACLE R/O II INTRODUCER SHEATH WITH RADIOPAQUE MARKER: Brand: PINNACLE

## (undated) DEVICE — SURGIFOAM 8.5 X 12.5

## (undated) DEVICE — Device

## (undated) DEVICE — INTENDED FOR TISSUE SEPARATION, AND OTHER PROCEDURES THAT REQUIRE A SHARP SURGICAL BLADE TO PUNCTURE OR CUT.: Brand: BARD-PARKER SAFETY BLADES SIZE 15, STERILE

## (undated) DEVICE — SUPPLY FEE STD

## (undated) DEVICE — DRAPE EQUIPMENT RF WAND

## (undated) DEVICE — NON-DEHP HIGH FLOW RATE EXTENSION SET, MALE LUER LOCK ADAPTER

## (undated) DEVICE — SURGICEL FIBRILLAR 1 X 2

## (undated) DEVICE — MICROPUNCTURE INTRODUCER SET SILHOUETTE TRANSITIONLESS PUSH-PLUS DESIGN - STIFFENED CANNULA WITH NITINOL WIRE GUIDE: Brand: MICROPUNCTURE

## (undated) DEVICE — SYRINGE KIT,PACKAGED,,150FT,MK 7(ANGIO-ARTERION, 150ML SYR KIT W/QFT,MC)(60729385): Brand: MEDRAD® MARK 7 ARTERION DISPOSABLE SYRINGE 150 ML WITH QUICK FILL TUBE

## (undated) DEVICE — TOWEL SURG XR DETECT GREEN STRL RFD

## (undated) DEVICE — GLOVE INDICATOR PI UNDERGLOVE SZ 8.5 BLUE

## (undated) DEVICE — SUT SILK 2-0 18 IN A185H

## (undated) DEVICE — SUT MONOCRYL 4-0 PS-2 27 IN Y426H

## (undated) DEVICE — 2000CC GUARDIAN II: Brand: GUARDIAN

## (undated) DEVICE — TROCAR: Brand: KII® SLEEVE

## (undated) DEVICE — FLUID MANAGEMENT KIT - IR

## (undated) DEVICE — PAD GROUNDING ADULT

## (undated) DEVICE — TIBURON SPLIT SHEET: Brand: CONVERTORS

## (undated) DEVICE — 3M™ TEGADERM™ TRANSPARENT FILM DRESSING FRAME STYLE, 1626W, 4 IN X 4-3/4 IN (10 CM X 12 CM), 50/CT 4CT/CASE: Brand: 3M™ TEGADERM™

## (undated) DEVICE — RADIFOCUS GLIDECATH: Brand: GLIDECATH

## (undated) DEVICE — DRAPE SURGIKIT SADDLE BAG

## (undated) DEVICE — SUT PROLENE 7-0 BV175-6 24 IN M8737

## (undated) DEVICE — PROXIMATE PLUS MD MULTI-DIRECTIONAL RELEASE SKIN STAPLERS CONTAINS 35 STAINLESS STEEL STAPLES APPROXIMATE CLOSED DIMENSIONS: 6.9MM X 3.9MM WIDE: Brand: PROXIMATE

## (undated) DEVICE — VIAL DECANTER

## (undated) DEVICE — CATH DIAG 5FR .035 70CM PIG CSC 20

## (undated) DEVICE — HARMONIC 1100 SHEARS, 36CM SHAFT LENGTH: Brand: HARMONIC

## (undated) DEVICE — SUT SILK 3-0 18 IN A184H

## (undated) DEVICE — DRAPE SHEET X-LG

## (undated) DEVICE — SUT MONOCRYL 4-0 PS-2 18 IN Y496G

## (undated) DEVICE — TROCAR: Brand: KII FIOS FIRST ENTRY

## (undated) DEVICE — FLEXCIL HIGH PRESSURE CONTRAST INJECTION LINE: Brand: NAMIC

## (undated) DEVICE — NEEDLE 22 G X 1 1/2 SAFETY

## (undated) DEVICE — INTENDED FOR TISSUE SEPARATION, AND OTHER PROCEDURES THAT REQUIRE A SHARP SURGICAL BLADE TO PUNCTURE OR CUT.: Brand: BARD-PARKER ® CARBON RIB-BACK BLADES

## (undated) DEVICE — COVER PROBE INTRAOPERATIVE 6 X 96 IN

## (undated) DEVICE — GLOVE SRG BIOGEL 8

## (undated) DEVICE — LIGAMAX 5 MM ENDOSCOPIC MULTIPLE CLIP APPLIER: Brand: LIGAMAX

## (undated) DEVICE — CHLORAPREP HI-LITE 26ML ORANGE

## (undated) DEVICE — 3M™ IOBAN™ 2 ANTIMICROBIAL INCISE DRAPE 6640EZ: Brand: IOBAN™ 2

## (undated) DEVICE — LIGACLIP MCA MULTIPLE CLIP APPLIERS, 20 MEDIUM CLIPS: Brand: LIGACLIP

## (undated) DEVICE — 3000CC GUARDIAN II: Brand: GUARDIAN

## (undated) DEVICE — RADIFOCUS TORQUE DEVICE MULTI-TORQUE VISE: Brand: RADIFOCUS TORQUE DEVICE

## (undated) DEVICE — DRAPE PROBE NEO-PROBE/ULTRASOUND

## (undated) DEVICE — SUT PROLENE 5-0 C-1/C-1 24 IN 8725H

## (undated) DEVICE — STERILE ICS CARDIOVASCULAR PK: Brand: CARDINAL HEALTH

## (undated) DEVICE — TUBING SMOKE EVAC W/FILTRATION DEVICE PLUMEPORT ACTIV

## (undated) DEVICE — GLOVE SRG BIOGEL ORTHOPEDIC 8